# Patient Record
Sex: FEMALE | Race: BLACK OR AFRICAN AMERICAN | NOT HISPANIC OR LATINO | Employment: OTHER | URBAN - METROPOLITAN AREA
[De-identification: names, ages, dates, MRNs, and addresses within clinical notes are randomized per-mention and may not be internally consistent; named-entity substitution may affect disease eponyms.]

---

## 2014-10-30 LAB — PAP RECOMMENDATION EXT: NORMAL

## 2017-08-22 ENCOUNTER — HOSPITAL ENCOUNTER (EMERGENCY)
Facility: HOSPITAL | Age: 57
Discharge: HOME | End: 2017-08-22
Payer: MEDICARE

## 2017-08-22 VITALS
SYSTOLIC BLOOD PRESSURE: 196 MMHG | TEMPERATURE: 96 F | DIASTOLIC BLOOD PRESSURE: 108 MMHG | HEART RATE: 95 BPM | WEIGHT: 130 LBS | HEIGHT: 60 IN | BODY MASS INDEX: 25.52 KG/M2 | OXYGEN SATURATION: 98 % | RESPIRATION RATE: 18 BRPM

## 2017-08-22 DIAGNOSIS — Z86.59: ICD-10-CM

## 2017-08-22 DIAGNOSIS — M54.9 BACK PAIN, UNSPECIFIED BACK LOCATION, UNSPECIFIED BACK PAIN LATERALITY, UNSPECIFIED CHRONICITY: Primary | ICD-10-CM

## 2017-08-22 DIAGNOSIS — R03.0 ELEVATED BLOOD PRESSURE READING: ICD-10-CM

## 2017-08-22 PROCEDURE — 99283 EMERGENCY DEPT VISIT LOW MDM: CPT

## 2017-08-22 NOTE — ED PROVIDER NOTES
"SCRIBE #1 NOTE: I, Tuannoah Ramos, am scribing for, and in the presence of, ORIANA Cardona. I have scribed the entire note.      History      Chief Complaint   Patient presents with    Other     was told by  to bring patient for xray because patient was walking "funny"       Review of patient's allergies indicates:  No Known Allergies     HPI   HPI    8/22/2017, 5:14 PM   History obtained from the adult caretaker and patient      History of Present Illness: Juan Pablo Bolden is a 57 y.o. female patient who presents to the Emergency Department for an evaluation of back pain which onset gradually today. Pt was reportedly brought here by a  for a back xray due to pt "walking funny" and c/o back pain. Pt's caretakers state pt is not c/o back pain and she is walking normally to their knowledge. Caretakers deny any known trauma. Patient denies any fever, chills, extremity weakness/numbness, saddle anesthesia, bowel/bladder incontinence, and all other sxs at this time. No further complaints or concerns at this time.         Arrival mode: Personal vehicle    PCP: Charity Mcgovern MD       Past Medical History:  Past Medical History:   Diagnosis Date    Galactorrhea        Past Surgical History:  Past surgical history reviewed not relevant      Family History:  Family history reviewed not relevant      Social History:  Social History     Social History Main Topics    Smoking status: Never Smoker    Smokeless tobacco: Unknown    Alcohol use No    Drug use: No    Sexual activity: No       ROS   Review of Systems   Constitutional: Negative for chills and fever.   HENT: Negative for congestion, sore throat and trouble swallowing.    Respiratory: Negative for cough and shortness of breath.    Cardiovascular: Negative for chest pain.   Gastrointestinal: Negative for abdominal pain, nausea and vomiting.   Genitourinary: Negative for dysuria and hematuria.        (-) Bladder incontinence "   Musculoskeletal: Positive for back pain. Negative for gait problem, neck pain and neck stiffness.   Skin: Negative for rash and wound.   Neurological: Negative for weakness, light-headedness and headaches.        (-) Saddle anesthesia   Hematological: Does not bruise/bleed easily.     Physical Exam      Initial Vitals [08/22/17 1619]   BP Pulse Resp Temp SpO2   (!) 196/108 95 18 (!) 95.9 °F (35.5 °C) 98 %      MAP       137.33          Physical Exam  Nursing Notes and Vital Signs Reviewed.  Constitutional: Patient is in no acute distress. Well-developed and well-nourished.  Head: Atraumatic. Normocephalic.  Eyes: PERRL. EOM intact. Conjunctivae are not pale. No scleral icterus.  ENT: Mucous membranes are moist. Oropharynx is clear and symmetric.    Neck: Supple. Full ROM. No lymphadenopathy.  Cardiovascular: Regular rate. Regular rhythm.  Pulmonary/Chest: No respiratory distress.   Abdominal: Soft and non-distended.  There is no tenderness.   Musculoskeletal: Moves all extremities. No obvious deformities. No back pain or gait problem noted upon examination.  Skin: Warm and dry.  Neurological:  Alert, awake, and appropriate.  Normal speech.  No acute focal neurological deficits are appreciated.  Psychiatric: Normal affect. Good eye contact. Appropriate in content.    ED Course    Procedures  ED Vital Signs:  Vitals:    08/22/17 1619   BP: (!) 196/108   Pulse: 95   Resp: 18   Temp: (!) 95.9 °F (35.5 °C)   TempSrc: Tympanic   SpO2: 98%   Weight: 59 kg (130 lb)   Height: 5' (1.524 m)            The Emergency Provider reviewed the vital signs and test results, which are outlined above.    ED Discussion     5:23 PM: Reassessed pt at this time. Pt is awake, alert, and in NAD. Discussed with pt all pertinent ED information. Discussed pt dx and plan of tx. Gave pt all f/u and return to the ED instructions. All questions and concerns were addressed at this time. Pt expresses understanding of information and instructions,  and is comfortable with plan to discharge. Pt is stable for discharge.    I discussed with patient and/or family/caretaker that evaluation in the ED does not suggest any emergent or life threatening medical conditions requiring immediate intervention beyond what was provided in the ED, and I believe patient is safe for discharge.  Regardless, an unremarkable evaluation in the ED does not preclude the development or presence of a serious of life threatening condition. As such, patient was instructed to return immediately for any worsening or change in current symptoms.      ED Medication(s):  Medications - No data to display    Discharge Medication List as of 8/22/2017  5:23 PM          Follow-up Information     Charity Mcgovern MD In 3 days.    Specialty:  Nephrology  Contact information:  [address] [place] [address]  [address]  [facility] [id number] [phone]                     Medical Decision Making              Scribe Attestation:   Scribe #1: I performed the above scribed service and the documentation accurately describes the services I performed. I attest to the accuracy of the note.    Attending:   Physician Attestation Statement for Scribe #1: I, ORIANA Cardona, personally performed the services described in this documentation, as scribed by Tuan Ramos, in my presence, and it is both accurate and complete.          Clinical Impression       ICD-10-CM ICD-9-CM   1. Back pain, unspecified back location, unspecified back pain laterality, unspecified chronicity M54.9 724.5   2. History of mental retardation Z86.59 V11.8   3. Elevated blood pressure reading R03.0 796.2       Disposition:   Disposition: Discharged  Condition: Stable         Nereida Parson PA-C  08/23/17 0204

## 2017-11-27 ENCOUNTER — OFFICE VISIT (OUTPATIENT)
Dept: PODIATRY | Facility: CLINIC | Age: 57
End: 2017-11-27
Payer: MEDICARE

## 2017-11-27 VITALS — DIASTOLIC BLOOD PRESSURE: 74 MMHG | HEIGHT: 60 IN | SYSTOLIC BLOOD PRESSURE: 111 MMHG | HEART RATE: 64 BPM

## 2017-11-27 DIAGNOSIS — B35.1 DERMATOPHYTOSIS OF NAIL: ICD-10-CM

## 2017-11-27 DIAGNOSIS — B35.1 ONYCHOMYCOSIS OF TOENAIL: Primary | ICD-10-CM

## 2017-11-27 PROCEDURE — 99999 PR PBB SHADOW E&M-EST. PATIENT-LVL II: CPT | Mod: PBBFAC,,, | Performed by: PODIATRIST

## 2017-11-27 PROCEDURE — 99212 OFFICE O/P EST SF 10 MIN: CPT | Mod: PBBFAC | Performed by: PODIATRIST

## 2017-11-27 PROCEDURE — 99202 OFFICE O/P NEW SF 15 MIN: CPT | Mod: S$PBB,,, | Performed by: PODIATRIST

## 2017-11-27 RX ORDER — QUETIAPINE FUMARATE 100 MG/1
TABLET, FILM COATED ORAL
COMMUNITY
End: 2019-11-12 | Stop reason: ALTCHOICE

## 2017-11-27 RX ORDER — DIVALPROEX SODIUM 500 MG/1
500 TABLET, DELAYED RELEASE ORAL EVERY 8 HOURS
COMMUNITY
End: 2019-11-12 | Stop reason: ALTCHOICE

## 2017-11-27 RX ORDER — DIAZEPAM 5 MG/1
5 TABLET ORAL EVERY 6 HOURS PRN
COMMUNITY
End: 2020-02-06

## 2017-12-04 NOTE — PROGRESS NOTES
Subjective:     Patient ID: Juan Pablo Bolden is a 57 y.o. female.    Chief Complaint: Nail Care (patient is accompanied by her sister and caregiver, nail care/feet check )    Juan Pablo is a 57 y.o. female who presents to the clinic for evaluation and treatment of high risk feet. Juan Pablo has a past medical history of Galactorrhea. The patient's chief complaint is long, thick toenails. Patient is mentally delayed and is non-verbal. Patient is accompaied by her sister and aid.     PCP: Charity Mcgovern MD    Date Last Seen by PCP: 10/2017    Current shoe gear:  Affected Foot: Tennis shoes     Unaffected Foot: Tennis shoes    Last encounter in this department: Visit date not found    Hemoglobin A1C   Date Value Ref Range Status   10/14/2014 5.2 4.5 - 6.2 % Final           Patient Active Problem List   Diagnosis    Central hypothyroidism    Lipodystrophy    Osteopenia    Hyperprolactinemia       Medication List with Changes/Refills   Current Medications    CALCIUM CITRATE-VITAMIN D3 315-200 MG (CITRACAL+D) 315-200 MG-UNIT PER TABLET    Take 1 tablet by mouth 2 (two) times daily.    DIAZEPAM (VALIUM) 5 MG TABLET    Take 5 mg by mouth every 6 (six) hours as needed for Anxiety.    DIVALPROEX (DEPAKOTE) 500 MG TBEC    Take 500 mg by mouth every 8 (eight) hours.    HYDROCODONE-ACETAMINOPHEN ORAL    Take by mouth.    MELATONIN 5 MG TAB    Take by mouth.    MULTIVITAMIN,THERAPEUTIC (THEREMS ORAL)    Take by mouth.    MULTIVITS,CA,MINERALS/IRON/FA (THERAPEUTIC-M ORAL)    Take by mouth.    POLYETHYLENE GLYCOL (GLYCOLAX) 17 GRAM/DOSE POWDER    Take 17 g by mouth once daily.    QUETIAPINE (SEROQUEL) 100 MG TAB    Take by mouth.    SIMVASTATIN (ZOCOR) 20 MG TABLET    Take 20 mg by mouth every evening.    TRIAMTERENE-HYDROCHLOROTHIAZIDE 37.5-25 MG (DYAZIDE) 37.5-25 MG PER CAPSULE    Take 1 capsule by mouth every morning.   Discontinued Medications    ACETAMINOPHEN (TYLENOL ARTHRITIS PAIN) 650 MG TBSR    Take 650 mg by mouth as needed.     ACETAMINOPHEN-CODEINE 300-30MG (TYLENOL #3) 300-30 MG TAB        ACETYLCYSTEINE (NAC) 600 MG CAP    Take 600 mg by mouth 3 (three) times daily.    ESTROGEN, CONJUGATED,-MEDROXYPROGESTERONE 0.3-1.5 MG (PREMPRO) 0.3-1.5 MG PER TABLET    Take 1 tablet by mouth once daily.    IBUPROFEN (ADVIL,MOTRIN) 600 MG TABLET        OXCARBAZEPINE (TRILEPTAL) 300 MG/5 ML SUSP    Take by mouth 2 (two) times daily.    QUETIAPINE (SEROQUEL) 25 MG TAB        TUBERCULIN,PURIF.PROT.DERIV. (TUBERSOL IDRM)    Inject into the skin.       Review of patient's allergies indicates:  No Known Allergies    History reviewed. No pertinent surgical history.    History reviewed. No pertinent family history.    Social History     Social History    Marital status: Single     Spouse name: N/A    Number of children: N/A    Years of education: N/A     Occupational History    Not on file.     Social History Main Topics    Smoking status: Never Smoker    Smokeless tobacco: Never Used    Alcohol use No    Drug use: No    Sexual activity: No     Other Topics Concern    Not on file     Social History Narrative    No narrative on file       Vitals:    11/27/17 1354   BP: 111/74   Pulse: 64   Height: 5' (1.524 m)   PainSc: 0-No pain       Hemoglobin A1C   Date Value Ref Range Status   10/14/2014 5.2 4.5 - 6.2 % Final       Review of Systems   Unable to perform ROS: Mental acuity             Objective:      PHYSICAL EXAM: Apperance: Alert and orient in no distress,well developed, and with good attention to grooming and body habits  LOWER EXTREMITY EXAM:  VASCULAR: Dorsalis pedis pulses 2/4 bilateral and Posterior Tibial pulses 2/4 bilateral. Capillary fill time <4 seconds bilateral. No edema observed bilateral. Varicosities absent bilateral. Skin temperature of the lower extremities is warm to warm, proximal to distal. Hair growth WNL bilateral.  DERMATOLOGICAL: No skin rashes, subcutaneous nodules, lesions, or ulcers observed bilateral. Nails  1,2,3,4,5 left elongated, thickened, and discolored with subungual debris and nails 1,2,3,4,5 right elongated. Webspaces 1-4 clean, dry and without evidence of break in skin integrity bilateral.   NEUROLOGICAL: Light touch, sharp-dull, proprioception all present and equal bilaterally.     MUSCULOSKELETAL: Muscle strength is 5/5 for foot inverters, everters, plantarflexors, and dorsiflexors. Muscle tone is normal. Negative pain on palpation of nails bilateral.           Assessment:       Encounter Diagnoses   Name Primary?    Onychomycosis of toenail - Left Foot Yes    Dermatophytosis of nail - Right Foot          Plan:   Onychomycosis of toenail - Left Foot    Dermatophytosis of nail - Right Foot      I counseled the patient on her conditions, regarding findings of my examination, my impressions, and usual treatment plan.   Patient instructed to spray all shoes with Lysol disinfectant spray and let dry before wearing. Patient instructed to wash all socks in hot water and bleach.  Discuss treatment options for nail fungus.  I explained that fungus lives in a warm dark moist environment and therefore patient should make every attempt to keep feet clean and dry.  We discussed drying feet thoroughly after shower particularly between the toes and then applying powder between the toes and in the shoes.  For fungal toenails I prescribed topical medication to be used daily for up to a year.  We discussed oral Lamisil but I did not recommend it as a first line of treatment since it is an internal medicine that may potentially have side effects, including liver problems. Patient elects for topical treatment.   Patient to return as needed.                             Elsa Wilkes DPM  [facility]

## 2018-01-06 ENCOUNTER — OFFICE VISIT (OUTPATIENT)
Dept: URGENT CARE | Facility: CLINIC | Age: 58
End: 2018-01-06
Payer: MEDICARE

## 2018-01-06 VITALS
OXYGEN SATURATION: 94 % | SYSTOLIC BLOOD PRESSURE: 120 MMHG | BODY MASS INDEX: 22.81 KG/M2 | DIASTOLIC BLOOD PRESSURE: 82 MMHG | TEMPERATURE: 97 F | HEIGHT: 58 IN | WEIGHT: 108.69 LBS | HEART RATE: 96 BPM

## 2018-01-06 DIAGNOSIS — J06.9 VIRAL URI WITH COUGH: Primary | ICD-10-CM

## 2018-01-06 PROCEDURE — 99999 PR PBB SHADOW E&M-EST. PATIENT-LVL III: CPT | Mod: PBBFAC,,, | Performed by: NURSE PRACTITIONER

## 2018-01-06 PROCEDURE — 99213 OFFICE O/P EST LOW 20 MIN: CPT | Mod: PBBFAC,PO | Performed by: NURSE PRACTITIONER

## 2018-01-06 PROCEDURE — 99214 OFFICE O/P EST MOD 30 MIN: CPT | Mod: S$PBB,,, | Performed by: NURSE PRACTITIONER

## 2018-01-06 RX ORDER — BENZONATATE 100 MG/1
100 CAPSULE ORAL 3 TIMES DAILY PRN
Qty: 60 CAPSULE | Refills: 0 | Status: SHIPPED | OUTPATIENT
Start: 2018-01-06 | End: 2018-01-16

## 2018-01-06 NOTE — PATIENT INSTRUCTIONS
Viral Upper Respiratory Illness (Adult)  You have a viral upper respiratory illness (URI), which is another term for the common cold. This illness is contagious during the first few days. It is spread through the air by coughing and sneezing. It may also be spread by direct contact (touching the sick person and then touching your own eyes, nose, or mouth). Frequent handwashing will decrease risk of spread. Most viral illnesses go away within 7 to 10 days with rest and simple home remedies. Sometimes the illness may last for several weeks. Antibiotics will not kill a virus, and they are generally not prescribed for this condition.    Home care  · If symptoms are severe, rest at home for the first 2 to 3 days. When you resume activity, don't let yourself get too tired.  · Avoid being exposed to cigarette smoke (yours or others).  · You may use acetaminophen or ibuprofen to control pain and fever, unless another medicine was prescribed. (Note: If you have chronic liver or kidney disease, have ever had a stomach ulcer or gastrointestinal bleeding, or are taking blood-thinning medicines, talk with your healthcare provider before using these medicines.) Aspirin should never be given to anyone under 18 years of age who is ill with a viral infection or fever. It may cause severe liver or brain damage.  · Your appetite may be poor, so a light diet is fine. Avoid dehydration by drinking 6 to 8 glasses of fluids per day (water, soft drinks, juices, tea, or soup). Extra fluids will help loosen secretions in the nose and lungs.  · Over-the-counter cold medicines will not shorten the length of time youre sick, but they may be helpful for the following symptoms: cough, sore throat, and nasal and sinus congestion. (Note: Do not use decongestants if you have high blood pressure.)  Follow-up care  Follow up with your healthcare provider, or as advised.  When to seek medical advice  Call your healthcare provider right away if any  of these occur:  · Cough with lots of colored sputum (mucus)  · Severe headache; face, neck, or ear pain  · Difficulty swallowing due to throat pain  · Fever of 100.4°F (38°C)  Call 911, or get immediate medical care  Call emergency services right away if any of these occur:  · Chest pain, shortness of breath, wheezing, or difficulty breathing  · Coughing up blood  · Inability to swallow due to throat pain  Date Last Reviewed: 9/13/2015  © 6879-6651 [employer]. [address] [place] [address]. All rights reserved. This information is not intended as a substitute for professional medical care. Always follow your healthcare professional's instructions.

## 2018-01-07 NOTE — PROGRESS NOTES
Subjective:       Patient ID: Juan Pablo Bolden is a 57 y.o. female.    Chief Complaint: Cough (x1 day)    Cough   This is a new problem. The current episode started today. The problem has been waxing and waning. The problem occurs every few minutes. The cough is non-productive. Associated symptoms include rhinorrhea. Pertinent negatives include no chest pain, chills, ear congestion, ear pain, fever, hemoptysis, nasal congestion, postnasal drip, rash, sore throat, shortness of breath, sweats, weight loss or wheezing. Nothing aggravates the symptoms. She has tried nothing for the symptoms. There is no history of asthma, bronchiectasis, bronchitis, COPD, emphysema, environmental allergies or pneumonia.     Review of Systems   Constitutional: Negative for chills, fever and weight loss.   HENT: Positive for rhinorrhea. Negative for ear pain, postnasal drip and sore throat.    Respiratory: Positive for cough. Negative for hemoptysis, shortness of breath, wheezing and stridor.    Cardiovascular: Negative for chest pain, palpitations and leg swelling.   Skin: Negative for rash.   Allergic/Immunologic: Negative for environmental allergies.   Neurological: Negative for dizziness.   Psychiatric/Behavioral:        Mental retardation.       Objective:      Physical Exam   Constitutional: She appears well-developed and well-nourished.   HENT:   Head: Normocephalic.   Right Ear: Tympanic membrane normal.   Left Ear: Tympanic membrane normal.   Nose: Rhinorrhea present. No mucosal edema.   Mouth/Throat: Uvula is midline, oropharynx is clear and moist and mucous membranes are normal.   Cardiovascular: Normal rate and normal heart sounds.    Pulmonary/Chest: Effort normal and breath sounds normal.   Non productive cough   Neurological: She is alert.   Skin: Skin is warm.   Psychiatric: She is hyperactive and combative.   Nursing note and vitals reviewed.      Assessment:       1. Viral URI with cough        Plan:         Juan Pablo was seen  today for cough.    Diagnoses and all orders for this visit:    Viral URI with cough  -     benzonatate (TESSALON) 100 MG capsule; Take 1 capsule (100 mg total) by mouth 3 (three) times daily as needed.    · Your symptoms are likely due to a viral infection. These infections can last up to 14 days, but you should notice some improvement of your symptoms within the first 7-10 days. Viral infections will not improve with antibiotics. If your symptoms persist >10 days without improvement or if you have any new or worsening symptoms, this is an indication that you may have developed a bacterial infection and should return to your primary care provider or to Urgent Care.   · Getting plenty of rest is very important to fighting infections.  · Increase fluids.   · May apply warm compresses as needed for facial pain and congestion.   · Saline nasal spray to loosen nasal congestion.  · Flonase or Nasacort to reduce inflammation in the sinus cavities.  · You may take an over the counter antihistamine for allergy symptoms such as sneezing, itchy/watery eyes, scratchy throat, or congestion.  · Take Tylenol or Ibuprofen as needed for sore throat, body aches, or fever.   · Follow up with your primary care provider if symptoms persist >10 days or sooner for any new or worsening symptoms.   Go to the ER for any fever that does not improve with Tylenol/Ibuprofen, neck stiffness, rash, severe headache, vision changes, shortness of breath, chest pain, facial swelling, severe facial pain, or any other new and concerning symptoms.

## 2018-12-06 ENCOUNTER — TELEPHONE (OUTPATIENT)
Dept: HEMATOLOGY/ONCOLOGY | Facility: CLINIC | Age: 58
End: 2018-12-06

## 2018-12-06 NOTE — TELEPHONE ENCOUNTER
"Spoke with Luis at [facility] regarding obtaining lab test & providers notes.  He said to call [facility] [phone].  Called clinic and spoke with Viky.  She will fax records to us after she goes thru 'privacy' & referral protocol.    ====================================  ----- Message from Bernardo Logan RN sent at 12/5/2018 10:54 AM CST -----  Contact: Gi or Luis, [facility]    Spoke to Luis, patient needs to be seen for "low white blood cells"    ----- Message -----  From: Margi Khan  Sent: 12/5/2018  10:38 AM  To: , #    MsAnibal Gi is calling looking for a specific physician,  She did not have the correct spelling, but believes Staff may know who it is.  She is requesting a returned call.    She can be reached at [phone].    Thank you.      "

## 2019-05-21 ENCOUNTER — TELEPHONE (OUTPATIENT)
Dept: PODIATRY | Facility: CLINIC | Age: 59
End: 2019-05-21

## 2019-05-21 NOTE — TELEPHONE ENCOUNTER
Spoke with pt's [facility] contact-Luis.  Informed him that the earliest available appointment was 6/4/19.  Caller understood and call ended pleasantly.       ----- Message from Christine Ochoa sent at 5/21/2019  9:46 AM CDT -----  Contact: Gi/Provider  Type:  Sooner Apoointment Request    Caller is requesting a sooner appointment.  Caller declined first available appointment listed below.  Caller will not accept being placed on the waitlist and is requesting a message be sent to doctor.  Name of Caller:Gi  When is the first available appointment?6/4  Symptoms:toe nail coming thru shoe  Would the patient rather a call back or a response via [facility]? Call back  best Call Back Number:[phone]  Additional Information: na

## 2019-06-04 ENCOUNTER — OFFICE VISIT (OUTPATIENT)
Dept: PODIATRY | Facility: CLINIC | Age: 59
End: 2019-06-04
Payer: MEDICARE

## 2019-06-04 DIAGNOSIS — B35.1 ONYCHOMYCOSIS: Primary | ICD-10-CM

## 2019-06-04 DIAGNOSIS — M79.674 PAIN IN TOES OF BOTH FEET: ICD-10-CM

## 2019-06-04 DIAGNOSIS — M79.675 PAIN IN TOES OF BOTH FEET: ICD-10-CM

## 2019-06-04 DIAGNOSIS — F79 MENTAL HANDICAP: ICD-10-CM

## 2019-06-04 PROCEDURE — 99499 NO LOS: ICD-10-PCS | Mod: ,,, | Performed by: PODIATRIST

## 2019-06-04 PROCEDURE — 99499 UNLISTED E&M SERVICE: CPT | Mod: ,,, | Performed by: PODIATRIST

## 2019-06-04 PROCEDURE — 99999 PR PBB SHADOW E&M-EST. PATIENT-LVL I: CPT | Mod: PBBFAC,,, | Performed by: PODIATRIST

## 2019-06-04 PROCEDURE — 99211 OFF/OP EST MAY X REQ PHY/QHP: CPT | Mod: PBBFAC | Performed by: PODIATRIST

## 2019-06-04 PROCEDURE — 17999 UNLISTD PX SKN MUC MEMB SUBQ: CPT | Mod: CSM,S$GLB,, | Performed by: PODIATRIST

## 2019-06-04 PROCEDURE — 99999 PR PBB SHADOW E&M-EST. PATIENT-LVL I: ICD-10-PCS | Mod: PBBFAC,,, | Performed by: PODIATRIST

## 2019-06-04 PROCEDURE — 17999 PR NON-COVERED FOOT CARE: ICD-10-PCS | Mod: CSM,S$GLB,, | Performed by: PODIATRIST

## 2019-06-04 RX ORDER — TRIAMTERENE AND HYDROCHLOROTHIAZIDE 37.5; 25 MG/1; MG/1
1 CAPSULE ORAL
COMMUNITY
End: 2020-02-05 | Stop reason: SDUPTHER

## 2019-06-04 RX ORDER — GUAIFENESIN 600 MG/1
TABLET, EXTENDED RELEASE ORAL
Refills: 0 | COMMUNITY
Start: 2019-03-28 | End: 2021-03-26

## 2019-06-04 RX ORDER — SIMVASTATIN 40 MG/1
40 TABLET, FILM COATED ORAL
COMMUNITY
End: 2020-03-21 | Stop reason: SDUPTHER

## 2019-06-04 RX ORDER — MELATONIN 10 MG
2 CAPSULE ORAL
COMMUNITY
End: 2020-03-21 | Stop reason: SDUPTHER

## 2019-06-04 RX ORDER — BENZONATATE 100 MG/1
100 CAPSULE ORAL
COMMUNITY
End: 2020-02-05

## 2019-06-04 RX ORDER — DIAZEPAM 5 MG/1
5 TABLET ORAL
COMMUNITY
End: 2020-02-05 | Stop reason: SDUPTHER

## 2019-06-04 RX ORDER — QUETIAPINE FUMARATE 100 MG/1
100 TABLET, FILM COATED ORAL
COMMUNITY
End: 2019-11-12 | Stop reason: ALTCHOICE

## 2019-06-04 NOTE — PROGRESS NOTES
Subjective:       Patient ID: Juan Pablo Bolden is a 58 y.o. female.    Chief Complaint: Nail Care (possible fungal nail-left hallux. )      HPI: Juan Pablo Bolden presents to the office with the chief complaint of elongated, thickened and dystrophic nail plates to the B/L foot. This patient does have Peripheral Angiopathy. Patient does follow with Primary Care for management of comorbid states. This patient's PMD is Charity Mcgovern MD. This patient last saw his/her primary care provider, several weeks ago.    Review of patient's allergies indicates:  No Known Allergies    Past Medical History:   Diagnosis Date    Galactorrhea        History reviewed. No pertinent family history.    Social History     Socioeconomic History    Marital status: Single     Spouse name: Not on file    Number of children: Not on file    Years of education: Not on file    Highest education level: Not on file   Occupational History    Not on file   Social Needs    Financial resource strain: Not on file    Food insecurity:     Worry: Not on file     Inability: Not on file    Transportation needs:     Medical: Not on file     Non-medical: Not on file   Tobacco Use    Smoking status: Never Smoker    Smokeless tobacco: Never Used   Substance and Sexual Activity    Alcohol use: No    Drug use: No    Sexual activity: Never   Lifestyle    Physical activity:     Days per week: Not on file     Minutes per session: Not on file    Stress: Not on file   Relationships    Social connections:     Talks on phone: Not on file     Gets together: Not on file     Attends Taoism service: Not on file     Active member of club or organization: Not on file     Attends meetings of clubs or organizations: Not on file     Relationship status: Not on file   Other Topics Concern    Not on file   Social History Narrative    Not on file       History reviewed. No pertinent surgical history.    Review of Systems   Unable to perform ROS: Psychiatric  disorder          Objective:   There were no vitals taken for this visit.    LOWER EXTREMITY PHYSICAL EXAMINATION    NEUROLOGY: Protective sensation is intact via 5.07 [place] Wally monofilament. Proprioception is intact. Sensation to light touch is intact.     VASCULAR: Hair growth is sparse on the left and right dorsal foot and at the digits. The left dorsalis pedis pulse is 1/4 and on the right is 1/4, and the left posterior tibial pulse is 1/4 and is 1/4 on the right. Varicosities are absent. Spider veins are absent to the bilateral lower extremity. Warm to warm, proximal to distal. Capillary refill time is within normal limits and less  than 3 seconds.     DERMATOLOGY: There are nail changes which are consistent with onychomycosis on the left and right foot. On the left, nails 1, 2, 3 and 5 are thickened, are dystrophic, with yellow discoloration, and with malodorous subungual debris. On the right, nails 1 are thickened, are dystrophic, with yellow discoloration, and with malodorous subungual debris. These thickened and dystrophic nails are painful to touch and palpation. The remaining nail plates are elongated, and are not suggestive of onychomycosis.     ORTHOPEDIC: Manual Muscle Testing is 5/5 in all planes on the left and right, without pains, with and without resistance.  Patient is ambulatory with the assistance of a wheelchair.      Assessment:     1. Onychomycosis    2. Pain in toes of both feet    3. Mental handicap        Plan:     Onychomycosis    Pain in toes of both feet    Mental handicap        The onychomycotic nail plates, as outlined above, are sharply debrided with double action nail nipper, and/or with the assistance of a mechanical rotary lucas, with removal of all offending nail and nail border(s), for reduction of pains. Nails are reduced in terms of length, width and girth with removal of subungual debris to facilitate pain free weight bearing and ambulation. The elongated nails as  outlined in the objective portion of this note, were trimmed to appropriate length, with a double action nail nipper, for alleviation/reduction of pains as well. Follow up in approx. 3-4 months.    Patient does not meet the qualifications for, or have the class findings necessary for routine foot care. There is no lack of or diminished sensation and he/she has no significant findings of PVD to the B/L LE. At any follow-up appointment concerning routine foot care, patient will be PROC-B, and will be required to pay for services.  This fact is explained to the patient and/or any family who is present at today's appt.        No future appointments.

## 2019-08-26 ENCOUNTER — TELEPHONE (OUTPATIENT)
Dept: NEUROLOGY | Facility: CLINIC | Age: 59
End: 2019-08-26

## 2019-08-26 NOTE — TELEPHONE ENCOUNTER
I called Luis at [facility] in regards to any paperwork that the patient may need to fill out before the visit with Dr. Simon. I left a message for a return call.

## 2019-08-26 NOTE — TELEPHONE ENCOUNTER
"----- Message from Arina Hill sent at 8/26/2019  4:13 PM CDT -----  Contact: Luis Sorensen - [facility]  Luis ask if there's any forms that need to be completed to be fax to office so that they can be completed before appointment      Contact Info: [phone] office   [phone] fax      Additional Information: Dial "0" for the  and ask for Luis"

## 2019-08-29 ENCOUNTER — TELEPHONE (OUTPATIENT)
Dept: NEUROLOGY | Facility: CLINIC | Age: 59
End: 2019-08-29

## 2019-08-29 ENCOUNTER — OFFICE VISIT (OUTPATIENT)
Dept: NEUROLOGY | Facility: CLINIC | Age: 59
End: 2019-08-29
Payer: MEDICARE

## 2019-08-29 VITALS — BODY MASS INDEX: 28.09 KG/M2 | WEIGHT: 133.81 LBS | HEIGHT: 58 IN

## 2019-08-29 DIAGNOSIS — R45.1 AGITATION: ICD-10-CM

## 2019-08-29 DIAGNOSIS — R25.9 ABNORMAL MOVEMENT: Primary | ICD-10-CM

## 2019-08-29 PROCEDURE — 99999 PR PBB SHADOW E&M-EST. PATIENT-LVL V: ICD-10-PCS | Mod: PBBFAC,,, | Performed by: PSYCHIATRY & NEUROLOGY

## 2019-08-29 PROCEDURE — 99203 PR OFFICE/OUTPT VISIT, NEW, LEVL III, 30-44 MIN: ICD-10-PCS | Mod: S$PBB,,, | Performed by: PSYCHIATRY & NEUROLOGY

## 2019-08-29 PROCEDURE — 99999 PR PBB SHADOW E&M-EST. PATIENT-LVL V: CPT | Mod: PBBFAC,,, | Performed by: PSYCHIATRY & NEUROLOGY

## 2019-08-29 PROCEDURE — 99215 OFFICE O/P EST HI 40 MIN: CPT | Mod: PBBFAC,PO | Performed by: PSYCHIATRY & NEUROLOGY

## 2019-08-29 PROCEDURE — 99203 OFFICE O/P NEW LOW 30 MIN: CPT | Mod: S$PBB,,, | Performed by: PSYCHIATRY & NEUROLOGY

## 2019-08-29 NOTE — TELEPHONE ENCOUNTER
----- Message from Yue Obreogn sent at 8/29/2019  9:19 AM CDT -----  Contact: Nurse Rubi Simon Neurology ([facility])  Type:  Sooner Apoointment Request    Caller is requesting a sooner appointment.  Caller declined first available appointment listed below.  Caller will not accept being placed on the waitlist and is requesting a message be sent to doctor.  Name of Caller: Nurse Hoyos ([facility])  When is the first available appointment?n/a  Symptoms: consult for movement disorder  Would the patient rather a call back or a response via [facility]? Call back   Best Call Back Number: [phone] ask for neurology   Additional Information: n/a

## 2019-08-30 ENCOUNTER — TELEPHONE (OUTPATIENT)
Dept: NEUROLOGY | Facility: CLINIC | Age: 59
End: 2019-08-30

## 2019-08-30 NOTE — PROGRESS NOTES
Neurology Clinic Visit  Primary Care Provider: Charity Mcgovern MD   Referring Provider: Self, Aaareffemi   Date of Visit: 08/29/2019     chief complaint:   Chief Complaint   Patient presents with    Consult     movement disorder       History of Present Illness  Juan Pablo Bolden is a 59 y.o. female I have been asked to consult for evaluation of abnormal movements. She was supposed to be scheduled with a movement disorder specialist but she was schedule with with me in error. She was previously seen by Dr. Ferreira (movement disorder specialist in the past). He is accompanied by someone from her facility who assist with history. The patient has baseline neurocognitive impairment (developmental delay) and currently on seroquel and depakote for mood.  She was noted to have keith buccal movements on examination.         Patient Active Problem List    Diagnosis Date Noted    Mental handicap 06/04/2019    Hyperprolactinemia 06/24/2016    Osteopenia 10/09/2014    Central hypothyroidism 07/02/2014    Lipodystrophy 07/02/2014     Past Medical History:   Diagnosis Date    Galactorrhea      No past surgical history on file.  No family history on file.      Current Outpatient Medications   Medication Sig    benzonatate (TESSALON) 100 MG capsule Take 100 mg by mouth.    diazePAM (VALIUM) 5 MG tablet Take 5 mg by mouth every 6 (six) hours as needed for Anxiety.    diazePAM (VALIUM) 5 MG tablet Take 5 mg by mouth.    divalproex (DEPAKOTE) 500 MG TbEC Take 500 mg by mouth every 8 (eight) hours.    melatonin 5 mg Tab Take by mouth.    MUCINEX 600 mg 12 hr tablet TAKE 1 TABLET BY MOUTH EVERY TWELVE HOURS AS NEEDED    paliperidone palmitate (INVEGA SUSTENNA) 234 mg/1.5 mL Syrg injection Inject 234 mg into the muscle.    QUEtiapine (SEROQUEL) 100 MG Tab Take by mouth.    QUEtiapine (SEROQUEL) 100 MG Tab Take 100 mg by mouth.    simvastatin (ZOCOR) 40 MG tablet Take 40 mg by mouth.    THERA-M Tab Take 1 tablet by mouth  once daily.    triamterene-hydrochlorothiazide 37.5-25 mg (DYAZIDE) 37.5-25 mg per capsule Take 1 capsule by mouth every morning.    triamterene-hydrochlorothiazide 37.5-25 mg (DYAZIDE) 37.5-25 mg per capsule Take 1 capsule by mouth.    calcium citrate-vitamin D3 315-200 mg (CITRACAL+D) 315-200 mg-unit per tablet Take 1 tablet by mouth 2 (two) times daily.    HYDROCODONE-ACETAMINOPHEN ORAL Take by mouth.    melatonin 10 mg Cap Take 2 tablets by mouth.    MULTIVITAMIN,THERAPEUTIC (THEREMS ORAL) Take by mouth.    MULTIVITS,CA,MINERALS/IRON/FA (THERAPEUTIC-M ORAL) Take by mouth.    polyethylene glycol (GLYCOLAX) 17 gram/dose powder Take 17 g by mouth once daily.    simvastatin (ZOCOR) 20 MG tablet Take 20 mg by mouth every evening.     No current facility-administered medications for this visit.        Review of patient's allergies indicates:  No Known Allergies  Social History     Socioeconomic History    Marital status: Single     Spouse name: Not on file    Number of children: Not on file    Years of education: Not on file    Highest education level: Not on file   Occupational History    Not on file   Social Needs    Financial resource strain: Not on file    Food insecurity:     Worry: Not on file     Inability: Not on file    Transportation needs:     Medical: Not on file     Non-medical: Not on file   Tobacco Use    Smoking status: Never Smoker    Smokeless tobacco: Never Used   Substance and Sexual Activity    Alcohol use: No    Drug use: No    Sexual activity: Never   Lifestyle    Physical activity:     Days per week: Not on file     Minutes per session: Not on file    Stress: Not on file   Relationships    Social connections:     Talks on phone: Not on file     Gets together: Not on file     Attends Hinduism service: Not on file     Active member of club or organization: Not on file     Attends meetings of clubs or organizations: Not on file     Relationship status: Not on file   Other  "Topics Concern    Not on file   Social History Narrative    Not on file       Review of Systems      Constitutional: negative  Eyes: negative  Ears, nose, mouth, throat, and face: negative  Respiratory: negative  Cardiovascular: negative  Gastrointestinal: negative  Genitourinary:negative  Integument/breast: negative  Hematologic/lymphatic: negative  Musculoskeletal:negative  Neurological: negative  Behavioral/Psych: negative  Endocrine: negative  Allergic/Immunologic: negative    Objective:  Vital signs in last 24 hours:    Vitals:    08/29/19 0822   Weight: 60.7 kg (133 lb 13.1 oz)   Height: 4' 10" (1.473 m)       Body mass index is 27.97 kg/m².     General: no acute distress, well nourished, well-groomed  CVS: RRR, no murmur, gallops or rubs  Respiratory: Clear to ausculation  Extremities: no edema    Neurological Examination:    HIGHER INTEGRATIVE FUNCTIONS:  -N immediately responds to questions and commands  -Oriented to person  -Recent and remote memory intact  -speaks in full sentences; follows commands  -Normal fund of knowledge    CN:  -PERRLA, visual fields full, unable to visualize optic discs due to small pupils on fundus exam   -EOMI with normal saccades and smooth pursuit  -Facial sensation intact bilaterally  -Facial strength/movement intact bilaterally  -Hearing intact to voice  -Palate elevates symmetrically  -Normal shoulder shrug and head turn  -Tongue protrudes midline    MOTOR: (left/right graded 1-5)   -formal testing was difficult but she was at least 4+/5 in all extremities       SENSORY:  -Light touch intact bilaterally    REFLEXES:  -2+ upper and lower bilaterally  -Flexor plantar reflex bilaterally  -No clonus    COORDINATION:  -FNF intact bilaterally    GAIT:  -steady; walks without assistance      Assessment/Plan:      1. Abnormal movements (oral buccal dyskinesia? Likely from medications); appear chronic as they were mentioned in previous neurology note in 2013  2. " Agitation    Plan:  Referral to movement disorder specialist or neurologist that is closer to her.  Referral to psychiatry      I discussed assessment and plan with care center and answered the questions that they had.     Part of patient note might have been created using Dragon Dictation.  Any errors in syntax or even information may not have been identified and edited on initial review prior to signing this note.

## 2019-08-30 NOTE — TELEPHONE ENCOUNTER
I returned a call to Luis Sorensen and I sent the patient's AVS and appt letters. Call was satisfied

## 2019-11-12 ENCOUNTER — INITIAL CONSULT (OUTPATIENT)
Dept: PSYCHIATRY | Facility: CLINIC | Age: 59
End: 2019-11-12
Payer: MEDICARE

## 2019-11-12 VITALS — BODY MASS INDEX: 27.05 KG/M2 | WEIGHT: 129.44 LBS

## 2019-11-12 DIAGNOSIS — F79 INTELLECTUAL DISABILITY: Primary | ICD-10-CM

## 2019-11-12 DIAGNOSIS — G47.00 INSOMNIA, UNSPECIFIED TYPE: ICD-10-CM

## 2019-11-12 DIAGNOSIS — R45.1 RESTLESSNESS AND AGITATION: ICD-10-CM

## 2019-11-12 PROCEDURE — 90792 PR PSYCHIATRIC DIAGNOSTIC EVALUATION W/MEDICAL SERVICES: ICD-10-PCS | Mod: ,,, | Performed by: PSYCHIATRY & NEUROLOGY

## 2019-11-12 PROCEDURE — 90792 PSYCH DIAG EVAL W/MED SRVCS: CPT | Mod: ,,, | Performed by: PSYCHIATRY & NEUROLOGY

## 2019-11-12 PROCEDURE — 99999 PR PBB SHADOW E&M-EST. PATIENT-LVL II: CPT | Mod: PBBFAC,,, | Performed by: PSYCHIATRY & NEUROLOGY

## 2019-11-12 PROCEDURE — 99999 PR PBB SHADOW E&M-EST. PATIENT-LVL II: ICD-10-PCS | Mod: PBBFAC,,, | Performed by: PSYCHIATRY & NEUROLOGY

## 2019-11-12 PROCEDURE — 99212 OFFICE O/P EST SF 10 MIN: CPT | Mod: PBBFAC | Performed by: PSYCHIATRY & NEUROLOGY

## 2019-11-12 RX ORDER — QUETIAPINE FUMARATE 100 MG/1
TABLET, FILM COATED ORAL
Qty: 45 TABLET | Refills: 3 | Status: SHIPPED | OUTPATIENT
Start: 2019-11-12 | End: 2020-01-15 | Stop reason: ALTCHOICE

## 2019-11-12 RX ORDER — DIVALPROEX SODIUM 250 MG/1
250 TABLET, FILM COATED, EXTENDED RELEASE ORAL DAILY
Qty: 30 TABLET | Refills: 3 | Status: SHIPPED | OUTPATIENT
Start: 2019-11-12 | End: 2020-02-05 | Stop reason: ALTCHOICE
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
## 2019-11-19 RX ORDER — LAMOTRIGINE 100 MG/1
100 TABLET ORAL DAILY
Qty: 30 TABLET | Refills: 1 | Status: SHIPPED | OUTPATIENT
Start: 2020-01-01 | End: 2020-02-06

## 2019-11-19 RX ORDER — LAMOTRIGINE 25 MG/1
TABLET ORAL
Qty: 84 TABLET | Refills: 0 | Status: SHIPPED | OUTPATIENT
Start: 2019-11-19 | End: 2020-02-05 | Stop reason: ALTCHOICE

## 2020-01-15 ENCOUNTER — OFFICE VISIT (OUTPATIENT)
Dept: NEUROLOGY | Facility: CLINIC | Age: 60
End: 2020-01-15
Payer: MEDICARE

## 2020-01-15 VITALS
HEART RATE: 76 BPM | SYSTOLIC BLOOD PRESSURE: 130 MMHG | HEIGHT: 56 IN | WEIGHT: 119.06 LBS | DIASTOLIC BLOOD PRESSURE: 70 MMHG | BODY MASS INDEX: 26.78 KG/M2

## 2020-01-15 DIAGNOSIS — F79 MENTAL HANDICAP: Primary | ICD-10-CM

## 2020-01-15 PROCEDURE — 99999 PR PBB SHADOW E&M-EST. PATIENT-LVL III: ICD-10-PCS | Mod: PBBFAC,,, | Performed by: PSYCHIATRY & NEUROLOGY

## 2020-01-15 PROCEDURE — 99214 OFFICE O/P EST MOD 30 MIN: CPT | Mod: S$PBB,,, | Performed by: PSYCHIATRY & NEUROLOGY

## 2020-01-15 PROCEDURE — 99213 OFFICE O/P EST LOW 20 MIN: CPT | Mod: PBBFAC | Performed by: PSYCHIATRY & NEUROLOGY

## 2020-01-15 PROCEDURE — 99214 PR OFFICE/OUTPT VISIT, EST, LEVL IV, 30-39 MIN: ICD-10-PCS | Mod: S$PBB,,, | Performed by: PSYCHIATRY & NEUROLOGY

## 2020-01-15 PROCEDURE — 99999 PR PBB SHADOW E&M-EST. PATIENT-LVL III: CPT | Mod: PBBFAC,,, | Performed by: PSYCHIATRY & NEUROLOGY

## 2020-01-15 NOTE — LETTER
January 15, 2020      Roque Simon MD  [address] [facility] [address]           [address] [address]  Phone: [phone]  Fax: [phone]          Patient: Juan Pablo Bolden   MR Number: [id number]   YOB: 1960   Date of Visit: 1/15/2020       Dear Dr. Roque Simon:    Thank you for referring Juan Pablo Bolden to me for evaluation. Attached you will find relevant portions of my assessment and plan of care.    If you have questions, please do not hesitate to call me. I look forward to following Juan Pablo Bolden along with you.    Sincerely,    Yuniel Yuen MD    Enclosure  CC:  No Recipients    If you would like to receive this communication electronically, please contact [email or web address] or [phone] to request more information on [employer] access.    For providers and/or their staff who would like to refer a patient to [facility], please contact us through our one-stop-shop provider referral line, [facility], at [phone].    If you feel you have received this communication in error or would no longer like to receive these types of communications, please e-mail [email or web address]

## 2020-01-15 NOTE — PROGRESS NOTES
"Subjective:    Informant:  Patient's caregiver    Patient ID: Juan Pablo Bolden is a 59 y.o. female.    Chief Complaint:   Requesting a neurologist closer to the patient's home    History obtained from the patient's caregiver and review of her available medical records indicate that the patient has had a lifelong history of "mental handicap. "  Review of medical records indicate that the patient has been either with in a state hospital setting or in a group home or now in an individual apartment with a personal caregiver 24 hr a day.  The patient has been previously on multiple medications for behavior management.  According to the caregiver with her today, her best behavior was while she was on Seroquel, but that medication apparently was discontinued because of a low white count.  The patient more recently has been on Depakote 250 mg 2 or 3 times a day.  She has also been utilizing Valium 5 mg during periods of agitation and restlessness which does occur repetitively throughout the day and night.  According to the caregiver who is with her now, the patient has been much more agitated since being removed from Seroquel.   As an example, the caregiver indicates that during the evening hours, the patient is cursing loudly and is extremely restless and agitated.  She is constantly moving and requires almost hands-on care 24 hr a day.  When she is in the community or out of the home, she is given Valium 5 mg before the out any to try to moderate the behavior just slightly.  The caregiver with her today indicates that she does has some acquired her periods during the day when she is not as agitated although she is constantly talking.  As mentioned, she needs hands-on assistance for all instrumental activities of daily living.    The patient has been previously seen by movement disorders specialist but the examination was difficult because of the patient's behavior.  She has also been seen by General Neurology who again had " difficulty with examination because of her restlessness and agitation.  When questioned, the caregiver indicates that she does have occasional chewing movements and involuntary tongue protrusion but this is variable throughout the day and apparently does not interfere with activities of daily living.  She is extremely restless and is constantly moving about when she is in her apartment.  It is extremely difficult for the caregivers to keep her quiet and control her movements.          ROS:    The patient cannot answer questions appropriately in the review of systems.    Past Medical History:   Diagnosis Date    Galactorrhea      History reviewed. No pertinent surgical history.  History reviewed. No pertinent family history.  Social History     Socioeconomic History    Marital status: Single     Spouse name: Not on file    Number of children: Not on file    Years of education: Not on file    Highest education level: Not on file   Occupational History    Not on file   Social Needs    Financial resource strain: Not on file    Food insecurity:     Worry: Not on file     Inability: Not on file    Transportation needs:     Medical: Not on file     Non-medical: Not on file   Tobacco Use    Smoking status: Never Smoker    Smokeless tobacco: Never Used   Substance and Sexual Activity    Alcohol use: No    Drug use: No    Sexual activity: Never   Lifestyle    Physical activity:     Days per week: Not on file     Minutes per session: Not on file    Stress: Not on file   Relationships    Social connections:     Talks on phone: Not on file     Gets together: Not on file     Attends Christianity service: Not on file     Active member of club or organization: Not on file     Attends meetings of clubs or organizations: Not on file     Relationship status: Not on file   Other Topics Concern    Not on file   Social History Narrative    Not on file         Objective:   PE:   VITAL SIGNS:   Height 4 ft 8 in, weight 54  kg, BMI 26.69  Vitals:    01/15/20 1105   BP: 130/70   Pulse: 76     APPEARANCE:  MARKEDLY AGITATED SLIGHTLY THIN ADULT WOMAN WHO WAS CONSTANTLY TALKING ALTHOUGH HER SPEECH IS NOT CLEARLY UNDERSTANDABLE.  SHE WOULD NOT ATTEMPT TO FOLLOW ANY COMMANDS.  HEAD: NORMOCEPHALIC, ATRAUMATIC.  EYES:   BY OBSERVATION, HER EYES ARE CONJUGATE.  EARS:   THE PATIENT ACTIVELY RESISTED EXAMINATION    NOSE:  COULD NOT BE EXAMINED  MOUTH & THROAT: THE PATIENT IS EDENTULOUS.  SHE WOULD NOT OPEN HER MOUTH FOR PHARYNGEAL EXAMINATION.  NECK: SUPPLE. NO BRUITS.  CHEST: LUNGS CLEAR TO AUSCULTATION.  CARDIOVASCULAR: REGULAR RHYTHM WITHOUT SIGNIFICANT MURMURS.  ABDOMEN:  THE PATIENT WOULD NOT ALLOW EXAMINATION.  MUSCULOSKELETAL:  NO BONY DEFORMITY SEEN.     NEUROLOGIC:     NEUROLOGIC EXAMINATION WAS NOT POSSIBLE EXCEPT BY OBSERVATION.  HER FACIAL FEATURES ARE SYMMETRICAL.  HER SPEECH IS NONSENSICAL AND DIFFICULT TO UNDERSTAND.  SHE HAS ACTIVE MOVEMENT OF BOTH ARMS AND BOTH LEGS AND IN FACT WAS MARKEDLY RESTLESS THROUGHOUT THAT TIME OF HER VISIT.  SHE WAS TALKING CONTINUOUSLY.  SHE WOULD NOT FOLLOW ANY COMMANDS CONSISTENTLY.      Assessment:     Encounter Diagnosis   Name Primary?    Mental handicap Yes       Discussion:   It appears that an involuntary movement disorder is the least of this patient's issues.  Her behavior has deteriorated significantly and it is becoming increasingly difficult for her caregivers to manage her.  I have suggested that she be seen again by psychiatry are at least contact Psychiatry to determine if there are alternative medications that can be utilized to help control her continuous restless movements and acting out behavior.  It is noted that she is on Depakote which is being utilized as a mood stabilizer, but it should also be understood that Depakote can cause bone marrow suppression and if there is a low white count, this could be due to Depakote.  She has not had a recent CBC according to the medical  records I have available to me.  I do not think it reasonable to consider treating a movement disorder in this situation.      Plan:     1.  See comments above.  I would recommend consultation with Psychiatry regarding other medications.    This note is generated with speech recognition software and is subject to transcription error and sound alike phrases that may be missed by proofreading.

## 2020-01-21 ENCOUNTER — PATIENT MESSAGE (OUTPATIENT)
Dept: PSYCHIATRY | Facility: CLINIC | Age: 60
End: 2020-01-21

## 2020-01-21 RX ORDER — ARIPIPRAZOLE 10 MG/1
10 TABLET ORAL DAILY
Qty: 30 TABLET | Refills: 2 | Status: SHIPPED | OUTPATIENT
Start: 2020-01-21 | End: 2020-02-06

## 2020-01-24 ENCOUNTER — PATIENT MESSAGE (OUTPATIENT)
Dept: HEMATOLOGY/ONCOLOGY | Facility: CLINIC | Age: 60
End: 2020-01-24

## 2020-01-24 ENCOUNTER — TELEPHONE (OUTPATIENT)
Dept: HEMATOLOGY/ONCOLOGY | Facility: CLINIC | Age: 60
End: 2020-01-24

## 2020-01-24 NOTE — TELEPHONE ENCOUNTER
No answer, message sent via patient portal.     Telephone Encounter by Jada Paula RN at 02/09/17 01:32 PM     Author:  Jada Paula RN Service:  (none) Author Type:  Registered Nurse     Filed:  02/09/17 01:32 PM Encounter Date:  2/9/2017 Status:  Signed     :  Jada Paula RN (Registered Nurse)            Per Dr. Bass,  OK to wait since patient was asymptomatic at time of visit.  Reji notified.[AB1.1M]      Revision History        User Key Date/Time User Provider Type Action    > AB1.1 02/09/17 01:32 PM Jada Paula, RN Registered Nurse Sign    M - Manual

## 2020-01-24 NOTE — TELEPHONE ENCOUNTER
----- Message from Katherine Vargas sent at 1/24/2020 11:44 AM CST -----  Contact: My Chart Request  Patient requesting to speak with you regarding  Appointment Request From: Juan Pablo Bolden    With Provider: Hemotologist    Preferred Date Range: 2/3/2020 - 3/27/2020    Preferred Times: Any time    Reason for visit: Establish new patient for hemotologist in [facility] network    Comments:  Establish new hemotologist    Please call and advise/[phone]

## 2020-01-28 ENCOUNTER — PATIENT MESSAGE (OUTPATIENT)
Dept: PSYCHIATRY | Facility: CLINIC | Age: 60
End: 2020-01-28

## 2020-01-30 ENCOUNTER — PATIENT MESSAGE (OUTPATIENT)
Dept: PSYCHIATRY | Facility: CLINIC | Age: 60
End: 2020-01-30

## 2020-01-31 ENCOUNTER — OFFICE VISIT (OUTPATIENT)
Dept: NEUROLOGY | Facility: CLINIC | Age: 60
End: 2020-01-31
Payer: MEDICARE

## 2020-01-31 VITALS
SYSTOLIC BLOOD PRESSURE: 137 MMHG | DIASTOLIC BLOOD PRESSURE: 82 MMHG | BODY MASS INDEX: 26.69 KG/M2 | HEIGHT: 56 IN | HEART RATE: 61 BPM

## 2020-01-31 DIAGNOSIS — G24.01 TARDIVE DYSKINESIA: ICD-10-CM

## 2020-01-31 DIAGNOSIS — R45.1 RESTLESSNESS AND AGITATION: ICD-10-CM

## 2020-01-31 DIAGNOSIS — F84.9 PDD (PERVASIVE DEVELOPMENTAL DISORDER): ICD-10-CM

## 2020-01-31 PROBLEM — G93.49 CHRONIC STATIC ENCEPHALOPATHY: Status: ACTIVE | Noted: 2019-06-04

## 2020-01-31 PROCEDURE — 99999 PR PBB SHADOW E&M-EST. PATIENT-LVL IV: ICD-10-PCS | Mod: PBBFAC,,, | Performed by: PSYCHIATRY & NEUROLOGY

## 2020-01-31 PROCEDURE — 99214 OFFICE O/P EST MOD 30 MIN: CPT | Mod: S$PBB,,, | Performed by: PSYCHIATRY & NEUROLOGY

## 2020-01-31 PROCEDURE — 99999 PR PBB SHADOW E&M-EST. PATIENT-LVL IV: CPT | Mod: PBBFAC,,, | Performed by: PSYCHIATRY & NEUROLOGY

## 2020-01-31 PROCEDURE — 99214 OFFICE O/P EST MOD 30 MIN: CPT | Mod: PBBFAC | Performed by: PSYCHIATRY & NEUROLOGY

## 2020-01-31 PROCEDURE — 99214 PR OFFICE/OUTPT VISIT, EST, LEVL IV, 30-39 MIN: ICD-10-PCS | Mod: S$PBB,,, | Performed by: PSYCHIATRY & NEUROLOGY

## 2020-01-31 RX ORDER — MIRTAZAPINE 30 MG/1
30 TABLET, FILM COATED ORAL DAILY
COMMUNITY
Start: 2020-01-21 | End: 2020-02-06

## 2020-01-31 RX ORDER — ALPRAZOLAM 0.25 MG/1
TABLET ORAL 2 TIMES DAILY
COMMUNITY
Start: 2020-01-21 | End: 2020-02-06 | Stop reason: SDUPTHER

## 2020-01-31 RX ORDER — QUETIAPINE FUMARATE 100 MG/1
TABLET, FILM COATED ORAL
COMMUNITY
Start: 2020-01-01 | End: 2020-02-06

## 2020-01-31 NOTE — ASSESSMENT & PLAN NOTE
She is much calmer than what has been described in prior notes and also per sitter.  She is nearly constantly moving, much like a hyperactive kid, but no agitation today and will sit back down when asked.  The improvement came with resuming seroquel, so most likely behavioral or psychiatric rather than neurologic.    I am not sure of my role today (she was scheduled as a f/u, but new to me and thus not enough time to review chart until after she had left) other than to say, yes, she may still have EPS symptoms.    As recommended by my partner 7 years ago, clozapine would be safest, but if her behavior cannot be controlled with this, then there isn't much to be done to prevent EPS worsening.
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
## 2020-02-04 ENCOUNTER — PATIENT MESSAGE (OUTPATIENT)
Dept: NEUROLOGY | Facility: CLINIC | Age: 60
End: 2020-02-04

## 2020-02-05 ENCOUNTER — PATIENT MESSAGE (OUTPATIENT)
Dept: PSYCHIATRY | Facility: CLINIC | Age: 60
End: 2020-02-05

## 2020-02-05 ENCOUNTER — OFFICE VISIT (OUTPATIENT)
Dept: FAMILY MEDICINE | Facility: CLINIC | Age: 60
End: 2020-02-05
Payer: MEDICARE

## 2020-02-05 VITALS
HEART RATE: 75 BPM | BODY MASS INDEX: 23.51 KG/M2 | WEIGHT: 109 LBS | HEIGHT: 57 IN | DIASTOLIC BLOOD PRESSURE: 82 MMHG | TEMPERATURE: 98 F | SYSTOLIC BLOOD PRESSURE: 133 MMHG

## 2020-02-05 DIAGNOSIS — I10 ESSENTIAL HYPERTENSION: ICD-10-CM

## 2020-02-05 DIAGNOSIS — Z12.31 ENCOUNTER FOR SCREENING MAMMOGRAM FOR BREAST CANCER: Primary | ICD-10-CM

## 2020-02-05 DIAGNOSIS — G40.909 SEIZURE DISORDER: ICD-10-CM

## 2020-02-05 DIAGNOSIS — F84.9 PDD (PERVASIVE DEVELOPMENTAL DISORDER): ICD-10-CM

## 2020-02-05 PROCEDURE — 99214 OFFICE O/P EST MOD 30 MIN: CPT | Mod: PBBFAC,PO | Performed by: INTERNAL MEDICINE

## 2020-02-05 PROCEDURE — 99999 PR PBB SHADOW E&M-EST. PATIENT-LVL IV: CPT | Mod: PBBFAC,,, | Performed by: INTERNAL MEDICINE

## 2020-02-05 PROCEDURE — 99204 OFFICE O/P NEW MOD 45 MIN: CPT | Mod: S$PBB,,, | Performed by: INTERNAL MEDICINE

## 2020-02-05 PROCEDURE — 99204 PR OFFICE/OUTPT VISIT, NEW, LEVL IV, 45-59 MIN: ICD-10-PCS | Mod: S$PBB,,, | Performed by: INTERNAL MEDICINE

## 2020-02-05 PROCEDURE — 99999 PR PBB SHADOW E&M-EST. PATIENT-LVL IV: ICD-10-PCS | Mod: PBBFAC,,, | Performed by: INTERNAL MEDICINE

## 2020-02-05 RX ORDER — DIVALPROEX SODIUM 250 MG/1
250 TABLET, DELAYED RELEASE ORAL 3 TIMES DAILY
COMMUNITY
End: 2020-02-06 | Stop reason: SDUPTHER

## 2020-02-06 ENCOUNTER — PATIENT MESSAGE (OUTPATIENT)
Dept: FAMILY MEDICINE | Facility: CLINIC | Age: 60
End: 2020-02-06

## 2020-02-06 ENCOUNTER — OFFICE VISIT (OUTPATIENT)
Dept: PSYCHIATRY | Facility: CLINIC | Age: 60
End: 2020-02-06
Payer: MEDICARE

## 2020-02-06 DIAGNOSIS — R45.1 AGITATION: ICD-10-CM

## 2020-02-06 DIAGNOSIS — F84.9 PDD (PERVASIVE DEVELOPMENTAL DISORDER): Primary | ICD-10-CM

## 2020-02-06 PROCEDURE — 99211 OFF/OP EST MAY X REQ PHY/QHP: CPT | Mod: PBBFAC | Performed by: PSYCHIATRY & NEUROLOGY

## 2020-02-06 PROCEDURE — 99999 PR PBB SHADOW E&M-EST. PATIENT-LVL I: CPT | Mod: PBBFAC,,, | Performed by: PSYCHIATRY & NEUROLOGY

## 2020-02-06 PROCEDURE — 99999 PR PBB SHADOW E&M-EST. PATIENT-LVL I: ICD-10-PCS | Mod: PBBFAC,,, | Performed by: PSYCHIATRY & NEUROLOGY

## 2020-02-06 PROCEDURE — 99214 PR OFFICE/OUTPT VISIT, EST, LEVL IV, 30-39 MIN: ICD-10-PCS | Mod: S$PBB,,, | Performed by: PSYCHIATRY & NEUROLOGY

## 2020-02-06 PROCEDURE — 99214 OFFICE O/P EST MOD 30 MIN: CPT | Mod: S$PBB,,, | Performed by: PSYCHIATRY & NEUROLOGY

## 2020-02-06 RX ORDER — QUETIAPINE FUMARATE 200 MG/1
200 TABLET, FILM COATED ORAL NIGHTLY
Qty: 30 TABLET | Refills: 2 | Status: SHIPPED | OUTPATIENT
Start: 2020-02-06 | End: 2020-04-06 | Stop reason: SDUPTHER

## 2020-02-06 RX ORDER — DIVALPROEX SODIUM 250 MG/1
250 TABLET, DELAYED RELEASE ORAL 3 TIMES DAILY
Qty: 90 TABLET | Refills: 2 | Status: SHIPPED | OUTPATIENT
Start: 2020-02-06 | End: 2020-04-06 | Stop reason: SDUPTHER

## 2020-02-06 RX ORDER — ALPRAZOLAM 0.25 MG/1
0.25 TABLET ORAL 2 TIMES DAILY
Qty: 60 TABLET | Refills: 2 | Status: SHIPPED | OUTPATIENT
Start: 2020-02-06 | End: 2020-04-06 | Stop reason: SDUPTHER
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
## 2020-02-06 NOTE — ASSESSMENT & PLAN NOTE
-2-3 year old intellectual level  -Followed by both psychiatry and neurology  -Agitation and restlessness much improved since restarting seroquel  -Previously used valium PRN for doctor's visits  -Recently started on BID xanax by previous PCP but caregiver is unsure exactly why  -Discussed bringing this up at her upcoming psych appointment to see if this medication is needed

## 2020-02-06 NOTE — PROGRESS NOTES
Assessment/Plan:    Seizure disorder  -Caregiver reports hx of seizure disorder but no recent seizures  -On depakote but believe this may be more as mood stabilizer     PDD (pervasive developmental disorder)  -2-3 year old intellectual level  -Followed by both psychiatry and neurology  -Agitation and restlessness much improved since restarting seroquel  -Previously used valium PRN for doctor's visits  -Recently started on BID xanax by previous PCP but caregiver is unsure exactly why  -Discussed bringing this up at her upcoming psych appointment to see if this medication is needed    Essential hypertension  -At goal today  -Currently on Dyazide 37.5-25 mg daily    _____________________________________________________________________________________________________________________________________________________    Orders this visit:    Encounter for screening mammogram for breast cancer  -     Mammo Digital Screening Bilat; Future; Expected date: 02/05/2020    Seizure disorder    Essential hypertension    PDD (pervasive developmental disorder)      Follow up in about 6 months (around 8/5/2020).    Brooke Goldberg MD  _____________________________________________________________________________________________________________________________________________________    HPI:    Patient is in clinic today as a new patient, here to establish care. Patient is a 58 yo F with a PMH of pervasive developmental disorder, tardive dyskinesia and HTN.    Patient is here today with her caregivers.  They are currently working to transfer all of her care within the [facility] system.  She is already established with Neurology.  Plans to follow up with Psychiatry and Hematology soon.  Recently there was concern about patient's activity change.  She was previously on Seroquel but this was stopped due to concern for leukopenia.  After stopping Seroquel, patient can even more agitated and restless.  She was recently restarted on Seroquel  with improvement of behavior.  Patient plans to follow up with Psychiatry this week for further discussion of her psychiatric medications.  This includes discussion about the recent addition of Xanax her medication list.  Previously patient was provided p.r.n. Valium for doctor's visits or other outside activities.  For some unknown reason she was started on twice daily Xanax per her previous PCP.  Caregivers unsure why.  Since that time she has had periods of disorientation.  Caregiver and patient's sister having concerned that it could be due to Xanax, as the symptoms started shortly after she started this medication.  I agree that this should be further addressed with psychiatry and discontinue psychiatry agrees.    Patient also previously followed by Hematology for history of leukopenia.  This is suspected secondary to medications.  She plans to establish care with [facility] hematology soon.     Patient also with a history of hypertension.  This appears to be well controlled on her current medications.    Routine health maintenance discussed.  Mammogram ordered but I am not sure patient will be able to complete.  She has successfully completed in the past year.  In order to have Pap smear completed patient will require sedation.  This needs to be further discussed with her family.  I plan to request records from her previous PCP including last notes and labs.    Past Medical History:  Past Medical History:   Diagnosis Date    Galactorrhea      History reviewed. No pertinent surgical history.  Review of patient's allergies indicates:  No Known Allergies  Social History     Tobacco Use    Smoking status: Never Smoker    Smokeless tobacco: Never Used   Substance Use Topics    Alcohol use: No    Drug use: No     History reviewed. No pertinent family history.  Current Outpatient Medications on File Prior to Visit   Medication Sig Dispense Refill    ALPRAZolam (XANAX) 0.25 MG tablet 2 (two) times daily.        ARIPiprazole (ABILIFY) 10 MG Tab Take 1 tablet (10 mg total) by mouth once daily. 30 tablet 2    diazePAM (VALIUM) 5 MG tablet Take 5 mg by mouth every 6 (six) hours as needed for Anxiety.      divalproex (DEPAKOTE) 250 MG EC tablet Take 250 mg by mouth 3 (three) times daily.      lamoTRIgine (LAMICTAL) 100 MG tablet Take 1 tablet (100 mg total) by mouth once daily. 30 tablet 1    melatonin 10 mg Cap Take 2 tablets by mouth.      mirtazapine (REMERON) 30 MG tablet Take 30 mg by mouth once daily.      MUCINEX 600 mg 12 hr tablet TAKE 1 TABLET BY MOUTH EVERY TWELVE HOURS AS NEEDED  0    MULTIVITS,CA,MINERALS/IRON/FA (THERAPEUTIC-M ORAL) Take by mouth.      polyethylene glycol (GLYCOLAX) 17 gram/dose powder Take 17 g by mouth once daily.      QUEtiapine (SEROQUEL) 100 MG Tab       simvastatin (ZOCOR) 40 MG tablet Take 40 mg by mouth.      triamterene-hydrochlorothiazide 37.5-25 mg (DYAZIDE) 37.5-25 mg per capsule Take 1 capsule by mouth every morning.      [DISCONTINUED] divalproex ER (DEPAKOTE ER) 250 MG 24 hr tablet Take 1 tablet (250 mg total) by mouth once daily. 30 tablet 3    [DISCONTINUED] benzonatate (TESSALON) 100 MG capsule Take 100 mg by mouth.      [DISCONTINUED] calcium citrate-vitamin D3 315-200 mg (CITRACAL+D) 315-200 mg-unit per tablet Take 1 tablet by mouth 2 (two) times daily.      [DISCONTINUED] diazePAM (VALIUM) 5 MG tablet Take 5 mg by mouth.      [DISCONTINUED] HYDROCODONE-ACETAMINOPHEN ORAL Take by mouth.      [DISCONTINUED] lamoTRIgine (LAMICTAL) 25 MG tablet Take 1 daily x 14 days then 2 daily x 14 days then 3 daily x 14 days then start 100 mg bottle. Stop in case of rash. (Patient not taking: Reported on 2/5/2020) 84 tablet 0    [DISCONTINUED] melatonin 5 mg Tab Take by mouth.      [DISCONTINUED] MULTIVITAMIN,THERAPEUTIC (THEREMS ORAL) Take by mouth.      [DISCONTINUED] simvastatin (ZOCOR) 20 MG tablet Take 20 mg by mouth every evening.      [DISCONTINUED] THERA-M Tab Take  "1 tablet by mouth once daily.  11    [DISCONTINUED] triamterene-hydrochlorothiazide 37.5-25 mg (DYAZIDE) 37.5-25 mg per capsule Take 1 capsule by mouth.       No current facility-administered medications on file prior to visit.        Review of Systems   Constitutional: Positive for activity change. Negative for unexpected weight change.   HENT: Negative for hearing loss, rhinorrhea and trouble swallowing.    Eyes: Negative for discharge and visual disturbance.   Respiratory: Negative for chest tightness and wheezing.    Cardiovascular: Negative for chest pain and palpitations.   Gastrointestinal: Negative for blood in stool, constipation, diarrhea and vomiting.   Endocrine: Negative for polydipsia and polyuria.   Genitourinary: Negative for difficulty urinating, dysuria, hematuria and menstrual problem.   Musculoskeletal: Negative for arthralgias, joint swelling and neck pain.   Neurological: Negative for weakness and headaches.   Psychiatric/Behavioral: Negative for confusion and dysphoric mood.       Vitals:    02/05/20 1004   BP: 133/82   Pulse: 75   Temp: 97.6 °F (36.4 °C)   Weight: 49.4 kg (109 lb)   Height: 4' 9" (1.448 m)       Wt Readings from Last 3 Encounters:   02/05/20 49.4 kg (109 lb)   01/15/20 54 kg (119 lb 0.8 oz)   08/29/19 60.7 kg (133 lb 13.1 oz)       Physical Exam   Constitutional: She appears well-developed and well-nourished. No distress.   HENT:   Head: Normocephalic and atraumatic.   Eyes: Conjunctivae are normal.   Neck: Normal range of motion.   Cardiovascular: Normal rate, regular rhythm, normal heart sounds and intact distal pulses.   Pulmonary/Chest: Effort normal and breath sounds normal. No respiratory distress.   Abdominal: She exhibits no distension.   Musculoskeletal: Normal range of motion.   Neurological:   -Patient very active throughout visit, always moving in her chair and trying to get caregiver's attention  -Unable to assess orientation   Skin: Skin is warm and dry. "   Psychiatric: She has a normal mood and affect.

## 2020-02-06 NOTE — ASSESSMENT & PLAN NOTE
-Caregiver reports hx of seizure disorder but no recent seizures  -On depakote but believe this may be more as mood stabilizer

## 2020-02-11 ENCOUNTER — TELEPHONE (OUTPATIENT)
Dept: ADMINISTRATIVE | Facility: HOSPITAL | Age: 60
End: 2020-02-11

## 2020-02-11 PROBLEM — D69.6 THROMBOCYTOPENIA: Status: ACTIVE | Noted: 2020-02-11

## 2020-02-11 PROBLEM — D72.819 LEUKOPENIA: Status: ACTIVE | Noted: 2020-02-11

## 2020-02-11 NOTE — TELEPHONE ENCOUNTER
Pt's caregiver (Krystin) states she will be at her scheduled appt on Wednesday, 2/12/20 in [place]. lw

## 2020-02-12 ENCOUNTER — PATIENT MESSAGE (OUTPATIENT)
Dept: NEUROLOGY | Facility: CLINIC | Age: 60
End: 2020-02-12

## 2020-02-12 ENCOUNTER — HOSPITAL ENCOUNTER (OUTPATIENT)
Dept: RADIOLOGY | Facility: HOSPITAL | Age: 60
Discharge: HOME | End: 2020-02-12
Attending: INTERNAL MEDICINE
Payer: MEDICARE

## 2020-02-12 ENCOUNTER — INITIAL CONSULT (OUTPATIENT)
Dept: HEMATOLOGY/ONCOLOGY | Facility: CLINIC | Age: 60
End: 2020-02-12
Payer: MEDICARE

## 2020-02-12 VITALS
TEMPERATURE: 98 F | BODY MASS INDEX: 24.59 KG/M2 | WEIGHT: 114 LBS | HEART RATE: 83 BPM | OXYGEN SATURATION: 97 % | RESPIRATION RATE: 17 BRPM | HEIGHT: 57 IN

## 2020-02-12 DIAGNOSIS — D72.819 LEUKOPENIA, UNSPECIFIED TYPE: Primary | ICD-10-CM

## 2020-02-12 DIAGNOSIS — Z12.31 ENCOUNTER FOR SCREENING MAMMOGRAM FOR BREAST CANCER: ICD-10-CM

## 2020-02-12 DIAGNOSIS — G40.909 SEIZURE DISORDER: Primary | ICD-10-CM

## 2020-02-12 DIAGNOSIS — E22.1 HYPERPROLACTINEMIA: ICD-10-CM

## 2020-02-12 DIAGNOSIS — D69.6 THROMBOCYTOPENIA: ICD-10-CM

## 2020-02-12 DIAGNOSIS — D53.9 NUTRITIONAL ANEMIA, UNSPECIFIED: ICD-10-CM

## 2020-02-12 DIAGNOSIS — F84.9 PDD (PERVASIVE DEVELOPMENTAL DISORDER): ICD-10-CM

## 2020-02-12 PROCEDURE — 99204 OFFICE O/P NEW MOD 45 MIN: CPT | Mod: S$PBB,,, | Performed by: INTERNAL MEDICINE

## 2020-02-12 PROCEDURE — 99999 PR PBB SHADOW E&M-EST. PATIENT-LVL III: CPT | Mod: PBBFAC,,, | Performed by: INTERNAL MEDICINE

## 2020-02-12 PROCEDURE — 99204 PR OFFICE/OUTPT VISIT, NEW, LEVL IV, 45-59 MIN: ICD-10-PCS | Mod: S$PBB,,, | Performed by: INTERNAL MEDICINE

## 2020-02-12 PROCEDURE — 99213 OFFICE O/P EST LOW 20 MIN: CPT | Mod: PBBFAC,PO | Performed by: INTERNAL MEDICINE

## 2020-02-12 PROCEDURE — 99999 PR PBB SHADOW E&M-EST. PATIENT-LVL III: ICD-10-PCS | Mod: PBBFAC,,, | Performed by: INTERNAL MEDICINE

## 2020-02-12 NOTE — PROGRESS NOTES
Subjective:       Patient ID: Juan Pablo Bolden is a 59 y.o. female.    Chief Complaint: Leukopenia, unspecified type [D72.819]  HPI: We have an opportunity to see Ms. Juan Pablo Bolden in Hematology Oncology clinic at [facility] on 02/11/2020.  Ms. Juan Pablo Bolden is a 59 y.o. woman with pervasive development disorder, seizures disorder, presents for evaluation of leukopenia, thrombocytopenia. She is dependent on care 24 hours per day.     No history exists.     Past Medical History:   Diagnosis Date    Galactorrhea      No family history on file.  Social History     Socioeconomic History    Marital status: Single     Spouse name: Not on file    Number of children: Not on file    Years of education: Not on file    Highest education level: Not on file   Occupational History    Not on file   Social Needs    Financial resource strain: Not on file    Food insecurity:     Worry: Not on file     Inability: Not on file    Transportation needs:     Medical: Not on file     Non-medical: Not on file   Tobacco Use    Smoking status: Never Smoker    Smokeless tobacco: Never Used   Substance and Sexual Activity    Alcohol use: No    Drug use: No    Sexual activity: Never   Lifestyle    Physical activity:     Days per week: Not on file     Minutes per session: Not on file    Stress: Not on file   Relationships    Social connections:     Talks on phone: Not on file     Gets together: Not on file     Attends Voodoo service: Not on file     Active member of club or organization: Not on file     Attends meetings of clubs or organizations: Not on file     Relationship status: Not on file   Other Topics Concern    Not on file   Social History Narrative    Not on file     No past surgical history on file.  Current Outpatient Medications   Medication Sig Dispense Refill    ALPRAZolam (XANAX) 0.25 MG tablet Take 1 tablet (0.25 mg total) by mouth 2 (two) times daily. 60 tablet 2    divalproex (DEPAKOTE) 250 MG EC  tablet Take 1 tablet (250 mg total) by mouth 3 (three) times daily. 90 tablet 2    melatonin 10 mg Cap Take 2 tablets by mouth.      MUCINEX 600 mg 12 hr tablet TAKE 1 TABLET BY MOUTH EVERY TWELVE HOURS AS NEEDED  0    MULTIVITS,CA,MINERALS/IRON/FA (THERAPEUTIC-M ORAL) Take by mouth.      polyethylene glycol (GLYCOLAX) 17 gram/dose powder Take 17 g by mouth once daily.      QUEtiapine (SEROQUEL) 200 MG Tab Take 1 tablet (200 mg total) by mouth every evening. 30 tablet 2    simvastatin (ZOCOR) 40 MG tablet Take 40 mg by mouth.      triamterene-hydrochlorothiazide 37.5-25 mg (DYAZIDE) 37.5-25 mg per capsule Take 1 capsule by mouth every morning.       No current facility-administered medications for this visit.        Labs:  No results found for: WBC, HGB, HCT, MCV, PLT  BMP  Lab Results   Component Value Date     06/24/2016    K 4.2 06/24/2016     06/24/2016    CO2 28 06/24/2016    BUN 20 06/24/2016    CREATININE 0.8 06/24/2016    CALCIUM 10.5 06/24/2016    ANIONGAP 12 06/24/2016    ESTGFRAFRICA >60.0 06/24/2016    EGFRNONAA >60.0 06/24/2016     Lab Results   Component Value Date    ALT 16 06/24/2016    AST 19 06/24/2016    ALKPHOS 122 06/24/2016    BILITOT 0.2 06/24/2016       No results found for: IRON, TIBC, FERRITIN, SATURATEDIRO  No results found for: [id number]  No results found for: FOLATE  Lab Results   Component Value Date    TSH 0.462 06/24/2016       I have reviewed the radiology reports and examined the scan/xray images.    Review of Systems   Constitutional: Negative.    HENT: Negative.    Eyes: Negative.    Respiratory: Negative.    Cardiovascular: Negative.    Gastrointestinal: Negative.    Endocrine: Negative.    Genitourinary: Negative.    Musculoskeletal: Negative.    Skin: Negative.    Allergic/Immunologic: Negative.    Neurological: Negative.    Hematological: Negative.    Psychiatric/Behavioral: Negative.      ECOG SCORE    3 - Capable of only limited selfcare, confined to bed  or chair more than 50% of waking hours            Objective:     Vitals:    02/12/20 1415   Pulse: 83   Resp: 17   Temp: 97.5 °F (36.4 °C)   Body mass index is 24.67 kg/m².  Physical Exam   Constitutional: She is oriented to person, place, and time. She appears well-developed and well-nourished.   HENT:   Head: Normocephalic and atraumatic.   Eyes: Conjunctivae and EOM are normal.   Neck: Normal range of motion. Neck supple.   Cardiovascular: Normal rate and regular rhythm.   Pulmonary/Chest: Effort normal and breath sounds normal.   Abdominal: Soft. Bowel sounds are normal.   Musculoskeletal: Normal range of motion.   Neurological: She is alert and oriented to person, place, and time.   Skin: Skin is warm and dry.   Psychiatric: She has a normal mood and affect. Her behavior is normal. Judgment and thought content normal.   Nursing note and vitals reviewed.        Assessment:      1. Leukopenia, unspecified type    2. Thrombocytopenia    3. Nutritional anemia, unspecified            Plan:   Mild leukopenia and thrombocytopenia due to depakote.  Will workup for other causes.  Does not need to dose reduce depakote.  Leukopenia, unspecified type  -     CBC auto differential; Future; Expected date: 02/12/2020  -     Comprehensive metabolic panel; Future; Expected date: 02/12/2020  -     Folate; Future; Expected date: 02/12/2020  -     Ferritin; Future; Expected date: 02/12/2020  -     Vitamin B12; Future; Expected date: 02/12/2020  -     TSH; Future; Expected date: 02/12/2020  -     Iron and TIBC; Future; Expected date: 02/12/2020  -     Lactate dehydrogenase; Future; Expected date: 02/12/2020  -     Protein electrophoresis, serum; Future; Expected date: 02/12/2020  -     Immunofixation electrophoresis; Future; Expected date: 02/12/2020    Thrombocytopenia  -     CBC auto differential; Future; Expected date: 02/12/2020  -     Comprehensive metabolic panel; Future; Expected date: 02/12/2020  -     Folate; Future;  Expected date: 02/12/2020  -     Ferritin; Future; Expected date: 02/12/2020  -     Vitamin B12; Future; Expected date: 02/12/2020  -     TSH; Future; Expected date: 02/12/2020  -     Iron and TIBC; Future; Expected date: 02/12/2020  -     Lactate dehydrogenase; Future; Expected date: 02/12/2020  -     Protein electrophoresis, serum; Future; Expected date: 02/12/2020  -     Immunofixation electrophoresis; Future; Expected date: 02/12/2020    Nutritional anemia, unspecified   -     TSH; Future; Expected date: 02/12/2020

## 2020-02-13 ENCOUNTER — TELEPHONE (OUTPATIENT)
Dept: FAMILY MEDICINE | Facility: CLINIC | Age: 60
End: 2020-02-13

## 2020-02-13 NOTE — TELEPHONE ENCOUNTER
Returned call, no answer, rx for wheelchair was faxed yesterday. Also, original rx was left at  per request.

## 2020-02-13 NOTE — TELEPHONE ENCOUNTER
----- Message from Christine Ochoa sent at 2/13/2020 11:50 AM CST -----  Contact: Arnoldo/Xiomara  Please call Xiomara @ [phone] regarding pt order for wheelchair, [facility] never received

## 2020-02-14 ENCOUNTER — PATIENT MESSAGE (OUTPATIENT)
Dept: NEUROLOGY | Facility: CLINIC | Age: 60
End: 2020-02-14

## 2020-02-18 ENCOUNTER — PATIENT MESSAGE (OUTPATIENT)
Dept: PSYCHIATRY | Facility: CLINIC | Age: 60
End: 2020-02-18

## 2020-02-18 ENCOUNTER — TELEPHONE (OUTPATIENT)
Dept: FAMILY MEDICINE | Facility: CLINIC | Age: 60
End: 2020-02-18

## 2020-02-18 RX ORDER — DIAZEPAM 5 MG/1
5 TABLET ORAL EVERY 6 HOURS PRN
Qty: 30 TABLET | Refills: 0 | Status: SHIPPED | OUTPATIENT
Start: 2020-02-18 | End: 2020-04-03 | Stop reason: SDUPTHER

## 2020-02-18 NOTE — TELEPHONE ENCOUNTER
----- Message from Caty Mary sent at 2/18/2020  2:12 PM CST -----  Contact: Luis/ Arnoldo Smith called to see if he could get the wheelchair order fax over to him due to he has not has a chance to pick it up. Fax number is [phone] and phone number is [phone] ext [phone].        Thanks,  Caty Mary

## 2020-02-27 ENCOUNTER — PATIENT MESSAGE (OUTPATIENT)
Dept: PSYCHIATRY | Facility: CLINIC | Age: 60
End: 2020-02-27

## 2020-03-20 ENCOUNTER — PATIENT MESSAGE (OUTPATIENT)
Dept: PSYCHIATRY | Facility: CLINIC | Age: 60
End: 2020-03-20

## 2020-03-21 RX ORDER — MELATONIN 10 MG
2 CAPSULE ORAL NIGHTLY
COMMUNITY
Start: 2020-03-21 | End: 2020-04-22 | Stop reason: SDUPTHER

## 2020-03-21 RX ORDER — SIMVASTATIN 40 MG/1
40 TABLET, FILM COATED ORAL NIGHTLY
Qty: 30 TABLET | Refills: 1 | Status: SHIPPED | OUTPATIENT
Start: 2020-03-21 | End: 2020-10-30 | Stop reason: SDUPTHER

## 2020-03-21 RX ORDER — MIRTAZAPINE 30 MG/1
30 TABLET, FILM COATED ORAL NIGHTLY
Qty: 30 TABLET | Refills: 2 | Status: SHIPPED | OUTPATIENT
Start: 2020-03-21 | End: 2020-04-06 | Stop reason: SDUPTHER

## 2020-04-03 ENCOUNTER — PATIENT MESSAGE (OUTPATIENT)
Dept: PSYCHIATRY | Facility: CLINIC | Age: 60
End: 2020-04-03

## 2020-04-03 ENCOUNTER — TELEPHONE (OUTPATIENT)
Dept: PSYCHIATRY | Facility: CLINIC | Age: 60
End: 2020-04-03

## 2020-04-03 RX ORDER — DIAZEPAM 5 MG/1
5 TABLET ORAL EVERY 6 HOURS PRN
Qty: 30 TABLET | Refills: 0 | Status: SHIPPED | OUTPATIENT
Start: 2020-04-03 | End: 2020-04-06 | Stop reason: SDUPTHER

## 2020-04-06 ENCOUNTER — OFFICE VISIT (OUTPATIENT)
Dept: PSYCHIATRY | Facility: CLINIC | Age: 60
End: 2020-04-06
Payer: MEDICARE

## 2020-04-06 DIAGNOSIS — F79 INTELLECTUAL DISABILITY: ICD-10-CM

## 2020-04-06 DIAGNOSIS — F84.9 PDD (PERVASIVE DEVELOPMENTAL DISORDER): Primary | ICD-10-CM

## 2020-04-06 DIAGNOSIS — R45.1 RESTLESSNESS AND AGITATION: ICD-10-CM

## 2020-04-06 DIAGNOSIS — G47.00 INSOMNIA, UNSPECIFIED TYPE: ICD-10-CM

## 2020-04-06 PROCEDURE — 99214 OFFICE O/P EST MOD 30 MIN: CPT | Mod: 95,,, | Performed by: PSYCHIATRY & NEUROLOGY

## 2020-04-06 PROCEDURE — G0463 HOSPITAL OUTPT CLINIC VISIT: HCPCS

## 2020-04-06 PROCEDURE — 99214 PR OFFICE/OUTPT VISIT, EST, LEVL IV, 30-39 MIN: ICD-10-PCS | Mod: 95,,, | Performed by: PSYCHIATRY & NEUROLOGY

## 2020-04-06 PROCEDURE — 99211 OFF/OP EST MAY X REQ PHY/QHP: CPT

## 2020-04-06 RX ORDER — QUETIAPINE FUMARATE 200 MG/1
TABLET, FILM COATED ORAL
Qty: 60 TABLET | Refills: 1 | Status: SHIPPED | OUTPATIENT
Start: 2020-04-06 | End: 2020-06-12 | Stop reason: SDUPTHER

## 2020-04-06 RX ORDER — DIVALPROEX SODIUM 250 MG/1
250 TABLET, DELAYED RELEASE ORAL 3 TIMES DAILY
Qty: 90 TABLET | Refills: 0 | Status: SHIPPED | OUTPATIENT
Start: 2020-04-06 | End: 2020-06-02 | Stop reason: SDUPTHER

## 2020-04-06 RX ORDER — MIRTAZAPINE 30 MG/1
30 TABLET, FILM COATED ORAL NIGHTLY
Qty: 30 TABLET | Refills: 0 | Status: SHIPPED | OUTPATIENT
Start: 2020-04-06 | End: 2020-06-02 | Stop reason: SDUPTHER

## 2020-04-06 RX ORDER — DIAZEPAM 5 MG/1
5 TABLET ORAL EVERY 6 HOURS PRN
Qty: 30 TABLET | Refills: 0 | Status: SHIPPED | OUTPATIENT
Start: 2020-04-06 | End: 2020-06-02 | Stop reason: SDUPTHER

## 2020-04-06 RX ORDER — ALPRAZOLAM 0.25 MG/1
0.25 TABLET ORAL 2 TIMES DAILY
Qty: 60 TABLET | Refills: 0 | Status: SHIPPED | OUTPATIENT
Start: 2020-04-06 | End: 2020-06-02 | Stop reason: SDUPTHER
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
## 2020-04-22 ENCOUNTER — PATIENT MESSAGE (OUTPATIENT)
Dept: FAMILY MEDICINE | Facility: CLINIC | Age: 60
End: 2020-04-22

## 2020-04-22 RX ORDER — MELATONIN 10 MG
2 CAPSULE ORAL NIGHTLY
COMMUNITY
Start: 2020-04-22 | End: 2020-09-22

## 2020-04-22 NOTE — TELEPHONE ENCOUNTER
Medications have been renewed.    I have signed for the following orders AND/OR meds.  Please call the patient and ask the patient to schedule the testing AND/OR inform about any medications that were sent. Medications have been sent to pharmacy listed below      No orders of the defined types were placed in this encounter.      Medications Ordered This Encounter   Medications    melatonin 10 mg Cap     Sig: Take 2 tablets by mouth every evening.    multivit-iron-FA-calcium-mins (THERAPEUTIC-M) 9 mg iron-400 mcg Tab tablet     Sig: Take 1 tablet by mouth once daily.         [facility] - [address] [place] [address]  [facility] [place] [address], [place] [address]  Phone: [phone] Fax: [phone]

## 2020-06-02 RX ORDER — ALPRAZOLAM 0.25 MG/1
0.25 TABLET ORAL 2 TIMES DAILY
Qty: 60 TABLET | Refills: 0 | Status: SHIPPED | OUTPATIENT
Start: 2020-06-02 | End: 2020-06-12 | Stop reason: SDUPTHER

## 2020-06-02 RX ORDER — MELATONIN 10 MG
2 CAPSULE ORAL NIGHTLY
Status: CANCELLED | COMMUNITY
Start: 2020-06-02

## 2020-06-02 RX ORDER — DIAZEPAM 5 MG/1
5 TABLET ORAL EVERY 6 HOURS PRN
Qty: 30 TABLET | Refills: 0 | Status: SHIPPED | OUTPATIENT
Start: 2020-06-02 | End: 2020-07-24 | Stop reason: SDUPTHER

## 2020-06-02 RX ORDER — DIVALPROEX SODIUM 250 MG/1
250 TABLET, DELAYED RELEASE ORAL 3 TIMES DAILY
Qty: 90 TABLET | Refills: 0 | Status: SHIPPED | OUTPATIENT
Start: 2020-06-02 | End: 2020-06-12 | Stop reason: SDUPTHER

## 2020-06-02 RX ORDER — MIRTAZAPINE 30 MG/1
30 TABLET, FILM COATED ORAL NIGHTLY
Qty: 30 TABLET | Refills: 0 | Status: SHIPPED | OUTPATIENT
Start: 2020-06-02 | End: 2020-06-12 | Stop reason: SDUPTHER

## 2020-06-12 RX ORDER — ALPRAZOLAM 0.25 MG/1
0.25 TABLET ORAL 2 TIMES DAILY
Qty: 60 TABLET | Refills: 0 | Status: SHIPPED | OUTPATIENT
Start: 2020-06-12 | End: 2020-07-24 | Stop reason: SDUPTHER

## 2020-06-12 RX ORDER — MIRTAZAPINE 30 MG/1
30 TABLET, FILM COATED ORAL NIGHTLY
Qty: 30 TABLET | Refills: 0 | Status: SHIPPED | OUTPATIENT
Start: 2020-06-12 | End: 2020-07-15 | Stop reason: SDUPTHER

## 2020-06-12 RX ORDER — DIVALPROEX SODIUM 250 MG/1
250 TABLET, DELAYED RELEASE ORAL 3 TIMES DAILY
Qty: 90 TABLET | Refills: 0 | Status: SHIPPED | OUTPATIENT
Start: 2020-06-12 | End: 2020-07-15 | Stop reason: SDUPTHER

## 2020-06-12 RX ORDER — QUETIAPINE FUMARATE 200 MG/1
TABLET, FILM COATED ORAL
Qty: 60 TABLET | Refills: 1 | Status: SHIPPED | OUTPATIENT
Start: 2020-06-12 | End: 2020-08-24

## 2020-06-12 NOTE — TELEPHONE ENCOUNTER
Patient was last seen on 4/6/20  All medication was filled on 6/2/20 except Seroquel.   Seroquel was last sent to the pharmacy on 4/6/20

## 2020-07-21 ENCOUNTER — PATIENT MESSAGE (OUTPATIENT)
Dept: FAMILY MEDICINE | Facility: CLINIC | Age: 60
End: 2020-07-21

## 2020-07-21 DIAGNOSIS — I10 ESSENTIAL HYPERTENSION: Primary | ICD-10-CM

## 2020-07-21 RX ORDER — TRIAMTERENE AND HYDROCHLOROTHIAZIDE 37.5; 25 MG/1; MG/1
1 CAPSULE ORAL EVERY MORNING
Qty: 30 CAPSULE | Refills: 11 | Status: SHIPPED | OUTPATIENT
Start: 2020-07-21 | End: 2020-12-21 | Stop reason: SDUPTHER

## 2020-07-21 NOTE — TELEPHONE ENCOUNTER
I have signed for the following orders AND/OR meds.  Please call the patient and ask the patient to schedule the testing AND/OR inform about any medications that were sent. Medications have been sent to pharmacy listed below      No orders of the defined types were placed in this encounter.      Medications Ordered This Encounter   Medications    triamterene-hydrochlorothiazide 37.5-25 mg (DYAZIDE) 37.5-25 mg per capsule     Sig: Take 1 capsule by mouth every morning.     Dispense:  30 capsule     Refill:  11     .         [facility] - [address]  [address], [place] [address]  Phone: [phone] Fax: [phone]

## 2020-07-24 RX ORDER — ALPRAZOLAM 0.25 MG/1
0.25 TABLET ORAL 2 TIMES DAILY
Qty: 60 TABLET | Refills: 0 | Status: SHIPPED | OUTPATIENT
Start: 2020-07-24 | End: 2020-08-24 | Stop reason: SDUPTHER

## 2020-07-24 RX ORDER — DIAZEPAM 5 MG/1
5 TABLET ORAL EVERY 6 HOURS PRN
Qty: 30 TABLET | Refills: 0 | Status: SHIPPED | OUTPATIENT
Start: 2020-07-24 | End: 2020-08-24 | Stop reason: SDUPTHER

## 2020-08-24 ENCOUNTER — OFFICE VISIT (OUTPATIENT)
Dept: PSYCHIATRY | Facility: CLINIC | Age: 60
End: 2020-08-24
Payer: MEDICARE

## 2020-08-24 DIAGNOSIS — F84.9 PDD (PERVASIVE DEVELOPMENTAL DISORDER): Primary | ICD-10-CM

## 2020-08-24 DIAGNOSIS — R45.1 RESTLESSNESS AND AGITATION: ICD-10-CM

## 2020-08-24 PROCEDURE — 99213 OFFICE O/P EST LOW 20 MIN: CPT | Mod: 95,,, | Performed by: PSYCHIATRY & NEUROLOGY

## 2020-08-24 PROCEDURE — 99213 PR OFFICE/OUTPT VISIT, EST, LEVL III, 20-29 MIN: ICD-10-PCS | Mod: 95,,, | Performed by: PSYCHIATRY & NEUROLOGY

## 2020-08-24 RX ORDER — DIVALPROEX SODIUM 250 MG/1
250 TABLET, DELAYED RELEASE ORAL 3 TIMES DAILY
Qty: 90 TABLET | Refills: 2 | Status: SHIPPED | OUTPATIENT
Start: 2020-08-24 | End: 2020-10-30 | Stop reason: SDUPTHER

## 2020-08-24 RX ORDER — DIAZEPAM 5 MG/1
5 TABLET ORAL EVERY 6 HOURS PRN
Qty: 30 TABLET | Refills: 2 | Status: SHIPPED | OUTPATIENT
Start: 2020-08-24 | End: 2020-10-30 | Stop reason: SDUPTHER

## 2020-08-24 RX ORDER — QUETIAPINE FUMARATE 400 MG/1
TABLET, FILM COATED ORAL
Qty: 45 TABLET | Refills: 2 | Status: SHIPPED | OUTPATIENT
Start: 2020-08-24 | End: 2020-10-30 | Stop reason: SDUPTHER

## 2020-08-24 RX ORDER — MIRTAZAPINE 30 MG/1
30 TABLET, FILM COATED ORAL NIGHTLY
Qty: 30 TABLET | Refills: 2 | Status: SHIPPED | OUTPATIENT
Start: 2020-08-24 | End: 2020-10-30 | Stop reason: SDUPTHER

## 2020-08-24 RX ORDER — ALPRAZOLAM 0.25 MG/1
0.25 TABLET ORAL 2 TIMES DAILY
Qty: 60 TABLET | Refills: 2 | Status: SHIPPED | OUTPATIENT
Start: 2020-08-24 | End: 2020-10-08
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
## 2020-09-02 ENCOUNTER — OFFICE VISIT (OUTPATIENT)
Dept: PODIATRY | Facility: CLINIC | Age: 60
End: 2020-09-02
Payer: MEDICARE

## 2020-09-02 VITALS — HEIGHT: 57 IN | WEIGHT: 114 LBS | BODY MASS INDEX: 24.59 KG/M2

## 2020-09-02 DIAGNOSIS — F79 MENTAL HANDICAP: ICD-10-CM

## 2020-09-02 DIAGNOSIS — B35.1 ONYCHOMYCOSIS: Primary | ICD-10-CM

## 2020-09-02 PROCEDURE — 99499 NO LOS: ICD-10-PCS | Mod: ,,, | Performed by: PODIATRIST

## 2020-09-02 PROCEDURE — 99499 UNLISTED E&M SERVICE: CPT | Mod: ,,, | Performed by: PODIATRIST

## 2020-09-02 PROCEDURE — 17999 UNLISTD PX SKN MUC MEMB SUBQ: CPT | Mod: CSM,S$GLB,, | Performed by: PODIATRIST

## 2020-09-02 PROCEDURE — 17999 PR NON-COVERED FOOT CARE: ICD-10-PCS | Mod: CSM,S$GLB,, | Performed by: PODIATRIST

## 2020-09-02 PROCEDURE — 99999 PR PBB SHADOW E&M-EST. PATIENT-LVL III: CPT | Mod: PBBFAC,,, | Performed by: PODIATRIST

## 2020-09-02 PROCEDURE — 99213 OFFICE O/P EST LOW 20 MIN: CPT | Mod: PBBFAC | Performed by: PODIATRIST

## 2020-09-02 PROCEDURE — 99999 PR PBB SHADOW E&M-EST. PATIENT-LVL III: ICD-10-PCS | Mod: PBBFAC,,, | Performed by: PODIATRIST

## 2020-09-03 NOTE — PROGRESS NOTES
I advised the patient that for nail care coverage, patient must have high risk comorbidities such as Diabetes, Peripheral Vascular Disease, Neuropathy. The patient understands that they may receive a bill for routine non-covered foot care.   Patient does not have high risk feet. Pedal pulses are palpable.   RTC p.r.n. as PROC B

## 2020-09-09 ENCOUNTER — OFFICE VISIT (OUTPATIENT)
Dept: FAMILY MEDICINE | Facility: CLINIC | Age: 60
End: 2020-09-09
Payer: MEDICARE

## 2020-09-09 VITALS
HEART RATE: 66 BPM | HEIGHT: 57 IN | DIASTOLIC BLOOD PRESSURE: 92 MMHG | WEIGHT: 125.63 LBS | SYSTOLIC BLOOD PRESSURE: 128 MMHG | BODY MASS INDEX: 27.1 KG/M2

## 2020-09-09 DIAGNOSIS — R30.0 DYSURIA: Primary | ICD-10-CM

## 2020-09-09 DIAGNOSIS — N30.00 ACUTE CYSTITIS WITHOUT HEMATURIA: ICD-10-CM

## 2020-09-09 LAB
BACTERIA #/AREA URNS HPF: ABNORMAL /HPF
BILIRUB UR QL STRIP: NEGATIVE
CLARITY UR: CLEAR
COLOR UR: YELLOW
GLUCOSE UR QL STRIP: NEGATIVE
HGB UR QL STRIP: NEGATIVE
KETONES UR QL STRIP: NEGATIVE
LEUKOCYTE ESTERASE UR QL STRIP: ABNORMAL
MICROSCOPIC COMMENT: ABNORMAL
NITRITE UR QL STRIP: NEGATIVE
PH UR STRIP: 7 [PH] (ref 5–8)
PROT UR QL STRIP: NEGATIVE
RBC #/AREA URNS HPF: 0 /HPF (ref 0–4)
SP GR UR STRIP: 1.01 (ref 1–1.03)
SQUAMOUS #/AREA URNS HPF: 7 /HPF
URN SPEC COLLECT METH UR: ABNORMAL
WBC #/AREA URNS HPF: 15 /HPF (ref 0–5)

## 2020-09-09 PROCEDURE — 87086 URINE CULTURE/COLONY COUNT: CPT

## 2020-09-09 PROCEDURE — 99999 PR PBB SHADOW E&M-EST. PATIENT-LVL IV: ICD-10-PCS | Mod: PBBFAC,,, | Performed by: INTERNAL MEDICINE

## 2020-09-09 PROCEDURE — 81000 URINALYSIS NONAUTO W/SCOPE: CPT | Mod: PO

## 2020-09-09 PROCEDURE — 99999 PR PBB SHADOW E&M-EST. PATIENT-LVL IV: CPT | Mod: PBBFAC,,, | Performed by: INTERNAL MEDICINE

## 2020-09-09 PROCEDURE — 99213 OFFICE O/P EST LOW 20 MIN: CPT | Mod: S$PBB,,, | Performed by: INTERNAL MEDICINE

## 2020-09-09 PROCEDURE — 99214 OFFICE O/P EST MOD 30 MIN: CPT | Mod: PBBFAC,PO | Performed by: INTERNAL MEDICINE

## 2020-09-09 PROCEDURE — 99213 PR OFFICE/OUTPT VISIT, EST, LEVL III, 20-29 MIN: ICD-10-PCS | Mod: S$PBB,,, | Performed by: INTERNAL MEDICINE

## 2020-09-09 RX ORDER — NITROFURANTOIN 25; 75 MG/1; MG/1
100 CAPSULE ORAL 2 TIMES DAILY
Qty: 10 CAPSULE | Refills: 0 | Status: SHIPPED | OUTPATIENT
Start: 2020-09-09 | End: 2020-09-14

## 2020-09-09 NOTE — PATIENT INSTRUCTIONS
Nitrofurantoin tablets or capsules  What is this medicine?  NITROFURANTOIN (mook chirinos) is an antibiotic. It is used to treat urinary tract infections.  How should I use this medicine?  Take this medicine by mouth with a glass of water. Follow the directions on the prescription label. Take this medicine with food or milk. Take your doses at regular intervals. Do not take your medicine more often than directed. Do not stop taking except on your doctor's advice.  Talk to your pediatrician regarding the use of this medicine in children. While this drug may be prescribed for selected conditions, precautions do apply.  What side effects may I notice from receiving this medicine?  Side effects that you should report to your doctor or health care professional as soon as possible:  · allergic reactions like skin rash or hives, swelling of the face, lips, or tongue  · chest pain  · cough  · difficulty breathing  · dizziness, drowsiness  · fever or infection  · joint aches or pains  · pale or blue-tinted skin  · redness, blistering, peeling or loosening of the skin, including inside the mouth  · tingling, burning, pain, or numbness in hands or feet  · unusual bleeding or bruising  · unusually weak or tired  · yellowing of eyes or skin  Side effects that usually do not require medical attention (report to your doctor or health care professional if they continue or are bothersome):  · dark urine  · diarrhea  · headache  · loss of appetite  · nausea or vomiting  · temporary hair loss  What may interact with this medicine?  · antacids containing magnesium trisilicate  · probenecid  · quinolone antibiotics like ciprofloxacin, lomefloxacin, norfloxacin and ofloxacin  · sulfinpyrazone  What if I miss a dose?  If you miss a dose, take it as soon as you can. If it is almost time for your next dose, take only that dose. Do not take double or extra doses.  Where should I keep my medicine?  Keep out of the reach of  "children.  Store at room temperature between 15 and 30 degrees C (59 and 86 degrees F). Protect from light. Throw away any unused medicine after the expiration date.  What should I tell my health care provider before I take this medicine?  They need to know if you have any of these conditions:  · anemia  · diabetes  · glucose-6-phosphate dehydrogenase deficiency  · kidney disease  · liver disease  · lung disease  · other chronic illness  · an unusual or allergic reaction to nitrofurantoin, other antibiotics, other medicines, foods, dyes or preservatives  · pregnant or trying to get pregnant  · breast-feeding  What should I watch for while using this medicine?  Tell your doctor or health care professional if your symptoms do not improve or if you get new symptoms. Drink several glasses of water a day. If you are taking this medicine for a long time, visit your doctor for regular checks on your progress.  If you are diabetic, you may get a false positive result for sugar in your urine with certain brands of urine tests. Check with your doctor.  NOTE:This sheet is a summary. It may not cover all possible information. If you have questions about this medicine, talk to your doctor, pharmacist, or health care provider. Copyright© 2017 [employer]      Bladder Infection, Female (Adult)    Urine is normally doesn't have any bacteria in it. But bacteria can get into the urinary tract from the skin around the rectum. Or they can travel in the blood from elsewhere in the body. Once they are in your urinary tract, they can cause infection in the urethra (urethritis), the bladder (cystitis), or the kidneys (pyelonephritis).  The most common place for an infection is in the bladder. This is called a bladder infection. This is one of the most common infections in women. Most bladder infections are easily treated. They are not serious unless the infection spreads to the kidney.  The phrases "bladder infection," "UTI," and " ""cystitis" are often used to describe the same thing. But they are not always the same. Cystitis is an inflammation of the bladder. The most common cause of cystitis is an infection.  Symptoms  The infection causes inflammation in the urethra and bladder. This causes many of the symptoms. The most common symptoms of a bladder infection are:  · Pain or burning when urinating  · Having to urinate more often than usual  · Urgent need to urinate  · Only a small amount of urine comes out  · Blood in urine  · Abdominal discomfort. This is usually in the lower abdomen above the pubic bone.  · Cloudy urine  · Strong- or bad-smelling urine  · Unable to urinate (urinary retention)  · Unable to hold urine in (urinary incontinence)  · Fever  · Loss of appetite  · Confusion (in older adults)  Causes  Bladder infections are not contagious. You can't get one from someone else, from a toilet seat, or from sharing a bath.  The most common cause of bladder infections is bacteria from the bowels. The bacteria get onto the skin around the opening of the urethra. From there, they can get into the urine and travel up to the bladder, causing inflammation and infection. This usually happens because of:  · Wiping improperly after urinating. Always wipe from front to back.  · Bowel incontinence  · Pregnancy  · Procedures such as having a catheter inserted  · Older age  · Not emptying your bladder. This can allow bacteria a chance to grow in your urine.  · Dehydration  · Constipation  · Sex  · Use of a diaphragm for birth control   Treatment  Bladder infections are diagnosed by a urine test. They are treated with antibiotics and usually clear up quickly without complications. Treatment helps prevent a more serious kidney infection.  Medicines  Medicines can help in the treatment of a bladder infection:  · Take antibiotics until they are used up, even if you feel better. It is important to finish them to make sure the infection has " cleared.  · You can use acetaminophen or ibuprofen for pain, fever, or discomfort, unless another medicine was prescribed. If you have chronic liver or kidney disease, talk with your healthcare provider before using these medicines. Also talk with your provider if you've ever had a stomach ulcer or gastrointestinal bleeding, or are taking blood-thinner medicines.  · If you are given phenazopydridine to reduce burning with urination, it will cause your urine to become a bright orange color. This can stain clothing.  Care and prevention  These self-care steps can help prevent future infections:  · Drink plenty of fluids to prevent dehydration and flush out your bladder. Do this unless you must restrict fluids for other health reasons, or your doctor told you not to.  · Proper cleaning after going to the bathroom is important. Wipe from front to back after using the toilet to prevent the spread of bacteria.  · Urinate more often. Don't try to hold urine in for a long time.  · Wear loose-fitting clothes and cotton underwear. Avoid tight-fitting pants.  · Improve your diet and prevent constipation. Eat more fresh fruit and vegetables, and fiber, and less junk and fatty foods.  · Avoid sex until your symptoms are gone.  · Avoid caffeine, alcohol, and spicy foods. These can irritate your bladder.  · Urinate right after intercourse to flush out your bladder.  · If you use birth control pills and have frequent bladder infections, discuss it with your doctor.  Follow-up care  Call your healthcare provider if all symptoms are not gone after 3 days of treatment. This is especially important if you have repeat infections.  If a culture was done, you will be told if your treatment needs to be changed. If directed, you can call to find out the results.  If X-rays were done, you will be told if the results will affect your treatment.  Call 911  Call 911 if any of the following occur:  · Trouble breathing  · Hard to wake up  or confusion  · Fainting or loss of consciousness  · Rapid heart rate  When to seek medical advice  Call your healthcare provider right away if any of these occur:  · Fever of 100.4ºF (38.0ºC) or higher, or as directed by your healthcare provider  · Symptoms are not better by the third day of treatment  · Back or belly (abdominal) pain that gets worse  · Repeated vomiting, or unable to keep medicine down  · Weakness or dizziness  · Vaginal discharge  · Pain, redness, or swelling in the outer vaginal area (labia)  Date Last Reviewed: 10/1/2016  © 8183-0233 [employer]. [address] [place] [id number]. All rights reserved. This information is not intended as a substitute for professional medical care. Always follow your healthcare professional's instructions.

## 2020-09-09 NOTE — PROGRESS NOTES
Assessment/Plan:    Acute cystitis without hematuria  -symptoms and urine consistent with acute cystitis  -start on antibiotic  -follow up urine culture  -follow up if symptoms persist/worsen  _____________________________________________________________________________________________________________________________________________________    Orders this visit:    Dysuria  -     Urinalysis, Reflex to Urine Culture Urine, Clean Catch    Acute cystitis without hematuria  -     nitrofurantoin, macrocrystal-monohydrate, (MACROBID) 100 MG capsule; Take 1 capsule (100 mg total) by mouth 2 (two) times daily. for 5 days  Dispense: 10 capsule; Refill: 0    Other orders  -     Urinalysis Microscopic  -     Urine culture      Follow up if symptoms worsen or fail to improve.    Brooke Goldberg MD  _____________________________________________________________________________________________________________________________________________________    HPI:    Patient is in clinic today as an established patient concern for possible UTI. History provided by care provider.    Urinary Tract Infection: Patient complains of foul smelling urine, frequency and incontinence She has had symptoms for several days. Caregiver denies back pain and fever. Patient does not have a history of recurrent UTI.  Patient does not have a history of pyelonephritis.     No other complaints today.    Past Medical History:  Past Medical History:   Diagnosis Date    Galactorrhea      History reviewed. No pertinent surgical history.  Review of patient's allergies indicates:  No Known Allergies  Social History     Tobacco Use    Smoking status: Never Smoker    Smokeless tobacco: Never Used   Substance Use Topics    Alcohol use: No    Drug use: No     History reviewed. No pertinent family history.  Current Outpatient Medications on File Prior to Visit   Medication Sig Dispense Refill    ALPRAZolam (XANAX) 0.25 MG tablet Take 1 tablet (0.25 mg total) by  mouth 2 (two) times daily. 60 tablet 2    diazePAM (VALIUM) 5 MG tablet Take 1 tablet (5 mg total) by mouth every 6 (six) hours as needed for Anxiety. 30 tablet 2    divalproex (DEPAKOTE) 250 MG EC tablet Take 1 tablet (250 mg total) by mouth 3 (three) times daily. 90 tablet 2    mirtazapine (REMERON) 30 MG tablet Take 1 tablet (30 mg total) by mouth every evening. 30 tablet 2    MUCINEX 600 mg 12 hr tablet TAKE 1 TABLET BY MOUTH EVERY TWELVE HOURS AS NEEDED  0    multivit-iron-FA-calcium-mins (THERAPEUTIC-M) 9 mg iron-400 mcg Tab tablet Take 1 tablet by mouth once daily.      polyethylene glycol (GLYCOLAX) 17 gram/dose powder Take 17 g by mouth once daily.      QUEtiapine (SEROQUEL) 400 MG tablet Take 1/2 tablet each morning and 1 tablet at bedtime 45 tablet 2    simvastatin (ZOCOR) 40 MG tablet Take 1 tablet (40 mg total) by mouth every evening. 30 tablet 1    triamterene-hydrochlorothiazide 37.5-25 mg (DYAZIDE) 37.5-25 mg per capsule Take 1 capsule by mouth every morning. 30 capsule 11    melatonin 10 mg Cap Take 2 tablets by mouth every evening. (Patient not taking: Reported on 9/9/2020)       No current facility-administered medications on file prior to visit.        Review of Systems   Constitutional: Positive for activity change. Negative for unexpected weight change.   HENT: Negative for hearing loss, rhinorrhea and trouble swallowing.    Eyes: Negative for discharge and visual disturbance.   Respiratory: Negative for chest tightness and wheezing.    Cardiovascular: Negative for chest pain and palpitations.   Gastrointestinal: Negative for blood in stool, constipation, diarrhea and vomiting.   Endocrine: Positive for polyuria. Negative for polydipsia.   Genitourinary: Negative for difficulty urinating, dysuria, hematuria and menstrual problem.   Musculoskeletal: Negative for arthralgias, joint swelling and neck pain.   Neurological: Negative for weakness and headaches.   Psychiatric/Behavioral:  "Negative for confusion and dysphoric mood.       Vitals:    09/09/20 1631   BP: (!) 128/92   Pulse: 66   Weight: 57 kg (125 lb 9.6 oz)   Height: 4' 9" (1.448 m)       Wt Readings from Last 3 Encounters:   09/09/20 57 kg (125 lb 9.6 oz)   09/02/20 51.7 kg (114 lb)   02/12/20 51.7 kg (114 lb)       Physical Exam  Constitutional:       General: She is not in acute distress.     Appearance: Normal appearance. She is well-developed.   HENT:      Head: Normocephalic and atraumatic.   Eyes:      Conjunctiva/sclera: Conjunctivae normal.   Neck:      Musculoskeletal: Normal range of motion and neck supple.   Cardiovascular:      Rate and Rhythm: Normal rate.      Pulses: Normal pulses.   Pulmonary:      Effort: Pulmonary effort is normal.   Abdominal:      General: There is no distension.      Palpations: Abdomen is soft.      Tenderness: There is no abdominal tenderness. There is no right CVA tenderness or left CVA tenderness.   Musculoskeletal: Normal range of motion.   Skin:     General: Skin is warm and dry.      Findings: No rash.   Neurological:      General: No focal deficit present.      Mental Status: She is alert and oriented to person, place, and time.         Health Maintenance   Topic Date Due    Hepatitis C Screening  1960    Mammogram  03/08/2015    Lipid Panel  09/07/2022    TETANUS VACCINE  05/30/2024       "

## 2020-09-10 LAB
BACTERIA UR CULT: NORMAL
BACTERIA UR CULT: NORMAL

## 2020-09-11 ENCOUNTER — PATIENT MESSAGE (OUTPATIENT)
Dept: FAMILY MEDICINE | Facility: CLINIC | Age: 60
End: 2020-09-11

## 2020-09-14 NOTE — TELEPHONE ENCOUNTER
90 L is a separate appointment, as this is a full physical/annual exam.  Please have patient make an appointment.

## 2020-09-17 ENCOUNTER — OFFICE VISIT (OUTPATIENT)
Dept: FAMILY MEDICINE | Facility: CLINIC | Age: 60
End: 2020-09-17
Payer: MEDICARE

## 2020-09-17 VITALS
BODY MASS INDEX: 27.16 KG/M2 | SYSTOLIC BLOOD PRESSURE: 164 MMHG | DIASTOLIC BLOOD PRESSURE: 92 MMHG | WEIGHT: 125.88 LBS | TEMPERATURE: 97 F | HEART RATE: 82 BPM | HEIGHT: 57 IN

## 2020-09-17 DIAGNOSIS — F84.9 PDD (PERVASIVE DEVELOPMENTAL DISORDER): ICD-10-CM

## 2020-09-17 DIAGNOSIS — I10 ESSENTIAL HYPERTENSION: ICD-10-CM

## 2020-09-17 DIAGNOSIS — D72.819 LEUKOPENIA, UNSPECIFIED TYPE: ICD-10-CM

## 2020-09-17 PROCEDURE — 99999 PR PBB SHADOW E&M-EST. PATIENT-LVL III: ICD-10-PCS | Mod: PBBFAC,,, | Performed by: INTERNAL MEDICINE

## 2020-09-17 PROCEDURE — 99213 OFFICE O/P EST LOW 20 MIN: CPT | Mod: S$PBB,,, | Performed by: INTERNAL MEDICINE

## 2020-09-17 PROCEDURE — 99213 PR OFFICE/OUTPT VISIT, EST, LEVL III, 20-29 MIN: ICD-10-PCS | Mod: S$PBB,,, | Performed by: INTERNAL MEDICINE

## 2020-09-17 PROCEDURE — 99213 OFFICE O/P EST LOW 20 MIN: CPT | Mod: PBBFAC,PO | Performed by: INTERNAL MEDICINE

## 2020-09-17 PROCEDURE — 99999 PR PBB SHADOW E&M-EST. PATIENT-LVL III: CPT | Mod: PBBFAC,,, | Performed by: INTERNAL MEDICINE

## 2020-09-17 NOTE — Clinical Note
Please call caregiver and find out if there is anyway if Juan Pablo would be able to get her labs done that were ordered by Dr. Woodard earlier this year? It looks like she has been able to do this in the past. Would probable have to take valium before them.

## 2020-09-22 NOTE — ASSESSMENT & PLAN NOTE
-elevated today but usually at goal  -currently on Dyazide 37.5-25 mg daily  -I have asked staff to start a blood pressure log at home so that we can make sure she is being treated appropriately  -continue to monitor sodium intake in diet

## 2020-09-23 NOTE — PROGRESS NOTES
Assessment/Plan:    Essential hypertension  -elevated today but usually at goal  -currently on Dyazide 37.5-25 mg daily  -I have asked staff to start a blood pressure log at home so that we can make sure she is being treated appropriately  -continue to monitor sodium intake in diet    PDD (pervasive developmental disorder)  -2-3 year old intellectual level  -followed by both psychiatry and neurology  -currently depakote er 250 mg tid, quetiapine to 200 mg qAM and 400 mg qhs. Xanax 0.25 mg bid. Diazepam 5 mg prn    Leukopenia  -hx of both leukopenia and thrombocytopenia  -evaluated by hematology  -thought likely 2/2 depakote use  -labs for further work up never obtained    _____________________________________________________________________________________________________________________________________________________    Orders this visit:    Essential hypertension    PDD (pervasive developmental disorder)    Leukopenia, unspecified type      Follow up in about 6 months (around 3/17/2021), or if symptoms worsen or fail to improve.    Brooke Goldberg MD  _____________________________________________________________________________________________________________________________________________________    HPI:    Patient is in clinic today as an established patient here for annual visit. Juan Pablo is here today with one of her caregivers.    HTN: The patient has a diagnosis of hypertension. Blood pressure usually at goal but elevated today, however her constant movement does make it difficult to get an accurate readings. Caregiver reports compliance with the medicine listed below and has not any known side effects that they are aware of. They have not been checking blood pressure at home. Caregiver denies increase in sodium in diet. Denies any OTC medication use, including NSAIDs or decongestants.    Intellectual disability: Followed by neurology and psychiatry. Had psych follow up recently. No change in medications. Her  behavior has remained stable recently. Periodic agitation but calm today, but with continued movement and fidgeting which is usual behavior for her.    Patient has labs ordered earlier this year but were never done. Will need to see how we can facilitate getting these done. She does have PRN benzos available. There are no new complaints today. I did see her recently for a UTI but those symptoms resolved with antibiotic use. Health maintenance reviewed. Patient unable to complete mmg in past.      Past Medical History:  Past Medical History:   Diagnosis Date    Galactorrhea      History reviewed. No pertinent surgical history.  Review of patient's allergies indicates:  No Known Allergies  Social History     Tobacco Use    Smoking status: Never Smoker    Smokeless tobacco: Never Used   Substance Use Topics    Alcohol use: No    Drug use: No     History reviewed. No pertinent family history.  Current Outpatient Medications on File Prior to Visit   Medication Sig Dispense Refill    ALPRAZolam (XANAX) 0.25 MG tablet Take 1 tablet (0.25 mg total) by mouth 2 (two) times daily. 60 tablet 2    diazePAM (VALIUM) 5 MG tablet Take 1 tablet (5 mg total) by mouth every 6 (six) hours as needed for Anxiety. 30 tablet 2    divalproex (DEPAKOTE) 250 MG EC tablet Take 1 tablet (250 mg total) by mouth 3 (three) times daily. 90 tablet 2    mirtazapine (REMERON) 30 MG tablet Take 1 tablet (30 mg total) by mouth every evening. 30 tablet 2    MUCINEX 600 mg 12 hr tablet TAKE 1 TABLET BY MOUTH EVERY TWELVE HOURS AS NEEDED  0    multivit-iron-FA-calcium-mins (THERAPEUTIC-M) 9 mg iron-400 mcg Tab tablet Take 1 tablet by mouth once daily.      polyethylene glycol (GLYCOLAX) 17 gram/dose powder Take 17 g by mouth once daily.      QUEtiapine (SEROQUEL) 400 MG tablet Take 1/2 tablet each morning and 1 tablet at bedtime 45 tablet 2    simvastatin (ZOCOR) 40 MG tablet Take 1 tablet (40 mg total) by mouth every evening. 30 tablet 1     "triamterene-hydrochlorothiazide 37.5-25 mg (DYAZIDE) 37.5-25 mg per capsule Take 1 capsule by mouth every morning. 30 capsule 11     No current facility-administered medications on file prior to visit.        Review of Systems   Unable to perform ROS: Other       Vitals:    09/17/20 1119   BP: (!) 164/92   Pulse: 82   Temp: 96.9 °F (36.1 °C)   TempSrc: Oral   Weight: 57.1 kg (125 lb 14.4 oz)   Height: 4' 9" (1.448 m)       Wt Readings from Last 3 Encounters:   09/17/20 57.1 kg (125 lb 14.4 oz)   09/09/20 57 kg (125 lb 9.6 oz)   09/02/20 51.7 kg (114 lb)       Physical Exam  Constitutional:       General: She is not in acute distress.     Appearance: Normal appearance. She is well-developed.   HENT:      Head: Normocephalic and atraumatic.   Eyes:      Conjunctiva/sclera: Conjunctivae normal.   Neck:      Musculoskeletal: Normal range of motion and neck supple.   Cardiovascular:      Rate and Rhythm: Normal rate and regular rhythm.      Pulses: Normal pulses.      Heart sounds: Normal heart sounds. No murmur.   Pulmonary:      Effort: Pulmonary effort is normal. No respiratory distress.      Breath sounds: Normal breath sounds.   Abdominal:      General: Bowel sounds are normal. There is no distension.      Palpations: Abdomen is soft.      Tenderness: There is no abdominal tenderness.   Musculoskeletal: Normal range of motion.   Skin:     General: Skin is warm and dry.      Findings: No rash.   Neurological:      General: No focal deficit present.      Mental Status: She is alert. Mental status is at baseline.      Comments: Constant moving/fidgeting but at her baseline. No agitation today         Health Maintenance   Topic Date Due    Hepatitis C Screening  1960    Mammogram  03/08/2015    Lipid Panel  09/07/2022    TETANUS VACCINE  05/30/2024       "

## 2020-09-23 NOTE — ASSESSMENT & PLAN NOTE
-hx of both leukopenia and thrombocytopenia  -evaluated by hematology  -thought likely 2/2 depakote use  -labs for further work up never obtained

## 2020-09-23 NOTE — ASSESSMENT & PLAN NOTE
-2-3 year old intellectual level  -followed by both psychiatry and neurology  -currently depakote er 250 mg tid, quetiapine to 200 mg qAM and 400 mg qhs. Xanax 0.25 mg bid. Diazepam 5 mg prn

## 2020-09-24 ENCOUNTER — TELEPHONE (OUTPATIENT)
Dept: FAMILY MEDICINE | Facility: CLINIC | Age: 60
End: 2020-09-24

## 2020-09-24 NOTE — TELEPHONE ENCOUNTER
Please call patient's care givers and make sure they have started checking her blood pressure and if so, please get her last few readings.

## 2020-09-24 NOTE — TELEPHONE ENCOUNTER
Spoke with representative at [facility]. Stated that they haven't taken pt's blood pressure because they haven't received her cuff yet.

## 2020-09-24 NOTE — TELEPHONE ENCOUNTER
MD PIETER Rosales Staff             Please call caregiver and find out if there is anyway if Juan Pablo would be able to get her labs done that were ordered by Dr. Woodard earlier this year? It looks like she has been able to do this in the past. Would probable have to take valium before them.

## 2020-09-25 ENCOUNTER — PATIENT MESSAGE (OUTPATIENT)
Dept: FAMILY MEDICINE | Facility: CLINIC | Age: 60
End: 2020-09-25

## 2020-10-01 ENCOUNTER — LAB VISIT (OUTPATIENT)
Dept: LAB | Facility: HOSPITAL | Age: 60
End: 2020-10-01
Attending: INTERNAL MEDICINE
Payer: MEDICARE

## 2020-10-01 ENCOUNTER — TELEPHONE (OUTPATIENT)
Dept: HEMATOLOGY/ONCOLOGY | Facility: CLINIC | Age: 60
End: 2020-10-01

## 2020-10-01 ENCOUNTER — PATIENT MESSAGE (OUTPATIENT)
Dept: OTHER | Facility: OTHER | Age: 60
End: 2020-10-01

## 2020-10-01 DIAGNOSIS — D72.819 LEUKOPENIA, UNSPECIFIED TYPE: ICD-10-CM

## 2020-10-01 DIAGNOSIS — D53.9 NUTRITIONAL ANEMIA, UNSPECIFIED: ICD-10-CM

## 2020-10-01 DIAGNOSIS — D69.6 THROMBOCYTOPENIA: ICD-10-CM

## 2020-10-01 LAB
ALBUMIN SERPL BCP-MCNC: 3.4 G/DL (ref 3.5–5.2)
ALP SERPL-CCNC: 97 U/L (ref 55–135)
ALT SERPL W/O P-5'-P-CCNC: 13 U/L (ref 10–44)
ANION GAP SERPL CALC-SCNC: 11 MMOL/L (ref 8–16)
AST SERPL-CCNC: 17 U/L (ref 10–40)
BASOPHILS # BLD AUTO: ABNORMAL K/UL (ref 0–0.2)
BASOPHILS NFR BLD: 0 % (ref 0–1.9)
BILIRUB SERPL-MCNC: 0.2 MG/DL (ref 0.1–1)
BUN SERPL-MCNC: 20 MG/DL (ref 8–23)
CALCIUM SERPL-MCNC: 9.7 MG/DL (ref 8.7–10.5)
CHLORIDE SERPL-SCNC: 100 MMOL/L (ref 95–110)
CO2 SERPL-SCNC: 31 MMOL/L (ref 23–29)
CREAT SERPL-MCNC: 0.8 MG/DL (ref 0.5–1.4)
DIFFERENTIAL METHOD: ABNORMAL
EOSINOPHIL # BLD AUTO: ABNORMAL K/UL (ref 0–0.5)
EOSINOPHIL NFR BLD: 2 % (ref 0–8)
ERYTHROCYTE [DISTWIDTH] IN BLOOD BY AUTOMATED COUNT: 12.4 % (ref 11.5–14.5)
EST. GFR  (AFRICAN AMERICAN): >60 ML/MIN/1.73 M^2
EST. GFR  (NON AFRICAN AMERICAN): >60 ML/MIN/1.73 M^2
FERRITIN SERPL-MCNC: 83 NG/ML (ref 20–300)
FOLATE SERPL-MCNC: 8.4 NG/ML (ref 4–24)
GLUCOSE SERPL-MCNC: 80 MG/DL (ref 70–110)
HCT VFR BLD AUTO: 42.4 % (ref 37–48.5)
HGB BLD-MCNC: 13.7 G/DL (ref 12–16)
IMM GRANULOCYTES # BLD AUTO: ABNORMAL K/UL (ref 0–0.04)
IMM GRANULOCYTES NFR BLD AUTO: ABNORMAL % (ref 0–0.5)
IRON SERPL-MCNC: 80 UG/DL (ref 30–160)
LDH SERPL L TO P-CCNC: 230 U/L (ref 110–260)
LYMPHOCYTES # BLD AUTO: ABNORMAL K/UL (ref 1–4.8)
LYMPHOCYTES NFR BLD: 56 % (ref 18–48)
MCH RBC QN AUTO: 28 PG (ref 27–31)
MCHC RBC AUTO-ENTMCNC: 32.3 G/DL (ref 32–36)
MCV RBC AUTO: 87 FL (ref 82–98)
MONOCYTES # BLD AUTO: ABNORMAL K/UL (ref 0.3–1)
MONOCYTES NFR BLD: 9 % (ref 4–15)
NEUTROPHILS NFR BLD: 31 % (ref 38–73)
NEUTS BAND NFR BLD MANUAL: 1 %
NRBC BLD-RTO: 0 /100 WBC
PLATELET # BLD AUTO: 113 K/UL (ref 150–350)
PLATELET BLD QL SMEAR: ABNORMAL
PMV BLD AUTO: 10.8 FL (ref 9.2–12.9)
POTASSIUM SERPL-SCNC: 4.2 MMOL/L (ref 3.5–5.1)
PROMYELOCYTES NFR BLD MANUAL: 1 %
PROT SERPL-MCNC: 6.7 G/DL (ref 6–8.4)
RBC # BLD AUTO: 4.9 M/UL (ref 4–5.4)
SATURATED IRON: 18 % (ref 20–50)
SODIUM SERPL-SCNC: 142 MMOL/L (ref 136–145)
TOTAL IRON BINDING CAPACITY: 450 UG/DL (ref 250–450)
TRANSFERRIN SERPL-MCNC: 304 MG/DL (ref 200–375)
TSH SERPL DL<=0.005 MIU/L-ACNC: 1.23 UIU/ML (ref 0.4–4)
VIT B12 SERPL-MCNC: 586 PG/ML (ref 210–950)
WBC # BLD AUTO: 1.96 K/UL (ref 3.9–12.7)

## 2020-10-01 PROCEDURE — 80053 COMPREHEN METABOLIC PANEL: CPT

## 2020-10-01 PROCEDURE — 86334 PATHOLOGIST INTERPRETATION IFE: ICD-10-PCS | Mod: 26,,, | Performed by: PATHOLOGY

## 2020-10-01 PROCEDURE — 84443 ASSAY THYROID STIM HORMONE: CPT

## 2020-10-01 PROCEDURE — 85007 BL SMEAR W/DIFF WBC COUNT: CPT | Mod: PO

## 2020-10-01 PROCEDURE — 82728 ASSAY OF FERRITIN: CPT

## 2020-10-01 PROCEDURE — 85027 COMPLETE CBC AUTOMATED: CPT | Mod: PO

## 2020-10-01 PROCEDURE — 83615 LACTATE (LD) (LDH) ENZYME: CPT

## 2020-10-01 PROCEDURE — 84165 PROTEIN E-PHORESIS SERUM: CPT | Mod: 26,,, | Performed by: PATHOLOGY

## 2020-10-01 PROCEDURE — 86334 IMMUNOFIX E-PHORESIS SERUM: CPT | Mod: 26,,, | Performed by: PATHOLOGY

## 2020-10-01 PROCEDURE — 84165 PROTEIN E-PHORESIS SERUM: CPT

## 2020-10-01 PROCEDURE — 82607 VITAMIN B-12: CPT

## 2020-10-01 PROCEDURE — 86334 IMMUNOFIX E-PHORESIS SERUM: CPT

## 2020-10-01 PROCEDURE — 82746 ASSAY OF FOLIC ACID SERUM: CPT

## 2020-10-01 PROCEDURE — 36415 COLL VENOUS BLD VENIPUNCTURE: CPT | Mod: PO

## 2020-10-01 PROCEDURE — 84165 PATHOLOGIST INTERPRETATION SPE: ICD-10-PCS | Mod: 26,,, | Performed by: PATHOLOGY

## 2020-10-01 PROCEDURE — 83540 ASSAY OF IRON: CPT

## 2020-10-02 ENCOUNTER — PATIENT MESSAGE (OUTPATIENT)
Dept: FAMILY MEDICINE | Facility: CLINIC | Age: 60
End: 2020-10-02

## 2020-10-02 DIAGNOSIS — I10 ESSENTIAL HYPERTENSION: Primary | ICD-10-CM

## 2020-10-02 LAB
ALBUMIN SERPL ELPH-MCNC: 3.59 G/DL (ref 3.35–5.55)
ALPHA1 GLOB SERPL ELPH-MCNC: 0.52 G/DL (ref 0.17–0.41)
ALPHA2 GLOB SERPL ELPH-MCNC: 0.67 G/DL (ref 0.43–0.99)
B-GLOBULIN SERPL ELPH-MCNC: 0.74 G/DL (ref 0.5–1.1)
GAMMA GLOB SERPL ELPH-MCNC: 0.79 G/DL (ref 0.67–1.58)
INTERPRETATION SERPL IFE-IMP: NORMAL
PATHOLOGIST INTERPRETATION IFE: NORMAL
PATHOLOGIST INTERPRETATION SPE: NORMAL
PROT SERPL-MCNC: 6.3 G/DL (ref 6–8.4)

## 2020-10-02 RX ORDER — AMLODIPINE BESYLATE 5 MG/1
5 TABLET ORAL DAILY
Qty: 30 TABLET | Refills: 11 | Status: SHIPPED | OUTPATIENT
Start: 2020-10-02 | End: 2020-10-08 | Stop reason: SDUPTHER

## 2020-10-02 NOTE — TELEPHONE ENCOUNTER
Pressure is elevated.  I would like to add amlodipine once daily to her medications list.  Please have caregivers continue to send me readings.  Medication has been sent to her pharmacy.    I have signed for the following orders AND/OR meds.  Please call the patient and ask the patient to schedule the testing AND/OR inform about any medications that were sent. Medications have been sent to pharmacy listed below      No orders of the defined types were placed in this encounter.      Medications Ordered This Encounter   Medications    amLODIPine (NORVASC) 5 MG tablet     Sig: Take 1 tablet (5 mg total) by mouth once daily.     Dispense:  30 tablet     Refill:  11     .         [facility] - [address], [place] [address]  Phone: [phone] Fax: [phone]     Calm

## 2020-10-04 DIAGNOSIS — E61.1 IRON DEFICIENCY: Primary | ICD-10-CM

## 2020-10-05 ENCOUNTER — PATIENT MESSAGE (OUTPATIENT)
Dept: HEMATOLOGY/ONCOLOGY | Facility: CLINIC | Age: 60
End: 2020-10-05

## 2020-10-06 ENCOUNTER — PATIENT MESSAGE (OUTPATIENT)
Dept: FAMILY MEDICINE | Facility: CLINIC | Age: 60
End: 2020-10-06

## 2020-10-07 ENCOUNTER — PATIENT OUTREACH (OUTPATIENT)
Dept: ADMINISTRATIVE | Facility: OTHER | Age: 60
End: 2020-10-07

## 2020-10-07 NOTE — PROGRESS NOTES
Health Maintenance Due   Topic Date Due    Shingles Vaccine (1 of 2) 07/06/2009    Mammogram  03/08/2015    Influenza Vaccine (1) 08/01/2020     Updates were requested from care everywhere.  Chart was reviewed for overdue Proactive [facility] Encounters (MELISA) topics (CRS, Breast Cancer Screening, Eye exam)  Health Maintenance has been updated.  LINKS immunization registry triggered.  Immunizations were reconciled.  Mammogram tasked to patient.

## 2020-10-08 ENCOUNTER — TELEPHONE (OUTPATIENT)
Dept: FAMILY MEDICINE | Facility: CLINIC | Age: 60
End: 2020-10-08

## 2020-10-08 ENCOUNTER — PATIENT MESSAGE (OUTPATIENT)
Dept: FAMILY MEDICINE | Facility: CLINIC | Age: 60
End: 2020-10-08

## 2020-10-08 ENCOUNTER — OFFICE VISIT (OUTPATIENT)
Dept: GASTROENTEROLOGY | Facility: CLINIC | Age: 60
End: 2020-10-08
Payer: MEDICARE

## 2020-10-08 VITALS — HEIGHT: 57 IN | WEIGHT: 125 LBS | BODY MASS INDEX: 26.97 KG/M2

## 2020-10-08 DIAGNOSIS — Z01.812 PRE-PROCEDURE LAB EXAM: ICD-10-CM

## 2020-10-08 DIAGNOSIS — I10 ESSENTIAL HYPERTENSION: ICD-10-CM

## 2020-10-08 DIAGNOSIS — E61.1 IRON DEFICIENCY: ICD-10-CM

## 2020-10-08 PROCEDURE — 99999 PR PBB SHADOW E&M-EST. PATIENT-LVL III: ICD-10-PCS | Mod: PBBFAC,,, | Performed by: INTERNAL MEDICINE

## 2020-10-08 PROCEDURE — 99213 OFFICE O/P EST LOW 20 MIN: CPT | Mod: PBBFAC,PO | Performed by: INTERNAL MEDICINE

## 2020-10-08 PROCEDURE — 99204 OFFICE O/P NEW MOD 45 MIN: CPT | Mod: S$PBB,,, | Performed by: INTERNAL MEDICINE

## 2020-10-08 PROCEDURE — 99999 PR PBB SHADOW E&M-EST. PATIENT-LVL III: CPT | Mod: PBBFAC,,, | Performed by: INTERNAL MEDICINE

## 2020-10-08 PROCEDURE — 99204 PR OFFICE/OUTPT VISIT, NEW, LEVL IV, 45-59 MIN: ICD-10-PCS | Mod: S$PBB,,, | Performed by: INTERNAL MEDICINE

## 2020-10-08 RX ORDER — AMLODIPINE BESYLATE 10 MG/1
10 TABLET ORAL DAILY
Qty: 30 TABLET | Refills: 11 | Status: SHIPPED | OUTPATIENT
Start: 2020-10-08 | End: 2020-12-21 | Stop reason: SDUPTHER

## 2020-10-08 NOTE — LETTER
October 11, 2020      Brown Woodard MD  [address]  [place] [address]           [facility] [address] [place] [address]  Phone: [phone]          Patient: Juan Pablo Bolden   MR Number: [id number]   YOB: 1960   Date of Visit: 10/8/2020       Dear Dr. Brown Woodard:    Thank you for referring Juan Pablo Bolden to me for evaluation. Attached you will find relevant portions of my assessment and plan of care.    If you have questions, please do not hesitate to call me. I look forward to following Juan Pablo Bolden along with you.    Sincerely,    John Batista MD    Enclosure  CC:  No Recipients    If you would like to receive this communication electronically, please contact [email or web address] or [phone] to request more information on [facility] access.    For providers and/or their staff who would like to refer a patient to [facility], please contact us through our one-stop-shop provider referral line, Octavia Cosme, at [phone].    If you feel you have received this communication in error or would no longer like to receive these types of communications, please e-mail [email or web address]

## 2020-10-08 NOTE — TELEPHONE ENCOUNTER
----- Message from Zuhair Ann sent at 10/8/2020 12:37 PM CDT -----  Contact: Isabel  Type:  Sooner Apoointment Request    Caller is requesting a sooner appointment.  Caller declined first available appointment listed below.  Caller will not accept being placed on the waitlist and is requesting a message be sent to doctor.  Name of Caller:Isabel/pt supervisor  When is the first available appointment?2020  Symptoms:possible UTI  Would the patient rather a call back or a response via [facility]? Call back  Best Call Back Number:[phone]  Additional Information:

## 2020-10-08 NOTE — TELEPHONE ENCOUNTER
I have signed for the following orders AND/OR meds.  Please call the patient and ask the patient to schedule the testing AND/OR inform about any medications that were sent. Medications have been sent to pharmacy listed below      No orders of the defined types were placed in this encounter.      Medications Ordered This Encounter   Medications    amLODIPine (NORVASC) 10 MG tablet     Sig: Take 1 tablet (10 mg total) by mouth once daily.     Dispense:  30 tablet     Refill:  11     .         [facility] - [address], [place] [address]  Phone: [phone] Fax: [phone]

## 2020-10-08 NOTE — PROGRESS NOTES
"CC: iron deficiency    HPI 60 y.o. female with cognitive delay who presents for evaluation of incidental mild iron deficiency without associated overt blood loss. History difficult to obtain from patient due to cognitive delay. Therefore most of history obtained from medical records and caretaker at bedside. Patient had iron studies with low iron sat, increased TIBC 450, ferritin was normal. H/H normal at 13.7/42.4 but WBC decreased at 1.96. No history of endoscopy in past. No history of blood in stool.     Medical records reviewed. Additional history supplemented by nursing.     Past Medical History:   Diagnosis Date    Galactorrhea      PSH  None    Social History  Social History     Tobacco Use    Smoking status: Never Smoker    Smokeless tobacco: Never Used   Substance Use Topics    Alcohol use: No    Drug use: No     FH  No family history of colon cancer    Review of Systems  Unable to obtain due to cognitive delay    Physical Examination  Ht 4' 9" (1.448 m)   Wt 56.7 kg (125 lb)   BMI 27.05 kg/m²   General appearance: alert, sitting in wheelchair, no distress  HENT: Normocephalic, atraumatic, neck symmetrical, no nasal discharge   Eyes: conjunctivae/corneas clear, PERRL, EOM's intact  Lungs: clear to auscultation bilaterally, symmetric chest wall expansion bilaterally  Heart: regular rate and rhythm without rub; no displacement of the PMI   Abdomen: soft, non-tender; bowel sounds normoactive; no organomegaly  Extremities: extremities symmetric; no clubbing, cyanosis, or edema  Integument: Skin color, texture, turgor normal; no rashes; hair distrubution normal  Neurologic: Alert, unable to answer orientation questions, did not test gait  Psychiatric: flat affect; +evidence of impaired cognition     Labs:  Lab Results   Component Value Date    WBC 1.96 (LL) 10/01/2020    HGB 13.7 10/01/2020    HCT 42.4 10/01/2020    MCV 87 10/01/2020     (L) 10/01/2020       CMP  Sodium   Date Value Ref Range " Status   10/01/2020 142 136 - 145 mmol/L Final     Potassium   Date Value Ref Range Status   10/01/2020 4.2 3.5 - 5.1 mmol/L Final     Chloride   Date Value Ref Range Status   10/01/2020 100 95 - 110 mmol/L Final     CO2   Date Value Ref Range Status   10/01/2020 31 (H) 23 - 29 mmol/L Final     Glucose   Date Value Ref Range Status   10/01/2020 80 70 - 110 mg/dL Final     BUN, Bld   Date Value Ref Range Status   10/01/2020 20 8 - 23 mg/dL Final     Creatinine   Date Value Ref Range Status   10/01/2020 0.8 0.5 - 1.4 mg/dL Final     Calcium   Date Value Ref Range Status   10/01/2020 9.7 8.7 - 10.5 mg/dL Final     Total Protein   Date Value Ref Range Status   10/01/2020 6.7 6.0 - 8.4 g/dL Final     Albumin   Date Value Ref Range Status   10/01/2020 3.4 (L) 3.5 - 5.2 g/dL Final     Total Bilirubin   Date Value Ref Range Status   10/01/2020 0.2 0.1 - 1.0 mg/dL Final     Comment:     For infants and newborns, interpretation of results should be based  on gestational age, weight and in agreement with clinical  observations.  Premature Infant recommended reference ranges:  Up to 24 hours.............<8.0 mg/dL  Up to 48 hours............<12.0 mg/dL  3-5 days..................<15.0 mg/dL  6-29 days.................<15.0 mg/dL       Alkaline Phosphatase   Date Value Ref Range Status   10/01/2020 97 55 - 135 U/L Final     AST   Date Value Ref Range Status   10/01/2020 17 10 - 40 U/L Final     ALT   Date Value Ref Range Status   10/01/2020 13 10 - 44 U/L Final     Anion Gap   Date Value Ref Range Status   10/01/2020 11 8 - 16 mmol/L Final     eGFR if    Date Value Ref Range Status   10/01/2020 >60.0 >60 mL/min/1.73 m^2 Final     eGFR if non    Date Value Ref Range Status   10/01/2020 >60.0 >60 mL/min/1.73 m^2 Final     Comment:     Calculation used to obtain the estimated glomerular filtration  rate (eGFR) is the CKD-EPI equation.        Imaging:  BMD:  Normal     Independently  reviewed    Assessment:   1. Iron deficiency    Plan:   -Schedule EGD/Colonoscopy for evaluation of iron deficiency at [facility]  -Discussed with caretaker at bedside  -Will need to obtain consent from medical decision maker on day of procedure  -Magnesium citrate with dulcolax prep  -Hold iron supplementation for one week prior to procedure  -Further recommendations to follow endoscopy    Jhon Batista MD  [facility]  [address] [facility] [place] [address]  Office: [phone]  Fax: [phone]

## 2020-10-09 ENCOUNTER — PATIENT MESSAGE (OUTPATIENT)
Dept: FAMILY MEDICINE | Facility: CLINIC | Age: 60
End: 2020-10-09

## 2020-10-14 ENCOUNTER — OFFICE VISIT (OUTPATIENT)
Dept: FAMILY MEDICINE | Facility: CLINIC | Age: 60
End: 2020-10-14
Payer: MEDICARE

## 2020-10-14 VITALS
HEIGHT: 58 IN | WEIGHT: 125 LBS | BODY MASS INDEX: 26.24 KG/M2 | TEMPERATURE: 97 F | HEART RATE: 80 BPM | DIASTOLIC BLOOD PRESSURE: 90 MMHG | SYSTOLIC BLOOD PRESSURE: 148 MMHG

## 2020-10-14 DIAGNOSIS — Z23 INFLUENZA VACCINE NEEDED: Primary | ICD-10-CM

## 2020-10-14 DIAGNOSIS — I10 ESSENTIAL HYPERTENSION: ICD-10-CM

## 2020-10-14 DIAGNOSIS — R82.90 FOUL SMELLING URINE: ICD-10-CM

## 2020-10-14 PROCEDURE — 99999 PR PBB SHADOW E&M-EST. PATIENT-LVL IV: CPT | Mod: PBBFAC,,, | Performed by: INTERNAL MEDICINE

## 2020-10-14 PROCEDURE — 99999 PR PBB SHADOW E&M-EST. PATIENT-LVL IV: ICD-10-PCS | Mod: PBBFAC,,, | Performed by: INTERNAL MEDICINE

## 2020-10-14 PROCEDURE — 99214 OFFICE O/P EST MOD 30 MIN: CPT | Mod: PBBFAC,PO | Performed by: INTERNAL MEDICINE

## 2020-10-14 PROCEDURE — 99213 OFFICE O/P EST LOW 20 MIN: CPT | Mod: S$PBB,,, | Performed by: INTERNAL MEDICINE

## 2020-10-14 PROCEDURE — 99213 PR OFFICE/OUTPT VISIT, EST, LEVL III, 20-29 MIN: ICD-10-PCS | Mod: S$PBB,,, | Performed by: INTERNAL MEDICINE

## 2020-10-14 NOTE — PROGRESS NOTES
Assessment/Plan:    Essential hypertension  -at goal today  -currently on Dyazide 37.5-25 mg and amlodipine 10 mg QD  -have recently had issues with elevation but appears that current regimen is appopriate  -there was a concern for orthostatic hypotension by family due a hx but negative orthostatic vitals in clinic  -continue lifestyle modification with low sodium diet and exercise   -discussed hypertension disease course and importance of treating high blood pressure  -patient understood and advised of risk of untreated blood pressure.  ER precautions were given   for symptoms of hypertensive urgency and emergency.      Foul smelling urine  -concern by family member  -caregiver who spends most time with patient has not noticed any change in urine smell  -no other urinary symptoms with exception of chronic urinary frequency  -could not get urine specimen in clinic but will sent home cup so that she can bring sample for UA  _____________________________________________________________________________________________________________________________________________________    Orders this visit:    Influenza vaccine needed  -     Influenza - Quadrivalent (PF)    Essential hypertension  -     Orthostatic blood pressure    Foul smelling urine  -     Urinalysis, Reflex to Urine Culture Urine, Clean Catch      Follow up in about 6 months (around 4/14/2021).    Brooke Goldberg MD  _____________________________________________________________________________________________________________________________________________________    HPI:    Patient is in clinic today as an established patient here for HTN follow up and urinary concern.    HTN: The patient is currently being treated for essential hypertension. This condition is chronic.  Recently elevated but added amlodipine and have increased up to 10 mg.  Blood pressure now controlled and at goal.  The patient is tolerating their medication well with good compliance.  Denies  any adverse effects of medications.  Counseling was offered regarding low sodium diet.  The patient has a reduced salt intake. Routine exercise recommended. The patient denies headache, vision changes, chest pain, palpitations, shortness of breath, or lower extremity edema. There was a concern by her sister for swelling of her feet but her caregiver who is with her most of the time has never noticed this and she has no evidence of swelling today on exam.    There was also a concern expressed by her sister about foul-smelling urine.  She is unable to give a sample today in clinic but will send her home with the specimen cup so that she can bring back for UA.  Her caregiver has not noticed any strange smells of her urine.  She has started to have more frequent urination, however this has been over the past several months.  Denies noticing any hematuria.  Patient does not look like she is in discomfort when urinating, per caregiver.  She also denies any fevers recently.    No other complaints today.  Patient has plan for an EGD and colonoscopy seen with GI.  Also needs to follow-up with hematology for history of leukopenia/thrombocytopenia. Flu shot today.      Past Medical History:  Past Medical History:   Diagnosis Date    Galactorrhea      History reviewed. No pertinent surgical history.  Review of patient's allergies indicates:  No Known Allergies  Social History     Tobacco Use    Smoking status: Never Smoker    Smokeless tobacco: Never Used   Substance Use Topics    Alcohol use: No    Drug use: No     History reviewed. No pertinent family history.  Current Outpatient Medications on File Prior to Visit   Medication Sig Dispense Refill    amLODIPine (NORVASC) 10 MG tablet Take 1 tablet (10 mg total) by mouth once daily. 30 tablet 11    diazePAM (VALIUM) 5 MG tablet Take 1 tablet (5 mg total) by mouth every 6 (six) hours as needed for Anxiety. 30 tablet 2    divalproex (DEPAKOTE) 250 MG EC tablet Take 1  "tablet (250 mg total) by mouth 3 (three) times daily. 90 tablet 2    mirtazapine (REMERON) 30 MG tablet Take 1 tablet (30 mg total) by mouth every evening. 30 tablet 2    MUCINEX 600 mg 12 hr tablet TAKE 1 TABLET BY MOUTH EVERY TWELVE HOURS AS NEEDED  0    multivit-iron-FA-calcium-mins (THERAPEUTIC-M) 9 mg iron-400 mcg Tab tablet Take 1 tablet by mouth once daily.      polyethylene glycol (GLYCOLAX) 17 gram/dose powder Take 17 g by mouth once daily.      QUEtiapine (SEROQUEL) 400 MG tablet Take 1/2 tablet each morning and 1 tablet at bedtime 45 tablet 2    simvastatin (ZOCOR) 40 MG tablet Take 1 tablet (40 mg total) by mouth every evening. 30 tablet 1    triamterene-hydrochlorothiazide 37.5-25 mg (DYAZIDE) 37.5-25 mg per capsule Take 1 capsule by mouth every morning. 30 capsule 11     No current facility-administered medications on file prior to visit.        Review of Systems   Unable to perform ROS: Other   Respiratory: Negative for shortness of breath.    Cardiovascular: Negative for chest pain and palpitations.   Musculoskeletal: Negative for neck pain.   Neurological: Negative for headaches.       Vitals:    10/14/20 1320   BP: 123/73   Pulse: 80   Temp: 97.3 °F (36.3 °C)   Weight: 56.7 kg (125 lb)   Height: 4' 10" (1.473 m)       Wt Readings from Last 3 Encounters:   10/14/20 56.7 kg (125 lb)   10/08/20 56.7 kg (125 lb)   09/17/20 57.1 kg (125 lb 14.4 oz)       Physical Exam  Constitutional:       General: She is not in acute distress.     Appearance: Normal appearance. She is well-developed.   HENT:      Head: Normocephalic and atraumatic.   Eyes:      Conjunctiva/sclera: Conjunctivae normal.   Neck:      Musculoskeletal: Normal range of motion and neck supple.   Cardiovascular:      Rate and Rhythm: Normal rate and regular rhythm.      Pulses: Normal pulses.      Heart sounds: Normal heart sounds. No murmur.   Pulmonary:      Effort: Pulmonary effort is normal. No respiratory distress.      Breath " sounds: Normal breath sounds.   Abdominal:      General: Bowel sounds are normal. There is no distension.      Palpations: Abdomen is soft.      Tenderness: There is no abdominal tenderness.   Musculoskeletal: Normal range of motion.   Skin:     General: Skin is warm and dry.      Findings: No rash.   Neurological:      General: No focal deficit present.      Mental Status: She is alert. Mental status is at baseline.         Health Maintenance   Topic Date Due    Hepatitis C Screening  1960    Mammogram  03/08/2015    Lipid Panel  09/07/2022    TETANUS VACCINE  05/30/2024       Answers for HPI/ROS submitted by the patient on 10/14/2020   Hypertension  Chronicity: chronic  Onset: more than 1 month ago  Progression since onset: waxing and waning  Condition status: resistant  anxiety: Yes  blurred vision: No  malaise/fatigue: No  orthopnea: No  peripheral edema: No  PND: No  sweats: No  Agents associated with hypertension: no associated agents  Compliance problems: no compliance problems  Past treatments: nothing  Improvement on treatment: no improvement

## 2020-10-14 NOTE — ASSESSMENT & PLAN NOTE
-at goal today  -currently on Dyazide 37.5-25 mg and amlodipine 10 mg QD  -have recently had issues with elevation but appears that current regimen is appopriate  -continue lifestyle modification with low sodium diet and exercise   -discussed hypertension disease course and importance of treating high blood pressure  -patient understood and advised of risk of untreated blood pressure.  ER precautions were given   for symptoms of hypertensive urgency and emergency.

## 2020-10-16 ENCOUNTER — PATIENT MESSAGE (OUTPATIENT)
Dept: FAMILY MEDICINE | Facility: CLINIC | Age: 60
End: 2020-10-16

## 2020-10-19 ENCOUNTER — TELEPHONE (OUTPATIENT)
Dept: FAMILY MEDICINE | Facility: CLINIC | Age: 60
End: 2020-10-19

## 2020-10-19 ENCOUNTER — LAB VISIT (OUTPATIENT)
Dept: LAB | Facility: HOSPITAL | Age: 60
End: 2020-10-19
Attending: INTERNAL MEDICINE
Payer: MEDICARE

## 2020-10-19 DIAGNOSIS — R82.90 FOUL SMELLING URINE: ICD-10-CM

## 2020-10-19 DIAGNOSIS — R82.90 FOUL SMELLING URINE: Primary | ICD-10-CM

## 2020-10-19 DIAGNOSIS — N30.01 ACUTE CYSTITIS WITH HEMATURIA: Primary | ICD-10-CM

## 2020-10-19 LAB
BACTERIA #/AREA URNS HPF: NORMAL /HPF
BILIRUB UR QL STRIP: NEGATIVE
CLARITY UR: CLEAR
COLOR UR: YELLOW
GLUCOSE UR QL STRIP: NEGATIVE
HGB UR QL STRIP: ABNORMAL
KETONES UR QL STRIP: NEGATIVE
LEUKOCYTE ESTERASE UR QL STRIP: ABNORMAL
MICROSCOPIC COMMENT: NORMAL
NITRITE UR QL STRIP: NEGATIVE
PH UR STRIP: 7 [PH] (ref 5–8)
PROT UR QL STRIP: NEGATIVE
RBC #/AREA URNS HPF: 1 /HPF (ref 0–4)
SP GR UR STRIP: 1.02 (ref 1–1.03)
SQUAMOUS #/AREA URNS HPF: 5 /HPF
URN SPEC COLLECT METH UR: ABNORMAL
WBC #/AREA URNS HPF: 1 /HPF (ref 0–5)

## 2020-10-19 PROCEDURE — 81000 URINALYSIS NONAUTO W/SCOPE: CPT | Mod: PO

## 2020-10-19 RX ORDER — SULFAMETHOXAZOLE AND TRIMETHOPRIM 800; 160 MG/1; MG/1
1 TABLET ORAL 2 TIMES DAILY
Qty: 6 TABLET | Refills: 0 | Status: SHIPPED | OUTPATIENT
Start: 2020-10-19 | End: 2020-10-22

## 2020-10-19 NOTE — TELEPHONE ENCOUNTER
I have signed for the following orders AND/OR meds.  Please call the patient and ask the patient to schedule the testing AND/OR inform about any medications that were sent. Medications have been sent to pharmacy listed below      Orders Placed This Encounter   Procedures    Urinalysis, Reflex to Urine Culture Urine, Clean Catch     Standing Status:   Future     Standing Expiration Date:   12/18/2021     Order Specific Question:   Preferred Collection Type     Answer:   Urine, Clean Catch     Order Specific Question:   Specimen Source     Answer:   Urine              [facility] - [address], [place] [address]  Phone: [phone] Fax: [phone]

## 2020-10-19 NOTE — TELEPHONE ENCOUNTER
Patient's urine is slightly concerning for an infection.  I would like her to the started other antibiotic and then repeat her urine study in 2 weeks to make sure it is normal.      I have signed for the following orders AND/OR meds.  Please call the patient and ask the patient to schedule the testing AND/OR inform about any medications that were sent. Medications have been sent to pharmacy listed below      Orders Placed This Encounter   Procedures    Urinalysis, Reflex to Urine Culture Urine, Clean Catch     Standing Status:   Future     Standing Expiration Date:   12/18/2021     Order Specific Question:   Preferred Collection Type     Answer:   Urine, Clean Catch     Order Specific Question:   Specimen Source     Answer:   Urine       Medications Ordered This Encounter   Medications    sulfamethoxazole-trimethoprim 800-160mg (BACTRIM DS) 800-160 mg Tab     Sig: Take 1 tablet by mouth 2 (two) times daily. for 3 days     Dispense:  6 tablet     Refill:  0         [facility] [address], [address], [address] [place] [address]  Phone: [phone] Fax: [phone]

## 2020-10-19 NOTE — TELEPHONE ENCOUNTER
Spoke with [facility] regarding patient's results/medication sent to pharmacy. Urinalysis scheduled in 2 weeks.

## 2020-10-23 ENCOUNTER — PATIENT MESSAGE (OUTPATIENT)
Dept: PSYCHIATRY | Facility: CLINIC | Age: 60
End: 2020-10-23

## 2020-10-23 ENCOUNTER — PATIENT MESSAGE (OUTPATIENT)
Dept: FAMILY MEDICINE | Facility: CLINIC | Age: 60
End: 2020-10-23

## 2020-10-30 ENCOUNTER — PATIENT MESSAGE (OUTPATIENT)
Dept: FAMILY MEDICINE | Facility: CLINIC | Age: 60
End: 2020-10-30

## 2020-10-30 RX ORDER — DIAZEPAM 5 MG/1
5 TABLET ORAL EVERY 6 HOURS PRN
Qty: 30 TABLET | Refills: 0 | Status: SHIPPED | OUTPATIENT
Start: 2020-10-30 | End: 2020-11-30 | Stop reason: SDUPTHER

## 2020-10-30 RX ORDER — SIMVASTATIN 40 MG/1
40 TABLET, FILM COATED ORAL NIGHTLY
Qty: 30 TABLET | Refills: 1 | Status: SHIPPED | OUTPATIENT
Start: 2020-10-30 | End: 2020-12-14 | Stop reason: SDUPTHER

## 2020-10-30 RX ORDER — MIRTAZAPINE 30 MG/1
30 TABLET, FILM COATED ORAL NIGHTLY
Qty: 30 TABLET | Refills: 0 | Status: SHIPPED | OUTPATIENT
Start: 2020-10-30 | End: 2020-11-10 | Stop reason: SDUPTHER

## 2020-10-30 RX ORDER — QUETIAPINE FUMARATE 400 MG/1
TABLET, FILM COATED ORAL
Qty: 45 TABLET | Refills: 0 | Status: SHIPPED | OUTPATIENT
Start: 2020-10-30 | End: 2020-11-10 | Stop reason: SDUPTHER

## 2020-10-30 RX ORDER — DIVALPROEX SODIUM 250 MG/1
250 TABLET, DELAYED RELEASE ORAL 3 TIMES DAILY
Qty: 90 TABLET | Refills: 0 | Status: SHIPPED | OUTPATIENT
Start: 2020-10-30 | End: 2020-11-10 | Stop reason: SDUPTHER

## 2020-11-02 ENCOUNTER — CLINICAL SUPPORT (OUTPATIENT)
Dept: FAMILY MEDICINE | Facility: CLINIC | Age: 60
End: 2020-11-02
Payer: MEDICARE

## 2020-11-02 DIAGNOSIS — Z01.812 PRE-PROCEDURE LAB EXAM: ICD-10-CM

## 2020-11-02 PROCEDURE — U0003 INFECTIOUS AGENT DETECTION BY NUCLEIC ACID (DNA OR RNA); SEVERE ACUTE RESPIRATORY SYNDROME CORONAVIRUS 2 (SARS-COV-2) (CORONAVIRUS DISEASE [COVID-19]), AMPLIFIED PROBE TECHNIQUE, MAKING USE OF HIGH THROUGHPUT TECHNOLOGIES AS DESCRIBED BY CMS-2020-01-R: HCPCS

## 2020-11-03 LAB — SARS-COV-2 RNA RESP QL NAA+PROBE: NOT DETECTED

## 2020-11-03 NOTE — TELEPHONE ENCOUNTER
Blood pressure looks to be fairly well controlled. Plan to continue current medications for now. Her repeat urine study still shows bacteria and white blood cells. Please find out if patient is having any symptoms that could be a sign of an infection, such as frequent urination or foul-smelling urine. I plan to follow up on her culture before I prescribe another antibiotic.

## 2020-11-04 ENCOUNTER — LAB VISIT (OUTPATIENT)
Dept: LAB | Facility: HOSPITAL | Age: 60
End: 2020-11-04
Attending: INTERNAL MEDICINE
Payer: MEDICARE

## 2020-11-04 ENCOUNTER — OFFICE VISIT (OUTPATIENT)
Dept: HEMATOLOGY/ONCOLOGY | Facility: CLINIC | Age: 60
End: 2020-11-04
Payer: MEDICARE

## 2020-11-04 VITALS
SYSTOLIC BLOOD PRESSURE: 140 MMHG | DIASTOLIC BLOOD PRESSURE: 85 MMHG | HEIGHT: 57 IN | OXYGEN SATURATION: 96 % | BODY MASS INDEX: 27.05 KG/M2 | RESPIRATION RATE: 18 BRPM

## 2020-11-04 DIAGNOSIS — D69.6 THROMBOCYTOPENIA: ICD-10-CM

## 2020-11-04 DIAGNOSIS — D72.819 LEUKOPENIA, UNSPECIFIED TYPE: Primary | ICD-10-CM

## 2020-11-04 DIAGNOSIS — D72.819 LEUKOPENIA, UNSPECIFIED TYPE: ICD-10-CM

## 2020-11-04 LAB
ANISOCYTOSIS BLD QL SMEAR: SLIGHT
BASOPHILS # BLD AUTO: ABNORMAL K/UL (ref 0–0.2)
BASOPHILS NFR BLD: 0 % (ref 0–1.9)
DIFFERENTIAL METHOD: ABNORMAL
EOSINOPHIL # BLD AUTO: ABNORMAL K/UL (ref 0–0.5)
EOSINOPHIL NFR BLD: 3 % (ref 0–8)
ERYTHROCYTE [DISTWIDTH] IN BLOOD BY AUTOMATED COUNT: 12.5 % (ref 11.5–14.5)
GIANT PLATELETS BLD QL SMEAR: PRESENT
HCT VFR BLD AUTO: 42 % (ref 37–48.5)
HGB BLD-MCNC: 14 G/DL (ref 12–16)
IMM GRANULOCYTES # BLD AUTO: ABNORMAL K/UL (ref 0–0.04)
IMM GRANULOCYTES NFR BLD AUTO: ABNORMAL % (ref 0–0.5)
LYMPHOCYTES # BLD AUTO: ABNORMAL K/UL (ref 1–4.8)
LYMPHOCYTES NFR BLD: 38 % (ref 18–48)
MCH RBC QN AUTO: 29.4 PG (ref 27–31)
MCHC RBC AUTO-ENTMCNC: 33.3 G/DL (ref 32–36)
MCV RBC AUTO: 88 FL (ref 82–98)
MONOCYTES # BLD AUTO: ABNORMAL K/UL (ref 0.3–1)
MONOCYTES NFR BLD: 11 % (ref 4–15)
NEUTROPHILS NFR BLD: 46 % (ref 38–73)
NEUTS BAND NFR BLD MANUAL: 2 %
NRBC BLD-RTO: 0 /100 WBC
PLATELET # BLD AUTO: 120 K/UL (ref 150–350)
PLATELET BLD QL SMEAR: ABNORMAL
PMV BLD AUTO: 10.7 FL (ref 9.2–12.9)
RBC # BLD AUTO: 4.77 M/UL (ref 4–5.4)
VALPROATE SERPL-MCNC: 87.7 UG/ML (ref 50–100)
WBC # BLD AUTO: 2.16 K/UL (ref 3.9–12.7)

## 2020-11-04 PROCEDURE — 85027 COMPLETE CBC AUTOMATED: CPT | Mod: PO

## 2020-11-04 PROCEDURE — 99999 PR PBB SHADOW E&M-EST. PATIENT-LVL III: CPT | Mod: PBBFAC,,, | Performed by: INTERNAL MEDICINE

## 2020-11-04 PROCEDURE — 99213 OFFICE O/P EST LOW 20 MIN: CPT | Mod: S$PBB,,, | Performed by: INTERNAL MEDICINE

## 2020-11-04 PROCEDURE — 85007 BL SMEAR W/DIFF WBC COUNT: CPT | Mod: PO

## 2020-11-04 PROCEDURE — 99999 PR PBB SHADOW E&M-EST. PATIENT-LVL III: ICD-10-PCS | Mod: PBBFAC,,, | Performed by: INTERNAL MEDICINE

## 2020-11-04 PROCEDURE — 80164 ASSAY DIPROPYLACETIC ACD TOT: CPT

## 2020-11-04 PROCEDURE — 36415 COLL VENOUS BLD VENIPUNCTURE: CPT | Mod: PO

## 2020-11-04 PROCEDURE — 99213 PR OFFICE/OUTPT VISIT, EST, LEVL III, 20-29 MIN: ICD-10-PCS | Mod: S$PBB,,, | Performed by: INTERNAL MEDICINE

## 2020-11-04 PROCEDURE — 99213 OFFICE O/P EST LOW 20 MIN: CPT | Mod: PBBFAC,PO | Performed by: INTERNAL MEDICINE

## 2020-11-04 RX ORDER — DOCUSATE SODIUM 100 MG/1
100 CAPSULE, LIQUID FILLED ORAL 2 TIMES DAILY PRN
Qty: 60 CAPSULE | Refills: 0 | Status: SHIPPED | OUTPATIENT
Start: 2020-11-04 | End: 2021-03-26

## 2020-11-05 VITALS — HEART RATE: 78 BPM | SYSTOLIC BLOOD PRESSURE: 133 MMHG | DIASTOLIC BLOOD PRESSURE: 85 MMHG

## 2020-11-05 PROBLEM — D50.0 IRON DEFICIENCY ANEMIA DUE TO CHRONIC BLOOD LOSS: Status: ACTIVE | Noted: 2020-11-05

## 2020-11-06 ENCOUNTER — PATIENT MESSAGE (OUTPATIENT)
Dept: FAMILY MEDICINE | Facility: CLINIC | Age: 60
End: 2020-11-06

## 2020-11-09 NOTE — TELEPHONE ENCOUNTER
Please find out if caregivers are reporting any urinary symptoms. This was sent to them in a message but I cannot find a response. Symptoms such us foul smells or urinary frequency present?

## 2020-11-13 ENCOUNTER — PATIENT MESSAGE (OUTPATIENT)
Dept: FAMILY MEDICINE | Facility: CLINIC | Age: 60
End: 2020-11-13

## 2020-11-13 ENCOUNTER — PATIENT MESSAGE (OUTPATIENT)
Dept: GASTROENTEROLOGY | Facility: CLINIC | Age: 60
End: 2020-11-13

## 2020-11-13 DIAGNOSIS — A04.8 H. PYLORI INFECTION: Primary | ICD-10-CM

## 2020-11-13 RX ORDER — BISMUTH SUBCITRATE POTASSIUM, METRONIDAZOLE, TETRACYCLINE HYDROCHLORIDE 140; 125; 125 MG/1; MG/1; MG/1
3 CAPSULE ORAL
Qty: 120 CAPSULE | Refills: 0 | Status: SHIPPED | OUTPATIENT
Start: 2020-11-13 | End: 2020-11-23

## 2020-11-13 RX ORDER — PANTOPRAZOLE SODIUM 20 MG/1
20 TABLET, DELAYED RELEASE ORAL
Qty: 28 TABLET | Refills: 0 | Status: SHIPPED | OUTPATIENT
Start: 2020-11-13 | End: 2020-11-17 | Stop reason: SDUPTHER

## 2020-11-17 RX ORDER — TETRACYCLINE HYDROCHLORIDE 500 MG/1
500 CAPSULE ORAL EVERY 6 HOURS
Qty: 56 CAPSULE | Refills: 0 | Status: SHIPPED | OUTPATIENT
Start: 2020-11-17 | End: 2020-12-01

## 2020-11-17 RX ORDER — METRONIDAZOLE 500 MG/1
500 TABLET ORAL EVERY 8 HOURS
Qty: 42 TABLET | Refills: 0 | Status: SHIPPED | OUTPATIENT
Start: 2020-11-17 | End: 2020-12-01

## 2020-11-17 RX ORDER — PANTOPRAZOLE SODIUM 20 MG/1
20 TABLET, DELAYED RELEASE ORAL
Qty: 28 TABLET | Refills: 0 | Status: SHIPPED | OUTPATIENT
Start: 2020-11-17 | End: 2020-11-30 | Stop reason: SDUPTHER

## 2020-11-17 NOTE — PROGRESS NOTES
Will plan PPI (standard dose) BID for 14 days, Pepto bismol four times daily and Tetracycline (500 mg) QID and Metronidazole (500 mg) TID for treatment of h.pylori.

## 2020-11-18 ENCOUNTER — PATIENT OUTREACH (OUTPATIENT)
Dept: ADMINISTRATIVE | Facility: HOSPITAL | Age: 60
End: 2020-11-18

## 2020-11-18 NOTE — PROGRESS NOTES
HTN Report: Spoke with Pt's rep with [facility] about blood pressure, Pt BP is checked daily, asked me to call back shortly for BP reading.

## 2020-11-20 ENCOUNTER — PATIENT MESSAGE (OUTPATIENT)
Dept: FAMILY MEDICINE | Facility: CLINIC | Age: 60
End: 2020-11-20

## 2020-11-20 ENCOUNTER — TELEPHONE (OUTPATIENT)
Dept: ADMINISTRATIVE | Facility: HOSPITAL | Age: 60
End: 2020-11-20

## 2020-11-20 NOTE — TELEPHONE ENCOUNTER
Contacted patient to schedule overdue mammogram. Patient coordinator supervisor ( Laisha); patient cooridnator states that there will be a change in providers so she will reach out to another coordinator and have them contact us back.

## 2020-11-27 ENCOUNTER — PATIENT MESSAGE (OUTPATIENT)
Dept: FAMILY MEDICINE | Facility: CLINIC | Age: 60
End: 2020-11-27

## 2020-11-30 RX ORDER — QUETIAPINE FUMARATE 400 MG/1
TABLET, FILM COATED ORAL
Qty: 45 TABLET | Refills: 0 | Status: SHIPPED | OUTPATIENT
Start: 2020-11-30 | End: 2020-12-14 | Stop reason: SDUPTHER

## 2020-11-30 RX ORDER — DIVALPROEX SODIUM 250 MG/1
250 TABLET, DELAYED RELEASE ORAL 3 TIMES DAILY
Qty: 90 TABLET | Refills: 0 | Status: SHIPPED | OUTPATIENT
Start: 2020-11-30 | End: 2020-12-14 | Stop reason: SDUPTHER

## 2020-11-30 RX ORDER — SIMVASTATIN 40 MG/1
40 TABLET, FILM COATED ORAL NIGHTLY
Qty: 30 TABLET | Refills: 1 | OUTPATIENT
Start: 2020-11-30

## 2020-11-30 RX ORDER — MIRTAZAPINE 30 MG/1
30 TABLET, FILM COATED ORAL NIGHTLY
Qty: 30 TABLET | Refills: 0 | Status: SHIPPED | OUTPATIENT
Start: 2020-11-30 | End: 2020-12-14 | Stop reason: SDUPTHER

## 2020-11-30 RX ORDER — DIAZEPAM 5 MG/1
5 TABLET ORAL EVERY 6 HOURS PRN
Qty: 30 TABLET | Refills: 0 | Status: SHIPPED | OUTPATIENT
Start: 2020-11-30 | End: 2020-12-31 | Stop reason: SDUPTHER

## 2020-12-01 ENCOUNTER — PATIENT MESSAGE (OUTPATIENT)
Dept: FAMILY MEDICINE | Facility: CLINIC | Age: 60
End: 2020-12-01

## 2020-12-01 RX ORDER — PANTOPRAZOLE SODIUM 20 MG/1
20 TABLET, DELAYED RELEASE ORAL
Qty: 28 TABLET | Refills: 0 | Status: SHIPPED | OUTPATIENT
Start: 2020-12-01 | End: 2020-12-21 | Stop reason: SDUPTHER

## 2020-12-03 ENCOUNTER — PATIENT MESSAGE (OUTPATIENT)
Dept: PSYCHIATRY | Facility: CLINIC | Age: 60
End: 2020-12-03

## 2020-12-04 ENCOUNTER — PATIENT MESSAGE (OUTPATIENT)
Dept: FAMILY MEDICINE | Facility: CLINIC | Age: 60
End: 2020-12-04

## 2020-12-04 ENCOUNTER — PATIENT MESSAGE (OUTPATIENT)
Dept: HEMATOLOGY/ONCOLOGY | Facility: CLINIC | Age: 60
End: 2020-12-04

## 2020-12-04 DIAGNOSIS — D69.6 THROMBOCYTOPENIA: ICD-10-CM

## 2020-12-04 DIAGNOSIS — D72.819 LEUKOPENIA, UNSPECIFIED TYPE: ICD-10-CM

## 2020-12-11 ENCOUNTER — PATIENT MESSAGE (OUTPATIENT)
Dept: FAMILY MEDICINE | Facility: CLINIC | Age: 60
End: 2020-12-11

## 2020-12-11 ENCOUNTER — OFFICE VISIT (OUTPATIENT)
Dept: CARDIOLOGY | Facility: CLINIC | Age: 60
End: 2020-12-11
Payer: MEDICARE

## 2020-12-11 ENCOUNTER — PATIENT MESSAGE (OUTPATIENT)
Dept: OTHER | Facility: OTHER | Age: 60
End: 2020-12-11

## 2020-12-11 VITALS
SYSTOLIC BLOOD PRESSURE: 132 MMHG | WEIGHT: 123.81 LBS | HEIGHT: 57 IN | BODY MASS INDEX: 26.71 KG/M2 | HEART RATE: 78 BPM | DIASTOLIC BLOOD PRESSURE: 82 MMHG

## 2020-12-11 DIAGNOSIS — G24.01 TARDIVE DYSKINESIA: Primary | ICD-10-CM

## 2020-12-11 DIAGNOSIS — I10 ESSENTIAL HYPERTENSION: ICD-10-CM

## 2020-12-11 DIAGNOSIS — G40.909 SEIZURE DISORDER: ICD-10-CM

## 2020-12-11 PROCEDURE — 99205 PR OFFICE/OUTPT VISIT, NEW, LEVL V, 60-74 MIN: ICD-10-PCS | Mod: S$PBB,,, | Performed by: INTERNAL MEDICINE

## 2020-12-11 PROCEDURE — 99214 OFFICE O/P EST MOD 30 MIN: CPT | Mod: PBBFAC | Performed by: INTERNAL MEDICINE

## 2020-12-11 PROCEDURE — 99999 PR PBB SHADOW E&M-EST. PATIENT-LVL IV: CPT | Mod: PBBFAC,,, | Performed by: INTERNAL MEDICINE

## 2020-12-11 PROCEDURE — 99999 PR PBB SHADOW E&M-EST. PATIENT-LVL IV: ICD-10-PCS | Mod: PBBFAC,,, | Performed by: INTERNAL MEDICINE

## 2020-12-11 PROCEDURE — 99205 OFFICE O/P NEW HI 60 MIN: CPT | Mod: S$PBB,,, | Performed by: INTERNAL MEDICINE

## 2020-12-11 NOTE — PROGRESS NOTES
Subjective:   Patient ID:  Juan Pablo Bolden is a 60 y.o. female who presents for follow-up of Essential hypertension  BP stable at the home.Patient denies CP, angina or anginal equivalent.    Hypertension  This is a chronic problem. The current episode started more than 1 year ago. The problem has been gradually improving since onset. The problem is controlled. Pertinent negatives include no chest pain, palpitations or shortness of breath. Past treatments include calcium channel blockers and diuretics. The current treatment provides moderate improvement. Compliance problems include psychosocial issues.    Hyperlipidemia  This is a chronic problem. The current episode started more than 1 year ago. The problem is controlled. Pertinent negatives include no chest pain or shortness of breath. Current antihyperlipidemic treatment includes statins. The current treatment provides moderate improvement of lipids. There are no compliance problems.        Review of Systems   Constitution: Negative. Negative for weight gain.   HENT: Negative.    Eyes: Negative.    Cardiovascular: Negative.  Negative for chest pain, leg swelling and palpitations.   Respiratory: Negative.  Negative for shortness of breath.    Endocrine: Negative.    Hematologic/Lymphatic: Negative.    Skin: Negative.    Musculoskeletal: Negative for muscle weakness.   Gastrointestinal: Negative.    Genitourinary: Negative.    Neurological: Negative.  Negative for dizziness.   Psychiatric/Behavioral: Negative.    Allergic/Immunologic: Negative.      History reviewed. No pertinent family history.  Past Medical History:   Diagnosis Date    Bipolar 1 disorder     Chronic constipation     Galactorrhea     Growth retardation     Profound mental retardation    High cholesterol     Hyperlipidemia     Hypertension     Leukopenia     Neuroleptic-induced tardive dyskinesia     Schizoaffective disorder     Stereotypic movement disorder      Social History      Socioeconomic History    Marital status: Single     Spouse name: Not on file    Number of children: Not on file    Years of education: Not on file    Highest education level: Not on file   Occupational History    Not on file   Social Needs    Financial resource strain: Not hard at all    Food insecurity     Worry: Never true     Inability: Never true    Transportation needs     Medical: No     Non-medical: No   Tobacco Use    Smoking status: Never Smoker    Smokeless tobacco: Never Used   Substance and Sexual Activity    Alcohol use: No    Drug use: No    Sexual activity: Never   Lifestyle    Physical activity     Days per week: Not on file     Minutes per session: 20 min    Stress: Very much   Relationships    Social connections     Talks on phone: Once a week     Gets together: Once a week     Attends Restoration service: Not on file     Active member of club or organization: No     Attends meetings of clubs or organizations: Never     Relationship status: Never    Other Topics Concern    Not on file   Social History Narrative    Not on file     Current Outpatient Medications on File Prior to Visit   Medication Sig Dispense Refill    ALPRAZolam (XANAX) 0.25 MG tablet Take by mouth 2 (two) times daily as needed for Anxiety.      amLODIPine (NORVASC) 10 MG tablet Take 1 tablet (10 mg total) by mouth once daily. 30 tablet 11    diazePAM (VALIUM) 5 MG tablet Take 1 tablet (5 mg total) by mouth every 6 (six) hours as needed for Anxiety. 30 tablet 0    divalproex (DEPAKOTE) 250 MG EC tablet Take 1 tablet (250 mg total) by mouth 3 (three) times daily. 90 tablet 0    docusate sodium (COLACE) 100 MG capsule Take 1 capsule (100 mg total) by mouth 2 (two) times daily as needed for Constipation. 60 capsule 0    iron fum-B12-IF-C-folic acid (FOLTRIN) 110-0.5 mg capsule Take 1 capsule by mouth 2 (two) times daily. 60 capsule 0    mirtazapine (REMERON) 30 MG tablet Take 1 tablet (30 mg total) by  mouth every evening. 30 tablet 0    MUCINEX 600 mg 12 hr tablet TAKE 1 TABLET BY MOUTH EVERY TWELVE HOURS AS NEEDED  0    multivit-iron-FA-calcium-mins (THERAPEUTIC-M) 9 mg iron-400 mcg Tab tablet Take 1 tablet by mouth once daily.      pantoprazole (PROTONIX) 20 MG tablet Take 1 tablet (20 mg total) by mouth 2 (two) times daily before meals. for 14 days 28 tablet 0    polyethylene glycol (GLYCOLAX) 17 gram/dose powder Take 17 g by mouth once daily.      QUEtiapine (SEROQUEL) 400 MG tablet Take 1/2 tablet each morning and 1 tablet at bedtime 45 tablet 0    simvastatin (ZOCOR) 40 MG tablet Take 1 tablet (40 mg total) by mouth every evening. 30 tablet 1    triamterene-hydrochlorothiazide 37.5-25 mg (DYAZIDE) 37.5-25 mg per capsule Take 1 capsule by mouth every morning. 30 capsule 11     No current facility-administered medications on file prior to visit.      Review of patient's allergies indicates:  No Known Allergies    Objective:     Physical Exam   Constitutional: She is oriented to person, place, and time. She appears well-developed and well-nourished.   HENT:   Head: Normocephalic and atraumatic.   Eyes: Pupils are equal, round, and reactive to light. Conjunctivae and EOM are normal.   Neck: Normal range of motion. Neck supple.   Cardiovascular: Normal rate, regular rhythm, normal heart sounds and intact distal pulses.   Pulmonary/Chest: Effort normal and breath sounds normal.   Abdominal: Soft. Bowel sounds are normal.   Musculoskeletal: Normal range of motion.   Neurological: She is alert and oriented to person, place, and time.   Skin: Skin is warm and dry.   Psychiatric: She has a normal mood and affect.   Nursing note and vitals reviewed.      Assessment:     1. Tardive dyskinesia    2. Seizure disorder    3. Essential hypertension        Plan:     Tardive dyskinesia    Seizure disorder    Essential hypertension      Continue norvasc, diuretics-htn  Continue statin-hlp

## 2020-12-14 ENCOUNTER — PATIENT MESSAGE (OUTPATIENT)
Dept: PSYCHIATRY | Facility: CLINIC | Age: 60
End: 2020-12-14

## 2020-12-14 RX ORDER — MIRTAZAPINE 30 MG/1
30 TABLET, FILM COATED ORAL NIGHTLY
Qty: 30 TABLET | Refills: 0 | Status: SHIPPED | OUTPATIENT
Start: 2020-12-14 | End: 2020-12-21 | Stop reason: SDUPTHER

## 2020-12-14 RX ORDER — QUETIAPINE FUMARATE 400 MG/1
TABLET, FILM COATED ORAL
Qty: 45 TABLET | Refills: 0 | Status: SHIPPED | OUTPATIENT
Start: 2020-12-14 | End: 2021-01-08 | Stop reason: SDUPTHER

## 2020-12-14 RX ORDER — SIMVASTATIN 40 MG/1
40 TABLET, FILM COATED ORAL NIGHTLY
Qty: 30 TABLET | Refills: 1 | Status: SHIPPED | OUTPATIENT
Start: 2020-12-14 | End: 2020-12-21 | Stop reason: SDUPTHER

## 2020-12-14 RX ORDER — DIVALPROEX SODIUM 250 MG/1
250 TABLET, DELAYED RELEASE ORAL 3 TIMES DAILY
Qty: 90 TABLET | Refills: 0 | Status: SHIPPED | OUTPATIENT
Start: 2020-12-14 | End: 2021-01-08 | Stop reason: SDUPTHER

## 2020-12-20 ENCOUNTER — HOSPITAL ENCOUNTER (EMERGENCY)
Facility: HOSPITAL | Age: 60
Discharge: HOME | End: 2020-12-20
Attending: EMERGENCY MEDICINE
Payer: MEDICARE

## 2020-12-20 ENCOUNTER — OFFICE VISIT (OUTPATIENT)
Dept: URGENT CARE | Facility: CLINIC | Age: 60
End: 2020-12-20
Payer: MEDICARE

## 2020-12-20 VITALS
BODY MASS INDEX: 27.26 KG/M2 | HEIGHT: 57 IN | SYSTOLIC BLOOD PRESSURE: 124 MMHG | TEMPERATURE: 98 F | HEART RATE: 89 BPM | OXYGEN SATURATION: 96 % | RESPIRATION RATE: 16 BRPM | DIASTOLIC BLOOD PRESSURE: 75 MMHG

## 2020-12-20 VITALS
HEART RATE: 98 BPM | SYSTOLIC BLOOD PRESSURE: 148 MMHG | BODY MASS INDEX: 27.26 KG/M2 | OXYGEN SATURATION: 95 % | DIASTOLIC BLOOD PRESSURE: 86 MMHG | WEIGHT: 126 LBS | TEMPERATURE: 99 F | RESPIRATION RATE: 24 BRPM

## 2020-12-20 DIAGNOSIS — K59.04 CHRONIC IDIOPATHIC CONSTIPATION: Primary | ICD-10-CM

## 2020-12-20 DIAGNOSIS — R35.0 URINARY FREQUENCY: Primary | ICD-10-CM

## 2020-12-20 DIAGNOSIS — R14.0 ABDOMINAL DISTENSION: ICD-10-CM

## 2020-12-20 DIAGNOSIS — K59.00 CONSTIPATION, UNSPECIFIED CONSTIPATION TYPE: ICD-10-CM

## 2020-12-20 LAB
ALBUMIN SERPL BCP-MCNC: 3.7 G/DL (ref 3.5–5.2)
ALP SERPL-CCNC: 87 U/L (ref 55–135)
ALT SERPL W/O P-5'-P-CCNC: 13 U/L (ref 10–44)
ANION GAP SERPL CALC-SCNC: 11 MMOL/L (ref 8–16)
AST SERPL-CCNC: 23 U/L (ref 10–40)
BASOPHILS # BLD AUTO: 0.03 K/UL (ref 0–0.2)
BASOPHILS NFR BLD: 0.6 % (ref 0–1.9)
BILIRUB SERPL-MCNC: 0.4 MG/DL (ref 0.1–1)
BILIRUB UR QL STRIP: NEGATIVE
BUN SERPL-MCNC: 21 MG/DL (ref 8–23)
CALCIUM SERPL-MCNC: 9.9 MG/DL (ref 8.7–10.5)
CHLORIDE SERPL-SCNC: 97 MMOL/L (ref 95–110)
CLARITY UR: CLEAR
CO2 SERPL-SCNC: 29 MMOL/L (ref 23–29)
COLOR UR: YELLOW
CREAT SERPL-MCNC: 1.1 MG/DL (ref 0.5–1.4)
DIFFERENTIAL METHOD: ABNORMAL
EOSINOPHIL # BLD AUTO: 0 K/UL (ref 0–0.5)
EOSINOPHIL NFR BLD: 0.2 % (ref 0–8)
ERYTHROCYTE [DISTWIDTH] IN BLOOD BY AUTOMATED COUNT: 12.8 % (ref 11.5–14.5)
EST. GFR  (AFRICAN AMERICAN): >60 ML/MIN/1.73 M^2
EST. GFR  (NON AFRICAN AMERICAN): 54 ML/MIN/1.73 M^2
GLUCOSE SERPL-MCNC: 124 MG/DL (ref 70–110)
GLUCOSE UR QL STRIP: NEGATIVE
HCT VFR BLD AUTO: 43.4 % (ref 37–48.5)
HGB BLD-MCNC: 14.4 G/DL (ref 12–16)
HGB UR QL STRIP: NEGATIVE
IMM GRANULOCYTES # BLD AUTO: 0.1 K/UL (ref 0–0.04)
IMM GRANULOCYTES NFR BLD AUTO: 2.1 % (ref 0–0.5)
KETONES UR QL STRIP: NEGATIVE
LEUKOCYTE ESTERASE UR QL STRIP: NEGATIVE
LIPASE SERPL-CCNC: 20 U/L (ref 4–60)
LYMPHOCYTES # BLD AUTO: 0.6 K/UL (ref 1–4.8)
LYMPHOCYTES NFR BLD: 11.7 % (ref 18–48)
MCH RBC QN AUTO: 28.7 PG (ref 27–31)
MCHC RBC AUTO-ENTMCNC: 33.2 G/DL (ref 32–36)
MCV RBC AUTO: 87 FL (ref 82–98)
MONOCYTES # BLD AUTO: 0.8 K/UL (ref 0.3–1)
MONOCYTES NFR BLD: 17.3 % (ref 4–15)
NEUTROPHILS # BLD AUTO: 3.2 K/UL (ref 1.8–7.7)
NEUTROPHILS NFR BLD: 68.1 % (ref 38–73)
NITRITE UR QL STRIP: NEGATIVE
NRBC BLD-RTO: 0 /100 WBC
PH UR STRIP: 7 [PH] (ref 5–8)
PLATELET # BLD AUTO: 129 K/UL (ref 150–350)
PLATELET BLD QL SMEAR: ABNORMAL
PMV BLD AUTO: 11.3 FL (ref 9.2–12.9)
POTASSIUM SERPL-SCNC: 4.4 MMOL/L (ref 3.5–5.1)
PROT SERPL-MCNC: 7.4 G/DL (ref 6–8.4)
PROT UR QL STRIP: NEGATIVE
RBC # BLD AUTO: 5.01 M/UL (ref 4–5.4)
SARS-COV-2 RDRP RESP QL NAA+PROBE: NEGATIVE
SODIUM SERPL-SCNC: 137 MMOL/L (ref 136–145)
SP GR UR STRIP: 1.01 (ref 1–1.03)
URN SPEC COLLECT METH UR: NORMAL
UROBILINOGEN UR STRIP-ACNC: NEGATIVE EU/DL
WBC # BLD AUTO: 4.69 K/UL (ref 3.9–12.7)

## 2020-12-20 PROCEDURE — 36415 COLL VENOUS BLD VENIPUNCTURE: CPT

## 2020-12-20 PROCEDURE — 99999 PR PBB SHADOW E&M-EST. PATIENT-LVL IV: ICD-10-PCS | Mod: PBBFAC,,, | Performed by: PHYSICIAN ASSISTANT

## 2020-12-20 PROCEDURE — 80053 COMPREHEN METABOLIC PANEL: CPT

## 2020-12-20 PROCEDURE — 81003 URINALYSIS AUTO W/O SCOPE: CPT

## 2020-12-20 PROCEDURE — 99285 EMERGENCY DEPT VISIT HI MDM: CPT | Mod: 25,27

## 2020-12-20 PROCEDURE — 25000003 PHARM REV CODE 250: Performed by: FAMILY MEDICINE

## 2020-12-20 PROCEDURE — 63600175 PHARM REV CODE 636 W HCPCS: Performed by: FAMILY MEDICINE

## 2020-12-20 PROCEDURE — 99999 PR PBB SHADOW E&M-EST. PATIENT-LVL IV: CPT | Mod: PBBFAC,,, | Performed by: PHYSICIAN ASSISTANT

## 2020-12-20 PROCEDURE — 99214 OFFICE O/P EST MOD 30 MIN: CPT | Mod: PBBFAC,PO | Performed by: PHYSICIAN ASSISTANT

## 2020-12-20 PROCEDURE — 99214 OFFICE O/P EST MOD 30 MIN: CPT | Mod: S$PBB,,, | Performed by: PHYSICIAN ASSISTANT

## 2020-12-20 PROCEDURE — P9612 CATHETERIZE FOR URINE SPEC: HCPCS

## 2020-12-20 PROCEDURE — 96374 THER/PROPH/DIAG INJ IV PUSH: CPT

## 2020-12-20 PROCEDURE — 63600175 PHARM REV CODE 636 W HCPCS: Performed by: EMERGENCY MEDICINE

## 2020-12-20 PROCEDURE — U0002 COVID-19 LAB TEST NON-CDC: HCPCS

## 2020-12-20 PROCEDURE — 96372 THER/PROPH/DIAG INJ SC/IM: CPT | Mod: 59

## 2020-12-20 PROCEDURE — 85025 COMPLETE CBC W/AUTO DIFF WBC: CPT

## 2020-12-20 PROCEDURE — 99214 PR OFFICE/OUTPT VISIT, EST, LEVL IV, 30-39 MIN: ICD-10-PCS | Mod: S$PBB,,, | Performed by: PHYSICIAN ASSISTANT

## 2020-12-20 PROCEDURE — 83690 ASSAY OF LIPASE: CPT

## 2020-12-20 RX ORDER — GLYCERIN 1 G/1
1 SUPPOSITORY RECTAL ONCE
Status: COMPLETED | OUTPATIENT
Start: 2020-12-20 | End: 2020-12-20

## 2020-12-20 RX ORDER — LORAZEPAM 2 MG/ML
1 INJECTION INTRAMUSCULAR
Status: COMPLETED | OUTPATIENT
Start: 2020-12-20 | End: 2020-12-20

## 2020-12-20 RX ORDER — ZIPRASIDONE MESYLATE 20 MG/ML
20 INJECTION, POWDER, LYOPHILIZED, FOR SOLUTION INTRAMUSCULAR
Status: COMPLETED | OUTPATIENT
Start: 2020-12-20 | End: 2020-12-20

## 2020-12-20 RX ORDER — BISACODYL 5 MG
5 TABLET, DELAYED RELEASE (ENTERIC COATED) ORAL 2 TIMES DAILY
Qty: 60 TABLET | Refills: 0 | Status: SHIPPED | OUTPATIENT
Start: 2020-12-20 | End: 2020-12-31 | Stop reason: SDUPTHER

## 2020-12-20 RX ORDER — DIPHENHYDRAMINE HYDROCHLORIDE 50 MG/ML
25 INJECTION INTRAMUSCULAR; INTRAVENOUS
Status: COMPLETED | OUTPATIENT
Start: 2020-12-20 | End: 2020-12-20

## 2020-12-20 RX ADMIN — ZIPRASIDONE MESYLATE 20 MG: 20 INJECTION, POWDER, LYOPHILIZED, FOR SOLUTION INTRAMUSCULAR at 02:12

## 2020-12-20 RX ADMIN — DIPHENHYDRAMINE HYDROCHLORIDE 25 MG: 50 INJECTION, SOLUTION INTRAMUSCULAR; INTRAVENOUS at 02:12

## 2020-12-20 RX ADMIN — GLYCERIN 1 SUPPOSITORY: 2 SUPPOSITORY RECTAL at 07:12

## 2020-12-20 RX ADMIN — LORAZEPAM 1 MG: 2 INJECTION INTRAMUSCULAR; INTRAVENOUS at 07:12

## 2020-12-20 NOTE — ED NOTES
Pt arrived to ER room with caregiver and sister. Pt combative with attempts to help her from wheelchair to ED stretcher. Pt requires medication per family they have given 5mg Valium in the past with no behavior improvements. Dr. Pedro notified, medications to be ordered. Visitors remain in room with the patient in the wheelchair.

## 2020-12-20 NOTE — ED PROVIDER NOTES
SCRIBE #1 NOTE: I, Chana Anderson, am scribing for, and in the presence of, Yohana Pedro MD. I have scribed the HPI, ROS, and PEx.     SCRIBE #2 NOTE: I, Linda Davis, am scribing for, and in the presence of,  Julita Domingo MD. I have scribed the remaining portions of the note not scribed by Scribe #1.      History     Chief Complaint   Patient presents with    Dysuria     Caregiver reports pt continuously saying she has to use the bathroom but hasn't urinated since yesterday     Review of patient's allergies indicates:  No Known Allergies      History of Present Illness     HPI    12/20/2020, 2:01 PM  History obtained from the adult caretaker and sister      History of Present Illness: Juan Pablo Bolden is a 60 y.o. female patient with a PMHx of bipolar 1 disorder, chronic constipation, galactorrhea, growth retardation, HLD, HTN, leukopenia, neuroleptic-induced tardive dyskinesia, schizoaffective disorder, and stereotypic movement disorder who presents to the Emergency Department for evaluation of decreased urination which onset gradually a few days ago. Per caregiver, pt keeps saying she has to use the bathroom but then does not urinate. Symptoms are constant and moderate in severity. No mitigating or exacerbating factors reported. Associated sxs include urinary urgency, abdominal pain, and constipation. Caregiver/sister denies any fever, chills, n/v/d, hematuria, blood in stool, SOB, CP, and all other sxs at this time. Caregiver and sister brought pt to urgent care PTA and was told that the pt was very impacted and to come to the ED for catheter. Pt is also taking miralax with no relief. No further complaints or concerns at this time.       Arrival mode: Personal vehicle    PCP: Brooke Goldberg MD        Past Medical History:  Past Medical History:   Diagnosis Date    Bipolar 1 disorder     Chronic constipation     Galactorrhea     Growth retardation     Profound mental retardation    High cholesterol      Hyperlipidemia     Hypertension     Leukopenia     Neuroleptic-induced tardive dyskinesia     Schizoaffective disorder     Stereotypic movement disorder        Past Surgical History:  Past Surgical History:   Procedure Laterality Date    ESOPHAGOGASTRODUODENOSCOPY N/A 11/5/2020    Procedure: EGD (ESOPHAGOGASTRODUODENOSCOPY);  Surgeon: John Batista MD;  Location: [facility];  Service: Endoscopy;  Laterality: N/A;         Family History:  History reviewed. No pertinent family history.     Social History:  Social History     Tobacco Use    Smoking status: Never Smoker    Smokeless tobacco: Never Used   Substance and Sexual Activity    Alcohol use: No    Drug use: No    Sexual activity: Never      Review of Systems     Review of Systems   Constitutional: Negative for chills and fever.   HENT: Negative for sore throat.    Respiratory: Negative for shortness of breath.    Cardiovascular: Negative for chest pain.   Gastrointestinal: Positive for abdominal pain and constipation. Negative for blood in stool, diarrhea, nausea and vomiting.   Genitourinary: Positive for decreased urine volume and urgency. Negative for dysuria and hematuria.   Musculoskeletal: Negative for back pain.   Skin: Negative for rash.   Neurological: Negative for weakness.   Hematological: Does not bruise/bleed easily.   All other systems reviewed and are negative.     Physical Exam     Initial Vitals [12/20/20 1307]   BP Pulse Resp Temp SpO2   139/75 106 20 98.8 °F (37.1 °C) 95 %      MAP       --          Physical Exam   Difficulty examining pt due to pt being uncooperative, swinging arms, and pushing away.  Nursing Notes and Vital Signs Reviewed.  Constitutional: Patient is in no acute distress. Well-developed and well-nourished.  Head: Atraumatic. Normocephalic.  Eyes: PERRL. EOM intact. Conjunctivae are not pale. No scleral icterus.  ENT: Mucous membranes are moist. Oropharynx is clear and symmetric.    Neck: Supple. Full ROM.  "No lymphadenopathy.  Cardiovascular: Regular rate. Regular rhythm. No murmurs, rubs, or gallops. Distal pulses are 2+ and symmetric.  Pulmonary/Chest: No respiratory distress. Clear to auscultation bilaterally. No wheezing or rales.  Abdominal: Soft and somewhat distended.  There is no tenderness.  No rebound, guarding, or rigidity. Decreased bowel sounds. No edema.   Genitourinary: No CVA tenderness  Musculoskeletal: Moves all extremities. No obvious deformities. No edema.  Skin: Warm and dry.  Neurological:  Alert and awake.  Normal speech. No acute focal neurological deficits are appreciated.  Psychiatric: Normal affect.   ED Course   Procedures  ED Vital Signs:  Vitals:    12/20/20 1307 12/20/20 1530 12/20/20 1601 12/20/20 1733   BP: 139/75  (!) 141/85 139/76   Pulse: 106 97 94 96   Resp: 20  18 20   Temp: 98.8 °F (37.1 °C)      TempSrc: Oral      SpO2: 95%  96% 95%   Height: 4' 9" (1.448 m)       12/20/20 1832   BP: 124/75   Pulse: 89   Resp: 16   Temp: 97.8 °F (36.6 °C)   TempSrc: Oral   SpO2: 96%   Height:        Abnormal Lab Results:  Labs Reviewed   CBC W/ AUTO DIFFERENTIAL - Abnormal; Notable for the following components:       Result Value    Platelets 129 (*)     Immature Granulocytes 2.1 (*)     Immature Grans (Abs) 0.10 (*)     Lymph # 0.6 (*)     Lymph % 11.7 (*)     Mono % 17.3 (*)     Platelet Estimate Decreased (*)     All other components within normal limits   COMPREHENSIVE METABOLIC PANEL - Abnormal; Notable for the following components:    Glucose 124 (*)     eGFR if non  54 (*)     All other components within normal limits   URINALYSIS, REFLEX TO URINE CULTURE    Narrative:     Specimen Source->Urine   LIPASE   SARS-COV-2 RNA AMPLIFICATION, QUAL        All Lab Results:  Results for orders placed or performed during the hospital encounter of 12/20/20   Urinalysis, Reflex to Urine Culture Urine, Catheterized    Specimen: Urine   Result Value Ref Range    Specimen UA Urine, " Catheterized     Color, UA Yellow Yellow, Straw, Demi    Appearance, UA Clear Clear    pH, UA 7.0 5.0 - 8.0    Specific Gravity, UA 1.015 1.005 - 1.030    Protein, UA Negative Negative    Glucose, UA Negative Negative    Ketones, UA Negative Negative    Bilirubin (UA) Negative Negative    Occult Blood UA Negative Negative    Nitrite, UA Negative Negative    Urobilinogen, UA Negative <2.0 EU/dL    Leukocytes, UA Negative Negative   CBC auto differential   Result Value Ref Range    WBC 4.69 3.90 - 12.70 K/uL    RBC 5.01 4.00 - 5.40 M/uL    Hemoglobin 14.4 12.0 - 16.0 g/dL    Hematocrit 43.4 37.0 - 48.5 %    MCV 87 82 - 98 fL    MCH 28.7 27.0 - 31.0 pg    MCHC 33.2 32.0 - 36.0 g/dL    RDW 12.8 11.5 - 14.5 %    Platelets 129 (L) 150 - 350 K/uL    MPV 11.3 9.2 - 12.9 fL    Immature Granulocytes 2.1 (H) 0.0 - 0.5 %    Gran # (ANC) 3.2 1.8 - 7.7 K/uL    Immature Grans (Abs) 0.10 (H) 0.00 - 0.04 K/uL    Lymph # 0.6 (L) 1.0 - 4.8 K/uL    Mono # 0.8 0.3 - 1.0 K/uL    Eos # 0.0 0.0 - 0.5 K/uL    Baso # 0.03 0.00 - 0.20 K/uL    nRBC 0 0 /100 WBC    Gran % 68.1 38.0 - 73.0 %    Lymph % 11.7 (L) 18.0 - 48.0 %    Mono % 17.3 (H) 4.0 - 15.0 %    Eosinophil % 0.2 0.0 - 8.0 %    Basophil % 0.6 0.0 - 1.9 %    Platelet Estimate Decreased (A)     Differential Method Automated    Comprehensive metabolic panel   Result Value Ref Range    Sodium 137 136 - 145 mmol/L    Potassium 4.4 3.5 - 5.1 mmol/L    Chloride 97 95 - 110 mmol/L    CO2 29 23 - 29 mmol/L    Glucose 124 (H) 70 - 110 mg/dL    BUN 21 8 - 23 mg/dL    Creatinine 1.1 0.5 - 1.4 mg/dL    Calcium 9.9 8.7 - 10.5 mg/dL    Total Protein 7.4 6.0 - 8.4 g/dL    Albumin 3.7 3.5 - 5.2 g/dL    Total Bilirubin 0.4 0.1 - 1.0 mg/dL    Alkaline Phosphatase 87 55 - 135 U/L    AST 23 10 - 40 U/L    ALT 13 10 - 44 U/L    Anion Gap 11 8 - 16 mmol/L    eGFR if African American >60 >60 mL/min/1.73 m^2    eGFR if non African American 54 (A) >60 mL/min/1.73 m^2   Lipase   Result Value Ref Range     Lipase 20 4 - 60 U/L   COVID-19 Rapid Screening   Result Value Ref Range    SARS-CoV-2 RNA, Amplification, Qual Negative Negative         Imaging Results:  Imaging Results          CT Abdomen Pelvis  Without Contrast (Final result)  Result time 12/20/20 17:35:21    Final result by Raleigh Brady MD (12/20/20 17:35:21)                 Impression:      Motion limited    No evidence of bowel obstruction.    Moderate constipation. Moderate fecal impaction level the rectum.    Moderate gastric rugal fold thickening and mild thickening of the distal esophagus could reflect esophagitis and gastritis. Air in fluid is seen throughout small bowel loops without transition in could reflect enteritis.    All CT scans at this facility use dose modulation, iterative reconstruction, and/or weight based dosing when appropriate to reduce radiation dose to as low as reasonable achievable.      Electronically signed by: Raleigh Brady MD  Date:    12/20/2020  Time:    17:35             Narrative:    EXAMINATION:  CT ABDOMEN PELVIS WITHOUT CONTRAST    CLINICAL HISTORY:  Abdominal distension;    TECHNIQUE:  Low dose axial images, sagittal and coronal reformations were obtained from the lung bases to the pubic symphysis.  Oral contrast was not administered.    COMPARISON:  12/20/2020    FINDINGS:  Motion limited    Heart: Normal size. No effusion.  Dense coronary artery disease    Lung Bases: Demonstrate dependent changes and mild atelectasis    Liver: Normal size and attenuation. No focal lesions.    Gallbladder: No calcified gallstones.    Bile Ducts: No dilatation.    Pancreas: No obvious mass. No peripancreatic fat stranding.    Spleen: Normal.    Adrenals: Normal.    Kidneys/Ureters: No mass, hydroureteronephrosis, or nephroureterolithiasis.  Hypodensity within the interpolar region right kidney measuring 2.2 cm thought to reflect a cyst.  Punctate nonobstructing stone upper pole left kidney    Bladder: No wall  thickening.    Reproductive organs: Normal.    GI Tract/Mesentery: Moderate gastric rugal fold thickening and mild thickening of the distal esophagus could reflect esophagitis and gastritis.  Small bowel loops measure up to 2.9 cm.  No evidence of bowel obstruction.  Fluid is seen throughout small bowel loops without transition in could reflect enteritis.  Moderate constipation.  Moderate fecal impaction level the rectum.  Appendix is not seen.  No inflammation    Peritoneal Space: No ascites or free air.    Retroperitoneum: No significant adenopathy.    Abdominal wall: Normal.    Vasculature: No aneurysm. Aorta demonstrates atherosclerotic disease.    Bones: No acute fracture.  Remote posterior bilateral 12th rib fractures.  Advanced degenerative changes throughout the mid lower lumbar spine.  No suspicious lytic or sclerotic lesions.                               X-Ray Abdomen Flat And Erect (Final result)  Result time 12/20/20 16:24:10    Final result by Raleigh Brady MD (12/20/20 16:24:10)                 Impression:      See findings above.      Electronically signed by: Raleigh Brady MD  Date:    12/20/2020  Time:    16:24             Narrative:    EXAMINATION:  XR ABDOMEN FLAT AND ERECT    CLINICAL HISTORY:  Abdominal distension (gaseous)    COMPARISON:  None    FINDINGS:  Study is severely motion limited.    Prominent small bowel loops measuring up to 3.4 cm are seen throughout the abdomen without definite transition.  Nonobstructive bowel gas pattern is noted. No radiopaque kidney calculus is identified.    Air and feces is seen throughout the colon consistent with mild to moderate constipation.  No evidence of organomegaly.    The bones demonstrate moderate degenerative changes within the lower lumbar region.  The bones are otherwise intact.    No gross consolidation within the lungs.  Mild atelectasis left lung base with trace left pleural effusion suspected.                                       The  Emergency Provider reviewed the vital signs and test results, which are outlined above.     ED Discussion     3:54 PM: Dr. Pedro transfers care of patient to Dr. Domingo pending lab results.    6:12 PM: Reassessed pt at this time.  Pt's condition has improved at this time. Discussed with pt's caretaker all pertinent ED information and results. Discussed pt dx and gave pt's caretaker pt's rx of Dulcolax 5 mg BID. I recommended to the pt's caretaker that the pt f/u with her PCP in a few days. Gave pt's caretaker all f/u and return to the ED instructions. All questions and concerns were addressed at this time. Pt's caretaker expresses understanding of information and instructions, and is comfortable with plan to discharge. Pt is stable for discharge.    I discussed with patient's caretaker that evaluation in the ED does not suggest any emergent or life threatening medical conditions requiring immediate intervention beyond what was provided in the ED, and I believe patient is safe for discharge.  Regardless, an unremarkable evaluation in the ED does not preclude the development or presence of a serious of life threatening condition. As such, patient's caretaker was instructed to return immediately for any worsening or change in pt's current symptoms.       Medical Decision Making:   Clinical Tests:   Lab Tests: Ordered and Reviewed  Radiological Study: Ordered and Reviewed           ED Medication(s):  Medications   ziprasidone injection 20 mg (20 mg Intramuscular Given 12/20/20 1426)   diphenhydrAMINE injection 25 mg (25 mg Intramuscular Given 12/20/20 1426)   glycerin adult suppository 1 suppository (1 suppository Rectal Given 12/20/20 1919)   lorazepam injection 1 mg (1 mg Intravenous Given 12/20/20 1906)       Discharge Medication List as of 12/20/2020  6:13 PM      START taking these medications    Details   bisacodyL (DULCOLAX) 5 mg EC tablet Take 1 tablet (5 mg total) by mouth 2 (two) times daily., Starting Sun  12/20/2020, Print             Follow-up Information     Schedule an appointment as soon as possible for a visit  with Brooke Goldberg MD.    Specialty: Internal Medicine  Why: As needed  Contact information:  [id number] [id number] [phone]                       Scribe Attestation:   Scribe #1: I performed the above scribed service and the documentation accurately describes the services I performed. I attest to the accuracy of the note.     Attending:   Physician Attestation Statement for Scribe #1: I, Yohana Pedro MD, personally performed the services described in this documentation, as scribed by Chana Anderson, in my presence, and it is both accurate and complete.       Scribe Attestation:   Scribe #2: I performed the above scribed service and the documentation accurately describes the services I performed. I attest to the accuracy of the note.    Attending Attestation:           Physician Attestation for Scribe:    Physician Attestation Statement for Scribe #2: I, Julita Domingo MD, reviewed documentation, as scribed by Linda Davis in my presence, and it is both accurate and complete. I also acknowledge and confirm the content of the note done by Scribe #1.           Clinical Impression       ICD-10-CM ICD-9-CM   1. Chronic idiopathic constipation  K59.04 564.00   2. Abdominal distension  R14.0 787.3       Disposition:   Disposition: Discharged  Condition: Stable         Julita Domingo MD  12/21/20 0540

## 2020-12-20 NOTE — PROGRESS NOTES
Juan Pablo Bolden is a 60 year old female who presents today with her sister and .  HPI provided by sister and .  States patient has been constipated for a few days now but unsure of last BM since caregivers have changed recently.  Caregiver states that when she has changed her brief over the past few days she has noticed hard stool at the rectum but unable to disimpact.  Sister states she is concerned because she is more distended than normal.  Also with symptoms of urinary frequency since Friday and today with urinary retention.  Caregiver states that when she changes her she complains of discomfort when wiping her at the urethra.  No back pain, fever, night sweats, chills, or vomiting.      All areas of patients chart reviewed including past medical history, past surgical history, medications, allergies, family history, and social history.    Review of Systems   Constitutional: Negative for chills and fever.   HENT: Negative for sore throat.    Respiratory: Negative for shortness of breath.    Cardiovascular: Negative for chest pain.   Gastrointestinal: Positive for constipation. Negative for abdominal pain, nausea and vomiting.   Genitourinary: Positive for frequency. Negative for dysuria.        +urinary retention today   Musculoskeletal: Negative for back pain.   Neurological: Negative for headaches.   All other systems reviewed and are negative.    Physical Exam:  BP (!) 148/86 (BP Location: Right arm, Patient Position: Sitting)   Pulse 98   Temp 98.5 °F (36.9 °C) (Temporal)   Resp (!) 24   Wt 57.2 kg (125 lb 15.9 oz)   SpO2 95%   BMI 27.26 kg/m²   Physical Exam   Constitutional: She is oriented to person, place, and time and well-developed, well-nourished, and in no distress.   Wheelchair bound.   HENT:   Head: Normocephalic.   Right Ear: External ear normal.   Left Ear: External ear normal.   Mouth/Throat: No oropharyngeal exudate.   Neck: Normal range of motion.   Cardiovascular:  Normal rate and normal heart sounds.   Pulmonary/Chest: Effort normal and breath sounds normal. No respiratory distress.   Abdominal: Bowel sounds are normal. She exhibits distension. There is no abdominal tenderness. There is no CVA tenderness.   Neurological: She is alert and oriented to person, place, and time.   Patient at baseline per provider and sister.   Skin: Skin is warm.   Vitals reviewed.    Assessment:  1. Urinary frequency  - POCT URINE DIPSTICK WITHOUT MICROSCOPE    2. Constipation, unspecified constipation type    3. Abdominal distension    Plan:  Patient unable to provider urine sample.  Recommended further eval in ER.  Patients caregiver and family verbalize understanding and will go via private vehicle.  Report called to ER to nurse.

## 2020-12-20 NOTE — ED NOTES
In and out catheter performed using sterile technique. Urine collected on first attempt. Pt tolerated well. Pt drained 400cc of urine from bladder.

## 2020-12-21 DIAGNOSIS — I10 ESSENTIAL HYPERTENSION: ICD-10-CM

## 2020-12-21 DIAGNOSIS — D72.819 LEUKOPENIA, UNSPECIFIED TYPE: ICD-10-CM

## 2020-12-21 DIAGNOSIS — D69.6 THROMBOCYTOPENIA: ICD-10-CM

## 2020-12-21 RX ORDER — QUETIAPINE FUMARATE 400 MG/1
TABLET, FILM COATED ORAL
Qty: 45 TABLET | Refills: 0 | OUTPATIENT
Start: 2020-12-21

## 2020-12-21 RX ORDER — ALPRAZOLAM 0.25 MG/1
TABLET ORAL 2 TIMES DAILY PRN
OUTPATIENT
Start: 2020-12-21

## 2020-12-21 RX ORDER — PANTOPRAZOLE SODIUM 20 MG/1
20 TABLET, DELAYED RELEASE ORAL DAILY
Qty: 30 TABLET | Refills: 5 | Status: SHIPPED | OUTPATIENT
Start: 2020-12-21 | End: 2021-04-09

## 2020-12-21 RX ORDER — DIVALPROEX SODIUM 250 MG/1
250 TABLET, DELAYED RELEASE ORAL 3 TIMES DAILY
Qty: 90 TABLET | Refills: 5 | OUTPATIENT
Start: 2020-12-21

## 2020-12-21 RX ORDER — SIMVASTATIN 40 MG/1
40 TABLET, FILM COATED ORAL NIGHTLY
Qty: 30 TABLET | Refills: 11 | Status: SHIPPED | OUTPATIENT
Start: 2020-12-21 | End: 2021-04-23 | Stop reason: SDUPTHER

## 2020-12-21 RX ORDER — POLYETHYLENE GLYCOL 3350 17 G/17G
17 POWDER, FOR SOLUTION ORAL DAILY
Qty: 750 G | Refills: 5 | Status: SHIPPED | OUTPATIENT
Start: 2020-12-21 | End: 2021-03-26

## 2020-12-21 RX ORDER — MIRTAZAPINE 30 MG/1
30 TABLET, FILM COATED ORAL NIGHTLY
Qty: 30 TABLET | Refills: 0 | Status: SHIPPED | OUTPATIENT
Start: 2020-12-21 | End: 2021-01-08 | Stop reason: SDUPTHER

## 2020-12-21 RX ORDER — AMLODIPINE BESYLATE 10 MG/1
10 TABLET ORAL DAILY
Qty: 30 TABLET | Refills: 11 | Status: SHIPPED | OUTPATIENT
Start: 2020-12-21 | End: 2021-10-13

## 2020-12-21 RX ORDER — DIAZEPAM 5 MG/1
5 TABLET ORAL EVERY 6 HOURS PRN
Qty: 30 TABLET | Refills: 0 | OUTPATIENT
Start: 2020-12-21 | End: 2021-01-20

## 2020-12-21 RX ORDER — TRIAMTERENE AND HYDROCHLOROTHIAZIDE 37.5; 25 MG/1; MG/1
1 CAPSULE ORAL EVERY MORNING
Qty: 30 CAPSULE | Refills: 11 | Status: SHIPPED | OUTPATIENT
Start: 2020-12-21 | End: 2021-07-08 | Stop reason: SDUPTHER

## 2020-12-21 NOTE — ED NOTES
IV insertion successful with ED tech X2, patient's caregiver, and patient's sister. Rails up X2, visitors to remain with patient as approved by charge nurse upon arrival.

## 2020-12-21 NOTE — TELEPHONE ENCOUNTER
----- Message from Irena Gerber sent at 12/21/2020  4:27 PM CST -----  Type:  RX Refill Request    Who Called: Sister of pt-Alanis Noyola  Refill or New Rx:refill  RX Name and Strength:all medication  How is the patient currently taking it? (ex. 1XDay):  Is this a 30 day or 90 day RX:  Preferred Pharmacy with phone number:      [facility]/pharmacy #[id number] - [place], [place] - [address] [address]  [address] [address] [address]  Phone: [phone] Fax: [phone]      Local or Mail Order:local  Ordering Provider:Yusuf  Would the patient rather a call back or a response via [facility]? call  Best Call Back Number:[phone]    Additional Information: Pt is completely out.//need to speak with nurse

## 2020-12-23 ENCOUNTER — PATIENT MESSAGE (OUTPATIENT)
Dept: PSYCHIATRY | Facility: CLINIC | Age: 60
End: 2020-12-23

## 2020-12-23 RX ORDER — ALPRAZOLAM 0.25 MG/1
0.25 TABLET ORAL 2 TIMES DAILY PRN
Qty: 30 TABLET | Refills: 0 | Status: SHIPPED | OUTPATIENT
Start: 2020-12-23 | End: 2020-12-31 | Stop reason: SDUPTHER

## 2020-12-31 ENCOUNTER — OFFICE VISIT (OUTPATIENT)
Dept: FAMILY MEDICINE | Facility: CLINIC | Age: 60
End: 2020-12-31
Payer: MEDICARE

## 2020-12-31 VITALS
SYSTOLIC BLOOD PRESSURE: 110 MMHG | HEIGHT: 57 IN | BODY MASS INDEX: 26.54 KG/M2 | HEART RATE: 100 BPM | OXYGEN SATURATION: 98 % | TEMPERATURE: 98 F | DIASTOLIC BLOOD PRESSURE: 60 MMHG | WEIGHT: 123 LBS

## 2020-12-31 DIAGNOSIS — K59.09 CHRONIC CONSTIPATION: ICD-10-CM

## 2020-12-31 DIAGNOSIS — Z09 HOSPITAL DISCHARGE FOLLOW-UP: ICD-10-CM

## 2020-12-31 DIAGNOSIS — Z23 IMMUNIZATION DUE: ICD-10-CM

## 2020-12-31 DIAGNOSIS — F84.9 PDD (PERVASIVE DEVELOPMENTAL DISORDER): ICD-10-CM

## 2020-12-31 PROCEDURE — 99214 PR OFFICE/OUTPT VISIT, EST, LEVL IV, 30-39 MIN: ICD-10-PCS | Mod: S$PBB,,, | Performed by: NURSE PRACTITIONER

## 2020-12-31 PROCEDURE — 99214 OFFICE O/P EST MOD 30 MIN: CPT | Mod: S$PBB,,, | Performed by: NURSE PRACTITIONER

## 2020-12-31 PROCEDURE — 99999 PR PBB SHADOW E&M-EST. PATIENT-LVL IV: ICD-10-PCS | Mod: PBBFAC,,, | Performed by: NURSE PRACTITIONER

## 2020-12-31 PROCEDURE — 99214 OFFICE O/P EST MOD 30 MIN: CPT | Mod: PBBFAC,PO | Performed by: NURSE PRACTITIONER

## 2020-12-31 PROCEDURE — 99999 PR PBB SHADOW E&M-EST. PATIENT-LVL IV: CPT | Mod: PBBFAC,,, | Performed by: NURSE PRACTITIONER

## 2020-12-31 RX ORDER — DIAZEPAM 5 MG/1
5 TABLET ORAL EVERY 6 HOURS PRN
Qty: 30 TABLET | Refills: 0 | Status: SHIPPED | OUTPATIENT
Start: 2020-12-31 | End: 2021-01-08 | Stop reason: SDUPTHER

## 2020-12-31 RX ORDER — BISACODYL 5 MG
TABLET, DELAYED RELEASE (ENTERIC COATED) ORAL
Qty: 60 TABLET | Refills: 5 | Status: SHIPPED | OUTPATIENT
Start: 2020-12-31 | End: 2022-04-02

## 2020-12-31 RX ORDER — ALPRAZOLAM 0.25 MG/1
0.25 TABLET ORAL 2 TIMES DAILY
Qty: 60 TABLET | Refills: 0 | Status: SHIPPED | OUTPATIENT
Start: 2020-12-31 | End: 2021-01-08 | Stop reason: SDUPTHER

## 2021-01-07 ENCOUNTER — IMMUNIZATION (OUTPATIENT)
Dept: FAMILY MEDICINE | Facility: CLINIC | Age: 61
End: 2021-01-07
Payer: MEDICARE

## 2021-01-07 PROCEDURE — 90686 IIV4 VACC NO PRSV 0.5 ML IM: CPT | Mod: PBBFAC,PO

## 2021-01-08 ENCOUNTER — OFFICE VISIT (OUTPATIENT)
Dept: PSYCHIATRY | Facility: CLINIC | Age: 61
End: 2021-01-08
Payer: MEDICARE

## 2021-01-08 DIAGNOSIS — F84.9 PDD (PERVASIVE DEVELOPMENTAL DISORDER): Primary | ICD-10-CM

## 2021-01-08 DIAGNOSIS — R45.1 RESTLESSNESS AND AGITATION: ICD-10-CM

## 2021-01-08 DIAGNOSIS — F79 INTELLECTUAL DISABILITY: ICD-10-CM

## 2021-01-08 PROCEDURE — 99214 OFFICE O/P EST MOD 30 MIN: CPT | Mod: S$PBB,,, | Performed by: PSYCHIATRY & NEUROLOGY

## 2021-01-08 PROCEDURE — 99999 PR PBB SHADOW E&M-EST. PATIENT-LVL II: CPT | Mod: PBBFAC,,, | Performed by: PSYCHIATRY & NEUROLOGY

## 2021-01-08 PROCEDURE — 99212 OFFICE O/P EST SF 10 MIN: CPT | Mod: PBBFAC | Performed by: PSYCHIATRY & NEUROLOGY

## 2021-01-08 PROCEDURE — 99999 PR PBB SHADOW E&M-EST. PATIENT-LVL II: ICD-10-PCS | Mod: PBBFAC,,, | Performed by: PSYCHIATRY & NEUROLOGY

## 2021-01-08 PROCEDURE — 99214 PR OFFICE/OUTPT VISIT, EST, LEVL IV, 30-39 MIN: ICD-10-PCS | Mod: S$PBB,,, | Performed by: PSYCHIATRY & NEUROLOGY

## 2021-01-08 RX ORDER — DIVALPROEX SODIUM 250 MG/1
250 TABLET, DELAYED RELEASE ORAL 3 TIMES DAILY
Qty: 90 TABLET | Refills: 2 | Status: SHIPPED | OUTPATIENT
Start: 2021-01-08 | End: 2021-04-09

## 2021-01-08 RX ORDER — MIRTAZAPINE 30 MG/1
30 TABLET, FILM COATED ORAL NIGHTLY
Qty: 30 TABLET | Refills: 2 | Status: SHIPPED | OUTPATIENT
Start: 2021-01-08 | End: 2021-04-08

## 2021-01-08 RX ORDER — DIAZEPAM 5 MG/1
5 TABLET ORAL EVERY 6 HOURS PRN
Qty: 30 TABLET | Refills: 2 | Status: SHIPPED | OUTPATIENT
Start: 2021-01-08 | End: 2021-03-10 | Stop reason: SDUPTHER

## 2021-01-08 RX ORDER — ALPRAZOLAM 0.25 MG/1
0.25 TABLET ORAL 2 TIMES DAILY
Qty: 60 TABLET | Refills: 2 | Status: SHIPPED | OUTPATIENT
Start: 2021-01-08 | End: 2021-04-27 | Stop reason: SDUPTHER

## 2021-01-08 RX ORDER — QUETIAPINE FUMARATE 400 MG/1
TABLET, FILM COATED ORAL
Qty: 45 TABLET | Refills: 2 | Status: SHIPPED | OUTPATIENT
Start: 2021-01-08 | End: 2021-03-29

## 2021-01-15 ENCOUNTER — PATIENT MESSAGE (OUTPATIENT)
Dept: GASTROENTEROLOGY | Facility: CLINIC | Age: 61
End: 2021-01-15

## 2021-01-15 DIAGNOSIS — D72.819 LEUKOPENIA, UNSPECIFIED TYPE: ICD-10-CM

## 2021-01-15 DIAGNOSIS — D69.6 THROMBOCYTOPENIA: ICD-10-CM

## 2021-01-31 ENCOUNTER — OFFICE VISIT (OUTPATIENT)
Dept: URGENT CARE | Facility: CLINIC | Age: 61
End: 2021-01-31
Payer: MEDICARE

## 2021-01-31 VITALS — BODY MASS INDEX: 23.37 KG/M2 | OXYGEN SATURATION: 97 % | HEART RATE: 88 BPM | WEIGHT: 108 LBS | TEMPERATURE: 98 F

## 2021-01-31 DIAGNOSIS — R05.9 COUGH: Primary | ICD-10-CM

## 2021-01-31 DIAGNOSIS — U07.1 LAB TEST POSITIVE FOR DETECTION OF COVID-19 VIRUS: ICD-10-CM

## 2021-01-31 DIAGNOSIS — R52 GENERALIZED BODY ACHES: ICD-10-CM

## 2021-01-31 LAB
CTP QC/QA: YES
SARS-COV-2 RDRP RESP QL NAA+PROBE: POSITIVE

## 2021-01-31 PROCEDURE — 99214 OFFICE O/P EST MOD 30 MIN: CPT | Mod: PBBFAC,PO | Performed by: NURSE PRACTITIONER

## 2021-01-31 PROCEDURE — 99999 PR PBB SHADOW E&M-EST. PATIENT-LVL IV: CPT | Mod: PBBFAC,CR,, | Performed by: NURSE PRACTITIONER

## 2021-01-31 PROCEDURE — 99215 PR OFFICE/OUTPT VISIT, EST, LEVL V, 40-54 MIN: ICD-10-PCS | Mod: S$PBB,CR,, | Performed by: NURSE PRACTITIONER

## 2021-01-31 PROCEDURE — 99215 OFFICE O/P EST HI 40 MIN: CPT | Mod: S$PBB,CR,, | Performed by: NURSE PRACTITIONER

## 2021-01-31 PROCEDURE — U0002 COVID-19 LAB TEST NON-CDC: HCPCS | Mod: PBBFAC,PO | Performed by: NURSE PRACTITIONER

## 2021-01-31 PROCEDURE — 99999 PR PBB SHADOW E&M-EST. PATIENT-LVL IV: ICD-10-PCS | Mod: PBBFAC,CR,, | Performed by: NURSE PRACTITIONER

## 2021-02-01 ENCOUNTER — TELEPHONE (OUTPATIENT)
Dept: FAMILY MEDICINE | Facility: CLINIC | Age: 61
End: 2021-02-01

## 2021-02-01 ENCOUNTER — NURSE TRIAGE (OUTPATIENT)
Dept: ADMINISTRATIVE | Facility: CLINIC | Age: 61
End: 2021-02-01

## 2021-02-01 DIAGNOSIS — I10 ESSENTIAL HYPERTENSION: ICD-10-CM

## 2021-02-01 DIAGNOSIS — F84.9 PDD (PERVASIVE DEVELOPMENTAL DISORDER): ICD-10-CM

## 2021-02-01 DIAGNOSIS — G40.909 SEIZURE DISORDER: Primary | ICD-10-CM

## 2021-02-02 ENCOUNTER — TELEPHONE (OUTPATIENT)
Dept: FAMILY MEDICINE | Facility: CLINIC | Age: 61
End: 2021-02-02

## 2021-02-03 ENCOUNTER — PATIENT MESSAGE (OUTPATIENT)
Dept: PSYCHIATRY | Facility: CLINIC | Age: 61
End: 2021-02-03

## 2021-02-03 RX ORDER — DIVALPROEX SODIUM 500 MG/1
500 TABLET, DELAYED RELEASE ORAL 2 TIMES DAILY
Qty: 60 TABLET | Refills: 2 | Status: SHIPPED | OUTPATIENT
Start: 2021-02-03 | End: 2021-04-27 | Stop reason: SDUPTHER

## 2021-02-04 ENCOUNTER — TELEPHONE (OUTPATIENT)
Dept: FAMILY MEDICINE | Facility: CLINIC | Age: 61
End: 2021-02-04

## 2021-02-10 ENCOUNTER — PATIENT MESSAGE (OUTPATIENT)
Dept: CARDIOLOGY | Facility: CLINIC | Age: 61
End: 2021-02-10

## 2021-02-10 ENCOUNTER — PATIENT MESSAGE (OUTPATIENT)
Dept: GASTROENTEROLOGY | Facility: CLINIC | Age: 61
End: 2021-02-10

## 2021-02-19 ENCOUNTER — PATIENT MESSAGE (OUTPATIENT)
Dept: PSYCHIATRY | Facility: CLINIC | Age: 61
End: 2021-02-19

## 2021-02-25 ENCOUNTER — PATIENT MESSAGE (OUTPATIENT)
Dept: GASTROENTEROLOGY | Facility: CLINIC | Age: 61
End: 2021-02-25

## 2021-02-25 ENCOUNTER — EXTERNAL HOME HEALTH (OUTPATIENT)
Dept: HOME HEALTH SERVICES | Facility: HOSPITAL | Age: 61
End: 2021-02-25

## 2021-03-01 ENCOUNTER — PATIENT MESSAGE (OUTPATIENT)
Dept: GASTROENTEROLOGY | Facility: CLINIC | Age: 61
End: 2021-03-01

## 2021-03-03 ENCOUNTER — OFFICE VISIT (OUTPATIENT)
Dept: FAMILY MEDICINE | Facility: CLINIC | Age: 61
End: 2021-03-03
Payer: MEDICARE

## 2021-03-03 VITALS
HEIGHT: 59 IN | TEMPERATURE: 98 F | HEART RATE: 60 BPM | WEIGHT: 123 LBS | BODY MASS INDEX: 24.8 KG/M2 | SYSTOLIC BLOOD PRESSURE: 116 MMHG | OXYGEN SATURATION: 98 % | DIASTOLIC BLOOD PRESSURE: 60 MMHG

## 2021-03-03 DIAGNOSIS — R46.89 BEHAVIORAL CHANGE: ICD-10-CM

## 2021-03-03 DIAGNOSIS — R30.0 DYSURIA: ICD-10-CM

## 2021-03-03 PROCEDURE — 99999 PR PBB SHADOW E&M-EST. PATIENT-LVL IV: ICD-10-PCS | Mod: PBBFAC,,, | Performed by: NURSE PRACTITIONER

## 2021-03-03 PROCEDURE — 99213 OFFICE O/P EST LOW 20 MIN: CPT | Mod: S$PBB,,, | Performed by: NURSE PRACTITIONER

## 2021-03-03 PROCEDURE — 99999 PR PBB SHADOW E&M-EST. PATIENT-LVL IV: CPT | Mod: PBBFAC,,, | Performed by: NURSE PRACTITIONER

## 2021-03-03 PROCEDURE — 99213 PR OFFICE/OUTPT VISIT, EST, LEVL III, 20-29 MIN: ICD-10-PCS | Mod: S$PBB,,, | Performed by: NURSE PRACTITIONER

## 2021-03-03 PROCEDURE — 99214 OFFICE O/P EST MOD 30 MIN: CPT | Mod: PBBFAC,PO | Performed by: NURSE PRACTITIONER

## 2021-03-03 RX ORDER — CEFUROXIME AXETIL 500 MG/1
500 TABLET ORAL
COMMUNITY
End: 2021-03-26

## 2021-03-05 ENCOUNTER — TELEPHONE (OUTPATIENT)
Dept: FAMILY MEDICINE | Facility: CLINIC | Age: 61
End: 2021-03-05

## 2021-03-05 ENCOUNTER — TELEPHONE (OUTPATIENT)
Dept: GASTROENTEROLOGY | Facility: CLINIC | Age: 61
End: 2021-03-05

## 2021-03-05 ENCOUNTER — LAB VISIT (OUTPATIENT)
Dept: LAB | Facility: HOSPITAL | Age: 61
End: 2021-03-05
Attending: INTERNAL MEDICINE
Payer: MEDICARE

## 2021-03-05 DIAGNOSIS — A04.8 H. PYLORI INFECTION: Primary | ICD-10-CM

## 2021-03-05 DIAGNOSIS — A04.8 H. PYLORI INFECTION: ICD-10-CM

## 2021-03-05 PROCEDURE — 87338 HPYLORI STOOL AG IA: CPT | Performed by: INTERNAL MEDICINE

## 2021-03-08 ENCOUNTER — PATIENT MESSAGE (OUTPATIENT)
Dept: NEUROLOGY | Facility: CLINIC | Age: 61
End: 2021-03-08

## 2021-03-08 ENCOUNTER — TELEPHONE (OUTPATIENT)
Dept: NEUROLOGY | Facility: CLINIC | Age: 61
End: 2021-03-08

## 2021-03-08 ENCOUNTER — PATIENT MESSAGE (OUTPATIENT)
Dept: PSYCHIATRY | Facility: CLINIC | Age: 61
End: 2021-03-08

## 2021-03-09 ENCOUNTER — PATIENT MESSAGE (OUTPATIENT)
Dept: PSYCHIATRY | Facility: CLINIC | Age: 61
End: 2021-03-09

## 2021-03-10 ENCOUNTER — PATIENT MESSAGE (OUTPATIENT)
Dept: FAMILY MEDICINE | Facility: CLINIC | Age: 61
End: 2021-03-10

## 2021-03-10 ENCOUNTER — PATIENT MESSAGE (OUTPATIENT)
Dept: PSYCHIATRY | Facility: CLINIC | Age: 61
End: 2021-03-10

## 2021-03-10 RX ORDER — DIAZEPAM 5 MG/1
5 TABLET ORAL EVERY 6 HOURS PRN
Qty: 30 TABLET | Refills: 0 | Status: SHIPPED | OUTPATIENT
Start: 2021-03-10 | End: 2021-04-09

## 2021-03-11 ENCOUNTER — TELEPHONE (OUTPATIENT)
Dept: GASTROENTEROLOGY | Facility: CLINIC | Age: 61
End: 2021-03-11

## 2021-03-11 ENCOUNTER — PATIENT MESSAGE (OUTPATIENT)
Dept: FAMILY MEDICINE | Facility: CLINIC | Age: 61
End: 2021-03-11

## 2021-03-11 LAB — H PYLORI AG STL QL IA: NOT DETECTED

## 2021-03-17 ENCOUNTER — DOCUMENT SCAN (OUTPATIENT)
Dept: HOME HEALTH SERVICES | Facility: HOSPITAL | Age: 61
End: 2021-03-17
Payer: MEDICARE

## 2021-03-17 DIAGNOSIS — D72.819 LEUKOPENIA, UNSPECIFIED TYPE: ICD-10-CM

## 2021-03-17 DIAGNOSIS — D69.6 THROMBOCYTOPENIA: Primary | ICD-10-CM

## 2021-03-19 ENCOUNTER — HOSPITAL ENCOUNTER (EMERGENCY)
Facility: HOSPITAL | Age: 61
Discharge: HOME | End: 2021-04-04
Attending: FAMILY MEDICINE
Payer: MEDICARE

## 2021-03-19 DIAGNOSIS — F23 ACUTE PSYCHOSIS: Primary | ICD-10-CM

## 2021-03-19 DIAGNOSIS — F99 PSYCHIATRIC DISORDER: ICD-10-CM

## 2021-03-19 DIAGNOSIS — Z00.8 MEDICAL CLEARANCE FOR PSYCHIATRIC ADMISSION: ICD-10-CM

## 2021-03-19 DIAGNOSIS — R46.89 AGGRESSIVE BEHAVIOR: ICD-10-CM

## 2021-03-19 DIAGNOSIS — F25.9 SCHIZOAFFECTIVE DISORDER, UNSPECIFIED TYPE: ICD-10-CM

## 2021-03-19 LAB
ALBUMIN SERPL BCP-MCNC: 3.1 G/DL (ref 3.5–5.2)
ALP SERPL-CCNC: 69 U/L (ref 55–135)
ALT SERPL W/O P-5'-P-CCNC: 8 U/L (ref 10–44)
ANION GAP SERPL CALC-SCNC: 7 MMOL/L (ref 8–16)
APAP SERPL-MCNC: <3 UG/ML (ref 10–20)
AST SERPL-CCNC: 15 U/L (ref 10–40)
BASOPHILS NFR BLD: 0 % (ref 0–1.9)
BILIRUB SERPL-MCNC: 0.2 MG/DL (ref 0.1–1)
BUN SERPL-MCNC: 11 MG/DL (ref 8–23)
CALCIUM SERPL-MCNC: 9 MG/DL (ref 8.7–10.5)
CHLORIDE SERPL-SCNC: 105 MMOL/L (ref 95–110)
CO2 SERPL-SCNC: 30 MMOL/L (ref 23–29)
CREAT SERPL-MCNC: 0.8 MG/DL (ref 0.5–1.4)
DIFFERENTIAL METHOD: ABNORMAL
EOSINOPHIL NFR BLD: 5 % (ref 0–8)
ERYTHROCYTE [DISTWIDTH] IN BLOOD BY AUTOMATED COUNT: 13.5 % (ref 11.5–14.5)
EST. GFR  (AFRICAN AMERICAN): >60 ML/MIN/1.73 M^2
EST. GFR  (NON AFRICAN AMERICAN): >60 ML/MIN/1.73 M^2
ETHANOL SERPL-MCNC: <10 MG/DL
GLUCOSE SERPL-MCNC: 123 MG/DL (ref 70–110)
HCT VFR BLD AUTO: 34.6 % (ref 37–48.5)
HGB BLD-MCNC: 11.4 G/DL (ref 12–16)
IMM GRANULOCYTES # BLD AUTO: ABNORMAL K/UL (ref 0–0.04)
IMM GRANULOCYTES NFR BLD AUTO: ABNORMAL % (ref 0–0.5)
LYMPHOCYTES NFR BLD: 56 % (ref 18–48)
MCH RBC QN AUTO: 29.8 PG (ref 27–31)
MCHC RBC AUTO-ENTMCNC: 32.9 G/DL (ref 32–36)
MCV RBC AUTO: 90 FL (ref 82–98)
MONOCYTES NFR BLD: 11 % (ref 4–15)
NEUTROPHILS NFR BLD: 26 % (ref 38–73)
NEUTS BAND NFR BLD MANUAL: 2 %
NRBC BLD-RTO: 0 /100 WBC
PLATELET # BLD AUTO: 132 K/UL (ref 150–350)
PMV BLD AUTO: 10.2 FL (ref 9.2–12.9)
POTASSIUM SERPL-SCNC: 3.5 MMOL/L (ref 3.5–5.1)
PROT SERPL-MCNC: 5.7 G/DL (ref 6–8.4)
RBC # BLD AUTO: 3.83 M/UL (ref 4–5.4)
SODIUM SERPL-SCNC: 142 MMOL/L (ref 136–145)
T4 FREE SERPL-MCNC: 0.82 NG/DL (ref 0.71–1.51)
TSH SERPL DL<=0.005 MIU/L-ACNC: 0.27 UIU/ML (ref 0.4–4)
WBC # BLD AUTO: 1.63 K/UL (ref 3.9–12.7)

## 2021-03-19 PROCEDURE — 96372 THER/PROPH/DIAG INJ SC/IM: CPT | Mod: 59

## 2021-03-19 PROCEDURE — 82077 ASSAY SPEC XCP UR&BREATH IA: CPT | Performed by: FAMILY MEDICINE

## 2021-03-19 PROCEDURE — 80143 DRUG ASSAY ACETAMINOPHEN: CPT | Performed by: FAMILY MEDICINE

## 2021-03-19 PROCEDURE — 85007 BL SMEAR W/DIFF WBC COUNT: CPT | Performed by: FAMILY MEDICINE

## 2021-03-19 PROCEDURE — 85027 COMPLETE CBC AUTOMATED: CPT | Performed by: FAMILY MEDICINE

## 2021-03-19 PROCEDURE — 63600175 PHARM REV CODE 636 W HCPCS: Performed by: FAMILY MEDICINE

## 2021-03-19 PROCEDURE — 99291 CRITICAL CARE FIRST HOUR: CPT | Mod: 25

## 2021-03-19 PROCEDURE — 84439 ASSAY OF FREE THYROXINE: CPT | Performed by: FAMILY MEDICINE

## 2021-03-19 PROCEDURE — 84443 ASSAY THYROID STIM HORMONE: CPT | Performed by: FAMILY MEDICINE

## 2021-03-19 PROCEDURE — 80053 COMPREHEN METABOLIC PANEL: CPT | Performed by: FAMILY MEDICINE

## 2021-03-19 PROCEDURE — 82962 GLUCOSE BLOOD TEST: CPT

## 2021-03-19 RX ORDER — FOLIC ACID 1 MG/1
0.5 TABLET ORAL 2 TIMES DAILY
COMMUNITY
End: 2021-04-23

## 2021-03-19 RX ORDER — ZIPRASIDONE MESYLATE 20 MG/ML
20 INJECTION, POWDER, LYOPHILIZED, FOR SOLUTION INTRAMUSCULAR
Status: COMPLETED | OUTPATIENT
Start: 2021-03-19 | End: 2021-03-19

## 2021-03-19 RX ORDER — LORAZEPAM 2 MG/ML
1 INJECTION INTRAMUSCULAR
Status: COMPLETED | OUTPATIENT
Start: 2021-03-19 | End: 2021-03-19

## 2021-03-19 RX ORDER — MAGNESIUM GLUCONATE 27 MG(500)
10 TABLET ORAL NIGHTLY
COMMUNITY
End: 2021-04-23

## 2021-03-19 RX ORDER — DIPHENHYDRAMINE HYDROCHLORIDE 50 MG/ML
50 INJECTION INTRAMUSCULAR; INTRAVENOUS
Status: COMPLETED | OUTPATIENT
Start: 2021-03-19 | End: 2021-03-19

## 2021-03-19 RX ADMIN — DIPHENHYDRAMINE HYDROCHLORIDE 50 MG: 50 INJECTION, SOLUTION INTRAMUSCULAR; INTRAVENOUS at 07:03

## 2021-03-19 RX ADMIN — ZIPRASIDONE MESYLATE 20 MG: 20 INJECTION, POWDER, LYOPHILIZED, FOR SOLUTION INTRAMUSCULAR at 06:03

## 2021-03-19 RX ADMIN — LORAZEPAM 1 MG: 2 INJECTION INTRAMUSCULAR; INTRAVENOUS at 07:03

## 2021-03-20 LAB
AMPHET+METHAMPHET UR QL: NEGATIVE
BARBITURATES UR QL SCN>200 NG/ML: NEGATIVE
BENZODIAZ UR QL SCN>200 NG/ML: NORMAL
BILIRUB UR QL STRIP: NEGATIVE
BZE UR QL SCN: NEGATIVE
CANNABINOIDS UR QL SCN: NEGATIVE
CLARITY UR: CLEAR
COLOR UR: YELLOW
CREAT UR-MCNC: 39.6 MG/DL (ref 15–325)
CTP QC/QA: YES
GLUCOSE UR QL STRIP: NEGATIVE
HGB UR QL STRIP: ABNORMAL
KETONES UR QL STRIP: NEGATIVE
LEUKOCYTE ESTERASE UR QL STRIP: NEGATIVE
METHADONE UR QL SCN>300 NG/ML: NEGATIVE
NITRITE UR QL STRIP: NEGATIVE
OPIATES UR QL SCN: NEGATIVE
PCP UR QL SCN>25 NG/ML: NEGATIVE
PH UR STRIP: 7 [PH] (ref 5–8)
PROT UR QL STRIP: NEGATIVE
SARS-COV-2 RDRP RESP QL NAA+PROBE: NEGATIVE
SP GR UR STRIP: 1.01 (ref 1–1.03)
TOXICOLOGY INFORMATION: NORMAL
URN SPEC COLLECT METH UR: ABNORMAL
UROBILINOGEN UR STRIP-ACNC: NEGATIVE EU/DL

## 2021-03-20 PROCEDURE — 99214 OFFICE O/P EST MOD 30 MIN: CPT | Mod: 95,,, | Performed by: PSYCHIATRY & NEUROLOGY

## 2021-03-20 PROCEDURE — 80307 DRUG TEST PRSMV CHEM ANLYZR: CPT | Performed by: FAMILY MEDICINE

## 2021-03-20 PROCEDURE — 63600175 PHARM REV CODE 636 W HCPCS: Performed by: EMERGENCY MEDICINE

## 2021-03-20 PROCEDURE — 99214 PR OFFICE/OUTPT VISIT, EST, LEVL IV, 30-39 MIN: ICD-10-PCS | Mod: 95,,, | Performed by: PSYCHIATRY & NEUROLOGY

## 2021-03-20 PROCEDURE — 25000003 PHARM REV CODE 250: Performed by: EMERGENCY MEDICINE

## 2021-03-20 PROCEDURE — 63600175 PHARM REV CODE 636 W HCPCS: Performed by: FAMILY MEDICINE

## 2021-03-20 PROCEDURE — 81003 URINALYSIS AUTO W/O SCOPE: CPT | Mod: 59 | Performed by: FAMILY MEDICINE

## 2021-03-20 PROCEDURE — U0002 COVID-19 LAB TEST NON-CDC: HCPCS | Performed by: EMERGENCY MEDICINE

## 2021-03-20 RX ORDER — ZIPRASIDONE MESYLATE 20 MG/ML
20 INJECTION, POWDER, LYOPHILIZED, FOR SOLUTION INTRAMUSCULAR
Status: COMPLETED | OUTPATIENT
Start: 2021-03-20 | End: 2021-03-20

## 2021-03-20 RX ORDER — DIPHENHYDRAMINE HYDROCHLORIDE 50 MG/ML
25 INJECTION INTRAMUSCULAR; INTRAVENOUS
Status: COMPLETED | OUTPATIENT
Start: 2021-03-20 | End: 2021-03-20

## 2021-03-20 RX ORDER — ATORVASTATIN CALCIUM 20 MG/1
20 TABLET, FILM COATED ORAL NIGHTLY
COMMUNITY
Start: 2021-03-18 | End: 2021-03-26

## 2021-03-20 RX ORDER — HALOPERIDOL 5 MG/ML
10 INJECTION INTRAMUSCULAR
Status: COMPLETED | OUTPATIENT
Start: 2021-03-20 | End: 2021-03-20

## 2021-03-20 RX ORDER — ZIPRASIDONE MESYLATE 20 MG/ML
20 INJECTION, POWDER, LYOPHILIZED, FOR SOLUTION INTRAMUSCULAR
Status: DISCONTINUED | OUTPATIENT
Start: 2021-03-20 | End: 2021-03-20

## 2021-03-20 RX ORDER — LORAZEPAM 2 MG/ML
2 INJECTION INTRAMUSCULAR
Status: COMPLETED | OUTPATIENT
Start: 2021-03-20 | End: 2021-03-20

## 2021-03-20 RX ORDER — HALOPERIDOL 5 MG/ML
10 INJECTION INTRAMUSCULAR
Status: DISCONTINUED | OUTPATIENT
Start: 2021-03-20 | End: 2021-03-20

## 2021-03-20 RX ORDER — LORAZEPAM 1 MG/1
2 TABLET ORAL
Status: COMPLETED | OUTPATIENT
Start: 2021-03-20 | End: 2021-03-20

## 2021-03-20 RX ADMIN — DIPHENHYDRAMINE HYDROCHLORIDE 25 MG: 50 INJECTION, SOLUTION INTRAMUSCULAR; INTRAVENOUS at 08:03

## 2021-03-20 RX ADMIN — LORAZEPAM 2 MG: 1 TABLET ORAL at 10:03

## 2021-03-20 RX ADMIN — DIPHENHYDRAMINE HYDROCHLORIDE 25 MG: 50 INJECTION, SOLUTION INTRAMUSCULAR; INTRAVENOUS at 02:03

## 2021-03-20 RX ADMIN — ZIPRASIDONE MESYLATE 20 MG: 20 INJECTION, POWDER, LYOPHILIZED, FOR SOLUTION INTRAMUSCULAR at 01:03

## 2021-03-20 RX ADMIN — ZIPRASIDONE MESYLATE 20 MG: 20 INJECTION, POWDER, LYOPHILIZED, FOR SOLUTION INTRAMUSCULAR at 02:03

## 2021-03-20 RX ADMIN — ZIPRASIDONE MESYLATE 20 MG: 20 INJECTION, POWDER, LYOPHILIZED, FOR SOLUTION INTRAMUSCULAR at 09:03

## 2021-03-20 RX ADMIN — LORAZEPAM 2 MG: 2 INJECTION INTRAMUSCULAR; INTRAVENOUS at 08:03

## 2021-03-20 RX ADMIN — ZIPRASIDONE MESYLATE 20 MG: 20 INJECTION, POWDER, LYOPHILIZED, FOR SOLUTION INTRAMUSCULAR at 06:03

## 2021-03-20 RX ADMIN — HALOPERIDOL LACTATE 10 MG: 5 INJECTION, SOLUTION INTRAMUSCULAR at 08:03

## 2021-03-21 LAB — POCT GLUCOSE: 137 MG/DL (ref 70–110)

## 2021-03-21 PROCEDURE — 63600175 PHARM REV CODE 636 W HCPCS: Performed by: EMERGENCY MEDICINE

## 2021-03-21 PROCEDURE — 25000003 PHARM REV CODE 250: Performed by: EMERGENCY MEDICINE

## 2021-03-21 RX ORDER — DIAZEPAM 5 MG/1
5 TABLET ORAL EVERY 6 HOURS PRN
Status: DISCONTINUED | OUTPATIENT
Start: 2021-03-21 | End: 2021-04-04 | Stop reason: HOSPADM

## 2021-03-21 RX ORDER — DIVALPROEX SODIUM 500 MG/1
500 TABLET, DELAYED RELEASE ORAL 2 TIMES DAILY
Status: DISCONTINUED | OUTPATIENT
Start: 2021-03-21 | End: 2021-03-21

## 2021-03-21 RX ORDER — DIVALPROEX SODIUM 250 MG/1
250 TABLET, DELAYED RELEASE ORAL 3 TIMES DAILY
Status: DISCONTINUED | OUTPATIENT
Start: 2021-03-21 | End: 2021-03-21

## 2021-03-21 RX ORDER — TALC
9 POWDER (GRAM) TOPICAL NIGHTLY
Status: DISCONTINUED | OUTPATIENT
Start: 2021-03-21 | End: 2021-03-23

## 2021-03-21 RX ORDER — QUETIAPINE FUMARATE 100 MG/1
400 TABLET, FILM COATED ORAL NIGHTLY
Status: DISCONTINUED | OUTPATIENT
Start: 2021-03-21 | End: 2021-04-02

## 2021-03-21 RX ORDER — MIRTAZAPINE 15 MG/1
30 TABLET, FILM COATED ORAL NIGHTLY
Status: DISCONTINUED | OUTPATIENT
Start: 2021-03-21 | End: 2021-03-23

## 2021-03-21 RX ORDER — ZIPRASIDONE MESYLATE 20 MG/ML
20 INJECTION, POWDER, LYOPHILIZED, FOR SOLUTION INTRAMUSCULAR
Status: COMPLETED | OUTPATIENT
Start: 2021-03-21 | End: 2021-03-21

## 2021-03-21 RX ORDER — QUETIAPINE FUMARATE 100 MG/1
200 TABLET, FILM COATED ORAL DAILY
Status: DISCONTINUED | OUTPATIENT
Start: 2021-03-21 | End: 2021-04-04 | Stop reason: HOSPADM

## 2021-03-21 RX ORDER — DIAZEPAM 5 MG/1
5 TABLET ORAL
Status: COMPLETED | OUTPATIENT
Start: 2021-03-21 | End: 2021-03-21

## 2021-03-21 RX ORDER — DIVALPROEX SODIUM 500 MG/1
500 TABLET, DELAYED RELEASE ORAL 2 TIMES DAILY
Status: DISCONTINUED | OUTPATIENT
Start: 2021-03-21 | End: 2021-03-29

## 2021-03-21 RX ADMIN — QUETIAPINE 400 MG: 100 TABLET ORAL at 08:03

## 2021-03-21 RX ADMIN — ZIPRASIDONE MESYLATE 20 MG: 20 INJECTION, POWDER, LYOPHILIZED, FOR SOLUTION INTRAMUSCULAR at 06:03

## 2021-03-21 RX ADMIN — DIAZEPAM 5 MG: 5 TABLET ORAL at 11:03

## 2021-03-21 RX ADMIN — DIVALPROEX SODIUM 500 MG: 500 TABLET, DELAYED RELEASE ORAL at 02:03

## 2021-03-21 RX ADMIN — Medication 9 MG: at 08:03

## 2021-03-21 RX ADMIN — MIRTAZAPINE 30 MG: 15 TABLET, FILM COATED ORAL at 08:03

## 2021-03-21 RX ADMIN — DIVALPROEX SODIUM 500 MG: 500 TABLET, DELAYED RELEASE ORAL at 08:03

## 2021-03-21 RX ADMIN — QUETIAPINE 200 MG: 100 TABLET ORAL at 02:03

## 2021-03-21 RX ADMIN — DIAZEPAM 5 MG: 5 TABLET ORAL at 05:03

## 2021-03-22 PROCEDURE — 25000003 PHARM REV CODE 250: Performed by: EMERGENCY MEDICINE

## 2021-03-22 PROCEDURE — 63600175 PHARM REV CODE 636 W HCPCS: Performed by: EMERGENCY MEDICINE

## 2021-03-22 PROCEDURE — G0408 PR TELHEALTH INPT CONSULT 35MIN: ICD-10-PCS | Mod: 95,,, | Performed by: PSYCHIATRY & NEUROLOGY

## 2021-03-22 PROCEDURE — G0408 INPT/TELE FOLLOW UP 35: HCPCS | Mod: 95,,, | Performed by: PSYCHIATRY & NEUROLOGY

## 2021-03-22 RX ORDER — ZIPRASIDONE MESYLATE 20 MG/ML
20 INJECTION, POWDER, LYOPHILIZED, FOR SOLUTION INTRAMUSCULAR
Status: COMPLETED | OUTPATIENT
Start: 2021-03-22 | End: 2021-03-22

## 2021-03-22 RX ADMIN — Medication 9 MG: at 09:03

## 2021-03-22 RX ADMIN — QUETIAPINE 200 MG: 100 TABLET ORAL at 09:03

## 2021-03-22 RX ADMIN — DIVALPROEX SODIUM 500 MG: 500 TABLET, DELAYED RELEASE ORAL at 09:03

## 2021-03-22 RX ADMIN — QUETIAPINE 400 MG: 100 TABLET ORAL at 09:03

## 2021-03-22 RX ADMIN — ZIPRASIDONE MESYLATE 20 MG: 20 INJECTION, POWDER, LYOPHILIZED, FOR SOLUTION INTRAMUSCULAR at 10:03

## 2021-03-22 RX ADMIN — MIRTAZAPINE 30 MG: 15 TABLET, FILM COATED ORAL at 09:03

## 2021-03-23 PROCEDURE — 25000003 PHARM REV CODE 250: Performed by: EMERGENCY MEDICINE

## 2021-03-23 PROCEDURE — 93005 ELECTROCARDIOGRAM TRACING: CPT

## 2021-03-23 PROCEDURE — 63600175 PHARM REV CODE 636 W HCPCS: Performed by: EMERGENCY MEDICINE

## 2021-03-23 PROCEDURE — 93010 EKG 12-LEAD: ICD-10-PCS | Mod: ,,, | Performed by: INTERNAL MEDICINE

## 2021-03-23 PROCEDURE — 93010 ELECTROCARDIOGRAM REPORT: CPT | Mod: ,,, | Performed by: INTERNAL MEDICINE

## 2021-03-23 RX ORDER — TALC
9 POWDER (GRAM) TOPICAL NIGHTLY
Status: DISCONTINUED | OUTPATIENT
Start: 2021-03-23 | End: 2021-04-04 | Stop reason: HOSPADM

## 2021-03-23 RX ORDER — ZIPRASIDONE MESYLATE 20 MG/ML
20 INJECTION, POWDER, LYOPHILIZED, FOR SOLUTION INTRAMUSCULAR
Status: COMPLETED | OUTPATIENT
Start: 2021-03-23 | End: 2021-03-23

## 2021-03-23 RX ORDER — DIAZEPAM 5 MG/1
5 TABLET ORAL
Status: COMPLETED | OUTPATIENT
Start: 2021-03-23 | End: 2021-03-23

## 2021-03-23 RX ORDER — MIRTAZAPINE 15 MG/1
30 TABLET, FILM COATED ORAL NIGHTLY
Status: DISCONTINUED | OUTPATIENT
Start: 2021-03-23 | End: 2021-04-04 | Stop reason: HOSPADM

## 2021-03-23 RX ADMIN — DIVALPROEX SODIUM 500 MG: 500 TABLET, DELAYED RELEASE ORAL at 10:03

## 2021-03-23 RX ADMIN — DIAZEPAM 5 MG: 5 TABLET ORAL at 06:03

## 2021-03-23 RX ADMIN — ZIPRASIDONE MESYLATE 20 MG: 20 INJECTION, POWDER, LYOPHILIZED, FOR SOLUTION INTRAMUSCULAR at 11:03

## 2021-03-23 RX ADMIN — MIRTAZAPINE 30 MG: 15 TABLET, FILM COATED ORAL at 10:03

## 2021-03-23 RX ADMIN — DIAZEPAM 5 MG: 5 TABLET ORAL at 02:03

## 2021-03-23 RX ADMIN — QUETIAPINE 400 MG: 100 TABLET ORAL at 10:03

## 2021-03-23 RX ADMIN — Medication 9 MG: at 10:03

## 2021-03-23 RX ADMIN — DIVALPROEX SODIUM 500 MG: 500 TABLET, DELAYED RELEASE ORAL at 09:03

## 2021-03-23 RX ADMIN — QUETIAPINE 200 MG: 100 TABLET ORAL at 09:03

## 2021-03-24 PROCEDURE — 25000003 PHARM REV CODE 250: Performed by: EMERGENCY MEDICINE

## 2021-03-24 PROCEDURE — 63600175 PHARM REV CODE 636 W HCPCS: Performed by: EMERGENCY MEDICINE

## 2021-03-24 RX ORDER — LORAZEPAM 2 MG/ML
1.5 INJECTION INTRAMUSCULAR
Status: COMPLETED | OUTPATIENT
Start: 2021-03-24 | End: 2021-03-24

## 2021-03-24 RX ORDER — DIAZEPAM 5 MG/1
5 TABLET ORAL
Status: COMPLETED | OUTPATIENT
Start: 2021-03-24 | End: 2021-03-24

## 2021-03-24 RX ADMIN — DIVALPROEX SODIUM 500 MG: 500 TABLET, DELAYED RELEASE ORAL at 07:03

## 2021-03-24 RX ADMIN — QUETIAPINE 400 MG: 100 TABLET ORAL at 07:03

## 2021-03-24 RX ADMIN — DIAZEPAM 5 MG: 5 TABLET ORAL at 07:03

## 2021-03-24 RX ADMIN — DIVALPROEX SODIUM 500 MG: 500 TABLET, DELAYED RELEASE ORAL at 10:03

## 2021-03-24 RX ADMIN — LORAZEPAM 1.5 MG: 2 INJECTION INTRAMUSCULAR; INTRAVENOUS at 08:03

## 2021-03-24 RX ADMIN — MIRTAZAPINE 30 MG: 15 TABLET, FILM COATED ORAL at 07:03

## 2021-03-24 RX ADMIN — Medication 9 MG: at 07:03

## 2021-03-24 RX ADMIN — DIAZEPAM 5 MG: 5 TABLET ORAL at 11:03

## 2021-03-24 RX ADMIN — QUETIAPINE 200 MG: 100 TABLET ORAL at 10:03

## 2021-03-25 PROCEDURE — 63600175 PHARM REV CODE 636 W HCPCS: Performed by: EMERGENCY MEDICINE

## 2021-03-25 PROCEDURE — 25000003 PHARM REV CODE 250: Performed by: EMERGENCY MEDICINE

## 2021-03-25 RX ORDER — QUETIAPINE FUMARATE 100 MG/1
200 TABLET, FILM COATED ORAL ONCE
Status: COMPLETED | OUTPATIENT
Start: 2021-03-25 | End: 2021-03-25

## 2021-03-25 RX ORDER — HALOPERIDOL 5 MG/ML
10 INJECTION INTRAMUSCULAR
Status: COMPLETED | OUTPATIENT
Start: 2021-03-25 | End: 2021-03-25

## 2021-03-25 RX ORDER — DIVALPROEX SODIUM 500 MG/1
500 TABLET, DELAYED RELEASE ORAL
Status: COMPLETED | OUTPATIENT
Start: 2021-03-25 | End: 2021-03-25

## 2021-03-25 RX ORDER — DIPHENHYDRAMINE HYDROCHLORIDE 50 MG/ML
25 INJECTION INTRAMUSCULAR; INTRAVENOUS
Status: COMPLETED | OUTPATIENT
Start: 2021-03-25 | End: 2021-03-25

## 2021-03-25 RX ADMIN — QUETIAPINE 200 MG: 100 TABLET ORAL at 10:03

## 2021-03-25 RX ADMIN — QUETIAPINE 400 MG: 100 TABLET ORAL at 10:03

## 2021-03-25 RX ADMIN — Medication 9 MG: at 08:03

## 2021-03-25 RX ADMIN — MIRTAZAPINE 30 MG: 15 TABLET, FILM COATED ORAL at 08:03

## 2021-03-25 RX ADMIN — DIPHENHYDRAMINE HYDROCHLORIDE 25 MG: 50 INJECTION, SOLUTION INTRAMUSCULAR; INTRAVENOUS at 01:03

## 2021-03-25 RX ADMIN — DIVALPROEX SODIUM 500 MG: 500 TABLET, DELAYED RELEASE ORAL at 10:03

## 2021-03-25 RX ADMIN — DIVALPROEX SODIUM 500 MG: 500 TABLET, DELAYED RELEASE ORAL at 08:03

## 2021-03-25 RX ADMIN — DIAZEPAM 5 MG: 5 TABLET ORAL at 04:03

## 2021-03-25 RX ADMIN — HALOPERIDOL LACTATE 10 MG: 5 INJECTION, SOLUTION INTRAMUSCULAR at 01:03

## 2021-03-26 PROCEDURE — 25000003 PHARM REV CODE 250: Performed by: EMERGENCY MEDICINE

## 2021-03-26 PROCEDURE — 63600175 PHARM REV CODE 636 W HCPCS: Performed by: EMERGENCY MEDICINE

## 2021-03-26 PROCEDURE — G0425 INPT/ED TELECONSULT30: HCPCS | Mod: 95,,, | Performed by: NURSE PRACTITIONER

## 2021-03-26 PROCEDURE — G0425 PR INPT TELEHEALTH CONSULT 30M: ICD-10-PCS | Mod: 95,,, | Performed by: NURSE PRACTITIONER

## 2021-03-26 RX ORDER — DIPHENHYDRAMINE HYDROCHLORIDE 50 MG/ML
25 INJECTION INTRAMUSCULAR; INTRAVENOUS
Status: COMPLETED | OUTPATIENT
Start: 2021-03-26 | End: 2021-03-26

## 2021-03-26 RX ORDER — HALOPERIDOL 5 MG/ML
5 INJECTION INTRAMUSCULAR
Status: COMPLETED | OUTPATIENT
Start: 2021-03-26 | End: 2021-03-26

## 2021-03-26 RX ADMIN — MIRTAZAPINE 30 MG: 15 TABLET, FILM COATED ORAL at 10:03

## 2021-03-26 RX ADMIN — DIVALPROEX SODIUM 500 MG: 500 TABLET, DELAYED RELEASE ORAL at 10:03

## 2021-03-26 RX ADMIN — DIAZEPAM 5 MG: 5 TABLET ORAL at 11:03

## 2021-03-26 RX ADMIN — HALOPERIDOL LACTATE 5 MG: 5 INJECTION, SOLUTION INTRAMUSCULAR at 03:03

## 2021-03-26 RX ADMIN — QUETIAPINE 200 MG: 100 TABLET ORAL at 10:03

## 2021-03-26 RX ADMIN — DIPHENHYDRAMINE HYDROCHLORIDE 25 MG: 50 INJECTION, SOLUTION INTRAMUSCULAR; INTRAVENOUS at 03:03

## 2021-03-26 RX ADMIN — Medication 9 MG: at 10:03

## 2021-03-26 RX ADMIN — QUETIAPINE 400 MG: 100 TABLET ORAL at 10:03

## 2021-03-27 PROCEDURE — 63600175 PHARM REV CODE 636 W HCPCS: Performed by: EMERGENCY MEDICINE

## 2021-03-27 PROCEDURE — 25000003 PHARM REV CODE 250: Performed by: EMERGENCY MEDICINE

## 2021-03-27 RX ORDER — ZIPRASIDONE MESYLATE 20 MG/ML
20 INJECTION, POWDER, LYOPHILIZED, FOR SOLUTION INTRAMUSCULAR
Status: COMPLETED | OUTPATIENT
Start: 2021-03-27 | End: 2021-03-27

## 2021-03-27 RX ADMIN — DIVALPROEX SODIUM 500 MG: 500 TABLET, DELAYED RELEASE ORAL at 11:03

## 2021-03-27 RX ADMIN — ZIPRASIDONE MESYLATE 20 MG: 20 INJECTION, POWDER, LYOPHILIZED, FOR SOLUTION INTRAMUSCULAR at 06:03

## 2021-03-27 RX ADMIN — DIAZEPAM 5 MG: 5 TABLET ORAL at 05:03

## 2021-03-27 RX ADMIN — MIRTAZAPINE 30 MG: 15 TABLET, FILM COATED ORAL at 08:03

## 2021-03-27 RX ADMIN — QUETIAPINE 200 MG: 100 TABLET ORAL at 11:03

## 2021-03-27 RX ADMIN — DIVALPROEX SODIUM 500 MG: 500 TABLET, DELAYED RELEASE ORAL at 08:03

## 2021-03-28 PROCEDURE — 25000003 PHARM REV CODE 250: Performed by: EMERGENCY MEDICINE

## 2021-03-28 PROCEDURE — 99214 OFFICE O/P EST MOD 30 MIN: CPT | Mod: 95,,, | Performed by: PSYCHIATRY & NEUROLOGY

## 2021-03-28 PROCEDURE — 99214 PR OFFICE/OUTPT VISIT, EST, LEVL IV, 30-39 MIN: ICD-10-PCS | Mod: 95,,, | Performed by: PSYCHIATRY & NEUROLOGY

## 2021-03-28 RX ADMIN — DIVALPROEX SODIUM 500 MG: 500 TABLET, DELAYED RELEASE ORAL at 11:03

## 2021-03-28 RX ADMIN — MIRTAZAPINE 30 MG: 15 TABLET, FILM COATED ORAL at 09:03

## 2021-03-28 RX ADMIN — QUETIAPINE 400 MG: 100 TABLET ORAL at 09:03

## 2021-03-28 RX ADMIN — DIVALPROEX SODIUM 500 MG: 500 TABLET, DELAYED RELEASE ORAL at 09:03

## 2021-03-28 RX ADMIN — QUETIAPINE 200 MG: 100 TABLET ORAL at 11:03

## 2021-03-28 RX ADMIN — Medication 9 MG: at 09:03

## 2021-03-29 LAB
VALPROATE SERPL-MCNC: 113.3 UG/ML (ref 50–100)
VALPROATE SERPL-MCNC: 94.5 UG/ML (ref 50–100)

## 2021-03-29 PROCEDURE — 80164 ASSAY DIPROPYLACETIC ACD TOT: CPT | Performed by: EMERGENCY MEDICINE

## 2021-03-29 PROCEDURE — 25000003 PHARM REV CODE 250: Performed by: EMERGENCY MEDICINE

## 2021-03-29 PROCEDURE — S0166 INJ OLANZAPINE 2.5MG: HCPCS | Performed by: EMERGENCY MEDICINE

## 2021-03-29 PROCEDURE — 80164 ASSAY DIPROPYLACETIC ACD TOT: CPT | Mod: 91 | Performed by: EMERGENCY MEDICINE

## 2021-03-29 PROCEDURE — 63600175 PHARM REV CODE 636 W HCPCS: Performed by: EMERGENCY MEDICINE

## 2021-03-29 RX ORDER — ZIPRASIDONE MESYLATE 20 MG/ML
20 INJECTION, POWDER, LYOPHILIZED, FOR SOLUTION INTRAMUSCULAR
Status: COMPLETED | OUTPATIENT
Start: 2021-03-29 | End: 2021-03-29

## 2021-03-29 RX ORDER — OLANZAPINE 10 MG/2ML
10 INJECTION, POWDER, FOR SOLUTION INTRAMUSCULAR ONCE AS NEEDED
Status: COMPLETED | OUTPATIENT
Start: 2021-03-29 | End: 2021-03-29

## 2021-03-29 RX ORDER — DIVALPROEX SODIUM 500 MG/1
500 TABLET, DELAYED RELEASE ORAL 3 TIMES DAILY
Status: DISCONTINUED | OUTPATIENT
Start: 2021-03-29 | End: 2021-03-29

## 2021-03-29 RX ADMIN — ZIPRASIDONE MESYLATE 20 MG: 20 INJECTION, POWDER, LYOPHILIZED, FOR SOLUTION INTRAMUSCULAR at 05:03

## 2021-03-29 RX ADMIN — QUETIAPINE 200 MG: 100 TABLET ORAL at 09:03

## 2021-03-29 RX ADMIN — MIRTAZAPINE 30 MG: 15 TABLET, FILM COATED ORAL at 11:03

## 2021-03-29 RX ADMIN — DIVALPROEX SODIUM 500 MG: 500 TABLET, DELAYED RELEASE ORAL at 09:03

## 2021-03-29 RX ADMIN — OLANZAPINE 10 MG: 10 INJECTION, POWDER, FOR SOLUTION INTRAMUSCULAR at 02:03

## 2021-03-29 RX ADMIN — DIVALPROEX SODIUM 500 MG: 500 TABLET, DELAYED RELEASE ORAL at 03:03

## 2021-03-29 RX ADMIN — DIAZEPAM 5 MG: 5 TABLET ORAL at 10:03

## 2021-03-30 ENCOUNTER — PATIENT MESSAGE (OUTPATIENT)
Dept: FAMILY MEDICINE | Facility: CLINIC | Age: 61
End: 2021-03-30

## 2021-03-30 LAB — VALPROATE SERPL-MCNC: 64.9 UG/ML (ref 50–100)

## 2021-03-30 PROCEDURE — 63600175 PHARM REV CODE 636 W HCPCS: Performed by: EMERGENCY MEDICINE

## 2021-03-30 PROCEDURE — 25000003 PHARM REV CODE 250: Performed by: EMERGENCY MEDICINE

## 2021-03-30 PROCEDURE — 80164 ASSAY DIPROPYLACETIC ACD TOT: CPT | Performed by: EMERGENCY MEDICINE

## 2021-03-30 RX ORDER — DIVALPROEX SODIUM 500 MG/1
500 TABLET, DELAYED RELEASE ORAL EVERY 12 HOURS
Status: DISCONTINUED | OUTPATIENT
Start: 2021-03-30 | End: 2021-04-04 | Stop reason: HOSPADM

## 2021-03-30 RX ORDER — ZIPRASIDONE MESYLATE 20 MG/ML
20 INJECTION, POWDER, LYOPHILIZED, FOR SOLUTION INTRAMUSCULAR EVERY 12 HOURS PRN
Status: DISCONTINUED | OUTPATIENT
Start: 2021-03-30 | End: 2021-03-30

## 2021-03-30 RX ORDER — DOCUSATE SODIUM 100 MG
CAPSULE ORAL
Qty: 60 CAPSULE | Refills: 0 | Status: SHIPPED | OUTPATIENT
Start: 2021-03-30 | End: 2021-07-28

## 2021-03-30 RX ORDER — HALOPERIDOL 5 MG/ML
5 INJECTION INTRAMUSCULAR
Status: COMPLETED | OUTPATIENT
Start: 2021-03-30 | End: 2021-03-30

## 2021-03-30 RX ORDER — IBUPROFEN 200 MG
TABLET ORAL
Qty: 120 TABLET | Refills: 0 | OUTPATIENT
Start: 2021-03-30

## 2021-03-30 RX ORDER — DIPHENHYDRAMINE HYDROCHLORIDE 50 MG/ML
25 INJECTION INTRAMUSCULAR; INTRAVENOUS
Status: COMPLETED | OUTPATIENT
Start: 2021-03-30 | End: 2021-03-30

## 2021-03-30 RX ORDER — LORAZEPAM 2 MG/ML
2 INJECTION INTRAMUSCULAR
Status: COMPLETED | OUTPATIENT
Start: 2021-03-30 | End: 2021-03-30

## 2021-03-30 RX ORDER — MUPIROCIN 20 MG/G
OINTMENT TOPICAL
Qty: 22 G | OUTPATIENT
Start: 2021-03-30

## 2021-03-30 RX ADMIN — MIRTAZAPINE 30 MG: 15 TABLET, FILM COATED ORAL at 09:03

## 2021-03-30 RX ADMIN — LORAZEPAM 2 MG: 2 INJECTION INTRAMUSCULAR; INTRAVENOUS at 12:03

## 2021-03-30 RX ADMIN — DIPHENHYDRAMINE HYDROCHLORIDE 25 MG: 50 INJECTION, SOLUTION INTRAMUSCULAR; INTRAVENOUS at 12:03

## 2021-03-30 RX ADMIN — DIVALPROEX SODIUM 500 MG: 500 TABLET, DELAYED RELEASE ORAL at 09:03

## 2021-03-30 RX ADMIN — HALOPERIDOL LACTATE 5 MG: 5 INJECTION, SOLUTION INTRAMUSCULAR at 12:03

## 2021-03-30 RX ADMIN — QUETIAPINE 400 MG: 100 TABLET ORAL at 09:03

## 2021-03-30 RX ADMIN — QUETIAPINE 200 MG: 100 TABLET ORAL at 11:03

## 2021-03-30 RX ADMIN — Medication 9 MG: at 09:03

## 2021-03-30 RX ADMIN — DIAZEPAM 5 MG: 5 TABLET ORAL at 11:03

## 2021-03-31 ENCOUNTER — TELEPHONE (OUTPATIENT)
Dept: FAMILY MEDICINE | Facility: CLINIC | Age: 61
End: 2021-03-31

## 2021-03-31 DIAGNOSIS — F84.9 PDD (PERVASIVE DEVELOPMENTAL DISORDER): Primary | ICD-10-CM

## 2021-03-31 DIAGNOSIS — F25.9 SCHIZOAFFECTIVE DISORDER, UNSPECIFIED TYPE: ICD-10-CM

## 2021-03-31 DIAGNOSIS — R45.1 RESTLESSNESS AND AGITATION: ICD-10-CM

## 2021-03-31 PROCEDURE — 25000003 PHARM REV CODE 250: Performed by: EMERGENCY MEDICINE

## 2021-03-31 PROCEDURE — 25000003 PHARM REV CODE 250: Performed by: FAMILY MEDICINE

## 2021-03-31 RX ORDER — FERROUS FUM/VIT C/B12-IF/FOLIC 110-0.5MG
CAPSULE ORAL
Qty: 60 CAPSULE | Refills: 11 | Status: SHIPPED | OUTPATIENT
Start: 2021-03-31 | End: 2021-04-09

## 2021-03-31 RX ORDER — QUETIAPINE FUMARATE 100 MG/1
200 TABLET, FILM COATED ORAL ONCE
Status: COMPLETED | OUTPATIENT
Start: 2021-03-31 | End: 2021-03-31

## 2021-03-31 RX ORDER — DIVALPROEX SODIUM 500 MG/1
500 TABLET, DELAYED RELEASE ORAL
Status: COMPLETED | OUTPATIENT
Start: 2021-03-31 | End: 2021-03-31

## 2021-03-31 RX ORDER — ALPRAZOLAM 0.5 MG/1
1 TABLET ORAL
Status: COMPLETED | OUTPATIENT
Start: 2021-03-31 | End: 2021-03-31

## 2021-03-31 RX ADMIN — QUETIAPINE 200 MG: 100 TABLET ORAL at 12:03

## 2021-03-31 RX ADMIN — DIAZEPAM 5 MG: 5 TABLET ORAL at 12:03

## 2021-03-31 RX ADMIN — MIRTAZAPINE 30 MG: 15 TABLET, FILM COATED ORAL at 09:03

## 2021-03-31 RX ADMIN — ALPRAZOLAM 1 MG: 0.5 TABLET ORAL at 06:03

## 2021-03-31 RX ADMIN — DIVALPROEX SODIUM 500 MG: 500 TABLET, DELAYED RELEASE ORAL at 09:03

## 2021-03-31 RX ADMIN — Medication 9 MG: at 09:03

## 2021-03-31 RX ADMIN — QUETIAPINE 400 MG: 100 TABLET ORAL at 09:03

## 2021-03-31 RX ADMIN — DIVALPROEX SODIUM 500 MG: 500 TABLET, DELAYED RELEASE ORAL at 12:03

## 2021-04-01 PROCEDURE — 25000003 PHARM REV CODE 250: Performed by: EMERGENCY MEDICINE

## 2021-04-01 RX ADMIN — DIVALPROEX SODIUM 500 MG: 500 TABLET, DELAYED RELEASE ORAL at 08:04

## 2021-04-01 RX ADMIN — QUETIAPINE 400 MG: 100 TABLET ORAL at 10:04

## 2021-04-01 RX ADMIN — DIVALPROEX SODIUM 500 MG: 500 TABLET, DELAYED RELEASE ORAL at 10:04

## 2021-04-01 RX ADMIN — MIRTAZAPINE 30 MG: 15 TABLET, FILM COATED ORAL at 10:04

## 2021-04-01 RX ADMIN — QUETIAPINE 200 MG: 100 TABLET ORAL at 08:04

## 2021-04-01 RX ADMIN — Medication 9 MG: at 10:04

## 2021-04-02 PROCEDURE — G0425 PR INPT TELEHEALTH CONSULT 30M: ICD-10-PCS | Mod: 95,,, | Performed by: PSYCHIATRY & NEUROLOGY

## 2021-04-02 PROCEDURE — 63600175 PHARM REV CODE 636 W HCPCS: Performed by: EMERGENCY MEDICINE

## 2021-04-02 PROCEDURE — 25000003 PHARM REV CODE 250: Performed by: EMERGENCY MEDICINE

## 2021-04-02 PROCEDURE — G0425 INPT/ED TELECONSULT30: HCPCS | Mod: 95,,, | Performed by: PSYCHIATRY & NEUROLOGY

## 2021-04-02 PROCEDURE — 93010 EKG 12-LEAD: ICD-10-PCS | Mod: ,,, | Performed by: INTERNAL MEDICINE

## 2021-04-02 PROCEDURE — 93005 ELECTROCARDIOGRAM TRACING: CPT

## 2021-04-02 PROCEDURE — 93010 ELECTROCARDIOGRAM REPORT: CPT | Mod: ,,, | Performed by: INTERNAL MEDICINE

## 2021-04-02 RX ORDER — QUETIAPINE FUMARATE 100 MG/1
200 TABLET, FILM COATED ORAL NIGHTLY
Status: DISCONTINUED | OUTPATIENT
Start: 2021-04-02 | End: 2021-04-04 | Stop reason: HOSPADM

## 2021-04-02 RX ORDER — HALOPERIDOL 5 MG/ML
5 INJECTION INTRAMUSCULAR
Status: COMPLETED | OUTPATIENT
Start: 2021-04-02 | End: 2021-04-02

## 2021-04-02 RX ORDER — ZIPRASIDONE MESYLATE 20 MG/ML
20 INJECTION, POWDER, LYOPHILIZED, FOR SOLUTION INTRAMUSCULAR
Status: COMPLETED | OUTPATIENT
Start: 2021-04-02 | End: 2021-04-02

## 2021-04-02 RX ORDER — CLONIDINE HYDROCHLORIDE 0.1 MG/1
0.1 TABLET ORAL 2 TIMES DAILY
Status: DISCONTINUED | OUTPATIENT
Start: 2021-04-02 | End: 2021-04-04 | Stop reason: HOSPADM

## 2021-04-02 RX ORDER — POLYETHYLENE GLYCOL 3350 17 G/17G
17 POWDER, FOR SOLUTION ORAL DAILY
Status: DISCONTINUED | OUTPATIENT
Start: 2021-04-03 | End: 2021-04-04 | Stop reason: HOSPADM

## 2021-04-02 RX ORDER — LORAZEPAM 2 MG/ML
2 INJECTION INTRAMUSCULAR
Status: COMPLETED | OUTPATIENT
Start: 2021-04-02 | End: 2021-04-02

## 2021-04-02 RX ADMIN — QUETIAPINE 200 MG: 100 TABLET ORAL at 09:04

## 2021-04-02 RX ADMIN — ZIPRASIDONE MESYLATE 20 MG: 20 INJECTION, POWDER, LYOPHILIZED, FOR SOLUTION INTRAMUSCULAR at 06:04

## 2021-04-02 RX ADMIN — MIRTAZAPINE 30 MG: 15 TABLET, FILM COATED ORAL at 09:04

## 2021-04-02 RX ADMIN — LORAZEPAM 2 MG: 2 INJECTION INTRAMUSCULAR; INTRAVENOUS at 06:04

## 2021-04-02 RX ADMIN — DIVALPROEX SODIUM 500 MG: 500 TABLET, DELAYED RELEASE ORAL at 09:04

## 2021-04-02 RX ADMIN — HALOPERIDOL LACTATE 5 MG: 5 INJECTION, SOLUTION INTRAMUSCULAR at 02:04

## 2021-04-02 RX ADMIN — DIAZEPAM 5 MG: 5 TABLET ORAL at 09:04

## 2021-04-02 RX ADMIN — Medication 9 MG: at 09:04

## 2021-04-03 PROCEDURE — 25000003 PHARM REV CODE 250: Performed by: EMERGENCY MEDICINE

## 2021-04-03 RX ORDER — GLYCERIN 1 G/1
1 SUPPOSITORY RECTAL
Qty: 25 SUPPOSITORY | Refills: 0 | Status: SHIPPED | OUTPATIENT
Start: 2021-04-03 | End: 2023-05-07

## 2021-04-03 RX ORDER — CLONIDINE HYDROCHLORIDE 0.1 MG/1
0.1 TABLET ORAL 2 TIMES DAILY
Qty: 30 TABLET | Refills: 0 | Status: SHIPPED | OUTPATIENT
Start: 2021-04-03 | End: 2021-04-09

## 2021-04-03 RX ADMIN — CLONIDINE HYDROCHLORIDE 0.1 MG: 0.1 TABLET ORAL at 08:04

## 2021-04-03 RX ADMIN — DIVALPROEX SODIUM 500 MG: 500 TABLET, DELAYED RELEASE ORAL at 08:04

## 2021-04-03 RX ADMIN — QUETIAPINE 200 MG: 100 TABLET ORAL at 08:04

## 2021-04-03 RX ADMIN — DIAZEPAM 5 MG: 5 TABLET ORAL at 08:04

## 2021-04-03 RX ADMIN — POLYETHYLENE GLYCOL 3350 17 G: 17 POWDER, FOR SOLUTION ORAL at 08:04

## 2021-04-03 RX ADMIN — Medication 9 MG: at 08:04

## 2021-04-03 RX ADMIN — MIRTAZAPINE 30 MG: 15 TABLET, FILM COATED ORAL at 08:04

## 2021-04-04 VITALS
DIASTOLIC BLOOD PRESSURE: 89 MMHG | SYSTOLIC BLOOD PRESSURE: 130 MMHG | WEIGHT: 123 LBS | HEART RATE: 77 BPM | HEIGHT: 59 IN | BODY MASS INDEX: 24.8 KG/M2 | TEMPERATURE: 97 F | OXYGEN SATURATION: 97 % | RESPIRATION RATE: 16 BRPM

## 2021-04-04 PROCEDURE — 25000003 PHARM REV CODE 250: Performed by: EMERGENCY MEDICINE

## 2021-04-04 PROCEDURE — 25000003 PHARM REV CODE 250: Performed by: STUDENT IN AN ORGANIZED HEALTH CARE EDUCATION/TRAINING PROGRAM

## 2021-04-04 RX ORDER — POLYETHYLENE GLYCOL 3350 17 G/17G
17 POWDER, FOR SOLUTION ORAL
Status: COMPLETED | OUTPATIENT
Start: 2021-04-04 | End: 2021-04-04

## 2021-04-04 RX ADMIN — CLONIDINE HYDROCHLORIDE 0.1 MG: 0.1 TABLET ORAL at 10:04

## 2021-04-04 RX ADMIN — DIVALPROEX SODIUM 500 MG: 500 TABLET, DELAYED RELEASE ORAL at 10:04

## 2021-04-04 RX ADMIN — QUETIAPINE 200 MG: 100 TABLET ORAL at 10:04

## 2021-04-04 RX ADMIN — POLYETHYLENE GLYCOL 3350 17 G: 17 POWDER, FOR SOLUTION ORAL at 05:04

## 2021-04-05 ENCOUNTER — TELEPHONE (OUTPATIENT)
Dept: FAMILY MEDICINE | Facility: CLINIC | Age: 61
End: 2021-04-05

## 2021-04-06 ENCOUNTER — PATIENT MESSAGE (OUTPATIENT)
Dept: CARDIOLOGY | Facility: CLINIC | Age: 61
End: 2021-04-06

## 2021-04-06 ENCOUNTER — TELEPHONE (OUTPATIENT)
Dept: NEUROLOGY | Facility: CLINIC | Age: 61
End: 2021-04-06

## 2021-04-06 NOTE — TELEPHONE ENCOUNTER
----- Message from Meri Carbajal sent at 4/6/2021  4:38 PM CDT -----  Juan Pablo Decord sister Alanis calling regarding Patient Advice want to know pt can be seen sooner.  psychologist want her to be evaluate for kluver bucy syndrome. Call back [phone]

## 2021-04-07 ENCOUNTER — TELEPHONE (OUTPATIENT)
Dept: CARDIOLOGY | Facility: CLINIC | Age: 61
End: 2021-04-07

## 2021-04-07 ENCOUNTER — NURSE TRIAGE (OUTPATIENT)
Dept: ADMINISTRATIVE | Facility: CLINIC | Age: 61
End: 2021-04-07

## 2021-04-07 ENCOUNTER — TELEPHONE (OUTPATIENT)
Dept: NEUROLOGY | Facility: CLINIC | Age: 61
End: 2021-04-07

## 2021-04-08 ENCOUNTER — HOSPITAL ENCOUNTER (OUTPATIENT)
Dept: RADIOLOGY | Facility: HOSPITAL | Age: 61
Discharge: HOME | End: 2021-04-08
Attending: INTERNAL MEDICINE
Payer: MEDICARE

## 2021-04-08 ENCOUNTER — TELEPHONE (OUTPATIENT)
Dept: FAMILY MEDICINE | Facility: CLINIC | Age: 61
End: 2021-04-08

## 2021-04-08 DIAGNOSIS — K59.00 CONSTIPATION, UNSPECIFIED CONSTIPATION TYPE: Primary | ICD-10-CM

## 2021-04-08 DIAGNOSIS — K59.00 CONSTIPATION, UNSPECIFIED CONSTIPATION TYPE: ICD-10-CM

## 2021-04-08 PROCEDURE — 74018 RADEX ABDOMEN 1 VIEW: CPT | Mod: 26,,, | Performed by: RADIOLOGY

## 2021-04-08 PROCEDURE — 74018 XR KUB: ICD-10-PCS | Mod: 26,,, | Performed by: RADIOLOGY

## 2021-04-08 PROCEDURE — 74018 RADEX ABDOMEN 1 VIEW: CPT | Mod: TC

## 2021-04-09 ENCOUNTER — TELEPHONE (OUTPATIENT)
Dept: FAMILY MEDICINE | Facility: CLINIC | Age: 61
End: 2021-04-09

## 2021-04-09 ENCOUNTER — LAB VISIT (OUTPATIENT)
Dept: LAB | Facility: HOSPITAL | Age: 61
End: 2021-04-09
Attending: INTERNAL MEDICINE
Payer: MEDICARE

## 2021-04-09 ENCOUNTER — OFFICE VISIT (OUTPATIENT)
Dept: FAMILY MEDICINE | Facility: CLINIC | Age: 61
End: 2021-04-09
Payer: MEDICARE

## 2021-04-09 VITALS
BODY MASS INDEX: 30.64 KG/M2 | SYSTOLIC BLOOD PRESSURE: 127 MMHG | HEIGHT: 59 IN | WEIGHT: 152 LBS | TEMPERATURE: 98 F | DIASTOLIC BLOOD PRESSURE: 83 MMHG | HEART RATE: 84 BPM

## 2021-04-09 DIAGNOSIS — E03.8 CENTRAL HYPOTHYROIDISM: ICD-10-CM

## 2021-04-09 DIAGNOSIS — R79.89 ABNORMAL TSH: ICD-10-CM

## 2021-04-09 DIAGNOSIS — G40.909 SEIZURE DISORDER: ICD-10-CM

## 2021-04-09 DIAGNOSIS — F25.9 SCHIZOAFFECTIVE DISORDER, UNSPECIFIED TYPE: ICD-10-CM

## 2021-04-09 DIAGNOSIS — K59.00 CONSTIPATION, UNSPECIFIED CONSTIPATION TYPE: Primary | ICD-10-CM

## 2021-04-09 DIAGNOSIS — K59.00 CONSTIPATION, UNSPECIFIED CONSTIPATION TYPE: ICD-10-CM

## 2021-04-09 DIAGNOSIS — Z79.899 ENCOUNTER FOR LONG-TERM (CURRENT) USE OF HIGH-RISK MEDICATION: ICD-10-CM

## 2021-04-09 DIAGNOSIS — D72.819 LEUKOPENIA, UNSPECIFIED TYPE: ICD-10-CM

## 2021-04-09 DIAGNOSIS — R79.89 LOW TSH LEVEL: ICD-10-CM

## 2021-04-09 DIAGNOSIS — R53.81 DEBILITY: Primary | ICD-10-CM

## 2021-04-09 DIAGNOSIS — I10 ESSENTIAL HYPERTENSION: ICD-10-CM

## 2021-04-09 DIAGNOSIS — F84.9 PDD (PERVASIVE DEVELOPMENTAL DISORDER): ICD-10-CM

## 2021-04-09 DIAGNOSIS — R31.9 HEMATURIA, UNSPECIFIED TYPE: ICD-10-CM

## 2021-04-09 PROCEDURE — 85027 COMPLETE CBC AUTOMATED: CPT | Performed by: INTERNAL MEDICINE

## 2021-04-09 PROCEDURE — 99214 OFFICE O/P EST MOD 30 MIN: CPT | Mod: S$PBB,,, | Performed by: INTERNAL MEDICINE

## 2021-04-09 PROCEDURE — 84443 ASSAY THYROID STIM HORMONE: CPT | Performed by: INTERNAL MEDICINE

## 2021-04-09 PROCEDURE — 36415 COLL VENOUS BLD VENIPUNCTURE: CPT | Mod: PO | Performed by: INTERNAL MEDICINE

## 2021-04-09 PROCEDURE — 99214 OFFICE O/P EST MOD 30 MIN: CPT | Mod: PBBFAC,PO | Performed by: INTERNAL MEDICINE

## 2021-04-09 PROCEDURE — 80164 ASSAY DIPROPYLACETIC ACD TOT: CPT | Performed by: INTERNAL MEDICINE

## 2021-04-09 PROCEDURE — 80053 COMPREHEN METABOLIC PANEL: CPT | Performed by: INTERNAL MEDICINE

## 2021-04-09 PROCEDURE — 99999 PR PBB SHADOW E&M-EST. PATIENT-LVL IV: ICD-10-PCS | Mod: PBBFAC,,, | Performed by: INTERNAL MEDICINE

## 2021-04-09 PROCEDURE — 99214 PR OFFICE/OUTPT VISIT, EST, LEVL IV, 30-39 MIN: ICD-10-PCS | Mod: S$PBB,,, | Performed by: INTERNAL MEDICINE

## 2021-04-09 PROCEDURE — 99999 PR PBB SHADOW E&M-EST. PATIENT-LVL IV: CPT | Mod: PBBFAC,,, | Performed by: INTERNAL MEDICINE

## 2021-04-09 PROCEDURE — 85007 BL SMEAR W/DIFF WBC COUNT: CPT | Performed by: INTERNAL MEDICINE

## 2021-04-09 RX ORDER — MUPIROCIN 20 MG/G
OINTMENT TOPICAL
COMMUNITY
Start: 2021-03-30 | End: 2022-01-12 | Stop reason: SDUPTHER

## 2021-04-09 RX ORDER — IBUPROFEN 200 MG
200 TABLET ORAL EVERY 6 HOURS PRN
COMMUNITY
Start: 2021-03-30 | End: 2023-05-07 | Stop reason: ALTCHOICE

## 2021-04-09 RX ORDER — DEXTROMETHORPHAN POLISTIREX 30 MG/5 ML
1 SUSPENSION, EXTENDED RELEASE 12 HR ORAL ONCE
Qty: 133 ML | Refills: 0 | Status: SHIPPED | OUTPATIENT
Start: 2021-04-09 | End: 2021-04-09

## 2021-04-09 RX ORDER — LACTULOSE 10 G/15ML
10 SOLUTION ORAL EVERY 6 HOURS PRN
Qty: 120 ML | Refills: 1 | Status: SHIPPED | OUTPATIENT
Start: 2021-04-09 | End: 2021-04-19

## 2021-04-10 LAB
ALBUMIN SERPL BCP-MCNC: 3.4 G/DL (ref 3.5–5.2)
ALP SERPL-CCNC: 78 U/L (ref 55–135)
ALT SERPL W/O P-5'-P-CCNC: 9 U/L (ref 10–44)
ANION GAP SERPL CALC-SCNC: 8 MMOL/L (ref 8–16)
AST SERPL-CCNC: 15 U/L (ref 10–40)
BASOPHILS NFR BLD: 0 % (ref 0–1.9)
BILIRUB SERPL-MCNC: 0.3 MG/DL (ref 0.1–1)
BUN SERPL-MCNC: 18 MG/DL (ref 8–23)
CALCIUM SERPL-MCNC: 9.6 MG/DL (ref 8.7–10.5)
CHLORIDE SERPL-SCNC: 98 MMOL/L (ref 95–110)
CO2 SERPL-SCNC: 30 MMOL/L (ref 23–29)
CREAT SERPL-MCNC: 0.7 MG/DL (ref 0.5–1.4)
DIFFERENTIAL METHOD: ABNORMAL
EOSINOPHIL NFR BLD: 5 % (ref 0–8)
ERYTHROCYTE [DISTWIDTH] IN BLOOD BY AUTOMATED COUNT: 13.5 % (ref 11.5–14.5)
EST. GFR  (AFRICAN AMERICAN): >60 ML/MIN/1.73 M^2
EST. GFR  (NON AFRICAN AMERICAN): >60 ML/MIN/1.73 M^2
GLUCOSE SERPL-MCNC: 79 MG/DL (ref 70–110)
HCT VFR BLD AUTO: 41.1 % (ref 37–48.5)
HGB BLD-MCNC: 13 G/DL (ref 12–16)
IMM GRANULOCYTES # BLD AUTO: ABNORMAL K/UL (ref 0–0.04)
IMM GRANULOCYTES NFR BLD AUTO: ABNORMAL % (ref 0–0.5)
LYMPHOCYTES NFR BLD: 38 % (ref 18–48)
MCH RBC QN AUTO: 29.8 PG (ref 27–31)
MCHC RBC AUTO-ENTMCNC: 31.6 G/DL (ref 32–36)
MCV RBC AUTO: 94 FL (ref 82–98)
METAMYELOCYTES NFR BLD MANUAL: 1 %
MONOCYTES NFR BLD: 20 % (ref 4–15)
NEUTROPHILS NFR BLD: 35 % (ref 38–73)
NEUTS BAND NFR BLD MANUAL: 1 %
NRBC BLD-RTO: 0 /100 WBC
PLATELET # BLD AUTO: 138 K/UL (ref 150–450)
PLATELET BLD QL SMEAR: ABNORMAL
PMV BLD AUTO: 11.6 FL (ref 9.2–12.9)
POTASSIUM SERPL-SCNC: 4.5 MMOL/L (ref 3.5–5.1)
PROT SERPL-MCNC: 6.5 G/DL (ref 6–8.4)
RBC # BLD AUTO: 4.36 M/UL (ref 4–5.4)
SODIUM SERPL-SCNC: 136 MMOL/L (ref 136–145)
TSH SERPL DL<=0.005 MIU/L-ACNC: 1.18 UIU/ML (ref 0.4–4)
VALPROATE SERPL-MCNC: 90.8 UG/ML (ref 50–100)
WBC # BLD AUTO: 1.61 K/UL (ref 3.9–12.7)

## 2021-04-11 PROCEDURE — G0179 MD RECERTIFICATION HHA PT: HCPCS | Mod: ,,, | Performed by: INTERNAL MEDICINE

## 2021-04-11 PROCEDURE — G0179 PR HOME HEALTH MD RECERTIFICATION: ICD-10-PCS | Mod: ,,, | Performed by: INTERNAL MEDICINE

## 2021-04-12 ENCOUNTER — LAB VISIT (OUTPATIENT)
Dept: LAB | Facility: HOSPITAL | Age: 61
End: 2021-04-12
Attending: INTERNAL MEDICINE
Payer: MEDICARE

## 2021-04-12 ENCOUNTER — PATIENT OUTREACH (OUTPATIENT)
Dept: ADMINISTRATIVE | Facility: OTHER | Age: 61
End: 2021-04-12

## 2021-04-12 ENCOUNTER — TELEPHONE (OUTPATIENT)
Dept: FAMILY MEDICINE | Facility: CLINIC | Age: 61
End: 2021-04-12

## 2021-04-12 DIAGNOSIS — R31.9 HEMATURIA, UNSPECIFIED TYPE: ICD-10-CM

## 2021-04-12 DIAGNOSIS — R60.0 LOWER EXTREMITY EDEMA: Primary | ICD-10-CM

## 2021-04-12 LAB
BILIRUB UR QL STRIP: NEGATIVE
CLARITY UR: CLEAR
COLOR UR: YELLOW
GLUCOSE UR QL STRIP: NEGATIVE
HGB UR QL STRIP: NEGATIVE
KETONES UR QL STRIP: NEGATIVE
LEUKOCYTE ESTERASE UR QL STRIP: NEGATIVE
NITRITE UR QL STRIP: NEGATIVE
PH UR STRIP: 7 [PH] (ref 5–8)
PROT UR QL STRIP: NEGATIVE
SP GR UR STRIP: 1.01 (ref 1–1.03)
URN SPEC COLLECT METH UR: NORMAL

## 2021-04-12 PROCEDURE — 81003 URINALYSIS AUTO W/O SCOPE: CPT | Performed by: INTERNAL MEDICINE

## 2021-04-13 ENCOUNTER — TELEPHONE (OUTPATIENT)
Dept: GASTROENTEROLOGY | Facility: CLINIC | Age: 61
End: 2021-04-13

## 2021-04-13 ENCOUNTER — OFFICE VISIT (OUTPATIENT)
Dept: GASTROENTEROLOGY | Facility: CLINIC | Age: 61
End: 2021-04-13
Payer: MEDICARE

## 2021-04-13 VITALS
BODY MASS INDEX: 31.65 KG/M2 | WEIGHT: 157 LBS | SYSTOLIC BLOOD PRESSURE: 130 MMHG | HEIGHT: 59 IN | HEART RATE: 60 BPM | DIASTOLIC BLOOD PRESSURE: 60 MMHG

## 2021-04-13 DIAGNOSIS — Z12.11 COLON CANCER SCREENING: Primary | ICD-10-CM

## 2021-04-13 DIAGNOSIS — A04.8 H. PYLORI INFECTION: ICD-10-CM

## 2021-04-13 DIAGNOSIS — K59.09 OTHER CONSTIPATION: ICD-10-CM

## 2021-04-13 DIAGNOSIS — D50.0 IRON DEFICIENCY ANEMIA DUE TO CHRONIC BLOOD LOSS: ICD-10-CM

## 2021-04-13 PROCEDURE — 99214 OFFICE O/P EST MOD 30 MIN: CPT | Mod: S$PBB,,, | Performed by: INTERNAL MEDICINE

## 2021-04-13 PROCEDURE — 99214 OFFICE O/P EST MOD 30 MIN: CPT | Mod: PBBFAC | Performed by: INTERNAL MEDICINE

## 2021-04-13 PROCEDURE — 99999 PR PBB SHADOW E&M-EST. PATIENT-LVL IV: CPT | Mod: PBBFAC,,, | Performed by: INTERNAL MEDICINE

## 2021-04-13 PROCEDURE — 99999 PR PBB SHADOW E&M-EST. PATIENT-LVL IV: ICD-10-PCS | Mod: PBBFAC,,, | Performed by: INTERNAL MEDICINE

## 2021-04-13 PROCEDURE — 99214 PR OFFICE/OUTPT VISIT, EST, LEVL IV, 30-39 MIN: ICD-10-PCS | Mod: S$PBB,,, | Performed by: INTERNAL MEDICINE

## 2021-04-13 RX ORDER — DOCUSATE SODIUM 100 MG/1
100 CAPSULE, LIQUID FILLED ORAL 2 TIMES DAILY
Qty: 180 CAPSULE | Refills: 0 | Status: SHIPPED | OUTPATIENT
Start: 2021-04-13 | End: 2021-07-12 | Stop reason: SDUPTHER

## 2021-04-13 RX ORDER — SODIUM, POTASSIUM,MAG SULFATES 17.5-3.13G
1 SOLUTION, RECONSTITUTED, ORAL ORAL DAILY
Qty: 1 KIT | Refills: 0 | Status: SHIPPED | OUTPATIENT
Start: 2021-04-13 | End: 2021-04-15

## 2021-04-13 RX ORDER — SODIUM PICOSULFATE, MAGNESIUM OXIDE, AND ANHYDROUS CITRIC ACID 10; 3.5; 12 MG/160ML; G/160ML; G/160ML
1 LIQUID ORAL ONCE
Qty: 2 BOTTLE | Refills: 0 | Status: SHIPPED | OUTPATIENT
Start: 2021-04-13 | End: 2021-04-13

## 2021-04-13 RX ORDER — POLYETHYLENE GLYCOL 3350 17 G/17G
17 POWDER, FOR SOLUTION ORAL 2 TIMES DAILY
Qty: 180 EACH | Refills: 0 | Status: SHIPPED | OUTPATIENT
Start: 2021-04-13 | End: 2021-07-12

## 2021-04-13 RX ORDER — POLYETHYLENE GLYCOL 3350, SODIUM SULFATE ANHYDROUS, SODIUM BICARBONATE, SODIUM CHLORIDE, POTASSIUM CHLORIDE 236; 22.74; 6.74; 5.86; 2.97 G/4L; G/4L; G/4L; G/4L; G/4L
4 POWDER, FOR SOLUTION ORAL ONCE
Qty: 4000 ML | Refills: 0 | Status: SHIPPED | OUTPATIENT
Start: 2021-04-13 | End: 2021-04-13

## 2021-04-13 RX ORDER — SIMETHICONE 125 MG
125 CAPSULE ORAL 4 TIMES DAILY PRN
Qty: 30 CAPSULE | Refills: 0 | Status: SHIPPED | OUTPATIENT
Start: 2021-04-13 | End: 2021-06-14 | Stop reason: SDUPTHER

## 2021-04-13 RX ORDER — FUROSEMIDE 20 MG/1
20 TABLET ORAL DAILY PRN
Qty: 30 TABLET | Refills: 1 | Status: SHIPPED | OUTPATIENT
Start: 2021-04-13 | End: 2021-07-12 | Stop reason: SDUPTHER

## 2021-04-14 ENCOUNTER — TELEPHONE (OUTPATIENT)
Dept: GASTROENTEROLOGY | Facility: CLINIC | Age: 61
End: 2021-04-14

## 2021-04-15 ENCOUNTER — OFFICE VISIT (OUTPATIENT)
Dept: CARDIOLOGY | Facility: CLINIC | Age: 61
End: 2021-04-15
Payer: MEDICARE

## 2021-04-15 VITALS
SYSTOLIC BLOOD PRESSURE: 132 MMHG | HEART RATE: 60 BPM | HEIGHT: 59 IN | WEIGHT: 156.94 LBS | DIASTOLIC BLOOD PRESSURE: 70 MMHG | BODY MASS INDEX: 31.64 KG/M2

## 2021-04-15 DIAGNOSIS — I10 ESSENTIAL HYPERTENSION: ICD-10-CM

## 2021-04-15 DIAGNOSIS — R60.0 LOCALIZED EDEMA: Primary | ICD-10-CM

## 2021-04-15 DIAGNOSIS — G24.01 TARDIVE DYSKINESIA: ICD-10-CM

## 2021-04-15 DIAGNOSIS — G40.909 SEIZURE DISORDER: ICD-10-CM

## 2021-04-15 PROCEDURE — 99999 PR PBB SHADOW E&M-EST. PATIENT-LVL IV: CPT | Mod: PBBFAC,,, | Performed by: INTERNAL MEDICINE

## 2021-04-15 PROCEDURE — 99214 OFFICE O/P EST MOD 30 MIN: CPT | Mod: S$PBB,,, | Performed by: INTERNAL MEDICINE

## 2021-04-15 PROCEDURE — 99214 OFFICE O/P EST MOD 30 MIN: CPT | Mod: PBBFAC | Performed by: INTERNAL MEDICINE

## 2021-04-15 PROCEDURE — 99999 PR PBB SHADOW E&M-EST. PATIENT-LVL IV: ICD-10-PCS | Mod: PBBFAC,,, | Performed by: INTERNAL MEDICINE

## 2021-04-15 PROCEDURE — 99214 PR OFFICE/OUTPT VISIT, EST, LEVL IV, 30-39 MIN: ICD-10-PCS | Mod: S$PBB,,, | Performed by: INTERNAL MEDICINE

## 2021-04-19 ENCOUNTER — TELEPHONE (OUTPATIENT)
Dept: HEMATOLOGY/ONCOLOGY | Facility: CLINIC | Age: 61
End: 2021-04-19

## 2021-04-20 ENCOUNTER — TELEPHONE (OUTPATIENT)
Dept: FAMILY MEDICINE | Facility: CLINIC | Age: 61
End: 2021-04-20

## 2021-04-20 ENCOUNTER — DOCUMENT SCAN (OUTPATIENT)
Dept: HOME HEALTH SERVICES | Facility: HOSPITAL | Age: 61
End: 2021-04-20
Payer: MEDICARE

## 2021-04-20 DIAGNOSIS — R60.0 LOWER EXTREMITY EDEMA: Primary | ICD-10-CM

## 2021-04-22 ENCOUNTER — DOCUMENT SCAN (OUTPATIENT)
Dept: HOME HEALTH SERVICES | Facility: HOSPITAL | Age: 61
End: 2021-04-22
Payer: MEDICARE

## 2021-04-26 ENCOUNTER — TELEPHONE (OUTPATIENT)
Dept: FAMILY MEDICINE | Facility: CLINIC | Age: 61
End: 2021-04-26

## 2021-04-27 ENCOUNTER — OFFICE VISIT (OUTPATIENT)
Dept: PSYCHIATRY | Facility: CLINIC | Age: 61
End: 2021-04-27
Payer: MEDICARE

## 2021-04-27 ENCOUNTER — PATIENT MESSAGE (OUTPATIENT)
Dept: GASTROENTEROLOGY | Facility: CLINIC | Age: 61
End: 2021-04-27

## 2021-04-27 DIAGNOSIS — R45.1 RESTLESSNESS AND AGITATION: ICD-10-CM

## 2021-04-27 DIAGNOSIS — F84.9 PDD (PERVASIVE DEVELOPMENTAL DISORDER): Primary | ICD-10-CM

## 2021-04-27 DIAGNOSIS — F79 INTELLECTUAL DISABILITY: ICD-10-CM

## 2021-04-27 DIAGNOSIS — G47.00 INSOMNIA, UNSPECIFIED TYPE: ICD-10-CM

## 2021-04-27 PROCEDURE — 99213 OFFICE O/P EST LOW 20 MIN: CPT | Mod: S$PBB,,, | Performed by: PSYCHIATRY & NEUROLOGY

## 2021-04-27 PROCEDURE — 99213 PR OFFICE/OUTPT VISIT, EST, LEVL III, 20-29 MIN: ICD-10-PCS | Mod: S$PBB,,, | Performed by: PSYCHIATRY & NEUROLOGY

## 2021-04-27 PROCEDURE — 99212 OFFICE O/P EST SF 10 MIN: CPT | Mod: PBBFAC | Performed by: PSYCHIATRY & NEUROLOGY

## 2021-04-27 PROCEDURE — 99999 PR PBB SHADOW E&M-EST. PATIENT-LVL II: CPT | Mod: PBBFAC,,, | Performed by: PSYCHIATRY & NEUROLOGY

## 2021-04-27 PROCEDURE — 99999 PR PBB SHADOW E&M-EST. PATIENT-LVL II: ICD-10-PCS | Mod: PBBFAC,,, | Performed by: PSYCHIATRY & NEUROLOGY

## 2021-04-27 RX ORDER — ALPRAZOLAM 0.25 MG/1
0.25 TABLET ORAL 2 TIMES DAILY
Qty: 60 TABLET | Refills: 2 | Status: SHIPPED | OUTPATIENT
Start: 2021-04-27 | End: 2021-07-19

## 2021-04-27 RX ORDER — DIVALPROEX SODIUM 500 MG/1
500 TABLET, DELAYED RELEASE ORAL 2 TIMES DAILY
Qty: 60 TABLET | Refills: 2 | Status: SHIPPED | OUTPATIENT
Start: 2021-04-27 | End: 2021-07-27 | Stop reason: SDUPTHER

## 2021-04-27 RX ORDER — DIAZEPAM 5 MG/1
5 TABLET ORAL EVERY 6 HOURS PRN
Qty: 30 TABLET | Refills: 1 | Status: SHIPPED | OUTPATIENT
Start: 2021-04-27 | End: 2021-07-27

## 2021-04-27 RX ORDER — QUETIAPINE FUMARATE 400 MG/1
TABLET, FILM COATED ORAL
Qty: 45 TABLET | Refills: 2 | Status: SHIPPED | OUTPATIENT
Start: 2021-04-27 | End: 2021-07-27 | Stop reason: SDUPTHER

## 2021-04-27 RX ORDER — MIRTAZAPINE 30 MG/1
30 TABLET, FILM COATED ORAL NIGHTLY
Qty: 30 TABLET | Refills: 2 | Status: SHIPPED | OUTPATIENT
Start: 2021-04-27 | End: 2021-07-27

## 2021-04-28 ENCOUNTER — TELEPHONE (OUTPATIENT)
Dept: HEMATOLOGY/ONCOLOGY | Facility: CLINIC | Age: 61
End: 2021-04-28

## 2021-04-28 ENCOUNTER — IMMUNIZATION (OUTPATIENT)
Dept: INTERNAL MEDICINE | Facility: CLINIC | Age: 61
End: 2021-04-28
Payer: MEDICARE

## 2021-04-28 ENCOUNTER — HOSPITAL ENCOUNTER (OUTPATIENT)
Dept: CARDIOLOGY | Facility: HOSPITAL | Age: 61
Discharge: HOME | End: 2021-04-28
Attending: INTERNAL MEDICINE
Payer: MEDICARE

## 2021-04-28 VITALS
BODY MASS INDEX: 31.45 KG/M2 | SYSTOLIC BLOOD PRESSURE: 132 MMHG | DIASTOLIC BLOOD PRESSURE: 70 MMHG | HEART RATE: 88 BPM | HEIGHT: 59 IN | WEIGHT: 156 LBS

## 2021-04-28 DIAGNOSIS — Z23 NEED FOR VACCINATION: Primary | ICD-10-CM

## 2021-04-28 DIAGNOSIS — I10 ESSENTIAL HYPERTENSION: ICD-10-CM

## 2021-04-28 DIAGNOSIS — G40.909 SEIZURE DISORDER: ICD-10-CM

## 2021-04-28 DIAGNOSIS — R60.0 LOCALIZED EDEMA: ICD-10-CM

## 2021-04-28 DIAGNOSIS — G24.01 TARDIVE DYSKINESIA: ICD-10-CM

## 2021-04-28 PROCEDURE — 91300 COVID-19, MRNA, LNP-S, PF, 30 MCG/0.3 ML DOSE VACCINE: CPT | Mod: PBBFAC

## 2021-04-28 PROCEDURE — 93306 TTE W/DOPPLER COMPLETE: CPT

## 2021-04-28 PROCEDURE — 93306 TTE W/DOPPLER COMPLETE: CPT | Mod: 26,,, | Performed by: INTERNAL MEDICINE

## 2021-04-28 PROCEDURE — 93306 ECHO (CUPID ONLY): ICD-10-PCS | Mod: 26,,, | Performed by: INTERNAL MEDICINE

## 2021-04-29 LAB
AV INDEX (PROSTH): 0.78
AV MEAN GRADIENT: 5 MMHG
AV PEAK GRADIENT: 10 MMHG
AV VALVE AREA: 2.19 CM2
AV VELOCITY RATIO: 0.76
BSA FOR ECHO PROCEDURE: 1.72 M2
CV ECHO LV RWT: 0.59 CM
DOP CALC AO PEAK VEL: 1.55 M/S
DOP CALC AO VTI: 29.33 CM
DOP CALC LVOT AREA: 2.8 CM2
DOP CALC LVOT DIAMETER: 1.89 CM
DOP CALC LVOT PEAK VEL: 1.18 M/S
DOP CALC LVOT STROKE VOLUME: 64.19 CM3
DOP CALCLVOT PEAK VEL VTI: 22.89 CM
E WAVE DECELERATION TIME: 182.09 MSEC
E/A RATIO: 0.86
ECHO LV POSTERIOR WALL: 0.97 CM (ref 0.6–1.1)
EJECTION FRACTION: 60 %
FRACTIONAL SHORTENING: 38 % (ref 28–44)
INTERVENTRICULAR SEPTUM: 1.02 CM (ref 0.6–1.1)
IVRT: 85.63 MSEC
LA MAJOR: 3.81 CM
LA MINOR: 3.48 CM
LA WIDTH: 2.14 CM
LEFT ATRIUM SIZE: 2.89 CM
LEFT ATRIUM VOLUME INDEX: 11.5 ML/M2
LEFT ATRIUM VOLUME: 19.12 CM3
LEFT INTERNAL DIMENSION IN SYSTOLE: 2.04 CM (ref 2.1–4)
LEFT VENTRICLE DIASTOLIC VOLUME INDEX: 26.16 ML/M2
LEFT VENTRICLE DIASTOLIC VOLUME: 43.42 ML
LEFT VENTRICLE MASS INDEX: 56 G/M2
LEFT VENTRICLE SYSTOLIC VOLUME INDEX: 8.1 ML/M2
LEFT VENTRICLE SYSTOLIC VOLUME: 13.46 ML
LEFT VENTRICULAR INTERNAL DIMENSION IN DIASTOLE: 3.28 CM (ref 3.5–6)
LEFT VENTRICULAR MASS: 93.01 G
MV PEAK A VEL: 1.01 M/S
MV PEAK E VEL: 0.87 M/S
MV STENOSIS PRESSURE HALF TIME: 52.81 MS
MV VALVE AREA P 1/2 METHOD: 4.17 CM2
PISA TR MAX VEL: 1.89 M/S
PV PEAK VELOCITY: 0.98 CM/S
RA MAJOR: 3.05 CM
RA PRESSURE: 3 MMHG
RA WIDTH: 2.19 CM
RIGHT VENTRICULAR END-DIASTOLIC DIMENSION: 2.25 CM
TR MAX PG: 14 MMHG
TV REST PULMONARY ARTERY PRESSURE: 17 MMHG

## 2021-05-03 DIAGNOSIS — D69.6 THROMBOCYTOPENIA: Primary | ICD-10-CM

## 2021-05-03 DIAGNOSIS — D72.819 LEUKOPENIA, UNSPECIFIED TYPE: ICD-10-CM

## 2021-05-04 ENCOUNTER — PATIENT MESSAGE (OUTPATIENT)
Dept: CARDIOLOGY | Facility: CLINIC | Age: 61
End: 2021-05-04

## 2021-05-06 ENCOUNTER — OFFICE VISIT (OUTPATIENT)
Dept: HEMATOLOGY/ONCOLOGY | Facility: CLINIC | Age: 61
End: 2021-05-06
Payer: MEDICARE

## 2021-05-06 ENCOUNTER — TELEPHONE (OUTPATIENT)
Dept: FAMILY MEDICINE | Facility: CLINIC | Age: 61
End: 2021-05-06

## 2021-05-06 ENCOUNTER — LAB VISIT (OUTPATIENT)
Dept: LAB | Facility: HOSPITAL | Age: 61
End: 2021-05-06
Attending: INTERNAL MEDICINE
Payer: MEDICARE

## 2021-05-06 VITALS
DIASTOLIC BLOOD PRESSURE: 99 MMHG | OXYGEN SATURATION: 99 % | WEIGHT: 115.75 LBS | TEMPERATURE: 97 F | HEIGHT: 59 IN | HEART RATE: 102 BPM | SYSTOLIC BLOOD PRESSURE: 147 MMHG | BODY MASS INDEX: 23.33 KG/M2

## 2021-05-06 DIAGNOSIS — D72.819 LEUKOPENIA, UNSPECIFIED TYPE: ICD-10-CM

## 2021-05-06 DIAGNOSIS — D69.6 THROMBOCYTOPENIA: ICD-10-CM

## 2021-05-06 DIAGNOSIS — G40.909 SEIZURE DISORDER: ICD-10-CM

## 2021-05-06 DIAGNOSIS — G24.01 TARDIVE DYSKINESIA: Primary | ICD-10-CM

## 2021-05-06 DIAGNOSIS — D72.819 LEUKOPENIA, UNSPECIFIED TYPE: Primary | ICD-10-CM

## 2021-05-06 LAB
ANISOCYTOSIS BLD QL SMEAR: SLIGHT
BASOPHILS # BLD AUTO: ABNORMAL K/UL (ref 0–0.2)
BASOPHILS NFR BLD: 0 % (ref 0–1.9)
DIFFERENTIAL METHOD: ABNORMAL
EOSINOPHIL # BLD AUTO: ABNORMAL K/UL (ref 0–0.5)
EOSINOPHIL NFR BLD: 1 % (ref 0–8)
ERYTHROCYTE [DISTWIDTH] IN BLOOD BY AUTOMATED COUNT: 12 % (ref 11.5–14.5)
HCT VFR BLD AUTO: 43.7 % (ref 37–48.5)
HGB BLD-MCNC: 15 G/DL (ref 12–16)
HYPOCHROMIA BLD QL SMEAR: ABNORMAL
IMM GRANULOCYTES # BLD AUTO: ABNORMAL K/UL (ref 0–0.04)
IMM GRANULOCYTES NFR BLD AUTO: ABNORMAL % (ref 0–0.5)
LYMPHOCYTES # BLD AUTO: ABNORMAL K/UL (ref 1–4.8)
LYMPHOCYTES NFR BLD: 55 % (ref 18–48)
MCH RBC QN AUTO: 30.2 PG (ref 27–31)
MCHC RBC AUTO-ENTMCNC: 34.3 G/DL (ref 32–36)
MCV RBC AUTO: 88 FL (ref 82–98)
MONOCYTES # BLD AUTO: ABNORMAL K/UL (ref 0.3–1)
MONOCYTES NFR BLD: 6 % (ref 4–15)
NEUTROPHILS NFR BLD: 35 % (ref 38–73)
NEUTS BAND NFR BLD MANUAL: 3 %
NRBC BLD-RTO: 0 /100 WBC
OVALOCYTES BLD QL SMEAR: ABNORMAL
PLATELET # BLD AUTO: 162 K/UL (ref 150–450)
PLATELET BLD QL SMEAR: ABNORMAL
PMV BLD AUTO: 10.8 FL (ref 9.2–12.9)
POIKILOCYTOSIS BLD QL SMEAR: SLIGHT
RBC # BLD AUTO: 4.97 M/UL (ref 4–5.4)
WBC # BLD AUTO: 1.97 K/UL (ref 3.9–12.7)

## 2021-05-06 PROCEDURE — 88189 FLOWCYTOMETRY/READ 16 & >: CPT | Mod: ,,, | Performed by: PATHOLOGY

## 2021-05-06 PROCEDURE — 88189 PR  FLOWCYTOMETRY/READ, 16 & > MARKERS: ICD-10-PCS | Mod: ,,, | Performed by: PATHOLOGY

## 2021-05-06 PROCEDURE — 36415 COLL VENOUS BLD VENIPUNCTURE: CPT | Performed by: NURSE PRACTITIONER

## 2021-05-06 PROCEDURE — 99215 OFFICE O/P EST HI 40 MIN: CPT | Mod: PBBFAC,25 | Performed by: NURSE PRACTITIONER

## 2021-05-06 PROCEDURE — 99999 PR PBB SHADOW E&M-EST. PATIENT-LVL V: ICD-10-PCS | Mod: PBBFAC,,, | Performed by: NURSE PRACTITIONER

## 2021-05-06 PROCEDURE — 85007 BL SMEAR W/DIFF WBC COUNT: CPT | Performed by: NURSE PRACTITIONER

## 2021-05-06 PROCEDURE — 99214 OFFICE O/P EST MOD 30 MIN: CPT | Mod: S$PBB,,, | Performed by: NURSE PRACTITIONER

## 2021-05-06 PROCEDURE — 99999 PR PBB SHADOW E&M-EST. PATIENT-LVL V: CPT | Mod: PBBFAC,,, | Performed by: NURSE PRACTITIONER

## 2021-05-06 PROCEDURE — 85027 COMPLETE CBC AUTOMATED: CPT | Performed by: NURSE PRACTITIONER

## 2021-05-06 PROCEDURE — 88184 FLOWCYTOMETRY/ TC 1 MARKER: CPT | Performed by: PATHOLOGY

## 2021-05-06 PROCEDURE — 99214 PR OFFICE/OUTPT VISIT, EST, LEVL IV, 30-39 MIN: ICD-10-PCS | Mod: S$PBB,,, | Performed by: NURSE PRACTITIONER

## 2021-05-06 PROCEDURE — 88185 FLOWCYTOMETRY/TC ADD-ON: CPT | Mod: 59 | Performed by: PATHOLOGY

## 2021-05-07 ENCOUNTER — DOCUMENT SCAN (OUTPATIENT)
Dept: HOME HEALTH SERVICES | Facility: HOSPITAL | Age: 61
End: 2021-05-07
Payer: MEDICARE

## 2021-05-07 LAB
FLOW CYTOMETRY ANTIBODIES ANALYZED - BLOOD: NORMAL
FLOW CYTOMETRY COMMENT - BLOOD: NORMAL
FLOW CYTOMETRY INTERPRETATION - BLOOD: NORMAL

## 2021-05-10 ENCOUNTER — TELEPHONE (OUTPATIENT)
Dept: FAMILY MEDICINE | Facility: CLINIC | Age: 61
End: 2021-05-10

## 2021-05-11 ENCOUNTER — DOCUMENT SCAN (OUTPATIENT)
Dept: HOME HEALTH SERVICES | Facility: HOSPITAL | Age: 61
End: 2021-05-11
Payer: MEDICARE

## 2021-05-11 ENCOUNTER — TELEPHONE (OUTPATIENT)
Dept: GASTROENTEROLOGY | Facility: CLINIC | Age: 61
End: 2021-05-11

## 2021-05-13 ENCOUNTER — EXTERNAL HOME HEALTH (OUTPATIENT)
Dept: HOME HEALTH SERVICES | Facility: HOSPITAL | Age: 61
End: 2021-05-13
Payer: MEDICARE

## 2021-05-14 ENCOUNTER — LAB VISIT (OUTPATIENT)
Dept: OTOLARYNGOLOGY | Facility: CLINIC | Age: 61
End: 2021-05-14
Payer: MEDICARE

## 2021-05-14 DIAGNOSIS — Z12.11 COLON CANCER SCREENING: ICD-10-CM

## 2021-05-14 PROCEDURE — U0003 INFECTIOUS AGENT DETECTION BY NUCLEIC ACID (DNA OR RNA); SEVERE ACUTE RESPIRATORY SYNDROME CORONAVIRUS 2 (SARS-COV-2) (CORONAVIRUS DISEASE [COVID-19]), AMPLIFIED PROBE TECHNIQUE, MAKING USE OF HIGH THROUGHPUT TECHNOLOGIES AS DESCRIBED BY CMS-2020-01-R: HCPCS | Performed by: INTERNAL MEDICINE

## 2021-05-14 PROCEDURE — U0005 INFEC AGEN DETEC AMPLI PROBE: HCPCS | Performed by: INTERNAL MEDICINE

## 2021-05-15 LAB — SARS-COV-2 RNA RESP QL NAA+PROBE: NOT DETECTED

## 2021-05-17 ENCOUNTER — ANESTHESIA (OUTPATIENT)
Dept: ENDOSCOPY | Facility: HOSPITAL | Age: 61
End: 2021-05-17
Payer: MEDICARE

## 2021-05-17 ENCOUNTER — ANESTHESIA EVENT (OUTPATIENT)
Dept: ENDOSCOPY | Facility: HOSPITAL | Age: 61
End: 2021-05-17
Payer: MEDICARE

## 2021-05-17 ENCOUNTER — HOSPITAL ENCOUNTER (OUTPATIENT)
Facility: HOSPITAL | Age: 61
Discharge: HOME | End: 2021-05-17
Attending: INTERNAL MEDICINE | Admitting: INTERNAL MEDICINE
Payer: MEDICARE

## 2021-05-17 DIAGNOSIS — Z12.11 COLON CANCER SCREENING: Primary | ICD-10-CM

## 2021-05-17 PROCEDURE — G0121 COLON CA SCRN NOT HI RSK IND: HCPCS | Mod: ,,, | Performed by: INTERNAL MEDICINE

## 2021-05-17 PROCEDURE — G0121 COLON CA SCRN NOT HI RSK IND: HCPCS | Performed by: INTERNAL MEDICINE

## 2021-05-17 PROCEDURE — 63600175 PHARM REV CODE 636 W HCPCS: Performed by: NURSE ANESTHETIST, CERTIFIED REGISTERED

## 2021-05-17 PROCEDURE — 37000009 HC ANESTHESIA EA ADD 15 MINS: Performed by: INTERNAL MEDICINE

## 2021-05-17 PROCEDURE — 25000003 PHARM REV CODE 250: Performed by: NURSE ANESTHETIST, CERTIFIED REGISTERED

## 2021-05-17 PROCEDURE — 25000003 PHARM REV CODE 250: Performed by: ANESTHESIOLOGY

## 2021-05-17 PROCEDURE — 37000008 HC ANESTHESIA 1ST 15 MINUTES: Performed by: INTERNAL MEDICINE

## 2021-05-17 PROCEDURE — G0121 COLON CA SCRN NOT HI RSK IND: ICD-10-PCS | Mod: ,,, | Performed by: INTERNAL MEDICINE

## 2021-05-17 RX ORDER — KETAMINE HYDROCHLORIDE 100 MG/ML
200 INJECTION, SOLUTION INTRAMUSCULAR; INTRAVENOUS ONCE
Status: COMPLETED | OUTPATIENT
Start: 2021-05-17 | End: 2021-05-17

## 2021-05-17 RX ORDER — PROPOFOL 10 MG/ML
VIAL (ML) INTRAVENOUS
Status: DISCONTINUED | OUTPATIENT
Start: 2021-05-17 | End: 2021-05-17

## 2021-05-17 RX ORDER — MIDAZOLAM HYDROCHLORIDE 1 MG/ML
INJECTION, SOLUTION INTRAMUSCULAR; INTRAVENOUS
Status: DISCONTINUED | OUTPATIENT
Start: 2021-05-17 | End: 2021-05-17

## 2021-05-17 RX ORDER — SODIUM CHLORIDE, SODIUM LACTATE, POTASSIUM CHLORIDE, CALCIUM CHLORIDE 600; 310; 30; 20 MG/100ML; MG/100ML; MG/100ML; MG/100ML
INJECTION, SOLUTION INTRAVENOUS CONTINUOUS PRN
Status: DISCONTINUED | OUTPATIENT
Start: 2021-05-17 | End: 2021-05-17

## 2021-05-17 RX ORDER — LIDOCAINE HYDROCHLORIDE 10 MG/ML
INJECTION, SOLUTION EPIDURAL; INFILTRATION; INTRACAUDAL; PERINEURAL
Status: DISCONTINUED | OUTPATIENT
Start: 2021-05-17 | End: 2021-05-17

## 2021-05-17 RX ADMIN — MIDAZOLAM HYDROCHLORIDE 2 MG: 1 INJECTION, SOLUTION INTRAMUSCULAR; INTRAVENOUS at 09:05

## 2021-05-17 RX ADMIN — PROPOFOL 30 MG: 10 INJECTION, EMULSION INTRAVENOUS at 09:05

## 2021-05-17 RX ADMIN — SODIUM CHLORIDE, SODIUM LACTATE, POTASSIUM CHLORIDE, AND CALCIUM CHLORIDE: 600; 310; 30; 20 INJECTION, SOLUTION INTRAVENOUS at 09:05

## 2021-05-17 RX ADMIN — LIDOCAINE HYDROCHLORIDE 50 MG: 10 INJECTION, SOLUTION EPIDURAL; INFILTRATION; INTRACAUDAL; PERINEURAL at 09:05

## 2021-05-17 RX ADMIN — KETAMINE HYDROCHLORIDE 200 MG: 100 INJECTION INTRAMUSCULAR; INTRAVENOUS at 09:05

## 2021-05-17 RX ADMIN — PROPOFOL 50 MG: 10 INJECTION, EMULSION INTRAVENOUS at 09:05

## 2021-05-20 ENCOUNTER — OFFICE VISIT (OUTPATIENT)
Dept: CARDIOLOGY | Facility: CLINIC | Age: 61
End: 2021-05-20
Payer: MEDICARE

## 2021-05-20 ENCOUNTER — PATIENT OUTREACH (OUTPATIENT)
Dept: INTERNAL MEDICINE | Facility: CLINIC | Age: 61
End: 2021-05-20

## 2021-05-20 VITALS
WEIGHT: 115.5 LBS | OXYGEN SATURATION: 97 % | SYSTOLIC BLOOD PRESSURE: 120 MMHG | BODY MASS INDEX: 23.33 KG/M2 | HEART RATE: 67 BPM | DIASTOLIC BLOOD PRESSURE: 70 MMHG

## 2021-05-20 VITALS
WEIGHT: 114.88 LBS | HEIGHT: 59 IN | OXYGEN SATURATION: 97 % | TEMPERATURE: 97 F | SYSTOLIC BLOOD PRESSURE: 145 MMHG | DIASTOLIC BLOOD PRESSURE: 76 MMHG | HEART RATE: 72 BPM | BODY MASS INDEX: 23.16 KG/M2 | RESPIRATION RATE: 13 BRPM

## 2021-05-20 DIAGNOSIS — G24.01 TARDIVE DYSKINESIA: Primary | ICD-10-CM

## 2021-05-20 DIAGNOSIS — G40.909 SEIZURE DISORDER: ICD-10-CM

## 2021-05-20 DIAGNOSIS — R60.0 LOCALIZED EDEMA: ICD-10-CM

## 2021-05-20 DIAGNOSIS — I10 ESSENTIAL HYPERTENSION: ICD-10-CM

## 2021-05-20 DIAGNOSIS — Z12.31 BREAST CANCER SCREENING BY MAMMOGRAM: Primary | ICD-10-CM

## 2021-05-20 PROCEDURE — 99999 PR PBB SHADOW E&M-EST. PATIENT-LVL IV: ICD-10-PCS | Mod: PBBFAC,,, | Performed by: INTERNAL MEDICINE

## 2021-05-20 PROCEDURE — 99999 PR PBB SHADOW E&M-EST. PATIENT-LVL IV: CPT | Mod: PBBFAC,,, | Performed by: INTERNAL MEDICINE

## 2021-05-20 PROCEDURE — 99215 PR OFFICE/OUTPT VISIT, EST, LEVL V, 40-54 MIN: ICD-10-PCS | Mod: S$PBB,,, | Performed by: INTERNAL MEDICINE

## 2021-05-20 PROCEDURE — 99215 OFFICE O/P EST HI 40 MIN: CPT | Mod: S$PBB,,, | Performed by: INTERNAL MEDICINE

## 2021-05-20 PROCEDURE — 99214 OFFICE O/P EST MOD 30 MIN: CPT | Mod: PBBFAC | Performed by: INTERNAL MEDICINE

## 2021-05-21 ENCOUNTER — PATIENT OUTREACH (OUTPATIENT)
Dept: ADMINISTRATIVE | Facility: HOSPITAL | Age: 61
End: 2021-05-21

## 2021-05-21 ENCOUNTER — IMMUNIZATION (OUTPATIENT)
Dept: INTERNAL MEDICINE | Facility: CLINIC | Age: 61
End: 2021-05-21
Payer: MEDICARE

## 2021-05-21 DIAGNOSIS — Z23 NEED FOR VACCINATION: Primary | ICD-10-CM

## 2021-05-21 PROCEDURE — 91300 COVID-19, MRNA, LNP-S, PF, 30 MCG/0.3 ML DOSE VACCINE: CPT | Mod: ,,, | Performed by: FAMILY MEDICINE

## 2021-05-21 PROCEDURE — 0002A COVID-19, MRNA, LNP-S, PF, 30 MCG/0.3 ML DOSE VACCINE: ICD-10-PCS | Mod: CV19,,, | Performed by: FAMILY MEDICINE

## 2021-05-21 PROCEDURE — 0002A COVID-19, MRNA, LNP-S, PF, 30 MCG/0.3 ML DOSE VACCINE: CPT | Mod: CV19,,, | Performed by: FAMILY MEDICINE

## 2021-05-21 PROCEDURE — 91300 COVID-19, MRNA, LNP-S, PF, 30 MCG/0.3 ML DOSE VACCINE: ICD-10-PCS | Mod: ,,, | Performed by: FAMILY MEDICINE

## 2021-06-03 ENCOUNTER — TELEPHONE (OUTPATIENT)
Dept: RADIOLOGY | Facility: HOSPITAL | Age: 61
End: 2021-06-03

## 2021-06-03 ENCOUNTER — TELEPHONE (OUTPATIENT)
Dept: ADMINISTRATIVE | Facility: HOSPITAL | Age: 61
End: 2021-06-03

## 2021-06-04 ENCOUNTER — HOSPITAL ENCOUNTER (OUTPATIENT)
Dept: RADIOLOGY | Facility: HOSPITAL | Age: 61
Discharge: HOME | End: 2021-06-04
Attending: INTERNAL MEDICINE
Payer: MEDICARE

## 2021-06-04 DIAGNOSIS — R60.0 LOWER EXTREMITY EDEMA: ICD-10-CM

## 2021-06-04 DIAGNOSIS — Z12.31 BREAST CANCER SCREENING BY MAMMOGRAM: ICD-10-CM

## 2021-06-04 PROCEDURE — 93970 EXTREMITY STUDY: CPT | Mod: TC,PO

## 2021-06-04 PROCEDURE — 93970 EXTREMITY STUDY: CPT | Mod: 26,,, | Performed by: RADIOLOGY

## 2021-06-04 PROCEDURE — 93970 US LOWER EXTREMITY VEINS BILATERAL: ICD-10-PCS | Mod: 26,,, | Performed by: RADIOLOGY

## 2021-06-07 ENCOUNTER — OFFICE VISIT (OUTPATIENT)
Dept: HEMATOLOGY/ONCOLOGY | Facility: CLINIC | Age: 61
End: 2021-06-07
Payer: MEDICARE

## 2021-06-07 ENCOUNTER — TELEPHONE (OUTPATIENT)
Dept: FAMILY MEDICINE | Facility: CLINIC | Age: 61
End: 2021-06-07

## 2021-06-07 VITALS
OXYGEN SATURATION: 96 % | HEART RATE: 100 BPM | TEMPERATURE: 97 F | DIASTOLIC BLOOD PRESSURE: 86 MMHG | WEIGHT: 117.06 LBS | SYSTOLIC BLOOD PRESSURE: 125 MMHG | BODY MASS INDEX: 23.64 KG/M2

## 2021-06-07 DIAGNOSIS — D69.6 THROMBOCYTOPENIA: Primary | ICD-10-CM

## 2021-06-07 DIAGNOSIS — D72.819 LEUKOPENIA, UNSPECIFIED TYPE: ICD-10-CM

## 2021-06-07 PROCEDURE — 99214 OFFICE O/P EST MOD 30 MIN: CPT | Mod: PBBFAC | Performed by: INTERNAL MEDICINE

## 2021-06-07 PROCEDURE — 99214 PR OFFICE/OUTPT VISIT, EST, LEVL IV, 30-39 MIN: ICD-10-PCS | Mod: S$PBB,,, | Performed by: INTERNAL MEDICINE

## 2021-06-07 PROCEDURE — 99999 PR PBB SHADOW E&M-EST. PATIENT-LVL IV: CPT | Mod: PBBFAC,,, | Performed by: INTERNAL MEDICINE

## 2021-06-07 PROCEDURE — 99999 PR PBB SHADOW E&M-EST. PATIENT-LVL IV: ICD-10-PCS | Mod: PBBFAC,,, | Performed by: INTERNAL MEDICINE

## 2021-06-07 PROCEDURE — 99214 OFFICE O/P EST MOD 30 MIN: CPT | Mod: S$PBB,,, | Performed by: INTERNAL MEDICINE

## 2021-06-10 ENCOUNTER — TELEPHONE (OUTPATIENT)
Dept: RADIOLOGY | Facility: HOSPITAL | Age: 61
End: 2021-06-10
Payer: MEDICARE

## 2021-06-10 ENCOUNTER — TELEPHONE (OUTPATIENT)
Dept: FAMILY MEDICINE | Facility: CLINIC | Age: 61
End: 2021-06-10

## 2021-06-10 PROCEDURE — G0179 MD RECERTIFICATION HHA PT: HCPCS | Mod: ,,, | Performed by: INTERNAL MEDICINE

## 2021-06-10 PROCEDURE — G0179 PR HOME HEALTH MD RECERTIFICATION: ICD-10-PCS | Mod: ,,, | Performed by: INTERNAL MEDICINE

## 2021-06-14 ENCOUNTER — PATIENT OUTREACH (OUTPATIENT)
Dept: ADMINISTRATIVE | Facility: OTHER | Age: 61
End: 2021-06-14

## 2021-06-14 RX ORDER — SIMETHICONE 125 MG
125 CAPSULE ORAL 4 TIMES DAILY PRN
Qty: 30 CAPSULE | Refills: 0 | Status: SHIPPED | OUTPATIENT
Start: 2021-06-14 | End: 2021-09-12

## 2021-06-15 ENCOUNTER — OFFICE VISIT (OUTPATIENT)
Dept: GASTROENTEROLOGY | Facility: CLINIC | Age: 61
End: 2021-06-15
Payer: MEDICARE

## 2021-06-15 VITALS
BODY MASS INDEX: 23.6 KG/M2 | HEART RATE: 64 BPM | DIASTOLIC BLOOD PRESSURE: 70 MMHG | SYSTOLIC BLOOD PRESSURE: 120 MMHG | HEIGHT: 59 IN | WEIGHT: 117.06 LBS

## 2021-06-15 DIAGNOSIS — K59.00 CONSTIPATION, UNSPECIFIED CONSTIPATION TYPE: Primary | ICD-10-CM

## 2021-06-15 PROCEDURE — 99214 PR OFFICE/OUTPT VISIT, EST, LEVL IV, 30-39 MIN: ICD-10-PCS | Mod: S$PBB,,, | Performed by: INTERNAL MEDICINE

## 2021-06-15 PROCEDURE — 99214 OFFICE O/P EST MOD 30 MIN: CPT | Mod: S$PBB,,, | Performed by: INTERNAL MEDICINE

## 2021-06-15 PROCEDURE — 99214 OFFICE O/P EST MOD 30 MIN: CPT | Mod: PBBFAC | Performed by: INTERNAL MEDICINE

## 2021-06-15 PROCEDURE — 99999 PR PBB SHADOW E&M-EST. PATIENT-LVL IV: ICD-10-PCS | Mod: PBBFAC,,, | Performed by: INTERNAL MEDICINE

## 2021-06-15 PROCEDURE — 99999 PR PBB SHADOW E&M-EST. PATIENT-LVL IV: CPT | Mod: PBBFAC,,, | Performed by: INTERNAL MEDICINE

## 2021-06-16 ENCOUNTER — TELEPHONE (OUTPATIENT)
Dept: ADMINISTRATIVE | Facility: HOSPITAL | Age: 61
End: 2021-06-16

## 2021-06-23 ENCOUNTER — TELEPHONE (OUTPATIENT)
Dept: FAMILY MEDICINE | Facility: CLINIC | Age: 61
End: 2021-06-23

## 2021-07-01 ENCOUNTER — EXTERNAL HOME HEALTH (OUTPATIENT)
Dept: HOME HEALTH SERVICES | Facility: HOSPITAL | Age: 61
End: 2021-07-01
Payer: MEDICARE

## 2021-07-07 ENCOUNTER — TELEPHONE (OUTPATIENT)
Dept: ADMINISTRATIVE | Facility: HOSPITAL | Age: 61
End: 2021-07-07

## 2021-07-08 RX ORDER — TRIAMTERENE/HYDROCHLOROTHIAZID 37.5-25 MG
TABLET ORAL
Qty: 30 TABLET | Refills: 11 | Status: SHIPPED | OUTPATIENT
Start: 2021-07-08 | End: 2022-06-20

## 2021-07-12 ENCOUNTER — TELEPHONE (OUTPATIENT)
Dept: FAMILY MEDICINE | Facility: CLINIC | Age: 61
End: 2021-07-12

## 2021-07-12 ENCOUNTER — PATIENT MESSAGE (OUTPATIENT)
Dept: FAMILY MEDICINE | Facility: CLINIC | Age: 61
End: 2021-07-12

## 2021-07-12 ENCOUNTER — PATIENT MESSAGE (OUTPATIENT)
Dept: GASTROENTEROLOGY | Facility: CLINIC | Age: 61
End: 2021-07-12

## 2021-07-12 DIAGNOSIS — K59.00 CONSTIPATION, UNSPECIFIED CONSTIPATION TYPE: Primary | ICD-10-CM

## 2021-07-12 DIAGNOSIS — R60.0 LOWER EXTREMITY EDEMA: ICD-10-CM

## 2021-07-12 RX ORDER — DOCUSATE SODIUM 100 MG/1
100 CAPSULE, LIQUID FILLED ORAL 2 TIMES DAILY
Qty: 180 CAPSULE | Refills: 0 | Status: SHIPPED | OUTPATIENT
Start: 2021-07-12 | End: 2021-07-12 | Stop reason: SDUPTHER

## 2021-07-12 RX ORDER — FUROSEMIDE 20 MG/1
20 TABLET ORAL DAILY PRN
Qty: 30 TABLET | Refills: 5 | Status: SHIPPED | OUTPATIENT
Start: 2021-07-12 | End: 2021-07-12

## 2021-07-13 ENCOUNTER — TELEPHONE (OUTPATIENT)
Dept: FAMILY MEDICINE | Facility: CLINIC | Age: 61
End: 2021-07-13

## 2021-07-13 RX ORDER — FUROSEMIDE 20 MG/1
20 TABLET ORAL DAILY PRN
Qty: 30 TABLET | Refills: 5 | Status: SHIPPED | OUTPATIENT
Start: 2021-07-13 | End: 2022-07-06 | Stop reason: SDUPTHER

## 2021-07-13 RX ORDER — DOCUSATE SODIUM 100 MG/1
100 CAPSULE, LIQUID FILLED ORAL 2 TIMES DAILY
Qty: 180 CAPSULE | Refills: 0 | Status: SHIPPED | OUTPATIENT
Start: 2021-07-13 | End: 2021-10-11

## 2021-07-16 ENCOUNTER — PATIENT MESSAGE (OUTPATIENT)
Dept: GASTROENTEROLOGY | Facility: CLINIC | Age: 61
End: 2021-07-16

## 2021-07-20 ENCOUNTER — HOSPITAL ENCOUNTER (OUTPATIENT)
Dept: RADIOLOGY | Facility: HOSPITAL | Age: 61
Discharge: HOME | End: 2021-07-20
Attending: NURSE PRACTITIONER
Payer: MEDICARE

## 2021-07-20 DIAGNOSIS — R56.9 SEIZURE: ICD-10-CM

## 2021-07-20 DIAGNOSIS — Z87.898 HISTORY OF SEIZURES: ICD-10-CM

## 2021-07-20 DIAGNOSIS — R41.9 ALTERATION OF AWARENESS: Primary | ICD-10-CM

## 2021-07-20 DIAGNOSIS — R42 DIZZINESS AND GIDDINESS: Primary | ICD-10-CM

## 2021-07-20 DIAGNOSIS — G24.01 TARDIVE DYSKINESIA: ICD-10-CM

## 2021-07-20 DIAGNOSIS — F80.9 DEVELOPMENTAL SPEECH OR LANGUAGE DISORDER: ICD-10-CM

## 2021-07-20 DIAGNOSIS — Z86.73 HISTORY OF CVA (CEREBROVASCULAR ACCIDENT): ICD-10-CM

## 2021-07-20 DIAGNOSIS — R41.9 ALTERATION OF AWARENESS: ICD-10-CM

## 2021-07-20 DIAGNOSIS — F84.9 PERVASIVE DEVELOPMENTAL DISORDER, UNSPECIFIED: ICD-10-CM

## 2021-07-20 DIAGNOSIS — R56.9 SEIZURES: ICD-10-CM

## 2021-07-20 DIAGNOSIS — F81.9 DEVELOPMENTAL DISORDER OF SCHOLASTIC SKILL: ICD-10-CM

## 2021-07-20 DIAGNOSIS — Z74.09 IMPAIRED FUNCTIONAL MOBILITY, BALANCE, GAIT, AND ENDURANCE: ICD-10-CM

## 2021-07-20 DIAGNOSIS — I10 HYPERTENSION, ESSENTIAL: ICD-10-CM

## 2021-07-20 DIAGNOSIS — R42 DIZZINESS AND GIDDINESS: ICD-10-CM

## 2021-07-20 PROCEDURE — 93880 EXTRACRANIAL BILAT STUDY: CPT | Mod: TC

## 2021-07-20 PROCEDURE — 93880 US CAROTID BILATERAL: ICD-10-PCS | Mod: 26,,, | Performed by: RADIOLOGY

## 2021-07-20 PROCEDURE — 93880 EXTRACRANIAL BILAT STUDY: CPT | Mod: 26,,, | Performed by: RADIOLOGY

## 2021-07-20 RX ORDER — POLYETHYLENE GLYCOL 3350 17 G/17G
POWDER, FOR SOLUTION ORAL DAILY
COMMUNITY
Start: 2021-06-15 | End: 2021-07-20 | Stop reason: SDUPTHER

## 2021-07-20 RX ORDER — POLYETHYLENE GLYCOL 3350 17 G/17G
17 POWDER, FOR SOLUTION ORAL 2 TIMES DAILY
Qty: 3060 G | Refills: 0 | Status: SHIPPED | OUTPATIENT
Start: 2021-07-20 | End: 2021-10-18

## 2021-07-22 ENCOUNTER — NURSE TRIAGE (OUTPATIENT)
Dept: ADMINISTRATIVE | Facility: CLINIC | Age: 61
End: 2021-07-22

## 2021-07-23 ENCOUNTER — OFFICE VISIT (OUTPATIENT)
Dept: URGENT CARE | Facility: CLINIC | Age: 61
End: 2021-07-23
Payer: MEDICARE

## 2021-07-23 ENCOUNTER — HOSPITAL ENCOUNTER (OUTPATIENT)
Dept: RADIOLOGY | Facility: HOSPITAL | Age: 61
Discharge: HOME | End: 2021-07-23
Attending: NURSE PRACTITIONER
Payer: MEDICARE

## 2021-07-23 ENCOUNTER — TELEPHONE (OUTPATIENT)
Dept: FAMILY MEDICINE | Facility: CLINIC | Age: 61
End: 2021-07-23

## 2021-07-23 VITALS
TEMPERATURE: 97 F | BODY MASS INDEX: 22.58 KG/M2 | SYSTOLIC BLOOD PRESSURE: 151 MMHG | DIASTOLIC BLOOD PRESSURE: 86 MMHG | WEIGHT: 111.75 LBS | HEART RATE: 87 BPM | OXYGEN SATURATION: 98 %

## 2021-07-23 DIAGNOSIS — M79.672 ACUTE FOOT PAIN, LEFT: ICD-10-CM

## 2021-07-23 DIAGNOSIS — S05.12XA ECCHYMOSIS OF LEFT EYE, INITIAL ENCOUNTER: ICD-10-CM

## 2021-07-23 DIAGNOSIS — S05.12XA ECCHYMOSIS OF LEFT EYE, INITIAL ENCOUNTER: Primary | ICD-10-CM

## 2021-07-23 PROCEDURE — 70150 XR FACIAL BONES 3 OR MORE VIEW: ICD-10-PCS | Mod: 26,,, | Performed by: RADIOLOGY

## 2021-07-23 PROCEDURE — 73630 X-RAY EXAM OF FOOT: CPT | Mod: TC,PO,LT

## 2021-07-23 PROCEDURE — 99214 PR OFFICE/OUTPT VISIT, EST, LEVL IV, 30-39 MIN: ICD-10-PCS | Mod: S$PBB,,, | Performed by: NURSE PRACTITIONER

## 2021-07-23 PROCEDURE — 99999 PR PBB SHADOW E&M-EST. PATIENT-LVL V: CPT | Mod: PBBFAC,,, | Performed by: NURSE PRACTITIONER

## 2021-07-23 PROCEDURE — 99214 OFFICE O/P EST MOD 30 MIN: CPT | Mod: S$PBB,,, | Performed by: NURSE PRACTITIONER

## 2021-07-23 PROCEDURE — 73630 X-RAY EXAM OF FOOT: CPT | Mod: 26,LT,, | Performed by: RADIOLOGY

## 2021-07-23 PROCEDURE — 70150 X-RAY EXAM OF FACIAL BONES: CPT | Mod: TC,PO

## 2021-07-23 PROCEDURE — 73630 XR FOOT COMPLETE 3 VIEW LEFT: ICD-10-PCS | Mod: 26,LT,, | Performed by: RADIOLOGY

## 2021-07-23 PROCEDURE — 99215 OFFICE O/P EST HI 40 MIN: CPT | Mod: PBBFAC,PO | Performed by: NURSE PRACTITIONER

## 2021-07-23 PROCEDURE — 99999 PR PBB SHADOW E&M-EST. PATIENT-LVL V: ICD-10-PCS | Mod: PBBFAC,,, | Performed by: NURSE PRACTITIONER

## 2021-07-23 PROCEDURE — 70150 X-RAY EXAM OF FACIAL BONES: CPT | Mod: 26,,, | Performed by: RADIOLOGY

## 2021-07-27 ENCOUNTER — OFFICE VISIT (OUTPATIENT)
Dept: PSYCHIATRY | Facility: CLINIC | Age: 61
End: 2021-07-27
Payer: MEDICARE

## 2021-07-27 ENCOUNTER — HOSPITAL ENCOUNTER (OUTPATIENT)
Dept: RADIOLOGY | Facility: HOSPITAL | Age: 61
Discharge: HOME | End: 2021-07-27
Attending: INTERNAL MEDICINE
Payer: MEDICARE

## 2021-07-27 DIAGNOSIS — Z12.39 ENCOUNTER FOR SCREENING FOR MALIGNANT NEOPLASM OF BREAST, UNSPECIFIED SCREENING MODALITY: ICD-10-CM

## 2021-07-27 DIAGNOSIS — R45.1 RESTLESSNESS AND AGITATION: ICD-10-CM

## 2021-07-27 DIAGNOSIS — F84.9 PDD (PERVASIVE DEVELOPMENTAL DISORDER): Primary | ICD-10-CM

## 2021-07-27 DIAGNOSIS — G47.00 INSOMNIA, UNSPECIFIED TYPE: ICD-10-CM

## 2021-07-27 PROCEDURE — 99214 OFFICE O/P EST MOD 30 MIN: CPT | Mod: S$PBB,,, | Performed by: PSYCHIATRY & NEUROLOGY

## 2021-07-27 PROCEDURE — 76642 ULTRASOUND BREAST LIMITED: CPT | Mod: TC,50

## 2021-07-27 PROCEDURE — 99214 PR OFFICE/OUTPT VISIT, EST, LEVL IV, 30-39 MIN: ICD-10-PCS | Mod: S$PBB,,, | Performed by: PSYCHIATRY & NEUROLOGY

## 2021-07-27 PROCEDURE — 76642 ULTRASOUND BREAST LIMITED: CPT | Mod: 26,50,, | Performed by: RADIOLOGY

## 2021-07-27 PROCEDURE — 99212 OFFICE O/P EST SF 10 MIN: CPT | Mod: PBBFAC | Performed by: PSYCHIATRY & NEUROLOGY

## 2021-07-27 PROCEDURE — 99999 PR PBB SHADOW E&M-EST. PATIENT-LVL II: CPT | Mod: PBBFAC,,, | Performed by: PSYCHIATRY & NEUROLOGY

## 2021-07-27 PROCEDURE — 76642 US BREAST BILATERAL LIMITED: ICD-10-PCS | Mod: 26,50,, | Performed by: RADIOLOGY

## 2021-07-27 PROCEDURE — 99999 PR PBB SHADOW E&M-EST. PATIENT-LVL II: ICD-10-PCS | Mod: PBBFAC,,, | Performed by: PSYCHIATRY & NEUROLOGY

## 2021-07-27 RX ORDER — QUETIAPINE FUMARATE 400 MG/1
TABLET, FILM COATED ORAL
Qty: 45 TABLET | Refills: 2 | Status: SHIPPED | OUTPATIENT
Start: 2021-07-27 | End: 2021-11-30 | Stop reason: SDUPTHER

## 2021-07-27 RX ORDER — LORAZEPAM 1 MG/1
1 TABLET ORAL DAILY PRN
Qty: 30 TABLET | Refills: 2 | Status: SHIPPED | OUTPATIENT
Start: 2021-07-27 | End: 2021-11-30 | Stop reason: SDUPTHER

## 2021-07-27 RX ORDER — ALPRAZOLAM 0.25 MG/1
0.25 TABLET ORAL 2 TIMES DAILY
Qty: 60 TABLET | Refills: 2 | Status: SHIPPED | OUTPATIENT
Start: 2021-07-27 | End: 2021-11-30 | Stop reason: SDUPTHER

## 2021-07-27 RX ORDER — DIVALPROEX SODIUM 500 MG/1
500 TABLET, DELAYED RELEASE ORAL 2 TIMES DAILY
Qty: 60 TABLET | Refills: 2 | Status: SHIPPED | OUTPATIENT
Start: 2021-07-27 | End: 2021-11-30 | Stop reason: SDUPTHER

## 2021-07-27 RX ORDER — MIRTAZAPINE 45 MG/1
45 TABLET, FILM COATED ORAL NIGHTLY
Qty: 30 TABLET | Refills: 2 | Status: SHIPPED | OUTPATIENT
Start: 2021-07-27 | End: 2021-10-18

## 2021-07-28 ENCOUNTER — OFFICE VISIT (OUTPATIENT)
Dept: FAMILY MEDICINE | Facility: CLINIC | Age: 61
End: 2021-07-28
Payer: MEDICARE

## 2021-07-28 VITALS
WEIGHT: 110 LBS | TEMPERATURE: 98 F | BODY MASS INDEX: 22.18 KG/M2 | SYSTOLIC BLOOD PRESSURE: 122 MMHG | HEART RATE: 78 BPM | HEIGHT: 59 IN | DIASTOLIC BLOOD PRESSURE: 89 MMHG

## 2021-07-28 DIAGNOSIS — F84.9 PDD (PERVASIVE DEVELOPMENTAL DISORDER): ICD-10-CM

## 2021-07-28 DIAGNOSIS — D50.0 IRON DEFICIENCY ANEMIA DUE TO CHRONIC BLOOD LOSS: ICD-10-CM

## 2021-07-28 DIAGNOSIS — G40.909 SEIZURE DISORDER: Primary | ICD-10-CM

## 2021-07-28 DIAGNOSIS — I10 ESSENTIAL HYPERTENSION: ICD-10-CM

## 2021-07-28 DIAGNOSIS — D72.819 LEUKOPENIA, UNSPECIFIED TYPE: ICD-10-CM

## 2021-07-28 PROCEDURE — 99214 OFFICE O/P EST MOD 30 MIN: CPT | Mod: S$PBB,,, | Performed by: INTERNAL MEDICINE

## 2021-07-28 PROCEDURE — 99214 PR OFFICE/OUTPT VISIT, EST, LEVL IV, 30-39 MIN: ICD-10-PCS | Mod: S$PBB,,, | Performed by: INTERNAL MEDICINE

## 2021-07-28 PROCEDURE — 99999 PR PBB SHADOW E&M-EST. PATIENT-LVL IV: CPT | Mod: PBBFAC,,, | Performed by: INTERNAL MEDICINE

## 2021-07-28 PROCEDURE — 99214 OFFICE O/P EST MOD 30 MIN: CPT | Mod: PBBFAC,PO | Performed by: INTERNAL MEDICINE

## 2021-07-28 PROCEDURE — 99999 PR PBB SHADOW E&M-EST. PATIENT-LVL IV: ICD-10-PCS | Mod: PBBFAC,,, | Performed by: INTERNAL MEDICINE

## 2021-07-28 RX ORDER — DICLOFENAC SODIUM 10 MG/G
2 GEL TOPICAL 4 TIMES DAILY PRN
Qty: 150 G | Refills: 1 | Status: SHIPPED | OUTPATIENT
Start: 2021-07-28 | End: 2021-10-25 | Stop reason: SDUPTHER

## 2021-08-02 ENCOUNTER — PATIENT OUTREACH (OUTPATIENT)
Dept: ADMINISTRATIVE | Facility: HOSPITAL | Age: 61
End: 2021-08-02

## 2021-08-12 ENCOUNTER — TELEPHONE (OUTPATIENT)
Dept: PSYCHIATRY | Facility: CLINIC | Age: 61
End: 2021-08-12

## 2021-08-13 ENCOUNTER — TELEPHONE (OUTPATIENT)
Dept: PSYCHIATRY | Facility: CLINIC | Age: 61
End: 2021-08-13

## 2021-08-13 RX ORDER — GABAPENTIN 800 MG/1
800 TABLET ORAL 3 TIMES DAILY
Qty: 90 TABLET | Refills: 2 | Status: SHIPPED | OUTPATIENT
Start: 2021-08-13 | End: 2021-10-25

## 2021-08-24 ENCOUNTER — PATIENT MESSAGE (OUTPATIENT)
Dept: GASTROENTEROLOGY | Facility: CLINIC | Age: 61
End: 2021-08-24

## 2021-09-02 ENCOUNTER — PATIENT MESSAGE (OUTPATIENT)
Dept: HEMATOLOGY/ONCOLOGY | Facility: CLINIC | Age: 61
End: 2021-09-02

## 2021-09-11 ENCOUNTER — LAB VISIT (OUTPATIENT)
Dept: LAB | Facility: HOSPITAL | Age: 61
End: 2021-09-11
Attending: INTERNAL MEDICINE
Payer: MEDICARE

## 2021-09-11 ENCOUNTER — NURSE TRIAGE (OUTPATIENT)
Dept: ADMINISTRATIVE | Facility: CLINIC | Age: 61
End: 2021-09-11

## 2021-09-11 ENCOUNTER — DOCUMENTATION ONLY (OUTPATIENT)
Dept: HEMATOLOGY/ONCOLOGY | Facility: CLINIC | Age: 61
End: 2021-09-11

## 2021-09-11 DIAGNOSIS — G40.909 SEIZURE DISORDER: ICD-10-CM

## 2021-09-11 DIAGNOSIS — D69.6 THROMBOCYTOPENIA: ICD-10-CM

## 2021-09-11 DIAGNOSIS — D72.819 LEUKOPENIA, UNSPECIFIED TYPE: ICD-10-CM

## 2021-09-11 LAB
ALBUMIN SERPL BCP-MCNC: 3.6 G/DL (ref 3.5–5.2)
ANION GAP SERPL CALC-SCNC: 12 MMOL/L (ref 8–16)
BUN SERPL-MCNC: 21 MG/DL (ref 8–23)
CALCIUM SERPL-MCNC: 10.3 MG/DL (ref 8.7–10.5)
CHLORIDE SERPL-SCNC: 100 MMOL/L (ref 95–110)
CO2 SERPL-SCNC: 27 MMOL/L (ref 23–29)
CREAT SERPL-MCNC: 0.8 MG/DL (ref 0.5–1.4)
EST. GFR  (AFRICAN AMERICAN): >60 ML/MIN/1.73 M^2
EST. GFR  (NON AFRICAN AMERICAN): >60 ML/MIN/1.73 M^2
GLUCOSE SERPL-MCNC: 102 MG/DL (ref 70–110)
PHOSPHATE SERPL-MCNC: 3.4 MG/DL (ref 2.7–4.5)
POTASSIUM SERPL-SCNC: 3.9 MMOL/L (ref 3.5–5.1)
SODIUM SERPL-SCNC: 139 MMOL/L (ref 136–145)
VALPROATE SERPL-MCNC: 69.4 UG/ML (ref 50–100)

## 2021-09-11 PROCEDURE — 87389 HIV-1 AG W/HIV-1&-2 AB AG IA: CPT | Performed by: INTERNAL MEDICINE

## 2021-09-11 PROCEDURE — 85027 COMPLETE CBC AUTOMATED: CPT | Performed by: INTERNAL MEDICINE

## 2021-09-11 PROCEDURE — 85060 BLOOD SMEAR INTERPRETATION: CPT | Mod: ,,, | Performed by: PATHOLOGY

## 2021-09-11 PROCEDURE — 85060 PATHOLOGIST REVIEW: ICD-10-PCS | Mod: ,,, | Performed by: PATHOLOGY

## 2021-09-11 PROCEDURE — 80164 ASSAY DIPROPYLACETIC ACD TOT: CPT | Performed by: INTERNAL MEDICINE

## 2021-09-11 PROCEDURE — 85007 BL SMEAR W/DIFF WBC COUNT: CPT | Performed by: INTERNAL MEDICINE

## 2021-09-11 PROCEDURE — 86803 HEPATITIS C AB TEST: CPT | Performed by: INTERNAL MEDICINE

## 2021-09-11 PROCEDURE — 36415 COLL VENOUS BLD VENIPUNCTURE: CPT | Performed by: INTERNAL MEDICINE

## 2021-09-11 PROCEDURE — 80069 RENAL FUNCTION PANEL: CPT | Performed by: INTERNAL MEDICINE

## 2021-09-13 ENCOUNTER — PATIENT MESSAGE (OUTPATIENT)
Dept: PSYCHIATRY | Facility: CLINIC | Age: 61
End: 2021-09-13

## 2021-09-13 ENCOUNTER — OFFICE VISIT (OUTPATIENT)
Dept: HEMATOLOGY/ONCOLOGY | Facility: CLINIC | Age: 61
End: 2021-09-13
Payer: MEDICARE

## 2021-09-13 VITALS
TEMPERATURE: 98 F | DIASTOLIC BLOOD PRESSURE: 78 MMHG | OXYGEN SATURATION: 98 % | HEIGHT: 59 IN | WEIGHT: 122.56 LBS | SYSTOLIC BLOOD PRESSURE: 125 MMHG | BODY MASS INDEX: 24.71 KG/M2 | HEART RATE: 91 BPM

## 2021-09-13 DIAGNOSIS — D69.6 THROMBOCYTOPENIA: Primary | ICD-10-CM

## 2021-09-13 DIAGNOSIS — D72.819 LEUKOPENIA, UNSPECIFIED TYPE: ICD-10-CM

## 2021-09-13 LAB
ANISOCYTOSIS BLD QL SMEAR: SLIGHT
BASOPHILS NFR BLD: 0 % (ref 0–1.9)
DACRYOCYTES BLD QL SMEAR: ABNORMAL
DIFFERENTIAL METHOD: ABNORMAL
EOSINOPHIL NFR BLD: 1 % (ref 0–8)
ERYTHROCYTE [DISTWIDTH] IN BLOOD BY AUTOMATED COUNT: 12.4 % (ref 11.5–14.5)
HCT VFR BLD AUTO: 40.4 % (ref 37–48.5)
HCV AB SERPL QL IA: NEGATIVE
HGB BLD-MCNC: 13.5 G/DL (ref 12–16)
HIV 1+2 AB+HIV1 P24 AG SERPL QL IA: NEGATIVE
IMM GRANULOCYTES # BLD AUTO: ABNORMAL K/UL (ref 0–0.04)
IMM GRANULOCYTES NFR BLD AUTO: ABNORMAL % (ref 0–0.5)
LYMPHOCYTES NFR BLD: 47 % (ref 18–48)
MCH RBC QN AUTO: 30.1 PG (ref 27–31)
MCHC RBC AUTO-ENTMCNC: 33.4 G/DL (ref 32–36)
MCV RBC AUTO: 90 FL (ref 82–98)
MONOCYTES NFR BLD: 17 % (ref 4–15)
MYELOCYTES NFR BLD MANUAL: 2 %
NEUTROPHILS NFR BLD: 33 % (ref 38–73)
NRBC BLD-RTO: 0 /100 WBC
PATH REV BLD -IMP: NORMAL
PATH REV BLD -IMP: NORMAL
PLATELET # BLD AUTO: 120 K/UL (ref 150–450)
PLATELET BLD QL SMEAR: ABNORMAL
PMV BLD AUTO: 10.1 FL (ref 9.2–12.9)
POIKILOCYTOSIS BLD QL SMEAR: SLIGHT
RBC # BLD AUTO: 4.48 M/UL (ref 4–5.4)
SMUDGE CELLS BLD QL SMEAR: PRESENT
WBC # BLD AUTO: 1.86 K/UL (ref 3.9–12.7)

## 2021-09-13 PROCEDURE — 99214 PR OFFICE/OUTPT VISIT, EST, LEVL IV, 30-39 MIN: ICD-10-PCS | Mod: S$PBB,,, | Performed by: INTERNAL MEDICINE

## 2021-09-13 PROCEDURE — 99999 PR PBB SHADOW E&M-EST. PATIENT-LVL V: CPT | Mod: PBBFAC,,, | Performed by: INTERNAL MEDICINE

## 2021-09-13 PROCEDURE — 99215 OFFICE O/P EST HI 40 MIN: CPT | Mod: PBBFAC | Performed by: INTERNAL MEDICINE

## 2021-09-13 PROCEDURE — 99214 OFFICE O/P EST MOD 30 MIN: CPT | Mod: S$PBB,,, | Performed by: INTERNAL MEDICINE

## 2021-09-13 PROCEDURE — 99999 PR PBB SHADOW E&M-EST. PATIENT-LVL V: ICD-10-PCS | Mod: PBBFAC,,, | Performed by: INTERNAL MEDICINE

## 2021-09-17 ENCOUNTER — TELEPHONE (OUTPATIENT)
Dept: FAMILY MEDICINE | Facility: CLINIC | Age: 61
End: 2021-09-17

## 2021-09-20 DIAGNOSIS — D69.6 THROMBOCYTOPENIA: Primary | ICD-10-CM

## 2021-09-22 ENCOUNTER — PATIENT OUTREACH (OUTPATIENT)
Dept: ADMINISTRATIVE | Facility: OTHER | Age: 61
End: 2021-09-22

## 2021-09-22 DIAGNOSIS — Z12.31 ENCOUNTER FOR SCREENING MAMMOGRAM FOR BREAST CANCER: Primary | ICD-10-CM

## 2021-09-24 ENCOUNTER — OFFICE VISIT (OUTPATIENT)
Dept: GASTROENTEROLOGY | Facility: CLINIC | Age: 61
End: 2021-09-24
Payer: MEDICARE

## 2021-09-24 ENCOUNTER — OFFICE VISIT (OUTPATIENT)
Dept: HEMATOLOGY/ONCOLOGY | Facility: CLINIC | Age: 61
End: 2021-09-24
Payer: MEDICARE

## 2021-09-24 DIAGNOSIS — K59.00 CONSTIPATION, UNSPECIFIED CONSTIPATION TYPE: Primary | ICD-10-CM

## 2021-09-24 DIAGNOSIS — D72.819 LEUKOPENIA, UNSPECIFIED TYPE: ICD-10-CM

## 2021-09-24 DIAGNOSIS — D69.6 THROMBOCYTOPENIA: ICD-10-CM

## 2021-09-24 PROCEDURE — 99214 PR OFFICE/OUTPT VISIT, EST, LEVL IV, 30-39 MIN: ICD-10-PCS | Mod: 95,,, | Performed by: NURSE PRACTITIONER

## 2021-09-24 PROCEDURE — 99214 OFFICE O/P EST MOD 30 MIN: CPT | Mod: 95,,, | Performed by: INTERNAL MEDICINE

## 2021-09-24 PROCEDURE — 99214 OFFICE O/P EST MOD 30 MIN: CPT | Mod: 95,,, | Performed by: NURSE PRACTITIONER

## 2021-09-24 PROCEDURE — 99214 PR OFFICE/OUTPT VISIT, EST, LEVL IV, 30-39 MIN: ICD-10-PCS | Mod: 95,,, | Performed by: INTERNAL MEDICINE

## 2021-09-30 ENCOUNTER — TELEPHONE (OUTPATIENT)
Dept: FAMILY MEDICINE | Facility: CLINIC | Age: 61
End: 2021-09-30

## 2021-10-02 ENCOUNTER — LAB VISIT (OUTPATIENT)
Dept: LAB | Facility: HOSPITAL | Age: 61
End: 2021-10-02
Attending: PSYCHIATRY & NEUROLOGY
Payer: MEDICARE

## 2021-10-02 DIAGNOSIS — D69.6 THROMBOCYTOPENIA: ICD-10-CM

## 2021-10-04 ENCOUNTER — IMMUNIZATION (OUTPATIENT)
Dept: PRIMARY CARE CLINIC | Facility: CLINIC | Age: 61
End: 2021-10-04
Payer: MEDICARE

## 2021-10-04 DIAGNOSIS — Z23 NEED FOR VACCINATION: Primary | ICD-10-CM

## 2021-10-04 PROCEDURE — 0003A COVID-19, MRNA, LNP-S, PF, 30 MCG/0.3 ML DOSE VACCINE: CPT | Mod: CV19,PBBFAC | Performed by: NURSE PRACTITIONER

## 2021-10-04 PROCEDURE — 91300 COVID-19, MRNA, LNP-S, PF, 30 MCG/0.3 ML DOSE VACCINE: CPT | Mod: PBBFAC | Performed by: NURSE PRACTITIONER

## 2021-10-06 ENCOUNTER — TELEPHONE (OUTPATIENT)
Dept: ADMINISTRATIVE | Facility: HOSPITAL | Age: 61
End: 2021-10-06

## 2021-10-19 ENCOUNTER — OFFICE VISIT (OUTPATIENT)
Dept: PODIATRY | Facility: CLINIC | Age: 61
End: 2021-10-19
Payer: MEDICARE

## 2021-10-19 ENCOUNTER — HOSPITAL ENCOUNTER (OUTPATIENT)
Dept: RADIOLOGY | Facility: HOSPITAL | Age: 61
Discharge: HOME | End: 2021-10-19
Attending: PODIATRIST
Payer: MEDICARE

## 2021-10-19 ENCOUNTER — IMMUNIZATION (OUTPATIENT)
Dept: INTERNAL MEDICINE | Facility: CLINIC | Age: 61
End: 2021-10-19
Payer: MEDICARE

## 2021-10-19 VITALS — BODY MASS INDEX: 24.6 KG/M2 | HEIGHT: 59 IN | WEIGHT: 122 LBS

## 2021-10-19 DIAGNOSIS — M79.671 RIGHT FOOT PAIN: Primary | ICD-10-CM

## 2021-10-19 DIAGNOSIS — M79.671 RIGHT FOOT PAIN: ICD-10-CM

## 2021-10-19 DIAGNOSIS — F79 MENTAL HANDICAP: ICD-10-CM

## 2021-10-19 DIAGNOSIS — B35.1 ONYCHOMYCOSIS: ICD-10-CM

## 2021-10-19 PROCEDURE — 99999 PR PBB SHADOW E&M-EST. PATIENT-LVL III: ICD-10-PCS | Mod: PBBFAC,,, | Performed by: PODIATRIST

## 2021-10-19 PROCEDURE — 99213 OFFICE O/P EST LOW 20 MIN: CPT | Mod: PBBFAC,25,PO | Performed by: PODIATRIST

## 2021-10-19 PROCEDURE — 73630 X-RAY EXAM OF FOOT: CPT | Mod: 26,RT,, | Performed by: RADIOLOGY

## 2021-10-19 PROCEDURE — 73630 XR FOOT COMPLETE 3 VIEW RIGHT: ICD-10-PCS | Mod: 26,RT,, | Performed by: RADIOLOGY

## 2021-10-19 PROCEDURE — 99999 PR PBB SHADOW E&M-EST. PATIENT-LVL III: CPT | Mod: PBBFAC,,, | Performed by: PODIATRIST

## 2021-10-19 PROCEDURE — 99212 PR OFFICE/OUTPT VISIT, EST, LEVL II, 10-19 MIN: ICD-10-PCS | Mod: S$PBB,,, | Performed by: PODIATRIST

## 2021-10-19 PROCEDURE — 90686 IIV4 VACC NO PRSV 0.5 ML IM: CPT | Mod: PBBFAC

## 2021-10-19 PROCEDURE — 73630 X-RAY EXAM OF FOOT: CPT | Mod: TC,PO,RT

## 2021-10-19 PROCEDURE — 99212 OFFICE O/P EST SF 10 MIN: CPT | Mod: S$PBB,,, | Performed by: PODIATRIST

## 2021-10-20 ENCOUNTER — TELEPHONE (OUTPATIENT)
Dept: FAMILY MEDICINE | Facility: CLINIC | Age: 61
End: 2021-10-20

## 2021-10-20 RX ORDER — MIRTAZAPINE 45 MG/1
45 TABLET, FILM COATED ORAL NIGHTLY
Qty: 30 TABLET | Refills: 0 | Status: SHIPPED | OUTPATIENT
Start: 2021-10-20 | End: 2021-11-30 | Stop reason: SDUPTHER

## 2021-10-25 ENCOUNTER — OFFICE VISIT (OUTPATIENT)
Dept: FAMILY MEDICINE | Facility: CLINIC | Age: 61
End: 2021-10-25
Payer: MEDICARE

## 2021-10-25 DIAGNOSIS — I10 ESSENTIAL HYPERTENSION: ICD-10-CM

## 2021-10-25 DIAGNOSIS — F84.9 PDD (PERVASIVE DEVELOPMENTAL DISORDER): Primary | ICD-10-CM

## 2021-10-25 DIAGNOSIS — D69.6 THROMBOCYTOPENIA: ICD-10-CM

## 2021-10-25 DIAGNOSIS — D72.819 LEUKOPENIA, UNSPECIFIED TYPE: ICD-10-CM

## 2021-10-25 PROCEDURE — 99213 OFFICE O/P EST LOW 20 MIN: CPT | Mod: 95,,, | Performed by: INTERNAL MEDICINE

## 2021-10-25 PROCEDURE — 99213 PR OFFICE/OUTPT VISIT, EST, LEVL III, 20-29 MIN: ICD-10-PCS | Mod: 95,,, | Performed by: INTERNAL MEDICINE

## 2021-10-25 RX ORDER — DICLOFENAC SODIUM 10 MG/G
2 GEL TOPICAL 4 TIMES DAILY PRN
Qty: 150 G | Refills: 1 | Status: SHIPPED | OUTPATIENT
Start: 2021-10-25 | End: 2021-12-17 | Stop reason: SDUPTHER

## 2021-11-15 ENCOUNTER — OFFICE VISIT (OUTPATIENT)
Dept: INTERNAL MEDICINE | Facility: CLINIC | Age: 61
End: 2021-11-15
Payer: MEDICARE

## 2021-11-15 ENCOUNTER — HOSPITAL ENCOUNTER (OUTPATIENT)
Dept: RADIOLOGY | Facility: HOSPITAL | Age: 61
Discharge: HOME | End: 2021-11-15
Payer: MEDICARE

## 2021-11-15 VITALS — TEMPERATURE: 99 F | WEIGHT: 121.56 LBS | BODY MASS INDEX: 24.56 KG/M2

## 2021-11-15 DIAGNOSIS — R30.0 DYSURIA: Primary | ICD-10-CM

## 2021-11-15 DIAGNOSIS — K59.09 OTHER CONSTIPATION: ICD-10-CM

## 2021-11-15 DIAGNOSIS — F84.9 PDD (PERVASIVE DEVELOPMENTAL DISORDER): ICD-10-CM

## 2021-11-15 PROCEDURE — 99999 PR PBB SHADOW E&M-EST. PATIENT-LVL III: ICD-10-PCS | Mod: PBBFAC,,,

## 2021-11-15 PROCEDURE — 99213 PR OFFICE/OUTPT VISIT, EST, LEVL III, 20-29 MIN: ICD-10-PCS | Mod: S$PBB,,,

## 2021-11-15 PROCEDURE — 74018 RADEX ABDOMEN 1 VIEW: CPT | Mod: TC

## 2021-11-15 PROCEDURE — 74018 XR ABDOMEN AP 1 VIEW: ICD-10-PCS | Mod: 26,,, | Performed by: RADIOLOGY

## 2021-11-15 PROCEDURE — 99213 OFFICE O/P EST LOW 20 MIN: CPT | Mod: S$PBB,,,

## 2021-11-15 PROCEDURE — 99999 PR PBB SHADOW E&M-EST. PATIENT-LVL III: CPT | Mod: PBBFAC,,,

## 2021-11-15 PROCEDURE — 99213 OFFICE O/P EST LOW 20 MIN: CPT | Mod: PBBFAC

## 2021-11-15 PROCEDURE — 74018 RADEX ABDOMEN 1 VIEW: CPT | Mod: 26,,, | Performed by: RADIOLOGY

## 2021-11-15 RX ORDER — DOCUSATE SODIUM 100 MG/1
100 CAPSULE, LIQUID FILLED ORAL 2 TIMES DAILY
COMMUNITY
Start: 2021-11-12 | End: 2021-12-21 | Stop reason: SDUPTHER

## 2021-11-15 RX ORDER — POLYETHYLENE GLYCOL 3350 17 G/17G
POWDER, FOR SOLUTION ORAL
COMMUNITY
Start: 2021-11-12 | End: 2023-03-15 | Stop reason: SDUPTHER

## 2021-11-22 ENCOUNTER — TELEPHONE (OUTPATIENT)
Dept: INTERNAL MEDICINE | Facility: CLINIC | Age: 61
End: 2021-11-22
Payer: MEDICARE

## 2021-11-22 ENCOUNTER — LAB VISIT (OUTPATIENT)
Dept: LAB | Facility: HOSPITAL | Age: 61
End: 2021-11-22
Attending: INTERNAL MEDICINE
Payer: MEDICARE

## 2021-11-22 DIAGNOSIS — K59.09 OTHER CONSTIPATION: ICD-10-CM

## 2021-11-22 LAB
BILIRUB UR QL STRIP: NEGATIVE
CLARITY UR: CLEAR
COLOR UR: YELLOW
GLUCOSE UR QL STRIP: NEGATIVE
HGB UR QL STRIP: NEGATIVE
KETONES UR QL STRIP: NEGATIVE
LEUKOCYTE ESTERASE UR QL STRIP: NEGATIVE
NITRITE UR QL STRIP: NEGATIVE
PH UR STRIP: 6 [PH] (ref 5–8)
PROT UR QL STRIP: NEGATIVE
SP GR UR STRIP: >1.03 (ref 1–1.03)
URN SPEC COLLECT METH UR: ABNORMAL
UROBILINOGEN UR STRIP-ACNC: 1 EU/DL

## 2021-11-22 PROCEDURE — 81003 URINALYSIS AUTO W/O SCOPE: CPT

## 2021-11-30 ENCOUNTER — OFFICE VISIT (OUTPATIENT)
Dept: PSYCHIATRY | Facility: CLINIC | Age: 61
End: 2021-11-30
Payer: MEDICARE

## 2021-11-30 DIAGNOSIS — F79 INTELLECTUAL DISABILITY: Primary | ICD-10-CM

## 2021-11-30 DIAGNOSIS — R45.1 RESTLESSNESS AND AGITATION: ICD-10-CM

## 2021-11-30 PROCEDURE — 99213 OFFICE O/P EST LOW 20 MIN: CPT | Mod: 95,,, | Performed by: PSYCHIATRY & NEUROLOGY

## 2021-11-30 PROCEDURE — 99213 PR OFFICE/OUTPT VISIT, EST, LEVL III, 20-29 MIN: ICD-10-PCS | Mod: 95,,, | Performed by: PSYCHIATRY & NEUROLOGY

## 2021-11-30 RX ORDER — LORAZEPAM 1 MG/1
1 TABLET ORAL DAILY PRN
Qty: 30 TABLET | Refills: 2 | Status: SHIPPED | OUTPATIENT
Start: 2021-11-30 | End: 2022-02-16

## 2021-11-30 RX ORDER — ALPRAZOLAM 0.25 MG/1
0.25 TABLET ORAL 2 TIMES DAILY
Qty: 60 TABLET | Refills: 2 | Status: SHIPPED | OUTPATIENT
Start: 2021-11-30 | End: 2022-02-22 | Stop reason: SDUPTHER

## 2021-11-30 RX ORDER — QUETIAPINE FUMARATE 400 MG/1
TABLET, FILM COATED ORAL
Qty: 45 TABLET | Refills: 2 | Status: SHIPPED | OUTPATIENT
Start: 2021-11-30 | End: 2022-02-22 | Stop reason: SDUPTHER

## 2021-11-30 RX ORDER — DIVALPROEX SODIUM 500 MG/1
500 TABLET, DELAYED RELEASE ORAL 2 TIMES DAILY
Qty: 60 TABLET | Refills: 2 | Status: SHIPPED | OUTPATIENT
Start: 2021-11-30 | End: 2022-02-09

## 2021-11-30 RX ORDER — MIRTAZAPINE 45 MG/1
45 TABLET, FILM COATED ORAL NIGHTLY
Qty: 30 TABLET | Refills: 2 | Status: SHIPPED | OUTPATIENT
Start: 2021-11-30 | End: 2022-02-22 | Stop reason: SDUPTHER

## 2021-12-09 ENCOUNTER — OFFICE VISIT (OUTPATIENT)
Dept: CARDIOLOGY | Facility: CLINIC | Age: 61
End: 2021-12-09
Payer: MEDICARE

## 2021-12-09 ENCOUNTER — HOSPITAL ENCOUNTER (OUTPATIENT)
Dept: CARDIOLOGY | Facility: HOSPITAL | Age: 61
Discharge: HOME | End: 2021-12-09
Attending: INTERNAL MEDICINE
Payer: MEDICARE

## 2021-12-09 VITALS — DIASTOLIC BLOOD PRESSURE: 121 MMHG | SYSTOLIC BLOOD PRESSURE: 161 MMHG | HEART RATE: 95 BPM | OXYGEN SATURATION: 98 %

## 2021-12-09 DIAGNOSIS — I10 ESSENTIAL HYPERTENSION: ICD-10-CM

## 2021-12-09 DIAGNOSIS — R60.0 LOCALIZED EDEMA: ICD-10-CM

## 2021-12-09 DIAGNOSIS — I10 ESSENTIAL HYPERTENSION: Primary | ICD-10-CM

## 2021-12-09 DIAGNOSIS — G24.01 TARDIVE DYSKINESIA: ICD-10-CM

## 2021-12-09 DIAGNOSIS — G40.909 SEIZURE DISORDER: Primary | ICD-10-CM

## 2021-12-09 PROCEDURE — 99214 OFFICE O/P EST MOD 30 MIN: CPT | Mod: S$PBB,,, | Performed by: INTERNAL MEDICINE

## 2021-12-09 PROCEDURE — 99212 OFFICE O/P EST SF 10 MIN: CPT | Mod: PBBFAC | Performed by: INTERNAL MEDICINE

## 2021-12-09 PROCEDURE — 99999 PR PBB SHADOW E&M-EST. PATIENT-LVL II: ICD-10-PCS | Mod: PBBFAC,,, | Performed by: INTERNAL MEDICINE

## 2021-12-09 PROCEDURE — 93005 ELECTROCARDIOGRAM TRACING: CPT

## 2021-12-09 PROCEDURE — 93010 EKG 12-LEAD: ICD-10-PCS | Mod: ,,, | Performed by: INTERNAL MEDICINE

## 2021-12-09 PROCEDURE — 99999 PR PBB SHADOW E&M-EST. PATIENT-LVL II: CPT | Mod: PBBFAC,,, | Performed by: INTERNAL MEDICINE

## 2021-12-09 PROCEDURE — 99214 PR OFFICE/OUTPT VISIT, EST, LEVL IV, 30-39 MIN: ICD-10-PCS | Mod: S$PBB,,, | Performed by: INTERNAL MEDICINE

## 2021-12-09 PROCEDURE — 93010 ELECTROCARDIOGRAM REPORT: CPT | Mod: ,,, | Performed by: INTERNAL MEDICINE

## 2021-12-17 RX ORDER — DICLOFENAC SODIUM 10 MG/G
2 GEL TOPICAL 4 TIMES DAILY PRN
Qty: 150 G | Refills: 1 | Status: SHIPPED | OUTPATIENT
Start: 2021-12-17 | End: 2022-01-29

## 2021-12-21 RX ORDER — DOCUSATE SODIUM 100 MG/1
100 CAPSULE, LIQUID FILLED ORAL 2 TIMES DAILY
Qty: 180 CAPSULE | Refills: 0 | Status: SHIPPED | OUTPATIENT
Start: 2021-12-21 | End: 2022-03-21

## 2021-12-23 ENCOUNTER — OFFICE VISIT (OUTPATIENT)
Dept: URGENT CARE | Facility: CLINIC | Age: 61
End: 2021-12-23
Payer: MEDICARE

## 2021-12-23 VITALS
HEIGHT: 59 IN | RESPIRATION RATE: 14 BRPM | TEMPERATURE: 98 F | BODY MASS INDEX: 24.39 KG/M2 | HEART RATE: 95 BPM | DIASTOLIC BLOOD PRESSURE: 74 MMHG | SYSTOLIC BLOOD PRESSURE: 131 MMHG | WEIGHT: 121 LBS | OXYGEN SATURATION: 99 %

## 2021-12-23 DIAGNOSIS — J20.9 ACUTE BRONCHITIS, UNSPECIFIED ORGANISM: Primary | ICD-10-CM

## 2021-12-23 LAB
CTP QC/QA: YES
SARS-COV-2 RDRP RESP QL NAA+PROBE: NEGATIVE

## 2021-12-23 PROCEDURE — U0002 COVID-19 LAB TEST NON-CDC: HCPCS | Mod: QW,CR,S$GLB, | Performed by: PHYSICIAN ASSISTANT

## 2021-12-23 PROCEDURE — U0002: ICD-10-PCS | Mod: QW,CR,S$GLB, | Performed by: PHYSICIAN ASSISTANT

## 2021-12-23 PROCEDURE — 99213 OFFICE O/P EST LOW 20 MIN: CPT | Mod: S$GLB,,, | Performed by: PHYSICIAN ASSISTANT

## 2021-12-23 PROCEDURE — 99213 PR OFFICE/OUTPT VISIT, EST, LEVL III, 20-29 MIN: ICD-10-PCS | Mod: S$GLB,,, | Performed by: PHYSICIAN ASSISTANT

## 2021-12-23 RX ORDER — FOLIC ACID 1 MG/1
TABLET ORAL
COMMUNITY
End: 2022-01-12 | Stop reason: SDUPTHER

## 2021-12-23 RX ORDER — LACTULOSE 10 G/15ML
SOLUTION ORAL
COMMUNITY
End: 2022-04-02

## 2021-12-23 RX ORDER — FUROSEMIDE 20 MG/1
TABLET ORAL
COMMUNITY
End: 2022-01-12 | Stop reason: SDUPTHER

## 2021-12-23 RX ORDER — DIAZEPAM 5 MG/1
TABLET ORAL
COMMUNITY
End: 2022-02-22

## 2021-12-23 RX ORDER — MUPIROCIN 20 MG/G
OINTMENT TOPICAL
COMMUNITY
End: 2023-05-07 | Stop reason: ALTCHOICE

## 2021-12-23 RX ORDER — DOCUSATE SODIUM 100 MG/1
CAPSULE, LIQUID FILLED ORAL
COMMUNITY
End: 2022-01-12 | Stop reason: SDUPTHER

## 2021-12-23 RX ORDER — POLYETHYLENE GLYCOL 1450
POWDER (GRAM) MISCELLANEOUS
COMMUNITY
End: 2022-01-12 | Stop reason: SDUPTHER

## 2021-12-23 RX ORDER — TRIAMTERENE AND HYDROCHLOROTHIAZIDE 37.5; 25 MG/1; MG/1
CAPSULE ORAL
COMMUNITY
End: 2022-01-12 | Stop reason: SDUPTHER

## 2021-12-23 RX ORDER — MELATONIN 10 MG
CAPSULE ORAL
COMMUNITY
End: 2022-04-26 | Stop reason: SDUPTHER

## 2021-12-23 RX ORDER — IBUPROFEN 200 MG
TABLET ORAL
COMMUNITY
End: 2022-01-12 | Stop reason: SDUPTHER

## 2021-12-23 RX ORDER — AMLODIPINE BESYLATE 10 MG/1
TABLET ORAL
COMMUNITY
End: 2022-01-12 | Stop reason: SDUPTHER

## 2021-12-23 RX ORDER — QUETIAPINE FUMARATE 400 MG/1
TABLET, FILM COATED ORAL
COMMUNITY
End: 2022-01-12 | Stop reason: SDUPTHER

## 2021-12-23 RX ORDER — BENZONATATE 100 MG/1
100 CAPSULE ORAL 3 TIMES DAILY PRN
Qty: 15 CAPSULE | Refills: 0 | Status: SHIPPED | OUTPATIENT
Start: 2021-12-23 | End: 2021-12-28

## 2021-12-23 RX ORDER — ALPRAZOLAM 0.25 MG/1
TABLET ORAL
COMMUNITY
End: 2022-01-12 | Stop reason: SDUPTHER

## 2021-12-23 RX ORDER — DIVALPROEX SODIUM 500 MG/1
TABLET, DELAYED RELEASE ORAL
COMMUNITY
End: 2022-01-12 | Stop reason: SDUPTHER

## 2021-12-23 RX ORDER — SIMETHICONE 125 MG
CAPSULE ORAL
COMMUNITY

## 2021-12-23 RX ORDER — MIRTAZAPINE 30 MG/1
TABLET, FILM COATED ORAL
COMMUNITY
End: 2022-02-22 | Stop reason: SDUPTHER

## 2021-12-30 ENCOUNTER — TELEPHONE (OUTPATIENT)
Dept: FAMILY MEDICINE | Facility: CLINIC | Age: 61
End: 2021-12-30
Payer: MEDICARE

## 2022-01-03 ENCOUNTER — HOSPITAL ENCOUNTER (OUTPATIENT)
Dept: RADIOLOGY | Facility: HOSPITAL | Age: 62
Discharge: HOME | End: 2022-01-03
Attending: NURSE PRACTITIONER
Payer: MEDICARE

## 2022-01-03 ENCOUNTER — OFFICE VISIT (OUTPATIENT)
Dept: FAMILY MEDICINE | Facility: CLINIC | Age: 62
End: 2022-01-03
Payer: MEDICARE

## 2022-01-03 VITALS
DIASTOLIC BLOOD PRESSURE: 85 MMHG | TEMPERATURE: 98 F | HEART RATE: 89 BPM | BODY MASS INDEX: 22.78 KG/M2 | SYSTOLIC BLOOD PRESSURE: 130 MMHG | WEIGHT: 113 LBS | HEIGHT: 59 IN

## 2022-01-03 DIAGNOSIS — K59.00 CONSTIPATION, UNSPECIFIED CONSTIPATION TYPE: ICD-10-CM

## 2022-01-03 DIAGNOSIS — R05.9 COUGH: ICD-10-CM

## 2022-01-03 DIAGNOSIS — Z86.16 HISTORY OF COVID-19: ICD-10-CM

## 2022-01-03 DIAGNOSIS — J06.9 UPPER RESPIRATORY TRACT INFECTION, UNSPECIFIED TYPE: Primary | ICD-10-CM

## 2022-01-03 DIAGNOSIS — Z86.39 HISTORY OF DEHYDRATION: ICD-10-CM

## 2022-01-03 PROCEDURE — 99999 PR PBB SHADOW E&M-EST. PATIENT-LVL V: CPT | Mod: PBBFAC,,, | Performed by: NURSE PRACTITIONER

## 2022-01-03 PROCEDURE — 99214 OFFICE O/P EST MOD 30 MIN: CPT | Mod: S$PBB,,, | Performed by: NURSE PRACTITIONER

## 2022-01-03 PROCEDURE — 74018 XR ABDOMEN AP 1 VIEW: ICD-10-PCS | Mod: 26,,, | Performed by: RADIOLOGY

## 2022-01-03 PROCEDURE — 74018 RADEX ABDOMEN 1 VIEW: CPT | Mod: 26,,, | Performed by: RADIOLOGY

## 2022-01-03 PROCEDURE — 71046 X-RAY EXAM CHEST 2 VIEWS: CPT | Mod: 26,,, | Performed by: RADIOLOGY

## 2022-01-03 PROCEDURE — 74018 RADEX ABDOMEN 1 VIEW: CPT | Mod: TC,PO

## 2022-01-03 PROCEDURE — 71046 X-RAY EXAM CHEST 2 VIEWS: CPT | Mod: TC,PO

## 2022-01-03 PROCEDURE — 99999 PR PBB SHADOW E&M-EST. PATIENT-LVL V: ICD-10-PCS | Mod: PBBFAC,,, | Performed by: NURSE PRACTITIONER

## 2022-01-03 PROCEDURE — 99214 PR OFFICE/OUTPT VISIT, EST, LEVL IV, 30-39 MIN: ICD-10-PCS | Mod: S$PBB,,, | Performed by: NURSE PRACTITIONER

## 2022-01-03 PROCEDURE — 99215 OFFICE O/P EST HI 40 MIN: CPT | Mod: PBBFAC,25,PO | Performed by: NURSE PRACTITIONER

## 2022-01-03 PROCEDURE — 71046 XR CHEST PA AND LATERAL: ICD-10-PCS | Mod: 26,,, | Performed by: RADIOLOGY

## 2022-01-04 NOTE — PROGRESS NOTES
Subjective:       Patient ID: Juan Pablo Bolden is a 61 y.o. female.    Chief Complaint: Cough (X1 week)    Cough  This is a new problem. Episode onset: Caregiver states cough began 12/19/21; states tested for COVID-19 at urgent care and was negative; states cough progressed but has now resolved. The problem has been resolved (Caegiver states at facility with inconsistent medication administration; states uncertain if prescribed medications are given as prescribed. Pt developmentally delayed). The problem occurs every few minutes. The cough is productive of sputum. Pertinent negatives include no chest pain, chills, ear congestion, ear pain, fever, headaches, heartburn, hemoptysis, myalgias, nasal congestion, postnasal drip, rash, rhinorrhea, sore throat, shortness of breath, sweats, weight loss or wheezing. Nothing aggravates the symptoms. She has tried prescription cough suppressant (mucinex) for the symptoms. The treatment provided significant relief. There is no history of asthma, bronchiectasis, bronchitis, COPD, emphysema, environmental allergies or pneumonia.   Constipation  This is a chronic problem. The current episode started more than 1 month ago. The problem has been waxing and waning since onset. Stool frequency: Unknown; caregiver states has no record of bowel movement; pt unable to verbalize due to develomental delay; states prescribed medication for constipation but uncertain if being given. The patient is not on a high fiber diet. She does not exercise regularly. There has not been adequate water intake (History of dehydration per caregiver). Associated symptoms include bloating. Pertinent negatives include no abdominal pain, anorexia, back pain, diarrhea, difficulty urinating, fecal incontinence, fever, flatus, hematochezia, hemorrhoids, melena, nausea, rectal pain, vomiting or weight loss. She has tried nothing for the symptoms. The treatment provided no relief. Her past medical history is significant  for psychiatric history. There is no history of abdominal surgery, endocrine disease, inflammatory bowel disease, irritable bowel syndrome, metabolic disease, neurologic disease, neuromuscular disease or radiation treatment.     Review of Systems   Constitutional: Negative.  Negative for chills, fever and weight loss.   HENT: Negative.  Negative for ear pain, postnasal drip, rhinorrhea and sore throat.    Eyes: Negative.    Respiratory: Positive for cough. Negative for hemoptysis, shortness of breath and wheezing.    Cardiovascular: Negative.  Negative for chest pain.   Gastrointestinal: Positive for bloating and constipation. Negative for abdominal pain, anorexia, diarrhea, flatus, heartburn, hematochezia, hemorrhoids, melena, nausea, rectal pain and vomiting.   Endocrine: Negative.    Genitourinary: Negative.  Negative for difficulty urinating.   Musculoskeletal: Negative.  Negative for back pain and myalgias.   Integumentary:  Negative for rash. Negative.   Allergic/Immunologic: Negative.  Negative for environmental allergies.   Neurological: Negative.  Negative for headaches.   Psychiatric/Behavioral: Negative.          Objective:      Physical Exam  Vitals and nursing note reviewed.   Constitutional:       Appearance: Normal appearance.   HENT:      Head: Normocephalic.      Right Ear: Tympanic membrane, ear canal and external ear normal.      Left Ear: Tympanic membrane, ear canal and external ear normal.      Nose: Nose normal.      Mouth/Throat:      Mouth: Mucous membranes are moist.      Pharynx: Oropharynx is clear.   Eyes:      Conjunctiva/sclera: Conjunctivae normal.      Pupils: Pupils are equal, round, and reactive to light.   Cardiovascular:      Rate and Rhythm: Normal rate and regular rhythm.      Pulses: Normal pulses.      Heart sounds: Normal heart sounds.   Pulmonary:      Effort: Pulmonary effort is normal.      Breath sounds: Normal breath sounds.   Abdominal:      General: Bowel sounds are  normal.      Palpations: Abdomen is soft.   Musculoskeletal:         General: Normal range of motion.      Cervical back: Normal range of motion and neck supple.   Skin:     General: Skin is warm and dry.      Capillary Refill: Capillary refill takes 2 to 3 seconds.   Neurological:      Mental Status: She is alert.   Psychiatric:         Mood and Affect: Mood normal.         Assessment:       Problem List Items Addressed This Visit    None     Visit Diagnoses     Upper respiratory tract infection, unspecified type    -  Primary    Cough        Relevant Orders    X-Ray Chest PA And Lateral (Completed)    History of dehydration        Constipation, unspecified constipation type        Relevant Orders    X-Ray Abdomen AP 1 View (Completed)    History of COVID-19              Plan:           Juan Pablo was seen today for cough.    Diagnoses and all orders for this visit:    Upper respiratory tract infection, unspecified type  Cough(resolved)  History of COVID-19  -     X-Ray Chest PA And Lateral; Future    History of dehydration  -    Basic Metabolic Panel; Future    Constipation, unspecified constipation type  -     X-Ray Abdomen Flat And Erect; Future    Continue current medications  Report to ER immediately if symptoms worsen

## 2022-01-05 ENCOUNTER — TELEPHONE (OUTPATIENT)
Dept: PSYCHIATRY | Facility: CLINIC | Age: 62
End: 2022-01-05
Payer: MEDICARE

## 2022-01-07 ENCOUNTER — TELEPHONE (OUTPATIENT)
Dept: FAMILY MEDICINE | Facility: CLINIC | Age: 62
End: 2022-01-07
Payer: MEDICARE

## 2022-01-07 ENCOUNTER — TELEPHONE (OUTPATIENT)
Dept: NEUROLOGY | Facility: CLINIC | Age: 62
End: 2022-01-07
Payer: MEDICARE

## 2022-01-07 DIAGNOSIS — Z86.39 HISTORY OF DEHYDRATION: Primary | ICD-10-CM

## 2022-01-07 NOTE — TELEPHONE ENCOUNTER
----- Message from Shamika Champion sent at 1/7/2022  1:58 PM CST -----  Contact: Alanis/ Sister  Alanis is needing a call back to get the patient schedule for a transfer of care appointment. She has been seen for Movement disorders at [facility]. Please call her back at [phone]

## 2022-01-07 NOTE — TELEPHONE ENCOUNTER
----- Message from Shamika Champion sent at 1/7/2022  1:50 PM CST -----  Contact: Alanis/ Sister  Alanis is needing new orders placed for the lab work and please call her to schedule at the [place] location. Please call her back at [phone]

## 2022-01-11 NOTE — PROGRESS NOTES
Subjective:      Patient ID: Juan Pablo Bolden is a 61 y.o. female.    Chief Complaint:  hand shaking in the left hand       History of Present Illness  HPI    Patient has seen multiple neurologist, including movement specialist, in the past including Dr. Ferreira, Dr. Gonzalez at [facility], Dr. Simon, Dr. Yuen, Dr. Bangura, and Dr. Arsen Zayas at [facility]. Obtaining an accurate history and assessment was difficult related to patient talking loudly and being restless throughout appointment.      Patient present's to appointment with sister. Patient's sister states that they would like to establish care with a neurologist in [place]. Patient's sister provided the history. Patient's sister states that she recently began taking patient to appointments over the last year; therefore, she has limited history. Patient has diagnoses of tardive dyskinesia, PDD, schizoaffective disorder, hyperprolactinemia, central hypothyroidism, and lipodystrophy.  The patient is currently living at a respite center. Previously, patient was living independently with a privatized provider (who brought her to appointments). Patient is awaiting to be placed with a new privatized provider. She states at baseline, the patient is orientated to self and needs assistance with all ADLs and 24 hour care. She becomes agitated when her medications are not given to her consistently or when objects are taken from her. The patient's sister visits her 2 to 3 times a week. Patient's sister states patient is at her baseline. She has noticed the patient's focus has decreased but denies memory changes. Patient's sister is unsure if the patient is receiving her medications appropriately at the respite center.    Patient's sister brought patient to appointment because she has noticed the patient holding her left arm close to her body and moving the fingers on her left hand constantly for the last 3 weeks (December 2021). Patient's sister demonstrated the  movement, which appeared to be piano-like. Per chart review, patient has long history of psychotropic exposure. Patient sees Dr. Schmidt with psychiatry for agitation and restlessness. Currently taking Seroquel 200/400 mg, Remeron 30 mg QHS, Depakote 500 mg BID, Valium 5 mg PRN, and Xanax 0.25 mg BID for restlessness and agitation. Patient's sister is concerned the patient had a stroke because she had very high blood pressure at her last cardiology appointment (/121 12-9-2021)  and not sure about compliance with medications. Denies lateralized weakness, trouble swallowing (always needs foods chopped), or facial droop. She states the patient has had similar movement in her fingers in the past as well as lip smacking that stopped. She noticed her sister often rocks/sways back and forth. She often grabs her clothes and licks her clothes. She denies the patient tapping her foot, shoulder shrugging, and rotating hips. She denies history of seizures, although it is on the patient's chart. She states that she has never witnessed seizure-like activity or was told about seizure-like activity. She states she has had a negative EEG in the past. Patient's sister was unaware of patient's previous stroke and denied patient having any stroke-like symptoms in the past. The patient had a fall last year. She is able to ambulate but likes to hold on to people while she walks.  9- CTH showed small, old lacunar infarct in the anterior limb of the left internal capsule. Otherwise unremarkable. 2-7-2020 CTH showed no acute intracranial abnormality. Chronic ischemic changes including bilateral chronic lacunar infarcts.      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 Patient's sister states that patient does not have Kluver Bucy Syndrome, and it was ruled out in the past. She is unsure when the last brain imaging was completed. She states they have attempted a MRI and MRA in the past but  sedation is needed. Patient was not able to stay still with valium.         Review of Systems  Review of Systems   Unable to perform ROS: Psychiatric disorder     Objective:   VITAL SIGNS WERE REVIEWED        GENERAL APPEARANCE:      The patient speaking loudly stating and restless. Did not follow instructions.     BMI 23.38     No signs of respiratory distress.     Normal breathing pattern.     Did not make eye contact      GENERAL MEDICAL EXAM:     HEENT:  Head is atraumatic normocephalic.      Neck and Axillae: No JVD. No visible lesions. No thyromegaly. No lymphadenopathy.     Cardiopulmonary: No cyanosis. No tachypnea. Normal respiratory effort.     Gastrointestinal/Urogenital:  No jaundice. No stomas or lesions. No visible hernias. No catheters.     Skin, Hair and Nails:  No neurofibromatosis. No visible lesions.No stigmata of autoimmune disease. No clubbing.     Skin is warm and moist. No palpable masses.     Limbs: No varicose veins. No visible swelling. No palpable edema.     Muskoskeletal: No visible deformities.No visible lesions.     No spine tenderness. No signs of longstanding neuropathy. No dislocations or fractures.          Neurologic Exam     Mental Status   Oriented to person.   Disoriented to place. Disoriented to country, city, area, street and number.   Disoriented to time. Disoriented to year, month, date and season.   Follows 1 step commands.   Attention: decreased. Concentration: decreased.   Speech: slurred   Level of consciousness: alert  Knowledge: poor.   Abnormal comprehension.     Cranial Nerves   Cranial nerves II through XII intact.     CN III, IV, VI   Right pupil: Size: 3 mm. Shape: regular. Accommodation: intact.   Left pupil: Size: 3 mm. Shape: regular. Accommodation: intact.   CN III: no CN III palsy  CN VI: no CN VI palsy  Ophthalmoparesis: none  Upgaze: normal  Downgaze: normal  Conjugate gaze: absent    CN VII   Facial expression full, symmetric.     CN VIII   CN VIII normal.    Hearing: intact    CN XII   CN XII normal.    Exam limited due to patient's limited participation      Motor Exam   Muscle bulk: decreased  Overall muscle tone: decreased Patient appeared to move all extremities equally      Patient able to reach bilateral arms out to grab objects     Sensory Exam   Right arm light touch: normal  Left arm light touch: normal  Right leg light touch: normal  Left leg light touch: normal    Gait, Coordination, and Reflexes     Gait  Gait: normal    Reflexes   Right brachioradialis: 2+  Left brachioradialis: 2+  Right biceps: 2+  Left biceps: 2+  Right triceps: 2+  Left triceps: 2+ Patient screamed and kicked leg up when attempting to check reflexes     Patient was holding items in her hands throughout appointment, unable to assess finger movements.         Lab Results   Component Value Date    WBC 2.34 (L) 10/02/2021    HGB 12.7 10/02/2021    HCT 38.7 10/02/2021    MCV 91 10/02/2021     (L) 10/02/2021       Sodium   Date Value Ref Range Status   09/11/2021 139 136 - 145 mmol/L Final     Potassium   Date Value Ref Range Status   09/11/2021 3.9 3.5 - 5.1 mmol/L Final     Chloride   Date Value Ref Range Status   09/11/2021 100 95 - 110 mmol/L Final     CO2   Date Value Ref Range Status   09/11/2021 27 23 - 29 mmol/L Final     Glucose   Date Value Ref Range Status   09/11/2021 102 70 - 110 mg/dL Final     BUN   Date Value Ref Range Status   09/11/2021 21 8 - 23 mg/dL Final     Creatinine   Date Value Ref Range Status   09/11/2021 0.8 0.5 - 1.4 mg/dL Final     Calcium   Date Value Ref Range Status   09/11/2021 10.3 8.7 - 10.5 mg/dL Final     Total Protein   Date Value Ref Range Status   04/09/2021 6.5 6.0 - 8.4 g/dL Final     Albumin   Date Value Ref Range Status   09/11/2021 3.6 3.5 - 5.2 g/dL Final     Total Bilirubin   Date Value Ref Range Status   04/09/2021 0.3 0.1 - 1.0 mg/dL Final     Comment:     For infants and newborns, interpretation of results should be based  on  gestational age, weight and in agreement with clinical  observations.    Premature Infant recommended reference ranges:  Up to 24 hours.............<8.0 mg/dL  Up to 48 hours............<12.0 mg/dL  3-5 days..................<15.0 mg/dL  6-29 days.................<15.0 mg/dL       Alkaline Phosphatase   Date Value Ref Range Status   04/09/2021 78 55 - 135 U/L Final     AST   Date Value Ref Range Status   04/09/2021 15 10 - 40 U/L Final     ALT   Date Value Ref Range Status   04/09/2021 9 (L) 10 - 44 U/L Final     Anion Gap   Date Value Ref Range Status   09/11/2021 12 8 - 16 mmol/L Final     eGFR if    Date Value Ref Range Status   09/11/2021 >60.0 >60 mL/min/1.73 m^2 Final     eGFR if non    Date Value Ref Range Status   09/11/2021 >60.0 >60 mL/min/1.73 m^2 Final     Comment:     Calculation used to obtain the estimated glomerular filtration  rate (eGFR) is the CKD-EPI equation.          Lab Results   Component Value Date    ZVOBYSRF01 586 10/01/2020       Lab Results   Component Value Date    TSH 1.177 04/09/2021    FREET4 0.82 03/19/2021 9-    CTH showed small, old lacunar infarct in the anterior limb of the left internal capsule. Otherwise unremarkable.    2-7-2020    CTH showed no acute intracranial abnormality. Chronic ischemic changes including bilateral chronic lacunar infarcts    4-9-2021    Valproic Acid level 90.8 NL    4-    TTE WNL per Dr Ambriz from [facility] note (do not personally see results)    6-4-2021    EEG normal in the awake state    Reviewed the neuroimaging independently      Assessment:     1. Tardive dyskinesia    2. Other symptoms and signs involving the nervous system    3. Restlessness and agitation    4. PDD (pervasive developmental disorder)    5. Mental handicap    6. Abnormal movements           Plan:   TARDIVE DYSKINESIA/ABNROMAL MOVEMENTS/QUESTIONAL SEIZURE DISORDER?    Evaluate MRI brain W WO contrast    Evaluate MRA  brain    Evaluate EEG followed by AEEG    Continue to monitor        PDD/RESTLESS and AGITATION    Continue to follow psych    Continue medications as prescribed by psych          MEDICAL/SURGICAL COMORBIDITIES      All relevant medical comorbidities noted and managed by primary care physician and medical care team.          MISCELLANEOUS MEDICAL PROBLEMS         HEALTHY LIFESTYLE AND PREVENTATIVE CARE     Encouraged the patient to adhere to the age-appropriate health maintenance guidelines including screening tests and vaccinations.      Discussed the overall importance of healthy lifestyle, optimal weight, exercise, healthy diet, good sleep hygiene and avoiding drugs including smoking, alcohol and recreational drugs. The patient verbalized full understanding.         Advised the patient to follow COVID-19 prevention measures.         I spent a total of 90 minutes on the day of the visit.  This includes face to face time (30 minutes) and non-face to face time (60 mintues) preparing to see the patient (eg, review of tests, CTH), obtaining and/or reviewing separately obtained history (psych and neurology notes), documenting clinical information in the electronic or other health record, independently interpreting results and communicating results to the patient/family/caregiver, or care coordinator.           RTC    Follow up in about 6 months (around 7/12/2022).     Dolly Casillas, MSN, NP  Collaborating Provider: Korin Fowler MD, FAAN Neurologist/Epileptologist

## 2022-01-12 ENCOUNTER — OFFICE VISIT (OUTPATIENT)
Dept: NEUROLOGY | Facility: CLINIC | Age: 62
End: 2022-01-12
Payer: MEDICARE

## 2022-01-12 ENCOUNTER — PATIENT OUTREACH (OUTPATIENT)
Dept: ADMINISTRATIVE | Facility: OTHER | Age: 62
End: 2022-01-12
Payer: MEDICARE

## 2022-01-12 VITALS
HEART RATE: 65 BPM | RESPIRATION RATE: 16 BRPM | WEIGHT: 115.75 LBS | DIASTOLIC BLOOD PRESSURE: 74 MMHG | OXYGEN SATURATION: 78 % | SYSTOLIC BLOOD PRESSURE: 134 MMHG | HEIGHT: 59 IN | BODY MASS INDEX: 23.33 KG/M2

## 2022-01-12 DIAGNOSIS — R45.1 RESTLESSNESS AND AGITATION: ICD-10-CM

## 2022-01-12 DIAGNOSIS — F79 MENTAL HANDICAP: ICD-10-CM

## 2022-01-12 DIAGNOSIS — G24.01 TARDIVE DYSKINESIA: Primary | ICD-10-CM

## 2022-01-12 DIAGNOSIS — F84.9 PDD (PERVASIVE DEVELOPMENTAL DISORDER): ICD-10-CM

## 2022-01-12 DIAGNOSIS — R25.9 ABNORMAL MOVEMENTS: ICD-10-CM

## 2022-01-12 DIAGNOSIS — R29.818 OTHER SYMPTOMS AND SIGNS INVOLVING THE NERVOUS SYSTEM: ICD-10-CM

## 2022-01-12 PROCEDURE — 99999 PR PBB SHADOW E&M-EST. PATIENT-LVL V: CPT | Mod: PBBFAC,,, | Performed by: NURSE PRACTITIONER

## 2022-01-12 PROCEDURE — 99215 PR OFFICE/OUTPT VISIT, EST, LEVL V, 40-54 MIN: ICD-10-PCS | Mod: S$PBB,,, | Performed by: NURSE PRACTITIONER

## 2022-01-12 PROCEDURE — 99215 OFFICE O/P EST HI 40 MIN: CPT | Mod: PBBFAC | Performed by: NURSE PRACTITIONER

## 2022-01-12 PROCEDURE — 99417 PR PROLONGED SVC, OUTPT, W/WO DIRECT PT CONTACT,  EA ADDTL 15 MIN: ICD-10-PCS | Mod: S$PBB,,, | Performed by: NURSE PRACTITIONER

## 2022-01-12 PROCEDURE — 99215 OFFICE O/P EST HI 40 MIN: CPT | Mod: S$PBB,,, | Performed by: NURSE PRACTITIONER

## 2022-01-12 PROCEDURE — 99999 PR PBB SHADOW E&M-EST. PATIENT-LVL V: ICD-10-PCS | Mod: PBBFAC,,, | Performed by: NURSE PRACTITIONER

## 2022-01-12 PROCEDURE — 99417 PROLNG OP E/M EACH 15 MIN: CPT | Mod: S$PBB,,, | Performed by: NURSE PRACTITIONER

## 2022-01-12 NOTE — PATIENT INSTRUCTIONS
Patient Education       Tardive Dyskinesia   The Basics   Written by the doctors and editors at [facility]   What is tardive dyskinesia? -- Tardive dyskinesia is a disorder that causes your body to make movements that are not under your control. It can make you move your mouth, tongue, face, trunk, arms, or legs, even when you are not trying to move. This disorder is a side effect of taking certain medicines. It most often happens in people who have been taking medicines called antipsychotics, which are used to treat mental illness, such as schizophrenia. Sometimes the disorder becomes permanent.  What are the symptoms of tardive dyskinesia? -- People with tardive dyskinesia make movements that they can't control.  · They sometimes:  ? Make odd faces  ? Stick out their tongue or move their tongue around  ? Pout, pucker, or smack their lips  · They might twist or spread their fingers over and over again, or move them as though they are playing a piano.  · Their legs might make quick, jerking movements, or slow, twisting movements.  · Their neck might twist at strange angles, or their hips might thrust.  The symptoms can be mild, and might or might not be bothersome.  What causes tardive dyskinesia? -- Tardive dyskinesia is a side effect of being on certain medicines for a while. Sometimes it starts when the dose of the medicine is lowered, or when the medicine is stopped. The main medicines that cause it include:  · Medicines called antipsychotics, such as:  ? Haloperidol (brand name: Haldol)  ? Fluphenazine  ? Risperidone (brand name: Risperdal)  ? Olanzapine (brand name: Zyprexa)  · Metoclopramide (brand name: Reglan) - This is a medicine used to control nausea and vomiting and to treat acid reflux. No one should take this medicine for longer than 3 months at a time. There might be other medicines that can be used in its place.  Will I need tests? -- Probably not. If you have been on an antipsychotic medicine for at  "least a month and you show signs of tardive dyskinesia, the doctor or nurse will probably be able to tell what's wrong. Still, it's possible the doctor or nurse will order blood tests or brain scans to make sure your symptoms are not caused by another problem.  How is tardive dyskinesia treated? -- Some people with tardive dyskinesia only have mild symptoms and don't need treatment. For people who do, the treatment usually involves switching antipsychotic medicines or changing the dose. In some cases, the condition will go away or improve a little if the medicine that caused it is stopped. Unfortunately, it is not always possible to stop the medicine.  There are also medicines that can be given along with antipsychotic medicines that seem to help with tardive dyskinesia. If your doctor is not able to control your tardive dyskinesia by switching your antipsychotic medicine or changing your dose, they might suggest adding other medicines. For example:  · Benzodiazepines - These medicines can ease mild tardive dyskinesia symptoms in some people.  · Botulinum toxin - People who have abnormal movement only in certain body parts (such as the eyelids) can get injections of a medicine called botulinum toxin. This medicine, also called "BoTox," can help muscles relax and stop moving abnormally.  · Deutetrabenazine, tetrabenazine, or valbenazine - If you have severe symptoms that can't be treated with botulinum toxin, your doctor might suggest trying one of these medicines.  People with severe tardive dyskinesia that does not improve with other treatments can sometimes get a treatment called "deep brain stimulation" (also called "DBS"). People who get DBS must first have surgery to place wires into a part of the brain that helps control muscle movement. The wires are attached to a device that gets implanted under the skin, usually near the collarbone. It sends electrical signals to the brain to reduce abnormal movement.  Can " tardive dyskinesia be prevented? -- Yes and no. Tardive dyskinesia is an unfortunate side effect of medicines that people need to take to be well. Most people who take these medications do not get tardive dyskinesia, but some do. It's not always possible to avoid these medicines, so it's not always possible to avoid tardive dyskinesia. Still, there are certain things you can do to reduce the chances of getting the disorder or having a severe form of it. If you are on an antipsychotic medicine, watch out for abnormal movements and let your doctor know right away if you notice any. The earlier the symptoms of tardive dyskinesia are found, the more likely it is that something can be done to prevent the condition from becoming permanent.  All topics are updated as new evidence becomes available and our peer review process is complete.  This topic retrieved from [employer] on: Sep 21, 2021.  Topic 30205 Version 9.0  Release: 29.4.2 - C29.263  © 2021 UpToDate, Inc. and/or its affiliates. All rights reserved.  Consumer Information Use and Disclaimer   This information is not specific medical advice and does not replace information you receive from your health care provider. This is only a brief summary of general information. It does NOT include all information about conditions, illnesses, injuries, tests, procedures, treatments, therapies, discharge instructions or life-style choices that may apply to you. You must talk with your health care provider for complete information about your health and treatment options. This information should not be used to decide whether or not to accept your health care provider's advice, instructions or recommendations. Only your health care provider has the knowledge and training to provide advice that is right for you. The use of this information is governed by the [employer] End User License Agreement, available at https://www.[employer].Kiw[employer]ic/en/solutions/JumpSeat/about/servando.The use of  "[facility] content is governed by the [employer] Terms of Use. ©2021 UpToDate, Inc. All rights reserved.  Copyright   © 2021 UpToDate, Inc. and/or its affiliates. All rights reserved.  Patient Education       EEG   The Basics   Written by the doctors and editors at [facility]   What is an EEG? -- EEG is short for "electroencephalogram." An EEG is a test that measures electrical activity in the brain and records brain wave patterns. Some brain conditions can affect a person's brain wave patterns.   Why might my doctor order an EEG? -- Your doctor might order an EEG to diagnose a brain condition or get more information about a known brain condition. You might have an EEG if you:  · Have seizures - Seizures are waves of abnormal electrical activity in the brain. Seizures can make you have convulsions (sudden shaking episodes), pass out, or move or behave strangely. An EEG can give your doctor information about your seizures.  · Have a brain tumor, brain injury, sleep problems, or memory problems  · Are going to have brain surgery  A doctor might also order an EEG to check the brain activity of a person in a coma.  How do I prepare for an EEG? -- To prepare for an EEG, you should:  · Wash your hair the night before your EEG with shampoo only. Don't put any conditioner, cream, or gel in your hair.  · Not eat or drink anything with caffeine in it for 8 to 12 hours beforehand.  · Follow your doctor's instructions about taking your medicines. You might need to take your usual medicines and doses. Or you might need to change your medicines.  · Follow your doctor's instructions about sleep. You might need to stay awake the night before your EEG if your doctor orders something called a "sleep-deprived EEG." If you do this, you will need to stay awake without drinking coffee or energy drinks.  What happens during an EEG? -- An EEG can take place in a lab, office, or hospital.  At the start of the EEG, a technician will stick " "electrodes onto your scalp using paste (figure 1). Some people wear a special cap with electrodes on it instead of having electrodes put on their scalp. Wires run from the electrodes to a computer that will record your brain activity.  After the electrodes are in place, you will lie on a bed or lean back in a chair. Your doctor might give you medicine to make you sleepy. You will need to stay still and close your eyes. The technician will ask you to do different things at different times during the EEG. He or she might have you look at a flashing light, or breathe in and out deeply and quickly.  Having an EEG usually takes about an hour. Some EEGs last longer. People can have an EEG for a few hours or overnight. In some cases, a video camera will record them during their EEG. This is called a "video EEG."   Some people have an "ambulatory EEG." After the electrodes are in place, they go home wearing a portable EEG recorder. The EEG records their brain waves for a few days, or sometimes longer, while they do their usual activities.  What happens after an EEG? -- After an EEG, the technician will remove the electrodes or the cap. Most people can go about their usual activities. But if your doctor gave you medicine to make you sleepy, you will feel sleepy for a while. In that case, you should have another person drive you home.  What are the downsides of an EEG? -- There aren't usually any downsides to having an EEG. The procedure is painless. An EEG only gets information about the electricity that your brain makes. It does not do anything to your brain, and it cannot read your thoughts or feelings.  What should I know about the results? -- You should know that an EEG doesn't always give a doctor helpful information. That's because an EEG records brain activity only for a short time.  Some people can have a normal EEG result even if they have seizures or another brain condition. Your doctor will use your EEG results " along with your exam and other results to diagnose or manage your condition. If your doctor thinks that another EEG would be helpful, he or she might have you do an EEG that lasts longer.  All topics are updated as new evidence becomes available and our peer review process is complete.  This topic retrieved from [employer] on: Sep 21, 2021.  Topic 38765 Version 6.0  Release: 29.4.2 - C29.263  © 2021 UpToDate, Inc. and/or its affiliates. All rights reserved.  figure 1: Person having an EEG     · This drawing shows a person having an EEG (also called an electroencephalogram). An EEG is a test that measures electrical activity in the brain and records brain wave patterns.  · The electrodes are stuck onto the scalp using paste. In some cases, the person wears a special cap with electrodes on it instead.  Graphic 94057 Version 6.0    Consumer Information Use and Disclaimer   This information is not specific medical advice and does not replace information you receive from your health care provider. This is only a brief summary of general information. It does NOT include all information about conditions, illnesses, injuries, tests, procedures, treatments, therapies, discharge instructions or life-style choices that may apply to you. You must talk with your health care provider for complete information about your health and treatment options. This information should not be used to decide whether or not to accept your health care provider's advice, instructions or recommendations. Only your health care provider has the knowledge and training to provide advice that is right for you. The use of this information is governed by the [employer] End User License Agreement, available at https://www.InflaRx.[employer]MCN/en/solutions/[employer]/about/servando.The use of [employer] content is governed by the [employer] Terms of Use. ©2021 UpToDate, Inc. All rights reserved.  Copyright   © 2021 UpToDate, Inc. and/or its affiliates. All rights  reserved.  Patient Education       MRI Scan   Why is this procedure done?   MRI scan is an imaging test. It uses strong magnetic and radio waves to take pictures of the inside of your body. The doctor looks at the pictures on a computer. The test shows many details about your organs, tissues, and bones. Your doctor may order an MRI to:  · See if you have any problems inside of your body from an injury or illness. Problems of the head, chest, spine, bones, joints, stomach, and pelvis can be found.  · See results after treatments  · Check results from other tests that are not normal  · Know how much of the body is affected by an illness  · Look at tissues or body parts for a defect or deformity       What happens before the procedure?   · Your doctor will ask you about your health history. Talk to your doctor about:  ? All the drugs you are taking. Be sure to include all prescription and over-the-counter (OTC) drugs, and herbal supplements. Tell the doctor about any drug allergy. Bring a list of drugs you take with you.  ? You may be given a dye called contrast for this procedure. Tell your doctor if you are allergic to dye.  ? Any chronic health problems you may have. Be sure to tell your doctor if you have sickle cell anemia, kidney problems, diabetes, anxiety, or fear of closed spaces.  ? Any metal devices or implants in your body. These may include heart valves, pacemakers, or implantable defibrillators. You may have ear implants, a joint replacement, or an IUD. If you have had surgery, you may have metal plates, pins, screws, or staples and clips from other surgical repairs.  ? If you have ever worked around metal. Welders and metal workers may have small pieces of metal under their skin.  ? If you are or may be pregnant or trying to become pregnant.  ? If you are not able to stay in a closed space for 30 to 45 minutes. You may be given drugs to help you relax during the test.  ? Your weight. MRI machines may  have a weight limit.  · Your doctor will do an exam and may order an x-ray to check for metal objects in your body.  · You may be asked to take off all metal. This would include jewelry, watch, hairpins, or hearing aids.  · If you have been given a drug to help you relax, you will need to have someone to drive you home after the test.  What happens during the procedure?   · The staff may place an IV in your blood vessel if you need contrast dye. The dye is used to make the pictures clearer. You may feel warm or flushed for a minute or two when the dye is given.  · You will lie on a narrow exam table. When the test starts, the table is moved into a large magnetic tunnel at the center of MRI scanner.  · You are alone in the MRI room during the test. The staff will be close by where they can see you while you are having the MRI. They also use special tools to watch your heart rate and breathing.  · The MRI machine often makes loud and knocking noises while it is working. Earplugs or headphones help block the noise of the MRI machine during the test. They also allow the staff to talk with you.  · It is important that you hold very still and do not move during the test.  · The magnetic field is created and radio waves are sent from the scanner. The waves react in your body and respond with signals. These signals are received by the computer. Then, it makes them into pictures of your organs, bones, and tissues.  · When the test is over, the exam table moves out of the magnetic tunnel and the IV is taken out.  · The test takes about 1 hour.  What happens after the procedure?   · If you came from home, you should be able to go home after your test.  · If you are in the hospital, you will go back to your room.  · Your doctor will look at the pictures with the staff and the MRI expert.  · After the review is done, your doctor will talk about your test results with you. Your doctor may ask you to plan a visit to talk about the  test results.  What care is needed at home?   · Ask your doctor what you need to do when you go home. Make sure you ask questions if you do not understand what the doctor says. This way you will know what you need to do.  · If you have been given drugs to relax you, do not drive or run machinery for at least 24 hours.  · Drink lots of fluids to help flush the dye out of your body.  · Your doctor will let you know when you can go back to your normal activities and diet after the test.  What follow-up care is needed?   · Your doctor may ask you to make visits to the office to check on your progress. Be sure to keep these visits.  What problems could happen?   · Sometimes patients may have an allergic reaction to the contrast dye. Allergic reactions may range from mild hives to (rarely) anaphylactic shock.  · You may feel very nervous during the test if you have a problem with small spaces or anxiety. Ask your doctor for drugs to help with these feelings.  Where can I learn more?   American College of Radiology  http://www.radiologyinfo.org/en/info.cfm?pg=bodymr   [facility] Choices  http://www.nhs.uk/Conditions/MRI-scan/Pages/Introduction.aspx   Last Reviewed Date   2021-02-12  Consumer Information Use and Disclaimer   This information is not specific medical advice and does not replace information you receive from your health care provider. This is only a brief summary of general information. It does NOT include all information about conditions, illnesses, injuries, tests, procedures, treatments, therapies, discharge instructions or life-style choices that may apply to you. You must talk with your health care provider for complete information about your health and treatment options. This information should not be used to decide whether or not to accept your health care providers advice, instructions or recommendations. Only your health care provider has the knowledge and training to provide advice that is right for  "you.  Copyright   Copyright © 2021 UpToDate, Inc. and its affiliates and/or licensors. All rights reserved.  Patient Education       Magnetic Resonance Angiography   Why is this procedure done?   Magnetic resonance angiogram is also called MRA. This imaging test uses strong magnetic and radio waves to take pictures of the blood vessels. The doctor looks at the pictures on a computer. Your doctor may order this test to learn more about possible problems with your blood vessels. The doctor may want to look at the blood vessels in your brain, legs, or other parts of your body. You may have:  · Bulges, weak spots, or tears in your blood vessels  · Blocked vessels that bring blood and oxygen to a part of your body  What happens before the procedure?   · Your doctor will take your history. Talk to your doctor about:  ? All the drugs you are taking. Be sure to include all prescription and over-the-counter (OTC) drugs, and herbal supplements. Tell the doctor about any drug allergy. Bring a list of drugs you take with you.  ? Any chronic health problems you may have. Be sure to tell your doctor if you have sickle cell anemia, kidney problems, diabetes, anxiety, or claustrophobia.  ? Any metal devices and implants in your body. These may include pacemakers, implantable defibrillators, ear implants, recently placed artificial joints, IUDs, artificial valves, metal fragments like metal plates, pins, screws, or staples and clips from other surgical repairs.  ? If you are pregnant, trying to become pregnant, or may be pregnant.  ? If you are breastfeeding.  · You may be given a dye called "contrast" for this test. Tell your doctor if you are allergic to dye or shellfish.  · You may be given a drug to help you relax during the test. If so, you will not be allowed to drive right away after the test. Ask a family member or a friend to drive you home.  Your doctor may:  · Take an x-ray to check if there are any metal objects in your " body  · Ask you to remove your watch, jewelry, glasses, dentures, partials, hearing aids, and all other metal objects  · Tell you to stop eating or drinking before your procedure  What happens during the procedure?   · You will lie on a narrow exam table. When the test starts, the table moves into a large magnetic tunnel at the center of the MRI scanner.  · You are alone in the MRI room during the test. The staff will be close by where they can see and hear you while you are having the MRI. They also use special tools to watch your heart rate and breathing.  · The staff will place an IV in your blood vessel so they can give you the contrast dye. The dye is used to make the pictures clearer. You may feel warm for a minute or two when the dye is given.  · The MRI machine often makes loud and knocking noises while it is working. Earplugs or headphones help block the noise of the MRI machine during the test. They also allow the staff to talk with you.  · It is important that you do not move during the test.  · The magnetic field is created and radio waves are sent from the scanner. The waves react in your body and respond with signals. These signals are received by the computer. Then, it makes them into pictures of the blood vessels.  · When the test is over, the exam table moves out of the magnetic tunnel and the IV is taken out.  · The test takes 1 to 2 hours.  What happens after the procedure?   · If you came from home, you should be able to go home after your test.  · If you are in the hospital, you will go back to your room.  · Your doctor will look at the pictures with the staff and the MRI expert.  · After the review is done, your doctor will talk about your test results with you. Your doctor may ask you to plan a visit to talk about the test results.  What care is needed at home?   · Ask your doctor what you need to do when you go home. Make sure you ask questions if you do not understand what the doctor says.  This way you will know what you need to do.  · Drink lots of fluids to help flush the dye out of your body.  · Your doctor will let you know when you can go back to your normal activities and diet after the test.  What follow-up care is needed?   · Your doctor may ask you to make visits to the office to check on your progress. Be sure to keep these visits.  What problems could happen?   · Some patients may have an allergic reaction to the contrast dye. These reactions may range from mild hives to (rarely) anaphylactic shock.  · You may feel very nervous during the test if you have a problem with small spaces or anxiety. Ask your doctor for drugs to help with these feelings.  Where can I learn more?   Rad[employer] Society of [place]  http://www.radiologyinfo.org/en/info.cfm?pg=angiomr   Last Reviewed Date   2020-11-16  Consumer Information Use and Disclaimer   This information is not specific medical advice and does not replace information you receive from your health care provider. This is only a brief summary of general information. It does NOT include all information about conditions, illnesses, injuries, tests, procedures, treatments, therapies, discharge instructions or life-style choices that may apply to you. You must talk with your health care provider for complete information about your health and treatment options. This information should not be used to decide whether or not to accept your health care providers advice, instructions or recommendations. Only your health care provider has the knowledge and training to provide advice that is right for you.  Copyright   Copyright © 2021 UpToDate, Inc. and its affiliates and/or licensors. All rights reserved.

## 2022-01-12 NOTE — PROGRESS NOTES
Health Maintenance Due   Topic Date Due    Shingles Vaccine (1 of 2) Never done    Mammogram  03/08/2014     Updates were requested from care everywhere.  Chart was reviewed for overdue Proactive [facility] Encounters (MELISA) topics (CRS, Breast Cancer Screening, Eye exam)  Health Maintenance has been updated.  [place] immunization registry triggered.  Immunizations were reconciled.

## 2022-01-13 DIAGNOSIS — E61.1 IRON DEFICIENCY: ICD-10-CM

## 2022-01-13 DIAGNOSIS — D69.6 THROMBOCYTOPENIA: ICD-10-CM

## 2022-01-13 DIAGNOSIS — D72.819 LEUKOPENIA, UNSPECIFIED TYPE: Primary | ICD-10-CM

## 2022-01-14 DIAGNOSIS — Z79.899 ON VALPROATE THERAPY: Primary | ICD-10-CM

## 2022-01-20 ENCOUNTER — TELEPHONE (OUTPATIENT)
Dept: NEUROLOGY | Facility: CLINIC | Age: 62
End: 2022-01-20
Payer: MEDICARE

## 2022-01-20 NOTE — TELEPHONE ENCOUNTER
----- Message from Dolly Casillas NP sent at 1/20/2022  3:20 PM CST -----  Please let Ms. Bolden's sister know I received her EEG from [facility] today, and it was normal. Please tell her she does not need to repeat EEG and cancel the order. Thank you!!

## 2022-01-21 ENCOUNTER — PATIENT MESSAGE (OUTPATIENT)
Dept: NEUROLOGY | Facility: CLINIC | Age: 62
End: 2022-01-21
Payer: MEDICARE

## 2022-01-21 ENCOUNTER — TELEPHONE (OUTPATIENT)
Dept: NEUROLOGY | Facility: CLINIC | Age: 62
End: 2022-01-21
Payer: MEDICARE

## 2022-01-22 ENCOUNTER — LAB VISIT (OUTPATIENT)
Dept: LAB | Facility: HOSPITAL | Age: 62
End: 2022-01-22
Attending: NURSE PRACTITIONER
Payer: MEDICARE

## 2022-01-22 DIAGNOSIS — Z86.39 HISTORY OF DEHYDRATION: ICD-10-CM

## 2022-01-22 DIAGNOSIS — Z79.899 ON VALPROATE THERAPY: ICD-10-CM

## 2022-01-22 DIAGNOSIS — R29.818 OTHER SYMPTOMS AND SIGNS INVOLVING THE NERVOUS SYSTEM: ICD-10-CM

## 2022-01-22 LAB
ANION GAP SERPL CALC-SCNC: 10 MMOL/L (ref 8–16)
BUN SERPL-MCNC: 20 MG/DL (ref 8–23)
CALCIUM SERPL-MCNC: 10 MG/DL (ref 8.7–10.5)
CHLORIDE SERPL-SCNC: 101 MMOL/L (ref 95–110)
CO2 SERPL-SCNC: 30 MMOL/L (ref 23–29)
CREAT SERPL-MCNC: 0.8 MG/DL (ref 0.5–1.4)
CREAT SERPL-MCNC: 0.8 MG/DL (ref 0.5–1.4)
EST. GFR  (AFRICAN AMERICAN): >60 ML/MIN/1.73 M^2
EST. GFR  (AFRICAN AMERICAN): >60 ML/MIN/1.73 M^2
EST. GFR  (NON AFRICAN AMERICAN): >60 ML/MIN/1.73 M^2
EST. GFR  (NON AFRICAN AMERICAN): >60 ML/MIN/1.73 M^2
GLUCOSE SERPL-MCNC: 100 MG/DL (ref 70–110)
POTASSIUM SERPL-SCNC: 4.4 MMOL/L (ref 3.5–5.1)
SODIUM SERPL-SCNC: 141 MMOL/L (ref 136–145)
VALPROATE SERPL-MCNC: 107.3 UG/ML (ref 50–100)

## 2022-01-22 PROCEDURE — 80048 BASIC METABOLIC PNL TOTAL CA: CPT | Performed by: NURSE PRACTITIONER

## 2022-01-22 PROCEDURE — 80164 ASSAY DIPROPYLACETIC ACD TOT: CPT | Performed by: PSYCHIATRY & NEUROLOGY

## 2022-01-22 PROCEDURE — 36415 COLL VENOUS BLD VENIPUNCTURE: CPT | Performed by: NURSE PRACTITIONER

## 2022-01-29 RX ORDER — DICLOFENAC SODIUM 10 MG/G
GEL TOPICAL
Qty: 200 G | Refills: 1 | Status: SHIPPED | OUTPATIENT
Start: 2022-01-29 | End: 2022-03-22

## 2022-02-12 ENCOUNTER — OFFICE VISIT (OUTPATIENT)
Dept: URGENT CARE | Facility: CLINIC | Age: 62
End: 2022-02-12
Payer: MEDICARE

## 2022-02-12 VITALS
HEIGHT: 59 IN | WEIGHT: 114 LBS | TEMPERATURE: 99 F | BODY MASS INDEX: 22.98 KG/M2 | OXYGEN SATURATION: 98 % | SYSTOLIC BLOOD PRESSURE: 141 MMHG | HEART RATE: 93 BPM | RESPIRATION RATE: 20 BRPM | DIASTOLIC BLOOD PRESSURE: 77 MMHG

## 2022-02-12 DIAGNOSIS — J06.9 URI WITH COUGH AND CONGESTION: ICD-10-CM

## 2022-02-12 DIAGNOSIS — I10 ESSENTIAL HYPERTENSION: ICD-10-CM

## 2022-02-12 DIAGNOSIS — Z20.822 SUSPECTED COVID-19 VIRUS INFECTION: ICD-10-CM

## 2022-02-12 LAB
CTP QC/QA: YES
SARS-COV-2 RDRP RESP QL NAA+PROBE: NEGATIVE

## 2022-02-12 PROCEDURE — U0002: ICD-10-PCS | Mod: QW,CR,S$GLB, | Performed by: NURSE PRACTITIONER

## 2022-02-12 PROCEDURE — U0002 COVID-19 LAB TEST NON-CDC: HCPCS | Mod: QW,CR,S$GLB, | Performed by: NURSE PRACTITIONER

## 2022-02-12 PROCEDURE — 99214 OFFICE O/P EST MOD 30 MIN: CPT | Mod: S$GLB,,, | Performed by: NURSE PRACTITIONER

## 2022-02-12 PROCEDURE — 99214 PR OFFICE/OUTPT VISIT, EST, LEVL IV, 30-39 MIN: ICD-10-PCS | Mod: S$GLB,,, | Performed by: NURSE PRACTITIONER

## 2022-02-12 RX ORDER — BENZONATATE 100 MG/1
200 CAPSULE ORAL 3 TIMES DAILY PRN
Qty: 60 CAPSULE | Refills: 1 | Status: SHIPPED | OUTPATIENT
Start: 2022-02-12 | End: 2022-03-02

## 2022-02-12 NOTE — PATIENT INSTRUCTIONS
PLAN: Lab work POCT rapid Covid 19 screen/negative  Advise increase p.o. fluids--water/juice & rest  Meds:  Tessalon Perles  Advise use of Robitussin DM at nighttime  Simply saline nasal wash to irrigate sinuses and for congestion/runny nose.  Cool mist humidifier/vaporizer.  Practice good handwashing.  Advise use of green tea with honey  Tylenol for fever, headache and body aches.  Warm salt water gargles for throat comfort.  Chloraseptic spray or lozenges for throat comfort.  Advise follow up with PCP in 2-3 days for recheck  Advise go to ER if symptoms worsen or fail to improve with treatment.  Instructions, follow up, and supportive care as per AVS.  AVS provided and reviewed with patient including supportive care, follow up, and red flag symptoms.   Sister verbalizes understanding and agrees with treatment plan. Discharged from Urgent Care in stable condition.  You have tested negative for COVID-19 today. If you did not have any close exposure as defined below, then effective today, you can return to your normal daily activities including social distancing, wearing masks, and frequent handwashing.

## 2022-02-12 NOTE — PROGRESS NOTES
"Subjective:       Patient ID: Juan Pablo Bolden is a 61 y.o. female.    Vitals:  height is 4' 11" (1.499 m) and weight is 51.7 kg (114 lb). Her temperature is 99.3 °F (37.4 °C). Her blood pressure is 141/77 (abnormal) and her pulse is 93. Her respiration is 20 and oxygen saturation is 98%.     Chief Complaint: Cough    Patient presents to clinic with sister complaining of runny nose and persistant cough.   Patient is developmentally disabled and is in an assisted living home Concerns for covid.     Cough  This is a new problem. The problem has been unchanged. The problem occurs constantly. The cough is non-productive. Associated symptoms include a fever, nasal congestion, postnasal drip, rhinorrhea, a sore throat, sweats, weight loss and wheezing. Pertinent negatives include no chest pain, chills, ear congestion, ear pain, headaches, heartburn, hemoptysis, myalgias, rash or shortness of breath. Nothing aggravates the symptoms. Risk factors for lung disease include animal exposure. The treatment provided no relief. There is no history of asthma, bronchiectasis, bronchitis, COPD, emphysema, environmental allergies or pneumonia.       Constitution: Positive for fever. Negative for chills.   HENT: Positive for postnasal drip and sore throat. Negative for ear pain.    Cardiovascular: Negative for chest pain.   Respiratory: Positive for cough and wheezing. Negative for bloody sputum and shortness of breath.    Gastrointestinal: Negative for heartburn.   Musculoskeletal: Negative for muscle ache.   Skin: Negative for rash.   Allergic/Immunologic: Negative for environmental allergies.   Neurological: Negative for headaches.         Patient asking for [employer]  Past Medical History:   Diagnosis Date    Bipolar 1 disorder     Chronic constipation     Galactorrhea     Growth retardation     Profound mental retardation    High cholesterol     Hyperlipidemia     Hypertension     Leukopenia     Neuroleptic-induced " tardive dyskinesia     Schizoaffective disorder     Stereotypic movement disorder          .  Past Surgical History:   Procedure Laterality Date    COLONOSCOPY N/A 5/17/2021    Procedure: COLONOSCOPY;  Surgeon: Jeffrey Ayala MD;  Location: [facility];  Service: Gastroenterology;  Laterality: N/A;    ESOPHAGOGASTRODUODENOSCOPY N/A 11/5/2020    Procedure: EGD (ESOPHAGOGASTRODUODENOSCOPY);  Surgeon: John Batista MD;  Location: [facility];  Service: Endoscopy;  Laterality: N/A;         Social History     Socioeconomic History    Marital status: Single   Tobacco Use    Smoking status: Never Smoker    Smokeless tobacco: Never Used   Substance and Sexual Activity    Alcohol use: No    Drug use: No    Sexual activity: Never     Social Determinants of Health     Financial Resource Strain: Unknown    Difficulty of Paying Living Expenses: Patient refused   Food Insecurity: Unknown    Worried About Running Out of Food in the Last Year: Patient refused    Ran Out of Food in the Last Year: Patient refused   Transportation Needs: No Transportation Needs    Lack of Transportation (Medical): No    Lack of Transportation (Non-Medical): No   Physical Activity: Inactive    Days of Exercise per Week: 0 days    Minutes of Exercise per Session: 0 min   Stress: Unknown    Feeling of Stress : Patient refused   Social Connections: Unknown    Frequency of Communication with Friends and Family: Patient refused    Frequency of Social Gatherings with Friends and Family: Patient refused    Active Member of Clubs or Organizations: No    Attends Club or Organization Meetings: Never    Marital Status: Never    Housing Stability: Low Risk     Unable to Pay for Housing in the Last Year: No    Number of Places Lived in the Last Year: 1    Unstable Housing in the Last Year: No       History reviewed. No pertinent family history.      ROS:  GENERAL:  fever, chills with body aches  SKIN: No rashes, itching or  changes in color or texture of skin.   HEENT:  Reports  runny nose, nasal congestion  NODES: No masses or lesions. Denies swollen glands.   CHEST: reports cough   CARDIOVASCULAR: Denies chest pain, shortness of breath.  ABDOMEN: Appetite fine. No weight loss. Denies diarrhea, abdominal pain  MUSCULOSKELETAL: No joint stiffness or swelling. Denies back pain.  NEUROLOGIC: No history of seizures, paralysis, alteration of gait or coordination.  PSYCHIATRIC: Denies mood swings, depression or suicidal thoughts.    PE:   APPEARANCE: Well nourished, well developed, in mild distress.  Temp 99.3°, pulse ox 98%  V/S: Reviewed.  SKIN: Normal skin turgor, no lesions.  HEENT: Turbinates injected, TM's poor light reflex bilateral, no facial tenderness.  CHEST: Lungs clear to auscultation. No wheezing  CARDIOVASCULAR: Regular rate and rhythm.  NEUROLOGIC: No sensory deficits. Gait & Posture: Normal, No cerebellar signs.  MENTAL STATUS: Patient alert, oriented x 3 & conversant.    PLAN: Lab work POCT rapid Covid 19 screen/negative  Advise increase p.o. fluids--water/juice & rest  Meds:  Tessalon Perles  Advise use of Robitussin DM at nighttime  Simply saline nasal wash to irrigate sinuses and for congestion/runny nose.  Cool mist humidifier/vaporizer.  Practice good handwashing.  Advise use of green tea with honey  Tylenol for fever, headache and body aches.  Warm salt water gargles for throat comfort.  Chloraseptic spray or lozenges for throat comfort.  Advise follow up with PCP in 2-3 days for recheck  Advise go to ER if symptoms worsen or fail to improve with treatment.  Instructions, follow up, and supportive care as per AVS.  AVS provided and reviewed with patient including supportive care, follow up, and red flag symptoms.   Sister verbalizes understanding and agrees with treatment plan. Discharged from Urgent Care in stable condition.  You have tested negative for COVID-19 today. If you did not have any close exposure as  defined below, then effective today, you can return to your normal daily activities including social distancing, wearing masks, and frequent handwashing.      DIAGNOSIS:  Suspect COVID-19  Essential hypertension  URI with cough and congestion

## 2022-02-15 ENCOUNTER — OFFICE VISIT (OUTPATIENT)
Dept: INTERNAL MEDICINE | Facility: CLINIC | Age: 62
End: 2022-02-15
Payer: MEDICARE

## 2022-02-15 ENCOUNTER — HOSPITAL ENCOUNTER (OUTPATIENT)
Dept: RADIOLOGY | Facility: HOSPITAL | Age: 62
Discharge: HOME | End: 2022-02-15
Payer: MEDICARE

## 2022-02-15 VITALS
WEIGHT: 112.75 LBS | SYSTOLIC BLOOD PRESSURE: 120 MMHG | HEART RATE: 96 BPM | DIASTOLIC BLOOD PRESSURE: 70 MMHG | TEMPERATURE: 98 F | BODY MASS INDEX: 22.73 KG/M2 | HEIGHT: 59 IN | OXYGEN SATURATION: 96 %

## 2022-02-15 DIAGNOSIS — G24.01 TARDIVE DYSKINESIA: ICD-10-CM

## 2022-02-15 DIAGNOSIS — J20.8 ACUTE VIRAL BRONCHITIS: ICD-10-CM

## 2022-02-15 DIAGNOSIS — R05.9 COUGH: Primary | ICD-10-CM

## 2022-02-15 DIAGNOSIS — F25.9 SCHIZOAFFECTIVE DISORDER, UNSPECIFIED TYPE: ICD-10-CM

## 2022-02-15 DIAGNOSIS — F84.9 PDD (PERVASIVE DEVELOPMENTAL DISORDER): ICD-10-CM

## 2022-02-15 DIAGNOSIS — I10 ESSENTIAL HYPERTENSION: ICD-10-CM

## 2022-02-15 DIAGNOSIS — R05.9 COUGH: ICD-10-CM

## 2022-02-15 PROCEDURE — 99999 PR PBB SHADOW E&M-EST. PATIENT-LVL V: CPT | Mod: PBBFAC,,,

## 2022-02-15 PROCEDURE — 71046 X-RAY EXAM CHEST 2 VIEWS: CPT | Mod: TC

## 2022-02-15 PROCEDURE — 99999 PR PBB SHADOW E&M-EST. PATIENT-LVL V: ICD-10-PCS | Mod: PBBFAC,,,

## 2022-02-15 PROCEDURE — 71046 XR CHEST PA AND LATERAL: ICD-10-PCS | Mod: 26,,, | Performed by: RADIOLOGY

## 2022-02-15 PROCEDURE — 99214 OFFICE O/P EST MOD 30 MIN: CPT | Mod: S$PBB,,,

## 2022-02-15 PROCEDURE — 71046 X-RAY EXAM CHEST 2 VIEWS: CPT | Mod: 26,,, | Performed by: RADIOLOGY

## 2022-02-15 PROCEDURE — 99214 PR OFFICE/OUTPT VISIT, EST, LEVL IV, 30-39 MIN: ICD-10-PCS | Mod: S$PBB,,,

## 2022-02-15 PROCEDURE — 99215 OFFICE O/P EST HI 40 MIN: CPT | Mod: PBBFAC

## 2022-02-15 RX ORDER — AZITHROMYCIN 250 MG/1
TABLET, FILM COATED ORAL
Qty: 6 TABLET | Refills: 0 | Status: SHIPPED | OUTPATIENT
Start: 2022-02-15 | End: 2022-02-20

## 2022-02-15 RX ORDER — AZITHROMYCIN 250 MG/1
TABLET, FILM COATED ORAL
Qty: 6 TABLET | Refills: 0 | Status: SHIPPED | OUTPATIENT
Start: 2022-02-15 | End: 2022-02-15 | Stop reason: CLARIF

## 2022-02-15 NOTE — PROGRESS NOTES
Juan Pablo Bolden  02/15/2022  [id number]    Brooke Goldberg MD  Patient Care Team:  Brooke Goldberg MD as PCP - General (Internal Medicine)  Has the patient seen any provider outside of the [facility] network since the last visit? (no). If yes, HIPPA forms completed and records requested.        Visit Type:an urgent visit for a new problem    Chief Complaint:  Chief Complaint   Patient presents with    Chest Congestion       History of Present Illness:    61 year old female presents today with her sister  She lives in a group home  She has been coughing and not feeling well for almost two weeks  The home called her sister on 2/12 and informed her that her temp was 99.3 and that her cough was getting worse.   Went to  on 2/12  Was tested for COVID and it was negative  Given tessalon pearls    The sister is having problems with the facility   She is not sure if she is getting her medicine daily like they should     She is waiting for her to find sitters so she can go back into her apartment  The sister has spoken with the staff and administers at the facility several times regarding medication administration.       History:  Past Medical History:   Diagnosis Date    Bipolar 1 disorder     Chronic constipation     Galactorrhea     Growth retardation     Profound mental retardation    High cholesterol     Hyperlipidemia     Hypertension     Leukopenia     Neuroleptic-induced tardive dyskinesia     Schizoaffective disorder     Stereotypic movement disorder      Past Surgical History:   Procedure Laterality Date    COLONOSCOPY N/A 5/17/2021    Procedure: COLONOSCOPY;  Surgeon: Jeffrey Ayala MD;  Location: [facility];  Service: Gastroenterology;  Laterality: N/A;    ESOPHAGOGASTRODUODENOSCOPY N/A 11/5/2020    Procedure: EGD (ESOPHAGOGASTRODUODENOSCOPY);  Surgeon: John Batista MD;  Location: [facility];  Service: Endoscopy;  Laterality: N/A;     No family history on file.  Social History     Socioeconomic  History    Marital status: Single   Tobacco Use    Smoking status: Never Smoker    Smokeless tobacco: Never Used   Substance and Sexual Activity    Alcohol use: No    Drug use: No    Sexual activity: Never     Social Determinants of Health     Financial Resource Strain: Unknown    Difficulty of Paying Living Expenses: Patient refused   Food Insecurity: Unknown    Worried About Running Out of Food in the Last Year: Patient refused    Ran Out of Food in the Last Year: Patient refused   Transportation Needs: No Transportation Needs    Lack of Transportation (Medical): No    Lack of Transportation (Non-Medical): No   Physical Activity: Inactive    Days of Exercise per Week: 0 days    Minutes of Exercise per Session: 0 min   Stress: Unknown    Feeling of Stress : Patient refused   Social Connections: Unknown    Frequency of Communication with Friends and Family: Patient refused    Frequency of Social Gatherings with Friends and Family: Patient refused    Active Member of Clubs or Organizations: No    Attends Club or Organization Meetings: Never    Marital Status: Never    Housing Stability: Low Risk     Unable to Pay for Housing in the Last Year: No    Number of Places Lived in the Last Year: 1    Unstable Housing in the Last Year: No     Patient Active Problem List   Diagnosis    Central hypothyroidism    Lipodystrophy    Osteopenia    Hyperprolactinemia    PDD (pervasive developmental disorder)    Tardive dyskinesia    Restlessness and agitation    Seizure disorder    Essential hypertension    Leukopenia    Thrombocytopenia    Iron deficiency anemia due to chronic blood loss    Schizoaffective disorder    Other constipation    H. pylori infection    Colon cancer screening     Review of patient's allergies indicates:  No Known Allergies    The following were reviewed at this visit: active problem list, medication list, allergies, family history, social history, and health  maintenance.    Medications:  Current Outpatient Medications on File Prior to Visit   Medication Sig Dispense Refill    ALPRAZolam (XANAX) 0.25 MG tablet Take 1 tablet (0.25 mg total) by mouth 2 (two) times daily. 60 tablet 2    amLODIPine (NORVASC) 10 MG tablet TAKE 1 TABLET BY MOUTH ONCE DAILY 30 tablet 5    benzonatate (TESSALON PERLES) 100 MG capsule Take 2 capsules (200 mg total) by mouth 3 (three) times daily as needed for Cough. 60 capsule 1    bisacodyL (DULCOLAX) 5 mg EC tablet Take 1 tablet twice daily for constipation 60 tablet 5    diazePAM (VALIUM) 5 MG tablet 1 tablet      diclofenac sodium (VOLTAREN) 1 % Gel APPLY TWO GRAMS TO THE AFFECTED AREA FOUR TIMES DAILY AS NEEDED FOR PAIN 200 g 1    divalproex (DEPAKOTE) 500 MG TbEC Take 1 tablet (500 mg total) by mouth 2 (two) times daily. 60 tablet 2    docusate sodium (COLACE) 100 MG capsule Take 1 capsule (100 mg total) by mouth 2 (two) times daily. 180 capsule 0    folic acid (FOLVITE) 1 MG tablet TAKE ONE-HALF TABLET BY MOUTH TWICE DAILY 30 tablet 11    furosemide (LASIX) 20 MG tablet Take 1 tablet (20 mg total) by mouth daily as needed. 30 tablet 5    glycerin adult suppository Place 1 suppository rectally as needed for Constipation. 25 suppository 0    ibuprofen (ADVIL,MOTRIN) 200 MG tablet Take 200 mg by mouth every 6 (six) hours as needed.      lactulose (CHRONULAC) 20 gram/30 mL Soln 15 ml      LORazepam (ATIVAN) 1 MG tablet Take 1 tablet (1 mg total) by mouth daily as needed for Anxiety. 30 tablet 2    melatonin 10 mg Cap as directed      mirtazapine (REMERON) 30 MG tablet 1 tablet at bedtime      mirtazapine (REMERON) 45 MG tablet Take 1 tablet (45 mg total) by mouth every evening. 30 tablet 2    mupirocin (BACTROBAN) 2 % ointment 1 application      polyethylene glycol (GLYCOLAX) 17 gram/dose powder SMARTSI Capful(s) By Mouth Daily      QUEtiapine (SEROQUEL) 400 MG tablet TAKE 1/2 TABLET BY MOUTH EVERY MORNING and ONE TABLET  BY MOUTH EVERY NIGHT AT BEDTIME 45 tablet 2    simethicone (MYLICON) 125 mg Cap capsule 1 capsule after meals and at bedtime as needed      simvastatin (ZOCOR) 40 MG tablet Take 1 tablet (40 mg total) by mouth every evening. 30 tablet 11    triamterene-hydrochlorothiazide 37.5-25 mg (MAXZIDE-25) 37.5-25 mg per tablet TAKE 1 TABLET BY MOUTH EVERY MORNING 30 tablet 11     No current facility-administered medications on file prior to visit.       Medications have been reviewed and reconciled with patient at this visit.  Barriers to medications reviewed with patient.    Adverse reactions to current medications reviewed with patient..    Over the counter medications reviewed and reconciled with patient.    Exam:  Wt Readings from Last 3 Encounters:   02/12/22 51.7 kg (114 lb)   01/12/22 52.5 kg (115 lb 11.9 oz)   01/03/22 51.3 kg (113 lb)     Temp Readings from Last 3 Encounters:   02/12/22 99.3 °F (37.4 °C)   01/03/22 98.1 °F (36.7 °C) (Oral)   12/23/21 98.1 °F (36.7 °C)     BP Readings from Last 3 Encounters:   02/12/22 (!) 141/77   01/12/22 134/74   01/03/22 130/85     Pulse Readings from Last 3 Encounters:   02/12/22 93   01/12/22 65   01/03/22 89     There is no height or weight on file to calculate BMI.      Review of Systems   Unable to perform ROS: Mental acuity   Respiratory: Positive for cough.      Physical Exam  Cardiovascular:      Rate and Rhythm: Normal rate and regular rhythm.      Pulses: Normal pulses.      Heart sounds: Normal heart sounds.   Pulmonary:      Effort: Pulmonary effort is normal. No respiratory distress.      Breath sounds: Normal breath sounds. No stridor. No wheezing or rhonchi.   Abdominal:      General: Bowel sounds are normal.   Neurological:      Mental Status: Mental status is at baseline.         Laboratory Reviewed ({Yes)  Lab Results   Component Value Date    WBC 2.34 (L) 10/02/2021    HGB 12.7 10/02/2021    HCT 38.7 10/02/2021     (L) 10/02/2021    CHOL 234 (H)  10/14/2014    TRIG 97 10/14/2014     (H) 10/14/2014    ALT 9 (L) 04/09/2021    AST 15 04/09/2021     01/22/2022    K 4.4 01/22/2022     01/22/2022    CREATININE 0.8 01/22/2022    CREATININE 0.8 01/22/2022    BUN 20 01/22/2022    CO2 30 (H) 01/22/2022    TSH 1.177 04/09/2021    HGBA1C 5.2 10/14/2014       Juan Pablo was seen today for chest congestion.    Diagnoses and all orders for this visit:    Cough  -     X-Ray Chest PA And Lateral; Future  -     Discontinue: azithromycin (Z-JACKLYN) 250 MG tablet; Take 2 tablets by mouth on day 1; Take 1 tablet by mouth on days 2-5  -     azithromycin (Z-JACKLYN) 250 MG tablet; Take 2 tablets by mouth on day 1; Take 1 tablet by mouth on days 2-5    Essential hypertension   On Norvasc    Schizoaffective disorder, unspecified type   Being followed by psych    PDD (pervasive developmental disorder)  Being followed by psych     Tardive dyskinesia   Being followed by psych      Will cover with antibotic since she has been sick almost two weeks  Informed that bronchial cough can last weeks/months  Will continue to take Tessalon pearls as needed         Care Plan/Goals: Reviewed   Goals    None         Follow up: No follow-ups on file.    After visit summary was printed and given to patient upon discharge today.  Patient goals and care plan are included in After Visit Summary.

## 2022-02-21 ENCOUNTER — PATIENT MESSAGE (OUTPATIENT)
Dept: GASTROENTEROLOGY | Facility: CLINIC | Age: 62
End: 2022-02-21
Payer: MEDICARE

## 2022-02-22 ENCOUNTER — OFFICE VISIT (OUTPATIENT)
Dept: PSYCHIATRY | Facility: CLINIC | Age: 62
End: 2022-02-22
Payer: MEDICARE

## 2022-02-22 DIAGNOSIS — G47.00 INSOMNIA, UNSPECIFIED TYPE: ICD-10-CM

## 2022-02-22 DIAGNOSIS — F79 INTELLECTUAL DISABILITY: Primary | ICD-10-CM

## 2022-02-22 DIAGNOSIS — R45.1 RESTLESSNESS AND AGITATION: ICD-10-CM

## 2022-02-22 PROCEDURE — 99213 OFFICE O/P EST LOW 20 MIN: CPT | Mod: 95,,, | Performed by: PSYCHIATRY & NEUROLOGY

## 2022-02-22 PROCEDURE — 99213 PR OFFICE/OUTPT VISIT, EST, LEVL III, 20-29 MIN: ICD-10-PCS | Mod: 95,,, | Performed by: PSYCHIATRY & NEUROLOGY

## 2022-02-22 RX ORDER — ALPRAZOLAM 0.25 MG/1
0.25 TABLET ORAL 2 TIMES DAILY
Qty: 60 TABLET | Refills: 2 | Status: SHIPPED | OUTPATIENT
Start: 2022-02-22 | End: 2022-05-31 | Stop reason: SDUPTHER

## 2022-02-22 RX ORDER — MIRTAZAPINE 30 MG/1
TABLET, FILM COATED ORAL
Qty: 30 TABLET | Refills: 2 | Status: SHIPPED | OUTPATIENT
Start: 2022-02-22 | End: 2022-04-04 | Stop reason: SDUPTHER

## 2022-02-22 RX ORDER — QUETIAPINE FUMARATE 400 MG/1
TABLET, FILM COATED ORAL
Qty: 45 TABLET | Refills: 2 | Status: SHIPPED | OUTPATIENT
Start: 2022-02-22 | End: 2022-07-13 | Stop reason: SDUPTHER

## 2022-02-22 RX ORDER — MIRTAZAPINE 45 MG/1
45 TABLET, FILM COATED ORAL NIGHTLY
Qty: 30 TABLET | Refills: 2 | Status: SHIPPED | OUTPATIENT
Start: 2022-02-22 | End: 2022-05-23
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
## 2022-02-23 ENCOUNTER — PATIENT MESSAGE (OUTPATIENT)
Dept: GASTROENTEROLOGY | Facility: CLINIC | Age: 62
End: 2022-02-23
Payer: MEDICARE

## 2022-02-23 RX ORDER — DOCUSATE SODIUM 100 MG/1
100 CAPSULE, LIQUID FILLED ORAL DAILY
Qty: 90 CAPSULE | Refills: 3 | Status: SHIPPED | OUTPATIENT
Start: 2022-02-23 | End: 2022-05-24

## 2022-02-26 ENCOUNTER — PATIENT MESSAGE (OUTPATIENT)
Dept: PSYCHIATRY | Facility: CLINIC | Age: 62
End: 2022-02-26
Payer: MEDICARE

## 2022-03-08 ENCOUNTER — HOSPITAL ENCOUNTER (OUTPATIENT)
Dept: PULMONOLOGY | Facility: HOSPITAL | Age: 62
Discharge: HOME | End: 2022-03-08
Attending: NURSE PRACTITIONER
Payer: MEDICARE

## 2022-03-08 DIAGNOSIS — G24.01 TARDIVE DYSKINESIA: ICD-10-CM

## 2022-03-08 DIAGNOSIS — R29.818 OTHER SYMPTOMS AND SIGNS INVOLVING THE NERVOUS SYSTEM: ICD-10-CM

## 2022-03-08 DIAGNOSIS — R25.9 ABNORMAL MOVEMENTS: ICD-10-CM

## 2022-03-08 PROCEDURE — 95816 EEG AWAKE AND DROWSY: CPT

## 2022-03-08 PROCEDURE — 95816 PR EEG,W/AWAKE & DROWSY RECORD: ICD-10-PCS | Mod: 26,,, | Performed by: PSYCHIATRY & NEUROLOGY

## 2022-03-08 PROCEDURE — 95816 EEG AWAKE AND DROWSY: CPT | Mod: 26,,, | Performed by: PSYCHIATRY & NEUROLOGY

## 2022-03-09 NOTE — PROCEDURES
DATE EEG PERFORMED:  2022.      DATE EEG INTERPRETED: 2022.                   DURATION OF EE MINUTES.         LEVEL OF CONSCIOUSENESS    Awake.      EEG BACKGROUND    The posterior dominant basic rhythm reaches 7-8 Hz, symmetric, reactive, poorly-modulated and not well-sustained.     Excessive EMG artifacts.      EEG CLASSIFICATION    Background Slowing, Generalized.         IMPRESSION      The EEG is suggestive of diffuse encephalopathy.       There are no epileptiform discharges or lateralizing signs. No typical events were recorded. There is no electrographic evidence of seizure.There is no electrographic evidence of status epilepticus.         PLEASE NOTE THAT A NON-EPILEPTIFORM EEG DOES NOT RULE OUT EPILEPSY.        JASON MARCOS MD, FAAN    Diplomate, American Board of Psychiatry and Neurology    Diplomate, American Board of Clinical Neurophysiology

## 2022-03-09 NOTE — PROGRESS NOTES
EEG Reviewed    3-9-2022    The EEG is suggestive of diffuse encephalopathy. No seizure events or electrographic evidence of seizures noted

## 2022-03-11 ENCOUNTER — PATIENT MESSAGE (OUTPATIENT)
Dept: FAMILY MEDICINE | Facility: CLINIC | Age: 62
End: 2022-03-11
Payer: MEDICARE

## 2022-03-11 ENCOUNTER — PATIENT MESSAGE (OUTPATIENT)
Dept: PSYCHIATRY | Facility: CLINIC | Age: 62
End: 2022-03-11
Payer: MEDICARE

## 2022-03-18 ENCOUNTER — PES CALL (OUTPATIENT)
Dept: ADMINISTRATIVE | Facility: CLINIC | Age: 62
End: 2022-03-18
Payer: MEDICARE

## 2022-03-24 ENCOUNTER — PATIENT MESSAGE (OUTPATIENT)
Dept: PSYCHIATRY | Facility: CLINIC | Age: 62
End: 2022-03-24
Payer: MEDICARE

## 2022-03-28 RX ORDER — FOLIC ACID 1 MG/1
TABLET ORAL
Qty: 30 TABLET | Refills: 11 | Status: SHIPPED | OUTPATIENT
Start: 2022-03-28 | End: 2022-09-22

## 2022-03-29 DIAGNOSIS — I10 ESSENTIAL HYPERTENSION: ICD-10-CM

## 2022-03-30 RX ORDER — AMLODIPINE BESYLATE 10 MG/1
TABLET ORAL
Qty: 90 TABLET | Refills: 1 | Status: SHIPPED | OUTPATIENT
Start: 2022-03-30 | End: 2022-09-20

## 2022-03-30 NOTE — TELEPHONE ENCOUNTER
Refill Authorization Note   Juan Pablo Bolden  is requesting a refill authorization.  Brief Assessment and Rationale for Refill:  Approve     Medication Therapy Plan:       Medication Reconciliation Completed: No   Comments:   --->Care Gap information included below if applicable.       Requested Prescriptions   Pending Prescriptions Disp Refills    amLODIPine (NORVASC) 10 MG tablet [Pharmacy Med Name: amlodipine 10 mg tablet] 90 tablet 1     Sig: TAKE 1 TABLET BY MOUTH EVERY DAY       Calcium-Channel Blockers Protocol Passed - 3/30/2022  9:38 AM        Passed - Patient is not currently pregnant        Passed - No positive pregnancy test in past 12 months         Passed - Visit with authorizing provider in past 12 months or upcoming 90 days         Passed - Blood Pressure below 139/89 on file in past 12 months      BP Readings from Last 3 Encounters:   02/15/22 120/70   02/12/22 (!) 141/77   01/12/22 134/74            Cardiovascular:  Calcium Channel Blockers Passed - 3/30/2022  9:38 AM        Passed - Patient is at least 18 years old        Passed - Last BP in normal range within 360 days     BP Readings from Last 3 Encounters:   02/15/22 120/70   02/12/22 (!) 141/77   01/12/22 134/74               Passed - Valid encounter within last 15 months     Recent Visits  Date Type Provider Dept   10/25/21 Office Visit Brooke Goldberg MD [facility]   07/28/21 Office Visit Brooke Goldberg MD [facility]   04/09/21 Office Visit Brooke Goldberg MD [facility]   10/14/20 Office Visit Brooke Goldberg MD [facility]   09/17/20 Office Visit Brooke Goldberg MD [facility]   09/09/20 Office Visit Brooke Goldberg MD [facility]   Showing recent visits within past 720 days and meeting all other requirements  Future Appointments  No visits were found meeting these conditions.  Showing future appointments within next 150 days and meeting all other requirements      Future Appointments               In 2 days MD Georgia Rosales - Family Medicine, [place]    In 1 week [facility]ATORY, [facility] - Lab, [place]    In 3 weeks ROSE Okeefe - Hematol [facility], [place]    In 3 weeks MD Georgia Rosales - [facility], [place]    In 2 months MD Bartolo Taylor - Heart Failure & Transplant (Medical Of[facility] Bl), [place]    In 3 months MD Bartolo Gomez - Neurology, [place] [facility]                Passed - Matches previous order       Previous Authorizing Provider: Brooke Goldberg MD (amLODIPine (NORVASC) 10 MG tablet)  Previous Pharmacy: [facility] - [place] - [address]            Passed - No ED/Hospital visits since last PCP visit     Last PCP Visit: 10/25/2021 Last Admission: 5/17/2021 Last ED Visit: 3/19/2021              Appointments  past 12m or future 3m with PCP    Date Provider   Last Visit   10/25/2021 Brooke Goldberg MD   Next Visit   4/1/2022 Brooke Goldberg MD   ED visits in past 90 days: 0     Note composed:9:39 AM 03/30/2022

## 2022-03-30 NOTE — TELEPHONE ENCOUNTER
Unable to retrieve patient chart and identify care gaps.  Powered by [employer] by [employer]. Reference number: [id number].   3/29/2022 10:18:47 PM CDT

## 2022-03-31 ENCOUNTER — TELEPHONE (OUTPATIENT)
Dept: FAMILY MEDICINE | Facility: CLINIC | Age: 62
End: 2022-03-31
Payer: MEDICARE

## 2022-03-31 NOTE — TELEPHONE ENCOUNTER
----- Message from Priscilla Dugan sent at 3/31/2022  3:54 PM CDT -----  Contact: Self   Pt is requesting a call regarding her condition. Please advise

## 2022-04-02 ENCOUNTER — HOSPITAL ENCOUNTER (OUTPATIENT)
Dept: RADIOLOGY | Facility: CLINIC | Age: 62
Discharge: HOME | End: 2022-04-02
Attending: NURSE PRACTITIONER
Payer: MEDICARE

## 2022-04-02 ENCOUNTER — OFFICE VISIT (OUTPATIENT)
Dept: URGENT CARE | Facility: CLINIC | Age: 62
End: 2022-04-02
Payer: MEDICARE

## 2022-04-02 VITALS
BODY MASS INDEX: 22.58 KG/M2 | HEART RATE: 111 BPM | HEIGHT: 59 IN | RESPIRATION RATE: 18 BRPM | SYSTOLIC BLOOD PRESSURE: 145 MMHG | TEMPERATURE: 98 F | DIASTOLIC BLOOD PRESSURE: 83 MMHG | WEIGHT: 112 LBS | OXYGEN SATURATION: 99 %

## 2022-04-02 DIAGNOSIS — K59.09 OTHER CONSTIPATION: ICD-10-CM

## 2022-04-02 DIAGNOSIS — R63.0 NO APPETITE: ICD-10-CM

## 2022-04-02 DIAGNOSIS — R10.84 GENERALIZED ABDOMINAL PAIN: ICD-10-CM

## 2022-04-02 DIAGNOSIS — I10 ESSENTIAL HYPERTENSION: ICD-10-CM

## 2022-04-02 DIAGNOSIS — R10.84 GENERALIZED ABDOMINAL PAIN: Primary | ICD-10-CM

## 2022-04-02 DIAGNOSIS — F79 INTELLECTUAL DISABILITY: ICD-10-CM

## 2022-04-02 PROCEDURE — 99214 PR OFFICE/OUTPT VISIT, EST, LEVL IV, 30-39 MIN: ICD-10-PCS | Mod: S$GLB,,, | Performed by: NURSE PRACTITIONER

## 2022-04-02 PROCEDURE — 81003 POCT URINALYSIS, DIPSTICK, AUTOMATED, W/O SCOPE: ICD-10-PCS | Mod: QW,S$GLB,, | Performed by: NURSE PRACTITIONER

## 2022-04-02 PROCEDURE — 99214 OFFICE O/P EST MOD 30 MIN: CPT | Mod: S$GLB,,, | Performed by: NURSE PRACTITIONER

## 2022-04-02 PROCEDURE — 74019 XR ABDOMEN FLAT AND ERECT: ICD-10-PCS | Mod: S$GLB,,, | Performed by: RADIOLOGY

## 2022-04-02 PROCEDURE — 74019 RADEX ABDOMEN 2 VIEWS: CPT | Mod: S$GLB,,, | Performed by: RADIOLOGY

## 2022-04-02 PROCEDURE — 81003 URINALYSIS AUTO W/O SCOPE: CPT | Mod: QW,S$GLB,, | Performed by: NURSE PRACTITIONER

## 2022-04-02 RX ORDER — LACTULOSE 10 G/15ML
10 SOLUTION ORAL 2 TIMES DAILY
Qty: 210 ML | Refills: 0 | Status: SHIPPED | OUTPATIENT
Start: 2022-04-02 | End: 2022-04-06

## 2022-04-02 NOTE — PATIENT INSTRUCTIONS
PLAN:  Flat and erect of abdomen  Advise go to ER for further evaluation  Advise increase p.o. fluids--water/juice & rest  Advise use OTC Mag citrate  Meds:  Lactulose /refills  Tylenol or Ibuprofen for fever, headache and body aches.  Advise follow up with PCP in 2- 3 days for recheck  Advise go to ER if nausea, vomiting, fever, increased pain, or fail to improve with treatment.  AVS provided and reviewed with patient including supportive care, follow up, and red flag symptoms.   Patient verbalizes understanding and agrees with treatment plan. Discharged from Urgent Care in stable condition.

## 2022-04-02 NOTE — PROGRESS NOTES
Subjective:       Patient ID: Juan Pablo Bolden is a 61 y.o. female.    Vitals:  vitals were not taken for this visit.     Chief Complaint: Abdominal Pain    Patient presents with constipation and possible UTI.  Patients caregiver told her that yesterday she was told that she was not eating or drinking.  She's been sleeping a lot and her abd is hard.  She has not been urinating or defecating.     Abdominal Pain  This is a new problem. The current episode started yesterday. The onset quality is undetermined. Associated symptoms include constipation. Pertinent negatives include no anorexia, arthralgias, belching, diarrhea, dysuria, fever, flatus, frequency, headaches, hematochezia, hematuria, melena, myalgias, nausea, vomiting or weight loss.       Constitution: Negative for fever.   Gastrointestinal: Positive for abdominal pain and constipation. Negative for nausea, vomiting, diarrhea and bright red blood in stool.   Genitourinary: Negative for dysuria, frequency and hematuria.   Musculoskeletal: Negative for joint pain and muscle ache.   Neurological: Negative for headaches.         Past Medical History:   Diagnosis Date    Bipolar 1 disorder     Chronic constipation     Galactorrhea     Growth retardation     Profound mental retardation    High cholesterol     Hyperlipidemia     Hypertension     Leukopenia     Neuroleptic-induced tardive dyskinesia     Schizoaffective disorder     Stereotypic movement disorder           Social History     Socioeconomic History    Marital status: Single   Tobacco Use    Smoking status: Never Smoker    Smokeless tobacco: Never Used   Substance and Sexual Activity    Alcohol use: No    Drug use: No    Sexual activity: Never     Social Determinants of Health     Financial Resource Strain: Unknown    Difficulty of Paying Living Expenses: Patient refused   Food Insecurity: Unknown    Worried About Running Out of Food in the Last Year: Patient refused    Ran Out of Food in  the Last Year: Patient refused   Transportation Needs: No Transportation Needs    Lack of Transportation (Medical): No    Lack of Transportation (Non-Medical): No   Physical Activity: Inactive    Days of Exercise per Week: 0 days    Minutes of Exercise per Session: 0 min   Stress: Unknown    Feeling of Stress : Patient refused   Social Connections: Unknown    Frequency of Communication with Friends and Family: Patient refused    Frequency of Social Gatherings with Friends and Family: Patient refused    Active Member of Clubs or Organizations: No    Attends Club or Organization Meetings: Never    Marital Status: Never    Housing Stability: Low Risk     Unable to Pay for Housing in the Last Year: No    Number of Places Lived in the Last Year: 1    Unstable Housing in the Last Year: No        History reviewed. No pertinent family history.    ROS:  GENERAL: No fever, chills, fatigability or weight loss.  SKIN: No rashes, itching or changes in color or texture of skin.  HEENT: No headaches or recent head trauma.  Denies ear pain, discharge or vertigo. No loss of smell, no epistaxis or postnasal drip. No hoarseness or change in voice.   NODES: No masses or lesions. Denies swollen glands.  CHEST: Denies cyanosis, wheezing, cough and sputum production.  CARDIOVASCULAR: Denies chest pain, PND, orthopnea or reduced exercise tolerance.  ABDOMEN:  No Appetite, reports constipation, abdominal pain  :  Not urinating  MUSCULOSKELETAL: No joint stiffness or swelling. Denies back pain.  NEUROLOGIC: No history of seizures, paralysis, alteration of gait or coordination.  PSYCHIATRIC: Denies mood swings, depression or suicidal thoughts.    PE:   APPEARANCE: Well nourished, mumbles, in moderate distress.  Temp 98.4°, pulse ox 99%  V/S: Reviewed.  SKIN: Normal skin turgor, no lesions.  HEENT:  turbinates pink, mucus membranes okay pink pharynx  CHEST:  No respiratory symptoms.  CARDIOVASCULAR: Regular rate and  rhythm   ABDOMEN:  Abdomen distended, tenderness on touch, no bowel sounds  MUSCULOSKELETAL: Motor: 5/5 strength major flexors/extensors.  NEUROLOGIC: No sensory deficits. Gait & Posture: Normal gait and fine motion.   MENTAL STATUS: Patient awake, mumbles.    PLAN: POCT UA/ unable to urinate, Flat and erect of abdomen  Advise go to ER for further evaluation  Advise increase p.o. fluids--water/juice & rest  Advise use OTC Mag citrate  Meds:  Lactulose /refills  Tylenol or Ibuprofen for fever, headache and body aches.  Advise follow up with PCP in 2- 3 days for recheck  Advise go to ER if nausea, vomiting, fever, increased pain, or fail to improve with treatment.  AVS provided and reviewed with patient including supportive care, follow up, and red flag symptoms.   Patient verbalizes understanding and agrees with treatment plan. Discharged from Urgent Care in stable condition.    Patient unable to urinate, no urine specimen  Advise x-rays  [facility] radiologist will officially read x-rays, staff will telephone results    DIAGNOSIS:  No appetite  Abdominal pain  Constipation  Essential hypertension  Intellectual disability

## 2022-04-03 LAB
BILIRUB UR QL STRIP: NEGATIVE
GLUCOSE UR QL STRIP: NEGATIVE
KETONES UR QL STRIP: NEGATIVE
LEUKOCYTE ESTERASE UR QL STRIP: NEGATIVE
PH, POC UA: 6.5
POC BLOOD, URINE: NEGATIVE
POC NITRATES, URINE: NEGATIVE
PROT UR QL STRIP: NEGATIVE
SP GR UR STRIP: 1.02 (ref 1–1.03)
UROBILINOGEN UR STRIP-ACNC: 1 (ref 0.1–1.1)

## 2022-04-04 ENCOUNTER — OFFICE VISIT (OUTPATIENT)
Dept: FAMILY MEDICINE | Facility: CLINIC | Age: 62
End: 2022-04-04
Payer: MEDICARE

## 2022-04-04 VITALS
DIASTOLIC BLOOD PRESSURE: 85 MMHG | HEIGHT: 59 IN | SYSTOLIC BLOOD PRESSURE: 138 MMHG | TEMPERATURE: 98 F | WEIGHT: 111 LBS | HEART RATE: 72 BPM | BODY MASS INDEX: 22.38 KG/M2

## 2022-04-04 DIAGNOSIS — R19.00 INTRA-ABDOMINAL AND PELVIC SWELLING, MASS AND LUMP, UNSPECIFIED SITE: ICD-10-CM

## 2022-04-04 DIAGNOSIS — D72.819 LEUKOPENIA, UNSPECIFIED TYPE: ICD-10-CM

## 2022-04-04 DIAGNOSIS — R10.9 ABDOMINAL PAIN, UNSPECIFIED ABDOMINAL LOCATION: ICD-10-CM

## 2022-04-04 DIAGNOSIS — G40.909 SEIZURE DISORDER: ICD-10-CM

## 2022-04-04 DIAGNOSIS — R10.84 GENERALIZED ABDOMINAL PAIN: ICD-10-CM

## 2022-04-04 DIAGNOSIS — F25.9 SCHIZOAFFECTIVE DISORDER, UNSPECIFIED TYPE: ICD-10-CM

## 2022-04-04 DIAGNOSIS — I10 ESSENTIAL HYPERTENSION: Primary | ICD-10-CM

## 2022-04-04 DIAGNOSIS — E03.8 CENTRAL HYPOTHYROIDISM: ICD-10-CM

## 2022-04-04 DIAGNOSIS — F84.9 PDD (PERVASIVE DEVELOPMENTAL DISORDER): ICD-10-CM

## 2022-04-04 PROCEDURE — 99214 PR OFFICE/OUTPT VISIT, EST, LEVL IV, 30-39 MIN: ICD-10-PCS | Mod: S$PBB,,, | Performed by: INTERNAL MEDICINE

## 2022-04-04 PROCEDURE — 99999 PR PBB SHADOW E&M-EST. PATIENT-LVL V: CPT | Mod: PBBFAC,,, | Performed by: INTERNAL MEDICINE

## 2022-04-04 PROCEDURE — 99214 OFFICE O/P EST MOD 30 MIN: CPT | Mod: S$PBB,,, | Performed by: INTERNAL MEDICINE

## 2022-04-04 PROCEDURE — 99999 PR PBB SHADOW E&M-EST. PATIENT-LVL V: ICD-10-PCS | Mod: PBBFAC,,, | Performed by: INTERNAL MEDICINE

## 2022-04-04 PROCEDURE — 99215 OFFICE O/P EST HI 40 MIN: CPT | Mod: PBBFAC,PO | Performed by: INTERNAL MEDICINE

## 2022-04-05 ENCOUNTER — TELEPHONE (OUTPATIENT)
Dept: URGENT CARE | Facility: CLINIC | Age: 62
End: 2022-04-05
Payer: MEDICARE

## 2022-04-05 ENCOUNTER — TELEPHONE (OUTPATIENT)
Dept: FAMILY MEDICINE | Facility: CLINIC | Age: 62
End: 2022-04-05
Payer: MEDICARE

## 2022-04-05 DIAGNOSIS — R10.9 ABDOMINAL PAIN, UNSPECIFIED ABDOMINAL LOCATION: ICD-10-CM

## 2022-04-05 NOTE — TELEPHONE ENCOUNTER
Courtesy call to pt sister to see if she was feeling better. Pt sister Alanis states that she is feeling better.

## 2022-04-05 NOTE — TELEPHONE ENCOUNTER
----- Message from Maria Guadalupe Humphrey sent at 4/5/2022  8:35 AM CDT -----  Contact: Alanis Noyola-sister  Calling in regards to questions about her CT scan procedure. Please call [phone] (home)

## 2022-04-05 NOTE — TELEPHONE ENCOUNTER
Pt's sister called to ask if CT had to be with contrast, due to pt not cooperating with IV. If so, she would like to have a stronger sedative sent in.

## 2022-04-05 NOTE — TELEPHONE ENCOUNTER
Ok to do without contrast      I have signed for the following orders AND/OR meds.  Please call the patient and ask the patient to schedule the testing AND/OR inform about any medications that were sent. Medications have been sent to pharmacy listed below      Orders Placed This Encounter   Procedures    CT Abdomen Pelvis  Without Contrast     Standing Status:   Future     Standing Expiration Date:   4/5/2023     Order Specific Question:   Oral/Rectal Contrast instructions:     Answer:   NO Oral Contrast     Order Specific Question:   Special CT ABD Protocol Request?     Answer:   Routine     Order Specific Question:   May the Radiologist modify the order per protocol to meet the clinical needs of the patient?     Answer:   Yes              [facility] - [id number] [facility] [place] [address]  Phone: [phone] Fax: [phone]

## 2022-04-07 ENCOUNTER — PATIENT MESSAGE (OUTPATIENT)
Dept: FAMILY MEDICINE | Facility: CLINIC | Age: 62
End: 2022-04-07
Payer: MEDICARE

## 2022-04-08 ENCOUNTER — HOSPITAL ENCOUNTER (OUTPATIENT)
Dept: RADIOLOGY | Facility: HOSPITAL | Age: 62
Discharge: HOME | End: 2022-04-08
Attending: INTERNAL MEDICINE
Payer: MEDICARE

## 2022-04-08 DIAGNOSIS — R10.9 ABDOMINAL PAIN, UNSPECIFIED ABDOMINAL LOCATION: ICD-10-CM

## 2022-04-08 PROCEDURE — 74176 CT ABD & PELVIS W/O CONTRAST: CPT | Mod: 26,,, | Performed by: RADIOLOGY

## 2022-04-08 PROCEDURE — 74176 CT ABDOMEN PELVIS WITHOUT CONTRAST: ICD-10-PCS | Mod: 26,,, | Performed by: RADIOLOGY

## 2022-04-08 PROCEDURE — 74176 CT ABD & PELVIS W/O CONTRAST: CPT | Mod: TC
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
## 2022-04-14 ENCOUNTER — TELEPHONE (OUTPATIENT)
Dept: NEUROLOGY | Facility: CLINIC | Age: 62
End: 2022-04-14
Payer: MEDICARE

## 2022-04-14 NOTE — TELEPHONE ENCOUNTER
----- Message from Annette Membreno sent at 4/14/2022 10:48 AM CDT -----  Contact: Ms. Peguero Direct # [phone]  Patients nurse Ms. Peguero at [facility] is calling in regards to her saying that she would like for you to fax over the history of the patients physicals in order for the patient to sedated on April 2nd 2022.     I told Ms. Peguero that she will have to go through medical records, she said that she would like for you to fax the information over instead.     [facility] fax# [phone]

## 2022-04-18 ENCOUNTER — PES CALL (OUTPATIENT)
Dept: ADMINISTRATIVE | Facility: CLINIC | Age: 62
End: 2022-04-18
Payer: MEDICARE

## 2022-04-19 ENCOUNTER — TELEPHONE (OUTPATIENT)
Dept: NEUROLOGY | Facility: CLINIC | Age: 62
End: 2022-04-19
Payer: MEDICARE

## 2022-04-19 NOTE — TELEPHONE ENCOUNTER
Called patient's sister, Alanis, in regard to MRI and MRA brain. Notified Alanis that [facility] requires the patient to have an updated H&P within 1 month of the MRI with sedation. Alanis informed me that they would no longer be able to make the MRI/MRA on 4-. She states it was rescheduled for 5-. Patient's daughter stated they would be able to come for an appointment on 5-. I notified staff to schedule appointment on 5- at 10:30AM with me.

## 2022-04-19 NOTE — TELEPHONE ENCOUNTER
Attempted to contact with the patients sister about trying to get the patient in to be seen before her MRI. VM left.-BR   Forwarded to MA to watch for fax.

## 2022-04-20 ENCOUNTER — OFFICE VISIT (OUTPATIENT)
Dept: HEMATOLOGY/ONCOLOGY | Facility: CLINIC | Age: 62
End: 2022-04-20
Payer: MEDICARE

## 2022-04-20 ENCOUNTER — HOSPITAL ENCOUNTER (OUTPATIENT)
Dept: RADIOLOGY | Facility: HOSPITAL | Age: 62
Discharge: HOME | End: 2022-04-20
Attending: NURSE PRACTITIONER
Payer: MEDICARE

## 2022-04-20 ENCOUNTER — HOSPITAL ENCOUNTER (OUTPATIENT)
Dept: RADIOLOGY | Facility: HOSPITAL | Age: 62
Discharge: HOME | End: 2022-04-20
Attending: INTERNAL MEDICINE
Payer: MEDICARE

## 2022-04-20 VITALS
TEMPERATURE: 97 F | OXYGEN SATURATION: 96 % | WEIGHT: 113.31 LBS | BODY MASS INDEX: 22.84 KG/M2 | HEART RATE: 90 BPM | HEIGHT: 59 IN

## 2022-04-20 DIAGNOSIS — D69.6 THROMBOCYTOPENIA: ICD-10-CM

## 2022-04-20 DIAGNOSIS — E61.1 IRON DEFICIENCY: ICD-10-CM

## 2022-04-20 DIAGNOSIS — R60.0 LOCALIZED EDEMA: ICD-10-CM

## 2022-04-20 DIAGNOSIS — R19.00 INTRA-ABDOMINAL AND PELVIC SWELLING, MASS AND LUMP, UNSPECIFIED SITE: ICD-10-CM

## 2022-04-20 DIAGNOSIS — D72.819 LEUKOPENIA, UNSPECIFIED TYPE: ICD-10-CM

## 2022-04-20 DIAGNOSIS — M79.89 SWELLING OF RIGHT UPPER EXTREMITY: ICD-10-CM

## 2022-04-20 DIAGNOSIS — M79.89 SWELLING OF RIGHT UPPER EXTREMITY: Primary | ICD-10-CM

## 2022-04-20 DIAGNOSIS — D50.0 IRON DEFICIENCY ANEMIA DUE TO CHRONIC BLOOD LOSS: ICD-10-CM

## 2022-04-20 PROCEDURE — A9698 NON-RAD CONTRAST MATERIALNOC: HCPCS | Performed by: INTERNAL MEDICINE

## 2022-04-20 PROCEDURE — 25500020 PHARM REV CODE 255: Performed by: INTERNAL MEDICINE

## 2022-04-20 PROCEDURE — 99213 PR OFFICE/OUTPT VISIT, EST, LEVL III, 20-29 MIN: ICD-10-PCS | Mod: S$PBB,,, | Performed by: NURSE PRACTITIONER

## 2022-04-20 PROCEDURE — 99999 PR PBB SHADOW E&M-EST. PATIENT-LVL V: CPT | Mod: PBBFAC,,, | Performed by: NURSE PRACTITIONER

## 2022-04-20 PROCEDURE — 99215 OFFICE O/P EST HI 40 MIN: CPT | Mod: PBBFAC,25 | Performed by: NURSE PRACTITIONER

## 2022-04-20 PROCEDURE — 74177 CT ABD & PELVIS W/CONTRAST: CPT | Mod: TC

## 2022-04-20 PROCEDURE — 99999 PR PBB SHADOW E&M-EST. PATIENT-LVL V: ICD-10-PCS | Mod: PBBFAC,,, | Performed by: NURSE PRACTITIONER

## 2022-04-20 PROCEDURE — 93971 EXTREMITY STUDY: CPT | Mod: TC,RT

## 2022-04-20 PROCEDURE — 99213 OFFICE O/P EST LOW 20 MIN: CPT | Mod: S$PBB,,, | Performed by: NURSE PRACTITIONER

## 2022-04-20 RX ADMIN — IOHEXOL 100 ML: 350 INJECTION, SOLUTION INTRAVENOUS at 04:04

## 2022-04-20 RX ADMIN — IOHEXOL 500 ML: 9 SOLUTION ORAL at 04:04

## 2022-04-20 NOTE — ASSESSMENT & PLAN NOTE
Saturated iron level slightly low. No anemia. encourage iron rich foods.     F/u 3 months with cbc, iron studies. Sooner follow up PRN

## 2022-04-20 NOTE — PROGRESS NOTES
Kecia reports her oxygen saturations have gotten worse than when we spoke earlier today, going to the mid 80s with any activity. She will come to [facility] ER for eval. Message sent to Dr. Norris and Blanca with IP team regarding this update in her status.     Dr. Norris rec a chest CT when patient gets to the ER.    Subjective:       Patient ID: Juan Pablo Bolden is a 61 y.o. female.    Chief Complaint: review labs. Low WBC      HPI: 61 y.o female with pervasive development disorder, seizures disorder, originally referred for evaluation of leukopenia, thrombocytopenia. She is dependent on care 24 hours per day. Workup revealed normal B12/folate. Slightly low saturated iron level. No evidence of paraproteinemia. Cytopenias attributed to Depakote use    Patient presents today for follow up of her leukopenia, thrombocytopenia. Having CT abdomen with contrast today to f/u abnormal CT abdomen without contrast  Social History     Socioeconomic History    Marital status: Single   Tobacco Use    Smoking status: Never Smoker    Smokeless tobacco: Never Used   Substance and Sexual Activity    Alcohol use: No    Drug use: No    Sexual activity: Never     Social Determinants of Health     Financial Resource Strain: Unknown    Difficulty of Paying Living Expenses: Patient refused   Food Insecurity: Unknown    Worried About Running Out of Food in the Last Year: Patient refused    Ran Out of Food in the Last Year: Patient refused   Transportation Needs: No Transportation Needs    Lack of Transportation (Medical): No    Lack of Transportation (Non-Medical): No   Physical Activity: Inactive    Days of Exercise per Week: 0 days    Minutes of Exercise per Session: 0 min   Stress: Unknown    Feeling of Stress : Patient refused   Social Connections: Unknown    Frequency of Communication with Friends and Family: Patient refused    Frequency of Social Gatherings with Friends and Family: Patient refused    Active Member of Clubs or Organizations: No    Attends Club or Organization Meetings: Never    Marital Status: Never    Housing Stability: Low Risk     Unable to Pay for Housing in the Last Year: No    Number of Places Lived in the Last Year: 1    Unstable Housing in the Last Year: No       Past Medical History:   Diagnosis  Date    Bipolar 1 disorder     Chronic constipation     Galactorrhea     Growth retardation     Profound mental retardation    High cholesterol     Hyperlipidemia     Hypertension     Leukopenia     Neuroleptic-induced tardive dyskinesia     Schizoaffective disorder     Stereotypic movement disorder        History reviewed. No pertinent family history.    Past Surgical History:   Procedure Laterality Date    COLONOSCOPY N/A 5/17/2021    Procedure: COLONOSCOPY;  Surgeon: Jeffrey Ayala MD;  Location: [facility] ENDO;  Service: Gastroenterology;  Laterality: N/A;    ESOPHAGOGASTRODUODENOSCOPY N/A 11/5/2020    Procedure: EGD (ESOPHAGOGASTRODUODENOSCOPY);  Surgeon: John Batista MD;  Location: [facility] ENDO;  Service: Endoscopy;  Laterality: N/A;       Review of Systems   Unable to perform ROS: Other         Medication List with Changes/Refills   Current Medications    ALPRAZOLAM (XANAX) 0.25 MG TABLET    Take 1 tablet (0.25 mg total) by mouth 2 (two) times daily.    AMLODIPINE (NORVASC) 10 MG TABLET    TAKE 1 TABLET BY MOUTH EVERY DAY    DICLOFENAC SODIUM (VOLTAREN) 1 % GEL    APPLY TWO GRAMS TO THE AFFECTED AREA FOUR TIMES DAILY AS NEEDED FOR PAIN    DIVALPROEX (DEPAKOTE) 500 MG TBEC    Take 1 tablet (500 mg total) by mouth 2 (two) times daily.    DOCUSATE SODIUM (COLACE) 100 MG CAPSULE    Take 1 capsule (100 mg total) by mouth once daily.    FOLIC ACID (FOLVITE) 1 MG TABLET    TAKE ONE-HALF TABLET BY MOUTH TWICE DAILY    FUROSEMIDE (LASIX) 20 MG TABLET    Take 1 tablet (20 mg total) by mouth daily as needed.    GLYCERIN ADULT SUPPOSITORY    Place 1 suppository rectally as needed for Constipation.    IBUPROFEN (ADVIL,MOTRIN) 200 MG TABLET    Take 200 mg by mouth every 6 (six) hours as needed.    LACTULOSE (CHRONULAC) 10 GRAM/15 ML SOLUTION    TAKE 15 ML BY MOUTH TWICE DAILY FOR 7 DAYS    LORAZEPAM (ATIVAN) 1 MG TABLET    Take 1 tablet (1 mg total) by mouth daily as needed for Anxiety.    MELATONIN 10 MG  CAP    as directed    MIRTAZAPINE (REMERON) 45 MG TABLET    Take 1 tablet (45 mg total) by mouth every evening.    MUPIROCIN (BACTROBAN) 2 % OINTMENT    1 application    POLYETHYLENE GLYCOL (GLYCOLAX) 17 GRAM/DOSE POWDER    SMARTSI Capful(s) By Mouth Daily    QUETIAPINE (SEROQUEL) 400 MG TABLET    TAKE 1/2 TABLET BY MOUTH EVERY MORNING and ONE TABLET BY MOUTH EVERY NIGHT AT BEDTIME    SIMETHICONE (MYLICON) 125 MG CAP CAPSULE    1 capsule after meals and at bedtime as needed    SIMVASTATIN (ZOCOR) 40 MG TABLET    Take 1 tablet (40 mg total) by mouth every evening.    TRIAMTERENE-HYDROCHLOROTHIAZIDE 37.5-25 MG (MAXZIDE-25) 37.5-25 MG PER TABLET    TAKE 1 TABLET BY MOUTH EVERY MORNING     Objective:     Vitals:    22 1313   Pulse: 90   Temp: 97.2 °F (36.2 °C)     Lab Results   Component Value Date    WBC 4.63 2022    HGB 13.0 2022    HCT 40.4 2022    MCV 91 2022     2022       BMP  Lab Results   Component Value Date     2022    K 4.8 2022    CL 99 2022    CO2 29 2022    BUN 16 2022    CREATININE 0.7 2022    CALCIUM 10.3 2022    ANIONGAP 10 2022    ESTGFRAFRICA >60 2022    EGFRNONAA >60 2022     Lab Results   Component Value Date    ALT 22 2022    AST 19 2022    ALKPHOS 116 2022    BILITOT 0.3 2022         Physical Exam  Pulmonary:      Effort: Pulmonary effort is normal. No respiratory distress.   Neurological:      Mental Status: She is alert.          Assessment:     Problem List Items Addressed This Visit        Hematology    Thrombocytopenia     Platelet count wnl on most recent CBC           Relevant Orders    CBC Auto Differential    Iron and TIBC    Ferritin       Oncology    Leukopenia     WBC wnl on most recent CBC    Plan to f/u with cbc, iron studies in 3 months.            Relevant Orders    CBC Auto Differential    Iron and TIBC    Ferritin    Iron deficiency anemia due to  chronic blood loss     Saturated iron level slightly low. No anemia. encourage iron rich foods.     F/u 3 months with cbc, iron studies. Sooner follow up PRN             Other Visit Diagnoses     Swelling of right upper extremity    -  Primary    Relevant Orders    US RUE VENOUS (Completed)    Localized edema         Relevant Orders    US RUE VENOUS (Completed)    Iron deficiency        Relevant Orders    CBC Auto Differential    Iron and TIBC    Ferritin            Plan:     Swelling of right upper extremity  -     US RUE VENOUS; Future; Expected date: 04/20/2022    Localized edema   -     US RUE VENOUS; Future; Expected date: 04/20/2022    Iron deficiency  -     CBC Auto Differential; Future; Expected date: 04/20/2022  -     Iron and TIBC; Future; Expected date: 04/20/2022  -     Ferritin; Future; Expected date: 04/20/2022    Leukopenia, unspecified type  -     CBC Auto Differential; Future; Expected date: 04/20/2022  -     Iron and TIBC; Future; Expected date: 04/20/2022  -     Ferritin; Future; Expected date: 04/20/2022    Thrombocytopenia  -     CBC Auto Differential; Future; Expected date: 04/20/2022  -     Iron and TIBC; Future; Expected date: 04/20/2022  -     Ferritin; Future; Expected date: 04/20/2022    Iron deficiency anemia due to chronic blood loss              JOSÉ MIGUEL Sousa

## 2022-04-21 ENCOUNTER — TELEPHONE (OUTPATIENT)
Dept: FAMILY MEDICINE | Facility: CLINIC | Age: 62
End: 2022-04-21
Payer: MEDICARE

## 2022-04-21 NOTE — TELEPHONE ENCOUNTER
----- Message from Shahla Pavon sent at 4/21/2022  9:21 AM CDT -----  Regarding: calling for [facility]  Contact: Alanis Noyola  Please call lAanis Noyola again regarding pt. [phone]

## 2022-04-21 NOTE — TELEPHONE ENCOUNTER
Mrs. Thapa agrees to econsult with GI. She wanted to inform you that Juan Pablo hasn't been sleeping, which she thinks is related to the pain. She would like to possibly get something sent in for her for pain.

## 2022-04-21 NOTE — TELEPHONE ENCOUNTER
Please call patient's sister Alanis. Repeat CT still concerning for pancreatic mass/lesions. Radiology recommending follow up with an endoscopic ultrasound. Please see if she is ok with an e-consult to gastro to see if they agree with this recommendation.

## 2022-04-21 NOTE — TELEPHONE ENCOUNTER
----- Message from Oma Mcgovern sent at 4/21/2022  8:49 AM CDT -----  Contact: Sister  Type:  Patient Returning Call    Who Called: Sister  Who Left Message for Patient: Brooke  Does the patient know what this is regarding? no  Would the patient rather a call back or a response via [employer]? Call back  Best Call Back Number: [phone]  Additional Information: n/a

## 2022-04-22 ENCOUNTER — E-CONSULT (OUTPATIENT)
Dept: GASTROENTEROLOGY | Facility: HOSPITAL | Age: 62
End: 2022-04-22
Payer: MEDICARE

## 2022-04-22 DIAGNOSIS — K86.9 PANCREATIC LESION: Primary | ICD-10-CM

## 2022-04-23 NOTE — PROGRESS NOTES
[facility] GASTROENTEROLOGY  Response for E-Consult     Patient Name: Juan Pablo Bolden  MRN: [id number]  Primary Care Provider: Brooke Goldberg MD   Requesting Provider: Brooke Goldberg MD      Findings: CT showing pancreatic cysts    I did not speak to the requesting provider verbally about this.     Total time of Consultation: 15 minute    Percentage of time spent on verbal/written discussion: 100%     Chart reviewed. Pt had undergone lab testing on 4/8/2022 for abdominal pain that showed evidence of possible pancreatitis with an elevated lipase >1000, amylase of 450 . Liver enzymes were not elevated. Pt has leukopenia and thrombocytopenia followed by heme.    CT abdomen without contrast on 4/8/2022 showed fullness at the pancreatic head/neck, normal biliary tree, soft tissue implants concerning for peritoneal carcinomatosis.     Repeat CT with contrast on 4/20/22 showed multiple hypodense lesions in the head and body ranging from 17 mm to 2.1 cm in the body. PD dilation is present. 6 mm lesion in the tail.     Given these findings endoscopic ultrasound is recommended to evaluate for pancreatic cysts and rule out malignancy given the prior CT findings of possible peritoneal carcinomatosis. Will arrange this procedure for the patient.     Thank you for your consult.     Cherise Marshall MD  [facility] GASTROENTEROLOGY

## 2022-04-25 ENCOUNTER — PATIENT MESSAGE (OUTPATIENT)
Dept: PSYCHIATRY | Facility: CLINIC | Age: 62
End: 2022-04-25
Payer: MEDICARE

## 2022-04-25 ENCOUNTER — PATIENT MESSAGE (OUTPATIENT)
Dept: FAMILY MEDICINE | Facility: CLINIC | Age: 62
End: 2022-04-25
Payer: MEDICARE

## 2022-04-25 NOTE — TELEPHONE ENCOUNTER
Care Due:                  Date            Visit Type   Department     Provider  --------------------------------------------------------------------------------                                EP -                              PRIMARY      [facility]  Last Visit: 04-      CARE ([facility])   MEDICINE       Brooke Goldberg  Next Visit: None Scheduled  None         None Found                                                            Last  Test          Frequency    Reason                     Performed    Due Date  --------------------------------------------------------------------------------    Lipid Panel.  12 months..  simvastatin..............  Not Found    Overdue    Powered by [employer]. Reference number: [id number].   4/25/2022 9:09:30 AM CDT

## 2022-04-26 ENCOUNTER — PATIENT MESSAGE (OUTPATIENT)
Dept: ADMINISTRATIVE | Facility: HOSPITAL | Age: 62
End: 2022-04-26
Payer: MEDICARE

## 2022-04-26 ENCOUNTER — PATIENT MESSAGE (OUTPATIENT)
Dept: FAMILY MEDICINE | Facility: CLINIC | Age: 62
End: 2022-04-26
Payer: MEDICARE

## 2022-04-26 RX ORDER — MAGNESIUM GLUCONATE 27 MG(500)
TABLET ORAL
Qty: 30 TABLET | Refills: 11 | Status: SHIPPED | OUTPATIENT
Start: 2022-04-26 | End: 2022-12-19 | Stop reason: SDUPTHER

## 2022-04-26 RX ORDER — SIMVASTATIN 40 MG/1
TABLET, FILM COATED ORAL
Qty: 30 TABLET | Refills: 11 | Status: SHIPPED | OUTPATIENT
Start: 2022-04-26 | End: 2023-03-31 | Stop reason: SDUPTHER

## 2022-04-26 RX ORDER — MELATONIN 10 MG
CAPSULE ORAL
Qty: 30 CAPSULE | Refills: 11 | Status: SHIPPED | OUTPATIENT
Start: 2022-04-26 | End: 2022-04-26 | Stop reason: SDUPTHER

## 2022-04-26 NOTE — TELEPHONE ENCOUNTER
Informed pt's sister that e consult was sent to GI. Also cancelled automatic refill for Voltaren gel at [facility]'s.

## 2022-04-26 NOTE — TELEPHONE ENCOUNTER
----- Message from Bernadette Acevedo sent at 4/26/2022  3:30 PM CDT -----  Contact: PT sister       Who Called: Alanis      Does the patient know what this is regarding?: missed   call   Would the patient rather a call back or a response via [facility]?   Callback   Best Call Back Number: .[phone] (home)         Additional Information:

## 2022-04-26 NOTE — TELEPHONE ENCOUNTER
----- Message from Bernadette Acevedo sent at 4/26/2022  3:30 PM CDT -----  Contact: PT sister       Who Called: Alanis      Does the patient know what this is regarding?: missed   call   Would the patient rather a call back or a response via [facility]?   Callback   Best Call Back Number: [phone] (home)         Additional Information:

## 2022-04-28 ENCOUNTER — TELEPHONE (OUTPATIENT)
Dept: GASTROENTEROLOGY | Facility: CLINIC | Age: 62
End: 2022-04-28
Payer: MEDICARE

## 2022-04-28 ENCOUNTER — PATIENT MESSAGE (OUTPATIENT)
Dept: GASTROENTEROLOGY | Facility: CLINIC | Age: 62
End: 2022-04-28
Payer: MEDICARE

## 2022-04-28 NOTE — TELEPHONE ENCOUNTER
Phoned patient and spoke with her sister Alanis.  Scheduled patient for procedure on 5/16/22.  Went over instructions and will send via [employer].  Alanis verbalized understanding.

## 2022-04-28 NOTE — TELEPHONE ENCOUNTER
----- Message from Cherise Marshall MD sent at 4/22/2022  9:27 PM CDT -----  Pt has new pancreatic lesions needs EUS for further evaluation.    -S

## 2022-05-02 ENCOUNTER — PATIENT MESSAGE (OUTPATIENT)
Dept: ADMINISTRATIVE | Facility: HOSPITAL | Age: 62
End: 2022-05-02
Payer: MEDICARE

## 2022-05-15 ENCOUNTER — PATIENT MESSAGE (OUTPATIENT)
Dept: GASTROENTEROLOGY | Facility: CLINIC | Age: 62
End: 2022-05-15
Payer: MEDICARE

## 2022-05-15 ENCOUNTER — PATIENT MESSAGE (OUTPATIENT)
Dept: ENDOSCOPY | Facility: HOSPITAL | Age: 62
End: 2022-05-15
Payer: MEDICARE

## 2022-05-15 ENCOUNTER — PATIENT MESSAGE (OUTPATIENT)
Dept: PSYCHIATRY | Facility: CLINIC | Age: 62
End: 2022-05-15
Payer: MEDICARE

## 2022-05-16 ENCOUNTER — HOSPITAL ENCOUNTER (OUTPATIENT)
Facility: HOSPITAL | Age: 62
Discharge: HOME | End: 2022-05-16
Attending: INTERNAL MEDICINE | Admitting: INTERNAL MEDICINE
Payer: MEDICARE

## 2022-05-16 ENCOUNTER — ANESTHESIA EVENT (OUTPATIENT)
Dept: ENDOSCOPY | Facility: HOSPITAL | Age: 62
End: 2022-05-16
Payer: MEDICARE

## 2022-05-16 ENCOUNTER — ANESTHESIA (OUTPATIENT)
Dept: ENDOSCOPY | Facility: HOSPITAL | Age: 62
End: 2022-05-16
Payer: MEDICARE

## 2022-05-16 PROCEDURE — 43238 EGD US FINE NEEDLE BX/ASPIR: CPT | Mod: ,,, | Performed by: INTERNAL MEDICINE

## 2022-05-16 PROCEDURE — 27202059 HC NEEDLE, FNA (ANY): Performed by: INTERNAL MEDICINE

## 2022-05-16 PROCEDURE — 63600175 PHARM REV CODE 636 W HCPCS: Performed by: NURSE ANESTHETIST, CERTIFIED REGISTERED

## 2022-05-16 PROCEDURE — 88173 CYTOPATH EVAL FNA REPORT: CPT | Performed by: PATHOLOGY

## 2022-05-16 PROCEDURE — 25000003 PHARM REV CODE 250: Performed by: NURSE ANESTHETIST, CERTIFIED REGISTERED

## 2022-05-16 PROCEDURE — 82150 ASSAY OF AMYLASE: CPT | Performed by: INTERNAL MEDICINE

## 2022-05-16 PROCEDURE — 82378 CARCINOEMBRYONIC ANTIGEN: CPT | Performed by: INTERNAL MEDICINE

## 2022-05-16 PROCEDURE — 37000008 HC ANESTHESIA 1ST 15 MINUTES: Performed by: INTERNAL MEDICINE

## 2022-05-16 PROCEDURE — 43238 EGD US FINE NEEDLE BX/ASPIR: CPT | Performed by: INTERNAL MEDICINE

## 2022-05-16 PROCEDURE — 37000009 HC ANESTHESIA EA ADD 15 MINS: Performed by: INTERNAL MEDICINE

## 2022-05-16 PROCEDURE — 88173 PR  INTERPRETATION OF FNA SMEAR: ICD-10-PCS | Mod: 26,,, | Performed by: PATHOLOGY

## 2022-05-16 PROCEDURE — 88173 CYTOPATH EVAL FNA REPORT: CPT | Mod: 26,,, | Performed by: PATHOLOGY

## 2022-05-16 PROCEDURE — 43238 PR UPGI ENDOSCOPY W/US FN BX: ICD-10-PCS | Mod: ,,, | Performed by: INTERNAL MEDICINE

## 2022-05-16 RX ORDER — PROPOFOL 10 MG/ML
VIAL (ML) INTRAVENOUS
Status: DISCONTINUED | OUTPATIENT
Start: 2022-05-16 | End: 2022-05-16

## 2022-05-16 RX ORDER — MIDAZOLAM HYDROCHLORIDE 1 MG/ML
INJECTION, SOLUTION INTRAMUSCULAR; INTRAVENOUS
Status: DISCONTINUED | OUTPATIENT
Start: 2022-05-16 | End: 2022-05-16

## 2022-05-16 RX ORDER — SODIUM CHLORIDE, SODIUM LACTATE, POTASSIUM CHLORIDE, CALCIUM CHLORIDE 600; 310; 30; 20 MG/100ML; MG/100ML; MG/100ML; MG/100ML
INJECTION, SOLUTION INTRAVENOUS CONTINUOUS PRN
Status: DISCONTINUED | OUTPATIENT
Start: 2022-05-16 | End: 2022-05-16

## 2022-05-16 RX ORDER — SODIUM CHLORIDE, SODIUM LACTATE, POTASSIUM CHLORIDE, CALCIUM CHLORIDE 600; 310; 30; 20 MG/100ML; MG/100ML; MG/100ML; MG/100ML
INJECTION, SOLUTION INTRAVENOUS CONTINUOUS
Status: DISCONTINUED | OUTPATIENT
Start: 2022-05-16 | End: 2022-05-16 | Stop reason: HOSPADM

## 2022-05-16 RX ORDER — LIDOCAINE HYDROCHLORIDE 10 MG/ML
INJECTION, SOLUTION EPIDURAL; INFILTRATION; INTRACAUDAL; PERINEURAL
Status: DISCONTINUED | OUTPATIENT
Start: 2022-05-16 | End: 2022-05-16

## 2022-05-16 RX ADMIN — LIDOCAINE HYDROCHLORIDE 100 MG: 10 INJECTION, SOLUTION EPIDURAL; INFILTRATION; INTRACAUDAL; PERINEURAL at 12:05

## 2022-05-16 RX ADMIN — PROPOFOL 40 MG: 10 INJECTION, EMULSION INTRAVENOUS at 12:05

## 2022-05-16 RX ADMIN — PROPOFOL 50 MG: 10 INJECTION, EMULSION INTRAVENOUS at 12:05

## 2022-05-16 RX ADMIN — PROPOFOL 30 MG: 10 INJECTION, EMULSION INTRAVENOUS at 12:05

## 2022-05-16 RX ADMIN — GLYCOPYRROLATE 0.2 MG: 0.2 INJECTION, SOLUTION INTRAMUSCULAR; INTRAVITREAL at 12:05

## 2022-05-16 RX ADMIN — SODIUM CHLORIDE, SODIUM LACTATE, POTASSIUM CHLORIDE, AND CALCIUM CHLORIDE: .6; .31; .03; .02 INJECTION, SOLUTION INTRAVENOUS at 12:05

## 2022-05-16 RX ADMIN — MIDAZOLAM HYDROCHLORIDE 2 MG: 1 INJECTION, SOLUTION INTRAMUSCULAR; INTRAVENOUS at 12:05

## 2022-05-16 NOTE — TRANSFER OF CARE
"Anesthesia Transfer of Care Note    Patient: Juan Pablo Bolden    Procedure(s) Performed: Procedure(s) (LRB):  ULTRASOUND, UPPER GI TRACT, ENDOSCOPIC (N/A)    Patient location: GI    Anesthesia Type: MAC    Transport from OR: Transported from OR on room air with adequate spontaneous ventilation    Post pain: adequate analgesia    Post assessment: no apparent anesthetic complications and tolerated procedure well    Post vital signs: stable    Level of consciousness: sedated and responds to stimulation    Nausea/Vomiting: no nausea/vomiting    Complications: none    Transfer of care protocol was followed      Last vitals:   Visit Vitals  BP (!) 89/63 (BP Location: Left arm, Patient Position: Lying)   Pulse 73   Temp 36.5 °C (97.7 °F) (Temporal)   Resp 17   Ht 4' 11" (1.499 m)   Wt 51.3 kg (113 lb)   SpO2 99%   BMI 22.82 kg/m²     "

## 2022-05-16 NOTE — ANESTHESIA POSTPROCEDURE EVALUATION
Anesthesia Post Evaluation    Patient: Juan Pablo Bolden    Procedure(s) Performed: Procedure(s) (LRB):  ULTRASOUND, UPPER GI TRACT, ENDOSCOPIC (N/A)    Final Anesthesia Type: MAC      Patient location during evaluation: GI PACU  Patient participation: Yes- Able to Participate  Level of consciousness: awake and alert  Post-procedure vital signs: reviewed and stable  Pain management: adequate  Airway patency: patent    PONV status at discharge: No PONV  Anesthetic complications: no      Cardiovascular status: blood pressure returned to baseline and hemodynamically stable  Respiratory status: unassisted, spontaneous ventilation and room air  Hydration status: euvolemic  Follow-up not needed.          Vitals Value Taken Time   /71 05/16/22 1308   Temp 36.5 °C (97.7 °F) 05/16/22 1249   Pulse 58 05/16/22 1308   Resp 16 05/16/22 1308   SpO2 99 % 05/16/22 1308         No case tracking events are documented in the log.      Pain/Shala Score: Shala Score: 10 (5/16/2022  1:08 PM)

## 2022-05-16 NOTE — ANESTHESIA PREPROCEDURE EVALUATION
05/16/2022  Juan Pablo Bolden is a 61 y.o., female.      Pre-op Assessment    I have reviewed the Patient Summary Reports.     I have reviewed the Nursing Notes. I have reviewed the NPO Status.   I have reviewed the Medications.     Review of Systems  Anesthesia Hx:  No problems with previous Anesthesia  Denies Family Hx of Anesthesia complications.   Denies Personal Hx of Anesthesia complications.   Social:  Non-Smoker    Hematology/Oncology:  Hematology Normal        EENT/Dental:EENT/Dental Normal   Cardiovascular:   Exercise tolerance: good Hypertension ECG has been reviewed. Echo 4/21    · Concentric remodeling and normal systolic function.  · With normal right ventricular systolic function.  · The estimated ejection fraction is 60%.  · Normal left ventricular diastolic function.  · Normal central venous pressure (3 mmHg).  · The estimated PA systolic pressure is 17 mmHg.     Pulmonary:  Pulmonary Normal    Renal/:  Renal/ Normal     Hepatic/GI:  Hepatic/GI Normal    Musculoskeletal:  Musculoskeletal Normal    Neurological:   Seizures MR   Endocrine:   Hypothyroidism    Dermatological:  Skin Normal    Psych:   Psychiatric History Schizophrenia         Physical Exam  General: Well nourished, Alert and Confusion    Airway:  Mallampati: II   Mouth Opening: Normal  TM Distance: Normal  Tongue: Normal  Neck ROM: Normal ROM    Dental:  Edentulous    Heart:  Rate: Normal  Rhythm: Regular Rhythm        Anesthesia Plan  Type of Anesthesia, risks & benefits discussed:    Anesthesia Type: MAC, Gen ETT  Intra-op Monitoring Plan: Standard ASA Monitors  Post Op Pain Control Plan: multimodal analgesia  Induction:  IV  Informed Consent: Informed consent signed with the Patient and all parties understand the risks and agree with anesthesia plan.  All questions answered.   ASA Score: 3  Day of Surgery Review of History &  Physical: H&P Update referred to the surgeon/provider.    Ready For Surgery From Anesthesia Perspective.     .

## 2022-05-16 NOTE — [employer] PATIENT INSTRUCTIONS
Discharge Summary/Instructions after an Endoscopic Procedure  Patient Name: Juan Pablo Bolden  Patient MRN: [id number]  Patient YOB: 1960  Monday, May 16, 2022 Cherise Marshall MD  Dear patient,  As a result of recent federal legislation (The Federal Cures Act), you may   receive lab or pathology results from your procedure in your [facility]   account before your physician is able to contact you. Your physician or   their representative will relay the results to you with their   recommendations at their soonest availability.  Thank you,  RESTRICTIONS:  During your procedure today, you received medications for sedation.  These   medications may affect your judgment, balance and coordination.  Therefore,   for 24 hours, you have the following restrictions:   - DO NOT drive a car, operate machinery, make legal/financial decisions,   sign important papers or drink alcohol.    ACTIVITY:  Today: no heavy lifting, straining or running due to procedural   sedation/anesthesia.  The following day: return to full activity including work.  DIET:  Eat and drink normally unless instructed otherwise.     TREATMENT FOR COMMON SIDE EFFECTS:  - Mild abdominal pain, nausea, belching, bloating or excessive gas:  rest,   eat lightly and use a heating pad.  - Sore Throat: treat with throat lozenges and/or gargle with warm salt   water.  - Because air was used during the procedure, expelling large amounts of air   from your rectum or belching is normal.  - If a bowel prep was taken, you may not have a bowel movement for 1-3 days.    This is normal.  SYMPTOMS TO WATCH FOR AND REPORT TO YOUR PHYSICIAN:  1. Abdominal pain or bloating, other than gas cramps.  2. Chest pain.  3. Back pain.  4. Signs of infection such as: chills or fever occurring within 24 hours   after the procedure.  5. Rectal bleeding, which would show as bright red, maroon, or black stools.   (A tablespoon of blood from the rectum is not serious, especially  if   hemorrhoids are present.)  6. Vomiting.  7. Weakness or dizziness.  GO DIRECTLY TO THE NEAREST EMERGENCY ROOM IF YOU HAVE ANY OF THE FOLLOWING:      Difficulty breathing              Chills and/or fever over 101 F   Persistent vomiting and/or vomiting blood   Severe abdominal pain   Severe chest pain   Black, tarry stools   Bleeding- more than one tablespoon   Any other symptom or condition that you feel may need urgent attention  Your doctor recommends these additional instructions:  If any biopsies were taken, your doctors clinic will contact you in 1 to 2   weeks with any results.  - Discharge patient to home.   - Resume previous diet.   - Await pathology results.   - Perform MRCP in 6 months.  For questions, problems or results please call your physician Cherise Marshall MD at Work:  [phone]  If you have any questions about the above instructions, call the GI   department at [phone] or call the endoscopy unit at [phone]   from 7am until 3 pm.  [facility] [place], EMERGENCY ROOM PHONE NUMBER:   [phone]  IF A COMPLICATION OR EMERGENCY SITUATION ARISES AND YOU ARE UNABLE TO REACH   YOUR PHYSICIAN - GO DIRECTLY TO THE EMERGENCY ROOM.  I have read or have had read to me these discharge instructions for my   procedure and have received a written copy.  I understand these   instructions and will follow-up with my physician if I have any questions.     __________________________________       _____________________________________  Nurse Signature                                          Patient/Designated   Responsible Party Signature  MD Cherise Ritchie MD  5/16/2022 1:21:44 PM  This report has been verified and signed electronically.  Dear patient,  As a result of recent federal legislation (The Federal Cures Act), you may   receive lab or pathology results from your procedure in your [facility]   account before your  physician is able to contact you. Your physician or   their representative will relay the results to you with their   recommendations at their soonest availability.  Thank you,  PROVATION

## 2022-05-16 NOTE — H&P
PRE PROCEDURE H&P    Patient Name: Juan Pablo Bolden  MRN: [id number]  : 1960  Date of Procedure:  2022  Referring Physician: Cherise Marshall *  Primary Physician: Brooke Goldberg MD  Procedure Physician: Cherise Marshall MD       Planned Procedure: EUS  Diagnosis: pancreatic cysts  Chief Complaint: Same as above    HPI: Patient is an 61 y.o. female is here for the above.     Endoscopic ultrasound with possible fine needle aspiration/biopsy was recommended. The procedure was described along with the risks and benefits. Risks include perforation (0.02%), pancreatitis (0-2%), bleeding (4%), sedation related adverse events. This discussion was had in the presence of her sister Alanis Noyola. Other risks include, risks of adverse reaction to sedation requiring the use of reversal agents, bleeding requiring blood transfusion, perforation requiring surgical intervention, technical failure, aspiration leading to respiratory distress and respiratory failure resulting in endotracheal intubation and mechanical ventilation including death. Anesthesia is utilized for this procedure, it is up to the anesthesiologist to determine airway safety including elective endotracheal intubation. Questions were answered,  Her sister Alanis Noyola agreed to proceed. There were no language barriers.      Past Medical History:   Past Medical History:   Diagnosis Date    Bipolar 1 disorder     Chronic constipation     Galactorrhea     Growth retardation     Profound mental retardation    High cholesterol     Hyperlipidemia     Hypertension     Leukopenia     Neuroleptic-induced tardive dyskinesia     Schizoaffective disorder     Stereotypic movement disorder         Past Surgical History:  Past Surgical History:   Procedure Laterality Date    COLONOSCOPY N/A 2021    Procedure: COLONOSCOPY;  Surgeon: Jeffrey Ayala MD;  Location: [facility];  Service: Gastroenterology;  Laterality: N/A;     ESOPHAGOGASTRODUODENOSCOPY N/A 2020    Procedure: EGD (ESOPHAGOGASTRODUODENOSCOPY);  Surgeon: John Batista MD;  Location: [facility];  Service: Endoscopy;  Laterality: N/A;        Home Medications:  Prior to Admission medications    Medication Sig Start Date End Date Taking? Authorizing Provider   ALPRAZolam (XANAX) 0.25 MG tablet Take 1 tablet (0.25 mg total) by mouth 2 (two) times daily. 22  Yes Ahmet Schmidt MD   amLODIPine (NORVASC) 10 MG tablet TAKE 1 TABLET BY MOUTH EVERY DAY 3/30/22  Yes Brooke Goldberg MD   divalproex (DEPAKOTE) 500 MG TbEC Take 1 tablet (500 mg total) by mouth 2 (two) times daily. 22 Yes Ahmet Schmidt MD   docusate sodium (COLACE) 100 MG capsule Take 1 capsule (100 mg total) by mouth once daily. 22 Yes Jeffrey Ayala MD   folic acid (FOLVITE) 1 MG tablet TAKE ONE-HALF TABLET BY MOUTH TWICE DAILY 3/28/22  Yes Brooke Goldberg MD   furosemide (LASIX) 20 MG tablet Take 1 tablet (20 mg total) by mouth daily as needed. 21  Yes Brooke Goldberg MD   LORazepam (ATIVAN) 1 MG tablet Take 1 tablet (1 mg total) by mouth daily as needed for Anxiety. 22 Yes Ahmet Schmidt MD   melatonin 10 mg TbSR TAKE 1 TABLET BY MOUTH EVERY NIGHT AT BEDTIME 22  Yes Brooke Goldberg MD   mirtazapine (REMERON) 45 MG tablet Take 1 tablet (45 mg total) by mouth every evening. 22  Yes Ahmet Schmidt MD   polyethylene glycol (GLYCOLAX) 17 gram/dose powder SMARTSI Capful(s) By Mouth Daily 21  Yes Historical Provider   QUEtiapine (SEROQUEL) 400 MG tablet TAKE 1/2 TABLET BY MOUTH EVERY MORNING and ONE TABLET BY MOUTH EVERY NIGHT AT BEDTIME 22  Yes Ahmet Schmidt MD   simethicone (MYLICON) 125 mg Cap capsule 1 capsule after meals and at bedtime as needed   Yes Historical Provider   simvastatin (ZOCOR) 40 MG tablet TAKE 1 TABLET BY MOUTH EVERY EVENING 22  Yes Brooke Goldberg MD    triamterene-hydrochlorothiazide 37.5-25 mg (MAXZIDE-25) 37.5-25 mg per tablet TAKE 1 TABLET BY MOUTH EVERY MORNING 7/8/21  Yes Brooke Goldberg MD   diclofenac sodium (VOLTAREN) 1 % Gel APPLY TWO GRAMS TO THE AFFECTED AREA FOUR TIMES DAILY AS NEEDED FOR PAIN 5/9/22   Brooke Goldberg MD   glycerin adult suppository Place 1 suppository rectally as needed for Constipation. 4/3/21   Dawson Vazquez MD   ibuprofen (ADVIL,MOTRIN) 200 MG tablet Take 200 mg by mouth every 6 (six) hours as needed. 3/30/21   Historical Provider   lactulose (CHRONULAC) 10 gram/15 mL solution TAKE 15 ML BY MOUTH TWICE DAILY FOR 7 DAYS 4/6/22   Marya Storey NP   mupirocin (BACTROBAN) 2 % ointment 1 application    Historical Provider        Allergies:  Review of patient's allergies indicates:  No Known Allergies     Social History:   Social History     Socioeconomic History    Marital status: Single   Tobacco Use    Smoking status: Never Smoker    Smokeless tobacco: Never Used   Substance and Sexual Activity    Alcohol use: No    Drug use: No    Sexual activity: Never     Social Determinants of Health     Financial Resource Strain: Unknown    Difficulty of Paying Living Expenses: Patient refused   Food Insecurity: Unknown    Worried About Running Out of Food in the Last Year: Patient refused    Ran Out of Food in the Last Year: Patient refused   Transportation Needs: No Transportation Needs    Lack of Transportation (Medical): No    Lack of Transportation (Non-Medical): No   Physical Activity: Inactive    Days of Exercise per Week: 0 days    Minutes of Exercise per Session: 0 min   Stress: Unknown    Feeling of Stress : Patient refused   Social Connections: Unknown    Frequency of Communication with Friends and Family: Patient refused    Frequency of Social Gatherings with Friends and Family: Patient refused    Active Member of Clubs or Organizations: No    Attends Club or Organization Meetings: Never    Marital  "Status: Never    Housing Stability: Low Risk     Unable to Pay for Housing in the Last Year: No    Number of Places Lived in the Last Year: 1    Unstable Housing in the Last Year: No       Family History:  History reviewed. No pertinent family history.    ROS: No acute cardiac events, no acute respiratory complaints.     Physical Exam (all patients):    Temp 97.5 °F (36.4 °C)   Resp 20   Ht 4' 11" (1.499 m)   Wt 51.3 kg (113 lb)   BMI 22.82 kg/m²   Lungs: Clear to auscultation bilaterally, respirations unlabored  Heart: Regular rate and rhythm, S1 and S2 normal, no obvious murmurs  Abdomen:         Soft, non-tender, bowel sounds normal, no masses, no organomegaly    Lab Results   Component Value Date    WBC 4.63 04/08/2022    MCV 91 04/08/2022    RDW 12.9 04/08/2022     04/08/2022    GLU 95 04/08/2022    HGBA1C 5.2 10/14/2014    BUN 16 04/08/2022     04/08/2022    K 4.8 04/08/2022    CL 99 04/08/2022        SEDATION PLAN: per anesthesia      History reviewed, vital signs satisfactory, cardiopulmonary status satisfactory, sedation options, risks and plans have been discussed with the patient  All their questions were answered and the patient agrees to the sedation procedures as planned and the patient is deemed an appropriate candidate for the sedation as planned.    Procedure explained to patient, informed consent obtained and placed in chart.    Cherise Marshall  5/16/2022  12:24 PM     "

## 2022-05-17 VITALS
RESPIRATION RATE: 16 BRPM | DIASTOLIC BLOOD PRESSURE: 71 MMHG | TEMPERATURE: 98 F | BODY MASS INDEX: 22.78 KG/M2 | OXYGEN SATURATION: 99 % | HEART RATE: 58 BPM | SYSTOLIC BLOOD PRESSURE: 115 MMHG | WEIGHT: 113 LBS | HEIGHT: 59 IN

## 2022-05-18 ENCOUNTER — PATIENT OUTREACH (OUTPATIENT)
Dept: ADMINISTRATIVE | Facility: OTHER | Age: 62
End: 2022-05-18
Payer: MEDICARE

## 2022-05-18 NOTE — PROGRESS NOTES
Health Maintenance Due   Topic Date Due    Shingles Vaccine (1 of 2) Never done    Mammogram  03/08/2014    COVID-19 Vaccine (4 - Booster for Pfizer series) 01/04/2022     Updates were requested from care everywhere.  Chart was reviewed for overdue Proactive [facility] Encounters (MELISA) topics (CRS, Breast Cancer Screening, Eye exam)  Health Maintenance has been updated.  LINKS immunization registry triggered.  Immunizations were reconciled.

## 2022-05-19 ENCOUNTER — OFFICE VISIT (OUTPATIENT)
Dept: NEUROLOGY | Facility: CLINIC | Age: 62
End: 2022-05-19
Payer: MEDICARE

## 2022-05-19 VITALS
HEIGHT: 59 IN | WEIGHT: 110 LBS | RESPIRATION RATE: 16 BRPM | DIASTOLIC BLOOD PRESSURE: 70 MMHG | HEART RATE: 79 BPM | SYSTOLIC BLOOD PRESSURE: 138 MMHG | OXYGEN SATURATION: 95 % | BODY MASS INDEX: 22.18 KG/M2

## 2022-05-19 DIAGNOSIS — F84.9 PDD (PERVASIVE DEVELOPMENTAL DISORDER): ICD-10-CM

## 2022-05-19 DIAGNOSIS — G24.01 TARDIVE DYSKINESIA: Primary | ICD-10-CM

## 2022-05-19 DIAGNOSIS — R29.818 OTHER SYMPTOMS AND SIGNS INVOLVING THE NERVOUS SYSTEM: ICD-10-CM

## 2022-05-19 DIAGNOSIS — R45.1 RESTLESSNESS AND AGITATION: ICD-10-CM

## 2022-05-19 DIAGNOSIS — F79 MENTAL HANDICAP: ICD-10-CM

## 2022-05-19 DIAGNOSIS — F25.9 SCHIZOAFFECTIVE DISORDER, UNSPECIFIED TYPE: ICD-10-CM

## 2022-05-19 DIAGNOSIS — R25.9 ABNORMAL MOVEMENTS: ICD-10-CM

## 2022-05-19 PROCEDURE — 99999 PR PBB SHADOW E&M-EST. PATIENT-LVL V: ICD-10-PCS | Mod: PBBFAC,,, | Performed by: NURSE PRACTITIONER

## 2022-05-19 PROCEDURE — 99215 OFFICE O/P EST HI 40 MIN: CPT | Mod: S$PBB,,, | Performed by: NURSE PRACTITIONER

## 2022-05-19 PROCEDURE — 99999 PR PBB SHADOW E&M-EST. PATIENT-LVL V: CPT | Mod: PBBFAC,,, | Performed by: NURSE PRACTITIONER

## 2022-05-19 PROCEDURE — 99215 PR OFFICE/OUTPT VISIT, EST, LEVL V, 40-54 MIN: ICD-10-PCS | Mod: S$PBB,,, | Performed by: NURSE PRACTITIONER

## 2022-05-19 PROCEDURE — 99215 OFFICE O/P EST HI 40 MIN: CPT | Mod: PBBFAC | Performed by: NURSE PRACTITIONER

## 2022-05-19 NOTE — PROGRESS NOTES
Subjective:      Patient ID: Juan Pablo Bolden is a 61 y.o. female.    Chief Complaint:  Tardive dyskinesia       History of Present Illness  HPI    Patient has seen multiple neurologist, including movement specialist, in the past including Dr. Ferreira, Dr. Gonzalez at [facility], Dr. Simon, Dr. Yuen, Dr. Bangura, and Dr. Arsen Zayas at [facility]. Obtaining an accurate history and assessment was difficult related to patient talking loudly and being restless throughout appointment.      Patient present's to appointment with sister. Patient's sister states that they would like to establish care with a neurologist in [place]. Patient's sister provided the history. Patient's sister states that she recently began taking patient to appointments over the last year; therefore, she has limited history. Patient has diagnoses of tardive dyskinesia, PDD, schizoaffective disorder, hyperprolactinemia, central hypothyroidism, and lipodystrophy.  The patient is currently living at a respite center. Previously, patient was living independently with a privatized provider (who brought her to appointments). Patient is awaiting to be placed with a new privatized provider. She states at baseline, the patient is orientated to self and needs assistance with all ADLs and 24 hour care. She becomes agitated when her medications are not given to her consistently or when objects are taken from her. The patient's sister visits her 2 to 3 times a week. Patient's sister states patient is at her baseline. She has noticed the patient's focus has decreased but denies memory changes. Patient's sister is unsure if the patient is receiving her medications appropriately at the respite center.    Patient's sister brought patient to appointment because she has noticed the patient holding her left arm close to her body and moving the fingers on her left hand constantly for the last 3 weeks (December 2021). Patient's sister demonstrated the movement,  which appeared to be piano-like. Per chart review, patient has long history of psychotropic exposure. Patient sees Dr. Schmidt with psychiatry for agitation and restlessness. Currently taking Seroquel 200/400 mg, Remeron 30 mg QHS, Depakote 500 mg BID, Valium 5 mg PRN, and Xanax 0.25 mg BID for restlessness and agitation. Patient's sister is concerned the patient had a stroke because she had very high blood pressure at her last cardiology appointment (/121 12-9-2021)  and not sure about compliance with medications. Denies lateralized weakness, trouble swallowing (always needs foods chopped), or facial droop. She states the patient has had similar movement in her fingers in the past as well as lip smacking that stopped. She noticed her sister often rocks/sways back and forth. She often grabs her clothes and licks her clothes. She denies the patient tapping her foot, shoulder shrugging, and rotating hips. She denies history of seizures, although it is on the patient's chart. She states that she has never witnessed seizure-like activity or was told about seizure-like activity. She states she has had a negative EEG in the past. Patient's sister was unaware of patient's previous stroke and denied patient having any stroke-like symptoms in the past. The patient had a fall last year. She is able to ambulate but likes to hold on to people while she walks.  9- CTH showed small, old lacunar infarct in the anterior limb of the left internal capsule. Otherwise unremarkable. 2-7-2020 CTH showed no acute intracranial abnormality. Chronic ischemic changes including bilateral chronic lacunar infarcts.      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 Patient's sister states that patient does not have Kluver Bucy Syndrome, and it was ruled out in the past. She is unsure when the last brain imaging was completed. She states they have attempted a MRI and MRA in the past but sedation  is needed. Patient was not able to stay still with valium.     Interval History 5-: Patient presents to appointment with sister. Patient's sister states that patient has recently been diagnosed pancreatic cysts, otherwise doing well. Continues to have piano-like finger movements, lip smacking, and chewing on clothes periodically. Patient continues to see Dr. Schmidt with psychiatry for agitation and restlessness. Currently taking Seroquel 200/400 mg, Remeron 45 mg QHS, Depakote 500 mg BID, Ativan 1 mg PRN, and Xanax 0.25 mg BID for restlessness and agitation.  3-8-2022 The EEG is suggestive of diffuse encephalopathy.       Review of Systems  Review of Systems   Unable to perform ROS: Psychiatric disorder     Objective:   VITAL SIGNS WERE REVIEWED        GENERAL APPEARANCE:      The patient speaking loudly and restless. Did not follow instructions.     BMI 22.22     No signs of respiratory distress.     Normal breathing pattern.     Did not make eye contact      GENERAL MEDICAL EXAM:     HEENT:  Head is atraumatic normocephalic.      Neck and Axillae: No JVD. No visible lesions. No thyromegaly. No lymphadenopathy.     Cardiopulmonary: No cyanosis. No tachypnea. Normal respiratory effort.     Gastrointestinal/Urogenital:  No jaundice. No stomas or lesions. No visible hernias. No catheters.     Skin, Hair and Nails:  No neurofibromatosis. No visible lesions.No stigmata of autoimmune disease. No clubbing.     Skin is warm and moist. No palpable masses.     Limbs: No varicose veins. No visible swelling. No palpable edema.     Muskoskeletal: No visible deformities.No visible lesions.     No spine tenderness. No signs of longstanding neuropathy. No dislocations or fractures.          Neurologic Exam     Mental Status   Oriented to person.   Disoriented to place. Disoriented to country, city, area, street and number.   Disoriented to time. Disoriented to year, month, date and season.   Follows 1 step commands.    Attention: decreased. Concentration: decreased.   Speech: slurred   Level of consciousness: alert  Knowledge: poor.   Abnormal comprehension.     Cranial Nerves   Cranial nerves II through XII intact.     CN III, IV, VI   Right pupil: Size: 3 mm. Shape: regular. Accommodation: intact.   Left pupil: Size: 3 mm. Shape: regular. Accommodation: intact.   CN III: no CN III palsy  CN VI: no CN VI palsy  Ophthalmoparesis: none  Upgaze: normal  Downgaze: normal  Conjugate gaze: present  Vestibulo-ocular reflex: present    CN VII   Facial expression full, symmetric.     CN VIII   CN VIII normal.   Hearing: intact    CN XII   CN XII normal.     Exam limited due to patient's limited participation      Motor Exam   Muscle bulk: decreased  Overall muscle tone: decreased  Patient appeared to move all extremities equally     Patient able to reach bilateral arms out to grab objects     Sensory Exam   Right arm light touch: normal  Left arm light touch: normal  Right leg light touch: normal  Left leg light touch: normal    Gait, Coordination, and Reflexes     Gait  Gait: normal    Reflexes   Right brachioradialis: 2+  Left brachioradialis: 2+  Right biceps: 2+  Left biceps: 2+  Right triceps: 2+  Left triceps: 2+  Patient screamed and kicked leg up when attempting to check reflexes     Patient was holding items in her hands throughout appointment, unable to assess finger movements.         Lab Results   Component Value Date    WBC 4.63 04/08/2022    HGB 13.0 04/08/2022    HCT 40.4 04/08/2022    MCV 91 04/08/2022     04/08/2022   H   Sodium   Date Value Ref Range Status   04/08/2022 138 136 - 145 mmol/L Final     Potassium   Date Value Ref Range Status   04/08/2022 4.8 3.5 - 5.1 mmol/L Final     Chloride   Date Value Ref Range Status   04/08/2022 99 95 - 110 mmol/L Final     CO2   Date Value Ref Range Status   04/08/2022 29 23 - 29 mmol/L Final     Glucose   Date Value Ref Range Status   04/08/2022 95 70 - 110 mg/dL Final      BUN   Date Value Ref Range Status   04/08/2022 16 8 - 23 mg/dL Final     Creatinine   Date Value Ref Range Status   04/08/2022 0.7 0.5 - 1.4 mg/dL Final     Calcium   Date Value Ref Range Status   04/08/2022 10.3 8.7 - 10.5 mg/dL Final     Total Protein   Date Value Ref Range Status   04/08/2022 6.7 6.0 - 8.4 g/dL Final     Albumin   Date Value Ref Range Status   04/08/2022 2.7 (L) 3.5 - 5.2 g/dL Final     Total Bilirubin   Date Value Ref Range Status   04/08/2022 0.3 0.1 - 1.0 mg/dL Final     Comment:     For infants and newborns, interpretation of results should be based  on gestational age, weight and in agreement with clinical  observations.    Premature Infant recommended reference ranges:  Up to 24 hours.............<8.0 mg/dL  Up to 48 hours............<12.0 mg/dL  3-5 days..................<15.0 mg/dL  6-29 days.................<15.0 mg/dL       Alkaline Phosphatase   Date Value Ref Range Status   04/08/2022 116 55 - 135 U/L Final     AST   Date Value Ref Range Status   04/08/2022 19 10 - 40 U/L Final     ALT   Date Value Ref Range Status   04/08/2022 22 10 - 44 U/L Final     Anion Gap   Date Value Ref Range Status   04/08/2022 10 8 - 16 mmol/L Final     eGFR if    Date Value Ref Range Status   04/08/2022 >60 >60 mL/min/1.73 m^2 Final     eGFR if non    Date Value Ref Range Status   04/08/2022 >60 >60 mL/min/1.73 m^2 Final     Comment:     Calculation used to obtain the estimated glomerular filtration  rate (eGFR) is the CKD-EPI equation.        Lab Results   Component Value Date    PNXFTRJA19 586 10/01/2020     Lab Results   Component Value Date    TSH 1.177 04/09/2021 9-     CTH showed small, old lacunar infarct in the anterior limb of the left internal capsule. Otherwise unremarkable.     2-7-2020     CTH showed no acute intracranial abnormality. Chronic ischemic changes including bilateral chronic lacunar infarcts     4-9-2021     Valproic Acid level  90.8 NL     4-     TTE WNL per Dr Ambriz from [facility] note (do not personally see results)     6-4-2021     EEG normal in the awake state    3-8-2022    The EEG is suggestive of diffuse encephalopathy.    6-    MRI brain and MRA head show chronic ischemic changes including several chronic lacunar infarcts both supra and infratentorially in the basal ganglia, thalamus, and right cerebellar hemisphere. There are also scattered chronic microhemmorrhages both supra and infratentorially which are most likely secondary to hypertension. Atherosclerotic disease of the posterior circulation with nondominant right vertebral artyer occluding at the craniocervical junction and multifocal stenoses of the basilar artery. There are fetal origins of the posterior cerebral arteries.      Reviewed the neuroimaging independently      Assessment:      1. Tardive dyskinesia          Plan:   TARDIVE DYSKINESIA/ABNROMAL MOVEMENTS/QUESTIONAL SEIZURE DISORDER?     Evaluate MRI brain W WO contrast     Evaluate MRA brain     Unable to obtain AEEG r/t restlessness     Continue to monitor           PDD/RESTLESS and AGITATION     Continue to follow psych     Continue medications as prescribed by psych              MEDICAL/SURGICAL COMORBIDITIES      All relevant medical comorbidities noted and managed by primary care physician and medical care team.          MISCELLANEOUS MEDICAL PROBLEMS         HEALTHY LIFESTYLE AND PREVENTATIVE CARE     Encouraged the patient to adhere to the age-appropriate health maintenance guidelines including screening tests and vaccinations.      Discussed the overall importance of healthy lifestyle, optimal weight, exercise, healthy diet, good sleep hygiene and avoiding drugs including smoking, alcohol and recreational drugs. The patient verbalized full understanding.         Advised the patient to follow COVID-19 prevention measures.         I spent a total of 40 minutes on the day of the visit.  This  includes face to face time and non-face to face time preparing to see the patient (eg, review of tests), obtaining and/or reviewing separately obtained history, documenting clinical information in the electronic or other health record, independently interpreting results and communicating results to the patient/family/caregiver, or care coordinator.         Keep appointment with Dr. Malcolm Casillas, MSN, NP  Collaborating Provider: Korin Fowler MD, FAAN Neurologist/Epileptologist

## 2022-05-20 LAB
AMYLASE, PANCREATIC FLUID: 2024 U/L
BDY SITE: NORMAL
BDY SITE: NORMAL
CEA FLD-MCNC: 8859 NG/ML

## 2022-05-23 LAB
COMMENT: NORMAL
FINAL PATHOLOGIC DIAGNOSIS: NORMAL
Lab: NORMAL

## 2022-05-24 ENCOUNTER — TELEPHONE (OUTPATIENT)
Dept: FAMILY MEDICINE | Facility: CLINIC | Age: 62
End: 2022-05-24
Payer: MEDICARE

## 2022-05-24 NOTE — TELEPHONE ENCOUNTER
No additional labs needed. It looks like she also has labs scheduled in July but this is for hematology.    Ok to cancer voltaren gel refills.

## 2022-05-24 NOTE — TELEPHONE ENCOUNTER
----- Message from Ekta Horton sent at 5/24/2022 12:00 PM CDT -----  Pt's sister (Alanis) is requesting a call back in regards to pt. Caller can be reached at  [phone] (nscc)

## 2022-05-24 NOTE — TELEPHONE ENCOUNTER
pts sister is asking for the ok to call the pharmacy to cancel any refills on the voltaren. She has 8 tubes at home.    She would also like to know if you need any labs done on Juan Pablo, she has a lab appt on 6/11 for another provider and wanted to try and get them all done at once

## 2022-05-27 ENCOUNTER — PATIENT MESSAGE (OUTPATIENT)
Dept: FAMILY MEDICINE | Facility: CLINIC | Age: 62
End: 2022-05-27
Payer: MEDICARE

## 2022-05-27 ENCOUNTER — TELEPHONE (OUTPATIENT)
Dept: GASTROENTEROLOGY | Facility: CLINIC | Age: 62
End: 2022-05-27
Payer: MEDICARE

## 2022-05-27 ENCOUNTER — PATIENT MESSAGE (OUTPATIENT)
Dept: GASTROENTEROLOGY | Facility: HOSPITAL | Age: 62
End: 2022-05-27
Payer: MEDICARE

## 2022-05-27 DIAGNOSIS — K86.9 PANCREATIC LESION: Primary | ICD-10-CM

## 2022-05-27 NOTE — TELEPHONE ENCOUNTER
Returned call and spoke with patient's sister Alanis.  Alanis requested results from patient's procedure on 5/16/22.        Alanis complained that she had left messages last week and did not receive a call back. Advised her that the results can take anywhere from 7-10 business days.  I apologized to her and let her know that the results are in and I would get Dr. Marshall to review them and would give her a call right back.  She verbalized understanding.    Patient's advocacy was also on the line.

## 2022-05-27 NOTE — TELEPHONE ENCOUNTER
Pt called and informd about lab testing for cyst showing negative cytology but elevated CEA >8000. Will refer to surg onc and repeat scan in 3-6 months    Cherise Marshall MD  Gastroenterology  Director of Advanced Endoscopy at [facility]

## 2022-05-27 NOTE — TELEPHONE ENCOUNTER
----- Message from Billie Hill sent at 5/27/2022  3:52 PM CDT -----  Contact: Pt Sister  .Type:  Needs Medical Advice    Who Called: Pt Sister   Would the patient rather a call back or a response via [facility]? Call   Best Call Back Number: .[phone] (home)    Additional Information: Pt sister states that she needs to speak with someone.. Thanks AW        Labor Epidural    Patient location during procedure: OB  Indication:at surgeon's request  Performed By  Anesthesiologist: AMISHA MANJARREZ  Preanesthetic Checklist  Completed: patient identified, site marked, surgical consent, pre-op evaluation, timeout performed, IV checked, risks and benefits discussed and monitors and equipment checked  Epidural Block Prep:  Pt Position:sitting  Sterile Tech:cap, gloves, mask and sterile barrier  Monitoring:blood pressure monitoring and EKG  Epidural Block Procedure:  Approach:midline  Guidance:landmark technique  Location:L2-L3  Needle Type:Tuohy  Needle Gauge:17  Cath Depth at skin:12 cm  Paresthesia: none  Aspiration:negative  Test Dose:negative  Post Assessment:  Dressing:occlusive dressing applied and secured with tape  Pt Tolerance:patient tolerated the procedure well with no apparent complications  Complications:no

## 2022-05-30 ENCOUNTER — TELEPHONE (OUTPATIENT)
Dept: HEMATOLOGY/ONCOLOGY | Facility: CLINIC | Age: 62
End: 2022-05-30
Payer: MEDICARE

## 2022-05-30 NOTE — TELEPHONE ENCOUNTER
----- Message from Dawson Gutierrez MD sent at 5/30/2022  8:57 AM CDT -----  I can see her end of the month Deepali. We would def recommend continuing to follow this. I'm going to assume she is not a candidate for MRI given her handicap, so probably alternating CT and EUS to establish some stability.  ----- Message -----  From: Cherise Marshall MD  Sent: 5/27/2022   4:54 PM CDT  To: Dawson Gutierrez MD    Hi Dawson,   Can she see you or Khalif. Pt is has mental retardation and is accompanied by her sister Alanis Noyola. Cyst at the pancreatic head of 21 mm x 17 CEA is >8000 on EUS.     -S

## 2022-05-30 NOTE — TELEPHONE ENCOUNTER
Called patient's sister and discussed surgical oncology consult. Scheduled for 6/24 with Dr. Gutierrez per his direction. Reviewed location of the clinic, sister verbalized understanding to all information

## 2022-06-01 RX ORDER — ALPRAZOLAM 0.25 MG/1
0.25 TABLET ORAL 2 TIMES DAILY
Qty: 60 TABLET | Refills: 2 | Status: SHIPPED | OUTPATIENT
Start: 2022-06-01 | End: 2022-08-23

## 2022-06-08 ENCOUNTER — PES CALL (OUTPATIENT)
Dept: ADMINISTRATIVE | Facility: CLINIC | Age: 62
End: 2022-06-08
Payer: MEDICARE

## 2022-06-08 PROBLEM — I25.10 CORONARY ARTERY CALCIFICATION: Status: ACTIVE | Noted: 2022-06-08

## 2022-06-08 PROBLEM — R93.89 ABNORMAL CT SCAN: Status: ACTIVE | Noted: 2022-06-08

## 2022-06-08 PROBLEM — I25.84 CORONARY ARTERY CALCIFICATION: Status: ACTIVE | Noted: 2022-06-08

## 2022-06-08 PROBLEM — K76.9 LIVER LESION: Status: ACTIVE | Noted: 2022-06-08

## 2022-06-08 PROBLEM — I70.0 CALCIFICATION OF AORTA: Status: ACTIVE | Noted: 2022-06-08

## 2022-06-08 NOTE — PROGRESS NOTES
Subjective:   Patient ID:  Juan Pablo Bolden is a 61 y.o. female who presents for follow-up of No chief complaint on file.    Hypertension  This is a chronic problem. The current episode started more than 1 year ago. The problem has been gradually improving since onset. The problem is controlled. Pertinent negatives include no chest pain, palpitations or shortness of breath. Past treatments include calcium channel blockers and diuretics. The current treatment provides moderate improvement. Compliance problems include psychosocial issues.    Hyperlipidemia  This is a chronic problem. The current episode started more than 1 year ago. The problem is controlled. Pertinent negatives include no chest pain or shortness of breath. Current antihyperlipidemic treatment includes statins. The current treatment provides moderate improvement of lipids. There are no compliance problems     Clinically the patient is stable.  Recent cardiac echo shows normal LV function.  There has been no heart rate blood pressure changes.  The sister was present today and patient is stable.  Otherwise doing well this time.  Recent colonoscopy stable and no polyps.     This finds the patient agitated and probably not getting medications properly and will happy go back to the facility to improve overall compliance is of medications. The sister was present today and she is in agreement.     Today's visit finds the patient feeling well no longer agitated heart rate blood pressure stable doing well on chronic medications.      Review of Systems   Constitutional: Negative for chills, diaphoresis, night sweats, weight gain and weight loss.   HENT: Negative for congestion, hoarse voice, sore throat and stridor.    Eyes: Negative for double vision and pain.   Cardiovascular: Negative for chest pain, claudication, cyanosis, dyspnea on exertion, irregular heartbeat, leg swelling, near-syncope, orthopnea, palpitations, paroxysmal nocturnal dyspnea and syncope.    Respiratory: Negative for cough, hemoptysis, shortness of breath, sleep disturbances due to breathing, snoring, sputum production and wheezing.    Endocrine: Negative for cold intolerance, heat intolerance and polydipsia.   Hematologic/Lymphatic: Negative for bleeding problem. Does not bruise/bleed easily.   Skin: Negative for color change, dry skin and rash.   Musculoskeletal: Negative for joint swelling and muscle cramps.   Gastrointestinal: Negative for bloating, abdominal pain, constipation, diarrhea, dysphagia, melena, nausea and vomiting.   Genitourinary: Negative for flank pain and urgency.   Neurological: Negative for dizziness, focal weakness, headaches, light-headedness, loss of balance, seizures and weakness.   Psychiatric/Behavioral: Negative for altered mental status and memory loss. The patient is not nervous/anxious.      No family history on file.  Past Medical History:   Diagnosis Date    Bipolar 1 disorder     Chronic constipation     Galactorrhea     Growth retardation     Profound mental retardation    High cholesterol     Hyperlipidemia     Hypertension     Leukopenia     Neuroleptic-induced tardive dyskinesia     Schizoaffective disorder     Stereotypic movement disorder      Social History     Socioeconomic History    Marital status: Single   Tobacco Use    Smoking status: Never Smoker    Smokeless tobacco: Never Used   Substance and Sexual Activity    Alcohol use: No    Drug use: No    Sexual activity: Never     Social Determinants of Health     Financial Resource Strain: Unknown    Difficulty of Paying Living Expenses: Patient refused   Food Insecurity: Unknown    Worried About Running Out of Food in the Last Year: Patient refused    Ran Out of Food in the Last Year: Patient refused   Transportation Needs: No Transportation Needs    Lack of Transportation (Medical): No    Lack of Transportation (Non-Medical): No   Physical Activity: Inactive    Days of Exercise per  Week: 0 days    Minutes of Exercise per Session: 0 min   Stress: Unknown    Feeling of Stress : Patient refused   Social Connections: Unknown    Frequency of Communication with Friends and Family: Patient refused    Frequency of Social Gatherings with Friends and Family: Patient refused    Active Member of Clubs or Organizations: No    Attends Club or Organization Meetings: Never    Marital Status: Never    Housing Stability: Low Risk     Unable to Pay for Housing in the Last Year: No    Number of Places Lived in the Last Year: 1    Unstable Housing in the Last Year: No     Current Outpatient Medications on File Prior to Visit   Medication Sig Dispense Refill    ALPRAZolam (XANAX) 0.25 MG tablet Take 1 tablet (0.25 mg total) by mouth 2 (two) times daily. 60 tablet 2    amLODIPine (NORVASC) 10 MG tablet TAKE 1 TABLET BY MOUTH EVERY DAY 90 tablet 1    diclofenac sodium (VOLTAREN) 1 % Gel APPLY TWO GRAMS TO THE AFFECTED AREA FOUR TIMES DAILY AS NEEDED FOR PAIN 200 g 1    divalproex (DEPAKOTE) 500 MG TbEC Take 1 tablet (500 mg total) by mouth 2 (two) times daily. 60 tablet 1    folic acid (FOLVITE) 1 MG tablet TAKE ONE-HALF TABLET BY MOUTH TWICE DAILY 30 tablet 11    furosemide (LASIX) 20 MG tablet Take 1 tablet (20 mg total) by mouth daily as needed. 30 tablet 5    glycerin adult suppository Place 1 suppository rectally as needed for Constipation. 25 suppository 0    ibuprofen (ADVIL,MOTRIN) 200 MG tablet Take 200 mg by mouth every 6 (six) hours as needed.      lactulose (CHRONULAC) 10 gram/15 mL solution TAKE 15 ML BY MOUTH TWICE DAILY FOR 7 DAYS 210 mL 0    LORazepam (ATIVAN) 1 MG tablet Take 1 tablet (1 mg total) by mouth daily as needed for Anxiety. 30 tablet 2    melatonin 10 mg TbSR TAKE 1 TABLET BY MOUTH EVERY NIGHT AT BEDTIME 30 tablet 11    mirtazapine (REMERON) 45 MG tablet TAKE 1 TABLET BY MOUTH EVERY EVENING 30 tablet 1    mupirocin (BACTROBAN) 2 % ointment 1 application       polyethylene glycol (GLYCOLAX) 17 gram/dose powder SMARTSI Capful(s) By Mouth Daily      QUEtiapine (SEROQUEL) 400 MG tablet TAKE 1/2 TABLET BY MOUTH EVERY MORNING and ONE TABLET BY MOUTH EVERY NIGHT AT BEDTIME 45 tablet 2    simethicone (MYLICON) 125 mg Cap capsule 1 capsule after meals and at bedtime as needed      simvastatin (ZOCOR) 40 MG tablet TAKE 1 TABLET BY MOUTH EVERY EVENING 30 tablet 11    triamterene-hydrochlorothiazide 37.5-25 mg (MAXZIDE-25) 37.5-25 mg per tablet TAKE 1 TABLET BY MOUTH EVERY MORNING 30 tablet 11     No current facility-administered medications on file prior to visit.     Review of patient's allergies indicates:  No Known Allergies    Objective:     Physical Exam  Eyes:      Pupils: Pupils are equal, round, and reactive to light.   Neck:      Trachea: No tracheal deviation.   Cardiovascular:      Rate and Rhythm: Normal rate and regular rhythm.      Pulses: Intact distal pulses.           Carotid pulses are 2+ on the right side and 2+ on the left side.       Radial pulses are 2+ on the right side and 2+ on the left side.        Femoral pulses are 2+ on the right side and 2+ on the left side.       Popliteal pulses are 2+ on the right side and 2+ on the left side.        Dorsalis pedis pulses are 2+ on the right side and 2+ on the left side.        Posterior tibial pulses are 2+ on the right side and 2+ on the left side.      Heart sounds: Normal heart sounds. No murmur heard.    No friction rub. No gallop.   Pulmonary:      Effort: Pulmonary effort is normal. No respiratory distress.      Breath sounds: Normal breath sounds. No stridor. No wheezing or rales.   Chest:      Chest wall: No tenderness.   Abdominal:      General: There is no distension.      Tenderness: There is no abdominal tenderness. There is no rebound.   Musculoskeletal:         General: No tenderness.      Cervical back: Normal range of motion.   Skin:     General: Skin is warm and dry.   Neurological:       Mental Status: She is alert and oriented to person, place, and time.         Assessment:     1. Essential hypertension        Plan:     Essential hypertension    Impression 1 hypertension stable improved no other changes continue all medicines at this time.  Patient continues on max side as well as Lasix intermittently and amlodipine.  Follow-up evaluation again in 6 months.

## 2022-06-09 ENCOUNTER — OFFICE VISIT (OUTPATIENT)
Dept: TRANSPLANT | Facility: CLINIC | Age: 62
End: 2022-06-09
Payer: MEDICARE

## 2022-06-09 VITALS
HEIGHT: 59 IN | HEART RATE: 88 BPM | RESPIRATION RATE: 16 BRPM | OXYGEN SATURATION: 97 % | DIASTOLIC BLOOD PRESSURE: 82 MMHG | SYSTOLIC BLOOD PRESSURE: 116 MMHG | WEIGHT: 112 LBS | BODY MASS INDEX: 22.58 KG/M2

## 2022-06-09 DIAGNOSIS — I10 ESSENTIAL HYPERTENSION: Primary | ICD-10-CM

## 2022-06-09 PROCEDURE — 99214 PR OFFICE/OUTPT VISIT, EST, LEVL IV, 30-39 MIN: ICD-10-PCS | Mod: S$PBB,,, | Performed by: INTERNAL MEDICINE

## 2022-06-09 PROCEDURE — 99214 OFFICE O/P EST MOD 30 MIN: CPT | Mod: PBBFAC | Performed by: INTERNAL MEDICINE

## 2022-06-09 PROCEDURE — 99999 PR PBB SHADOW E&M-EST. PATIENT-LVL IV: ICD-10-PCS | Mod: PBBFAC,,, | Performed by: INTERNAL MEDICINE

## 2022-06-09 PROCEDURE — 99999 PR PBB SHADOW E&M-EST. PATIENT-LVL IV: CPT | Mod: PBBFAC,,, | Performed by: INTERNAL MEDICINE

## 2022-06-09 PROCEDURE — 99214 OFFICE O/P EST MOD 30 MIN: CPT | Mod: S$PBB,,, | Performed by: INTERNAL MEDICINE

## 2022-06-10 ENCOUNTER — PATIENT OUTREACH (OUTPATIENT)
Dept: ADMINISTRATIVE | Facility: HOSPITAL | Age: 62
End: 2022-06-10
Payer: MEDICARE

## 2022-06-10 NOTE — PROGRESS NOTES
Cervical Gap BR REPORT: Called and spoke with pt sister in regards to overdue pap smear and mammogram. Alanis says that pt has to be sedated to have pap smear and mammogram done. Message will be sent to PCP to see how to proceed. Uploaded and linked pap smear to HM found in Care Everywhere.

## 2022-06-11 ENCOUNTER — LAB VISIT (OUTPATIENT)
Dept: LAB | Facility: HOSPITAL | Age: 62
End: 2022-06-11
Attending: NURSE PRACTITIONER
Payer: MEDICARE

## 2022-06-11 DIAGNOSIS — G24.01 TARDIVE DYSKINESIA: ICD-10-CM

## 2022-06-11 LAB
CREAT SERPL-MCNC: 0.8 MG/DL (ref 0.5–1.4)
EST. GFR  (AFRICAN AMERICAN): >60 ML/MIN/1.73 M^2
EST. GFR  (NON AFRICAN AMERICAN): >60 ML/MIN/1.73 M^2

## 2022-06-11 PROCEDURE — 36415 COLL VENOUS BLD VENIPUNCTURE: CPT | Performed by: NURSE PRACTITIONER

## 2022-06-11 PROCEDURE — 82565 ASSAY OF CREATININE: CPT | Performed by: NURSE PRACTITIONER

## 2022-06-13 ENCOUNTER — TELEPHONE (OUTPATIENT)
Dept: FAMILY MEDICINE | Facility: CLINIC | Age: 62
End: 2022-06-13
Payer: MEDICARE

## 2022-06-13 ENCOUNTER — TELEPHONE (OUTPATIENT)
Dept: NEUROLOGY | Facility: CLINIC | Age: 62
End: 2022-06-13
Payer: MEDICARE

## 2022-06-13 ENCOUNTER — TELEPHONE (OUTPATIENT)
Dept: OBSTETRICS AND GYNECOLOGY | Facility: CLINIC | Age: 62
End: 2022-06-13
Payer: MEDICARE

## 2022-06-13 VITALS — SYSTOLIC BLOOD PRESSURE: 122 MMHG | DIASTOLIC BLOOD PRESSURE: 78 MMHG

## 2022-06-13 NOTE — TELEPHONE ENCOUNTER
----- Message from Angela Sigala MA sent at 6/13/2022 10:35 AM CDT -----  Regarding: Pap Smear  Pt need assistance with scheduling pap smear. Pt is special needs and will need sedation in order to do pap smear..[phone] (home)

## 2022-06-13 NOTE — TELEPHONE ENCOUNTER
Mrs. Thapa, Ms. Bolden's sister, had questions regarding wether or not Ms. Bolden could take a Xanax prior to receiving anesthesia for her MRI. I instructed Mrs. Thapa to ask the anesthesiologist at the [facility]. She verbalized understanding.

## 2022-06-13 NOTE — TELEPHONE ENCOUNTER
----- Message from Angela Sigala MA sent at 6/13/2022 10:35 AM CDT -----  Regarding: Pap Smear  Pt need assistance with scheduling pap smear. Pt is special needs and will need sedation in order to do pap smear..[phone] (home)

## 2022-06-13 NOTE — TELEPHONE ENCOUNTER
Called sister and advised Dr. Goldberg does not think the patient needs to be seen unless having problems.  Patient stated they will call with any concerns.

## 2022-06-13 NOTE — TELEPHONE ENCOUNTER
----- Message from Dolly Casillas NP sent at 6/13/2022  9:02 AM CDT -----  Labs Reviewed    6-    Creatinine and GFR NL    Kidney levels look good!

## 2022-06-13 NOTE — TELEPHONE ENCOUNTER
Spoke with patient sister regarding lab results.She had questions regarding an upcoming procedure for her sister. Informed her that ai will be reaching out to Ms. Alberto soon as she was out of clinic.-BR

## 2022-06-16 ENCOUNTER — PATIENT MESSAGE (OUTPATIENT)
Dept: NEUROLOGY | Facility: CLINIC | Age: 62
End: 2022-06-16
Payer: MEDICARE

## 2022-06-16 NOTE — TELEPHONE ENCOUNTER
Contacted patient's sister regarding an upcoming procedure. The sister wanted instructions as to whether the patient can take her medicines before the procedure. I informed the patients sister that the instructions would come for the anesthesia doctor. Patient verbalized understanding.-BR

## 2022-06-21 ENCOUNTER — TELEPHONE (OUTPATIENT)
Dept: NEUROLOGY | Facility: CLINIC | Age: 62
End: 2022-06-21
Payer: MEDICARE

## 2022-06-21 DIAGNOSIS — I65.01 OCCLUSION OF RIGHT VERTEBRAL ARTERY: ICD-10-CM

## 2022-06-21 DIAGNOSIS — I63.81 MULTIPLE LACUNAR INFARCTS: Primary | ICD-10-CM

## 2022-06-21 NOTE — TELEPHONE ENCOUNTER
Gave Ms. Thapa, Ms. Bolden's sister, MRI brain and MRA head results. Instructed to follow up with Dr. Moser on July 5th. Also asked Ms. Thapa to  disk with images at [facility] and bring to appointment. She verbalized understanding.

## 2022-06-23 ENCOUNTER — PATIENT OUTREACH (OUTPATIENT)
Dept: ADMINISTRATIVE | Facility: HOSPITAL | Age: 62
End: 2022-06-23
Payer: MEDICARE

## 2022-06-23 ENCOUNTER — PATIENT MESSAGE (OUTPATIENT)
Dept: FAMILY MEDICINE | Facility: CLINIC | Age: 62
End: 2022-06-23
Payer: MEDICARE

## 2022-06-23 DIAGNOSIS — G40.909 SEIZURE DISORDER: Primary | ICD-10-CM

## 2022-06-23 DIAGNOSIS — G24.01 TARDIVE DYSKINESIA: ICD-10-CM

## 2022-06-23 DIAGNOSIS — F84.9 PDD (PERVASIVE DEVELOPMENTAL DISORDER): ICD-10-CM

## 2022-06-23 DIAGNOSIS — F25.9 SCHIZOAFFECTIVE DISORDER, UNSPECIFIED TYPE: ICD-10-CM

## 2022-06-23 DIAGNOSIS — R45.1 RESTLESSNESS AND AGITATION: ICD-10-CM

## 2022-06-23 NOTE — PROGRESS NOTES
Working Cervical Cancer Screening Report:       Per Dr. Goldberg note 06/13. No pap needed unless pt having symptoms    Health Maintenance Updated  Immunizations Updated  Care Everywhere Updated  [facility] Searched-None found  [facility] Searched-None found  [facility] Searched-None found

## 2022-06-24 ENCOUNTER — OFFICE VISIT (OUTPATIENT)
Dept: INTERNAL MEDICINE | Facility: CLINIC | Age: 62
End: 2022-06-24
Payer: MEDICARE

## 2022-06-24 ENCOUNTER — OFFICE VISIT (OUTPATIENT)
Dept: SURGICAL ONCOLOGY | Facility: CLINIC | Age: 62
End: 2022-06-24
Payer: MEDICARE

## 2022-06-24 VITALS
WEIGHT: 111.56 LBS | HEART RATE: 71 BPM | DIASTOLIC BLOOD PRESSURE: 83 MMHG | SYSTOLIC BLOOD PRESSURE: 128 MMHG | BODY MASS INDEX: 22.49 KG/M2 | OXYGEN SATURATION: 95 % | HEIGHT: 59 IN

## 2022-06-24 VITALS
BODY MASS INDEX: 22.53 KG/M2 | DIASTOLIC BLOOD PRESSURE: 83 MMHG | TEMPERATURE: 97 F | WEIGHT: 111.56 LBS | SYSTOLIC BLOOD PRESSURE: 128 MMHG | HEART RATE: 71 BPM

## 2022-06-24 DIAGNOSIS — E03.8 CENTRAL HYPOTHYROIDISM: ICD-10-CM

## 2022-06-24 DIAGNOSIS — D69.6 THROMBOCYTOPENIA: ICD-10-CM

## 2022-06-24 DIAGNOSIS — K86.9 PANCREATIC LESION: ICD-10-CM

## 2022-06-24 DIAGNOSIS — G24.01 TARDIVE DYSKINESIA: ICD-10-CM

## 2022-06-24 DIAGNOSIS — F25.9 SCHIZOAFFECTIVE DISORDER, UNSPECIFIED TYPE: ICD-10-CM

## 2022-06-24 DIAGNOSIS — F84.9 PDD (PERVASIVE DEVELOPMENTAL DISORDER): ICD-10-CM

## 2022-06-24 DIAGNOSIS — Z00.00 ENCOUNTER FOR PREVENTIVE HEALTH EXAMINATION: Primary | ICD-10-CM

## 2022-06-24 DIAGNOSIS — D72.819 LEUKOPENIA, UNSPECIFIED TYPE: ICD-10-CM

## 2022-06-24 DIAGNOSIS — R32 URINARY INCONTINENCE, UNSPECIFIED TYPE: ICD-10-CM

## 2022-06-24 DIAGNOSIS — R15.9 INCONTINENCE OF FECES, UNSPECIFIED FECAL INCONTINENCE TYPE: ICD-10-CM

## 2022-06-24 DIAGNOSIS — E22.1 HYPERPROLACTINEMIA: ICD-10-CM

## 2022-06-24 DIAGNOSIS — G40.909 SEIZURE DISORDER: ICD-10-CM

## 2022-06-24 DIAGNOSIS — K76.9 LIVER LESION: ICD-10-CM

## 2022-06-24 DIAGNOSIS — D64.9 ANEMIA, UNSPECIFIED TYPE: ICD-10-CM

## 2022-06-24 DIAGNOSIS — I25.10 CORONARY ARTERY CALCIFICATION: ICD-10-CM

## 2022-06-24 DIAGNOSIS — R26.9 ABNORMALITY OF GAIT AND MOBILITY: ICD-10-CM

## 2022-06-24 DIAGNOSIS — M85.80 OSTEOPENIA, UNSPECIFIED LOCATION: ICD-10-CM

## 2022-06-24 DIAGNOSIS — D49.0 IPMN (INTRADUCTAL PAPILLARY MUCINOUS NEOPLASM): Primary | ICD-10-CM

## 2022-06-24 DIAGNOSIS — I25.84 CORONARY ARTERY CALCIFICATION: ICD-10-CM

## 2022-06-24 DIAGNOSIS — I10 ESSENTIAL HYPERTENSION: ICD-10-CM

## 2022-06-24 DIAGNOSIS — E88.1 LIPODYSTROPHY: ICD-10-CM

## 2022-06-24 DIAGNOSIS — I70.0 CALCIFICATION OF AORTA: ICD-10-CM

## 2022-06-24 DIAGNOSIS — R93.89 ABNORMAL CT SCAN: ICD-10-CM

## 2022-06-24 PROCEDURE — 99999 PR PBB SHADOW E&M-EST. PATIENT-LVL IV: ICD-10-PCS | Mod: PBBFAC,,, | Performed by: SURGERY

## 2022-06-24 PROCEDURE — 99203 PR OFFICE/OUTPT VISIT, NEW, LEVL III, 30-44 MIN: ICD-10-PCS | Mod: S$PBB,,, | Performed by: SURGERY

## 2022-06-24 PROCEDURE — G0439 PR MEDICARE ANNUAL WELLNESS SUBSEQUENT VISIT: ICD-10-PCS | Mod: ,,, | Performed by: NURSE PRACTITIONER

## 2022-06-24 PROCEDURE — 99999 PR PBB SHADOW E&M-EST. PATIENT-LVL V: CPT | Mod: PBBFAC,,, | Performed by: NURSE PRACTITIONER

## 2022-06-24 PROCEDURE — 99999 PR PBB SHADOW E&M-EST. PATIENT-LVL V: ICD-10-PCS | Mod: PBBFAC,,, | Performed by: NURSE PRACTITIONER

## 2022-06-24 PROCEDURE — 99203 OFFICE O/P NEW LOW 30 MIN: CPT | Mod: S$PBB,,, | Performed by: SURGERY

## 2022-06-24 PROCEDURE — 99215 OFFICE O/P EST HI 40 MIN: CPT | Mod: PBBFAC,25 | Performed by: NURSE PRACTITIONER

## 2022-06-24 PROCEDURE — G0439 PPPS, SUBSEQ VISIT: HCPCS | Mod: ,,, | Performed by: NURSE PRACTITIONER

## 2022-06-24 PROCEDURE — 99214 OFFICE O/P EST MOD 30 MIN: CPT | Mod: PBBFAC,27 | Performed by: SURGERY

## 2022-06-24 PROCEDURE — 99999 PR PBB SHADOW E&M-EST. PATIENT-LVL IV: CPT | Mod: PBBFAC,,, | Performed by: SURGERY

## 2022-06-24 NOTE — PATIENT INSTRUCTIONS
Counseling and Referral of Other Preventative  (Italic type indicates deductible and co-insurance are waived)    Patient Name: Juan Pablo Bolden  Today's Date: 6/24/2022    Health Maintenance       Date Due Completion Date    Shingles Vaccine (1 of 2) Never done ---    Mammogram 03/08/2014 3/8/2013    COVID-19 Vaccine (4 - Booster for Pfizer series) 01/04/2022 10/4/2021    High Dose Statin 06/24/2023 6/24/2022    TETANUS VACCINE 05/30/2024 5/30/2014    Lipid Panel 12/23/2024 12/23/2019    Colorectal Cancer Screening 05/17/2031 5/17/2021        No orders of the defined types were placed in this encounter.    The following information is provided to all patients.  This information is to help you find resources for any of the problems found today that may be affecting your health:                Living healthy guide: www.[facility].[place].gov      Understanding Diabetes: www.diabetes.org      Eating healthy: www.cdc.gov/healthyweight      [facility] home safety checklist: www.cdc.gov/steadi/patient.html      Agency on Aging: www.goea.[place].[facility]      Alcoholics anonymous (AA): www.aa.org      Physical Activity: www.kristal.nih.gov/ih6mnwb      Tobacco use: www.quitwithusla.org

## 2022-06-24 NOTE — PROGRESS NOTES
"  Juan Pablo Bolden presented for a  Medicare AWV and comprehensive Health Risk Assessment today. The following components were reviewed and updated:    · Medical history  · Family History  · Social history  · Allergies and Current Medications  · Health Risk Assessment  · Health Maintenance  · Care Team         ** See Completed Assessments for Annual Wellness Visit within the encounter summary.**         The following assessments were completed:  · Living Situation  · CAGE  · Depression Screening-unable to complete due to PDD  · Timed Get Up and Go  · Whisper Test-unable to complete due to PDD. Sister denies noticing hearing difficulties.   · Cognitive Function Screening-unable to complete due to PDD  · Nutrition Screening  · ADL Screening  · PAQ Screening        Vitals:    06/24/22 1259   BP: 128/83   BP Location: Left arm   Patient Position: Sitting   Pulse: 71   SpO2: 95%   Weight: 50.6 kg (111 lb 8.8 oz)   Height: 4' 11" (1.499 m)     Body mass index is 22.53 kg/m².  Physical Exam  Vitals and nursing note reviewed.   Constitutional:       Appearance: She is well-developed.      Comments: Patient accompanied by sister, who was present throughout the visit and provided information on patient's behalf.      HENT:      Head: Normocephalic.   Cardiovascular:      Rate and Rhythm: Normal rate and regular rhythm.      Heart sounds: Normal heart sounds.   Pulmonary:      Effort: Pulmonary effort is normal. No respiratory distress.   Abdominal:      Palpations: Abdomen is soft. There is no mass.      Tenderness: There is no abdominal tenderness.   Musculoskeletal:         General: Normal range of motion.   Skin:     General: Skin is warm and dry.   Neurological:      Mental Status: She is alert.      Motor: No abnormal muscle tone.   Psychiatric:         Behavior: Behavior normal.      Comments: Pt non communicative                Diagnoses and health risks identified today and associated recommendations/orders:    1. " Encounter for preventive health examination  Discussed receiving Shingrix at pharmacy.    Discussed locations to receive COVID booster    Sister denies noticing any problems due to stress. She knows to follow up with PCP if noticing.     Encouraged healthy diet and exercise as tolerated     Has 24/7 care    2. Liver lesion  Ct 4/22-12 mm hypervascular lesion within segment 8 of the right hepatic lobe is not fully characterized.  Differential includes flash filling hemangioma or metastatic disease.   Advised to follow up with PCP/providers for further evaluation and recommendations. Sister expressed understanding.      3. Abnormal CT scan  Ct 4/22  Continue current treatment plan as previously prescribed with your  Gastroenterologist and surgeon.     4. Calcification of aorta  Ct 4/22  Discussed diagnosis and risk reduction.   Advised to follow up with PCP/cardiologist for further recommendations. Sister expressed understanding.       5. Coronary artery calcification  See #4    6. Essential hypertension  Stable and controlled. Continue current treatment plan as previously prescribed with your PCP and cardiologist.     7. Central hypothyroidism  Continue current treatment plan as previously prescribed with your  pcp     8. Thrombocytopenia  Stable and controlled. Continue current treatment plan as previously prescribed with your hematologist.     9. Seizure disorder  Sister denies recent activity   Stable. Continue current treatment plan as previously prescribed with your  Neurologist.     10. Schizoaffective disorder, unspecified type  Continue current treatment plan as previously prescribed with your  pcp     11. PDD (pervasive developmental disorder)  Continue current treatment plan as previously prescribed with your  Providers.     12. Tardive dyskinesia  Continue current treatment plan as previously prescribed with your  Neurologist.     13. Osteopenia, unspecified location  Please follow up with PCP    14.  Lipodystrophy  Saw endocrinologist in past. Will discuss with PCP need to establish with new endo.     15. Hyperprolactinemia  See #14    16. Leukopenia, unspecified type  Stable and controlled. Continue current treatment plan as previously prescribed with your hematologist.     17. Anemia, unspecified type  Continue current treatment plan as previously prescribed with your  Hematologist.     18. Incontinence of feces, unspecified fecal incontinence type  Chronic  Advised to follow up with PCP for further evaluation and recommendations.sister expressed understanding.      19. Urinary incontinence, unspecified type  See #18    20. Abnormality of gait and mobility  Normal timed get up and go test. Reports 2+ falls in the last 12 months.    Fall precautions reviewed with sister. Advised to follow up with PCP for further recommendations. Sister expressed understanding.         Provided Juan Pablo with a 5-10 year written screening schedule and personal prevention plan. Recommendations were developed using the USPSTF age appropriate recommendations. Education, counseling, and referrals were provided as needed. After Visit Summary printed and given to patient which includes a list of additional screenings\tests needed.    Follow up in about 1 year (around 6/24/2023) for awv.    Maria Teresa Meadows NP  I offered to discuss advanced care planning, including how to pick a person who would make decisions for you if you were unable to make them for yourself, called a health care power of , and what kind of decisions you might make such as use of life sustaining treatments such as ventilators and tube feeding when faced with a life limiting illness recorded on a living will that they will need to know. (How you want to be cared for as you near the end of your natural life)     X  Patient is unable to engage in a discussion regarding advanced directives due to severe physical and/or cognitive impairment.

## 2022-06-24 NOTE — Clinical Note
Your patient was seen for AWV. Abnormalities bolded. Please review.  Thank you,  JOSÉ MIGUEL Lemons

## 2022-06-28 NOTE — TELEPHONE ENCOUNTER
"I have signed for the following orders AND/OR meds.  Please call the patient and ask the patient to schedule the testing AND/OR inform about any medications that were sent. Medications have been sent to pharmacy listed below      Orders Placed This Encounter   Procedures    WHEELCHAIR FOR HOME USE     Order Specific Question:   Hours in W/C per day:     Answer:   6     Order Specific Question:   Type of Wheelchair:     Answer:   Standard     Order Specific Question:   Size(Width):     Answer:   16"(small adult)     Order Specific Question:   Leg Support:     Answer:   STD footrests     Order Specific Question:   Lap Belt:     Answer:   Velcro     Order Specific Question:   Accessories:     Answer:   Front brakes     Order Specific Question:   Cushion:     Answer:   Basic     Order Specific Question:   Height:     Answer:   4'11"     Order Specific Question:   Weight:     Answer:   111 lb     Order Specific Question:   Length of need (1-99 months):     Answer:   99     Order Specific Question:   Please check all that apply:     Answer:   Caregiver is capable and willing to operate wheelchair safely.     Order Specific Question:   Please check all that apply:     Answer:   Patient mobility limitations cannot be sufficiently resolved by the use of other ambulatory therapies.              [facility] [place] - [address] [address] [facility] [address]  Phone: [phone] Fax: [phone]    [id number] - [place] - [address] AT [facility]  [address]  [place] [address]  Phone: [phone] Fax: [phone]    "

## 2022-06-29 ENCOUNTER — TELEPHONE (OUTPATIENT)
Dept: FAMILY MEDICINE | Facility: CLINIC | Age: 62
End: 2022-06-29
Payer: MEDICARE

## 2022-06-29 NOTE — TELEPHONE ENCOUNTER
----- Message from Savannah Kenny sent at 6/29/2022  3:28 PM CDT -----  Contact: Alanis/Sister  Patient's sister is calling to speak with a nurse regarding order. Patient's sister request to ask a question regarding wheelchair order. Please give Ms. Thapa a call back at [phone] when possible.  Thank you,  GH

## 2022-06-30 ENCOUNTER — PATIENT MESSAGE (OUTPATIENT)
Dept: PSYCHIATRY | Facility: CLINIC | Age: 62
End: 2022-06-30
Payer: MEDICARE

## 2022-06-30 ENCOUNTER — TELEPHONE (OUTPATIENT)
Dept: NEUROLOGY | Facility: CLINIC | Age: 62
End: 2022-06-30
Payer: MEDICARE

## 2022-06-30 NOTE — TELEPHONE ENCOUNTER
----- Message from Shamika Champion sent at 6/30/2022  9:59 AM CDT -----  Contact: Alanis/ Sister  Type:  Patient Returning Call    Who Called: Alanis  Who Left Message for Patient:  Does the patient know what this is regarding?: Rescheduling appt  Would the patient rather a call back or a response via [facility]? Call back  Best Call Back Number: [phone]  Additional Information:

## 2022-06-30 NOTE — TELEPHONE ENCOUNTER
Spoke with sister Alanis-    Arlen booked 08/02/2022 @ 1:30 pm with Dr. Dr. Moser at the [place].

## 2022-07-05 NOTE — PROGRESS NOTES
Juan Pablo is a mentally disabled 63yo F, lives in group home and brought in today by her sister. I have reviewed her imaging, EUS and labarotory findings.  In summary she has 2cm cyst of the pancreas. This is relatively stable in size since 2020 and is asymptomatic, at least to the best of our ability to characterize given her handicap. An EUS did not reveal high risk features and her cyst fluid CEA is 8000, confirming a side branch IPMN. Her main PD is normal and cytology was negative.    I discussed with her sister these findings and the significance of side branch IPMN. Given her handicap, we would have to have strong suspicion of progression to cancer to justify major operative intervention. Will continue to follow her alternating EUS/MRIs and be available to evaluate if there are major changes.    I spent 30 minutes with Ms. Bolden and her sister, with >50% of time spent face to face and additional time preparing to see the patient (eg, review of tests), obtaining and/or reviewing separately obtained history, documenting clinical information in the electronic or other health record, independently interpreting results (not separately reported) and communicating results to the patient/family/caregiver, or Care coordination (not separately reported).         Dawson Gutierrez MD  Upper GI / Hepatobiliary Surgical Oncology  [facility] [place], [place]  Office: [phone]  Fax: [phone]

## 2022-07-11 ENCOUNTER — PATIENT MESSAGE (OUTPATIENT)
Dept: NEUROLOGY | Facility: CLINIC | Age: 62
End: 2022-07-11
Payer: MEDICARE

## 2022-07-13 ENCOUNTER — PATIENT MESSAGE (OUTPATIENT)
Dept: FAMILY MEDICINE | Facility: CLINIC | Age: 62
End: 2022-07-13
Payer: MEDICARE

## 2022-07-13 ENCOUNTER — PATIENT MESSAGE (OUTPATIENT)
Dept: ADMINISTRATIVE | Facility: OTHER | Age: 62
End: 2022-07-13
Payer: MEDICARE

## 2022-07-13 ENCOUNTER — TELEPHONE (OUTPATIENT)
Dept: NEUROLOGY | Facility: CLINIC | Age: 62
End: 2022-07-13
Payer: MEDICARE

## 2022-07-13 DIAGNOSIS — I10 PRIMARY HYPERTENSION: Primary | ICD-10-CM

## 2022-07-13 RX ORDER — QUETIAPINE FUMARATE 400 MG/1
TABLET, FILM COATED ORAL
Qty: 45 TABLET | Refills: 1 | Status: SHIPPED | OUTPATIENT
Start: 2022-07-13 | End: 2022-08-11

## 2022-07-13 RX ORDER — DIVALPROEX SODIUM 500 MG/1
500 TABLET, DELAYED RELEASE ORAL 2 TIMES DAILY
Qty: 60 TABLET | Refills: 1 | Status: SHIPPED | OUTPATIENT
Start: 2022-07-13 | End: 2022-08-18 | Stop reason: SDUPTHER

## 2022-07-13 NOTE — TELEPHONE ENCOUNTER
I have signed for the following orders AND/OR meds.  Please call the patient and ask the patient to schedule the testing AND/OR inform about any medications that were sent. Medications have been sent to pharmacy listed below      Orders Placed This Encounter   Procedures    Hypertension Digital Medicine (HDMP) Enrollment Order     I. PURPOSE  To provide [facility] patients with innovative, specialized blood pressure monitoring and optimal dosing of antihypertensive therapy to improve health outcomes and decrease microvascular and macrovascular complications    II. GOALS  To maintain a systematic, coordinated and cost-effective process to monitor blood pressure in patients with hypertension  To attain and maintain optimal antihypertensive therapy while ensuring patient safety using the most recent evidence-based guidelines for the management of hypertension as reported by AHA/ACC in 2017.   Provide consultative services to providers, patients and caregivers regarding optimal antihypertensive therapy  To improve patient/caregiver understanding and compliance related to antihypertensive therapies by providing continuous patient and caregiver education about their prescribed medications and associated disease state  To provide education, guidance and reinforcement regarding lifestyle modifications including weight loss, adopting and maintaining the Dietary Approaches to Stop Hypertension or DASH diet, sodium restriction, physical activity, and moderation of alcohol consumption to patients and caregivers  Allow providers increased availability of clinic time for direct patient care   To provide patient care and education within an interdisciplinary framework and enhance partnering with other members of health care team  Improve continuity of care for high blood pressure patients and improve patient engagement  Collect and utilize pharmacy related outcomes to improve quality of patient care    III.  COLLABORATIVE PRACTICE AGREEMENT    A.  Under this collaborative practice agreement, an [facility] pharmacist according to and in compliance with [place] Board of Pharmacy Title 46, Part LIII, section 523 and [place] Board of Medical Examiners definition of the Collaborative Drug Therapy Management (CDTM) may initiate, implement, alter and monitor a therapeutic drug plan intended to manage antihypertensive therapy.  Services offered by the hypertension pharmacy specialist may include education on disease state and lifestyle modification, in addition to the drug therapy services listed above. Written and audio/visual educational materials and patient specific information may be provided to improve quality of care.    B. Primary Collaborating Physician:    Primary Physician: Aman Adamson M.D., [facility], [facility]  , Cardiology  License number: [id number]  CDTM number:  [id number]  Telephone number: [phone]   E-mail address: [email or web address]  Emergency Contact Information: [phone]    Back-Up Physician:  Elia Hampton M.D.  Cardiology  License number:  [id number]  CDTM number:  [id number]  Telephone number:  [phone]  E-mail address: [email or web address]  Emergency Contact Information: [phone]    C.  Pharmacists:   Each of the following pharmacists will serve as the primary pharmacist on CDTM and any pharmacist may serve as the secondary pharmacist for another pharmacists agreement.  Each of the pharmacists listed below has a Pharm.D degree, with completion of an accredited pharmacy practice residency and as well as experience with managing patients along with a physician.  The outpatient monitoring service will be provided under the Cardiology Department at [facility] location in [place] at [address] [place] [address]. The pharmacist will contact patients via telephone from a remote location.    Pharmacist: Marisela Reid, Efren  This  pharmacist has completed a pharmacy practice residency and has practiced clinical pharmacy for 7 years. She has experience in the areas of cardiology, acute care, and managed care. She started a pharmacist run hypertension clinic at [facility] during her residency and was published in The American Journal of Health-System Pharmacists.     [place] Pharmacist License Number: [id number]  CDTM number:  [id number]  Contact Number:  [phone]  Contact E-mail: [email or web address]  Emergency Contact Number:  [phone]    Pharmacist:  Alexa Rothman PharmD  This pharmacist has completed a pharmacy practice residency and has practiced clinical pharmacy for 17 years in the areas of cardiology, geriatric medicine, anticoagulation management, retail and ambulatory care.  She served as a pharmacy practice clinical preceptor and lead pharmacist in anticoagulation clinic for 10 years.  [place] Pharmacist License # [id number]  CDTM number:  [id number]  Contact Number:  [phone]  Contact E-mail:  [email or web address]   Emergency Contact Number:  [phone]    Pharmacist: Becky Myles PharmD, BCACP, CDE  This pharmacist has completed a pharmacy practice residency with emphasis in ambulatory care and has practiced clinical pharmacy for 8 years in the areas of dyslipidemia, hypertension, diabetes, and anticoagulation. She is also a Certified Diabetes Educator.      [place] Pharmacist License # [id number]  CDTM number: [id number]  Contact number: [phone]  Contact E-mail:  [email or web address]  Emergency Contact Number: [phone]    Pharmacist: Estefany Middleton PharmD  This pharmacist started practicing at [facility] in 2011 following her one year residency at [facility]. She serves as an Adjunct Clinical  in the College of Pharmacy at [employer]. She served as the lead pharmacist for the Coumadin Clinic team for 4 years.   [place]  Pharmacist License: [id number]  CDTM number: [id number]  Contact number: [phone]  Contact E-mail: [email or web address]   Emergency Contact Number: [phone]    Pharmacist:  Jess Caraballo PharmD  This pharmacist has completed a pharmacy practice residency (PGY-1) and has practiced clinical pharmacy for 16  years in the areas of heart and abdominal transplant, retail and ambulatory care.  She was directly involved in the care of congestive heart failure, left ventricular assist device and heart transplant patients from [id number].  She is currently practicing as a digital medicine clinical specialist in heart failure.    [place] Pharmacist License # [id number]  CDTM number:  [id number]  Contact Number:  [phone]  Contact E-mail:  [email or web address]   Emergency Contact Number:  [phone]    Pharmacist: Estefany Marr PharmD  This pharmacist obtained her Pharm.D. from [employer], and has completed an Ambulatory Care Pharmacy Practice residency at [employer] [facility]. She has practiced clinical pharmacy for 9  years and has experience in the areas of Hypertension and Diabetes.      [place] Pharmacist License: [id number]  CDTM Number: [id number]  Telephone number: [phone]  E-mail: [email or web address]  Emergency Contact Number: [phone]      Pharmacist: Reyna Dueñas PharmD  This pharmacist has completed a pharmacy practice residency with an emphasis in ambulatory care and has practiced clinical pharmacy for one year. She has experience in the areas of diabetes, hypertension and dyslipidemia.   [place] Pharmacist License: [id number]  CDTM Number: [id number]  Contact Number: [phone]  Contact Email: [email or web address]   Emergency Contact: [phone]    Pharmacist: Brenda Mcgovern PharmD, BCPS  This pharmacist has completed a pharmacy practice residency with an emphasis in ambulatory care and is a Board Certified Pharmacotherapy  Specialist (BCPS). She has practiced clinical pharmacy for  years in areas of ambulatory care, internal medicine, cardiology and specialty pharmacy.    [place] Pharmacist License Number: [id number]  TM number:  [id number]  Contact Number:  [phone]  Contact E-mail:  [email or web address]   Emergency Contact Number:  [phone]    D.  Eligible Patients  Patients whose antihypertensive therapy is managed under this agreement must have a diagnosis of hypertension as documented in the patient record, and have established care with a provider within [facility]. All aspects of the patients hypertension medication management will be followed in collaboration with the physician treating the patients hypertension.  The patient will be seen by his or her physician per their discretion or by the recommendation by the hypertension pharmacist.  The patients case may be reviewed with clinic medical personnel as needed, but will be reported at least every 30 days to the collaborating physician regarding the patients drug therapy management. All decisions made by the pharmacist will be recorded in [facility] and are readily available for physician review. All issues outside of the scope of antihypertensive therapy shall be reported to the collaborating physician.     E.  Medications in CDTM  This collaborative practice proposal involves the management of patients who are receiving antihypertensive therapy, specifically, thiazide-type diuretics, angiotensin-converting enzyme inhibtors (ACE-I), angiotensin receptor blockers (ARB), calcium channel blockers (CCB), beta-blockers, loop diuretics, potassium-sparing diuretics, potassium supplementation, aldosterone antagonists, direct renin inhibitors, alpha-1 receptor blockers, central alpha-2 agonists, direct arterial vasodilators and peripheral adrenergic antoagonists, or those patients in which hypertension is controlled by lifestyle modifcation  alone.      F.  Clinical Procedure  All patients will be notified that a hypertension CDTM exists.  A signed physician order will be required for each patient to be enrolled into the hypertension CDTM.  Informed consent and an electronic signature will be obtained from each patient and documented in the patients record.  This patient signature will be valid for 1 year, requiring a new physician order and patient consent yearly.  The patient must also be enrolled in the [employer] communication program ([facility]) to be elegible for the monitoring program.  The following management plan will be utilized for CDTM:    Patients must submit blood pressures at a minimum of once weekly from the patient- purchased wireless blood pressure cuff. Patients who fail to comply with this requirement are subject to removal from [facility].   Evaluate the change in blood pressure, if any, from previous measurements performed on the same cuff and arm.  Determine and document contributing factors for blood pressure change.    Review initial drug therapy made by clinic physician upon program enrollment with patient to ensure compliance.    Identify target blood pressure based on age and comorbidities. Therapeutic changes will be made in collaboration with PharmD and collaborating physician based on the AHA/ACC 2017 guidelines for the treatment of hypertension.  Guide drug optimization based on patient response at 2-4 week intervals until control is achieved.  The PharmD will continue to monitor blood pressure and make adjustments to hypertension medications in order to achieve optimal blood pressure.   Order follow up labs when changing or adding ACE inhibitors and diuretics.    Refer to hypertension specialist if  blood pressure goals cannot be achieved using the noted algorithm.  Notify physician with specific problems or request clinic visit if deemed necessary.  Document antihypertensive therapy changes, interventions, and outcomes in  EMR.   Provide patient/caregiver continuous education or reinforcement regarding hypertension management,  medications and lifestyle modifications.    Laboratory Tests  Pharmacists will be authorized to order and evaluate laboratory tests directly related to the disease specific drug therapy being managed. Pharmacists will also be authorized to order additional specific labs based on patient clinical presentation or description. Pharmacists may also review other lab data available in the patient record which may be necessary for the evaluation and assessment of the impact on antihypertensive therapy (i.e. drug interaction, disease interaction, etc.).     b.  Documentation   Documentation of patient encounters and lab results will be permanently placed in the [facility].  Laboratory results will automatically populate prompting review and assessment of each patient.  Laboratory results obtained from outside laboratories will be entered into EMR manually.  This documentation will include lab values, medication changes, identification and assessment of adverse events related to therapy, antihypertensive medication dose adjustments, therapeutic management plan and follow up as well as any other information given to the patient during the telemedicine visit.    c.     1.   will be carried out through quarterly reports tracking following statistics:   a. Percent of patients requiring a change to antihypertensive medications   b. Number of emergency visits due to hypertensive urgency or emergency, hypotension or medication side effects for participating patients  Percent of patients who are controlled (BP <130/80 mmHg)  and uncontrolled (BP>130 mmHg)     2.  Patient specific quality indicators will be identified through quarterly review of the following data:   a.  Average change in blood pressure after a dose adjument by the hypertension pharmacist    3.  A random sample of patient  records shall be reviewed by the primary physician quarterly to ensure adherence by the hypertension clinical specialists to the collaborative practice agreement.     4.   measures will be reported to Pharmacy & Therapeutics Committee yearly.     References:    ANNE Mark, et al. 2017. 2017. Guidelines for the Prevention, Detection, Evaluation and Management of High Blood Pressure in Adults.      Order Specific Question:   BP Control Goal     Answer:   Current (2017) AHA Guidelines              [facility] - [place] - [address]  [address] [facility] [address]  Phone: [phone] Fax: [phone]    [id number] - [place] - [address] AT [facility]  [address]  [place] [address]  Phone: [phone] Fax: [phone]

## 2022-07-13 NOTE — TELEPHONE ENCOUNTER
I spoke with patient in regards to appointment and they have been fixed. She did verbalize understanding.

## 2022-07-13 NOTE — TELEPHONE ENCOUNTER
----- Message from Anjelica Acosta sent at 7/13/2022 10:17 AM CDT -----  Contact: Alanis Thapa is calling regards to an appt miscommunication. Please give her a call back at [phone].

## 2022-07-14 RX ORDER — MIRTAZAPINE 45 MG/1
45 TABLET, FILM COATED ORAL NIGHTLY
Qty: 30 TABLET | Refills: 1 | Status: SHIPPED | OUTPATIENT
Start: 2022-07-14 | End: 2022-08-18 | Stop reason: SDUPTHER

## 2022-07-16 ENCOUNTER — LAB VISIT (OUTPATIENT)
Dept: LAB | Facility: HOSPITAL | Age: 62
End: 2022-07-16
Attending: NURSE PRACTITIONER
Payer: MEDICARE

## 2022-07-16 DIAGNOSIS — E61.1 IRON DEFICIENCY: ICD-10-CM

## 2022-07-16 DIAGNOSIS — D72.819 LEUKOPENIA, UNSPECIFIED TYPE: ICD-10-CM

## 2022-07-16 DIAGNOSIS — D69.6 THROMBOCYTOPENIA: ICD-10-CM

## 2022-07-16 LAB
BASOPHILS # BLD AUTO: 0.02 K/UL (ref 0–0.2)
BASOPHILS NFR BLD: 0.9 % (ref 0–1.9)
DIFFERENTIAL METHOD: ABNORMAL
EOSINOPHIL # BLD AUTO: 0 K/UL (ref 0–0.5)
EOSINOPHIL NFR BLD: 1.7 % (ref 0–8)
ERYTHROCYTE [DISTWIDTH] IN BLOOD BY AUTOMATED COUNT: 12.7 % (ref 11.5–14.5)
FERRITIN SERPL-MCNC: 148 NG/ML (ref 20–300)
HCT VFR BLD AUTO: 40.5 % (ref 37–48.5)
HGB BLD-MCNC: 13.8 G/DL (ref 12–16)
IMM GRANULOCYTES # BLD AUTO: 0.02 K/UL (ref 0–0.04)
IMM GRANULOCYTES NFR BLD AUTO: 0.9 % (ref 0–0.5)
IRON SERPL-MCNC: 92 UG/DL (ref 30–160)
LYMPHOCYTES # BLD AUTO: 1.2 K/UL (ref 1–4.8)
LYMPHOCYTES NFR BLD: 50.9 % (ref 18–48)
MCH RBC QN AUTO: 30.1 PG (ref 27–31)
MCHC RBC AUTO-ENTMCNC: 34.1 G/DL (ref 32–36)
MCV RBC AUTO: 88 FL (ref 82–98)
MONOCYTES # BLD AUTO: 0.4 K/UL (ref 0.3–1)
MONOCYTES NFR BLD: 15.5 % (ref 4–15)
NEUTROPHILS # BLD AUTO: 0.7 K/UL (ref 1.8–7.7)
NEUTROPHILS NFR BLD: 30.1 % (ref 38–73)
NRBC BLD-RTO: 0 /100 WBC
PLATELET # BLD AUTO: 122 K/UL (ref 150–450)
PMV BLD AUTO: 10.7 FL (ref 9.2–12.9)
RBC # BLD AUTO: 4.58 M/UL (ref 4–5.4)
SATURATED IRON: 21 % (ref 20–50)
TOTAL IRON BINDING CAPACITY: 444 UG/DL (ref 250–450)
TRANSFERRIN SERPL-MCNC: 300 MG/DL (ref 200–375)
WBC # BLD AUTO: 2.32 K/UL (ref 3.9–12.7)

## 2022-07-16 PROCEDURE — 82728 ASSAY OF FERRITIN: CPT | Performed by: NURSE PRACTITIONER

## 2022-07-16 PROCEDURE — 85025 COMPLETE CBC W/AUTO DIFF WBC: CPT | Performed by: NURSE PRACTITIONER

## 2022-07-16 PROCEDURE — 84466 ASSAY OF TRANSFERRIN: CPT | Performed by: NURSE PRACTITIONER

## 2022-07-16 PROCEDURE — 36415 COLL VENOUS BLD VENIPUNCTURE: CPT | Performed by: NURSE PRACTITIONER

## 2022-07-29 ENCOUNTER — TELEPHONE (OUTPATIENT)
Dept: NEUROLOGY | Facility: CLINIC | Age: 62
End: 2022-07-29
Payer: MEDICARE

## 2022-07-29 NOTE — TELEPHONE ENCOUNTER
----- Message from Dolly Casillas NP sent at 7/29/2022 12:08 PM CDT -----  I saw that Juan Pablo's sister was questioning the appointment with me next week in a message. She does not need to see me unless there is something new she wanted to discuss. The purpose of that appointment was to establish care with Dr. Fowler. I believe she was switched to my schedule because he is out of town. Looks like she has an appointment with Dr. Fowler in a couple weeks.

## 2022-08-02 ENCOUNTER — OFFICE VISIT (OUTPATIENT)
Dept: HEMATOLOGY/ONCOLOGY | Facility: CLINIC | Age: 62
End: 2022-08-02
Payer: MEDICARE

## 2022-08-02 ENCOUNTER — OFFICE VISIT (OUTPATIENT)
Dept: NEUROLOGY | Facility: CLINIC | Age: 62
End: 2022-08-02
Payer: MEDICARE

## 2022-08-02 VITALS
OXYGEN SATURATION: 98 % | WEIGHT: 113 LBS | HEIGHT: 59 IN | BODY MASS INDEX: 22.78 KG/M2 | TEMPERATURE: 99 F | HEART RATE: 86 BPM

## 2022-08-02 VITALS — BODY MASS INDEX: 22.98 KG/M2 | WEIGHT: 114 LBS | RESPIRATION RATE: 16 BRPM | HEART RATE: 80 BPM | HEIGHT: 59 IN

## 2022-08-02 DIAGNOSIS — F84.9 PDD (PERVASIVE DEVELOPMENTAL DISORDER): ICD-10-CM

## 2022-08-02 DIAGNOSIS — R45.1 RESTLESSNESS AND AGITATION: ICD-10-CM

## 2022-08-02 DIAGNOSIS — I63.81 MULTIPLE LACUNAR INFARCTS: ICD-10-CM

## 2022-08-02 DIAGNOSIS — I65.1 BASILAR ARTERY STENOSIS: ICD-10-CM

## 2022-08-02 DIAGNOSIS — R25.9 ABNORMAL MOVEMENTS: ICD-10-CM

## 2022-08-02 DIAGNOSIS — G24.01 TARDIVE DYSKINESIA: Primary | ICD-10-CM

## 2022-08-02 DIAGNOSIS — I10 ESSENTIAL HYPERTENSION: ICD-10-CM

## 2022-08-02 DIAGNOSIS — F25.9 SCHIZOAFFECTIVE DISORDER, UNSPECIFIED TYPE: ICD-10-CM

## 2022-08-02 DIAGNOSIS — D72.819 LEUKOPENIA, UNSPECIFIED TYPE: Primary | ICD-10-CM

## 2022-08-02 DIAGNOSIS — I65.01 OCCLUSION OF RIGHT VERTEBRAL ARTERY: ICD-10-CM

## 2022-08-02 DIAGNOSIS — F79 MENTAL HANDICAP: ICD-10-CM

## 2022-08-02 DIAGNOSIS — D69.6 THROMBOCYTOPENIA: ICD-10-CM

## 2022-08-02 PROCEDURE — 99215 OFFICE O/P EST HI 40 MIN: CPT | Mod: PBBFAC | Performed by: NURSE PRACTITIONER

## 2022-08-02 PROCEDURE — 99214 PR OFFICE/OUTPT VISIT, EST, LEVL IV, 30-39 MIN: ICD-10-PCS | Mod: S$PBB,,, | Performed by: NURSE PRACTITIONER

## 2022-08-02 PROCEDURE — 99214 OFFICE O/P EST MOD 30 MIN: CPT | Mod: S$PBB,,, | Performed by: INTERNAL MEDICINE

## 2022-08-02 PROCEDURE — 99214 PR OFFICE/OUTPT VISIT, EST, LEVL IV, 30-39 MIN: ICD-10-PCS | Mod: S$PBB,,, | Performed by: INTERNAL MEDICINE

## 2022-08-02 PROCEDURE — 99999 PR PBB SHADOW E&M-EST. PATIENT-LVL III: ICD-10-PCS | Mod: PBBFAC,,, | Performed by: INTERNAL MEDICINE

## 2022-08-02 PROCEDURE — 99999 PR PBB SHADOW E&M-EST. PATIENT-LVL V: ICD-10-PCS | Mod: PBBFAC,,, | Performed by: NURSE PRACTITIONER

## 2022-08-02 PROCEDURE — 99999 PR PBB SHADOW E&M-EST. PATIENT-LVL V: CPT | Mod: PBBFAC,,, | Performed by: NURSE PRACTITIONER

## 2022-08-02 PROCEDURE — 99999 PR PBB SHADOW E&M-EST. PATIENT-LVL III: CPT | Mod: PBBFAC,,, | Performed by: INTERNAL MEDICINE

## 2022-08-02 PROCEDURE — 99214 OFFICE O/P EST MOD 30 MIN: CPT | Mod: S$PBB,,, | Performed by: NURSE PRACTITIONER

## 2022-08-02 PROCEDURE — 99213 OFFICE O/P EST LOW 20 MIN: CPT | Mod: PBBFAC,27 | Performed by: INTERNAL MEDICINE

## 2022-08-02 NOTE — PROGRESS NOTES
Subjective:      DATE OF VISIT: 22     ?  Patient ID:?Juan Pablo Bolden is a 62 y.o. female.?? MR#: [id number]   ?   REFERRING PROVIDER: No referring provider defined for this encounter.     ? Primary Care Providers:  Brooke Goldberg MD, MD (General)     CHIEF COMPLAINT: ?  Leukopenia??   ?   HPI    Ms. ThomasdFollows up for monitoring of her leukopenia and thrombocytopenia which has been waxing and waning.  No notable new medications, fever, chills, night sweats, unintentional weight loss, lymphadenopathy, infections or oral ulceration.  Since last visit in our clinic she did have  Workup for pancreatic lesion including EUS evaluation of pancreatic cysts and surgical Oncology evaluation with Dr. Dawson Gutierrez; plan for surveillance.  She has been in stable health otherwise.    Review of Systems    ?   A comprehensive 14-point review of systems was reviewed with patient and was negative other than as specified above.   ?      Objective:      Physical Exam      ?   Vitals:    22 1117   Pulse: 86   Temp: 98.6 °F (37 °C)      ?   ECO   General appearance:   Agitation, limited interaction, developmental delay  Head, eyes, ears, nose, and throat: moist mucous membranes.   Respiratory:  Normal work of breathing  Abdomen: nontender, nondistended.   Extremities: Warm, without edema.   Skin: No rashes, ecchymoses or petechial lesion.   Psychiatric:  Normal mood and affect.    ?   Laboratory:  ?   No visits with results within 1 Day(s) from this visit.   Latest known visit with results is:   Lab Visit on 2022   Component Date Value Ref Range Status    WBC 2022 2.32 (A) 3.90 - 12.70 K/uL Final    RBC 2022 4.58  4.00 - 5.40 M/uL Final    Hemoglobin 2022 13.8  12.0 - 16.0 g/dL Final    Hematocrit 2022 40.5  37.0 - 48.5 % Final    MCV 2022 88  82 - 98 fL Final    MCH 2022 30.1  27.0 - 31.0 pg Final    MCHC 2022 34.1  32.0 - 36.0 g/dL Final    RDW 2022 12.7  11.5  - 14.5 % Final    Platelets 07/16/2022 122 (A) 150 - 450 K/uL Final    MPV 07/16/2022 10.7  9.2 - 12.9 fL Final    Immature Granulocytes 07/16/2022 0.9 (A) 0.0 - 0.5 % Final    Gran # (ANC) 07/16/2022 0.7 (A) 1.8 - 7.7 K/uL Final    Immature Grans (Abs) 07/16/2022 0.02  0.00 - 0.04 K/uL Final    Lymph # 07/16/2022 1.2  1.0 - 4.8 K/uL Final    Mono # 07/16/2022 0.4  0.3 - 1.0 K/uL Final    Eos # 07/16/2022 0.0  0.0 - 0.5 K/uL Final    Baso # 07/16/2022 0.02  0.00 - 0.20 K/uL Final    nRBC 07/16/2022 0  0 /100 WBC Final    Gran % 07/16/2022 30.1 (A) 38.0 - 73.0 % Final    Lymph % 07/16/2022 50.9 (A) 18.0 - 48.0 % Final    Mono % 07/16/2022 15.5 (A) 4.0 - 15.0 % Final    Eosinophil % 07/16/2022 1.7  0.0 - 8.0 % Final    Basophil % 07/16/2022 0.9  0.0 - 1.9 % Final    Differential Method 07/16/2022 Automated   Final    Iron 07/16/2022 92  30 - 160 ug/dL Final    Transferrin 07/16/2022 300  200 - 375 mg/dL Final    TIBC 07/16/2022 444  250 - 450 ug/dL Final    Saturated Iron 07/16/2022 21  20 - 50 % Final    Ferritin 07/16/2022 148  20.0 - 300.0 ng/mL Final          ?   Assessment/Plan:   Leukopenia, unspecified type    Thrombocytopenia       1. Leukopenia, unspecified type    2. Thrombocytopenia          Plan:     #  leukopenia:  Labs available since October 2020 with relatively stable leukopenia, mild lymphocyte predominance.  Flow cytometry previously performed within normal limits.  I did discuss differential diagnosis including medication effect, constitutional / benign but cannot exclude bone marrow disorder without biopsy although no other clinical concerns/constitutional symptom and may not be tolerated by patient given developmental delay.   HIV and hepatitis-C negative.  Peripheral blood smear review without notable abnormality aside from cytopenia.   Recommend continued surveillance and consideration of further testing/bone marrow biopsy upon significant lab change or clinical concern as  patient tolerance allows.     #   Thrombocytopenia:  Sister notes in her 20 use having more severe thrombocytopenia episode hospitalized without recurrent severe thrombocytopenia.  Low normal platelet count relatively stable and most recent labs with improvement to 170s K.  No clinical concern for bleeding.  Continue surveillance.  Differential diagnosis includes immune mediated, medication effect, less likely bone marrow neoplasia.  Recommend continue monitoring.  I reviewed signs and symptoms of low platelet count and recommended prompt follow-up if evidence of bleeding or petechial lesion.    P# ancreatic lesion/ hepatic lesion: Recommend follow-up with Surgical Oncology/ GI.  Per notes/ caregiver plan for surveillance at this time possible IPMN.    Follow-Up:   Patient Instructions   RV in 3 mo with cbc, cmp prior at [place]  Follow-up with surg onc/re panc/liver recommended

## 2022-08-02 NOTE — PROGRESS NOTES
Subjective:      Patient ID: Juan Pablo Bolden is a 62 y.o. female.    Chief Complaint:  Neurologic Problem (//)      History of Present Illness  HPI    Patient has seen multiple neurologist, including movement specialist, in the past including Dr. Ferreira, Dr. Gonzalez at [facility], Dr. Simon, Dr. Yuen, Dr. Bangura, and Dr. Arsen Zayas at [facility]. Obtaining an accurate history and assessment was difficult related to patient talking loudly and being restless throughout appointment.      Patient present's to appointment with sister. Patient's sister states that they would like to establish care with a neurologist in [place]. Patient's sister provided the history. Patient's sister states that she recently began taking patient to appointments over the last year; therefore, she has limited history. Patient has diagnoses of tardive dyskinesia, PDD, schizoaffective disorder, hyperprolactinemia, central hypothyroidism, and lipodystrophy.  The patient is currently living at a respite center. Previously, patient was living independently with a privatized provider (who brought her to appointments). Patient is awaiting to be placed with a new privatized provider. She states at baseline, the patient is orientated to self and needs assistance with all ADLs and 24 hour care. She becomes agitated when her medications are not given to her consistently or when objects are taken from her. The patient's sister visits her 2 to 3 times a week. Patient's sister states patient is at her baseline. She has noticed the patient's focus has decreased but denies memory changes. Patient's sister is unsure if the patient is receiving her medications appropriately at the respite center.    Patient's sister brought patient to appointment because she has noticed the patient holding her left arm close to her body and moving the fingers on her left hand constantly for the last 3 weeks (December 2021). Patient's sister demonstrated the movement,  which appeared to be piano-like. Per chart review, patient has long history of psychotropic exposure. Patient sees Dr. Schmidt with psychiatry for agitation and restlessness. Currently taking Seroquel 200/400 mg, Remeron 30 mg QHS, Depakote 500 mg BID, Valium 5 mg PRN, and Xanax 0.25 mg BID for restlessness and agitation. Patient's sister is concerned the patient had a stroke because she had very high blood pressure at her last cardiology appointment (/121 12-9-2021)  and not sure about compliance with medications. Denies lateralized weakness, trouble swallowing (always needs foods chopped), or facial droop. She states the patient has had similar movement in her fingers in the past as well as lip smacking that stopped. She noticed her sister often rocks/sways back and forth. She often grabs her clothes and licks her clothes. She denies the patient tapping her foot, shoulder shrugging, and rotating hips. She denies history of seizures, although it is on the patient's chart. She states that she has never witnessed seizure-like activity or was told about seizure-like activity. She states she has had a negative EEG in the past. Patient's sister was unaware of patient's previous stroke and denied patient having any stroke-like symptoms in the past. The patient had a fall last year. She is able to ambulate but likes to hold on to people while she walks.  9- CTH showed small, old lacunar infarct in the anterior limb of the left internal capsule. Otherwise unremarkable. 2-7-2020 CTH showed no acute intracranial abnormality. Chronic ischemic changes including bilateral chronic lacunar infarcts.  Patient continues to have piano-like finger movements, lip smacking, and chewing on clothes periodically. Patient continues to see Dr. Schmidt with psychiatry for agitation and restlessness. Currently taking Seroquel 200/400 mg, Remeron 45 mg QHS, Depakote 500 mg BID, Ativan 1 mg PRN, and Xanax 0.25 mg BID for  restlessness and agitation.  3-8-2022 The EEG is suggestive of diffuse encephalopathy.        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 Patient's sister states that patient does not have Kluver Bucy Syndrome, and it was ruled out in the past. She is unsure when the last brain imaging was completed. She states they have attempted a MRI and MRA in the past but sedation is needed. Patient was not able to stay still with valium.         Interval History 8-2-2022: Patient presents to appointment with sister. Patient's sister states patient is overall doing well. She has had a couple falls recently. Piano-like finger movements, lip smacking, and chewing on clothes has improved. Continues to see psych for agitation and restlessness. Denies seizure-like activity. Patient's sister states as long as her medications are given to her, the patient does well. Sister has concerns that caregivers are not giving the patient her medications consistently. 6- MRI brain and MRA head show chronic ischemic changes including several chronic lacunar infarcts both supra and infratentorially in the basal ganglia, thalamus, and right cerebellar hemisphere. There are also scattered chronic microhemmorrhages both supra and infratentorially which are most likely secondary to hypertension. Atherosclerotic disease of the posterior circulation with nondominant right vertebral artery occluding at the craniocervical junction and multifocal stenoses of the basilar artery. There are fetal origins of the posterior cerebral arteries. Patient has an appointment with Dr. Moser in September 2022. Also has an appointment with cardiology in December 2022.      Review of Systems  Review of Systems   Unable to perform ROS: Psychiatric disorder     Objective:   VITAL SIGNS WERE REVIEWED        GENERAL APPEARANCE:      The patient speaking loudly and restless. Did not follow instructions.     BMI 22.82     No signs of  respiratory distress.     Normal breathing pattern.     Did not make eye contact      GENERAL MEDICAL EXAM:     HEENT:  Head is atraumatic normocephalic.      Neck and Axillae: No JVD. No visible lesions. No thyromegaly. No lymphadenopathy.     Cardiopulmonary: No cyanosis. No tachypnea. Normal respiratory effort.     Gastrointestinal/Urogenital:  No jaundice. No stomas or lesions. No visible hernias. No catheters.     Skin, Hair and Nails:  No neurofibromatosis. No visible lesions.No stigmata of autoimmune disease. No clubbing.     Skin is warm and moist. No palpable masses.     Limbs: No varicose veins. No visible swelling. No palpable edema.     Muskoskeletal: No visible deformities.No visible lesions.     No spine tenderness. No signs of longstanding neuropathy. No dislocations or fractures.          Neurologic Exam     Mental Status   Oriented to person.   Disoriented to place. Disoriented to country, city, area, street and number.   Disoriented to time. Disoriented to year, month, date and season.   Follows 1 step commands.   Attention: decreased. Concentration: decreased.   Speech: slurred   Level of consciousness: alert  Knowledge: poor.   Abnormal comprehension.     Cranial Nerves     CN III, IV, VI   Right pupil: Size: 3 mm. Shape: regular. Accommodation: intact.   Left pupil: Size: 3 mm. Shape: regular. Accommodation: intact.   CN III: no CN III palsy  CN VI: no CN VI palsy  Ophthalmoparesis: none  Upgaze: normal  Downgaze: normal  Conjugate gaze: present  Vestibulo-ocular reflex: present    CN VII   Facial expression full, symmetric.     CN VIII   CN VIII normal.   Hearing: intact    CN XII   CN XII normal.     Exam limited due to patient's limited participation      Motor Exam   Muscle bulk: decreased  Overall muscle tone: decreased  Patient appeared to move all extremities equally     Patient able to reach bilateral arms out to grab objects     Sensory Exam   Right arm light touch: normal  Left arm  light touch: normal  Right leg light touch: normal  Left leg light touch: normal    Gait, Coordination, and Reflexes     Tremor   Resting tremor: absent  Intention tremor: absent  Action tremor: absent  Patient screamed and kicked leg up when attempting to check reflexes     Patient was holding items in her hands throughout appointment, unable to assess finger movements.         Lab Results   Component Value Date    WBC 2.32 (L) 07/16/2022    HGB 13.8 07/16/2022    HCT 40.5 07/16/2022    MCV 88 07/16/2022     (L) 07/16/2022   H   Sodium   Date Value Ref Range Status   04/08/2022 138 136 - 145 mmol/L Final     Potassium   Date Value Ref Range Status   04/08/2022 4.8 3.5 - 5.1 mmol/L Final     Chloride   Date Value Ref Range Status   04/08/2022 99 95 - 110 mmol/L Final     CO2   Date Value Ref Range Status   04/08/2022 29 23 - 29 mmol/L Final     Glucose   Date Value Ref Range Status   04/08/2022 95 70 - 110 mg/dL Final     BUN   Date Value Ref Range Status   04/08/2022 16 8 - 23 mg/dL Final     Creatinine   Date Value Ref Range Status   06/11/2022 0.8 0.5 - 1.4 mg/dL Final     Calcium   Date Value Ref Range Status   04/08/2022 10.3 8.7 - 10.5 mg/dL Final     Total Protein   Date Value Ref Range Status   04/08/2022 6.7 6.0 - 8.4 g/dL Final     Albumin   Date Value Ref Range Status   04/08/2022 2.7 (L) 3.5 - 5.2 g/dL Final     Total Bilirubin   Date Value Ref Range Status   04/08/2022 0.3 0.1 - 1.0 mg/dL Final     Comment:     For infants and newborns, interpretation of results should be based  on gestational age, weight and in agreement with clinical  observations.    Premature Infant recommended reference ranges:  Up to 24 hours.............<8.0 mg/dL  Up to 48 hours............<12.0 mg/dL  3-5 days..................<15.0 mg/dL  6-29 days.................<15.0 mg/dL       Alkaline Phosphatase   Date Value Ref Range Status   04/08/2022 116 55 - 135 U/L Final     AST   Date Value Ref Range Status   04/08/2022 19  10 - 40 U/L Final     ALT   Date Value Ref Range Status   04/08/2022 22 10 - 44 U/L Final     Anion Gap   Date Value Ref Range Status   04/08/2022 10 8 - 16 mmol/L Final     eGFR if    Date Value Ref Range Status   06/11/2022 >60.0 >60 mL/min/1.73 m^2 Final     eGFR if non    Date Value Ref Range Status   06/11/2022 >60.0 >60 mL/min/1.73 m^2 Final     Comment:     Calculation used to obtain the estimated glomerular filtration  rate (eGFR) is the CKD-EPI equation.        Lab Results   Component Value Date    UNYULOYD16 586 10/01/2020     Lab Results   Component Value Date    TSH 1.177 04/09/2021 9-     CTH showed small, old lacunar infarct in the anterior limb of the left internal capsule. Otherwise unremarkable.     2-7-2020     CTH showed no acute intracranial abnormality. Chronic ischemic changes including bilateral chronic lacunar infarcts     4-9-2021     Valproic Acid level 90.8 NL     4-     TTE unremarkable     6-4-2021     EEG normal in the awake state    3-8-2022    The EEG is suggestive of diffuse encephalopathy.    6-    MRI brain and MRA head show chronic ischemic changes including several chronic lacunar infarcts both supra and infratentorially in the basal ganglia, thalamus, and right cerebellar hemisphere. There are also scattered chronic microhemmorrhages both supra and infratentorially which are most likely secondary to hypertension. Atherosclerotic disease of the posterior circulation with nondominant right vertebral artyer occluding at the craniocervical junction and multifocal stenoses of the basilar artery. There are fetal origins of the posterior cerebral arteries.      Reviewed the neuroimaging independently      Assessment:      1. Tardive dyskinesia    2. History of multiple lacunar infarcts    3. Occlusion of right vertebral artery    4. Basilar artery stenosis    5. Essential hypertension    6. Restlessness and agitation    7.  PDD (pervasive developmental disorder)    8. Schizoaffective disorder, unspecified type    9. Abnormal movements    10. Mental handicap          Plan:   TARDIVE DYSKINESIA/ABNROMAL MOVEMENTS/QUESTIONAL SEIZURE DISORDER?     Unable to obtain AEEG r/t restlessness     Continue to monitor           PDD/RESTLESS and AGITATION     Continue to follow psych     Continue medications as prescribed by psych         HISTORY OF LACUNAR STROKES/CHRONIC MICRO HEMORRHAGES    Keep appointment with Dr. Moser    Medical supportive care    Vascular Risk Factors (VRFs) stratification (BP, BS, BC control )    Healthy diet and exercise as tolerated.     Call 911 if any SUDDEN:       Weakness  Numbness  Slurring of speech  Speech difficulty   Vertigo  Loss of balance  Loss of vision  Loss of hearing  Double vision  Trouble swallowing  Trouble breathing  Facial drooping            MEDICAL/SURGICAL COMORBIDITIES      All relevant medical comorbidities noted and managed by primary care physician and medical care team.          MISCELLANEOUS MEDICAL PROBLEMS         HEALTHY LIFESTYLE AND PREVENTATIVE CARE     Encouraged the patient to adhere to the age-appropriate health maintenance guidelines including screening tests and vaccinations.      Discussed the overall importance of healthy lifestyle, optimal weight, exercise, healthy diet, good sleep hygiene and avoiding drugs including smoking, alcohol and recreational drugs. The patient verbalized full understanding.         Advised the patient to follow COVID-19 prevention measures.         I spent a total of 35 minutes on the day of the visit.  This includes face to face time and non-face to face time preparing to see the patient (eg, review of tests), obtaining and/or reviewing separately obtained history, documenting clinical information in the electronic or other health record, independently interpreting results and communicating results to the patient/family/caregiver, or care  coordinator.         Keep appointment with Dr. Fowler & Dr. Ehsan Casillas, MSN, NP  Collaborating Provider: Korin Fowler MD, FAAN Neurologist/Epileptologist

## 2022-08-04 ENCOUNTER — PATIENT MESSAGE (OUTPATIENT)
Dept: ADMINISTRATIVE | Facility: HOSPITAL | Age: 62
End: 2022-08-04
Payer: MEDICARE

## 2022-08-09 ENCOUNTER — PATIENT MESSAGE (OUTPATIENT)
Dept: ADMINISTRATIVE | Facility: HOSPITAL | Age: 62
End: 2022-08-09
Payer: MEDICARE

## 2022-08-18 ENCOUNTER — OFFICE VISIT (OUTPATIENT)
Dept: PSYCHIATRY | Facility: CLINIC | Age: 62
End: 2022-08-18
Payer: MEDICARE

## 2022-08-18 DIAGNOSIS — F79 INTELLECTUAL DISABILITY: Primary | ICD-10-CM

## 2022-08-18 DIAGNOSIS — R45.1 RESTLESSNESS AND AGITATION: ICD-10-CM

## 2022-08-18 DIAGNOSIS — G47.00 INSOMNIA, UNSPECIFIED TYPE: ICD-10-CM

## 2022-08-18 DIAGNOSIS — Z79.899 ON VALPROATE THERAPY: ICD-10-CM

## 2022-08-18 PROCEDURE — 99214 OFFICE O/P EST MOD 30 MIN: CPT | Mod: 95,,, | Performed by: PSYCHIATRY & NEUROLOGY

## 2022-08-18 PROCEDURE — 99214 PR OFFICE/OUTPT VISIT, EST, LEVL IV, 30-39 MIN: ICD-10-PCS | Mod: 95,,, | Performed by: PSYCHIATRY & NEUROLOGY

## 2022-08-18 RX ORDER — MIRTAZAPINE 45 MG/1
45 TABLET, FILM COATED ORAL NIGHTLY
Qty: 30 TABLET | Refills: 3 | Status: SHIPPED | OUTPATIENT
Start: 2022-08-18 | End: 2022-12-15 | Stop reason: SDUPTHER

## 2022-08-18 RX ORDER — QUETIAPINE FUMARATE 400 MG/1
TABLET, FILM COATED ORAL
Qty: 45 TABLET | Refills: 3 | Status: SHIPPED | OUTPATIENT
Start: 2022-08-18 | End: 2022-11-14 | Stop reason: SDUPTHER

## 2022-08-18 RX ORDER — DIVALPROEX SODIUM 500 MG/1
500 TABLET, DELAYED RELEASE ORAL 2 TIMES DAILY
Qty: 60 TABLET | Refills: 3 | Status: SHIPPED | OUTPATIENT
Start: 2022-08-18 | End: 2022-12-15 | Stop reason: SDUPTHER
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
## 2022-08-22 ENCOUNTER — TELEPHONE (OUTPATIENT)
Dept: FAMILY MEDICINE | Facility: CLINIC | Age: 62
End: 2022-08-22
Payer: MEDICARE

## 2022-08-22 NOTE — TELEPHONE ENCOUNTER
Call returned to patient's sister, Alanis. She reports that the patient has developed a cough and runny nose. Appointment scheduled with ORIANA Masters on 8/24/22 at 3:00 pm.

## 2022-08-22 NOTE — TELEPHONE ENCOUNTER
----- Message from Elie Melo sent at 8/22/2022  1:29 PM CDT -----  Contact: Sister Alanis [phone]  Alanis called in regards to speaking with the nurse she has a concern she states she has been waiting for a return call please advise

## 2022-08-23 ENCOUNTER — PATIENT MESSAGE (OUTPATIENT)
Dept: PRIMARY CARE CLINIC | Facility: CLINIC | Age: 62
End: 2022-08-23
Payer: MEDICARE

## 2022-08-23 ENCOUNTER — OFFICE VISIT (OUTPATIENT)
Dept: INTERNAL MEDICINE | Facility: CLINIC | Age: 62
End: 2022-08-23
Payer: MEDICARE

## 2022-08-23 ENCOUNTER — TELEPHONE (OUTPATIENT)
Dept: NEUROLOGY | Facility: CLINIC | Age: 62
End: 2022-08-23
Payer: MEDICARE

## 2022-08-23 VITALS
HEIGHT: 59 IN | RESPIRATION RATE: 18 BRPM | WEIGHT: 111 LBS | DIASTOLIC BLOOD PRESSURE: 70 MMHG | SYSTOLIC BLOOD PRESSURE: 120 MMHG | HEART RATE: 84 BPM | TEMPERATURE: 98 F | BODY MASS INDEX: 22.38 KG/M2 | OXYGEN SATURATION: 95 %

## 2022-08-23 DIAGNOSIS — J10.1 INFLUENZA B: ICD-10-CM

## 2022-08-23 DIAGNOSIS — I10 ESSENTIAL HYPERTENSION: ICD-10-CM

## 2022-08-23 DIAGNOSIS — F79 INTELLECTUAL DISABILITY: ICD-10-CM

## 2022-08-23 DIAGNOSIS — U07.1 COVID-19: Primary | ICD-10-CM

## 2022-08-23 DIAGNOSIS — F84.9 PDD (PERVASIVE DEVELOPMENTAL DISORDER): ICD-10-CM

## 2022-08-23 DIAGNOSIS — G24.01 TARDIVE DYSKINESIA: ICD-10-CM

## 2022-08-23 LAB
CTP QC/QA: YES
CTP QC/QA: YES
FLUAV AG NPH QL: NEGATIVE
FLUBV AG NPH QL: POSITIVE
SARS-COV-2 RDRP RESP QL NAA+PROBE: POSITIVE

## 2022-08-23 PROCEDURE — 99999 PR PBB SHADOW E&M-EST. PATIENT-LVL IV: ICD-10-PCS | Mod: PBBFAC,CR,,

## 2022-08-23 PROCEDURE — 99214 PR OFFICE/OUTPT VISIT, EST, LEVL IV, 30-39 MIN: ICD-10-PCS | Mod: S$PBB,CR,,

## 2022-08-23 PROCEDURE — U0002 COVID-19 LAB TEST NON-CDC: HCPCS | Mod: PBBFAC

## 2022-08-23 PROCEDURE — 87804 INFLUENZA ASSAY W/OPTIC: CPT | Mod: PBBFAC

## 2022-08-23 PROCEDURE — 99214 OFFICE O/P EST MOD 30 MIN: CPT | Mod: PBBFAC

## 2022-08-23 PROCEDURE — 99214 OFFICE O/P EST MOD 30 MIN: CPT | Mod: S$PBB,CR,,

## 2022-08-23 PROCEDURE — 99999 PR PBB SHADOW E&M-EST. PATIENT-LVL IV: CPT | Mod: PBBFAC,CR,,

## 2022-08-23 RX ORDER — DOCUSATE SODIUM 100 MG/1
100 CAPSULE, LIQUID FILLED ORAL DAILY
COMMUNITY
Start: 2022-08-11 | End: 2022-08-23 | Stop reason: SDUPTHER

## 2022-08-23 NOTE — PROGRESS NOTES
Juan Pablo Thomasxiomara  08/23/2022  [id number]    Brooke Goldberg MD  Patient Care Team:  Brooke Goldberg MD as PCP - General (Internal Medicine)  Dolly Casillas NP as Nurse Practitioner (Neurology)  Elia Wilkins MD as Consulting Physician (Cardiology)  Richy Gutierrez MD as Consulting Physician (Surgical Oncology)  Cherise Marshall MD as Consulting Physician (Gastroenterology)  Dipti Zayas NP as Nurse Practitioner (Hematology and Oncology)  Yulissa Alexander as Digital Medicine Health   Minh Rock PharmD as Hypertension Digital Medicine Clinician (Pharmacist)  Brooke Goldberg MD as Hypertension Digital Medicine Responsible Provider (Internal Medicine)  [employer] as Hypertension Digital Medicine Contract          Visit Type:an urgent visit for a new problem    Chief Complaint:  Chief Complaint   Patient presents with    Otalgia       History of Present Illness:    62 year old female presents today with her sister for congestion  She has her own apartment and 24 hour sitters  Sitter at the house yesterday was sick   Her sister has cameras in the apartment     Received a message from one of the sitters yesterday that she congested, coughing, and has a runny nose  Not sure when the symptoms started  Checked her temp today and did not have a fever  She has not coughed or had a runny nose since she is been with her sister    Patient's brother in law recently had COVID  He is diabetic and has other health comorbidities          History:  Past Medical History:   Diagnosis Date    Bipolar 1 disorder     Chronic constipation     Galactorrhea     Growth retardation     Profound mental retardation    High cholesterol     Hyperlipidemia     Hypertension     Leukopenia     Neuroleptic-induced tardive dyskinesia     Schizoaffective disorder     Stereotypic movement disorder      Past Surgical History:   Procedure Laterality Date    COLONOSCOPY N/A 5/17/2021    Procedure: COLONOSCOPY;  Surgeon: Jeffrey  JUDE Ayala MD;  Location: [facility] ENDO;  Service: Gastroenterology;  Laterality: N/A;    ENDOSCOPIC ULTRASOUND OF UPPER GASTROINTESTINAL TRACT N/A 5/16/2022    Procedure: ULTRASOUND, UPPER GI TRACT, ENDOSCOPIC;  Surgeon: Cherise Marshall MD;  Location: [facility] ENDO;  Service: Endoscopy;  Laterality: N/A;  Upper and linear, 22 shark core, slides and formalin.    ESOPHAGOGASTRODUODENOSCOPY N/A 11/5/2020    Procedure: EGD (ESOPHAGOGASTRODUODENOSCOPY);  Surgeon: John Batista MD;  Location: [facility];  Service: Endoscopy;  Laterality: N/A;     Family History   Problem Relation Age of Onset    Lung cancer Mother     Hypertension Father     Prostate cancer Father     Stroke Maternal Uncle     Heart disease Maternal Grandmother      Social History     Socioeconomic History    Marital status: Single   Tobacco Use    Smoking status: Never Smoker    Smokeless tobacco: Never Used   Substance and Sexual Activity    Alcohol use: No    Drug use: No    Sexual activity: Never     Social Determinants of Health     Financial Resource Strain: Low Risk     Difficulty of Paying Living Expenses: Not hard at all   Food Insecurity: No Food Insecurity    Worried About Running Out of Food in the Last Year: Never true    Ran Out of Food in the Last Year: Never true   Transportation Needs: No Transportation Needs    Lack of Transportation (Medical): No    Lack of Transportation (Non-Medical): No   Physical Activity: Inactive    Days of Exercise per Week: 0 days    Minutes of Exercise per Session: 0 min   Stress: No Stress Concern Present    Feeling of Stress : Only a little   Social Connections: Socially Isolated    Frequency of Communication with Friends and Family: Twice a week    Frequency of Social Gatherings with Friends and Family: Twice a week    Attends Pentecostalism Services: Never    Active Member of Clubs or Organizations: No    Attends Club or Organization Meetings: Never    Marital Status: Never     Housing Stability: Low Risk     Unable to Pay for Housing in the Last Year: No    Number of Places Lived in the Last Year: 1    Unstable Housing in the Last Year: No     Patient Active Problem List   Diagnosis    Central hypothyroidism    Lipodystrophy    Osteopenia    Hyperprolactinemia    PDD (pervasive developmental disorder)    Tardive dyskinesia    Restlessness and agitation    Seizure disorder    Essential hypertension    Leukopenia    Thrombocytopenia    Iron deficiency anemia due to chronic blood loss    Schizoaffective disorder    Other constipation    H. pylori infection    Other symptoms and signs involving the nervous system    Coronary artery calcification    Abnormal CT scan    Calcification of aorta    Liver lesion     Review of patient's allergies indicates:  No Known Allergies    The following were reviewed at this visit: active problem list, medication list, allergies, family history, social history, and health maintenance.    Medications:  Current Outpatient Medications on File Prior to Visit   Medication Sig Dispense Refill    amLODIPine (NORVASC) 10 MG tablet TAKE 1 TABLET BY MOUTH EVERY DAY 90 tablet 1    diclofenac sodium (VOLTAREN) 1 % Gel APPLY TWO GRAMS TO THE AFFECTED AREA FOUR TIMES DAILY AS NEEDED FOR PAIN 200 g 1    divalproex (DEPAKOTE) 500 MG TbEC Take 1 tablet (500 mg total) by mouth 2 (two) times daily. 60 tablet 3    folic acid (FOLVITE) 1 MG tablet TAKE ONE-HALF TABLET BY MOUTH TWICE DAILY 30 tablet 11    furosemide (LASIX) 20 MG tablet TAKE 1 TABLET BY MOUTH DAILY AS NEEDED SWELLING 30 tablet 4    glycerin adult suppository Place 1 suppository rectally as needed for Constipation. 25 suppository 0    ibuprofen (ADVIL,MOTRIN) 200 MG tablet Take 200 mg by mouth every 6 (six) hours as needed.      lactulose (CHRONULAC) 10 gram/15 mL solution TAKE 15 ML BY MOUTH TWICE DAILY FOR 7 DAYS 210 mL 0    melatonin 10 mg TbSR TAKE 1 TABLET BY MOUTH  EVERY NIGHT AT BEDTIME 30 tablet 11    mirtazapine (REMERON) 45 MG tablet Take 1 tablet (45 mg total) by mouth every evening. 30 tablet 3    mupirocin (BACTROBAN) 2 % ointment Takes prn      polyethylene glycol (GLYCOLAX) 17 gram/dose powder SMARTSI Capful(s) By Mouth Daily      QUEtiapine (SEROQUEL) 400 MG tablet TAKE 1/2 TABLET BY MOUTH EVERY MORNING and ONE TABLET BY MOUTH EVERY NIGHT AT BEDTIME 45 tablet 3    simethicone (MYLICON) 125 mg Cap capsule 1 capsule after meals and at bedtime as needed      simvastatin (ZOCOR) 40 MG tablet TAKE 1 TABLET BY MOUTH EVERY EVENING 30 tablet 11    triamterene-hydrochlorothiazide 37.5-25 mg (MAXZIDE-25) 37.5-25 mg per tablet TAKE 1 TABLET BY MOUTH EVERY MORNING 90 tablet 3    LORazepam (ATIVAN) 1 MG tablet Take 1 tablet (1 mg total) by mouth daily as needed for Anxiety. 30 tablet 2    [DISCONTINUED] ALPRAZolam (XANAX) 0.25 MG tablet Take 1 tablet (0.25 mg total) by mouth 2 (two) times daily. 60 tablet 2    [DISCONTINUED] docusate sodium (COLACE) 100 MG capsule Take 100 mg by mouth once daily.       No current facility-administered medications on file prior to visit.       Medications have been reviewed and reconciled with patient at this visit.  Barriers to medications reviewed with patient.    Adverse reactions to current medications reviewed with patient..    Over the counter medications reviewed and reconciled with patient.    Exam:  Wt Readings from Last 3 Encounters:   22 50.4 kg (111 lb)   22 51.3 kg (113 lb)   22 51.7 kg (113 lb 15.7 oz)     Temp Readings from Last 3 Encounters:   22 98.4 °F (36.9 °C)   22 98.6 °F (37 °C)   22 97.1 °F (36.2 °C) (Tympanic)     BP Readings from Last 3 Encounters:   22 120/70   22 128/83   22 128/83     Pulse Readings from Last 3 Encounters:   22 84   22 86   22 80     Body mass index is 22.42 kg/m².      Review of Systems   Unable to perform ROS: Mental  acuity   HENT: Positive for congestion.      Physical Exam  Cardiovascular:      Rate and Rhythm: Normal rate and regular rhythm.      Heart sounds: Normal heart sounds.   Pulmonary:      Effort: Pulmonary effort is normal.      Breath sounds: Normal breath sounds.   Skin:     General: Skin is warm and dry.         Laboratory Reviewed ({Yes)  Lab Results   Component Value Date    WBC 2.32 (L) 07/16/2022    HGB 13.8 07/16/2022    HCT 40.5 07/16/2022     (L) 07/16/2022    CHOL 234 (H) 10/14/2014    TRIG 97 10/14/2014     (H) 10/14/2014    ALT 22 04/08/2022    AST 19 04/08/2022     04/08/2022    K 4.8 04/08/2022    CL 99 04/08/2022    CREATININE 0.8 06/11/2022    BUN 16 04/08/2022    CO2 29 04/08/2022    TSH 1.177 04/09/2021    HGBA1C 5.2 10/14/2014       Juan Pablo was seen today for otalgia.    Diagnoses and all orders for this visit:    COVID-19  -     POCT COVID-19 Rapid Screening  -     POCT INFLUENZA A/B    Influenza B  -     POCT COVID-19 Rapid Screening  -     POCT INFLUENZA A/B    Intellectual disability  Continue current treatment plan as previously prescribed with your Neurologist    Essential hypertension  At goal during visit      Tardive dyskinesia  Continue current treatment plan as previously prescribed with your Neurologist    PDD (pervasive developmental disorder)  Continue current treatment plan as previously prescribed with your Neurologist    Patient's sister will call to inform if she would like some medications sent to the pharmacy  Will call the sitting service to see if someone will be able to assist patient with medication administration     No SOB or resp distress  Noted during visit     Discussed risk and possible SE of paxlovid  Does not think patient will tolerate the infusion     Care Plan/Goals: Reviewed    Goals       Blood Pressure < 130/80 (pt-stated)       Exercise at least 150 minutes per week.       Take at least one BP reading per week at various times of the day            Follow up: No follow-ups on file.    After visit summary was printed and given to patient upon discharge today.  Patient goals and care plan are included in After Visit Summary.

## 2022-08-23 NOTE — TELEPHONE ENCOUNTER
----- Message from Anjelica Acosta sent at 8/23/2022  4:21 PM CDT -----  Contact: Alanis Thapa is returning a missed call form nurse.Please give her a call back at [phone].

## 2022-08-23 NOTE — TELEPHONE ENCOUNTER
----- Message from Gideon Malik sent at 8/23/2022  3:04 PM CDT -----  Regarding: Patient tested Positive  Pt would like to reschedule appointment, she tested positive for COVID. Please call her sister to reschedule her appointment. Thank you.

## 2022-08-24 ENCOUNTER — PATIENT MESSAGE (OUTPATIENT)
Dept: FAMILY MEDICINE | Facility: CLINIC | Age: 62
End: 2022-08-24
Payer: MEDICARE

## 2022-08-24 ENCOUNTER — TELEPHONE (OUTPATIENT)
Dept: FAMILY MEDICINE | Facility: CLINIC | Age: 62
End: 2022-08-24
Payer: MEDICARE

## 2022-08-24 DIAGNOSIS — U07.1 COVID-19: Primary | ICD-10-CM

## 2022-08-24 NOTE — TELEPHONE ENCOUNTER
----- Message from Mary Cornell sent at 8/24/2022  8:14 AM CDT -----  Contact: Alanis/ Sister  Patients sister is calling to speak to the nurse regarding question and concerns. Reports needing to speak to the nurse about sisters conditions. Please give patients sister a call back at. [phone]

## 2022-08-24 NOTE — TELEPHONE ENCOUNTER
Covid risk score 3  Qualifies for Paxlovid. Hold simvastatin while taking.  No COVID nursing available. Symptoms monitoring ordered.    I have signed for the following orders AND/OR meds.  Please call the patient and ask the patient to schedule the testing AND/OR inform about any medications that were sent. Medications have been sent to pharmacy listed below      Orders Placed This Encounter   Procedures    COVID-19 Home Symptom Monitoring  - Duration (days): 14     Order Specific Question:   Duration (days)     Answer:   14       Medications Ordered This Encounter   Medications    nirmatrelvir-ritonavir 300 mg (150 mg x 2)-100 mg copackaged tablets (EUA)     Sig: Take 3 tablets by mouth 2 (two) times daily for 5 days. Each dose contains 2 nirmatrelvir (pink tablets) and 1 ritonavir (white tablet). Take all 3 tablets together     Dispense:  30 tablet     Refill:  0     Order Specific Question:   Per EUA criteria, patient has a positive COVID test AND symptom onset </= 5 days     Answer:   Yes         [facility] [place] - [address] [facility]  [place] [address]  Phone: [phone] Fax: [phone]    [id number] - [place] - [address] AT [facility]  [address]  [place] [address]  Phone: [phone] Fax: [phone]

## 2022-08-25 ENCOUNTER — NURSE TRIAGE (OUTPATIENT)
Dept: ADMINISTRATIVE | Facility: CLINIC | Age: 62
End: 2022-08-25
Payer: MEDICARE

## 2022-08-25 NOTE — TELEPHONE ENCOUNTER
[facility] pt.   Covid + since 8/23/22.    Sister states pt is disabled, has home care service caring for pt in pt home. Sister is unhappy with care, states caregivers are not caring for her like they should. She is requesting information on any home service offered through [facility] and/or insurance to assist with pt care. Informed pt message would be routed to PCP clinic requesting callback from clinic. Also discussed reaching out to insurance company to find out if any services offered through insurance.     Reason for Disposition   Nursing judgment    Protocols used: INFORMATION ONLY CALL - NO TRIAGE-A-[place]

## 2022-08-29 ENCOUNTER — TELEPHONE (OUTPATIENT)
Dept: FAMILY MEDICINE | Facility: CLINIC | Age: 62
End: 2022-08-29
Payer: MEDICARE

## 2022-08-29 NOTE — TELEPHONE ENCOUNTER
----- Message from Maya Horton sent at 8/29/2022 12:06 PM CDT -----  Contact: Alanis/sister  Alanis would like a call back at [phone], Regards to if patient can still be seen or do she needs to reschedule her 8/30/22 visit due to recent diagnose of Covid and flu.    Thanks  Td

## 2022-08-29 NOTE — TELEPHONE ENCOUNTER
Spoke with patient's sister, she would like to discuss further at patient's scheduled appointment for tomorrow.

## 2022-08-29 NOTE — TELEPHONE ENCOUNTER
Call returned. Advised that as long as patient has completed 5 day quarantine and has had improvement of symptoms, there is no need to reschedule appointment at this time.

## 2022-08-30 ENCOUNTER — OFFICE VISIT (OUTPATIENT)
Dept: PRIMARY CARE CLINIC | Facility: CLINIC | Age: 62
End: 2022-08-30
Payer: MEDICARE

## 2022-08-30 VITALS
OXYGEN SATURATION: 99 % | HEIGHT: 59 IN | HEART RATE: 87 BPM | DIASTOLIC BLOOD PRESSURE: 71 MMHG | SYSTOLIC BLOOD PRESSURE: 109 MMHG | WEIGHT: 111 LBS | TEMPERATURE: 98 F | BODY MASS INDEX: 22.38 KG/M2

## 2022-08-30 DIAGNOSIS — M79.604 PAIN OF RIGHT LOWER EXTREMITY: ICD-10-CM

## 2022-08-30 DIAGNOSIS — H61.23 CERUMEN DEBRIS ON TYMPANIC MEMBRANE, BILATERAL: Primary | ICD-10-CM

## 2022-08-30 PROCEDURE — 99214 OFFICE O/P EST MOD 30 MIN: CPT | Mod: PBBFAC,PN | Performed by: INTERNAL MEDICINE

## 2022-08-30 PROCEDURE — 99213 OFFICE O/P EST LOW 20 MIN: CPT | Mod: S$PBB,,, | Performed by: INTERNAL MEDICINE

## 2022-08-30 PROCEDURE — 99999 PR PBB SHADOW E&M-EST. PATIENT-LVL IV: CPT | Mod: PBBFAC,,, | Performed by: INTERNAL MEDICINE

## 2022-08-30 PROCEDURE — 99213 PR OFFICE/OUTPT VISIT, EST, LEVL III, 20-29 MIN: ICD-10-PCS | Mod: S$PBB,,, | Performed by: INTERNAL MEDICINE

## 2022-08-30 PROCEDURE — 99999 PR PBB SHADOW E&M-EST. PATIENT-LVL IV: ICD-10-PCS | Mod: PBBFAC,,, | Performed by: INTERNAL MEDICINE

## 2022-08-30 NOTE — PROGRESS NOTES
Assessment/Plan:    Problem List Items Addressed This Visit    None  Visit Diagnoses       Cerumen debris on tympanic membrane, bilateral    -  Primary  -sister plans to try home debrox  -if unable to remove at home, will refer to ENT      Pain of right lower extremity      -patient did not exhibit any signs or symptoms of experiencing pain of extremity  -normal exam  -continue to monitor at home            Follow up if symptoms worsen or fail to improve.    Brooke Goldberg MD  _____________________________________________________________________________________________________________________________________________________    CC: leg pain    HPI:    Patient is in clinic today as an established patient here for leg pain.     Patient here today with her sister.  Sister states that patient was seen grabbing her leg and there was a concern for her having pain in her right leg.  She has been walking normally on  both legs, able to bear weight on both.  Sister denies any abnormal gait.  When asked, patient does not complain of any pain.      Sister was also concerned about patient having something going on with her years.  She was seen talking in both ears.  She did recently have COVID.  Still having a mild cough but symptoms improving.  Denies any fevers.  There has been no drainage from ear that has been noted.      No other complaints today.  Past Medical History:  Past Medical History:   Diagnosis Date    Bipolar 1 disorder     Chronic constipation     Galactorrhea     Growth retardation     Profound mental retardation    High cholesterol     Hyperlipidemia     Hypertension     Leukopenia     Neuroleptic-induced tardive dyskinesia     Schizoaffective disorder     Stereotypic movement disorder      Past Surgical History:   Procedure Laterality Date    COLONOSCOPY N/A 5/17/2021    Procedure: COLONOSCOPY;  Surgeon: Jeffrey Ayala MD;  Location: [facility];  Service: Gastroenterology;  Laterality: N/A;     ENDOSCOPIC ULTRASOUND OF UPPER GASTROINTESTINAL TRACT N/A 5/16/2022    Procedure: ULTRASOUND, UPPER GI TRACT, ENDOSCOPIC;  Surgeon: Cherise Marshall MD;  Location: [facility] ENDO;  Service: Endoscopy;  Laterality: N/A;  Upper and linear, 22 shark core, slides and formalin.    ESOPHAGOGASTRODUODENOSCOPY N/A 11/5/2020    Procedure: EGD (ESOPHAGOGASTRODUODENOSCOPY);  Surgeon: John Batista MD;  Location: [facility] ENDO;  Service: Endoscopy;  Laterality: N/A;     Review of patient's allergies indicates:  No Known Allergies  Social History     Tobacco Use    Smoking status: Never    Smokeless tobacco: Never   Substance Use Topics    Alcohol use: No    Drug use: No     Family History   Problem Relation Age of Onset    Lung cancer Mother     Hypertension Father     Prostate cancer Father     Stroke Maternal Uncle     Heart disease Maternal Grandmother      Current Outpatient Medications on File Prior to Visit   Medication Sig Dispense Refill    ALPRAZolam (XANAX) 0.25 MG tablet TAKE 1 TABLET BY MOUTH TWICE DAILY 60 tablet 3    amLODIPine (NORVASC) 10 MG tablet TAKE 1 TABLET BY MOUTH EVERY DAY 90 tablet 1    diclofenac sodium (VOLTAREN) 1 % Gel APPLY TWO GRAMS TO THE AFFECTED AREA FOUR TIMES DAILY AS NEEDED FOR PAIN 200 g 1    divalproex (DEPAKOTE) 500 MG TbEC Take 1 tablet (500 mg total) by mouth 2 (two) times daily. 60 tablet 3    folic acid (FOLVITE) 1 MG tablet TAKE ONE-HALF TABLET BY MOUTH TWICE DAILY 30 tablet 11    furosemide (LASIX) 20 MG tablet TAKE 1 TABLET BY MOUTH DAILY AS NEEDED SWELLING 30 tablet 4    glycerin adult suppository Place 1 suppository rectally as needed for Constipation. 25 suppository 0    ibuprofen (ADVIL,MOTRIN) 200 MG tablet Take 200 mg by mouth every 6 (six) hours as needed.      lactulose (CHRONULAC) 10 gram/15 mL solution TAKE 15 ML BY MOUTH TWICE DAILY FOR 7 DAYS 210 mL 0    LORazepam (ATIVAN) 1 MG tablet Take 1 tablet (1 mg total) by mouth daily as needed for  "Anxiety. 30 tablet 2    melatonin 10 mg TbSR TAKE 1 TABLET BY MOUTH EVERY NIGHT AT BEDTIME 30 tablet 11    mirtazapine (REMERON) 45 MG tablet Take 1 tablet (45 mg total) by mouth every evening. 30 tablet 3    mupirocin (BACTROBAN) 2 % ointment Takes prn      polyethylene glycol (GLYCOLAX) 17 gram/dose powder SMARTSI Capful(s) By Mouth Daily      QUEtiapine (SEROQUEL) 400 MG tablet TAKE 1/2 TABLET BY MOUTH EVERY MORNING and ONE TABLET BY MOUTH EVERY NIGHT AT BEDTIME 45 tablet 3    simethicone (MYLICON) 125 mg Cap capsule 1 capsule after meals and at bedtime as needed      simvastatin (ZOCOR) 40 MG tablet TAKE 1 TABLET BY MOUTH EVERY EVENING 30 tablet 11    triamterene-hydrochlorothiazide 37.5-25 mg (MAXZIDE-25) 37.5-25 mg per tablet TAKE 1 TABLET BY MOUTH EVERY MORNING 90 tablet 3    [DISCONTINUED] nirmatrelvir-ritonavir 300 mg (150 mg x 2)-100 mg copackaged tablets (EUA) Take 3 tablets by mouth 2 (two) times daily for 5 days. Each dose contains 2 nirmatrelvir (pink tablets) and 1 ritonavir (white tablet). Take all 3 tablets together (Patient not taking: Reported on 2022) 30 tablet 0     No current facility-administered medications on file prior to visit.       Review of Systems   Unable to perform ROS: Other     Vitals:    22 1044   BP: 109/71   Pulse: 87   Temp: 98.1 °F (36.7 °C)   SpO2: 99%   Weight: 50.3 kg (111 lb)   Height: 4' 11" (1.499 m)       Wt Readings from Last 3 Encounters:   22 50.3 kg (111 lb)   22 50.4 kg (111 lb)   22 51.3 kg (113 lb)       Physical Exam  Constitutional:       General: She is not in acute distress.     Appearance: Normal appearance. She is well-developed.   HENT:      Head: Normocephalic and atraumatic.      Right Ear: There is impacted cerumen.      Left Ear: There is impacted cerumen.   Eyes:      Conjunctiva/sclera: Conjunctivae normal.   Cardiovascular:      Rate and Rhythm: Normal rate and regular rhythm.      Pulses: Normal pulses.      " Heart sounds: Normal heart sounds. No murmur heard.  Pulmonary:      Effort: Pulmonary effort is normal. No respiratory distress.      Breath sounds: Normal breath sounds.   Abdominal:      General: Bowel sounds are normal. There is no distension.      Palpations: Abdomen is soft.      Tenderness: There is no abdominal tenderness.   Musculoskeletal:         General: No swelling, tenderness, deformity or signs of injury. Normal range of motion.      Cervical back: Normal range of motion and neck supple.      Right lower leg: No edema.      Left lower leg: No edema.   Skin:     General: Skin is warm and dry.      Findings: No rash.   Neurological:      General: No focal deficit present.      Mental Status: She is alert. Mental status is at baseline.     Health Maintenance   Topic Date Due    Aspirin/Antiplatelet Therapy  Never done    Mammogram  03/08/2014    High Dose Statin  08/30/2023    TETANUS VACCINE  05/30/2024    Lipid Panel  12/23/2024    Hepatitis C Screening  Completed    DEXA Scan  Discontinued

## 2022-09-06 ENCOUNTER — OFFICE VISIT (OUTPATIENT)
Dept: NEUROLOGY | Facility: CLINIC | Age: 62
End: 2022-09-06
Payer: MEDICARE

## 2022-09-06 VITALS
HEART RATE: 74 BPM | BODY MASS INDEX: 22.38 KG/M2 | HEIGHT: 59 IN | SYSTOLIC BLOOD PRESSURE: 134 MMHG | WEIGHT: 111 LBS | RESPIRATION RATE: 14 BRPM | DIASTOLIC BLOOD PRESSURE: 85 MMHG

## 2022-09-06 DIAGNOSIS — R90.89 ABNORMAL BRAIN MRI: Primary | ICD-10-CM

## 2022-09-06 PROCEDURE — 99999 PR PBB SHADOW E&M-EST. PATIENT-LVL III: ICD-10-PCS | Mod: PBBFAC,,, | Performed by: PSYCHIATRY & NEUROLOGY

## 2022-09-06 PROCEDURE — 99999 PR PBB SHADOW E&M-EST. PATIENT-LVL III: CPT | Mod: PBBFAC,,, | Performed by: PSYCHIATRY & NEUROLOGY

## 2022-09-06 PROCEDURE — 99213 OFFICE O/P EST LOW 20 MIN: CPT | Mod: PBBFAC | Performed by: PSYCHIATRY & NEUROLOGY

## 2022-09-06 PROCEDURE — 99204 OFFICE O/P NEW MOD 45 MIN: CPT | Mod: S$PBB,,, | Performed by: PSYCHIATRY & NEUROLOGY

## 2022-09-06 PROCEDURE — 99204 PR OFFICE/OUTPT VISIT, NEW, LEVL IV, 45-59 MIN: ICD-10-PCS | Mod: S$PBB,,, | Performed by: PSYCHIATRY & NEUROLOGY

## 2022-09-06 NOTE — PROGRESS NOTES
[facility]  Neurology Department  Vascular Neurology  Clinic Note      Reason for Consult (Chief Complaint):  Abnormal brain imaging    SUBJECTIVE:     History of Present Illness:  Patient is a 62 y.o. female who presents to clinic today as a referral for evaluation and recommendations based on possible chronic lacunar strokes on MRI of the brain.  Patient is accompanied by her sister.  They do not endorse any clear episodes of unilateral weakness or facial droop.  There was an episode of mild left hand weakness that did not last long.  Patient lives in a facility, there is questionable compliance with medications.    Past Medical History:   Diagnosis Date    Bipolar 1 disorder     Chronic constipation     Galactorrhea     Growth retardation     Profound mental retardation    High cholesterol     Hyperlipidemia     Hypertension     Leukopenia     Neuroleptic-induced tardive dyskinesia     Schizoaffective disorder     Stereotypic movement disorder      Past Surgical History:   Procedure Laterality Date    COLONOSCOPY N/A 5/17/2021    Procedure: COLONOSCOPY;  Surgeon: Jeffrey Ayala MD;  Location: [facility];  Service: Gastroenterology;  Laterality: N/A;    ENDOSCOPIC ULTRASOUND OF UPPER GASTROINTESTINAL TRACT N/A 5/16/2022    Procedure: ULTRASOUND, UPPER GI TRACT, ENDOSCOPIC;  Surgeon: Cherise Marshall MD;  Location: [facility];  Service: Endoscopy;  Laterality: N/A;  Upper and linear, 22 shark core, slides and formalin.    ESOPHAGOGASTRODUODENOSCOPY N/A 11/5/2020    Procedure: EGD (ESOPHAGOGASTRODUODENOSCOPY);  Surgeon: John Batista MD;  Location: [facility];  Service: Endoscopy;  Laterality: N/A;     Family History   Problem Relation Age of Onset    Lung cancer Mother     Hypertension Father     Prostate cancer Father     Stroke Maternal Uncle     Heart disease Maternal Grandmother      Social History     Tobacco Use    Smoking status: Never    Smokeless tobacco: Never  "  Substance Use Topics    Alcohol use: No    Drug use: No     Review of patient's allergies indicates:  No Known Allergies    Medications:   I have reviewed the current medication administration record.  For a complete list of medications please see the medication list.    Review of Systems:  Constitutional: no fever, no chills, no fatigue  Eyes: no eyesight worsening, no double vision, no eye pain  ENT/Mouth: no nasal congestion or nose bleeds, no sore throat or hoarseness.  Cardiovascular: no chest pain w/exertion, no palpitations, no leg pain while walking, no irregular heartbeat  Respiratory: positive for cough  Gastrointestinal: no nausea or vomiting, no abdominal pain or change in bowel habits, no black/bloody stools  Genitourinary: no frequent voiding or blood in urine  Musculoskeletal: no joint pain, no muscle pain   Skin/Breast: negative for rash or skin lesions  Hematologic: no easy bruising  Neurological: no headaches, dizziness, or confusion    OBJECTIVE:     Vital Signs (Most Recent):  /85   Pulse 74   Resp 14   Ht 4' 11" (1.499 m)   Wt 50.3 kg (111 lb)   BMI 22.42 kg/m²     Physical Exam:  General: well developed, well nourished  Head/Neck: atraumatic  Eyes: no vessel changes  Respiratory: normal respiratory effort  Vascular: no carotid bruits  Cardiac: regular rate and rhythm  Abdomen: soft, non-tender  Skin: positive for loss of energy/fatigue    Neurological Exam:   LOC: alert  Language: difficulty w communication at baseline  Speech: Dysarthria  Orientation: Not oriented to place, Not oriented to time  Memory: Abnormalities: No remote memory intact, No age correct, and No month correct  Visual Fields (CN II): Full  EOM (CN III, IV, VI): Full/intact  Oculocephalics: normal  Pupils (CN III, IV, VI): PERRL  Facial Sensation (CN V): Symmetric, Corneal reflex intact  Facial Movement (CN VII): symmetric facial expression  Hearing (CN VIII): intact bilaterally  Gag Reflex (CN IX, X): " normal/symmetric  Shoulder/Neck (CN XI): SCM-Left: Normal ; SCM-Right: Normal ; Shoulder Shrug: Normal/Symetric  Tongue (CN XII): to midline  Reflexes: flexor plantar responses bilaterally and 2+ throughout  Motor*: Arm Left:  Normal (5/5), Leg Left:   Normal (5/5), Arm Right:   Normal (5/5), Leg Right:   Normal (5/5)  Cerebellar*: Normal limb, Normal gait  , Normal stance  Sensation: intact to light touch, temperature and vibration  Tone: Arm-Left: normal; Leg-Left: normal; Arm-Right: normal; Leg-Right: normal    Laboratory:  BMP:   Lab Results   Component Value Date     04/08/2022    K 4.8 04/08/2022    CL 99 04/08/2022    CO2 29 04/08/2022    BUN 16 04/08/2022    CREATININE 0.8 06/11/2022    CALCIUM 10.3 04/08/2022     CBC:   Lab Results   Component Value Date    WBC 2.32 (L) 07/16/2022    RBC 4.58 07/16/2022    HGB 13.8 07/16/2022    HCT 40.5 07/16/2022     (L) 07/16/2022    MCV 88 07/16/2022    MCH 30.1 07/16/2022    MCHC 34.1 07/16/2022     Lipid Panel:   Lab Results   Component Value Date    CHOL 234 (H) 10/14/2014    LDLCALC 106.6 10/14/2014     (H) 10/14/2014    TRIG 97 10/14/2014     Coagulation: No results found for: PT, INR, APTT  Hgb A1C:   Lab Results   Component Value Date    HGBA1C 5.2 10/14/2014     TSH:   Lab Results   Component Value Date    TSH 1.177 04/09/2021       Diagnostic Results:  Brain Imaging: MRI Head. Date: 2022  Chronic changes  ASSESSMENT/PLAN:     Diagnosis:  Asymptomatic white matter disease    Recommendations:  Standard risk factor modification with goal of normal tension, normal blood sugar, normal lipids.      Recommend healthy diet with avoidance as possible of red meat and fried foods, and to include fish, chicken, turkey as well as fruits and vegetables.    Allow for daily exercise.    Patient/Family Stroke Education    I have discussed the patient's stroke risk factors and the importance to modify them in order to reduce the risk of future events.  Also,  the importance of a healthy diet and daily exercise in order to reduce the risk of future strokes.    I went over the usual stroke warning signs and symptoms, and the need to activate EMS (call 911) as soon as the symptoms present.  - Sudden onset numbness or weakness of the face, arm, or leg; especially on one side of the body  - Sudden confusion, trouble speaking, or understanding  - Sudden trouble seeing in one or both eyes  - Sudden trouble walking, dizziness, loss of coordination  - Sudden severe headache with no known cause      Leroy Moser MD  Vascular and Interventional Neurology  , Department of Neurology  Section Head, Vascular Neurology  Departments of Neurology, Neurosurgery and Radiology  [facility] [place], [place]

## 2022-09-15 ENCOUNTER — PATIENT MESSAGE (OUTPATIENT)
Dept: PRIMARY CARE CLINIC | Facility: CLINIC | Age: 62
End: 2022-09-15
Payer: MEDICARE

## 2022-09-19 ENCOUNTER — PATIENT MESSAGE (OUTPATIENT)
Dept: PRIMARY CARE CLINIC | Facility: CLINIC | Age: 62
End: 2022-09-19
Payer: MEDICARE

## 2022-09-19 ENCOUNTER — PATIENT MESSAGE (OUTPATIENT)
Dept: GASTROENTEROLOGY | Facility: CLINIC | Age: 62
End: 2022-09-19
Payer: MEDICARE

## 2022-10-03 ENCOUNTER — PATIENT MESSAGE (OUTPATIENT)
Dept: GASTROENTEROLOGY | Facility: CLINIC | Age: 62
End: 2022-10-03
Payer: MEDICARE

## 2022-10-03 DIAGNOSIS — Z71.89 COMPLEX CARE COORDINATION: ICD-10-CM

## 2022-10-04 ENCOUNTER — PATIENT MESSAGE (OUTPATIENT)
Dept: ADMINISTRATIVE | Facility: HOSPITAL | Age: 62
End: 2022-10-04
Payer: MEDICARE

## 2022-10-04 ENCOUNTER — OFFICE VISIT (OUTPATIENT)
Dept: PRIMARY CARE CLINIC | Facility: CLINIC | Age: 62
End: 2022-10-04
Payer: MEDICARE

## 2022-10-04 VITALS
HEIGHT: 59 IN | WEIGHT: 111 LBS | DIASTOLIC BLOOD PRESSURE: 81 MMHG | TEMPERATURE: 98 F | OXYGEN SATURATION: 97 % | HEART RATE: 89 BPM | BODY MASS INDEX: 22.38 KG/M2 | SYSTOLIC BLOOD PRESSURE: 111 MMHG

## 2022-10-04 DIAGNOSIS — Z12.31 ENCOUNTER FOR SCREENING MAMMOGRAM FOR BREAST CANCER: Primary | ICD-10-CM

## 2022-10-04 DIAGNOSIS — F84.9 PDD (PERVASIVE DEVELOPMENTAL DISORDER): ICD-10-CM

## 2022-10-04 DIAGNOSIS — D72.819 LEUKOPENIA, UNSPECIFIED TYPE: ICD-10-CM

## 2022-10-04 DIAGNOSIS — Z23 INFLUENZA VACCINE NEEDED: Primary | ICD-10-CM

## 2022-10-04 DIAGNOSIS — G40.909 SEIZURE DISORDER: ICD-10-CM

## 2022-10-04 DIAGNOSIS — I10 ESSENTIAL HYPERTENSION: ICD-10-CM

## 2022-10-04 LAB — BCS RECOMMENDATION EXT: NORMAL

## 2022-10-04 PROCEDURE — 99999 PR PBB SHADOW E&M-EST. PATIENT-LVL IV: ICD-10-PCS | Mod: PBBFAC,,, | Performed by: INTERNAL MEDICINE

## 2022-10-04 PROCEDURE — 99214 PR OFFICE/OUTPT VISIT, EST, LEVL IV, 30-39 MIN: ICD-10-PCS | Mod: S$PBB,,, | Performed by: INTERNAL MEDICINE

## 2022-10-04 PROCEDURE — 99214 OFFICE O/P EST MOD 30 MIN: CPT | Mod: S$PBB,,, | Performed by: INTERNAL MEDICINE

## 2022-10-04 PROCEDURE — G0008 ADMIN INFLUENZA VIRUS VAC: HCPCS | Mod: PBBFAC,PN

## 2022-10-04 PROCEDURE — 99999 PR PBB SHADOW E&M-EST. PATIENT-LVL IV: CPT | Mod: PBBFAC,,, | Performed by: INTERNAL MEDICINE

## 2022-10-04 PROCEDURE — 99214 OFFICE O/P EST MOD 30 MIN: CPT | Mod: PBBFAC,PN,25 | Performed by: INTERNAL MEDICINE

## 2022-10-04 RX ORDER — DOCUSATE SODIUM 100 MG/1
100 CAPSULE, LIQUID FILLED ORAL DAILY
Qty: 90 CAPSULE | Refills: 3 | Status: SHIPPED | OUTPATIENT
Start: 2022-10-04 | End: 2022-12-19 | Stop reason: SDUPTHER

## 2022-10-04 RX ORDER — DOCUSATE SODIUM 100 MG/1
100 CAPSULE, LIQUID FILLED ORAL DAILY
COMMUNITY
Start: 2022-09-26 | End: 2022-10-04 | Stop reason: SDUPTHER

## 2022-10-04 RX ORDER — CETIRIZINE HYDROCHLORIDE 10 MG/1
10 TABLET ORAL DAILY PRN
COMMUNITY

## 2022-10-04 RX ORDER — DIPHENHYDRAMINE HCL 25 MG
25 CAPSULE ORAL NIGHTLY PRN
COMMUNITY

## 2022-10-04 NOTE — TELEPHONE ENCOUNTER
I have signed for the following orders AND/OR meds.  Please call the patient and ask the patient to schedule the testing AND/OR inform about any medications that were sent. Medications have been sent to pharmacy listed below      Orders Placed This Encounter   Procedures    US Breast Bilateral Limited     Standing Status:   Future     Standing Expiration Date:   10/4/2024     Order Specific Question:   May the Radiologist modify the order per protocol to meet the clinical needs of the patient?     Answer:   Yes     Order Specific Question:   Release to patient     Answer:   Immediate              [facility] - [place] - [id number] [facility]  [place] [address]  Phone: [phone] Fax: [phone]    [id number] [place] - [address] AT [facility]  [address]  [place] [address]  Phone: [phone] Fax: [phone]

## 2022-10-19 ENCOUNTER — HOSPITAL ENCOUNTER (OUTPATIENT)
Dept: RADIOLOGY | Facility: HOSPITAL | Age: 62
Discharge: HOME | End: 2022-10-19
Attending: INTERNAL MEDICINE
Payer: MEDICARE

## 2022-10-19 ENCOUNTER — OFFICE VISIT (OUTPATIENT)
Dept: PODIATRY | Facility: CLINIC | Age: 62
End: 2022-10-19
Payer: MEDICARE

## 2022-10-19 VITALS — WEIGHT: 110.88 LBS | BODY MASS INDEX: 22.35 KG/M2 | HEIGHT: 59 IN

## 2022-10-19 DIAGNOSIS — Z12.31 ENCOUNTER FOR SCREENING MAMMOGRAM FOR BREAST CANCER: ICD-10-CM

## 2022-10-19 DIAGNOSIS — B35.1 ONYCHOMYCOSIS: Primary | ICD-10-CM

## 2022-10-19 DIAGNOSIS — F79 MENTAL HANDICAP: ICD-10-CM

## 2022-10-19 PROCEDURE — 99999 PR PBB SHADOW E&M-EST. PATIENT-LVL III: CPT | Mod: PBBFAC,,, | Performed by: PODIATRIST

## 2022-10-19 PROCEDURE — 99213 OFFICE O/P EST LOW 20 MIN: CPT | Mod: S$PBB,,, | Performed by: PODIATRIST

## 2022-10-19 PROCEDURE — 99213 OFFICE O/P EST LOW 20 MIN: CPT | Mod: PBBFAC | Performed by: PODIATRIST

## 2022-10-19 PROCEDURE — 99999 PR PBB SHADOW E&M-EST. PATIENT-LVL III: ICD-10-PCS | Mod: PBBFAC,,, | Performed by: PODIATRIST

## 2022-10-19 PROCEDURE — 76641 ULTRASOUND BREAST COMPLETE: CPT | Mod: TC,50

## 2022-10-19 PROCEDURE — 99213 PR OFFICE/OUTPT VISIT, EST, LEVL III, 20-29 MIN: ICD-10-PCS | Mod: S$PBB,,, | Performed by: PODIATRIST

## 2022-10-19 PROCEDURE — 76641 US BREAST BILATERAL COMPLETE: ICD-10-PCS | Mod: 26,50,, | Performed by: RADIOLOGY

## 2022-10-19 PROCEDURE — 76641 ULTRASOUND BREAST COMPLETE: CPT | Mod: 26,50,, | Performed by: RADIOLOGY

## 2022-10-19 NOTE — PROGRESS NOTES
Normal breast US, repeat in 1 year, results released through [employer]. Please verify that patient has viewed results. If not, please call patient with interpretation below:    I have reviewed the results of your breast US and it appears that everything was read as normal.  Based on this, the radiologist has recommended that you recheck in 1 year.     Also please see below health maintenance items that are due:    Aspirin/Antiplatelet Therapy Never done  Shingles Vaccine(1 of 2) Never done  Mammogram due on 03/08/2014  COVID-19 Vaccine(4 - Booster for Pfizer series) due on 11/29/2021

## 2022-10-25 DIAGNOSIS — D72.819 LEUKOPENIA, UNSPECIFIED TYPE: Primary | ICD-10-CM

## 2022-10-27 ENCOUNTER — TELEPHONE (OUTPATIENT)
Dept: PSYCHIATRY | Facility: CLINIC | Age: 62
End: 2022-10-27
Payer: MEDICARE

## 2022-10-27 NOTE — TELEPHONE ENCOUNTER
----- Message from Timothy Newton sent at 10/27/2022  3:59 PM CDT -----  Contact: pt sister  Type:  Sooner Apoointment Request    Caller is requesting a sooner appointment.  Caller declined first available appointment listed below.  Caller will not accept being placed on the waitlist and is requesting a message be sent to doctor.  Name of Caller: Yohan marquez    When is the first available appointment? 12/15/2022    Symptoms: to be seen / med check       Would the patient rather a call back or a response via [facility]? Call back   Best Call Back Number:[phone]    Additional Information:

## 2022-10-30 NOTE — PROGRESS NOTES
Subjective:     Patient ID: Juan Pablo Bolden is a 62 y.o. female.    Chief Complaint: Nail Care (Pt c/o left foot toe nail pain, non-diabetic pt wears tennis shoes, PCP Dr. Goldberg last seen 10-4-22)    Juan Pablo is a 62 y.o. female who presents to the podiatry clinic  with complaint of left nail toe pain. Patient is accompanied by her sister today, who states the patient aide has stated patient won't put full weight on left foot sometimes. Patients sister denied any injury to the foot.     Patient Active Problem List   Diagnosis    Central hypothyroidism    Lipodystrophy    Osteopenia    Hyperprolactinemia    PDD (pervasive developmental disorder)    Tardive dyskinesia    Restlessness and agitation    Seizure disorder    Essential hypertension    Leukopenia    Thrombocytopenia    Iron deficiency anemia due to chronic blood loss    Schizoaffective disorder    Other constipation    H. pylori infection    Other symptoms and signs involving the nervous system    Coronary artery calcification    Abnormal CT scan    Calcification of aorta    Liver lesion    Abnormal brain MRI       Medication List with Changes/Refills   Current Medications    ALPRAZOLAM (XANAX) 0.25 MG TABLET    TAKE 1 TABLET BY MOUTH TWICE DAILY    AMLODIPINE (NORVASC) 10 MG TABLET    TAKE 1 TABLET BY MOUTH ONCE DAILY    CETIRIZINE (ZYRTEC) 10 MG TABLET    Take 10 mg by mouth daily as needed for Allergies.    DICLOFENAC SODIUM (VOLTAREN) 1 % GEL    APPLY TWO GRAMS TO THE AFFECTED AREA FOUR TIMES DAILY AS NEEDED FOR PAIN    DIPHENHYDRAMINE (BENADRYL) 25 MG CAPSULE    Take 25 mg by mouth nightly as needed for Allergies.    DIVALPROEX (DEPAKOTE) 500 MG TBEC    Take 1 tablet (500 mg total) by mouth 2 (two) times daily.    DOCUSATE SODIUM (COLACE) 100 MG CAPSULE    Take 1 capsule (100 mg total) by mouth once daily.    FOLIC ACID (FOLVITE) 1 MG TABLET    TAKE ONE-HALF TABLET BY MOUTH TWICE DAILY    FUROSEMIDE (LASIX) 20 MG TABLET    TAKE 1 TABLET BY MOUTH DAILY AS  NEEDED SWELLING    GLYCERIN ADULT SUPPOSITORY    Place 1 suppository rectally as needed for Constipation.    IBUPROFEN (ADVIL,MOTRIN) 200 MG TABLET    Take 200 mg by mouth every 6 (six) hours as needed.    LACTULOSE (CHRONULAC) 10 GRAM/15 ML SOLUTION    TAKE 15 ML BY MOUTH TWICE DAILY FOR 7 DAYS    MELATONIN 10 MG TBSR    TAKE 1 TABLET BY MOUTH EVERY NIGHT AT BEDTIME    MIRTAZAPINE (REMERON) 45 MG TABLET    Take 1 tablet (45 mg total) by mouth every evening.    MUPIROCIN (BACTROBAN) 2 % OINTMENT    Takes prn    POLYETHYLENE GLYCOL (GLYCOLAX) 17 GRAM/DOSE POWDER    SMARTSI Capful(s) By Mouth Daily    QUETIAPINE (SEROQUEL) 400 MG TABLET    TAKE 1/2 TABLET BY MOUTH EVERY MORNING and ONE TABLET BY MOUTH EVERY NIGHT AT BEDTIME    SIMETHICONE (MYLICON) 125 MG CAP CAPSULE    1 capsule after meals and at bedtime as needed    SIMVASTATIN (ZOCOR) 40 MG TABLET    TAKE 1 TABLET BY MOUTH EVERY EVENING    TRIAMTERENE-HYDROCHLOROTHIAZIDE 37.5-25 MG (MAXZIDE-25) 37.5-25 MG PER TABLET    TAKE 1 TABLET BY MOUTH EVERY MORNING       Review of patient's allergies indicates:  No Known Allergies    Past Surgical History:   Procedure Laterality Date    COLONOSCOPY N/A 2021    Procedure: COLONOSCOPY;  Surgeon: Jeffrey Ayala MD;  Location: [facility];  Service: Gastroenterology;  Laterality: N/A;    ENDOSCOPIC ULTRASOUND OF UPPER GASTROINTESTINAL TRACT N/A 2022    Procedure: ULTRASOUND, UPPER GI TRACT, ENDOSCOPIC;  Surgeon: Cherise Marshall MD;  Location: [facility];  Service: Endoscopy;  Laterality: N/A;  Upper and linear, 22 shark core, slides and formalin.    ESOPHAGOGASTRODUODENOSCOPY N/A 2020    Procedure: EGD (ESOPHAGOGASTRODUODENOSCOPY);  Surgeon: John Batista MD;  Location: [facility];  Service: Endoscopy;  Laterality: N/A;       Family History   Problem Relation Age of Onset    Lung cancer Mother     Hypertension Father     Prostate cancer Father     Stroke Maternal Uncle     Heart disease  "Maternal Grandmother        Social History     Socioeconomic History    Marital status: Single   Tobacco Use    Smoking status: Never    Smokeless tobacco: Never   Substance and Sexual Activity    Alcohol use: No    Drug use: No    Sexual activity: Never     Social Determinants of Health     Financial Resource Strain: Low Risk     Difficulty of Paying Living Expenses: Not hard at all   Food Insecurity: No Food Insecurity    Worried About Running Out of Food in the Last Year: Never true    Ran Out of Food in the Last Year: Never true   Transportation Needs: No Transportation Needs    Lack of Transportation (Medical): No    Lack of Transportation (Non-Medical): No   Physical Activity: Inactive    Days of Exercise per Week: 0 days    Minutes of Exercise per Session: 0 min   Stress: No Stress Concern Present    Feeling of Stress : Only a little   Social Connections: Socially Isolated    Frequency of Communication with Friends and Family: Twice a week    Frequency of Social Gatherings with Friends and Family: Twice a week    Attends [employer] Services: Never    Active Member of Clubs or Organizations: No    Attends Club or Organization Meetings: Never    Marital Status: Never    Housing Stability: Low Risk     Unable to Pay for Housing in the Last Year: No    Number of Places Lived in the Last Year: 1    Unstable Housing in the Last Year: No       Vitals:    10/19/22 1123   Weight: 50.3 kg (110 lb 14.3 oz)   Height: 4' 11" (1.499 m)       Hemoglobin A1C   Date Value Ref Range Status   10/14/2014 5.2 4.5 - 6.2 % Final       Review of Systems   Unable to perform ROS: Mental acuity       Objective:      PHYSICAL EXAM: Apperance: Alert and orient in no distress,well developed, and with good attention to grooming and body habits  Patient presents ambulating in tennis shoes.   LOWER EXTREMITY EXAM:  VASCULAR: Dorsalis pedis pulses 2/4 bilateral and Posterior Tibial pulses 2/4 bilateral. Capillary fill time <4 seconds " bilateral. No edema observed bilateral. Varicosities absent bilateral. Skin temperature of the lower extremities is warm to warm, proximal to distal. Hair growth WNL bilateral.  DERMATOLOGICAL: No skin rashes, subcutaneous nodules, lesions, or ulcers observed bilateral. Nails 1,2,3,4,5 bilateral thickened, and discolored with subungual debris. Webspaces 1,2,3,4 bilateral clean, dry and without evidence of break in skin integrity.   NEUROLOGICAL: Light touch, sharp-dull, proprioception all present and equal bilaterally.   MUSCULOSKELETAL: Muscle strength is 5/5 for foot inverters, everters, plantarflexors, and dorsiflexors. Muscle tone is normal. No pain on palpation of bilateral feet.         Assessment:       Encounter Diagnoses   Name Primary?    Onychomycosis Yes    Mental handicap            Plan:   Onychomycosis    Mental handicap      I counseled the patient on her conditions, regarding findings of my examination, my impressions, and usual treatment plan.   Patients sister instructed on keeping nails filed down.  The patient and I reviewed the types of shoes she should be wearing, my recommendation includes generally the best time of the day for a shoe fitting is the afternoon, shoes with a wide toe box, very good cushion, and tennis shoes with removable inner soles.The patient and I reviewed my recommendations for over-the-counter orthotic inserts.   Patient to return as needed.       Elsa Wilkes DPM  [facility]

## 2022-10-31 NOTE — PROGRESS NOTES
This note is specifically for wellness visit performed today.     WELLNESS EXAM      Patient ID: Juan Pablo Bolden is a 62 y.o. female.  has a past medical history of Bipolar 1 disorder, Chronic constipation, Galactorrhea, Growth retardation, High cholesterol, Hyperlipidemia, Hypertension, Leukopenia, Neuroleptic-induced tardive dyskinesia, Schizoaffective disorder, and Stereotypic movement disorder.     Chief Complaint:  Encounter for wellness exam/90L documentation    Well Adult Physical: Patient here for a comprehensive physical exam. Patient reports no problems or complaints today in clinic. In need of her 90L documentation completed.     Health Maintenance Topics with due status: Not Due       Topic Last Completion Date    TETANUS VACCINE 05/30/2014    Lipid Panel 12/23/2019    Colorectal Cancer Screening 05/17/2021    High Dose Statin 10/30/2022        ==============================================    Health Maintenance Due   Topic Date Due    Aspirin/Antiplatelet Therapy  Never done    Shingles Vaccine (1 of 2) Never done    Mammogram  03/08/2014    COVID-19 Vaccine (4 - Booster for Pfizer series) 11/29/2021       Past Medical History:  Past Medical History:   Diagnosis Date    Bipolar 1 disorder     Chronic constipation     Galactorrhea     Growth retardation     Profound mental retardation    High cholesterol     Hyperlipidemia     Hypertension     Leukopenia     Neuroleptic-induced tardive dyskinesia     Schizoaffective disorder     Stereotypic movement disorder      Past Surgical History:   Procedure Laterality Date    COLONOSCOPY N/A 5/17/2021    Procedure: COLONOSCOPY;  Surgeon: Jeffrey Ayala MD;  Location: [facility];  Service: Gastroenterology;  Laterality: N/A;    ENDOSCOPIC ULTRASOUND OF UPPER GASTROINTESTINAL TRACT N/A 5/16/2022    Procedure: ULTRASOUND, UPPER GI TRACT, ENDOSCOPIC;  Surgeon: Cherise Marshall MD;  Location: [facility];  Service: Endoscopy;  Laterality: N/A;  Upper and  linear, 22 shark core, slides and formalin.    ESOPHAGOGASTRODUODENOSCOPY N/A 2020    Procedure: EGD (ESOPHAGOGASTRODUODENOSCOPY);  Surgeon: John Batista MD;  Location: [facility];  Service: Endoscopy;  Laterality: N/A;     Review of patient's allergies indicates:  No Known Allergies  Current Outpatient Medications on File Prior to Visit   Medication Sig Dispense Refill    ALPRAZolam (XANAX) 0.25 MG tablet TAKE 1 TABLET BY MOUTH TWICE DAILY 60 tablet 3    amLODIPine (NORVASC) 10 MG tablet TAKE 1 TABLET BY MOUTH ONCE DAILY 90 tablet 3    cetirizine (ZYRTEC) 10 MG tablet Take 10 mg by mouth daily as needed for Allergies.      diclofenac sodium (VOLTAREN) 1 % Gel APPLY TWO GRAMS TO THE AFFECTED AREA FOUR TIMES DAILY AS NEEDED FOR PAIN 200 g 1    diphenhydrAMINE (BENADRYL) 25 mg capsule Take 25 mg by mouth nightly as needed for Allergies.      divalproex (DEPAKOTE) 500 MG TbEC Take 1 tablet (500 mg total) by mouth 2 (two) times daily. 60 tablet 3    folic acid (FOLVITE) 1 MG tablet TAKE ONE-HALF TABLET BY MOUTH TWICE DAILY 30 tablet 5    furosemide (LASIX) 20 MG tablet TAKE 1 TABLET BY MOUTH DAILY AS NEEDED SWELLING 30 tablet 4    glycerin adult suppository Place 1 suppository rectally as needed for Constipation. 25 suppository 0    ibuprofen (ADVIL,MOTRIN) 200 MG tablet Take 200 mg by mouth every 6 (six) hours as needed.      lactulose (CHRONULAC) 10 gram/15 mL solution TAKE 15 ML BY MOUTH TWICE DAILY FOR 7 DAYS 210 mL 0    melatonin 10 mg TbSR TAKE 1 TABLET BY MOUTH EVERY NIGHT AT BEDTIME 30 tablet 11    mirtazapine (REMERON) 45 MG tablet Take 1 tablet (45 mg total) by mouth every evening. 30 tablet 3    mupirocin (BACTROBAN) 2 % ointment Takes prn      polyethylene glycol (GLYCOLAX) 17 gram/dose powder SMARTSI Capful(s) By Mouth Daily      QUEtiapine (SEROQUEL) 400 MG tablet TAKE 1/2 TABLET BY MOUTH EVERY MORNING and ONE TABLET BY MOUTH EVERY NIGHT AT BEDTIME 45 tablet 3    simethicone (MYLICON) 125 mg  Cap capsule 1 capsule after meals and at bedtime as needed      simvastatin (ZOCOR) 40 MG tablet TAKE 1 TABLET BY MOUTH EVERY EVENING 30 tablet 11    triamterene-hydrochlorothiazide 37.5-25 mg (MAXZIDE-25) 37.5-25 mg per tablet TAKE 1 TABLET BY MOUTH EVERY MORNING 90 tablet 3     No current facility-administered medications on file prior to visit.     Social History     Socioeconomic History    Marital status: Single   Tobacco Use    Smoking status: Never    Smokeless tobacco: Never   Substance and Sexual Activity    Alcohol use: No    Drug use: No    Sexual activity: Never     Social Determinants of Health     Financial Resource Strain: Low Risk     Difficulty of Paying Living Expenses: Not hard at all   Food Insecurity: No Food Insecurity    Worried About Running Out of Food in the Last Year: Never true    Ran Out of Food in the Last Year: Never true   Transportation Needs: No Transportation Needs    Lack of Transportation (Medical): No    Lack of Transportation (Non-Medical): No   Physical Activity: Inactive    Days of Exercise per Week: 0 days    Minutes of Exercise per Session: 0 min   Stress: No Stress Concern Present    Feeling of Stress : Only a little   Social Connections: Socially Isolated    Frequency of Communication with Friends and Family: Twice a week    Frequency of Social Gatherings with Friends and Family: Twice a week    Attends Confucianist Services: Never    Active Member of Clubs or Organizations: No    Attends Club or Organization Meetings: Never    Marital Status: Never    Housing Stability: Low Risk     Unable to Pay for Housing in the Last Year: No    Number of Places Lived in the Last Year: 1    Unstable Housing in the Last Year: No     Family History   Problem Relation Age of Onset    Lung cancer Mother     Hypertension Father     Prostate cancer Father     Stroke Maternal Uncle     Heart disease Maternal Grandmother        Review of Systems   Unable to obtain due to patient's  cognitive state        Objective:      Vitals:    10/04/22 1023   BP: 111/81   Pulse: 89   Temp: 98.4 °F (36.9 °C)     Body mass index is 22.42 kg/m².     Physical Exam   Constitutional: stable; NAD  Head: Normocephalic and atraumatic.   Eyes: Pupils are equal, round, and reactive to light. EOM are normal.   Neck: Normal range of motion. Neck supple.   Cardiovascular: Normal rate, regular rhythm, normal heart sounds and intact distal pulses.   No murmur heard.  Pulmonary/Chest: Effort normal and breath sounds normal. No respiratory distress. No wheezes.   Musculoskeletal: Normal range of motion. Exhibits no edema.   Neurological: At baseline  Skin: Skin is warm and dry. Capillary refill takes less than 2 seconds.         All labs, imaging and procedures performed since last visit have been personally reviewed.    Assessment / Plan:      Patient here for annual wellness exam. Health maintenance was reviewed and ordered.    Complete history and physical was completed today.  Complete and thorough medication reconciliation was performed.  Discussed risks and benefits of medications.  Advised patient on orders and health maintenance.  We discussed old records and old labs if available.  Will request any records not available through epic.  Continue current medications listed on your summary sheet.    All questions were answered. Patient had no further concerns. Advised of diagnoses and plan. Follow up as planned or return sooner if symptoms persist or worsen.     Problem List Items Addressed This Visit          Neuro    PDD (pervasive developmental disorder)    Overview     -2-3 year old intellectual level  -followed by both psychiatry and neurology  -currently depakote er 500 mg Bid, quetiapine 400 mg BID, remeron 45 mg qhs, Xanax 0.25 mg bid, Diazepam 5 mg prn  -sister/caregiver reports stable behavior         Seizure disorder    Overview     -follow by neurology  -no recent seizure activity  -on depakote             Cardiac/Vascular    Essential hypertension    Overview     -at goal today  -currently on Dyazide 37.5-25 mg and amlodipine 10 mg QD  -continue lifestyle modification with low sodium diet and exercise   -discussed hypertension disease course and importance of treating high blood pressure  -patient caregiver understood and advised of risk of untreated blood pressure.  ER precautions were given   for symptoms of hypertensive urgency and emergency.            Oncology    Leukopenia    Overview     -hx of both leukopenia and thrombocytopenia  -evaluated by hematology  -thought likely multifactorial  -plan to continue surveillance and if worsening, will pursue bone marrow bx          Other Visit Diagnoses       Influenza vaccine needed    -  Primary            Follow up in about 6 months (around 4/4/2023).    Brooke Goldberg MD

## 2022-11-05 ENCOUNTER — PATIENT MESSAGE (OUTPATIENT)
Dept: PSYCHIATRY | Facility: CLINIC | Age: 62
End: 2022-11-05
Payer: MEDICARE

## 2022-11-05 ENCOUNTER — PATIENT MESSAGE (OUTPATIENT)
Dept: GASTROENTEROLOGY | Facility: CLINIC | Age: 62
End: 2022-11-05
Payer: MEDICARE

## 2022-11-07 DIAGNOSIS — K86.9 PANCREATIC LESION: Primary | ICD-10-CM

## 2022-11-11 ENCOUNTER — PATIENT MESSAGE (OUTPATIENT)
Dept: PSYCHIATRY | Facility: CLINIC | Age: 62
End: 2022-11-11
Payer: MEDICARE

## 2022-11-14 DIAGNOSIS — R29.818 OTHER SYMPTOMS AND SIGNS INVOLVING THE NERVOUS SYSTEM: Primary | ICD-10-CM

## 2022-11-14 DIAGNOSIS — G24.01 TARDIVE DYSKINESIA: ICD-10-CM

## 2022-11-14 DIAGNOSIS — R25.9 ABNORMAL MOVEMENTS: ICD-10-CM

## 2022-11-14 RX ORDER — QUETIAPINE FUMARATE 400 MG/1
TABLET, FILM COATED ORAL
Qty: 60 TABLET | Refills: 3 | Status: SHIPPED | OUTPATIENT
Start: 2022-11-14 | End: 2022-12-15 | Stop reason: SDUPTHER

## 2022-11-19 ENCOUNTER — LAB VISIT (OUTPATIENT)
Dept: LAB | Facility: HOSPITAL | Age: 62
End: 2022-11-19
Attending: PSYCHIATRY & NEUROLOGY
Payer: MEDICARE

## 2022-11-19 DIAGNOSIS — Z79.899 ON VALPROATE THERAPY: ICD-10-CM

## 2022-11-19 LAB
ALT SERPL W/O P-5'-P-CCNC: 12 U/L (ref 10–44)
AST SERPL-CCNC: 20 U/L (ref 10–40)
VALPROATE SERPL-MCNC: 59.7 UG/ML (ref 50–100)

## 2022-11-19 PROCEDURE — 36415 COLL VENOUS BLD VENIPUNCTURE: CPT | Performed by: PSYCHIATRY & NEUROLOGY

## 2022-11-19 PROCEDURE — 84450 TRANSFERASE (AST) (SGOT): CPT | Performed by: PSYCHIATRY & NEUROLOGY

## 2022-11-19 PROCEDURE — 84460 ALANINE AMINO (ALT) (SGPT): CPT | Performed by: PSYCHIATRY & NEUROLOGY

## 2022-11-19 PROCEDURE — 80164 ASSAY DIPROPYLACETIC ACD TOT: CPT | Performed by: PSYCHIATRY & NEUROLOGY

## 2022-11-21 ENCOUNTER — PATIENT MESSAGE (OUTPATIENT)
Dept: PSYCHIATRY | Facility: CLINIC | Age: 62
End: 2022-11-21
Payer: MEDICARE

## 2022-11-23 ENCOUNTER — PATIENT MESSAGE (OUTPATIENT)
Dept: GASTROENTEROLOGY | Facility: CLINIC | Age: 62
End: 2022-11-23
Payer: MEDICARE

## 2022-11-23 ENCOUNTER — TELEPHONE (OUTPATIENT)
Dept: GASTROENTEROLOGY | Facility: CLINIC | Age: 62
End: 2022-11-23
Payer: MEDICARE

## 2022-11-23 NOTE — TELEPHONE ENCOUNTER
Phoned patient's sister and advised the MRI/MRCP has been rescheduled and provided the new time and place.  Also sent makexyz message regarding instructions and prep.  She verbalized understanding.

## 2022-11-23 NOTE — TELEPHONE ENCOUNTER
----- Message from Sienna Carrera RN sent at 11/23/2022 12:54 PM CST -----  Regarding: RE: Clarification needed - MRI's on Friday 11/25/22  Dr. Marshall,     The above pt's family states the pt has never come to [facility] for procedures.    Pt's Sister, Alanis, would like to r/s the MRI/MRCP (Friday, 11/25/22) to a location in [place].    Pt's Sister is requesting a CB from your staff to reschedule procedure in the [place] area.  Alanis reports difficulty connecting with your office staff in the past and prays she receives a return phone call soon. She can be reached at: [phone]     Regards,     ROSA Crouch, RN  [facility]   [facility]  [phone]     ----- Message -----  From: Cherise Marshall MD  Sent: 11/23/2022  11:11 AM CST  To: Leroy Moser MD, Sienna Carrera RN, #  Subject: RE: Clarification needed - MRI's on Friday 1#    I only ordered an MRCP. Please contact the physician whom ordered the MRI and MRA of the brain. The MRCP is needed.  ----- Message -----  From: Sienna Carrera RN  Sent: 11/22/2022   4:34 PM CST  To: Leroy Moser MD, Korin Fowler MD, #  Subject: Clarification needed - MRI's on Friday 11/25#    Good afternoon,     The above patient is scheduled for 3-MRI's on Friday 11/25/22:    MRI- brain W/WO Contrast  MRI/MRA brain  MRI MRCP     Pt's sister - Alanis - states MRI and MRA of brain was completed at [facility] in [place], [place] on 6/17/22.   Do the two Brain scans scheduled on Friday 11/25/22 - need to be repeated?     Sister is aware the MRCP scheduled on Friday 11/25/22 is necessary.     Thank you in advance for your anticipated cooperation.     Regards,  -KB    Sienna Carrera, LORELEIN, RN  [facility]   [facility]  [phone]

## 2022-12-13 ENCOUNTER — TELEPHONE (OUTPATIENT)
Dept: CARDIOLOGY | Facility: CLINIC | Age: 62
End: 2022-12-13
Payer: MEDICARE

## 2022-12-13 DIAGNOSIS — I10 ESSENTIAL HYPERTENSION: Primary | ICD-10-CM

## 2022-12-13 NOTE — TELEPHONE ENCOUNTER
Contacted pt to reschedule appt. Appt is set for 01/05/2023  Pt stated understanding with no questions or concerns      ----- Message from Martina Antonio sent at 12/13/2022 10:51 AM CST -----  Contact: Alanis/sister  Alanis the sister is calling in regards to getting the EKG appt reschedule.Please call back at [phone]        Thanks  RAMONA

## 2022-12-15 ENCOUNTER — PATIENT OUTREACH (OUTPATIENT)
Dept: ADMINISTRATIVE | Facility: HOSPITAL | Age: 62
End: 2022-12-15
Payer: MEDICARE

## 2022-12-15 ENCOUNTER — OFFICE VISIT (OUTPATIENT)
Dept: PSYCHIATRY | Facility: CLINIC | Age: 62
End: 2022-12-15
Payer: MEDICARE

## 2022-12-15 DIAGNOSIS — R45.1 RESTLESSNESS AND AGITATION: ICD-10-CM

## 2022-12-15 DIAGNOSIS — Z79.899 ON VALPROIC ACID THERAPY: Primary | ICD-10-CM

## 2022-12-15 DIAGNOSIS — F79 INTELLECTUAL DISABILITY: ICD-10-CM

## 2022-12-15 DIAGNOSIS — G47.00 INSOMNIA, UNSPECIFIED TYPE: ICD-10-CM

## 2022-12-15 PROCEDURE — 99214 OFFICE O/P EST MOD 30 MIN: CPT | Mod: 95,,, | Performed by: PSYCHIATRY & NEUROLOGY

## 2022-12-15 PROCEDURE — 99214 PR OFFICE/OUTPT VISIT, EST, LEVL IV, 30-39 MIN: ICD-10-PCS | Mod: 95,,, | Performed by: PSYCHIATRY & NEUROLOGY

## 2022-12-15 RX ORDER — MIRTAZAPINE 45 MG/1
45 TABLET, FILM COATED ORAL NIGHTLY
Qty: 30 TABLET | Refills: 3 | Status: SHIPPED | OUTPATIENT
Start: 2022-12-15 | End: 2023-03-31 | Stop reason: SDUPTHER

## 2022-12-15 RX ORDER — ALPRAZOLAM 0.25 MG/1
0.25 TABLET ORAL 2 TIMES DAILY
Qty: 60 TABLET | Refills: 3 | Status: SHIPPED | OUTPATIENT
Start: 2022-12-15 | End: 2023-04-11

## 2022-12-15 RX ORDER — DIVALPROEX SODIUM 500 MG/1
500 TABLET, DELAYED RELEASE ORAL 2 TIMES DAILY
Qty: 60 TABLET | Refills: 3 | Status: SHIPPED | OUTPATIENT
Start: 2022-12-15 | End: 2023-03-31 | Stop reason: SDUPTHER

## 2022-12-15 RX ORDER — QUETIAPINE FUMARATE 400 MG/1
TABLET, FILM COATED ORAL
Qty: 60 TABLET | Refills: 3 | Status: SHIPPED | OUTPATIENT
Start: 2022-12-15 | End: 2023-02-22
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
## 2022-12-15 NOTE — PROGRESS NOTES
Mammogram Report: Per chart review pt had [facility] US of breast on 10/19/22 and mammogram.  Mammogram results are on US report, waiting clarification from management on whether actual mammo report is needed to be able to update HM.

## 2022-12-16 RX ORDER — ALPRAZOLAM 1 MG/1
1 TABLET ORAL ONCE
Qty: 1 TABLET | Refills: 0 | Status: SHIPPED | OUTPATIENT
Start: 2022-12-16 | End: 2022-12-16

## 2022-12-17 ENCOUNTER — LAB VISIT (OUTPATIENT)
Dept: LAB | Facility: HOSPITAL | Age: 62
End: 2022-12-17
Attending: INTERNAL MEDICINE
Payer: MEDICARE

## 2022-12-17 DIAGNOSIS — D72.819 LEUKOPENIA, UNSPECIFIED TYPE: ICD-10-CM

## 2022-12-17 LAB
ALBUMIN SERPL BCP-MCNC: 3.2 G/DL (ref 3.5–5.2)
ALP SERPL-CCNC: 65 U/L (ref 55–135)
ALT SERPL W/O P-5'-P-CCNC: 17 U/L (ref 10–44)
ANION GAP SERPL CALC-SCNC: 11 MMOL/L (ref 8–16)
AST SERPL-CCNC: 21 U/L (ref 10–40)
BASOPHILS # BLD AUTO: 0.03 K/UL (ref 0–0.2)
BASOPHILS NFR BLD: 1.4 % (ref 0–1.9)
BILIRUB SERPL-MCNC: 0.3 MG/DL (ref 0.1–1)
BUN SERPL-MCNC: 20 MG/DL (ref 8–23)
CALCIUM SERPL-MCNC: 9.5 MG/DL (ref 8.7–10.5)
CHLORIDE SERPL-SCNC: 103 MMOL/L (ref 95–110)
CO2 SERPL-SCNC: 28 MMOL/L (ref 23–29)
CREAT SERPL-MCNC: 0.8 MG/DL (ref 0.5–1.4)
DIFFERENTIAL METHOD: ABNORMAL
EOSINOPHIL # BLD AUTO: 0 K/UL (ref 0–0.5)
EOSINOPHIL NFR BLD: 1.9 % (ref 0–8)
ERYTHROCYTE [DISTWIDTH] IN BLOOD BY AUTOMATED COUNT: 13.2 % (ref 11.5–14.5)
EST. GFR  (NO RACE VARIABLE): >60 ML/MIN/1.73 M^2
GLUCOSE SERPL-MCNC: 122 MG/DL (ref 70–110)
HCT VFR BLD AUTO: 43 % (ref 37–48.5)
HGB BLD-MCNC: 14.7 G/DL (ref 12–16)
IMM GRANULOCYTES # BLD AUTO: 0.04 K/UL (ref 0–0.04)
IMM GRANULOCYTES NFR BLD AUTO: 1.9 % (ref 0–0.5)
LYMPHOCYTES # BLD AUTO: 1 K/UL (ref 1–4.8)
LYMPHOCYTES NFR BLD: 45.8 % (ref 18–48)
MCH RBC QN AUTO: 30.6 PG (ref 27–31)
MCHC RBC AUTO-ENTMCNC: 34.2 G/DL (ref 32–36)
MCV RBC AUTO: 89 FL (ref 82–98)
MONOCYTES # BLD AUTO: 0.3 K/UL (ref 0.3–1)
MONOCYTES NFR BLD: 15.3 % (ref 4–15)
NEUTROPHILS # BLD AUTO: 0.7 K/UL (ref 1.8–7.7)
NEUTROPHILS NFR BLD: 33.7 % (ref 38–73)
NRBC BLD-RTO: 0 /100 WBC
PLATELET # BLD AUTO: 126 K/UL (ref 150–450)
PMV BLD AUTO: 10.2 FL (ref 9.2–12.9)
POTASSIUM SERPL-SCNC: 3.9 MMOL/L (ref 3.5–5.1)
PROT SERPL-MCNC: 6.1 G/DL (ref 6–8.4)
RBC # BLD AUTO: 4.81 M/UL (ref 4–5.4)
SODIUM SERPL-SCNC: 142 MMOL/L (ref 136–145)
WBC # BLD AUTO: 2.16 K/UL (ref 3.9–12.7)

## 2022-12-17 PROCEDURE — 36415 COLL VENOUS BLD VENIPUNCTURE: CPT | Performed by: INTERNAL MEDICINE

## 2022-12-17 PROCEDURE — 80053 COMPREHEN METABOLIC PANEL: CPT | Performed by: INTERNAL MEDICINE

## 2022-12-17 PROCEDURE — 85025 COMPLETE CBC W/AUTO DIFF WBC: CPT | Performed by: INTERNAL MEDICINE

## 2022-12-19 ENCOUNTER — PATIENT MESSAGE (OUTPATIENT)
Dept: GASTROENTEROLOGY | Facility: CLINIC | Age: 62
End: 2022-12-19
Payer: MEDICARE

## 2022-12-19 ENCOUNTER — OFFICE VISIT (OUTPATIENT)
Dept: HEMATOLOGY/ONCOLOGY | Facility: CLINIC | Age: 62
End: 2022-12-19
Payer: MEDICARE

## 2022-12-19 ENCOUNTER — TELEPHONE (OUTPATIENT)
Dept: HEMATOLOGY/ONCOLOGY | Facility: CLINIC | Age: 62
End: 2022-12-19
Payer: MEDICARE

## 2022-12-19 DIAGNOSIS — D72.819 LEUKOPENIA, UNSPECIFIED TYPE: ICD-10-CM

## 2022-12-19 DIAGNOSIS — D69.6 THROMBOCYTOPENIA: Primary | ICD-10-CM

## 2022-12-19 PROCEDURE — 99214 OFFICE O/P EST MOD 30 MIN: CPT | Mod: 95,,, | Performed by: INTERNAL MEDICINE

## 2022-12-19 PROCEDURE — 99214 PR OFFICE/OUTPT VISIT, EST, LEVL IV, 30-39 MIN: ICD-10-PCS | Mod: 95,,, | Performed by: INTERNAL MEDICINE

## 2022-12-19 RX ORDER — DOCUSATE SODIUM 100 MG/1
100 CAPSULE, LIQUID FILLED ORAL DAILY
Qty: 90 CAPSULE | Refills: 3 | Status: SHIPPED | OUTPATIENT
Start: 2022-12-19 | End: 2023-03-31 | Stop reason: SDUPTHER

## 2022-12-19 RX ORDER — MAGNESIUM GLUCONATE 27 MG(500)
1 TABLET ORAL NIGHTLY
Qty: 30 TABLET | Refills: 11 | Status: SHIPPED | OUTPATIENT
Start: 2022-12-19 | End: 2024-02-27

## 2022-12-19 RX ORDER — DICLOFENAC SODIUM 10 MG/G
GEL TOPICAL
Qty: 200 G | Refills: 1 | Status: SHIPPED | OUTPATIENT
Start: 2022-12-19 | End: 2023-10-31 | Stop reason: SDUPTHER

## 2022-12-19 NOTE — TELEPHONE ENCOUNTER
----- Message from Fior Alvarado sent at 12/19/2022 11:40 AM CST -----  Contact: Alanis/ Sister  Alanis is calling to speak to the nurse regarding  trying to see if the appointment that's scheduled for today can be changed to a virtual appointment. Please give her a call back at [phone]    Thanks  LJ

## 2022-12-19 NOTE — TELEPHONE ENCOUNTER
----- Message from Corinne Garcia sent at 12/19/2022  1:13 PM CST -----  Pt's sister Alanis would like the nurse to call her back regarding medication. Call back number is [phone]. Thx. EL

## 2022-12-19 NOTE — TELEPHONE ENCOUNTER
Call returned to patient's sister Alanis. Appointment changed to virtual per request. All understanding verbalized.

## 2022-12-19 NOTE — PROGRESS NOTES
Subjective:      DATE OF VISIT: 22     ?The patient location is: home  The chief complaint leading to consultation is:  Follow-up cytopenias chronic    Visit type: audiovisual    Face to Face time with patient:  10 minutes  Twenty-five minutes of total time spent on the encounter, which includes face to face time and non-face to face time preparing to see the patient (eg, review of tests), Obtaining and/or reviewing separately obtained history, Documenting clinical information in the electronic or other health record, Independently interpreting results (not separately reported) and communicating results to the patient/family/caregiver, or Care coordination (not separately reported).         Each patient to whom he or she provides medical services by telemedicine is:  (1) informed of the relationship between the physician and patient and the respective role of any other health care provider with respect to management of the patient; and (2) notified that he or she may decline to receive medical services by telemedicine and may withdraw from such care at any time.    Notes:     Patient ID:?Juan Pablo Bolden is a 62 y.o. female.?? MR#: [id number]   ?   REFERRING PROVIDER: No referring provider defined for this encounter.     ? Primary Care Providers:  Brooke Goldberg MD, MD (General)     CHIEF COMPLAINT: ?  Chronic leukopenia, thrombocytopenia?   ?   HPI    Ms. Bolden follows in virtual visit for chronic leukopenia and thrombocytopenia stable on repeat labs.  Since our last visit she did have COVID and pneumonia recovered well outpatient.  No unintentional weight loss.  Review of systems negative aside from foot pain.      Review of Systems    ?   A comprehensive 14-point review of systems was reviewed with patient and was negative other than as specified above.   ?      Objective:      Physical Exam      ?   There were no vitals filed for this visit.     ?   ECO   General appearance:   Agitation, limited  interaction, developmental delay  Head, eyes, ears, nose, and throat: moist mucous membranes.   Respiratory:  Normal work of breathing  Abdomen: nontender, nondistended.   Extremities: Warm, without edema.   Skin: No rashes, ecchymoses or petechial lesion.   Psychiatric:  Normal mood and affect.    ?   Laboratory:  ?   No visits with results within 1 Day(s) from this visit.   Latest known visit with results is:   Lab Visit on 12/17/2022   Component Date Value Ref Range Status    WBC 12/17/2022 2.16 (L)  3.90 - 12.70 K/uL Final    RBC 12/17/2022 4.81  4.00 - 5.40 M/uL Final    Hemoglobin 12/17/2022 14.7  12.0 - 16.0 g/dL Final    Hematocrit 12/17/2022 43.0  37.0 - 48.5 % Final    MCV 12/17/2022 89  82 - 98 fL Final    MCH 12/17/2022 30.6  27.0 - 31.0 pg Final    MCHC 12/17/2022 34.2  32.0 - 36.0 g/dL Final    RDW 12/17/2022 13.2  11.5 - 14.5 % Final    Platelets 12/17/2022 126 (L)  150 - 450 K/uL Final    MPV 12/17/2022 10.2  9.2 - 12.9 fL Final    Immature Granulocytes 12/17/2022 1.9 (H)  0.0 - 0.5 % Final    Gran # (ANC) 12/17/2022 0.7 (L)  1.8 - 7.7 K/uL Final    Immature Grans (Abs) 12/17/2022 0.04  0.00 - 0.04 K/uL Final    Lymph # 12/17/2022 1.0  1.0 - 4.8 K/uL Final    Mono # 12/17/2022 0.3  0.3 - 1.0 K/uL Final    Eos # 12/17/2022 0.0  0.0 - 0.5 K/uL Final    Baso # 12/17/2022 0.03  0.00 - 0.20 K/uL Final    nRBC 12/17/2022 0  0 /100 WBC Final    Gran % 12/17/2022 33.7 (L)  38.0 - 73.0 % Final    Lymph % 12/17/2022 45.8  18.0 - 48.0 % Final    Mono % 12/17/2022 15.3 (H)  4.0 - 15.0 % Final    Eosinophil % 12/17/2022 1.9  0.0 - 8.0 % Final    Basophil % 12/17/2022 1.4  0.0 - 1.9 % Final    Differential Method 12/17/2022 Automated   Final    Sodium 12/17/2022 142  136 - 145 mmol/L Final    Potassium 12/17/2022 3.9  3.5 - 5.1 mmol/L Final    Chloride 12/17/2022 103  95 - 110 mmol/L Final    CO2 12/17/2022 28  23 - 29 mmol/L Final    Glucose 12/17/2022 122 (H)  70 - 110 mg/dL Final    BUN 12/17/2022 20  8 - 23  mg/dL Final    Creatinine 12/17/2022 0.8  0.5 - 1.4 mg/dL Final    Calcium 12/17/2022 9.5  8.7 - 10.5 mg/dL Final    Total Protein 12/17/2022 6.1  6.0 - 8.4 g/dL Final    Albumin 12/17/2022 3.2 (L)  3.5 - 5.2 g/dL Final    Total Bilirubin 12/17/2022 0.3  0.1 - 1.0 mg/dL Final    Alkaline Phosphatase 12/17/2022 65  55 - 135 U/L Final    AST 12/17/2022 21  10 - 40 U/L Final    ALT 12/17/2022 17  10 - 44 U/L Final    Anion Gap 12/17/2022 11  8 - 16 mmol/L Final    eGFR 12/17/2022 >60  >60 mL/min/1.73 m^2 Final          ?   Assessment/Plan:   Thrombocytopenia    Leukopenia, unspecified type         1. Thrombocytopenia    2. Leukopenia, unspecified type            Plan:     #  leukopenia:  Labs available since October 2020 with relatively stable leukopenia, mild lymphocyte predominance.  Flow cytometry previously performed within normal limits.  I did discuss differential diagnosis including medication effect, constitutional / benign but cannot exclude bone marrow disorder without biopsy although no other clinical concerns/constitutional symptom and may not be tolerated by patient given developmental delay.   HIV and hepatitis-C negative.  Peripheral blood smear review without notable abnormality aside from cytopenia.   Recommend continued surveillance and consideration of further testing/bone marrow biopsy upon significant lab change or clinical concern as patient tolerance allows.     #   Thrombocytopenia:  Sister notes in her 20 use having more severe thrombocytopenia episode hospitalized without recurrent severe thrombocytopenia.  Low normal platelet count relatively stable and most recent labs with improvement to 170s K.  No clinical concern for bleeding.  Continue surveillance.  Differential diagnosis includes immune mediated, medication effect, less likely bone marrow neoplasia.  Recommend continue monitoring.  I reviewed signs and symptoms of low platelet count and recommended prompt follow-up if evidence of  bleeding or petechial lesion.    # Pancreatic lesion/ hepatic lesion: Recommend follow-up with Surgical Oncology/ GI.  Per notes/ caregiver plan for surveillance at this time possible IPMN.    Repeat labs December 2022 with stable cytopenias leukopenia and thrombocytopenia.  She did have COVID and pneumonia recovered well as outpatient.  Would monitor for any lab decline or clinical decline/recurrent infections.  Follow-up recommended with Surgical Oncology/GI in imaging per above.      Follow-Up:   Route Chart for Scheduling    Med Onc Chart Routing      Follow up with physician 6 months.   Follow up with [facility]    Infusion scheduling note    Injection scheduling note    Labs CBC and CMP   Lab interval: every 6 months  on saturday at [facility]   Imaging    Pharmacy appointment    Other referrals

## 2022-12-23 ENCOUNTER — PATIENT MESSAGE (OUTPATIENT)
Dept: HEMATOLOGY/ONCOLOGY | Facility: CLINIC | Age: 62
End: 2022-12-23
Payer: MEDICARE

## 2022-12-29 ENCOUNTER — TELEPHONE (OUTPATIENT)
Dept: FAMILY MEDICINE | Facility: CLINIC | Age: 62
End: 2022-12-29

## 2022-12-29 ENCOUNTER — TELEPHONE (OUTPATIENT)
Dept: FAMILY MEDICINE | Facility: CLINIC | Age: 62
End: 2022-12-29
Payer: MEDICARE

## 2022-12-29 NOTE — TELEPHONE ENCOUNTER
----- Message from Giovana Wu sent at 12/29/2022  3:11 PM CST -----  Contact: Alanis/ Sister  Patients sister is calling to speak with the nurse regarding questions/concerns regarding her leg and falls/ potential falls. Please give patients sister a call back at [phone]    Thanks

## 2022-12-29 NOTE — TELEPHONE ENCOUNTER
Pt sister asked for a handicap tag r/t pt not being able to handle far walking distances. Please advise.

## 2022-12-29 NOTE — TELEPHONE ENCOUNTER
----- Message from Mary Cornell sent at 12/29/2022  3:48 PM CST -----  Contact: Alanis/sister  .Type:  Patient Returning Call    Who Called: Alanis  Who Left Message for Patient: Nurse  Does the patient know what this is regarding?:No  Would the patient rather a call back or a response via [facility]? Call back  Best Call Back Number:.[phone]     Additional Information: Patient requesting call back

## 2022-12-30 ENCOUNTER — PATIENT MESSAGE (OUTPATIENT)
Dept: PRIMARY CARE CLINIC | Facility: CLINIC | Age: 62
End: 2022-12-30
Payer: MEDICARE

## 2022-12-30 ENCOUNTER — TELEPHONE (OUTPATIENT)
Dept: FAMILY MEDICINE | Facility: CLINIC | Age: 62
End: 2022-12-30
Payer: MEDICARE

## 2022-12-30 NOTE — TELEPHONE ENCOUNTER
----- Message from Ekta Horton sent at 12/30/2022  2:19 PM CST -----  Pt's sister is requesting a call back in regards to pt foot. Caller states that she has let a previous message and a message on the portal and is concern about pt foot being infected. Caller can be reached at  [phone] ([email or web address])

## 2022-12-30 NOTE — TELEPHONE ENCOUNTER
Spoke with patient's sister, Juan Pablo. Scheduled patient to come in tomorrow for urgent care clinic for further evaluation.

## 2022-12-31 ENCOUNTER — PATIENT MESSAGE (OUTPATIENT)
Dept: FAMILY MEDICINE | Facility: CLINIC | Age: 62
End: 2022-12-31
Payer: MEDICARE

## 2023-01-03 RX ORDER — DIAZEPAM 5 MG/1
5 TABLET ORAL ONCE
Qty: 1 TABLET | Refills: 0 | Status: SHIPPED | OUTPATIENT
Start: 2023-01-03 | End: 2023-01-17 | Stop reason: SDUPTHER

## 2023-01-05 ENCOUNTER — OFFICE VISIT (OUTPATIENT)
Dept: CARDIOLOGY | Facility: CLINIC | Age: 63
End: 2023-01-05
Payer: MEDICARE

## 2023-01-05 ENCOUNTER — HOSPITAL ENCOUNTER (OUTPATIENT)
Dept: CARDIOLOGY | Facility: HOSPITAL | Age: 63
Discharge: HOME | End: 2023-01-05
Attending: INTERNAL MEDICINE
Payer: MEDICARE

## 2023-01-05 VITALS
BODY MASS INDEX: 21.29 KG/M2 | WEIGHT: 105.63 LBS | HEIGHT: 59 IN | HEART RATE: 90 BPM | DIASTOLIC BLOOD PRESSURE: 60 MMHG | OXYGEN SATURATION: 97 % | SYSTOLIC BLOOD PRESSURE: 110 MMHG

## 2023-01-05 DIAGNOSIS — I25.10 CORONARY ARTERY CALCIFICATION: ICD-10-CM

## 2023-01-05 DIAGNOSIS — I25.84 CORONARY ARTERY CALCIFICATION: ICD-10-CM

## 2023-01-05 DIAGNOSIS — I10 ESSENTIAL HYPERTENSION: Primary | ICD-10-CM

## 2023-01-05 DIAGNOSIS — I70.0 CALCIFICATION OF AORTA: ICD-10-CM

## 2023-01-05 DIAGNOSIS — I10 ESSENTIAL HYPERTENSION: ICD-10-CM

## 2023-01-05 PROCEDURE — 99999 PR PBB SHADOW E&M-EST. PATIENT-LVL IV: CPT | Mod: PBBFAC,,, | Performed by: INTERNAL MEDICINE

## 2023-01-05 PROCEDURE — 93010 EKG 12-LEAD: ICD-10-PCS | Mod: ,,, | Performed by: INTERNAL MEDICINE

## 2023-01-05 PROCEDURE — 93010 ELECTROCARDIOGRAM REPORT: CPT | Mod: ,,, | Performed by: INTERNAL MEDICINE

## 2023-01-05 PROCEDURE — 99214 OFFICE O/P EST MOD 30 MIN: CPT | Mod: S$PBB,,, | Performed by: INTERNAL MEDICINE

## 2023-01-05 PROCEDURE — 99999 PR PBB SHADOW E&M-EST. PATIENT-LVL IV: ICD-10-PCS | Mod: PBBFAC,,, | Performed by: INTERNAL MEDICINE

## 2023-01-05 PROCEDURE — 99214 OFFICE O/P EST MOD 30 MIN: CPT | Mod: PBBFAC | Performed by: INTERNAL MEDICINE

## 2023-01-05 PROCEDURE — 93005 ELECTROCARDIOGRAM TRACING: CPT

## 2023-01-05 PROCEDURE — 99214 PR OFFICE/OUTPT VISIT, EST, LEVL IV, 30-39 MIN: ICD-10-PCS | Mod: S$PBB,,, | Performed by: INTERNAL MEDICINE

## 2023-01-05 NOTE — PROGRESS NOTES
Subjective:   Patient ID:  Juan Pablo Bolden is a 62 y.o. female who presents for follow-up of No chief complaint on file.  Hypertension  This is a chronic problem. The current episode started more than 1 year ago. The problem has been gradually improving since onset. The problem is controlled. Pertinent negatives include no chest pain, palpitations or shortness of breath. Past treatments include calcium channel blockers and diuretics. The current treatment provides moderate improvement. Compliance problems include psychosocial issues.    Hyperlipidemia  This is a chronic problem. The current episode started more than 1 year ago. The problem is controlled. Pertinent negatives include no chest pain or shortness of breath. Current antihyperlipidemic treatment includes statins. The current treatment provides moderate improvement of lipids. There are no compliance problems     Clinically the patient is stable.  Recent cardiac echo shows normal LV function.  There has been no heart rate blood pressure changes.  The sister was present today and patient is stable.  Otherwise doing well this time.  Recent colonoscopy stable and no polyps.     This finds the patient agitated and probably not getting medications properly and will happy go back to the facility to improve overall compliance is of medications. The sister was present today and she is in agreement.       01/05/2023: Overall patient is doing fairly well better compliance with medications stable heart rate blood pressure overall feeling improved no significant edema today otherwise stable current medications.  Hypertension hyperlipidemia are stable.      Review of Systems   Constitutional: Negative for chills, diaphoresis, night sweats, weight gain and weight loss.   HENT:  Negative for congestion, hoarse voice, sore throat and stridor.    Eyes:  Negative for double vision and pain.   Cardiovascular:  Negative for chest pain, claudication, cyanosis, dyspnea on exertion,  irregular heartbeat, leg swelling, near-syncope, orthopnea, palpitations, paroxysmal nocturnal dyspnea and syncope.   Respiratory:  Negative for cough, hemoptysis, shortness of breath, sleep disturbances due to breathing, snoring, sputum production and wheezing.    Endocrine: Negative for cold intolerance, heat intolerance and polydipsia.   Hematologic/Lymphatic: Negative for bleeding problem. Does not bruise/bleed easily.   Skin:  Negative for color change, dry skin and rash.   Musculoskeletal:  Negative for joint swelling and muscle cramps.   Gastrointestinal:  Negative for bloating, abdominal pain, constipation, diarrhea, dysphagia, melena, nausea and vomiting.   Genitourinary:  Negative for flank pain and urgency.   Neurological:  Negative for dizziness, focal weakness, headaches, light-headedness, loss of balance, seizures and weakness.   Psychiatric/Behavioral:  Negative for altered mental status and memory loss. The patient is not nervous/anxious.    Family History   Problem Relation Age of Onset    Lung cancer Mother     Hypertension Father     Prostate cancer Father     Stroke Maternal Uncle     Heart disease Maternal Grandmother      Past Medical History:   Diagnosis Date    Bipolar 1 disorder     Chronic constipation     Galactorrhea     Growth retardation     Profound mental retardation    High cholesterol     Hyperlipidemia     Hypertension     Leukopenia     Neuroleptic-induced tardive dyskinesia     Schizoaffective disorder     Stereotypic movement disorder      Social History     Socioeconomic History    Marital status: Single   Tobacco Use    Smoking status: Never    Smokeless tobacco: Never   Substance and Sexual Activity    Alcohol use: No    Drug use: No    Sexual activity: Never     Social Determinants of Health     Financial Resource Strain: Low Risk     Difficulty of Paying Living Expenses: Not hard at all   Food Insecurity: No Food Insecurity    Worried About Running Out of Food in the Last  Year: Never true    Ran Out of Food in the Last Year: Never true   Transportation Needs: No Transportation Needs    Lack of Transportation (Medical): No    Lack of Transportation (Non-Medical): No   Physical Activity: Inactive    Days of Exercise per Week: 0 days    Minutes of Exercise per Session: 0 min   Stress: Unknown    Feeling of Stress : Patient refused   Social Connections: Unknown    Frequency of Communication with Friends and Family: Three times a week    Frequency of Social Gatherings with Friends and Family: More than three times a week    Attends Restorationism Services: Never    Active Member of Clubs or Organizations: No    Attends Club or Organization Meetings: Patient refused    Marital Status: Patient refused   Housing Stability: Low Risk     Unable to Pay for Housing in the Last Year: No    Number of Places Lived in the Last Year: 1    Unstable Housing in the Last Year: No     Current Outpatient Medications on File Prior to Visit   Medication Sig Dispense Refill    ALPRAZolam (XANAX) 0.25 MG tablet Take 1 tablet (0.25 mg total) by mouth 2 (two) times daily. 60 tablet 3    amLODIPine (NORVASC) 10 MG tablet TAKE 1 TABLET BY MOUTH ONCE DAILY 90 tablet 3    cetirizine (ZYRTEC) 10 MG tablet Take 10 mg by mouth daily as needed for Allergies.      diazePAM (VALIUM) 5 MG tablet Take 1 tablet (5 mg total) by mouth once. for 1 dose 1 tablet 0    diclofenac sodium (VOLTAREN) 1 % Gel APPLY TWO GRAMS TO THE AFFECTED AREA FOUR TIMES DAILY AS NEEDED FOR PAIN 200 g 1    diphenhydrAMINE (BENADRYL) 25 mg capsule Take 25 mg by mouth nightly as needed for Allergies.      divalproex (DEPAKOTE) 500 MG TbEC Take 1 tablet (500 mg total) by mouth 2 (two) times daily. 60 tablet 3    docusate sodium (COLACE) 100 MG capsule Take 1 capsule (100 mg total) by mouth once daily. 90 capsule 3    folic acid (FOLVITE) 1 MG tablet TAKE ONE-HALF TABLET BY MOUTH TWICE DAILY 30 tablet 5    furosemide (LASIX) 20 MG tablet TAKE 1 TABLET BY  MOUTH DAILY AS NEEDED FOR SWELLING 30 tablet 11    glycerin adult suppository Place 1 suppository rectally as needed for Constipation. 25 suppository 0    ibuprofen (ADVIL,MOTRIN) 200 MG tablet Take 200 mg by mouth every 6 (six) hours as needed.      lactulose (CHRONULAC) 10 gram/15 mL solution TAKE 15 ML BY MOUTH TWICE DAILY FOR 7 DAYS 210 mL 0    melatonin 10 mg TbSR Take 1 tablet by mouth every evening. 30 tablet 11    mirtazapine (REMERON) 45 MG tablet Take 1 tablet (45 mg total) by mouth every evening. 30 tablet 3    mupirocin (BACTROBAN) 2 % ointment Takes prn      polyethylene glycol (GLYCOLAX) 17 gram/dose powder SMARTSI Capful(s) By Mouth Daily      QUEtiapine (SEROQUEL) 400 MG tablet TAKE 1/2 TABLET BY MOUTH EVERY MORNING and 1 AND 1/2 TABLET EVERY NIGHT AT BEDTIME. 60 tablet 3    simethicone (MYLICON) 125 mg Cap capsule 1 capsule after meals and at bedtime as needed      simvastatin (ZOCOR) 40 MG tablet TAKE 1 TABLET BY MOUTH EVERY EVENING 30 tablet 11    triamterene-hydrochlorothiazide 37.5-25 mg (MAXZIDE-25) 37.5-25 mg per tablet TAKE 1 TABLET BY MOUTH EVERY MORNING 90 tablet 3     No current facility-administered medications on file prior to visit.     Review of patient's allergies indicates:  No Known Allergies    Objective:     Physical Exam  Eyes:      Pupils: Pupils are equal, round, and reactive to light.   Neck:      Trachea: No tracheal deviation.   Cardiovascular:      Rate and Rhythm: Normal rate and regular rhythm.      Pulses: Intact distal pulses.           Carotid pulses are 2+ on the right side and 2+ on the left side.       Radial pulses are 2+ on the right side and 2+ on the left side.        Femoral pulses are 2+ on the right side and 2+ on the left side.       Popliteal pulses are 2+ on the right side and 2+ on the left side.        Dorsalis pedis pulses are 2+ on the right side and 2+ on the left side.        Posterior tibial pulses are 2+ on the right side and 2+ on the left  side.      Heart sounds: Normal heart sounds. No murmur heard.    No friction rub. No gallop.   Pulmonary:      Effort: Pulmonary effort is normal. No respiratory distress.      Breath sounds: Normal breath sounds. No stridor. No wheezing or rales.   Chest:      Chest wall: No tenderness.   Abdominal:      General: There is no distension.      Tenderness: There is no abdominal tenderness. There is no rebound.   Musculoskeletal:         General: No tenderness.      Cervical back: Normal range of motion.   Skin:     General: Skin is warm and dry.   Neurological:      Mental Status: She is alert and oriented to person, place, and time.       Cardiac echo from 4/02/21:   Concentric remodeling and normal systolic function.  With normal right ventricular systolic function.  The estimated ejection fraction is 60%.  Normal left ventricular diastolic function.  Normal central venous pressure (3 mmHg).  The estimated PA systolic pressure is 17 mmHg.   EKG NSR no changes  Assessment:     1. Essential hypertension    2. Coronary artery calcification    3. Calcification of aorta        Plan:     Essential hypertension    Coronary artery calcification    Calcification of aorta      Impression 1 hypertension stable on current medications and can not continues on current medications including amlodipine   2 peripheral edema stable continue doses of diuretics as needed.  Otherwise doing well this time no acute change follow-up evaluation in 1 year, clinically otherwise stable this time continues with medical management.

## 2023-01-15 ENCOUNTER — PATIENT MESSAGE (OUTPATIENT)
Dept: GASTROENTEROLOGY | Facility: CLINIC | Age: 63
End: 2023-01-15
Payer: MEDICARE

## 2023-01-25 ENCOUNTER — PATIENT MESSAGE (OUTPATIENT)
Dept: ADMINISTRATIVE | Facility: HOSPITAL | Age: 63
End: 2023-01-25
Payer: MEDICARE

## 2023-02-10 ENCOUNTER — PATIENT MESSAGE (OUTPATIENT)
Dept: GASTROENTEROLOGY | Facility: CLINIC | Age: 63
End: 2023-02-10
Payer: MEDICARE

## 2023-02-15 ENCOUNTER — PATIENT MESSAGE (OUTPATIENT)
Dept: GASTROENTEROLOGY | Facility: CLINIC | Age: 63
End: 2023-02-15
Payer: MEDICARE

## 2023-02-15 ENCOUNTER — TELEPHONE (OUTPATIENT)
Dept: GASTROENTEROLOGY | Facility: CLINIC | Age: 63
End: 2023-02-15
Payer: MEDICARE

## 2023-02-15 RX ORDER — DIAZEPAM 5 MG/1
5 TABLET ORAL ONCE
Qty: 1 TABLET | Refills: 0 | Status: SHIPPED | OUTPATIENT
Start: 2023-02-15 | End: 2023-04-11

## 2023-02-15 RX ORDER — LORAZEPAM 2 MG/1
5 TABLET ORAL ONCE
Qty: 3 TABLET | Refills: 0 | Status: SHIPPED | OUTPATIENT
Start: 2023-02-15 | End: 2023-04-11

## 2023-02-15 NOTE — TELEPHONE ENCOUNTER
----- Message from Cherise Marshall MD sent at 2/15/2023  3:43 PM CST -----    Please schedule her MRCP next week on Monday ort Tuesday, can go to [place] location.    -KETAN

## 2023-02-15 NOTE — TELEPHONE ENCOUNTER
Pt's sister Alanis Bolden was called and prescription sent for Ativan 5 mg po once. Will need MRI next week scheduled.     =-S

## 2023-02-16 ENCOUNTER — PATIENT OUTREACH (OUTPATIENT)
Dept: ADMINISTRATIVE | Facility: HOSPITAL | Age: 63
End: 2023-02-16
Payer: MEDICARE

## 2023-02-16 NOTE — TELEPHONE ENCOUNTER
Phoned patient and informed her sister Alanis about the MRI/MRCP appointment date/time/place.  She verbalized understanding and requested the Valium that is usually prescribed before procedure.  Advised her that it has been sent in.

## 2023-02-16 NOTE — PROGRESS NOTES
Working Mammogram Report:     Pt overdue for mammogram. Called to offer scheduling.  Hyperlinked beast ultrasound sone 10.4.22

## 2023-03-13 ENCOUNTER — HOSPITAL ENCOUNTER (OUTPATIENT)
Dept: RADIOLOGY | Facility: HOSPITAL | Age: 63
Discharge: HOME | End: 2023-03-13
Attending: INTERNAL MEDICINE
Payer: MEDICARE

## 2023-03-13 DIAGNOSIS — K86.9 PANCREATIC LESION: ICD-10-CM

## 2023-03-13 PROCEDURE — 76376 3D RENDER W/INTRP POSTPROCES: CPT | Mod: 26,,, | Performed by: RADIOLOGY

## 2023-03-13 PROCEDURE — 74181 MRI ABDOMEN W/O CONTRAST: CPT | Mod: TC

## 2023-03-13 PROCEDURE — 76376 3D RENDER W/INTRP POSTPROCES: CPT | Mod: TC

## 2023-03-13 PROCEDURE — 76376 MRI ABDOMEN WITHOUT CONTRAST MRCP: ICD-10-PCS | Mod: 26,,, | Performed by: RADIOLOGY

## 2023-03-13 PROCEDURE — 74181 MRI ABDOMEN WITHOUT CONTRAST MRCP: ICD-10-PCS | Mod: 26,,, | Performed by: RADIOLOGY

## 2023-03-13 PROCEDURE — 74181 MRI ABDOMEN W/O CONTRAST: CPT | Mod: 26,,, | Performed by: RADIOLOGY

## 2023-03-16 ENCOUNTER — PATIENT MESSAGE (OUTPATIENT)
Dept: GASTROENTEROLOGY | Facility: CLINIC | Age: 63
End: 2023-03-16
Payer: MEDICARE

## 2023-03-16 DIAGNOSIS — K86.9 PANCREATIC LESION: Primary | ICD-10-CM

## 2023-03-16 RX ORDER — POLYETHYLENE GLYCOL 3350 17 G/17G
POWDER, FOR SOLUTION ORAL
Qty: 90 EACH | Refills: 3 | Status: SHIPPED | OUTPATIENT
Start: 2023-03-16 | End: 2023-05-07

## 2023-03-17 ENCOUNTER — PATIENT MESSAGE (OUTPATIENT)
Dept: GASTROENTEROLOGY | Facility: CLINIC | Age: 63
End: 2023-03-17
Payer: MEDICARE

## 2023-03-20 ENCOUNTER — TELEPHONE (OUTPATIENT)
Dept: SURGERY | Facility: CLINIC | Age: 63
End: 2023-03-20
Payer: MEDICARE

## 2023-03-20 NOTE — TELEPHONE ENCOUNTER
Call placed to pt. Spoke to Alanis. Appt made per request. Time date location reviewed. Alanis verbalized understanding.

## 2023-03-20 NOTE — TELEPHONE ENCOUNTER
Spoke to pt's sister (caretaker) regarding scheduling appointment in [place]. Attempted to schedule pt on 3/24/23 but sister stated she cannot make it due to work schedule. Pt's sister requesting an appt during Easter holiday, anytime between 4/7-4/17. Dr. Hardwick available on 4/14, msg forwarded to JOSE Mcelroy RN.

## 2023-03-20 NOTE — TELEPHONE ENCOUNTER
----- Message from Corinne Larsen RN sent at 3/20/2023 10:44 AM CDT -----  This pt saw Dr Guteirrez in [place] 6/2022.  She's technically an EP.  Please schedule with either of the docs as appropriate.    Alanis  ----- Message -----  From: Raleigh Hardwick Jr., MD  Sent: 3/20/2023   8:40 AM CDT  To: Estefany Forrester PA-C, [facility]    Yes either me or Dr. Mejia can see her in the [place] clinic on a date that is convenient with her.    Thanks, Raleigh    ----- Message -----  From: Estefany Forrester PA-C  Sent: 3/17/2023   3:00 PM CDT  To: Raleigh Hardwick Jr., MD    Hi Dr. Hardwick,    I was wondering if you would mind please seeing Ms. Almeida in clinic. She has history of cerebral palsy and her sister is her caretaker. She has history of multiple pancreatic cysts. Dr. Kulkarni did EUS last year with normal pathology but CEA >8000. Repeat MRI completed but limited findings due to motion. She continues with multiple cysts (>3cm) throughout the head, body and tail, and there is also noted pancreatic ductal dilation. I spoke with Dr. Kulkarni who recommended she see you for further evaluation. I just wanted to give you a heads up.    Please let me know if you have any questions or recommendations.    Thank you!  Estefany Forrester Pa-C  Gastroenterology

## 2023-03-30 ENCOUNTER — PATIENT MESSAGE (OUTPATIENT)
Dept: PRIMARY CARE CLINIC | Facility: CLINIC | Age: 63
End: 2023-03-30
Payer: MEDICARE

## 2023-03-30 ENCOUNTER — PATIENT MESSAGE (OUTPATIENT)
Dept: GASTROENTEROLOGY | Facility: CLINIC | Age: 63
End: 2023-03-30
Payer: MEDICARE

## 2023-03-30 ENCOUNTER — PATIENT MESSAGE (OUTPATIENT)
Dept: PSYCHIATRY | Facility: CLINIC | Age: 63
End: 2023-03-30
Payer: MEDICARE

## 2023-03-30 DIAGNOSIS — I10 ESSENTIAL HYPERTENSION: ICD-10-CM

## 2023-03-31 RX ORDER — QUETIAPINE FUMARATE 400 MG/1
TABLET, FILM COATED ORAL
Qty: 60 TABLET | Refills: 1 | Status: SHIPPED | OUTPATIENT
Start: 2023-03-31 | End: 2023-05-30 | Stop reason: SDUPTHER

## 2023-03-31 RX ORDER — MIRTAZAPINE 45 MG/1
45 TABLET, FILM COATED ORAL NIGHTLY
Qty: 30 TABLET | Refills: 1 | Status: SHIPPED | OUTPATIENT
Start: 2023-03-31 | End: 2023-05-30 | Stop reason: SDUPTHER

## 2023-03-31 RX ORDER — DIVALPROEX SODIUM 500 MG/1
500 TABLET, DELAYED RELEASE ORAL 2 TIMES DAILY
Qty: 60 TABLET | Refills: 1 | Status: SHIPPED | OUTPATIENT
Start: 2023-03-31 | End: 2023-05-30 | Stop reason: SDUPTHER

## 2023-03-31 RX ORDER — SIMVASTATIN 40 MG/1
TABLET, FILM COATED ORAL
Qty: 30 TABLET | Refills: 11 | OUTPATIENT
Start: 2023-03-31

## 2023-03-31 NOTE — TELEPHONE ENCOUNTER
Care Due:                  Date            Visit Type   Department     Provider  --------------------------------------------------------------------------------                                EP -                              PRIMARY  Last Visit: 10-      CARE ([facility])   None Found     Brooke Goldberg                               -                              PRIMARY  Next Visit: 04-      CARE ([facility])   None Found     Brooke Goldberg                                                            Last  Test          Frequency    Reason                     Performed    Due Date  --------------------------------------------------------------------------------    Lipid Panel.  12 months..  simvastatin..............  Not Found    Overdue    [employer] Embedded Care Gaps. Reference number: [id number]. 3/31/2023   11:53:19 AM QUENTINT

## 2023-03-31 NOTE — TELEPHONE ENCOUNTER
Provider Staff:  Action required for this patient     Please see care gap opportunities below in Care Due Message: Labs (lipid panel) outdated.     Thanks!  [facility]     Appointments     Last Visit   10/4/2022 Brooke Goldberg MD   Next Visit   4/11/2023 Brooke Goldberg MD     Refill Decision Note   Juan Pablo Bolden  is requesting a refill authorization.  Brief Assessment and Rationale for Refill:  Quick Discontinue       Medication Therapy Plan:  Labs (lipid panel) outdated         Comments:     Note composed:3:29 PM 03/31/2023

## 2023-04-02 RX ORDER — TRIAMTERENE/HYDROCHLOROTHIAZID 37.5-25 MG
1 TABLET ORAL EVERY MORNING
Qty: 90 TABLET | Refills: 3 | Status: SHIPPED | OUTPATIENT
Start: 2023-04-02 | End: 2023-04-11 | Stop reason: SDUPTHER

## 2023-04-02 RX ORDER — DOCUSATE SODIUM 100 MG/1
100 CAPSULE, LIQUID FILLED ORAL DAILY
Qty: 90 CAPSULE | Refills: 3 | Status: SHIPPED | OUTPATIENT
Start: 2023-04-02 | End: 2024-03-06

## 2023-04-02 RX ORDER — AMLODIPINE BESYLATE 10 MG/1
10 TABLET ORAL DAILY
Qty: 90 TABLET | Refills: 3 | Status: SHIPPED | OUTPATIENT
Start: 2023-04-02 | End: 2023-04-11 | Stop reason: SDUPTHER

## 2023-04-02 RX ORDER — ALPRAZOLAM 0.25 MG/1
0.25 TABLET ORAL 2 TIMES DAILY
Qty: 60 TABLET | Refills: 3 | OUTPATIENT
Start: 2023-04-02

## 2023-04-02 RX ORDER — SIMVASTATIN 40 MG/1
40 TABLET, FILM COATED ORAL NIGHTLY
Qty: 90 TABLET | Refills: 3 | Status: SHIPPED | OUTPATIENT
Start: 2023-04-02 | End: 2023-04-11 | Stop reason: SDUPTHER

## 2023-04-05 ENCOUNTER — TELEPHONE (OUTPATIENT)
Dept: PSYCHIATRY | Facility: CLINIC | Age: 63
End: 2023-04-05
Payer: MEDICARE

## 2023-04-05 NOTE — TELEPHONE ENCOUNTER
----- Message from Libertad Early sent at 4/5/2023  3:53 PM CDT -----  Regarding: Dr. Zapata  Contact: Alanis Thapa is requesting a callback in regards to the patient need to have a virtual visit in the afternoon in May please.       Alanis can be reached at [phone] (home)  or send the appt via [facility].    Thanks

## 2023-04-10 ENCOUNTER — PATIENT MESSAGE (OUTPATIENT)
Dept: PRIMARY CARE CLINIC | Facility: CLINIC | Age: 63
End: 2023-04-10
Payer: MEDICARE

## 2023-04-10 RX ORDER — ALPRAZOLAM 0.25 MG/1
0.25 TABLET ORAL 2 TIMES DAILY
Qty: 60 TABLET | Refills: 3 | OUTPATIENT
Start: 2023-04-10

## 2023-04-11 ENCOUNTER — OFFICE VISIT (OUTPATIENT)
Dept: PRIMARY CARE CLINIC | Facility: CLINIC | Age: 63
End: 2023-04-11
Payer: MEDICARE

## 2023-04-11 VITALS
BODY MASS INDEX: 16.71 KG/M2 | DIASTOLIC BLOOD PRESSURE: 62 MMHG | HEIGHT: 64 IN | SYSTOLIC BLOOD PRESSURE: 102 MMHG | TEMPERATURE: 98 F | WEIGHT: 97.88 LBS

## 2023-04-11 DIAGNOSIS — R60.0 LOWER EXTREMITY EDEMA: ICD-10-CM

## 2023-04-11 DIAGNOSIS — I10 ESSENTIAL HYPERTENSION: ICD-10-CM

## 2023-04-11 DIAGNOSIS — G40.909 SEIZURE DISORDER: ICD-10-CM

## 2023-04-11 DIAGNOSIS — D69.6 THROMBOCYTOPENIA: ICD-10-CM

## 2023-04-11 DIAGNOSIS — F25.9 SCHIZOAFFECTIVE DISORDER, UNSPECIFIED TYPE: ICD-10-CM

## 2023-04-11 DIAGNOSIS — K86.2 PANCREAS CYST: ICD-10-CM

## 2023-04-11 DIAGNOSIS — F84.9 PDD (PERVASIVE DEVELOPMENTAL DISORDER): ICD-10-CM

## 2023-04-11 DIAGNOSIS — E78.2 MIXED HYPERLIPIDEMIA: Primary | ICD-10-CM

## 2023-04-11 DIAGNOSIS — E03.8 CENTRAL HYPOTHYROIDISM: ICD-10-CM

## 2023-04-11 DIAGNOSIS — E22.1 HYPERPROLACTINEMIA: ICD-10-CM

## 2023-04-11 PROCEDURE — 99999 PR PBB SHADOW E&M-EST. PATIENT-LVL IV: CPT | Mod: PBBFAC,,, | Performed by: INTERNAL MEDICINE

## 2023-04-11 PROCEDURE — 99214 OFFICE O/P EST MOD 30 MIN: CPT | Mod: PBBFAC,PN | Performed by: INTERNAL MEDICINE

## 2023-04-11 PROCEDURE — 99999 PR PBB SHADOW E&M-EST. PATIENT-LVL IV: ICD-10-PCS | Mod: PBBFAC,,, | Performed by: INTERNAL MEDICINE

## 2023-04-11 PROCEDURE — 99214 PR OFFICE/OUTPT VISIT, EST, LEVL IV, 30-39 MIN: ICD-10-PCS | Mod: S$PBB,,, | Performed by: INTERNAL MEDICINE

## 2023-04-11 PROCEDURE — 99214 OFFICE O/P EST MOD 30 MIN: CPT | Mod: S$PBB,,, | Performed by: INTERNAL MEDICINE

## 2023-04-11 RX ORDER — SIMVASTATIN 40 MG/1
40 TABLET, FILM COATED ORAL NIGHTLY
Qty: 90 TABLET | Refills: 3 | Status: SHIPPED | OUTPATIENT
Start: 2023-04-11 | End: 2024-02-27

## 2023-04-11 RX ORDER — ALPRAZOLAM 1 MG/1
1 TABLET ORAL DAILY PRN
COMMUNITY
Start: 2022-12-16 | End: 2023-05-24 | Stop reason: SDUPTHER

## 2023-04-11 RX ORDER — DEXTROMETHORPHAN HYDROBROMIDE, GUAIFENESIN 5; 100 MG/5ML; MG/5ML
650 LIQUID ORAL EVERY 8 HOURS PRN
COMMUNITY

## 2023-04-11 RX ORDER — TRIAMTERENE/HYDROCHLOROTHIAZID 37.5-25 MG
1 TABLET ORAL EVERY MORNING
Qty: 90 TABLET | Refills: 3 | Status: SHIPPED | OUTPATIENT
Start: 2023-04-11 | End: 2024-03-01 | Stop reason: SDUPTHER

## 2023-04-11 RX ORDER — FOLIC ACID 1 MG/1
1 TABLET ORAL DAILY
Qty: 90 TABLET | Refills: 3 | Status: SHIPPED | OUTPATIENT
Start: 2023-04-11 | End: 2024-02-27

## 2023-04-11 RX ORDER — AMLODIPINE BESYLATE 10 MG/1
10 TABLET ORAL DAILY
Qty: 90 TABLET | Refills: 3 | Status: SHIPPED | OUTPATIENT
Start: 2023-04-11 | End: 2024-03-01 | Stop reason: SDUPTHER

## 2023-04-11 NOTE — PATIENT INSTRUCTIONS
-start using benadryl 25 mg tablet as needed at night for sleep. She can take 12.5 mg (1/2 tablet) first and if this does not help, then give her the other 1/2.  -Ok to continue tylenol arthritis every 8 hours as needed for joint pain

## 2023-04-14 ENCOUNTER — OFFICE VISIT (OUTPATIENT)
Dept: SURGICAL ONCOLOGY | Facility: CLINIC | Age: 63
End: 2023-04-14
Payer: MEDICARE

## 2023-04-14 VITALS — HEART RATE: 97 BPM | DIASTOLIC BLOOD PRESSURE: 61 MMHG | SYSTOLIC BLOOD PRESSURE: 114 MMHG

## 2023-04-14 DIAGNOSIS — D49.0 IPMN (INTRADUCTAL PAPILLARY MUCINOUS NEOPLASM): Primary | ICD-10-CM

## 2023-04-14 DIAGNOSIS — K86.2 PANCREAS CYST: ICD-10-CM

## 2023-04-14 PROCEDURE — 99214 PR OFFICE/OUTPT VISIT, EST, LEVL IV, 30-39 MIN: ICD-10-PCS | Mod: S$PBB,,, | Performed by: STUDENT IN AN ORGANIZED HEALTH CARE EDUCATION/TRAINING PROGRAM

## 2023-04-14 PROCEDURE — 99213 OFFICE O/P EST LOW 20 MIN: CPT | Mod: PBBFAC | Performed by: STUDENT IN AN ORGANIZED HEALTH CARE EDUCATION/TRAINING PROGRAM

## 2023-04-14 PROCEDURE — 99999 PR PBB SHADOW E&M-EST. PATIENT-LVL III: ICD-10-PCS | Mod: PBBFAC,,, | Performed by: STUDENT IN AN ORGANIZED HEALTH CARE EDUCATION/TRAINING PROGRAM

## 2023-04-14 PROCEDURE — 99999 PR PBB SHADOW E&M-EST. PATIENT-LVL III: CPT | Mod: PBBFAC,,, | Performed by: STUDENT IN AN ORGANIZED HEALTH CARE EDUCATION/TRAINING PROGRAM

## 2023-04-14 PROCEDURE — 99214 OFFICE O/P EST MOD 30 MIN: CPT | Mod: S$PBB,,, | Performed by: STUDENT IN AN ORGANIZED HEALTH CARE EDUCATION/TRAINING PROGRAM

## 2023-04-14 NOTE — PROGRESS NOTES
Surgical Oncology Clinic Note  [facility]       PCP: Brooke Goldberg MD    Reason For Visit:   Chief Complaint   Patient presents with    IPMN       Oncologic History:  4/2022: CT: 2.1 cm pancreas cyst  10/2022: EUS: 2.3 cm pancreas head cyst, 1.2 cm and 7.5 mm body cyst, 9.1 mm tail cyst  3/2023: MRI: approximately 3 cm pancreas head cyst, 3 cm pancreas body cyst. Significant motion artifact.    History of Present Illness    Juan Pablo Bolden is a 62 y.o. female with history of pervasive developmental disorder, seizure disorder, who is mentally disabled and lives in a group home presents today for pancreas cyst surveillance.  She underwent cross-sectional imaging in April of 2022 demonstrating a pancreas cyst measuring 2.1 cm.  At that time she was set up for endoscopic ultrasound which was performed in October of 2022 demonstrating a 2.3 cm pancreas head cyst, 1.2 cm and 7.5 mm pancreas body cyst, and 9.1 mm pancreas tail cyst.  The largest cyst was sampled and demonstrated an elevated CEA, confirming the diagnosis of side-branch IPMN.  She presents today after undergoing a six-month MRI/MRCP for surveillance.  She is done well otherwise, is not experiencing any ongoing abdominal pain or digestive issues.  Her MRI did demonstrate increase in cyst size, though there was severe motion artifact on MRI making the results and images difficult to interpret.    Pathology:   Final Pathologic Diagnosis Pancreatic head cyst, fine needle aspiration (1 thin prep):   - Scant benign ductal epithelium   -  Negative  for malignant cells    Comment: Interp By Merissa Elias M.D., Signed on 05/23/2022 at 08:51         Current Outpatient Medications:     acetaminophen (TYLENOL) 650 MG TbSR, Take 650 mg by mouth every 8 (eight) hours as needed for Pain., Disp: , Rfl:     ALPRAZolam (XANAX) 1 MG tablet, Take 1 mg by mouth daily as needed., Disp: , Rfl:     amLODIPine (NORVASC) 10 MG tablet, Take 1 tablet (10 mg  total) by mouth once daily., Disp: 90 tablet, Rfl: 3    cetirizine (ZYRTEC) 10 MG tablet, Take 10 mg by mouth daily as needed for Allergies., Disp: , Rfl:     diclofenac sodium (VOLTAREN) 1 % Gel, APPLY TWO GRAMS TO THE AFFECTED AREA FOUR TIMES DAILY AS NEEDED FOR PAIN, Disp: 200 g, Rfl: 1    diphenhydrAMINE (BENADRYL) 25 mg capsule, Take 25 mg by mouth nightly as needed for Allergies., Disp: , Rfl:     divalproex (DEPAKOTE) 500 MG TbEC, Take 1 tablet (500 mg total) by mouth 2 (two) times daily., Disp: 60 tablet, Rfl: 1    docusate sodium (COLACE) 100 MG capsule, Take 1 capsule (100 mg total) by mouth once daily., Disp: 90 capsule, Rfl: 3    folic acid (FOLVITE) 1 MG tablet, Take 1 tablet (1 mg total) by mouth once daily., Disp: 90 tablet, Rfl: 3    furosemide (LASIX) 20 MG tablet, TAKE 1 TABLET BY MOUTH DAILY AS NEEDED FOR SWELLING, Disp: 30 tablet, Rfl: 11    glycerin adult suppository, Place 1 suppository rectally as needed for Constipation., Disp: 25 suppository, Rfl: 0    ibuprofen (ADVIL,MOTRIN) 200 MG tablet, Take 200 mg by mouth every 6 (six) hours as needed., Disp: , Rfl:     lactulose (CHRONULAC) 10 gram/15 mL solution, TAKE 15 ML BY MOUTH TWICE DAILY FOR 7 DAYS, Disp: 210 mL, Rfl: 0    melatonin 10 mg TbSR, Take 1 tablet by mouth every evening., Disp: 30 tablet, Rfl: 11    mirtazapine (REMERON) 45 MG tablet, Take 1 tablet (45 mg total) by mouth every evening., Disp: 30 tablet, Rfl: 1    mupirocin (BACTROBAN) 2 % ointment, Takes prn, Disp: , Rfl:     polyethylene glycol (GLYCOLAX) 17 gram/dose powder, SMARTSI Capful(s) By Mouth Daily, Disp: 90 each, Rfl: 3    QUEtiapine (SEROQUEL) 400 MG tablet, TAKE 1/2 TABLET BY MOUTH EVERY MORNING and TAKE 1 AND 1/2 TABLETS BY MOUTH AT BEDTIME, Disp: 60 tablet, Rfl: 1    simethicone (MYLICON) 125 mg Cap capsule, 1 capsule after meals and at bedtime as needed, Disp: , Rfl:     simvastatin (ZOCOR) 40 MG tablet, Take 1 tablet (40 mg total) by mouth every evening., Disp:  90 tablet, Rfl: 3    triamterene-hydrochlorothiazide 37.5-25 mg (MAXZIDE-25) 37.5-25 mg per tablet, Take 1 tablet by mouth every morning., Disp: 90 tablet, Rfl: 3    Review of patient's allergies indicates:  No Known Allergies    Past Medical History:   Diagnosis Date    Bipolar 1 disorder     Chronic constipation     Galactorrhea     Growth retardation     Profound mental retardation    High cholesterol     Hyperlipidemia     Hypertension     Leukopenia     Neuroleptic-induced tardive dyskinesia     Schizoaffective disorder     Stereotypic movement disorder        Past Surgical History:   Procedure Laterality Date    COLONOSCOPY N/A 5/17/2021    Procedure: COLONOSCOPY;  Surgeon: Jeffrey Ayala MD;  Location: [facility];  Service: Gastroenterology;  Laterality: N/A;    ENDOSCOPIC ULTRASOUND OF UPPER GASTROINTESTINAL TRACT N/A 5/16/2022    Procedure: ULTRASOUND, UPPER GI TRACT, ENDOSCOPIC;  Surgeon: Cherise Marshall MD;  Location: [facility];  Service: Endoscopy;  Laterality: N/A;  Upper and linear, 22 shark core, slides and formalin.    ESOPHAGOGASTRODUODENOSCOPY N/A 11/5/2020    Procedure: EGD (ESOPHAGOGASTRODUODENOSCOPY);  Surgeon: John Batista MD;  Location: [facility];  Service: Endoscopy;  Laterality: N/A;       Family History   Problem Relation Age of Onset    Lung cancer Mother     Hypertension Father     Prostate cancer Father     Stroke Maternal Uncle     Heart disease Maternal Grandmother        Social History     Socioeconomic History    Marital status: Single   Tobacco Use    Smoking status: Never    Smokeless tobacco: Never   Substance and Sexual Activity    Alcohol use: No    Drug use: No    Sexual activity: Never     Social Determinants of Health     Financial Resource Strain: Low Risk     Difficulty of Paying Living Expenses: Not hard at all   Food Insecurity: No Food Insecurity    Worried About Running Out of Food in the Last Year: Never true    Ran Out of Food in the Last Year:  Never true   Transportation Needs: No Transportation Needs    Lack of Transportation (Medical): No    Lack of Transportation (Non-Medical): No   Physical Activity: Inactive    Days of Exercise per Week: 0 days    Minutes of Exercise per Session: 0 min   Stress: Unknown    Feeling of Stress : Patient refused   Social Connections: Unknown    Frequency of Communication with Friends and Family: Three times a week    Frequency of Social Gatherings with Friends and Family: More than three times a week    Attends Christianity Services: Never    Active Member of Clubs or Organizations: No    Attends Club or Organization Meetings: Patient refused    Marital Status: Patient refused   Housing Stability: Low Risk     Unable to Pay for Housing in the Last Year: No    Number of Places Lived in the Last Year: 1    Unstable Housing in the Last Year: No       Review of Systems   Constitutional:  Negative for chills, fever and weight loss.   Respiratory:  Negative for cough and shortness of breath.    Cardiovascular:  Negative for chest pain.   Gastrointestinal:  Positive for diarrhea. Negative for abdominal pain, heartburn, nausea and vomiting.   Musculoskeletal:  Negative for joint pain.   Skin:  Negative for itching and rash.   Psychiatric/Behavioral:  The patient is not nervous/anxious.        Vitals:    04/14/23 1013   BP: 114/61   Pulse: 97     There is no height or weight on file to calculate BMI.  ECOG SCORE           Physical Exam  Abdominal:      General: Abdomen is flat. There is no distension.      Palpations: Abdomen is soft.      Tenderness: There is no abdominal tenderness. There is no guarding.          DATA REVIEW:  I personally reviewed the following records for this visit: lab work from prior visit, notes from other physicians, magnetic resonance/MR imaging, computed tomography/CT imaging, assessment of correct protocol, surgical pathology results, endoscopy results, and radiographic study evaluation      Lab Results    Component Value Date    WBC 2.16 (L) 12/17/2022    HGB 14.7 12/17/2022    HCT 43.0 12/17/2022     (L) 12/17/2022    CHOL 234 (H) 10/14/2014    TRIG 97 10/14/2014     (H) 10/14/2014    LDLCALC 106.6 10/14/2014    ALT 17 12/17/2022    AST 21 12/17/2022     12/17/2022    K 3.9 12/17/2022     12/17/2022    CREATININE 0.8 12/17/2022    BUN 20 12/17/2022    CO2 28 12/17/2022    TSH 1.177 04/09/2021    HGBA1C 5.2 10/14/2014       Lab Results   Component Value Date    CEA 8859 05/16/2022        Radiology:       ASSESSMENT & PLAN:  1. IPMN (intraductal papillary mucinous neoplasm)    2. Pancreas cyst       Juan Pablo Bolden is a 62 y.o. female with mental disability who presents today for ongoing pancreas cyst surveillance for her previously diagnosed side-branch IPMN.  I reviewed the imaging with the patient and her sister today.  Though the MRI does demonstrate increase in pancreas cysts size there is significant motion artifact making the study difficult to interpret.  I do not see any evidence of high-risk stigmata or worrisome features aside from the size change.  Given the other challenges Ms. Bolden faces I believe continued surveillance is best. That being said, studies going forward should be done under anesthesia to ensure the best results. I have ordered an EUS in 6 months for ongoing surveillance and will refer Ms. Bolden to pancreas cyst clinic for ongoing surveillance.    Ms. Bolden's sister expressed understanding today and agreed with the plan going forward.         Follow-up: Follow up in about 6 months (around 10/14/2023) for with pancreas cyst surveillance clinic to discuss 6 month EUS results.                  Raleigh Hardwick Jr., MD              Surgical Oncology              [facility] [place], [place]              Office: [phone]              Fax: [phone]    Communications: 30 minutes were spent on today's visit in  face-to-face and non face-to-face time with the patient. This patient was recently diagnosed with pancreas cyst/side branch IPMN and the time was required to provide counseling and guidance regarding their new diagnosis. Time was spent reviewing all outside records and information pertaining to their work-up and formulating a treatment plan in line with standardized guidelines. Additional time was spent communicating with referring physicians and facilities to facilitate the efficient exchange of previous healthcare records and radiographic imaging pertinent to the diagnosis and disease management.

## 2023-04-18 ENCOUNTER — PES CALL (OUTPATIENT)
Dept: ADMINISTRATIVE | Facility: CLINIC | Age: 63
End: 2023-04-18
Payer: MEDICARE

## 2023-04-26 ENCOUNTER — PATIENT MESSAGE (OUTPATIENT)
Dept: PRIMARY CARE CLINIC | Facility: CLINIC | Age: 63
End: 2023-04-26
Payer: MEDICARE

## 2023-05-01 DIAGNOSIS — D69.6 THROMBOCYTOPENIA: Primary | ICD-10-CM

## 2023-05-02 ENCOUNTER — PATIENT MESSAGE (OUTPATIENT)
Dept: HEMATOLOGY/ONCOLOGY | Facility: CLINIC | Age: 63
End: 2023-05-02
Payer: MEDICARE

## 2023-05-03 DIAGNOSIS — Z71.89 COMPLEX CARE COORDINATION: ICD-10-CM

## 2023-05-05 ENCOUNTER — OFFICE VISIT (OUTPATIENT)
Dept: INTERNAL MEDICINE | Facility: CLINIC | Age: 63
End: 2023-05-05
Payer: MEDICARE

## 2023-05-05 VITALS — DIASTOLIC BLOOD PRESSURE: 74 MMHG | SYSTOLIC BLOOD PRESSURE: 104 MMHG | RESPIRATION RATE: 18 BRPM

## 2023-05-05 DIAGNOSIS — I10 ESSENTIAL HYPERTENSION: Primary | ICD-10-CM

## 2023-05-05 DIAGNOSIS — F84.9 PDD (PERVASIVE DEVELOPMENTAL DISORDER): ICD-10-CM
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
## 2023-05-05 DIAGNOSIS — F79 INTELLECTUAL DISABILITY: ICD-10-CM

## 2023-05-05 DIAGNOSIS — M79.642 LEFT HAND PAIN: ICD-10-CM

## 2023-05-05 DIAGNOSIS — S09.90XA HEAD TRAUMA, INITIAL ENCOUNTER: ICD-10-CM

## 2023-05-05 PROCEDURE — 99999 PR PBB SHADOW E&M-EST. PATIENT-LVL IV: CPT | Mod: PBBFAC,,,

## 2023-05-05 PROCEDURE — 99999 PR PBB SHADOW E&M-EST. PATIENT-LVL IV: ICD-10-PCS | Mod: PBBFAC,,,

## 2023-05-05 PROCEDURE — 99214 OFFICE O/P EST MOD 30 MIN: CPT | Mod: PBBFAC

## 2023-05-05 PROCEDURE — 99214 PR OFFICE/OUTPT VISIT, EST, LEVL IV, 30-39 MIN: ICD-10-PCS | Mod: S$PBB,,,

## 2023-05-05 PROCEDURE — 99214 OFFICE O/P EST MOD 30 MIN: CPT | Mod: S$PBB,,,

## 2023-05-05 RX ORDER — DIAZEPAM 5 MG/1
10 TABLET ORAL EVERY 6 HOURS PRN
Qty: 4 TABLET | Refills: 0 | Status: SHIPPED | OUTPATIENT
Start: 2023-05-05 | End: 2024-03-26 | Stop reason: ALTCHOICE

## 2023-05-05 NOTE — PROGRESS NOTES
Juan Pablo Bolden  05/05/2023  [id number]    Brooke Goldberg MD  Patient Care Team:  Brooke Goldberg MD as PCP - General (Internal Medicine)  Dolly Casillas NP (Inactive) as Nurse Practitioner (Neurology)  Elia Wilkins MD as Consulting Physician (Cardiology)  Richy Gutierrez MD as Consulting Physician (Surgical Oncology)  Cherise Marshall MD as Consulting Physician (Gastroenterology)  Dipti Zayas NP as Nurse Practitioner (Hematology and Oncology)  Yulissa Alexander as Digital Medicine Health   Minh Rock PharmD as Hypertension Digital Medicine Clinician (Pharmacist)  Brooke Goldberg MD as Hypertension Digital Medicine Responsible Provider (Internal Medicine)  Misael Trujillo as Digital Medicine Health           Visit Type:an urgent visit for a new problem    Chief Complaint:  Chief Complaint   Patient presents with    Fall     Fall off the toilet on Geraldo morning possibly. They didn't report until later that she had fell to the family member. Hit the tub with her head.        History of Present Illness:    62 year old female presents today with her sister  She fell on Sunday morning. Her sitter did not report to the patient's sister that she fell  She has a knot on her head    One of the sitters said that Juan Pablo said her head was hurting  The sitter did give her a tylenol  Sister does not know if tylenol helped with pain     Today is the first day that the sister has seen Juan Pablo  Sister has not noticed a change in behavior  Sitters have not noticed a change in behavior  No vomiting   On Monday slept longer than usual    Was told that she fell out the bed  Noticed that when she has falls when she is with a particular sitter       History:  Past Medical History:   Diagnosis Date    Bipolar 1 disorder     Chronic constipation     Galactorrhea     Growth retardation     Profound mental retardation    High cholesterol     Hyperlipidemia     Hypertension     Leukopenia     Neuroleptic-induced  tardive dyskinesia     Schizoaffective disorder     Stereotypic movement disorder      Past Surgical History:   Procedure Laterality Date    COLONOSCOPY N/A 5/17/2021    Procedure: COLONOSCOPY;  Surgeon: Jeffrey Ayala MD;  Location: [facility];  Service: Gastroenterology;  Laterality: N/A;    ENDOSCOPIC ULTRASOUND OF UPPER GASTROINTESTINAL TRACT N/A 5/16/2022    Procedure: ULTRASOUND, UPPER GI TRACT, ENDOSCOPIC;  Surgeon: Cherise Marshall MD;  Location: [facility];  Service: Endoscopy;  Laterality: N/A;  Upper and linear, 22 shark core, slides and formalin.    ESOPHAGOGASTRODUODENOSCOPY N/A 11/5/2020    Procedure: EGD (ESOPHAGOGASTRODUODENOSCOPY);  Surgeon: John Batista MD;  Location: [facility];  Service: Endoscopy;  Laterality: N/A;     Family History   Problem Relation Age of Onset    Lung cancer Mother     Hypertension Father     Prostate cancer Father     Stroke Maternal Uncle     Heart disease Maternal Grandmother      Social History     Socioeconomic History    Marital status: Single   Tobacco Use    Smoking status: Never     Passive exposure: Never    Smokeless tobacco: Never   Substance and Sexual Activity    Alcohol use: No    Drug use: No    Sexual activity: Never     Social Determinants of Health     Financial Resource Strain: Low Risk     Difficulty of Paying Living Expenses: Not hard at all   Food Insecurity: No Food Insecurity    Worried About Running Out of Food in the Last Year: Never true    Ran Out of Food in the Last Year: Never true   Transportation Needs: No Transportation Needs    Lack of Transportation (Medical): No    Lack of Transportation (Non-Medical): No   Physical Activity: Inactive    Days of Exercise per Week: 0 days    Minutes of Exercise per Session: 0 min   Stress: Unknown    Feeling of Stress : Patient refused   Social Connections: Unknown    Frequency of Communication with Friends and Family: Three times a week    Frequency of Social Gatherings with Friends and  Family: More than three times a week    Attends Denominational Services: Never    Active Member of Clubs or Organizations: No    Attends Club or Organization Meetings: Patient refused    Marital Status: Patient refused   Housing Stability: Low Risk     Unable to Pay for Housing in the Last Year: No    Number of Places Lived in the Last Year: 1    Unstable Housing in the Last Year: No     Patient Active Problem List   Diagnosis    Central hypothyroidism    Lipodystrophy    Osteopenia    Hyperprolactinemia    PDD (pervasive developmental disorder)    Tardive dyskinesia    Restlessness and agitation    Seizure disorder    Essential hypertension    Leukopenia    Thrombocytopenia    Iron deficiency anemia due to chronic blood loss    Schizoaffective disorder    Other constipation    H. pylori infection    Other symptoms and signs involving the nervous system    Coronary artery calcification    Abnormal CT scan    Calcification of aorta    Liver lesion    Abnormal brain MRI    Pancreas cyst     Review of patient's allergies indicates:  No Known Allergies    The following were reviewed at this visit: active problem list, medication list, allergies, family history, social history, and health maintenance.    Medications:  Current Outpatient Medications on File Prior to Visit   Medication Sig Dispense Refill    acetaminophen (TYLENOL) 650 MG TbSR Take 650 mg by mouth every 8 (eight) hours as needed for Pain.      ALPRAZolam (XANAX) 1 MG tablet Take 1 mg by mouth daily as needed.      amLODIPine (NORVASC) 10 MG tablet Take 1 tablet (10 mg total) by mouth once daily. 90 tablet 3    cetirizine (ZYRTEC) 10 MG tablet Take 10 mg by mouth daily as needed for Allergies.      diclofenac sodium (VOLTAREN) 1 % Gel APPLY TWO GRAMS TO THE AFFECTED AREA FOUR TIMES DAILY AS NEEDED FOR PAIN 200 g 1    diphenhydrAMINE (BENADRYL) 25 mg capsule Take 25 mg by mouth nightly as needed for Allergies.      divalproex (DEPAKOTE) 500 MG TbEC Take 1 tablet  (500 mg total) by mouth 2 (two) times daily. 60 tablet 1    docusate sodium (COLACE) 100 MG capsule Take 1 capsule (100 mg total) by mouth once daily. 90 capsule 3    folic acid (FOLVITE) 1 MG tablet Take 1 tablet (1 mg total) by mouth once daily. 90 tablet 3    furosemide (LASIX) 20 MG tablet TAKE 1 TABLET BY MOUTH DAILY AS NEEDED FOR SWELLING 30 tablet 11    melatonin 10 mg TbSR Take 1 tablet by mouth every evening. 30 tablet 11    mirtazapine (REMERON) 45 MG tablet Take 1 tablet (45 mg total) by mouth every evening. 30 tablet 1    QUEtiapine (SEROQUEL) 400 MG tablet TAKE 1/2 TABLET BY MOUTH EVERY MORNING and TAKE 1 AND 1/2 TABLETS BY MOUTH AT BEDTIME 60 tablet 1    simvastatin (ZOCOR) 40 MG tablet Take 1 tablet (40 mg total) by mouth every evening. 90 tablet 3    triamterene-hydrochlorothiazide 37.5-25 mg (MAXZIDE-25) 37.5-25 mg per tablet Take 1 tablet by mouth every morning. 90 tablet 3    glycerin adult suppository Place 1 suppository rectally as needed for Constipation. (Patient not taking: Reported on 2023) 25 suppository 0    ibuprofen (ADVIL,MOTRIN) 200 MG tablet Take 200 mg by mouth every 6 (six) hours as needed.      lactulose (CHRONULAC) 10 gram/15 mL solution TAKE 15 ML BY MOUTH TWICE DAILY FOR 7 DAYS (Patient not taking: Reported on 2023) 210 mL 0    mupirocin (BACTROBAN) 2 % ointment Takes prn      polyethylene glycol (GLYCOLAX) 17 gram/dose powder SMARTSI Capful(s) By Mouth Daily (Patient not taking: Reported on 2023) 90 each 3    simethicone (MYLICON) 125 mg Cap capsule 1 capsule after meals and at bedtime as needed       No current facility-administered medications on file prior to visit.       Medications have been reviewed and reconciled with patient at this visit.  Barriers to medications reviewed with patient.    Adverse reactions to current medications reviewed with patient..    Over the counter medications reviewed and reconciled with patient.    Exam:  Wt Readings from Last 3  Encounters:   04/11/23 44.4 kg (97 lb 14.2 oz)   01/05/23 47.9 kg (105 lb 9.6 oz)   10/19/22 50.3 kg (110 lb 14.3 oz)     Temp Readings from Last 3 Encounters:   04/11/23 98.2 °F (36.8 °C)   10/04/22 98.4 °F (36.9 °C)   08/30/22 98.1 °F (36.7 °C)     BP Readings from Last 3 Encounters:   05/05/23 104/74   04/14/23 114/61   04/11/23 102/62     Pulse Readings from Last 3 Encounters:   04/14/23 97   01/05/23 90   10/04/22 89     There is no height or weight on file to calculate BMI.      Review of Systems   Unable to perform ROS: Mental acuity   Physical Exam  Cardiovascular:      Rate and Rhythm: Normal rate and regular rhythm.   Pulmonary:      Effort: Pulmonary effort is normal. No respiratory distress.      Breath sounds: Normal breath sounds. No wheezing or rales.   Abdominal:      General: Bowel sounds are normal.   Musculoskeletal:         General: Swelling and tenderness present.      Left hand: Swelling and tenderness present.      Comments: Swelling to left hand. Pt jumps when left hand is touched    Skin:     Comments: Knot on forehead        Laboratory Reviewed ({Yes)  Lab Results   Component Value Date    WBC 2.16 (L) 12/17/2022    HGB 14.7 12/17/2022    HCT 43.0 12/17/2022     (L) 12/17/2022    CHOL 234 (H) 10/14/2014    TRIG 97 10/14/2014     (H) 10/14/2014    ALT 17 12/17/2022    AST 21 12/17/2022     12/17/2022    K 3.9 12/17/2022     12/17/2022    CREATININE 0.8 12/17/2022    BUN 20 12/17/2022    CO2 28 12/17/2022    TSH 1.177 04/09/2021    HGBA1C 5.2 10/14/2014           Juan Pablo was seen today for fall.    Diagnoses and all orders for this visit:    Essential hypertension    Intellectual disability    PDD (pervasive developmental disorder)    Fall, initial encounter  -     diazePAM (VALIUM) 5 MG tablet; Take 2 tablets (10 mg total) by mouth every 6 (six) hours as needed for Anxiety.  -     CT Head Without Contrast; Future    Head trauma, initial encounter  -     diazePAM  (VALIUM) 5 MG tablet; Take 2 tablets (10 mg total) by mouth every 6 (six) hours as needed for Anxiety.  -     CT Head Without Contrast; Future    Left hand pain  -     X-Ray Hand 2 View Left; Future      Will order a CT scan   Pt previously had Valium before MRI  Will order Valium just for the CT scan  Discussed holding Xanax    Ice to head 2-3 times/day   Taking OTC tylenol as needed for headaches     Pt had was tender and swollen during visit  Order xray of hand  Can get OTC brace to help with swelling     Care Plan/Goals: Reviewed    Goals         Blood Pressure < 130/80 (pt-stated)       Exercise at least 150 minutes per week.       Take at least one BP reading per week at various times of the day             Follow up: No follow-ups on file.    After visit summary was printed and given to patient upon discharge today.  Patient goals and care plan are included in After Visit Summary.

## 2023-05-07 PROBLEM — E78.2 MIXED HYPERLIPIDEMIA: Status: ACTIVE | Noted: 2023-05-07

## 2023-05-07 NOTE — PROGRESS NOTES
Assessment/Plan:    Problem List Items Addressed This Visit          Neuro    PDD (pervasive developmental disorder)    Overview     -intellectual  disability  -followed by both psychiatry and neurology  -currently depakote er 500 mg Bid, quetiapine 200 mg am/400 mg pm, remeron 45 mg qhs, Xanax PRN, Diazepam PRN  -sister/caregiver reports stable behavior           Seizure disorder    Overview     -follow by neurology  -no recent seizure activity  -on depakote              Psychiatric    Schizoaffective disorder       Cardiac/Vascular    Essential hypertension    Overview     -at goal today  -currently on Dyazide 37.5-25 mg and amlodipine 10 mg QD  -continue lifestyle modification with low sodium diet and exercise   -discussed hypertension disease course and importance of treating high blood pressure  -patient caregiver understood and advised of risk of untreated blood pressure.  ER precautions were given   for symptoms of hypertensive urgency and emergency.         Relevant Medications    triamterene-hydrochlorothiazide 37.5-25 mg (MAXZIDE-25) 37.5-25 mg per tablet    amLODIPine (NORVASC) 10 MG tablet    Mixed hyperlipidemia - Primary    Overview     Hyperlipidemia Medications               simvastatin (ZOCOR) 40 MG tablet Take 1 tablet (40 mg total) by mouth every evening.   -chronic condition. Currently stable.    -reports compliance with hyperlipidemia treatment as prescribed  -denies any known adverse effects of medications         Relevant Medications    simvastatin (ZOCOR) 40 MG tablet       Hematology    Thrombocytopenia    Overview     -stable  -followed by hematology  -thought likely medication induced              Endocrine    Central hypothyroidism    Overview     -no longer on treatment  -need to check updated thyroid labs with next labs  Lab Results   Component Value Date    TSH 1.177 04/09/2021            Hyperprolactinemia    Overview     -no longer followed by endocrinology  -need to repeat prolactin  level with next labs              GI    Pancreas cyst    Overview     -incidental finding on imaging  -followed regularly with surg onc  -plan for alternating MRCP and EUS  -recent MRCP performed with upcoming surg onc appt to discuss results            Other Visit Diagnoses       Lower extremity edema                Brooke Goldberg MD  _____________________________________________________________________________________________________________________________________________________    CC: follow up of chronic medical conditions     HPI:    Patient is in clinic today as an established patient.    Patient here today with her sister. She is still being cared for by sitters. She has had no significant changes in her healthcare recently. Continues to follow with multiple specialists, including cardiology, psychiatry, neurology, hematology and surgical oncology. She recently had an MRCP for surveillance of known pancreatic cyst and will follow up with surg onc soon to discuss results.    No other new complaints today.  Remaining chronic conditions have been reviewed and remain stable. Further detail as stated above.     HM reviewed.     No recent changes to medical/surgical history.    Current Outpatient Medications on File Prior to Visit   Medication Sig Dispense Refill    acetaminophen (TYLENOL) 650 MG TbSR Take 650 mg by mouth every 8 (eight) hours as needed for Pain.      ALPRAZolam (XANAX) 1 MG tablet Take 1 mg by mouth daily as needed.      cetirizine (ZYRTEC) 10 MG tablet Take 10 mg by mouth daily as needed for Allergies.      diclofenac sodium (VOLTAREN) 1 % Gel APPLY TWO GRAMS TO THE AFFECTED AREA FOUR TIMES DAILY AS NEEDED FOR PAIN 200 g 1    diphenhydrAMINE (BENADRYL) 25 mg capsule Take 25 mg by mouth nightly as needed for Allergies.      divalproex (DEPAKOTE) 500 MG TbEC Take 1 tablet (500 mg total) by mouth 2 (two) times daily. 60 tablet 1    docusate sodium (COLACE) 100 MG capsule Take 1 capsule (100 mg  "total) by mouth once daily. 90 capsule 3    furosemide (LASIX) 20 MG tablet TAKE 1 TABLET BY MOUTH DAILY AS NEEDED FOR SWELLING 30 tablet 11    ibuprofen (ADVIL,MOTRIN) 200 MG tablet Take 200 mg by mouth every 6 (six) hours as needed.      melatonin 10 mg TbSR Take 1 tablet by mouth every evening. 30 tablet 11    mirtazapine (REMERON) 45 MG tablet Take 1 tablet (45 mg total) by mouth every evening. 30 tablet 1    mupirocin (BACTROBAN) 2 % ointment Takes prn      QUEtiapine (SEROQUEL) 400 MG tablet TAKE 1/2 TABLET BY MOUTH EVERY MORNING and TAKE 1 AND 1/2 TABLETS BY MOUTH AT BEDTIME 60 tablet 1    simethicone (MYLICON) 125 mg Cap capsule 1 capsule after meals and at bedtime as needed      [DISCONTINUED] glycerin adult suppository Place 1 suppository rectally as needed for Constipation. (Patient not taking: Reported on 2023) 25 suppository 0    [DISCONTINUED] lactulose (CHRONULAC) 10 gram/15 mL solution TAKE 15 ML BY MOUTH TWICE DAILY FOR 7 DAYS (Patient not taking: Reported on 2023) 210 mL 0    [DISCONTINUED] polyethylene glycol (GLYCOLAX) 17 gram/dose powder SMARTSI Capful(s) By Mouth Daily (Patient not taking: Reported on 2023) 90 each 3     No current facility-administered medications on file prior to visit.       Review of Systems   Unable to perform ROS: Psychiatric disorder     Vitals:    23 1045   BP: 102/62   Temp: 98.2 °F (36.8 °C)   Weight: 44.4 kg (97 lb 14.2 oz)   Height: 5' 4" (1.626 m)       Wt Readings from Last 3 Encounters:   23 44.4 kg (97 lb 14.2 oz)   23 47.9 kg (105 lb 9.6 oz)   10/19/22 50.3 kg (110 lb 14.3 oz)       Physical Exam  Constitutional:       General: She is not in acute distress.     Appearance: Normal appearance. She is well-developed.   HENT:      Head: Normocephalic and atraumatic.   Eyes:      Conjunctiva/sclera: Conjunctivae normal.   Cardiovascular:      Rate and Rhythm: Normal rate and regular rhythm.      Pulses: Normal pulses.      Heart " sounds: Normal heart sounds. No murmur heard.  Pulmonary:      Effort: Pulmonary effort is normal. No respiratory distress.      Breath sounds: Normal breath sounds.   Abdominal:      General: Bowel sounds are normal. There is no distension.      Palpations: Abdomen is soft.      Tenderness: There is no abdominal tenderness.   Musculoskeletal:         General: Normal range of motion.      Cervical back: Normal range of motion and neck supple.   Skin:     General: Skin is warm and dry.      Findings: No rash.   Neurological:      General: No focal deficit present.      Mental Status: She is alert. Mental status is at baseline.   Psychiatric:      Comments: Baseline behavior      Health Maintenance   Topic Date Due    Aspirin/Antiplatelet Therapy  Never done    Mammogram  10/04/2023    High Dose Statin  05/05/2024    TETANUS VACCINE  05/30/2024    Lipid Panel  12/23/2024    Hepatitis C Screening  Completed    DEXA Scan  Discontinued

## 2023-05-09 ENCOUNTER — TELEPHONE (OUTPATIENT)
Dept: ADMINISTRATIVE | Facility: HOSPITAL | Age: 63
End: 2023-05-09
Payer: MEDICARE

## 2023-05-11 ENCOUNTER — HOSPITAL ENCOUNTER (OUTPATIENT)
Dept: RADIOLOGY | Facility: HOSPITAL | Age: 63
Discharge: HOME | End: 2023-05-11
Payer: MEDICARE
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
## 2023-05-11 DIAGNOSIS — S09.90XA HEAD TRAUMA, INITIAL ENCOUNTER: ICD-10-CM

## 2023-05-11 DIAGNOSIS — M79.642 LEFT HAND PAIN: ICD-10-CM

## 2023-05-11 PROCEDURE — 73120 X-RAY EXAM OF HAND: CPT | Mod: TC,LT

## 2023-05-11 PROCEDURE — 70450 CT HEAD WITHOUT CONTRAST: ICD-10-PCS | Mod: 26,,, | Performed by: RADIOLOGY

## 2023-05-11 PROCEDURE — 73120 X-RAY EXAM OF HAND: CPT | Mod: 26,LT,, | Performed by: RADIOLOGY

## 2023-05-11 PROCEDURE — 70450 CT HEAD/BRAIN W/O DYE: CPT | Mod: TC

## 2023-05-11 PROCEDURE — 73120 XR HAND 2 VIEW LEFT: ICD-10-PCS | Mod: 26,LT,, | Performed by: RADIOLOGY

## 2023-05-11 PROCEDURE — 70450 CT HEAD/BRAIN W/O DYE: CPT | Mod: 26,,, | Performed by: RADIOLOGY

## 2023-05-21 ENCOUNTER — TELEPHONE (OUTPATIENT)
Dept: ENDOSCOPY | Facility: HOSPITAL | Age: 63
End: 2023-05-21
Payer: MEDICARE

## 2023-05-24 ENCOUNTER — PATIENT MESSAGE (OUTPATIENT)
Dept: ENDOSCOPY | Facility: HOSPITAL | Age: 63
End: 2023-05-24
Payer: MEDICARE

## 2023-05-24 ENCOUNTER — PATIENT MESSAGE (OUTPATIENT)
Dept: PSYCHIATRY | Facility: CLINIC | Age: 63
End: 2023-05-24
Payer: MEDICARE

## 2023-05-24 ENCOUNTER — PATIENT MESSAGE (OUTPATIENT)
Dept: HEMATOLOGY/ONCOLOGY | Facility: CLINIC | Age: 63
End: 2023-05-24
Payer: MEDICARE

## 2023-05-24 ENCOUNTER — PATIENT MESSAGE (OUTPATIENT)
Dept: PRIMARY CARE CLINIC | Facility: CLINIC | Age: 63
End: 2023-05-24
Payer: MEDICARE

## 2023-05-24 RX ORDER — ALPRAZOLAM 1 MG/1
1 TABLET ORAL DAILY PRN
Qty: 30 TABLET | Refills: 0 | Status: SHIPPED | OUTPATIENT
Start: 2023-05-24 | End: 2023-05-30

## 2023-05-29 ENCOUNTER — TELEPHONE (OUTPATIENT)
Dept: PSYCHIATRY | Facility: CLINIC | Age: 63
End: 2023-05-29
Payer: MEDICARE

## 2023-05-30 ENCOUNTER — PATIENT MESSAGE (OUTPATIENT)
Dept: PSYCHIATRY | Facility: CLINIC | Age: 63
End: 2023-05-30

## 2023-05-30 ENCOUNTER — TELEPHONE (OUTPATIENT)
Dept: FAMILY MEDICINE | Facility: CLINIC | Age: 63
End: 2023-05-30
Payer: MEDICARE

## 2023-05-30 ENCOUNTER — TELEPHONE (OUTPATIENT)
Dept: PRIMARY CARE CLINIC | Facility: CLINIC | Age: 63
End: 2023-05-30
Payer: MEDICARE

## 2023-05-30 ENCOUNTER — OFFICE VISIT (OUTPATIENT)
Dept: PSYCHIATRY | Facility: CLINIC | Age: 63
End: 2023-05-30
Payer: MEDICARE

## 2023-05-30 DIAGNOSIS — G47.00 INSOMNIA, UNSPECIFIED TYPE: ICD-10-CM

## 2023-05-30 DIAGNOSIS — R45.1 RESTLESSNESS AND AGITATION: ICD-10-CM

## 2023-05-30 DIAGNOSIS — Z79.899 ON VALPROIC ACID THERAPY: ICD-10-CM

## 2023-05-30 DIAGNOSIS — F79 INTELLECTUAL DISABILITY: Primary | ICD-10-CM

## 2023-05-30 PROCEDURE — 99214 PR OFFICE/OUTPT VISIT, EST, LEVL IV, 30-39 MIN: ICD-10-PCS | Mod: 95,,, | Performed by: PSYCHIATRY & NEUROLOGY

## 2023-05-30 PROCEDURE — 99214 OFFICE O/P EST MOD 30 MIN: CPT | Mod: 95,,, | Performed by: PSYCHIATRY & NEUROLOGY

## 2023-05-30 RX ORDER — QUETIAPINE FUMARATE 400 MG/1
TABLET, FILM COATED ORAL
Qty: 180 TABLET | Refills: 1 | Status: SHIPPED | OUTPATIENT
Start: 2023-05-30 | End: 2023-09-05 | Stop reason: SDUPTHER

## 2023-05-30 RX ORDER — MIRTAZAPINE 45 MG/1
45 TABLET, FILM COATED ORAL NIGHTLY
Qty: 90 TABLET | Refills: 1 | Status: SHIPPED | OUTPATIENT
Start: 2023-05-30 | End: 2023-09-05 | Stop reason: SDUPTHER

## 2023-05-30 RX ORDER — DIVALPROEX SODIUM 500 MG/1
500 TABLET, DELAYED RELEASE ORAL 2 TIMES DAILY
Qty: 180 TABLET | Refills: 1 | Status: SHIPPED | OUTPATIENT
Start: 2023-05-30 | End: 2023-09-05 | Stop reason: SDUPTHER

## 2023-05-30 RX ORDER — ALPRAZOLAM 0.25 MG/1
0.25 TABLET ORAL 2 TIMES DAILY
Qty: 60 TABLET | Refills: 3 | Status: SHIPPED | OUTPATIENT
Start: 2023-05-30 | End: 2023-09-05 | Stop reason: SDUPTHER
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
## 2023-05-30 NOTE — TELEPHONE ENCOUNTER
----- Message from Margy Dennis sent at 5/30/2023  9:46 AM CDT -----  Contact: Alanis Thapa was calling back to speak with the nurse. Please call her back at [phone].    Thanks  TS

## 2023-05-30 NOTE — TELEPHONE ENCOUNTER
----- Message from Margy Dennis sent at 5/30/2023  8:12 AM CDT -----  Contact: Juan Pablo Payton returned the phone call. Please call her back at [phone].    Thanks  TS

## 2023-05-30 NOTE — TELEPHONE ENCOUNTER
Pt states that she was able to get a virtual booked today with psych to discuss sleep med. Also will be cancelling her appt today with Dr. Goldberg.

## 2023-06-02 ENCOUNTER — TELEPHONE (OUTPATIENT)
Dept: FAMILY MEDICINE | Facility: CLINIC | Age: 63
End: 2023-06-02
Payer: MEDICARE

## 2023-06-02 ENCOUNTER — PATIENT MESSAGE (OUTPATIENT)
Dept: PRIMARY CARE CLINIC | Facility: CLINIC | Age: 63
End: 2023-06-02
Payer: MEDICARE

## 2023-06-02 NOTE — TELEPHONE ENCOUNTER
Spoke with pt's sister, stated that we needed to contact [facility] to make sure Benadryl was added to her MAR. Also, stated that she would send housing forms to complete for Juan Pablo through her [employer]. Spoke with pharmacy regarding adding medication.

## 2023-06-02 NOTE — TELEPHONE ENCOUNTER
----- Message from Alexandra Mcgovern sent at 6/2/2023  3:09 PM CDT -----  Regarding: pt call back  Name of Who is Calling:Sister            What is the request in detail: pt needs to be called soon as possible regarding medication and forms to complete            Can the clinic reply by [facility]:           What Number to Call Back if not in [facility]: [phone]

## 2023-06-12 ENCOUNTER — TELEPHONE (OUTPATIENT)
Dept: ADMINISTRATIVE | Facility: HOSPITAL | Age: 63
End: 2023-06-12
Payer: MEDICARE

## 2023-06-20 ENCOUNTER — PATIENT MESSAGE (OUTPATIENT)
Dept: GASTROENTEROLOGY | Facility: CLINIC | Age: 63
End: 2023-06-20
Payer: MEDICARE

## 2023-06-24 ENCOUNTER — LAB VISIT (OUTPATIENT)
Dept: LAB | Facility: HOSPITAL | Age: 63
End: 2023-06-24
Attending: INTERNAL MEDICINE
Payer: MEDICARE

## 2023-06-24 DIAGNOSIS — D69.6 THROMBOCYTOPENIA: ICD-10-CM

## 2023-06-24 LAB
ALBUMIN SERPL BCP-MCNC: 3 G/DL (ref 3.5–5.2)
ALP SERPL-CCNC: 91 U/L (ref 55–135)
ALT SERPL W/O P-5'-P-CCNC: 14 U/L (ref 10–44)
ANION GAP SERPL CALC-SCNC: 12 MMOL/L (ref 8–16)
AST SERPL-CCNC: 14 U/L (ref 10–40)
BASOPHILS # BLD AUTO: 0.03 K/UL (ref 0–0.2)
BASOPHILS NFR BLD: 0.8 % (ref 0–1.9)
BILIRUB SERPL-MCNC: 0.2 MG/DL (ref 0.1–1)
BUN SERPL-MCNC: 19 MG/DL (ref 8–23)
CALCIUM SERPL-MCNC: 9.6 MG/DL (ref 8.7–10.5)
CHLORIDE SERPL-SCNC: 101 MMOL/L (ref 95–110)
CO2 SERPL-SCNC: 27 MMOL/L (ref 23–29)
CREAT SERPL-MCNC: 0.7 MG/DL (ref 0.5–1.4)
DIFFERENTIAL METHOD: ABNORMAL
EOSINOPHIL # BLD AUTO: 0 K/UL (ref 0–0.5)
EOSINOPHIL NFR BLD: 1.1 % (ref 0–8)
ERYTHROCYTE [DISTWIDTH] IN BLOOD BY AUTOMATED COUNT: 12.5 % (ref 11.5–14.5)
EST. GFR  (NO RACE VARIABLE): >60 ML/MIN/1.73 M^2
GLUCOSE SERPL-MCNC: 107 MG/DL (ref 70–110)
HCT VFR BLD AUTO: 38.8 % (ref 37–48.5)
HGB BLD-MCNC: 13.3 G/DL (ref 12–16)
IMM GRANULOCYTES # BLD AUTO: 0.1 K/UL (ref 0–0.04)
IMM GRANULOCYTES NFR BLD AUTO: 2.7 % (ref 0–0.5)
LYMPHOCYTES # BLD AUTO: 1 K/UL (ref 1–4.8)
LYMPHOCYTES NFR BLD: 25.5 % (ref 18–48)
MCH RBC QN AUTO: 31.1 PG (ref 27–31)
MCHC RBC AUTO-ENTMCNC: 34.3 G/DL (ref 32–36)
MCV RBC AUTO: 91 FL (ref 82–98)
MONOCYTES # BLD AUTO: 0.4 K/UL (ref 0.3–1)
MONOCYTES NFR BLD: 10.6 % (ref 4–15)
NEUTROPHILS # BLD AUTO: 2.2 K/UL (ref 1.8–7.7)
NEUTROPHILS NFR BLD: 59.3 % (ref 38–73)
NRBC BLD-RTO: 0 /100 WBC
PLATELET # BLD AUTO: 178 K/UL (ref 150–450)
PMV BLD AUTO: 9.8 FL (ref 9.2–12.9)
POTASSIUM SERPL-SCNC: 4.1 MMOL/L (ref 3.5–5.1)
PROT SERPL-MCNC: 6 G/DL (ref 6–8.4)
RBC # BLD AUTO: 4.27 M/UL (ref 4–5.4)
SODIUM SERPL-SCNC: 140 MMOL/L (ref 136–145)
WBC # BLD AUTO: 3.77 K/UL (ref 3.9–12.7)

## 2023-06-24 PROCEDURE — 80053 COMPREHEN METABOLIC PANEL: CPT | Performed by: INTERNAL MEDICINE

## 2023-06-24 PROCEDURE — 85025 COMPLETE CBC W/AUTO DIFF WBC: CPT | Performed by: INTERNAL MEDICINE

## 2023-06-24 PROCEDURE — 36415 COLL VENOUS BLD VENIPUNCTURE: CPT | Performed by: INTERNAL MEDICINE

## 2023-06-26 ENCOUNTER — PATIENT MESSAGE (OUTPATIENT)
Dept: PRIMARY CARE CLINIC | Facility: CLINIC | Age: 63
End: 2023-06-26
Payer: MEDICARE

## 2023-06-26 ENCOUNTER — OFFICE VISIT (OUTPATIENT)
Dept: PODIATRY | Facility: CLINIC | Age: 63
End: 2023-06-26
Payer: MEDICARE

## 2023-06-26 ENCOUNTER — PATIENT MESSAGE (OUTPATIENT)
Dept: PODIATRY | Facility: CLINIC | Age: 63
End: 2023-06-26

## 2023-06-26 VITALS — WEIGHT: 97 LBS | BODY MASS INDEX: 16.56 KG/M2 | HEIGHT: 64 IN

## 2023-06-26 DIAGNOSIS — F79 MENTAL HANDICAP: ICD-10-CM

## 2023-06-26 DIAGNOSIS — B35.1 ONYCHOMYCOSIS: ICD-10-CM

## 2023-06-26 DIAGNOSIS — L02.415 CUTANEOUS ABSCESS OF RIGHT ANKLE: Primary | ICD-10-CM

## 2023-06-26 PROCEDURE — 99999 PR PBB SHADOW E&M-EST. PATIENT-LVL III: CPT | Mod: PBBFAC,,, | Performed by: PODIATRIST

## 2023-06-26 PROCEDURE — 99999 PR PBB SHADOW E&M-EST. PATIENT-LVL III: ICD-10-PCS | Mod: PBBFAC,,, | Performed by: PODIATRIST

## 2023-06-26 PROCEDURE — 99213 PR OFFICE/OUTPT VISIT, EST, LEVL III, 20-29 MIN: ICD-10-PCS | Mod: S$PBB,,, | Performed by: PODIATRIST

## 2023-06-26 PROCEDURE — 99213 OFFICE O/P EST LOW 20 MIN: CPT | Mod: PBBFAC | Performed by: PODIATRIST

## 2023-06-26 PROCEDURE — 99213 OFFICE O/P EST LOW 20 MIN: CPT | Mod: S$PBB,,, | Performed by: PODIATRIST

## 2023-06-26 RX ORDER — CLINDAMYCIN HYDROCHLORIDE 300 MG/1
300 CAPSULE ORAL EVERY 12 HOURS
Qty: 20 CAPSULE | Refills: 0 | Status: SHIPPED | OUTPATIENT
Start: 2023-06-26 | End: 2023-07-06

## 2023-06-27 ENCOUNTER — PATIENT MESSAGE (OUTPATIENT)
Dept: PSYCHIATRY | Facility: CLINIC | Age: 63
End: 2023-06-27
Payer: MEDICARE

## 2023-06-27 NOTE — TELEPHONE ENCOUNTER
She needs to see wound care. Please continue the antibiotics as prescribed. ER precautions for severe or worsening of symptoms.     I have signed for the following orders AND/OR meds.  Please call the patient and ask the patient to schedule the testing AND/OR inform about any medications that were sent. Medications have been sent to pharmacy listed below      Orders Placed This Encounter   Procedures    Ambulatory referral/consult to Wound Clinic     Standing Status:   Future     Standing Expiration Date:   7/27/2024     Referral Priority:   Routine     Referral Type:   Consultation     Referral Reason:   Specialty Services Required     Referred to Provider:   [facility]     Requested Specialty:   Wound Care     Number of Visits Requested:   1              [facility] [place] - [address] [id number] [facility] [address]  Phone: [phone] Fax: [phone]    [id number] - [place] - [address] AT [facility]  [address]  [place] [address]  Phone: [phone] Fax: [phone]

## 2023-06-28 ENCOUNTER — OFFICE VISIT (OUTPATIENT)
Dept: HEMATOLOGY/ONCOLOGY | Facility: CLINIC | Age: 63
End: 2023-06-28
Payer: MEDICARE

## 2023-06-28 VITALS
HEIGHT: 64 IN | SYSTOLIC BLOOD PRESSURE: 111 MMHG | WEIGHT: 107.56 LBS | OXYGEN SATURATION: 95 % | DIASTOLIC BLOOD PRESSURE: 73 MMHG | HEART RATE: 89 BPM | TEMPERATURE: 98 F | BODY MASS INDEX: 18.36 KG/M2

## 2023-06-28 DIAGNOSIS — D69.6 THROMBOCYTOPENIA: ICD-10-CM

## 2023-06-28 DIAGNOSIS — D72.819 LEUKOPENIA, UNSPECIFIED TYPE: Primary | ICD-10-CM

## 2023-06-28 PROCEDURE — 99213 OFFICE O/P EST LOW 20 MIN: CPT | Mod: PBBFAC | Performed by: INTERNAL MEDICINE

## 2023-06-28 PROCEDURE — 99213 PR OFFICE/OUTPT VISIT, EST, LEVL III, 20-29 MIN: ICD-10-PCS | Mod: S$PBB,,, | Performed by: INTERNAL MEDICINE

## 2023-06-28 PROCEDURE — 99999 PR PBB SHADOW E&M-EST. PATIENT-LVL III: CPT | Mod: PBBFAC,,, | Performed by: INTERNAL MEDICINE

## 2023-06-28 PROCEDURE — 99213 OFFICE O/P EST LOW 20 MIN: CPT | Mod: S$PBB,,, | Performed by: INTERNAL MEDICINE

## 2023-06-28 PROCEDURE — 99999 PR PBB SHADOW E&M-EST. PATIENT-LVL III: ICD-10-PCS | Mod: PBBFAC,,, | Performed by: INTERNAL MEDICINE

## 2023-06-28 NOTE — PROGRESS NOTES
Subjective:      DATE OF VISIT: 23     Patient ID:?Juan Pablo Bolden is a 62 y.o. female.?? MR#: [id number]   ?   REFERRING PROVIDER: No referring provider defined for this encounter.     ? Primary Care Providers:  Brooke Goldberg MD, MD (General)     CHIEF COMPLAINT: ?  Chronic leukopenia, thrombocytopenia?   ?   HPI    Ms. Bolden follows up for leukopenia and thrombocytopenia.  She had recent fall and scalp hematoma on CT head May 2023.  No subsequent fall.  Recent insect bite plan to see wound care right lateral malleolus region bandaged.  No known infection fevers chills night sweats.  She has had successful weight gain with improved nutrition at home care.    Review of Systems    ?   A comprehensive 14-point review of systems was reviewed with patient and was negative other than as specified above.   ?      Objective:      Physical Exam      ?   Vitals:    23 1258   BP: 111/73   Pulse: 89   Temp: 97.5 °F (36.4 °C)        ?   ECO   General appearance:   Agitation, limited interaction, developmental delay  Head, eyes, ears, nose, and throat: moist mucous membranes.   Respiratory:  Normal work of breathing  Abdomen: nontender, nondistended.   Extremities: Warm, without edema.  Right lateral malleolus bandaged insect bite wound  Skin: No rashes, ecchymoses or petechial lesion.   Psychiatric:  Normal mood and affect.    ?   Laboratory:  ?   No visits with results within 1 Day(s) from this visit.   Latest known visit with results is:   Lab Visit on 2023   Component Date Value Ref Range Status    WBC 2023 3.77 (L)  3.90 - 12.70 K/uL Final    RBC 2023 4.27  4.00 - 5.40 M/uL Final    Hemoglobin 2023 13.3  12.0 - 16.0 g/dL Final    Hematocrit 2023 38.8  37.0 - 48.5 % Final    MCV 2023 91  82 - 98 fL Final    MCH 2023 31.1 (H)  27.0 - 31.0 pg Final    MCHC 2023 34.3  32.0 - 36.0 g/dL Final    RDW 2023 12.5  11.5 - 14.5 % Final    Platelets 2023 178  150  - 450 K/uL Final    MPV 06/24/2023 9.8  9.2 - 12.9 fL Final    Immature Granulocytes 06/24/2023 2.7 (H)  0.0 - 0.5 % Final    Gran # (ANC) 06/24/2023 2.2  1.8 - 7.7 K/uL Final    Immature Grans (Abs) 06/24/2023 0.10 (H)  0.00 - 0.04 K/uL Final    Lymph # 06/24/2023 1.0  1.0 - 4.8 K/uL Final    Mono # 06/24/2023 0.4  0.3 - 1.0 K/uL Final    Eos # 06/24/2023 0.0  0.0 - 0.5 K/uL Final    Baso # 06/24/2023 0.03  0.00 - 0.20 K/uL Final    nRBC 06/24/2023 0  0 /100 WBC Final    Gran % 06/24/2023 59.3  38.0 - 73.0 % Final    Lymph % 06/24/2023 25.5  18.0 - 48.0 % Final    Mono % 06/24/2023 10.6  4.0 - 15.0 % Final    Eosinophil % 06/24/2023 1.1  0.0 - 8.0 % Final    Basophil % 06/24/2023 0.8  0.0 - 1.9 % Final    Differential Method 06/24/2023 Automated   Final    Sodium 06/24/2023 140  136 - 145 mmol/L Final    Potassium 06/24/2023 4.1  3.5 - 5.1 mmol/L Final    Chloride 06/24/2023 101  95 - 110 mmol/L Final    CO2 06/24/2023 27  23 - 29 mmol/L Final    Glucose 06/24/2023 107  70 - 110 mg/dL Final    BUN 06/24/2023 19  8 - 23 mg/dL Final    Creatinine 06/24/2023 0.7  0.5 - 1.4 mg/dL Final    Calcium 06/24/2023 9.6  8.7 - 10.5 mg/dL Final    Total Protein 06/24/2023 6.0  6.0 - 8.4 g/dL Final    Albumin 06/24/2023 3.0 (L)  3.5 - 5.2 g/dL Final    Total Bilirubin 06/24/2023 0.2  0.1 - 1.0 mg/dL Final    Alkaline Phosphatase 06/24/2023 91  55 - 135 U/L Final    AST 06/24/2023 14  10 - 40 U/L Final    ALT 06/24/2023 14  10 - 44 U/L Final    eGFR 06/24/2023 >60  >60 mL/min/1.73 m^2 Final    Anion Gap 06/24/2023 12  8 - 16 mmol/L Final          ?   Assessment/Plan:   There are no diagnoses linked to this encounter.       No diagnosis found.          Plan:     #  leukopenia:  Labs available since October 2020 with relatively stable leukopenia, mild lymphocyte predominance.  Flow cytometry previously performed within normal limits.  I did discuss differential diagnosis including medication effect, constitutional / benign but  cannot exclude bone marrow disorder without biopsy although no other clinical concerns/constitutional symptom and may not be tolerated by patient given developmental delay.   HIV and hepatitis-C negative.  Peripheral blood smear review without notable abnormality aside from cytopenia.   Recommend continued surveillance and consideration of further testing/bone marrow biopsy upon significant lab change or clinical concern as patient tolerance allows.     #   Thrombocytopenia:  Sister notes in her 20 use having more severe thrombocytopenia episode hospitalized without recurrent severe thrombocytopenia.  Low normal platelet count relatively stable and most recent labs with improvement to 170s K.  No clinical concern for bleeding.  Continue surveillance.  Differential diagnosis includes immune mediated, medication effect, less likely bone marrow neoplasia.  Recommend continue monitoring.  I reviewed signs and symptoms of low platelet count and recommended prompt follow-up if evidence of bleeding or petechial lesion.      June 2023 labs with relatively stable/improved leukopenia with normal differential no concerning constitutional symptoms.  Thrombocytopenia has resolved on most recent set of labs.  I did discuss routine surveillance expectant management with her primary care and yearly check in with Hematology which he is amenable to.      # Pancreatic lesion/ hepatic lesion: Recommend follow-up with Surgical Oncology/ GI.  Per notes/ caregiver plan for surveillance at this time possible IPMN.      Follow-Up:   Route Chart for Scheduling    Med Onc Chart Routing      Follow up with physician    Follow up with [facility] 1 year.   Infusion scheduling note    Injection scheduling note    Labs CBC   Scheduling:  Preferred lab:  Lab interval:     Imaging    Pharmacy appointment    Other referrals

## 2023-06-28 NOTE — Clinical Note
Kit Goldberg, Patient has been following in our clinic for chronic leukopenia and thrombocytopenia for some time which has been stable waxing and waning likely constitutional.  I do not think she needs hematology follow-up unless other concerns or lab abnormalities develop in future however she would like to follow-up with us in Hematology yearly.  But please let us know if there is any concerns you may have on interval follow-ups with her.  Thank you.

## 2023-06-29 ENCOUNTER — TELEPHONE (OUTPATIENT)
Dept: FAMILY MEDICINE | Facility: CLINIC | Age: 63
End: 2023-06-29
Payer: MEDICARE

## 2023-06-29 NOTE — TELEPHONE ENCOUNTER
----- Message from Radha Antonio sent at 6/29/2023  9:08 AM CDT -----  Contact: Jessie/[facility]  Jessie is needing the patients records faxed over as soon as possible. Please fax over information to [phone]

## 2023-07-02 NOTE — PROGRESS NOTES
Subjective:     Patient ID: Juan Pablo Bolden is a 62 y.o. female.    Chief Complaint: Nail Care (Nail care, also c/o wound on right ankle, lateral aspect, non diabetic wears tennis shoes and socks, Last seen Mary Norwood NP 05/05/2023)      Juan Pablo is a 62 y.o. female who presents to the podiatry clinic  with complaint of left nail toe pain. Patient is accompanied by her sister today, who states she notices concerning area on the right ankle just before her appointment today. Patient's sister states patient's aide has no stated any areas of concern. Patients sister denied any injury to the foot.     Patient Active Problem List   Diagnosis    Central hypothyroidism    Lipodystrophy    Osteopenia    Hyperprolactinemia    PDD (pervasive developmental disorder)    Tardive dyskinesia    Restlessness and agitation    Seizure disorder    Essential hypertension    Leukopenia    Thrombocytopenia    Iron deficiency anemia due to chronic blood loss    Schizoaffective disorder    Other constipation    H. pylori infection    Other symptoms and signs involving the nervous system    Coronary artery calcification    Abnormal CT scan    Calcification of aorta    Liver lesion    Abnormal brain MRI    Pancreas cyst    Mixed hyperlipidemia       Medication List with Changes/Refills   New Medications    CLINDAMYCIN (CLEOCIN) 300 MG CAPSULE    Take 1 capsule (300 mg total) by mouth every 12 (twelve) hours. for 10 days   Current Medications    ACETAMINOPHEN (TYLENOL) 650 MG TBSR    Take 650 mg by mouth every 8 (eight) hours as needed for Pain.    ALPRAZOLAM (XANAX) 0.25 MG TABLET    Take 1 tablet (0.25 mg total) by mouth 2 (two) times a day.    AMLODIPINE (NORVASC) 10 MG TABLET    Take 1 tablet (10 mg total) by mouth once daily.    CETIRIZINE (ZYRTEC) 10 MG TABLET    Take 10 mg by mouth daily as needed for Allergies.    DIAZEPAM (VALIUM) 5 MG TABLET    Take 2 tablets (10 mg total) by mouth every 6 (six) hours as needed for Anxiety.     DICLOFENAC SODIUM (VOLTAREN) 1 % GEL    APPLY TWO GRAMS TO THE AFFECTED AREA FOUR TIMES DAILY AS NEEDED FOR PAIN    DIPHENHYDRAMINE (BENADRYL) 25 MG CAPSULE    Take 25 mg by mouth nightly as needed for Allergies.    DIVALPROEX (DEPAKOTE) 500 MG TBEC    Take 1 tablet (500 mg total) by mouth 2 (two) times daily.    DOCUSATE SODIUM (COLACE) 100 MG CAPSULE    Take 1 capsule (100 mg total) by mouth once daily.    FOLIC ACID (FOLVITE) 1 MG TABLET    Take 1 tablet (1 mg total) by mouth once daily.    FUROSEMIDE (LASIX) 20 MG TABLET    TAKE 1 TABLET BY MOUTH DAILY AS NEEDED FOR SWELLING    MELATONIN 10 MG TBSR    Take 1 tablet by mouth every evening.    MIRTAZAPINE (REMERON) 45 MG TABLET    Take 1 tablet (45 mg total) by mouth every evening.    QUETIAPINE (SEROQUEL) 400 MG TABLET    TAKE 1/2 TABLET BY MOUTH EVERY MORNING and TAKE 1 AND 1/2 TABLETS BY MOUTH AT BEDTIME    SIMETHICONE (MYLICON) 125 MG CAP CAPSULE    1 capsule after meals and at bedtime as needed    SIMVASTATIN (ZOCOR) 40 MG TABLET    Take 1 tablet (40 mg total) by mouth every evening.    TRIAMTERENE-HYDROCHLOROTHIAZIDE 37.5-25 MG (MAXZIDE-25) 37.5-25 MG PER TABLET    Take 1 tablet by mouth every morning.       Review of patient's allergies indicates:  No Known Allergies    Past Surgical History:   Procedure Laterality Date    COLONOSCOPY N/A 5/17/2021    Procedure: COLONOSCOPY;  Surgeon: Jeffrey Ayala MD;  Location: [facility];  Service: Gastroenterology;  Laterality: N/A;    ENDOSCOPIC ULTRASOUND OF UPPER GASTROINTESTINAL TRACT N/A 5/16/2022    Procedure: ULTRASOUND, UPPER GI TRACT, ENDOSCOPIC;  Surgeon: Cherise Marshall MD;  Location: [facility];  Service: Endoscopy;  Laterality: N/A;  Upper and linear, 22 shark core, slides and formalin.    ESOPHAGOGASTRODUODENOSCOPY N/A 11/5/2020    Procedure: EGD (ESOPHAGOGASTRODUODENOSCOPY);  Surgeon: John Batista MD;  Location: [facility];  Service: Endoscopy;  Laterality: N/A;       Family History  "  Problem Relation Age of Onset    Lung cancer Mother     Hypertension Father     Prostate cancer Father     Stroke Maternal Uncle     Heart disease Maternal Grandmother        Social History     Socioeconomic History    Marital status: Single   Tobacco Use    Smoking status: Never     Passive exposure: Never    Smokeless tobacco: Never   Substance and Sexual Activity    Alcohol use: No    Drug use: No    Sexual activity: Never     Social Determinants of Health     Financial Resource Strain: Low Risk     Difficulty of Paying Living Expenses: Not hard at all   Food Insecurity: No Food Insecurity    Worried About Running Out of Food in the Last Year: Never true    Ran Out of Food in the Last Year: Never true   Transportation Needs: No Transportation Needs    Lack of Transportation (Medical): No    Lack of Transportation (Non-Medical): No   Physical Activity: Unknown    Days of Exercise per Week: 0 days   Stress: Unknown    Feeling of Stress : Patient refused   Social Connections: Unknown    Frequency of Communication with Friends and Family: Three times a week    Frequency of Social Gatherings with Friends and Family: More than three times a week    Active Member of Clubs or Organizations: No    Attends Club or Organization Meetings: Patient refused    Marital Status: Patient refused   Housing Stability: Low Risk     Unable to Pay for Housing in the Last Year: No    Number of Places Lived in the Last Year: 1    Unstable Housing in the Last Year: No       Vitals:    06/26/23 1128   Weight: 44 kg (97 lb)   Height: 5' 4" (1.626 m)       Hemoglobin A1C   Date Value Ref Range Status   10/14/2014 5.2 4.5 - 6.2 % Final       Review of Systems   Unable to perform ROS: Mental acuity       Objective:      PHYSICAL EXAM: Apperance: Alert and orient in no distress,well developed, and with good attention to grooming and body habits  Patient presents ambulating in tennis shoes.   LOWER EXTREMITY EXAM:  VASCULAR: Dorsalis pedis " pulses 2/4 bilateral and Posterior Tibial pulses 2/4 bilateral. Capillary fill time <4 seconds bilateral. No edema observed bilateral. Varicosities absent bilateral. Skin temperature of the lower extremities is warm to warm, proximal to distal. Hair growth WNL bilateral. (--) lymphangitis or (--) cellulitis noted right.  DERMATOLOGICAL: (+) edema, (+) erythema, (--) malodor, (--) drainage, (+) warmth to right foot. Intact area of fluctuance noted to right lateral malleoli.     Nails 1,2,3,4,5 bilateral thickened, and discolored with subungual debris. Webspaces 1,2,3,4 bilateral clean, dry and without evidence of break in skin integrity.   NEUROLOGICAL: Light touch, sharp-dull, proprioception all present and equal bilaterally.   MUSCULOSKELETAL: Muscle strength is 5/5 for foot inverters, everters, plantarflexors, and dorsiflexors. Muscle tone is normal. Pain on palpation of right lateral ankle.         Assessment:       Encounter Diagnoses   Name Primary?    Cutaneous abscess of right ankle Yes    Onychomycosis     Mental handicap            Plan:   Cutaneous abscess of right ankle  -     clindamycin (CLEOCIN) 300 MG capsule; Take 1 capsule (300 mg total) by mouth every 12 (twelve) hours. for 10 days  Dispense: 20 capsule; Refill: 0    Onychomycosis    Mental handicap        I counseled the patient on her conditions, regarding findings of my examination, my impressions, and usual treatment plan.   Due to patient being mildly combative when area of right ankle was touched, I will defer from draining area. Patient did allow me to apply Betadine and Mepilex pad to the area. Patients sister was given instructions on dressing changes. Patient was also instructed on the importance of keeping the wound and dressings clean dry and intact and keeping pressure off the wound area until complete healing of the wound.The patient is alerted to watch for any signs of infection (redness, pus, pain, increased swelling or fever) and  call if such occurs. Home wound care instructions are provided.  Prescription written for clindamycin 300 mg to be taken every 12 hours for the next 10 days.  Patients sister instructed on keeping nails filed down.  The patient and I reviewed the types of shoes she should be wearing, my recommendation includes generally the best time of the day for a shoe fitting is the afternoon, shoes with a wide toe box, very good cushion, and tennis shoes with removable inner soles.The patient and I reviewed my recommendations for over-the-counter orthotic inserts.   Patient to return in 2 weeks or sooner if needed.     Addendum: spoke to patient's sister and instructed patient's sister to leave dressing on.  Patient sister did state she spoke with 8 once returning patient to home, and states there may have been a possible insect/spider bite.  Patient sister stated she has appointment with wound care clinic on Thursday June 28th.  Patient's sister also stated that patient has began taking the clindamycin.      Elsa Wilkes DPM  [facility]

## 2023-07-12 ENCOUNTER — OFFICE VISIT (OUTPATIENT)
Dept: PODIATRY | Facility: CLINIC | Age: 63
End: 2023-07-12
Payer: MEDICARE

## 2023-07-12 VITALS — HEIGHT: 64 IN | BODY MASS INDEX: 18.27 KG/M2 | WEIGHT: 107 LBS

## 2023-07-12 DIAGNOSIS — L97.311 ANKLE ULCER, RIGHT, LIMITED TO BREAKDOWN OF SKIN: Primary | ICD-10-CM

## 2023-07-12 DIAGNOSIS — F79 MENTAL HANDICAP: ICD-10-CM

## 2023-07-12 PROCEDURE — 99999 PR PBB SHADOW E&M-EST. PATIENT-LVL III: ICD-10-PCS | Mod: PBBFAC,,, | Performed by: PODIATRIST

## 2023-07-12 PROCEDURE — 99213 OFFICE O/P EST LOW 20 MIN: CPT | Mod: PBBFAC | Performed by: PODIATRIST

## 2023-07-12 PROCEDURE — 99213 PR OFFICE/OUTPT VISIT, EST, LEVL III, 20-29 MIN: ICD-10-PCS | Mod: S$PBB,,, | Performed by: PODIATRIST

## 2023-07-12 PROCEDURE — 99999 PR PBB SHADOW E&M-EST. PATIENT-LVL III: CPT | Mod: PBBFAC,,, | Performed by: PODIATRIST

## 2023-07-12 PROCEDURE — 99213 OFFICE O/P EST LOW 20 MIN: CPT | Mod: S$PBB,,, | Performed by: PODIATRIST

## 2023-07-23 NOTE — PROGRESS NOTES
Subjective:     Patient ID: Juan Pablo Bolden is a 63 y.o. female.    Chief Complaint: Follow-up (2 week f/u right ankle wound, non-diabetic pt, last seen on 05/05/23 with Mary Norwood NP)      Juan Pablo is a 63 y.o. female who presents to the podiatry clinic for follow up of right ankle wound.  Patient's sister states she took patient to wound care clinic.  Patient sister states they have been putting Medihoney on wound.  Patient sister states patient did complete the antibiotics.     Patient Active Problem List   Diagnosis    Central hypothyroidism    Lipodystrophy    Osteopenia    Hyperprolactinemia    PDD (pervasive developmental disorder)    Tardive dyskinesia    Restlessness and agitation    Seizure disorder    Essential hypertension    Leukopenia    Thrombocytopenia    Iron deficiency anemia due to chronic blood loss    Schizoaffective disorder    Other constipation    H. pylori infection    Other symptoms and signs involving the nervous system    Coronary artery calcification    Abnormal CT scan    Calcification of aorta    Liver lesion    Abnormal brain MRI    Pancreas cyst    Mixed hyperlipidemia       Medication List with Changes/Refills   Current Medications    ACETAMINOPHEN (TYLENOL) 650 MG TBSR    Take 650 mg by mouth every 8 (eight) hours as needed for Pain.    ALPRAZOLAM (XANAX) 0.25 MG TABLET    Take 1 tablet (0.25 mg total) by mouth 2 (two) times a day.    AMLODIPINE (NORVASC) 10 MG TABLET    Take 1 tablet (10 mg total) by mouth once daily.    CETIRIZINE (ZYRTEC) 10 MG TABLET    Take 10 mg by mouth daily as needed for Allergies.    DIAZEPAM (VALIUM) 5 MG TABLET    Take 2 tablets (10 mg total) by mouth every 6 (six) hours as needed for Anxiety.    DICLOFENAC SODIUM (VOLTAREN) 1 % GEL    APPLY TWO GRAMS TO THE AFFECTED AREA FOUR TIMES DAILY AS NEEDED FOR PAIN    DIPHENHYDRAMINE (BENADRYL) 25 MG CAPSULE    Take 25 mg by mouth nightly as needed for Allergies.    DIVALPROEX (DEPAKOTE) 500 MG TBEC    Take 1  tablet (500 mg total) by mouth 2 (two) times daily.    DOCUSATE SODIUM (COLACE) 100 MG CAPSULE    Take 1 capsule (100 mg total) by mouth once daily.    FOLIC ACID (FOLVITE) 1 MG TABLET    Take 1 tablet (1 mg total) by mouth once daily.    FUROSEMIDE (LASIX) 20 MG TABLET    TAKE 1 TABLET BY MOUTH DAILY AS NEEDED FOR SWELLING    MELATONIN 10 MG TBSR    Take 1 tablet by mouth every evening.    MIRTAZAPINE (REMERON) 45 MG TABLET    Take 1 tablet (45 mg total) by mouth every evening.    QUETIAPINE (SEROQUEL) 400 MG TABLET    TAKE 1/2 TABLET BY MOUTH EVERY MORNING and TAKE 1 AND 1/2 TABLETS BY MOUTH AT BEDTIME    SIMETHICONE (MYLICON) 125 MG CAP CAPSULE    1 capsule after meals and at bedtime as needed    SIMVASTATIN (ZOCOR) 40 MG TABLET    Take 1 tablet (40 mg total) by mouth every evening.    TRIAMTERENE-HYDROCHLOROTHIAZIDE 37.5-25 MG (MAXZIDE-25) 37.5-25 MG PER TABLET    Take 1 tablet by mouth every morning.       Review of patient's allergies indicates:  No Known Allergies    Past Surgical History:   Procedure Laterality Date    COLONOSCOPY N/A 5/17/2021    Procedure: COLONOSCOPY;  Surgeon: Jeffrey Ayala MD;  Location: [facility];  Service: Gastroenterology;  Laterality: N/A;    ENDOSCOPIC ULTRASOUND OF UPPER GASTROINTESTINAL TRACT N/A 5/16/2022    Procedure: ULTRASOUND, UPPER GI TRACT, ENDOSCOPIC;  Surgeon: Cherise Marshall MD;  Location: [facility];  Service: Endoscopy;  Laterality: N/A;  Upper and linear, 22 shark core, slides and formalin.    ESOPHAGOGASTRODUODENOSCOPY N/A 11/5/2020    Procedure: EGD (ESOPHAGOGASTRODUODENOSCOPY);  Surgeon: John Batista MD;  Location: [facility];  Service: Endoscopy;  Laterality: N/A;       Family History   Problem Relation Age of Onset    Lung cancer Mother     Hypertension Father     Prostate cancer Father     Stroke Maternal Uncle     Heart disease Maternal Grandmother        Social History     Socioeconomic History    Marital status: Single   Tobacco Use     "Smoking status: Never     Passive exposure: Never    Smokeless tobacco: Never   Substance and Sexual Activity    Alcohol use: No    Drug use: No    Sexual activity: Never     Social Determinants of Health     Financial Resource Strain: Low Risk     Difficulty of Paying Living Expenses: Not hard at all   Food Insecurity: No Food Insecurity    Worried About Running Out of Food in the Last Year: Never true    Ran Out of Food in the Last Year: Never true   Transportation Needs: No Transportation Needs    Lack of Transportation (Medical): No    Lack of Transportation (Non-Medical): No   Physical Activity: Unknown    Days of Exercise per Week: 0 days   Stress: Unknown    Feeling of Stress : Patient refused   Social Connections: Unknown    Frequency of Communication with Friends and Family: Three times a week    Frequency of Social Gatherings with Friends and Family: More than three times a week    Active Member of Clubs or Organizations: No    Attends Club or Organization Meetings: Patient refused    Marital Status: Patient refused   Housing Stability: Low Risk     Unable to Pay for Housing in the Last Year: No    Number of Places Lived in the Last Year: 1    Unstable Housing in the Last Year: No       Vitals:    07/12/23 1430   Weight: 48.5 kg (107 lb)   Height: 5' 4" (1.626 m)   PainSc: 0-No pain   PainLoc: Foot       Hemoglobin A1C   Date Value Ref Range Status   10/14/2014 5.2 4.5 - 6.2 % Final       Review of Systems   Unable to perform ROS: Mental acuity       Objective:      PHYSICAL EXAM: Apperance: Alert and orient in no distress,well developed, and with good attention to grooming and body habits  Patient presents ambulating in tennis shoes.   LOWER EXTREMITY EXAM:  VASCULAR: Dorsalis pedis pulses 2/4 bilateral and Posterior Tibial pulses 2/4 bilateral. Capillary fill time <4 seconds bilateral. No edema observed bilateral. Varicosities absent bilateral. Skin temperature of the lower extremities is warm to warm, " proximal to distal. Hair growth WNL bilateral. (--) lymphangitis or (--) cellulitis noted right.  DERMATOLOGICAL:  (--) edema, (--) erythema, (--) malodor, (--) drainage, (+) warmth to right foot.  Ulcer noted to right lateral malleoli  with pink granular base. Ulcer measurement pinpoint.  The ulcer does not extend into deeper tissue and (--) sinus tracts exist.  The dorsum surface of the feet are soft and supple.  The plantar aspects of feet are dry and scaly.   NEUROLOGICAL: Light touch, sharp-dull, proprioception all present and equal bilaterally.   MUSCULOSKELETAL: Muscle strength is 5/5 for foot inverters, everters, plantarflexors, and dorsiflexors. Muscle tone is normal. Pain on palpation of right lateral ankle.         Assessment:       Encounter Diagnoses   Name Primary?    Ankle ulcer, right, limited to breakdown of skin - Right Foot Yes    Mental handicap            Plan:   Ankle ulcer, right, limited to breakdown of skin - Right Foot    Mental handicap    I counseled the patient on her conditions, regarding findings of my examination, my impressions, and usual treatment plan.   With patient's permission, the right lateral malleoli wound was irrigated with sterile saline and bleeding was controlled with direct pressure. Minimal blood loss.  Post debridement measurements are contained in the above progress note. The patient tolerated this well. Wound was then dressed with MediHoney and Mepilex pad. Patient was given instructions on daily dressing changes. Patient was also instructed on the importance of keeping the wound and dressings clean dry and intact and keeping pressure off the wound area until complete healing of the wound.The patient is alerted to watch for any signs of infection (redness, pus, pain, increased swelling or fever) and call if such occurs. Home wound care instructions are provided.  Patient's sister will upload image in 1-2 weeks.   Patient to return as needed.       Elsa Wilkes,  DPM [facility]

## 2023-07-30 ENCOUNTER — TELEPHONE (OUTPATIENT)
Dept: ADMINISTRATIVE | Facility: HOSPITAL | Age: 63
End: 2023-07-30
Payer: MEDICARE

## 2023-08-01 ENCOUNTER — TELEPHONE (OUTPATIENT)
Dept: ENDOSCOPY | Facility: HOSPITAL | Age: 63
End: 2023-08-01

## 2023-08-01 DIAGNOSIS — R93.89 ABNORMAL FINDING ON IMAGING: Primary | ICD-10-CM

## 2023-08-15 ENCOUNTER — OFFICE VISIT (OUTPATIENT)
Dept: URGENT CARE | Facility: CLINIC | Age: 63
End: 2023-08-15
Payer: MEDICARE

## 2023-08-15 VITALS
OXYGEN SATURATION: 98 % | RESPIRATION RATE: 18 BRPM | DIASTOLIC BLOOD PRESSURE: 80 MMHG | TEMPERATURE: 97 F | SYSTOLIC BLOOD PRESSURE: 136 MMHG | HEIGHT: 64 IN | HEART RATE: 89 BPM | WEIGHT: 100.19 LBS | BODY MASS INDEX: 17.11 KG/M2

## 2023-08-15 DIAGNOSIS — M25.472 LEFT ANKLE SWELLING: Primary | ICD-10-CM

## 2023-08-15 PROCEDURE — 99213 PR OFFICE/OUTPT VISIT, EST, LEVL III, 20-29 MIN: ICD-10-PCS | Mod: S$GLB,,,

## 2023-08-15 PROCEDURE — 99213 OFFICE O/P EST LOW 20 MIN: CPT | Mod: S$GLB,,,

## 2023-08-16 NOTE — PROGRESS NOTES
"Subjective:      Patient ID: Juan Pablo Bolden is a 63 y.o. female.    Vitals:  height is 5' 4" (1.626 m) and weight is 45.5 kg (100 lb 3.2 oz). Her temporal temperature is 97.2 °F (36.2 °C). Her blood pressure is 136/80 and her pulse is 89. Her respiration is 18 and oxygen saturation is 98%.     Chief Complaint: Leg Swelling    62 yo female Pt presents with sister for concerns of improved swelling in left lower extremity x yesterday. Sister made sure caregivers gave her an extra fluid pill today and symptoms have improved. Sister was concerned because a caregiver mentioned patient was limping prior to visit but unknown of any trauma. Sister concerned about potential blood clots or trauma.  Pt has taken tylenol for pain with mild relief. Patient has no history of cancer, no recent surgeries or long travel or being immobilized/bed ridden.  Patient not proper historian  with PMHx significant of intellectual disability. NKDA.    Leg Pain   The incident occurred 12 to 24 hours ago. The incident occurred at home. There was no injury mechanism. The pain is present in the left leg and left foot. The pain is at a severity of 7/10. The pain is moderate. The pain has been Improving since onset. She reports no foreign bodies present. Nothing aggravates the symptoms. She has tried acetaminophen for the symptoms. The treatment provided mild relief.       Unable to perform ROS: Other (intellectual disability)   Constitution: Negative for chills and fever.   Cardiovascular:  Positive for leg swelling.   Skin:  Negative for erythema.      Objective:     Vitals:    08/15/23 1910   BP: 136/80   Pulse: 89   Resp: 18   Temp: 97.2 °F (36.2 °C)       Physical Exam   Constitutional: She is oriented to person, place, and time. She appears well-developed.  Non-toxic appearance. She does not appear ill. No distress.      Comments:Patient talking in room in NAD, VSS, sitting in chair while kicking legs up to get footwear on asking about dinner "     HENT:   Head: Normocephalic and atraumatic. Head is without abrasion, without contusion and without laceration.   Ears:   Right Ear: External ear normal.   Left Ear: External ear normal.   Nose: Nose normal.   Mouth/Throat: Oropharynx is clear and moist and mucous membranes are normal.   Eyes: Conjunctivae, EOM and lids are normal. Pupils are equal, round, and reactive to light.   Neck: Trachea normal and phonation normal. Neck supple.   Cardiovascular: Normal rate, regular rhythm and normal heart sounds.   Pulmonary/Chest: Effort normal and breath sounds normal. No stridor. No respiratory distress.   Musculoskeletal:         General: No swelling.      Right knee: Normal. She exhibits normal range of motion.      Left knee: Normal. She exhibits normal range of motion, normal alignment, no LCL laxity, normal patellar mobility and no MCL laxity.      Right ankle: Normal. Achilles tendon normal.      Left ankle: Normal. She exhibits normal range of motion. No tenderness. Achilles tendon normal.      Right lower leg: Normal. She exhibits no swelling. No edema.      Left lower leg: Normal. She exhibits no swelling. No edema.        Legs:       Right foot: Normal range of motion. No deformity. No bruising present.      Left foot: Normal range of motion. No deformity. No bruising present. Anterior drawer test: negative. Comments: Unable to assess sensation due to patient's mental status      Comments: Mild swelling to Left ankle compared to R side with no obvious deformities or tenderness to palpation. No skin abnormalities, evidence of erythema or cellulitis. No baker's cyst. Patient able to bear weight unassisted.    Neurological: She is alert and oriented to person, place, and time. She displays no weakness. Gait (No limp seen while patient entering and exiting room) normal.   Skin: Skin is warm, dry, intact, not diaphoretic and no rash. Capillary refill takes less than 2 seconds. No abrasion, No burn, No bruising,  No erythema and No ecchymosis   Psychiatric: Her speech is normal and behavior is normal. Judgment and thought content normal.   Nursing note and vitals reviewed.      Assessment:     1. Left ankle swelling        Plan:       Left ankle swelling        Patient Instructions   Keep legs propped up on pillows tonight  Continue to take fluid pills as directed by provider   Maintain a low salt diet  You can use over the counter compression stockings   Make sure you move around and avoid a sedentary lifestyle   It can be normal for your feet or ankles to swell after a long day. Monitor your symptoms closely.  If you notice any leg swelling on one side, increased warmth or redness to one leg, pain with walking, shortness of breath, or chest pains, go to the ER immediately.       - You must understand that you have received an Urgent Care treatment only and that you may be released before all of your medical problems are known or treated.   - You, the patient, will arrange for follow up care as instructed with your primary care provider or recommended specialist.   - If your condition worsens or fails to improve we recommend that you receive another evaluation at the ER immediately or contact your PCP to discuss your concerns, or return here.   - Please do not drive or make any important decisions for 24 hours if you have received any pain medications, sedatives or mood altering drugs during your visit.    Disclaimer: This document was drafted with the use of a voice recognition device and is likely to have sound alike errors.          Additional MDM:     Well's Criteria Score:  -Clinical symptoms of DVT (leg swelling, pain with palpation) = 0.0  -Other diagnosis less likely than pulmonary embolism =            0.0  -Heart Rate >100 =   0.0  -Immobilization (= or > than 3 days) or surgery in the previous 4 weeks = 0.0  -Previous DVT/PE = 0.0  -Hemoptysis =          0.0  -Malignancy =           0.0  Well's Probability Score  =    0

## 2023-08-16 NOTE — PATIENT INSTRUCTIONS
Keep legs propped up on pillows tonight  Continue to take fluid pills as directed by provider   Maintain a low salt diet  You can use over the counter compression stockings   Make sure you move around and avoid a sedentary lifestyle   It can be normal for your feet or ankles to swell after a long day. Monitor your symptoms closely.  If you notice any leg swelling on one side, increased warmth or redness to one leg, pain with walking, shortness of breath, or chest pains, go to the ER immediately.       - You must understand that you have received an Urgent Care treatment only and that you may be released before all of your medical problems are known or treated.   - You, the patient, will arrange for follow up care as instructed with your primary care provider or recommended specialist.   - If your condition worsens or fails to improve we recommend that you receive another evaluation at the ER immediately or contact your PCP to discuss your concerns, or return here.   - Please do not drive or make any important decisions for 24 hours if you have received any pain medications, sedatives or mood altering drugs during your visit.    Disclaimer: This document was drafted with the use of a voice recognition device and is likely to have sound alike errors.

## 2023-08-17 ENCOUNTER — TELEPHONE (OUTPATIENT)
Dept: ENDOSCOPY | Facility: HOSPITAL | Age: 63
End: 2023-08-17
Payer: MEDICARE

## 2023-08-18 ENCOUNTER — TELEPHONE (OUTPATIENT)
Dept: URGENT CARE | Facility: CLINIC | Age: 63
End: 2023-08-18
Payer: MEDICARE

## 2023-09-05 ENCOUNTER — OFFICE VISIT (OUTPATIENT)
Dept: PSYCHIATRY | Facility: CLINIC | Age: 63
End: 2023-09-05
Payer: MEDICARE

## 2023-09-05 DIAGNOSIS — Z79.899 ON VALPROIC ACID THERAPY: ICD-10-CM

## 2023-09-05 DIAGNOSIS — R45.1 RESTLESSNESS AND AGITATION: ICD-10-CM

## 2023-09-05 DIAGNOSIS — F79 INTELLECTUAL DISABILITY: Primary | ICD-10-CM

## 2023-09-05 DIAGNOSIS — G47.00 INSOMNIA, UNSPECIFIED TYPE: ICD-10-CM

## 2023-09-05 PROCEDURE — 99214 OFFICE O/P EST MOD 30 MIN: CPT | Mod: 95,,, | Performed by: PSYCHIATRY & NEUROLOGY

## 2023-09-05 PROCEDURE — 99214 PR OFFICE/OUTPT VISIT, EST, LEVL IV, 30-39 MIN: ICD-10-PCS | Mod: 95,,, | Performed by: PSYCHIATRY & NEUROLOGY

## 2023-09-05 RX ORDER — QUETIAPINE FUMARATE 400 MG/1
TABLET, FILM COATED ORAL
Qty: 180 TABLET | Refills: 0 | Status: SHIPPED | OUTPATIENT
Start: 2023-09-05 | End: 2023-10-11

## 2023-09-05 RX ORDER — DIVALPROEX SODIUM 500 MG/1
500 TABLET, DELAYED RELEASE ORAL 2 TIMES DAILY
Qty: 180 TABLET | Refills: 0 | Status: SHIPPED | OUTPATIENT
Start: 2023-09-05 | End: 2023-12-14 | Stop reason: SDUPTHER

## 2023-09-05 RX ORDER — ALPRAZOLAM 0.25 MG/1
0.25 TABLET ORAL 2 TIMES DAILY
Qty: 60 TABLET | Refills: 3 | Status: SHIPPED | OUTPATIENT
Start: 2023-09-05 | End: 2023-10-12

## 2023-09-05 RX ORDER — MIRTAZAPINE 45 MG/1
45 TABLET, FILM COATED ORAL NIGHTLY
Qty: 90 TABLET | Refills: 0 | Status: SHIPPED | OUTPATIENT
Start: 2023-09-05 | End: 2023-12-14 | Stop reason: SDUPTHER

## 2023-09-05 RX ORDER — ALPRAZOLAM 0.25 MG/1
0.25 TABLET ORAL 2 TIMES DAILY PRN
Qty: 10 TABLET | Refills: 0 | Status: SHIPPED | OUTPATIENT
Start: 2023-09-05 | End: 2023-10-05

## 2023-09-06 ENCOUNTER — TELEPHONE (OUTPATIENT)
Dept: GASTROENTEROLOGY | Facility: CLINIC | Age: 63
End: 2023-09-06
Payer: MEDICARE

## 2023-09-06 ENCOUNTER — PATIENT MESSAGE (OUTPATIENT)
Dept: GASTROENTEROLOGY | Facility: CLINIC | Age: 63
End: 2023-09-06
Payer: MEDICARE

## 2023-09-06 NOTE — TELEPHONE ENCOUNTER
----- Message from Megha Laughlin sent at 9/6/2023  4:19 PM CDT -----  Type:  Patient Returning Call    Who Called:sister Alanis Noyola  Who Left Message for Patient:William  Does the patient know what this is regarding?:scheduling with   Would the patient rather a call back or a response via [facility]? 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

## 2023-09-08 ENCOUNTER — TELEPHONE (OUTPATIENT)
Dept: GASTROENTEROLOGY | Facility: CLINIC | Age: 63
End: 2023-09-08
Payer: MEDICARE

## 2023-09-08 NOTE — TELEPHONE ENCOUNTER
----- Message from Rosmery Matamoros MA sent at 9/6/2023  8:38 PM CDT -----  Contact: pt/ sister  William,   I think she is returning your call.  Maude  ----- Message -----  From: Rosangela Aguilar MA  Sent: 9/6/2023   4:04 PM CDT  To: Eduardo Angelo Staff    Patient sister Alanis requesting a call back states that she got a appointment for her sister she was not sure of the doctor name she needed to contact, please call her back at [phone] anytime after 3:30pm, pt sister was not sure if it was  nurse who was the one who called and left the message or someone else, she want this message to  go to the referral doctor who her sister is going to go too for the appointment.       Thanks   Sj

## 2023-09-10 ENCOUNTER — PATIENT MESSAGE (OUTPATIENT)
Dept: PSYCHIATRY | Facility: CLINIC | Age: 63
End: 2023-09-10
Payer: MEDICARE
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
## 2023-09-11 ENCOUNTER — TELEPHONE (OUTPATIENT)
Dept: GASTROENTEROLOGY | Facility: CLINIC | Age: 63
End: 2023-09-11
Payer: MEDICARE

## 2023-09-11 NOTE — TELEPHONE ENCOUNTER
----- Message from Juan Pablo Valladares MA sent at 9/11/2023  7:49 AM CDT -----  Contact: sister [phone]    ----- Message -----  From: Dora Mcgovern  Sent: 9/8/2023   3:50 PM CDT  To: Wilma Patino Staff    Patients sister called back after missing a call from your office. Please call back [phone]. Thanks joseph

## 2023-09-15 ENCOUNTER — TELEPHONE (OUTPATIENT)
Dept: ENDOSCOPY | Facility: HOSPITAL | Age: 63
End: 2023-09-15
Payer: MEDICARE

## 2023-09-19 ENCOUNTER — PATIENT MESSAGE (OUTPATIENT)
Dept: PSYCHIATRY | Facility: CLINIC | Age: 63
End: 2023-09-19
Payer: MEDICARE

## 2023-09-20 RX ORDER — CLONAZEPAM 0.5 MG/1
TABLET ORAL
Qty: 60 TABLET | Refills: 2 | Status: SHIPPED | OUTPATIENT
Start: 2023-09-20 | End: 2023-12-14 | Stop reason: SDUPTHER

## 2023-09-22 ENCOUNTER — TELEPHONE (OUTPATIENT)
Dept: ENDOSCOPY | Facility: HOSPITAL | Age: 63
End: 2023-09-22
Payer: MEDICARE

## 2023-09-26 ENCOUNTER — TELEPHONE (OUTPATIENT)
Dept: ENDOSCOPY | Facility: HOSPITAL | Age: 63
End: 2023-09-26
Payer: MEDICARE

## 2023-09-26 ENCOUNTER — PATIENT MESSAGE (OUTPATIENT)
Dept: ENDOSCOPY | Facility: HOSPITAL | Age: 63
End: 2023-09-26
Payer: MEDICARE

## 2023-09-26 NOTE — TELEPHONE ENCOUNTER
Spoke to sister to schedule procedure(s) Upper Endoscopy Ultrasound (EUS)       Physician to perform procedure(s) Dr. LAURIE Angelo  Date of Procedure (s) 11/21/23  Arrival Time 6:30 AM  Time of Procedure(s) 7:30 AM   Location of Procedure(s) [address]  Type of Rx Prep sent to patient: Other  Instructions provided to patient via Email/[facility]    Patient was informed on the following information and verbalized understanding. Screening questionnaire reviewed with patient and complete. If procedure requires anesthesia, a responsible adult needs to be present to accompany the patient home, patient cannot drive after receiving anesthesia. Appointment details are tentative, especially check-in time. Patient will receive a prep-op call 4 days prior to confirm check-in time for procedure. If applicable the patient should contact their pharmacy to verify Rx for procedure prep is ready for pick-up. Patient was advised to call the scheduling department at [phone] if pharmacy states no Rx is available. Patient was advised to call the endoscopy scheduling department if any questions or concerns arise.      [facility] Scheduling Department

## 2023-10-03 ENCOUNTER — PATIENT MESSAGE (OUTPATIENT)
Dept: PSYCHIATRY | Facility: CLINIC | Age: 63
End: 2023-10-03
Payer: MEDICARE

## 2023-10-03 RX ORDER — TRAZODONE HYDROCHLORIDE 150 MG/1
150 TABLET ORAL NIGHTLY PRN
Qty: 30 TABLET | Refills: 2 | Status: SHIPPED | OUTPATIENT
Start: 2023-10-03 | End: 2023-12-14 | Stop reason: SDUPTHER

## 2023-10-10 ENCOUNTER — PATIENT MESSAGE (OUTPATIENT)
Dept: PSYCHIATRY | Facility: CLINIC | Age: 63
End: 2023-10-10
Payer: MEDICARE

## 2023-10-11 RX ORDER — OLANZAPINE 20 MG/1
TABLET ORAL
Qty: 60 TABLET | Refills: 2 | Status: SHIPPED | OUTPATIENT
Start: 2023-10-11 | End: 2023-12-14

## 2023-10-12 ENCOUNTER — APPOINTMENT (OUTPATIENT)
Dept: RADIOLOGY | Facility: HOSPITAL | Age: 63
End: 2023-10-12
Attending: NURSE PRACTITIONER
Payer: MEDICARE

## 2023-10-12 ENCOUNTER — TELEPHONE (OUTPATIENT)
Dept: FAMILY MEDICINE | Facility: CLINIC | Age: 63
End: 2023-10-12
Payer: MEDICARE

## 2023-10-12 ENCOUNTER — OFFICE VISIT (OUTPATIENT)
Dept: PRIMARY CARE CLINIC | Facility: CLINIC | Age: 63
End: 2023-10-12
Payer: MEDICARE

## 2023-10-12 VITALS
DIASTOLIC BLOOD PRESSURE: 84 MMHG | WEIGHT: 109.13 LBS | OXYGEN SATURATION: 98 % | HEIGHT: 64 IN | SYSTOLIC BLOOD PRESSURE: 136 MMHG | BODY MASS INDEX: 18.63 KG/M2 | HEART RATE: 80 BPM

## 2023-10-12 DIAGNOSIS — M79.605 PAIN OF LEFT LOWER EXTREMITY: Primary | ICD-10-CM

## 2023-10-12 DIAGNOSIS — M79.605 PAIN OF LEFT LOWER EXTREMITY: ICD-10-CM

## 2023-10-12 DIAGNOSIS — Z23 NEED FOR INFLUENZA VACCINATION: ICD-10-CM

## 2023-10-12 PROCEDURE — 99999 PR PBB SHADOW E&M-EST. PATIENT-LVL V: ICD-10-PCS | Mod: PBBFAC,,, | Performed by: NURSE PRACTITIONER

## 2023-10-12 PROCEDURE — 93971 EXTREMITY STUDY: CPT | Mod: 26,LT,, | Performed by: RADIOLOGY

## 2023-10-12 PROCEDURE — 93971 EXTREMITY STUDY: CPT | Mod: TC,PO,LT

## 2023-10-12 PROCEDURE — 99999PBSHW FLU VACCINE (QUAD) GREATER THAN OR EQUAL TO 3YO PRESERVATIVE FREE IM: Mod: PBBFAC,,,

## 2023-10-12 PROCEDURE — 93971 US LOWER EXTREMITY VEINS LEFT: ICD-10-PCS | Mod: 26,LT,, | Performed by: RADIOLOGY

## 2023-10-12 PROCEDURE — 99999 PR PBB SHADOW E&M-EST. PATIENT-LVL V: CPT | Mod: PBBFAC,,, | Performed by: NURSE PRACTITIONER

## 2023-10-12 PROCEDURE — 99213 PR OFFICE/OUTPT VISIT, EST, LEVL III, 20-29 MIN: ICD-10-PCS | Mod: S$PBB,,, | Performed by: NURSE PRACTITIONER

## 2023-10-12 PROCEDURE — 99213 OFFICE O/P EST LOW 20 MIN: CPT | Mod: S$PBB,,, | Performed by: NURSE PRACTITIONER

## 2023-10-12 PROCEDURE — 99215 OFFICE O/P EST HI 40 MIN: CPT | Mod: PBBFAC,PN,25 | Performed by: NURSE PRACTITIONER

## 2023-10-12 PROCEDURE — G0008 ADMIN INFLUENZA VIRUS VAC: HCPCS | Mod: PBBFAC,PN

## 2023-10-12 PROCEDURE — 99999PBSHW FLU VACCINE (QUAD) GREATER THAN OR EQUAL TO 3YO PRESERVATIVE FREE IM: ICD-10-PCS | Mod: PBBFAC,,,

## 2023-10-12 NOTE — TELEPHONE ENCOUNTER
----- Message from Amira Miller sent at 10/12/2023  9:15 AM CDT -----  Contact: Alanis/sister  Patient's sister is calling to speak with a nurse regarding possibly being seen today . States pt left elg/foot has been swollen for going on a month now . Please give a call back at [phone] . (home)

## 2023-10-12 NOTE — PROGRESS NOTES
Assessment/Plan:    Problem List Items Addressed This Visit    None  Visit Diagnoses       Pain of left lower extremity    -  Primary    Relevant Orders    US Lower Extremity Veins Left    US Lower Extremity Veins Left    Need for influenza vaccination        Relevant Orders    Influenza - Quadrivalent *Preferred* (6 months+) (PF) (Completed)            No follow-ups on file.    Sandra Alvarado, ROSE  _____________________________________________________________________________________________________________________________________________________    CC: leg swelling and pain     HPI:    Patient is in clinic today as an established patient. Patient complaining of pain in left leg. Sister reports patient has been having left lower extremity swelling over the past 2 months despite taking lasix. Denies any injury or trauma. No shortness of breath.     No other new complaints today.  Remaining chronic conditions have been reviewed and remain stable. Further detail as stated above.     HM reviewed.     No recent changes to medical/surgical history.    Current Outpatient Medications on File Prior to Visit   Medication Sig Dispense Refill    acetaminophen (TYLENOL) 650 MG TbSR Take 650 mg by mouth every 8 (eight) hours as needed for Pain.      amLODIPine (NORVASC) 10 MG tablet Take 1 tablet (10 mg total) by mouth once daily. 90 tablet 3    cetirizine (ZYRTEC) 10 MG tablet Take 10 mg by mouth daily as needed for Allergies.      clonazePAM (KLONOPIN) 0.5 MG tablet Take 1 nightly at bedtime and one daily as needed for agitation 60 tablet 2    diclofenac sodium (VOLTAREN) 1 % Gel APPLY TWO GRAMS TO THE AFFECTED AREA FOUR TIMES DAILY AS NEEDED FOR PAIN 200 g 1    diphenhydrAMINE (BENADRYL) 25 mg capsule Take 25 mg by mouth nightly as needed for Allergies.      divalproex (DEPAKOTE) 500 MG TbEC Take 1 tablet (500 mg total) by mouth 2 (two) times daily. 180 tablet 0    docusate sodium (COLACE) 100 MG capsule Take 1 capsule (100 mg  "total) by mouth once daily. 90 capsule 3    folic acid (FOLVITE) 1 MG tablet Take 1 tablet (1 mg total) by mouth once daily. 90 tablet 3    furosemide (LASIX) 20 MG tablet TAKE 1 TABLET BY MOUTH DAILY AS NEEDED FOR SWELLING 30 tablet 11    melatonin 10 mg TbSR Take 1 tablet by mouth every evening. 30 tablet 11    mirtazapine (REMERON) 45 MG tablet Take 1 tablet (45 mg total) by mouth every evening. 90 tablet 0    OLANZapine (ZYPREXA) 20 MG tablet Take 1/2 tablet each morning and 1 and 1/2 tablets at bedtime. 60 tablet 2    simethicone (MYLICON) 125 mg Cap capsule 1 capsule after meals and at bedtime as needed      simvastatin (ZOCOR) 40 MG tablet Take 1 tablet (40 mg total) by mouth every evening. 90 tablet 3    traZODone (DESYREL) 150 MG tablet Take 1 tablet (150 mg total) by mouth nightly as needed for Insomnia. 30 tablet 2    triamterene-hydrochlorothiazide 37.5-25 mg (MAXZIDE-25) 37.5-25 mg per tablet Take 1 tablet by mouth every morning. 90 tablet 3    diazePAM (VALIUM) 5 MG tablet Take 2 tablets (10 mg total) by mouth every 6 (six) hours as needed for Anxiety. 4 tablet 0    [DISCONTINUED] ALPRAZolam (XANAX) 0.25 MG tablet Take 1 tablet (0.25 mg total) by mouth 2 (two) times a day. (Patient not taking: Reported on 10/12/2023) 60 tablet 3     No current facility-administered medications on file prior to visit.       Review of Systems   Cardiovascular:  Positive for leg swelling (left lower and pain).       Vitals:    10/12/23 1347   BP: 136/84   Pulse: 80   SpO2: 98%   Weight: 49.5 kg (109 lb 2 oz)   Height: 5' 4" (1.626 m)       Wt Readings from Last 3 Encounters:   10/12/23 49.5 kg (109 lb 2 oz)   08/15/23 45.5 kg (100 lb 3.2 oz)   07/12/23 48.5 kg (107 lb)       Physical Exam  Vitals and nursing note reviewed.   Constitutional:       Appearance: She is well-developed.   HENT:      Head: Normocephalic and atraumatic.   Eyes:      Conjunctiva/sclera: Conjunctivae normal.   Cardiovascular:      Rate and Rhythm: " Normal rate and regular rhythm.      Pulses:           Dorsalis pedis pulses are 2+ on the left side.      Heart sounds: Normal heart sounds.      Comments: LLE: Neurovascularly intact   Pulmonary:      Effort: Pulmonary effort is normal.      Breath sounds: Normal breath sounds.   Musculoskeletal:         General: Normal range of motion.      Cervical back: Normal range of motion and neck supple.      Left lower le+ Edema present.   Skin:     General: Skin is warm and dry.   Neurological:      Mental Status: She is alert. Mental status is at baseline.   Psychiatric:      Comments: Baseline behavior          Health Maintenance   Topic Date Due    Aspirin/Antiplatelet Therapy  Never done    Shingles Vaccine (1 of 2) Never done    Mammogram  10/04/2023    TETANUS VACCINE  2024    High Dose Statin  10/12/2024    Lipid Panel  2024    Colorectal Cancer Screening  2031    Hepatitis C Screening  Completed    DEXA Scan  Discontinued

## 2023-10-13 ENCOUNTER — PATIENT MESSAGE (OUTPATIENT)
Dept: PRIMARY CARE CLINIC | Facility: CLINIC | Age: 63
End: 2023-10-13
Payer: MEDICARE

## 2023-10-13 DIAGNOSIS — M79.89 LEG SWELLING: Primary | ICD-10-CM

## 2023-10-13 DIAGNOSIS — R60.0 LOWER EXTREMITY EDEMA: ICD-10-CM

## 2023-10-13 NOTE — TELEPHONE ENCOUNTER
I have signed for the following orders AND/OR meds.  Please call the patient and ask the patient to schedule the testing AND/OR inform about any medications that were sent. Medications have been sent to pharmacy listed below      Orders Placed This Encounter   Procedures    X-Ray Foot Complete 3 view Left     Standing Status:   Future     Standing Expiration Date:   10/13/2024     Order Specific Question:   May the Radiologist modify the order per protocol to meet the clinical needs of the patient?     Answer:   Yes     Order Specific Question:   Release to patient     Answer:   Immediate    X-Ray Ankle Complete 3 View Left     Standing Status:   Future     Standing Expiration Date:   10/13/2024     Order Specific Question:   May the Radiologist modify the order per protocol to meet the clinical needs of the patient?     Answer:   Yes     Order Specific Question:   Release to patient     Answer:   Immediate    US Lower Extremity Veins Bilateral     Standing Status:   Future     Standing Expiration Date:   10/13/2024     Order Specific Question:   Reason for Exam:     Answer:   venous insufficiency              [facility] - [place] - [address] [address] [facility]  [place] [address]  Phone: [phone] Fax: [phone]    [id number] - [place] - [address] AT [facility]  [address]  [place] [address]  Phone: [phone] Fax: [phone]

## 2023-10-18 ENCOUNTER — TELEPHONE (OUTPATIENT)
Dept: FAMILY MEDICINE | Facility: CLINIC | Age: 63
End: 2023-10-18
Payer: MEDICARE

## 2023-10-18 NOTE — TELEPHONE ENCOUNTER
----- Message from Nancy Gao sent at 10/18/2023  4:08 PM CDT -----  Patients sister is requesting a call back concerning the call she missed to schedule. [phone]

## 2023-10-31 ENCOUNTER — OFFICE VISIT (OUTPATIENT)
Dept: PRIMARY CARE CLINIC | Facility: CLINIC | Age: 63
End: 2023-10-31
Payer: MEDICARE

## 2023-10-31 VITALS
HEART RATE: 85 BPM | TEMPERATURE: 98 F | SYSTOLIC BLOOD PRESSURE: 118 MMHG | BODY MASS INDEX: 18.28 KG/M2 | OXYGEN SATURATION: 100 % | DIASTOLIC BLOOD PRESSURE: 70 MMHG | WEIGHT: 106.5 LBS

## 2023-10-31 DIAGNOSIS — R60.0 LOWER EXTREMITY EDEMA: ICD-10-CM

## 2023-10-31 DIAGNOSIS — E03.8 CENTRAL HYPOTHYROIDISM: ICD-10-CM

## 2023-10-31 DIAGNOSIS — F84.9 PDD (PERVASIVE DEVELOPMENTAL DISORDER): ICD-10-CM

## 2023-10-31 DIAGNOSIS — K86.2 PANCREAS CYST: ICD-10-CM

## 2023-10-31 DIAGNOSIS — D72.819 LEUKOPENIA, UNSPECIFIED TYPE: ICD-10-CM

## 2023-10-31 DIAGNOSIS — I10 ESSENTIAL HYPERTENSION: ICD-10-CM

## 2023-10-31 DIAGNOSIS — M15.9 PRIMARY OSTEOARTHRITIS INVOLVING MULTIPLE JOINTS: Primary | ICD-10-CM

## 2023-10-31 DIAGNOSIS — D70.9 NEUTROPENIA, UNSPECIFIED TYPE: ICD-10-CM

## 2023-10-31 PROCEDURE — 99214 OFFICE O/P EST MOD 30 MIN: CPT | Mod: S$PBB,,, | Performed by: INTERNAL MEDICINE

## 2023-10-31 PROCEDURE — 99213 OFFICE O/P EST LOW 20 MIN: CPT | Mod: PBBFAC,PN | Performed by: INTERNAL MEDICINE

## 2023-10-31 PROCEDURE — 99999 PR PBB SHADOW E&M-EST. PATIENT-LVL III: CPT | Mod: PBBFAC,,, | Performed by: INTERNAL MEDICINE

## 2023-10-31 PROCEDURE — 99999 PR PBB SHADOW E&M-EST. PATIENT-LVL III: ICD-10-PCS | Mod: PBBFAC,,, | Performed by: INTERNAL MEDICINE

## 2023-10-31 PROCEDURE — 99214 PR OFFICE/OUTPT VISIT, EST, LEVL IV, 30-39 MIN: ICD-10-PCS | Mod: S$PBB,,, | Performed by: INTERNAL MEDICINE

## 2023-10-31 RX ORDER — DICLOFENAC SODIUM 10 MG/G
GEL TOPICAL
Qty: 200 G | Refills: 1 | Status: SHIPPED | OUTPATIENT
Start: 2023-10-31

## 2023-10-31 RX ORDER — FUROSEMIDE 20 MG/1
TABLET ORAL
Qty: 30 TABLET | Refills: 11 | Status: SHIPPED | OUTPATIENT
Start: 2023-10-31

## 2023-11-12 ENCOUNTER — HOSPITAL ENCOUNTER (EMERGENCY)
Facility: HOSPITAL | Age: 63
Discharge: HOME | End: 2023-11-13
Attending: EMERGENCY MEDICINE
Payer: MEDICARE

## 2023-11-12 VITALS
RESPIRATION RATE: 20 BRPM | WEIGHT: 106 LBS | SYSTOLIC BLOOD PRESSURE: 143 MMHG | HEART RATE: 66 BPM | DIASTOLIC BLOOD PRESSURE: 69 MMHG | OXYGEN SATURATION: 97 % | BODY MASS INDEX: 18.19 KG/M2 | TEMPERATURE: 97 F

## 2023-11-12 DIAGNOSIS — K59.00 CONSTIPATION, UNSPECIFIED CONSTIPATION TYPE: Primary | ICD-10-CM

## 2023-11-12 DIAGNOSIS — M79.89 LEG SWELLING: ICD-10-CM

## 2023-11-12 LAB
ALBUMIN SERPL BCP-MCNC: 3 G/DL (ref 3.5–5.2)
ALP SERPL-CCNC: 78 U/L (ref 55–135)
ALT SERPL W/O P-5'-P-CCNC: 19 U/L (ref 10–44)
ANION GAP SERPL CALC-SCNC: 8 MMOL/L (ref 8–16)
AST SERPL-CCNC: 24 U/L (ref 10–40)
BASOPHILS # BLD AUTO: 0.03 K/UL (ref 0–0.2)
BASOPHILS NFR BLD: 1.1 % (ref 0–1.9)
BILIRUB SERPL-MCNC: 0.3 MG/DL (ref 0.1–1)
BUN SERPL-MCNC: 16 MG/DL (ref 8–23)
CALCIUM SERPL-MCNC: 8.7 MG/DL (ref 8.7–10.5)
CHLORIDE SERPL-SCNC: 97 MMOL/L (ref 95–110)
CO2 SERPL-SCNC: 31 MMOL/L (ref 23–29)
CREAT SERPL-MCNC: 0.7 MG/DL (ref 0.5–1.4)
DIFFERENTIAL METHOD: ABNORMAL
EOSINOPHIL # BLD AUTO: 0.1 K/UL (ref 0–0.5)
EOSINOPHIL NFR BLD: 3.3 % (ref 0–8)
ERYTHROCYTE [DISTWIDTH] IN BLOOD BY AUTOMATED COUNT: 11.9 % (ref 11.5–14.5)
EST. GFR  (NO RACE VARIABLE): >60 ML/MIN/1.73 M^2
GLUCOSE SERPL-MCNC: 87 MG/DL (ref 70–110)
HCT VFR BLD AUTO: 35.8 % (ref 37–48.5)
HGB BLD-MCNC: 12.1 G/DL (ref 12–16)
IMM GRANULOCYTES # BLD AUTO: 0.06 K/UL (ref 0–0.04)
IMM GRANULOCYTES NFR BLD AUTO: 2.2 % (ref 0–0.5)
LIPASE SERPL-CCNC: 22 U/L (ref 4–60)
LYMPHOCYTES # BLD AUTO: 0.9 K/UL (ref 1–4.8)
LYMPHOCYTES NFR BLD: 31.3 % (ref 18–48)
MCH RBC QN AUTO: 29.9 PG (ref 27–31)
MCHC RBC AUTO-ENTMCNC: 33.8 G/DL (ref 32–36)
MCV RBC AUTO: 88 FL (ref 82–98)
MONOCYTES # BLD AUTO: 0.5 K/UL (ref 0.3–1)
MONOCYTES NFR BLD: 19.5 % (ref 4–15)
NEUTROPHILS # BLD AUTO: 1.2 K/UL (ref 1.8–7.7)
NEUTROPHILS NFR BLD: 42.6 % (ref 38–73)
NRBC BLD-RTO: 0 /100 WBC
PLATELET # BLD AUTO: 92 K/UL (ref 150–450)
PMV BLD AUTO: 11.1 FL (ref 9.2–12.9)
POTASSIUM SERPL-SCNC: 4.3 MMOL/L (ref 3.5–5.1)
PROT SERPL-MCNC: 5.7 G/DL (ref 6–8.4)
RBC # BLD AUTO: 4.05 M/UL (ref 4–5.4)
SODIUM SERPL-SCNC: 136 MMOL/L (ref 136–145)
WBC # BLD AUTO: 2.72 K/UL (ref 3.9–12.7)

## 2023-11-12 PROCEDURE — 80053 COMPREHEN METABOLIC PANEL: CPT | Performed by: NURSE PRACTITIONER

## 2023-11-12 PROCEDURE — 96374 THER/PROPH/DIAG INJ IV PUSH: CPT

## 2023-11-12 PROCEDURE — 25500020 PHARM REV CODE 255: Performed by: EMERGENCY MEDICINE

## 2023-11-12 PROCEDURE — 83690 ASSAY OF LIPASE: CPT | Performed by: NURSE PRACTITIONER

## 2023-11-12 PROCEDURE — 63600175 PHARM REV CODE 636 W HCPCS: Performed by: EMERGENCY MEDICINE

## 2023-11-12 PROCEDURE — 85025 COMPLETE CBC W/AUTO DIFF WBC: CPT | Performed by: NURSE PRACTITIONER

## 2023-11-12 PROCEDURE — 99900035 HC TECH TIME PER 15 MIN (STAT)

## 2023-11-12 PROCEDURE — 96361 HYDRATE IV INFUSION ADD-ON: CPT

## 2023-11-12 PROCEDURE — 99285 EMERGENCY DEPT VISIT HI MDM: CPT | Mod: 25

## 2023-11-12 PROCEDURE — 96372 THER/PROPH/DIAG INJ SC/IM: CPT | Performed by: EMERGENCY MEDICINE

## 2023-11-12 PROCEDURE — 25000003 PHARM REV CODE 250: Performed by: EMERGENCY MEDICINE

## 2023-11-12 RX ORDER — MIRTAZAPINE 15 MG/1
45 TABLET, FILM COATED ORAL ONCE
Status: COMPLETED | OUTPATIENT
Start: 2023-11-12 | End: 2023-11-12

## 2023-11-12 RX ORDER — QUETIAPINE FUMARATE 100 MG/1
400 TABLET, FILM COATED ORAL
Status: COMPLETED | OUTPATIENT
Start: 2023-11-12 | End: 2023-11-12

## 2023-11-12 RX ORDER — CLONAZEPAM 0.5 MG/1
0.5 TABLET ORAL
Status: COMPLETED | OUTPATIENT
Start: 2023-11-12 | End: 2023-11-12

## 2023-11-12 RX ORDER — MIDAZOLAM HYDROCHLORIDE 5 MG/ML
2 INJECTION INTRAMUSCULAR; INTRAVENOUS
Status: COMPLETED | OUTPATIENT
Start: 2023-11-12 | End: 2023-11-12

## 2023-11-12 RX ORDER — KETAMINE HYDROCHLORIDE 10 MG/ML
2 INJECTION, SOLUTION INTRAMUSCULAR; INTRAVENOUS
Status: COMPLETED | OUTPATIENT
Start: 2023-11-12 | End: 2023-11-12

## 2023-11-12 RX ORDER — KETAMINE HYDROCHLORIDE 100 MG/ML
50 INJECTION, SOLUTION INTRAMUSCULAR; INTRAVENOUS
Status: COMPLETED | OUTPATIENT
Start: 2023-11-12 | End: 2023-11-12

## 2023-11-12 RX ORDER — TALC
9 POWDER (GRAM) TOPICAL ONCE
Status: COMPLETED | OUTPATIENT
Start: 2023-11-12 | End: 2023-11-12

## 2023-11-12 RX ORDER — LACTULOSE 10 G/15ML
30 SOLUTION ORAL
Status: DISCONTINUED | OUTPATIENT
Start: 2023-11-12 | End: 2023-11-12

## 2023-11-12 RX ORDER — GLYCERIN 1 G/1
1 SUPPOSITORY RECTAL ONCE
Status: COMPLETED | OUTPATIENT
Start: 2023-11-12 | End: 2023-11-12

## 2023-11-12 RX ORDER — DIVALPROEX SODIUM 500 MG/1
500 TABLET, DELAYED RELEASE ORAL
Status: COMPLETED | OUTPATIENT
Start: 2023-11-12 | End: 2023-11-12

## 2023-11-12 RX ORDER — ATORVASTATIN CALCIUM 40 MG/1
40 TABLET, FILM COATED ORAL
Status: COMPLETED | OUTPATIENT
Start: 2023-11-12 | End: 2023-11-12

## 2023-11-12 RX ORDER — GLYCERIN 1 G/1
1 SUPPOSITORY RECTAL ONCE
Status: DISCONTINUED | OUTPATIENT
Start: 2023-11-12 | End: 2023-11-12

## 2023-11-12 RX ORDER — TRAZODONE HYDROCHLORIDE 50 MG/1
150 TABLET ORAL ONCE
Status: COMPLETED | OUTPATIENT
Start: 2023-11-12 | End: 2023-11-12

## 2023-11-12 RX ADMIN — MIDAZOLAM HYDROCHLORIDE 2 MG: 5 INJECTION, SOLUTION INTRAMUSCULAR; INTRAVENOUS at 09:11

## 2023-11-12 RX ADMIN — GLYCERIN 1 SUPPOSITORY: 2 SUPPOSITORY RECTAL at 11:11

## 2023-11-12 RX ADMIN — TRAZODONE HYDROCHLORIDE 150 MG: 50 TABLET ORAL at 11:11

## 2023-11-12 RX ADMIN — MIRTAZAPINE 45 MG: 15 TABLET, FILM COATED ORAL at 11:11

## 2023-11-12 RX ADMIN — SODIUM CHLORIDE, SODIUM LACTATE, POTASSIUM CHLORIDE, AND CALCIUM CHLORIDE 1000 ML: .6; .31; .03; .02 INJECTION, SOLUTION INTRAVENOUS at 10:11

## 2023-11-12 RX ADMIN — QUETIAPINE FUMARATE 400 MG: 100 TABLET ORAL at 11:11

## 2023-11-12 RX ADMIN — IOHEXOL 100 ML: 350 INJECTION, SOLUTION INTRAVENOUS at 09:11

## 2023-11-12 RX ADMIN — KETAMINE HYDROCHLORIDE 50 MG: 100 INJECTION, SOLUTION, CONCENTRATE INTRAMUSCULAR; INTRAVENOUS at 09:11

## 2023-11-12 RX ADMIN — ATORVASTATIN CALCIUM 40 MG: 40 TABLET, FILM COATED ORAL at 11:11

## 2023-11-12 RX ADMIN — DIVALPROEX SODIUM 500 MG: 500 TABLET, DELAYED RELEASE ORAL at 11:11

## 2023-11-12 RX ADMIN — CLONAZEPAM 0.5 MG: 0.5 TABLET ORAL at 11:11

## 2023-11-12 RX ADMIN — Medication 9 MG: at 11:11

## 2023-11-12 RX ADMIN — KETAMINE HYDROCHLORIDE 96.2 MG: 10 INJECTION INTRAMUSCULAR; INTRAVENOUS at 09:11

## 2023-11-12 NOTE — Clinical Note
Alanis Bolden accompanied their sister(s) to the emergency department on 11/12/2023. They may return to work on 11/15/2023.      If you have any questions or concerns, please don't hesitate to call.      Danitza Ribeiro RN

## 2023-11-13 ENCOUNTER — TELEPHONE (OUTPATIENT)
Dept: GASTROENTEROLOGY | Facility: CLINIC | Age: 63
End: 2023-11-13
Payer: MEDICARE

## 2023-11-13 RX ORDER — GLYCERIN 1 G/1
1 SUPPOSITORY RECTAL
Qty: 12 SUPPOSITORY | Refills: 0 | Status: ON HOLD | OUTPATIENT
Start: 2023-11-13 | End: 2024-03-29 | Stop reason: HOSPADM

## 2023-11-13 RX ORDER — POLYETHYLENE GLYCOL 3350 17 G/17G
POWDER, FOR SOLUTION ORAL
Qty: 850 G | Refills: 0 | Status: SHIPPED | OUTPATIENT
Start: 2023-11-13 | End: 2024-04-02

## 2023-11-13 RX ORDER — POLYETHYLENE GLYCOL 3350 17 G/17G
17 POWDER, FOR SOLUTION ORAL DAILY
Qty: 116 G | Refills: 0 | Status: SHIPPED | OUTPATIENT
Start: 2023-11-13 | End: 2023-11-13 | Stop reason: SDUPTHER

## 2023-11-13 NOTE — FIRST PROVIDER EVALUATION
Medical screening examination initiated.  I have conducted a focused provider triage encounter, findings are as follows:    Brief history of present illness:  Patient sent to the emergency room from urgent care with possible small bowel obstruction.  Constipation x1 week.    There were no vitals filed for this visit.    Pertinent physical exam:      Brief workup plan:      Preliminary workup initiated; this workup will be continued and followed by the physician or advanced practice provider that is assigned to the patient when roomed.

## 2023-11-13 NOTE — ED PROVIDER NOTES
"SCRIBE #1 NOTE: I, Precious Beard, am scribing for, and in the presence of, Mae Rico DO. I have scribed the entire note.       History     Chief Complaint   Patient presents with    Abdominal Pain     Pt sister reports they were sent by urgent care for possible ileus. LBM x1 week.      Review of patient's allergies indicates:  No Known Allergies      History of Present Illness     HPI    11/12/2023, 8:32 PM  History obtained from the pt's sister      History of Present Illness: Juan Pablo Bolden is a 63 y.o. female patient with a PMHx of galactorrhea, MR, HTN, chronic constipation, Bipolar 1 disorder, leukopenia, Schizoaffective disorder, and neuroleptic-induced tardive dyskinesia who presents to the Emergency Department due to recommendation from urgent care for evaluation of possible ileus. The pt's sister reports some of her care takers feed her without hydrating her enough and do not keep up with stool softeners and laxatives. She notes pt's last bm was 1 week ago despite the pt having 60 mL dulcolax twice, half a cup Mg, and 64 oz prune juice. The sister notes the pt did have two loose, watery stools last night but is still "impacted". Symptoms are constant and moderate in severity. No mitigating or exacerbating factors reported. Associated sxs include swelling in LLE. No further complaints or concerns at this time.       Arrival mode: Personal vehicle      PCP: Brooke Goldberg MD        Past Medical History:  Past Medical History:   Diagnosis Date    Bipolar 1 disorder     Chronic constipation     Galactorrhea     Growth retardation     Profound mental retardation    High cholesterol     Hyperlipidemia     Hypertension     Leukopenia     Neuroleptic-induced tardive dyskinesia     Schizoaffective disorder     Stereotypic movement disorder        Past Surgical History:  Past Surgical History:   Procedure Laterality Date    COLONOSCOPY N/A 5/17/2021    Procedure: COLONOSCOPY;  Surgeon: Jeffrey Ayala MD;  " Location: [facility];  Service: Gastroenterology;  Laterality: N/A;    ENDOSCOPIC ULTRASOUND OF UPPER GASTROINTESTINAL TRACT N/A 5/16/2022    Procedure: ULTRASOUND, UPPER GI TRACT, ENDOSCOPIC;  Surgeon: Cherise Marshall MD;  Location: [facility];  Service: Endoscopy;  Laterality: N/A;  Upper and linear, 22 shark core, slides and formalin.    ESOPHAGOGASTRODUODENOSCOPY N/A 11/5/2020    Procedure: EGD (ESOPHAGOGASTRODUODENOSCOPY);  Surgeon: John Batista MD;  Location: [facility];  Service: Endoscopy;  Laterality: N/A;         Family History:  Family History   Problem Relation Age of Onset    Lung cancer Mother     Hypertension Father     Prostate cancer Father     Stroke Maternal Uncle     Heart disease Maternal Grandmother        Social History:  Social History     Tobacco Use    Smoking status: Never     Passive exposure: Never    Smokeless tobacco: Never   Substance and Sexual Activity    Alcohol use: No    Drug use: No    Sexual activity: Never        Review of Systems     Review of Systems   Unable to perform ROS: Psychiatric disorder   Constitutional:  Positive for activity change (Decreased).        Decreased water intake.    Cardiovascular:  Positive for leg swelling (Left).   Gastrointestinal:  Positive for constipation and diarrhea.        Physical Exam     Initial Vitals [11/12/23 1957]   BP Pulse Resp Temp SpO2   (!) 152/79 84 18 97.3 °F (36.3 °C) 96 %      MAP       --          Physical Exam  Nursing Notes and Vital Signs Reviewed.  Constitutional: Patient is in no acute distress.   Head: Atraumatic.  Eyes: PERRL. EOM intact. Conjunctivae are not pale. No scleral icterus.  ENT: Mucous membranes are moist.   Neck: Supple. Full ROM.  Cardiovascular: Regular rate. Regular rhythm. No murmurs, rubs, or gallops. Distal pulses are 2+ and symmetric.  Pulmonary/Chest: No respiratory distress. Clear to auscultation bilaterally. No wheezing or rales.  Abdominal: Distended abd.  There is tenderness.  No  "rebound, guarding, or rigidity. Quiet bowel sounds.   Rectal exam: No stool in rectal vault.   Musculoskeletal: Moves all extremities. No obvious deformities. 1+ pitting edema to LLE.   Skin: Warm and dry.  Neurological:  Alert, awake. Severe  Unable to verbally redirect.        ED Course   Procedural Sedation        Date/Time: 11/12/2023 10:08 PM    Performed by: Mae Rico DO  Authorized by: Mae Rico DO  Consent Done: Yes  Consent: Verbal consent obtained. Written consent obtained.  Risks and benefits: risks, benefits and alternatives were discussed  Consent given by: sibling  Patient understanding: patient states understanding of the procedure being performed  Patient consent: the patient's understanding of the procedure matches consent given  Procedure consent: procedure consent matches procedure scheduled  Relevant documents: relevant documents present and verified  Patient identity confirmed: provided demographic data  Time out: Immediately prior to procedure a "time out" was called to verify the correct patient, procedure, equipment, support staff and site/side marked as required.  ASA Class: Class 3 - Systemic Illness with functional impairment.NPO STATUS:  Date/Time of last solid: 11/12/2023 4:30 PM  Date/Time of last fluid: 11/12/2023 4:30 PM    Equipment: on cardiac monitor., on BP monitor., on CO2 monitor., on supplemental oxygen., suction available. and airway equipment available.     Sedation type: moderate (conscious) sedation    Sedatives: midazolam  Analgesia: ketamine  Vitals: Vital signs were monitored during sedation.  Complications: No complications.   Patient/Family history of anesthesia or sedation complications: No      ED Vital Signs:  Vitals:    11/12/23 1957 11/12/23 2033 11/12/23 2205 11/12/23 2217   BP: (!) 152/79  (!) 178/82 (!) 149/75   Pulse: 84  72 69   Resp: 18  20 20   Temp: 97.3 °F (36.3 °C)      TempSrc: Oral      SpO2: 96%  96% 96%   Weight:  48.1 kg (106 lb)  "     11/12/23 2233   BP: (!) 143/69   Pulse: 66   Resp:    Temp:    TempSrc:    SpO2: 97%   Weight:        Abnormal Lab Results:  Labs Reviewed   CBC W/ AUTO DIFFERENTIAL - Abnormal; Notable for the following components:       Result Value    WBC 2.72 (*)     Hematocrit 35.8 (*)     Platelets 92 (*)     Immature Granulocytes 2.2 (*)     Gran # (ANC) 1.2 (*)     Immature Grans (Abs) 0.06 (*)     Lymph # 0.9 (*)     Mono % 19.5 (*)     All other components within normal limits   COMPREHENSIVE METABOLIC PANEL - Abnormal; Notable for the following components:    CO2 31 (*)     Total Protein 5.7 (*)     Albumin 3.0 (*)     All other components within normal limits   LIPASE        All Lab Results:  Results for orders placed or performed during the hospital encounter of 11/12/23   CBC auto differential   Result Value Ref Range    WBC 2.72 (L) 3.90 - 12.70 K/uL    RBC 4.05 4.00 - 5.40 M/uL    Hemoglobin 12.1 12.0 - 16.0 g/dL    Hematocrit 35.8 (L) 37.0 - 48.5 %    MCV 88 82 - 98 fL    MCH 29.9 27.0 - 31.0 pg    MCHC 33.8 32.0 - 36.0 g/dL    RDW 11.9 11.5 - 14.5 %    Platelets 92 (L) 150 - 450 K/uL    MPV 11.1 9.2 - 12.9 fL    Immature Granulocytes 2.2 (H) 0.0 - 0.5 %    Gran # (ANC) 1.2 (L) 1.8 - 7.7 K/uL    Immature Grans (Abs) 0.06 (H) 0.00 - 0.04 K/uL    Lymph # 0.9 (L) 1.0 - 4.8 K/uL    Mono # 0.5 0.3 - 1.0 K/uL    Eos # 0.1 0.0 - 0.5 K/uL    Baso # 0.03 0.00 - 0.20 K/uL    nRBC 0 0 /100 WBC    Gran % 42.6 38.0 - 73.0 %    Lymph % 31.3 18.0 - 48.0 %    Mono % 19.5 (H) 4.0 - 15.0 %    Eosinophil % 3.3 0.0 - 8.0 %    Basophil % 1.1 0.0 - 1.9 %    Differential Method Automated    Comprehensive metabolic panel   Result Value Ref Range    Sodium 136 136 - 145 mmol/L    Potassium 4.3 3.5 - 5.1 mmol/L    Chloride 97 95 - 110 mmol/L    CO2 31 (H) 23 - 29 mmol/L    Glucose 87 70 - 110 mg/dL    BUN 16 8 - 23 mg/dL    Creatinine 0.7 0.5 - 1.4 mg/dL    Calcium 8.7 8.7 - 10.5 mg/dL    Total Protein 5.7 (L) 6.0 - 8.4 g/dL    Albumin  3.0 (L) 3.5 - 5.2 g/dL    Total Bilirubin 0.3 0.1 - 1.0 mg/dL    Alkaline Phosphatase 78 55 - 135 U/L    AST 24 10 - 40 U/L    ALT 19 10 - 44 U/L    eGFR >60 >60 mL/min/1.73 m^2    Anion Gap 8 8 - 16 mmol/L   Lipase   Result Value Ref Range    Lipase 22 4 - 60 U/L     *Note: Due to a large number of results and/or encounters for the requested time period, some results have not been displayed. A complete set of results can be found in Results Review.        Imaging Results:  Imaging Results              US Lower Extremity Veins Bilateral (Final result)  Result time 11/12/23 22:05:21      Final result by Shai Park MD (11/12/23 22:05:21)                   Impression:      No evidence of deep venous thrombosis in either lower extremity.      Electronically signed by: Shai Park  Date:    11/12/2023  Time:    22:05               Narrative:    EXAMINATION:  US LOWER EXTREMITY VEINS BILATERAL    CLINICAL HISTORY:  Other specified soft tissue disorders    TECHNIQUE:  Duplex and color flow Doppler and dynamic compression was performed of the bilateral lower extremity veins was performed.    COMPARISON:  None    FINDINGS:  Right thigh veins: The common femoral, femoral, popliteal, upper greater saphenous, and deep femoral veins are patent and free of thrombus. The veins are normally compressible and have normal phasic flow and augmentation response.    Right calf veins: The visualized calf veins are patent.    Left thigh veins: The common femoral, femoral, popliteal, upper greater saphenous, and deep femoral veins are patent and free of thrombus. The veins are normally compressible and have normal phasic flow and augmentation response.    Left calf veins: The visualized calf veins are patent.    Miscellaneous: None                                       CT Abdomen Pelvis With IV Contrast (Final result)  Result time 11/12/23 21:59:55      Final result by Shai Park MD (11/12/23 21:59:55)                    Impression:      .  Multiple complex septated cysts scattered throughout the pancreas with mild pancreatic ductal dilation most suggestive of multifocal IPMN.  Correlate with pathology results.  See recent MRI examination    Mild motion artifacts    Atherosclerotic changes    Right renal cyst    Fatty infiltration liver    18 mm enhancing lesion of the right lobe of the liver incompletely evaluated.    No bowel obstruction    All CT scans at this facility use dose modulation, iterative reconstruction, and/or weight based dosing when appropriate to reduce radiation dose to as low as reasonably achievable.      Electronically signed by: Shai Park  Date:    11/12/2023  Time:    21:59               Narrative:    EXAMINATION:  CT ABDOMEN PELVIS WITH IV CONTRAST    CLINICAL HISTORY:  Bowel obstruction suspected;    TECHNIQUE:  Low dose axial images, sagittal and coronal reformations were obtained from the lung bases to the pubic symphysis following the IV administration of 100 mL of Omnipaque 350.    COMPARISON:  None    FINDINGS:  Mild motion artifacts.    Heart: Normal size as far as seen. No effusion as far as seen.    Lung Bases: Slight basilar atelectasis    Liver: Fatty infiltration of the liver.  18 mm enhancing lesion of the right lobe of the liver incompletely evaluated.    Gallbladder: No calcified gallstones.    Bile Ducts: No dilatation.    Pancreas: .  Multiple complex septated cysts scattered throughout the pancreas with mild pancreatic ductal dilation most suggestive of multifocal IPMN.  Correlate with pathology results.  See recent MRI examination.    Spleen: Normal.    Adrenals: Normal.    Kidneys/Ureters: Normal enhancement.  No mass or  hydroureteronephrosis.  Right renal cyst.    Bladder: No wall thickening.    Reproductive organs: Normal.    GI Tract/Mesentery: No evidence of bowel obstruction or inflammation.  No evidence of acute appendicitis.    Peritoneal Space: No ascites or free  air.    Retroperitoneum: No significant adenopathy.    Abdominal wall: Normal.    Vasculature: No aneurysm.  Coronary artery calcification.    Bones: No acute fracture. No suspicious lytic or sclerotic lesions.  Mild anterolisthesis of L4 respect to L5                                              The Emergency Provider reviewed the vital signs and test results, which are outlined above.     ED Discussion   12:02 AM: Reassessed pt at this time. Discussed with pt all pertinent ED information and results. Discussed pt dx and plan of tx. Gave pt all f/u and return to the ED instructions. All questions and concerns were addressed at this time. Pt expresses understanding of information and instructions, and is comfortable with plan to discharge. Pt is stable for discharge.    I discussed with patient and/or family/caretaker that evaluation in the ED does not suggest any emergent or life threatening medical conditions requiring immediate intervention beyond what was provided in the ED, and I believe patient is safe for discharge.  Regardless, an unremarkable evaluation in the ED does not preclude the development or presence of a serious of life threatening condition. As such, patient was instructed to return immediately for any worsening or change in current symptoms.       ED Course as of 11/13/23 0016   Sun Nov 12, 2023 2203 Negative for DVT [NF]   2216  Pancreatic head cyst, fine needle aspiration (1 thin prep):   - Scant benign ductal epithelium   -  Negative  for malignant cells   Comment: Interp By Merissa Elias M.D., Signed on 05/23/2022 at 08:51     [NF]      ED Course User Index  [NF] Mae Rico, DO     Medical Decision Making  63-year-old female presents from urgent care for concern for ileus.  She has a history of chronic constipation.  Given her significant MR patient requires procedural sedation for any imaging or physical exam.  Unable to give oral contrast as patient can not follow commands.  CT  abdomen and pelvis does not show any signs of small-bowel obstruction, volvulus, ileus, or fecal impaction.  Additionally there was no signs of incarcerated or strangulated hernia, acute appendicitis, acute cholecystitis, perforated diverticulitis or ulcer, tubo-ovarian abscess, or ovarian torsion.  Lipase is negative for acute pancreatitis.  Patient has no signs of significant dehydration or infection.  Unsuccessful with suppository.  Given instructions on daily use of laxatives/stool softeners and suppositories/enemas as needed.  Also discussed appropriate diet and hydration.      Amount and/or Complexity of Data Reviewed  Labs: ordered. Decision-making details documented in ED Course.  Radiology: ordered and independent interpretation performed. Decision-making details documented in ED Course.    Risk  OTC drugs.  Prescription drug management.  Drug therapy requiring intensive monitoring for toxicity.  Diagnosis or treatment significantly limited by social determinants of health.                ED Medication(s):  Medications   midazolam (VERSED) 5 mg/mL injection 2 mg (2 mg Intramuscular Given 11/12/23 2100)   lactated ringers bolus 1,000 mL (0 mLs Intravenous Stopped 11/12/23 2305)   ketamine injection 96.2 mg (96.2 mg Intravenous Given 11/12/23 2126)   midazolam (VERSED) 5 mg/mL injection 2 mg (2 mg Intramuscular Given 11/12/23 2115)   iohexoL (OMNIPAQUE 350) injection 100 mL (100 mLs Intravenous Given 11/12/23 2146)   ketamine injection 50 mg (50 mg Intravenous Given 11/12/23 2132)   ketamine injection 50 mg (50 mg Intravenous Given 11/12/23 2135)   clonazePAM tablet 0.5 mg (0.5 mg Oral Given 11/12/23 2303)   melatonin tablet 9 mg (9 mg Oral Given 11/12/23 2303)   mirtazapine tablet 45 mg (45 mg Oral Given 11/12/23 2303)   traZODone tablet 150 mg (150 mg Oral Given 11/12/23 2303)   glycerin adult suppository 1 suppository (1 suppository Rectal Given 11/12/23 2327)   divalproex EC tablet 500 mg (500 mg Oral  Given 11/12/23 2342)   atorvastatin tablet 40 mg (40 mg Oral Given 11/12/23 2342)   QUEtiapine tablet 400 mg (400 mg Oral Given 11/12/23 2343)       New Prescriptions    POLYETHYLENE GLYCOL (GLYCOLAX) 17 GRAM/DOSE POWDER    Take 17 g by mouth once daily.        Follow-up Information       Brooke Goldberg MD On 11/13/2023.    Specialty: Internal Medicine  Contact information:  [address]  [address]  [facility] [facility] [address]  [phone]               Formerly [facility] - Emergency Dept..    Specialty: Emergency Medicine  Why: As needed, If symptoms worsen  Contact information:  [address]  [facility] [address] [phone]                               Scribe Attestation:   Scribe #1: I performed the above scribed service and the documentation accurately describes the services I performed. I attest to the accuracy of the note.     Attending:   Physician Attestation Statement for Scribe #1: I, Mae Rico DO, personally performed the services described in this documentation, as scribed by Precious Beard, in my presence, and it is both accurate and complete.           Clinical Impression       ICD-10-CM ICD-9-CM   1. Constipation, unspecified constipation type  K59.00 564.00   2. Leg swelling  M79.89 729.81       Disposition:   Disposition: Discharged  Condition: Stable        Mae Rico DO  11/19/23 6534

## 2023-11-13 NOTE — TELEPHONE ENCOUNTER
----- Message from Radha Antonio sent at 11/13/2023  3:51 PM CST -----  Contact: Alanis/sister  Alanis is needing a call back in regards to rescheduling the patients appt. Please give her a call back at [phone]

## 2023-11-14 ENCOUNTER — TELEPHONE (OUTPATIENT)
Dept: ENDOSCOPY | Facility: HOSPITAL | Age: 63
End: 2023-11-14
Payer: MEDICARE

## 2023-11-14 NOTE — TELEPHONE ENCOUNTER
Contacted the patient sister/jimmy to reschedule an endoscopy procedure(s) . The patient sister did not answer the call and left a voice message requesting a call back.

## 2023-11-14 NOTE — TELEPHONE ENCOUNTER
----- Message from Jasmyn Hauser sent at 11/14/2023  8:20 AM CST -----  Contact: Alanis/sister    ----- Message -----  From: Sarkis Rodrigez  Sent: 11/13/2023   1:31 PM CST  To: [facility] Endo Schedulers    Alanis/sister would like a call back at [phone] in regards to procedure that's scheduled for 11/21/23 for 7:30am. She would like to see if patient can have it done sometime this week if possible.  Thanks   Am

## 2023-11-16 ENCOUNTER — PATIENT MESSAGE (OUTPATIENT)
Dept: PRIMARY CARE CLINIC | Facility: CLINIC | Age: 63
End: 2023-11-16
Payer: MEDICARE

## 2023-11-17 ENCOUNTER — TELEPHONE (OUTPATIENT)
Dept: GASTROENTEROLOGY | Facility: CLINIC | Age: 63
End: 2023-11-17
Payer: MEDICARE

## 2023-11-20 ENCOUNTER — TELEPHONE (OUTPATIENT)
Dept: GASTROENTEROLOGY | Facility: CLINIC | Age: 63
End: 2023-11-20
Payer: MEDICARE

## 2023-11-20 ENCOUNTER — TELEPHONE (OUTPATIENT)
Dept: ENDOSCOPY | Facility: HOSPITAL | Age: 63
End: 2023-11-20
Payer: MEDICARE

## 2023-11-20 NOTE — TELEPHONE ENCOUNTER
Incoming call  Spoke to patient  Reason for the call: patient asking to reschedule procedure  Information confirmed:   Date of procedure:  11/21/2023  Arrival time: 6:30 am  Procedure (s): EUS

## 2023-11-20 NOTE — TELEPHONE ENCOUNTER
----- Message from Lashonda Acosta sent at 11/20/2023  3:48 PM CST -----  Regarding: Rescheduled Appt  Contact: Sster @ [phone]  Pt's sister is calling to get appt rescheduled to December 15-January 8th. Asking for a call back. Due to weather

## 2023-11-21 ENCOUNTER — TELEPHONE (OUTPATIENT)
Dept: ENDOSCOPY | Facility: HOSPITAL | Age: 63
End: 2023-11-21
Payer: MEDICARE

## 2023-11-21 ENCOUNTER — OFFICE VISIT (OUTPATIENT)
Dept: URGENT CARE | Facility: CLINIC | Age: 63
End: 2023-11-21
Payer: MEDICARE

## 2023-11-21 ENCOUNTER — PATIENT MESSAGE (OUTPATIENT)
Dept: ENDOSCOPY | Facility: HOSPITAL | Age: 63
End: 2023-11-21
Payer: MEDICARE

## 2023-11-21 VITALS
HEART RATE: 88 BPM | BODY MASS INDEX: 21.76 KG/M2 | WEIGHT: 107.94 LBS | SYSTOLIC BLOOD PRESSURE: 136 MMHG | HEIGHT: 59 IN | DIASTOLIC BLOOD PRESSURE: 65 MMHG | RESPIRATION RATE: 18 BRPM | OXYGEN SATURATION: 98 % | TEMPERATURE: 98 F

## 2023-11-21 DIAGNOSIS — R09.89 RUNNY NOSE: ICD-10-CM

## 2023-11-21 DIAGNOSIS — J01.90 ACUTE BACTERIAL SINUSITIS: Primary | ICD-10-CM

## 2023-11-21 DIAGNOSIS — R05.8 PRODUCTIVE COUGH: ICD-10-CM

## 2023-11-21 DIAGNOSIS — B96.89 ACUTE BACTERIAL SINUSITIS: Primary | ICD-10-CM

## 2023-11-21 DIAGNOSIS — R09.81 NASAL CONGESTION: ICD-10-CM

## 2023-11-21 LAB
CTP QC/QA: YES
SARS-COV-2 AG RESP QL IA.RAPID: NEGATIVE

## 2023-11-21 PROCEDURE — 99213 PR OFFICE/OUTPT VISIT, EST, LEVL III, 20-29 MIN: ICD-10-PCS | Mod: S$GLB,,,

## 2023-11-21 PROCEDURE — 99213 OFFICE O/P EST LOW 20 MIN: CPT | Mod: S$GLB,,,

## 2023-11-21 PROCEDURE — 87811 SARS-COV-2 COVID19 W/OPTIC: CPT | Mod: QW,S$GLB,,

## 2023-11-21 PROCEDURE — 87811 SARS CORONAVIRUS 2 ANTIGEN POCT, MANUAL READ: ICD-10-PCS | Mod: QW,S$GLB,,

## 2023-11-21 RX ORDER — FLUTICASONE PROPIONATE 50 MCG
2 SPRAY, SUSPENSION (ML) NASAL DAILY
Qty: 9.9 ML | Refills: 0 | Status: SHIPPED | OUTPATIENT
Start: 2023-11-21 | End: 2023-12-20

## 2023-11-21 RX ORDER — AMOXICILLIN 875 MG/1
875 TABLET, FILM COATED ORAL EVERY 12 HOURS
Qty: 20 TABLET | Refills: 0 | Status: SHIPPED | OUTPATIENT
Start: 2023-11-21 | End: 2023-12-01

## 2023-11-21 RX ORDER — PROMETHAZINE HYDROCHLORIDE AND DEXTROMETHORPHAN HYDROBROMIDE 6.25; 15 MG/5ML; MG/5ML
5 SYRUP ORAL NIGHTLY PRN
Qty: 50 ML | Refills: 0 | Status: SHIPPED | OUTPATIENT
Start: 2023-11-21 | End: 2023-12-01

## 2023-11-21 NOTE — PATIENT INSTRUCTIONS
Reviewed negative COVID-19 virus test with patient who verbalized understanding.  Advised patient that symptoms are indicative of an upper respiratory infection which is viral in nature and should be treated symptomatically.  We discussed over-the-counter medications as well as home remedies to help with current symptoms.  We also discussed a wait and see antibiotic plan which the patient verbalized understanding.  Patient educational handouts also included in discharge paperwork for patient who verbalized understanding agrees with plan of care.  They deny any further questions or concerns at this time.  Patient exits exam room in no acute distress.      PLEASE READ YOUR DISCHARGE INSTRUCTIONS ENTIRELY AS IT CONTAINS IMPORTANT INFORMATION.    - Take antibiotics as prescribed  - Please drink plenty of fluids.  - Please get plenty of rest.  - You can take plain Mucinex (guaifenesin) 1200 mg twice a day to help loosen mucous.   - Use over the counter Flonase as directed  Please return here or go to the Emergency Department for any concerns or worsening of condition.  - Please take an over the counter antihistamine medication (Allegra/Claritin/Zyrtec/Xyzal) of your choice as directed. These are antihistamines that can help with runny nose, nasal congestion, sneezing, and helps to dry up post-nasal drip, which usually causes sore throat and cough.    -If you do NOT have high blood pressure, you may use a decongestant form (D)  of this medication (ie. Claritin- D, zyrtec-D, allegra-D, Mucinex-D) or if you do not take the D form, you can take sudafed (pseudoephedrine) over the counter, which is a decongestant. Do NOT take two decongestant (D) medications at the same time (such as mucinex-D and claritin-D or plain sudafed and claritin D). Dextromethorphan (DM) is a cough suppressant over the counter (ie. mucinex DM, robitussin, delsym; dayquil/nyquil has DM as well.)    If you do have Hypertension or palpitations, it is  safe to take Coricidin HBP for relief of sinus symptoms.    - If not allergic, please take over the counter Tylenol (Acetaminophen) and/or Motrin (Ibuprofen) as directed for control of pain and/or fever.  Avoid tylenol if you have a history of liver disease. Do not take ibuprofen if you have a history of GI bleeding, kidney disease, or if you take blood thinners.  Please follow up with your primary care doctor or specialist as needed.    -IF YOU RECEIVED PRESCRIPTION COUGH SUPPRESSANTS: Take prescription cough meds (pills) as prescribed; take prescription cough syrup at night as needed for cough.  Do not take both the prescribed cough pills and syrup at the same time or within 6 hours of each other.  Do not take the cough syrup with any other sedative medication as it can can cause drowsiness. Do not operate any heavy machinery, drink or drive while taking the cough syrup.    Try taking half a dose first of the cough syrup to see how it affects you.     Sore throat recommendations: Warm fluids, warm salt water gargles, throat lozenges, tea, honey, soup, rest, hydration.    Use over the counter flonase: one spray each nostril twice daily OR two sprays each nostril once daily for sinus congestion and postnasal drip. This is a steroid nasal spray that works locally over time to decrease the inflammation in your nose/sinuses and help with allergic symptoms. This is not an quick- relief spray like afrin, but it works well if used daily.  Discontinue if you develop nose bleed    Sinus rinses DO NOT USE TAP WATER, if you must, water must be a rolling boil for 1 minute, let it cool, then use.  May use distilled water, or over the counter nasal saline rinses.  Vics vapor rub in shower to help open nasal passages.  May use nasal gel to keep passages moisturized.  May use Nasal saline sprays during the day for added relief of congestion.   For those who go to the gym, please do not use the sauna or steam room now to clear  sinuses.    If you  smoke, please stop smoking.    Please return or see your primary care doctor if you develop new or worsening symptoms.     Please arrange follow up with your primary medical clinic as soon as possible. You must understand that you've received an Urgent Care treatment only and that you may be released before all of your medical problems are known or treated. You, the patient, will arrange for follow up as instructed. If your symptoms worsen or fail to improve you should go to the Emergency Room.

## 2023-11-21 NOTE — PROGRESS NOTES
"Subjective:      Patient ID: Juan Pablo Bolden is a 63 y.o. female.    Vitals:  height is 4' 11" (1.499 m) and weight is 48.9 kg (107 lb 14.6 oz). Her oral temperature is 98 °F (36.7 °C). Her blood pressure is 136/65 and her pulse is 88. Her respiration is 18 and oxygen saturation is 98%.     Chief Complaint: Cough    62 y/o female presents with productive cough, cough inducing retching, nasal congestion and runny nose  that worsened Thursday or Friday of last week but had initially started earlier that week. Patient was treated with mucinex and delsym. Denies known fever, chills but states around sick contacts. Denies appetite change, ear tugging, voice change, wheezing. NKDA.     Cough  This is a new problem. The current episode started in the past 7 days. The cough is Productive of sputum. Associated symptoms include nasal congestion. Pertinent negatives include no chills, ear pain, fever, rash or sore throat.       Constitution: Negative for appetite change, chills and fever.   HENT:  Positive for congestion. Negative for ear pain and sore throat.    Neck: Negative for neck pain and neck stiffness.   Eyes:  Negative for eye discharge.   Respiratory:  Positive for cough and sputum production.    Gastrointestinal:  Negative for vomiting.   Skin:  Negative for rash.   Allergic/Immunologic: Negative for sneezing.      Objective:     Vitals:    11/21/23 1011   BP: 136/65   Pulse: 88   Resp: 18   Temp: 98 °F (36.7 °C)       Physical Exam   Constitutional: She is oriented to person, place, and time. She appears well-developed. She is cooperative.  Non-toxic appearance. She does not appear ill. No distress.   HENT:   Head: Normocephalic and atraumatic.   Ears:   Right Ear: Hearing and external ear normal.   Left Ear: Hearing and external ear normal.   Nose: Nose normal. No mucosal edema, rhinorrhea or nasal deformity. No epistaxis.   Mouth/Throat: Uvula is midline, oropharynx is clear and moist and mucous membranes are " normal. Mucous membranes are moist. No trismus in the jaw. Normal dentition. No uvula swelling. No oropharyngeal exudate, posterior oropharyngeal edema, posterior oropharyngeal erythema or cobblestoning.   Eyes: Conjunctivae and lids are normal. Right eye exhibits no discharge. Left eye exhibits no discharge. No scleral icterus. Extraocular movement intact   Neck: Trachea normal and phonation normal. Neck supple. No edema present. No erythema present. No neck rigidity present.   Cardiovascular: Normal rate, regular rhythm, normal heart sounds and normal pulses.   Pulmonary/Chest: Effort normal and breath sounds normal. No accessory muscle usage. No respiratory distress. She has no decreased breath sounds. She has no wheezes. She has no rhonchi. She has no rales.   Abdominal: Normal appearance.   Musculoskeletal: Normal range of motion.         General: No deformity. Normal range of motion.   Neurological: She is alert and oriented to person, place, and time. She displays no weakness. She exhibits normal muscle tone. Coordination and gait normal.   Skin: Skin is warm, dry, intact, not diaphoretic, not pale and no rash.   Psychiatric: Her speech is normal and behavior is normal. Judgment and thought content normal.   Nursing note and vitals reviewed.      Assessment:     1. Acute bacterial sinusitis    2. Productive cough    3. Runny nose    4. Nasal congestion      Results for orders placed or performed in visit on 11/21/23   SARS Coronavirus 2 Antigen, POCT Manual Read   Result Value Ref Range    SARS Coronavirus 2 Antigen Negative Negative     Acceptable Yes      *Note: Due to a large number of results and/or encounters for the requested time period, some results have not been displayed. A complete set of results can be found in Results Review.       Plan:       Acute bacterial sinusitis  -     amoxicillin (AMOXIL) 875 MG tablet; Take 1 tablet (875 mg total) by mouth every 12 (twelve) hours. for 10  days  Dispense: 20 tablet; Refill: 0    Productive cough  -     SARS Coronavirus 2 Antigen, POCT Manual Read  -     promethazine-dextromethorphan (PROMETHAZINE-DM) 6.25-15 mg/5 mL Syrp; Take 5 mLs by mouth nightly as needed (cough).  Dispense: 50 mL; Refill: 0    Runny nose  -     fluticasone propionate (FLONASE) 50 mcg/actuation nasal spray; 2 sprays (100 mcg total) by Each Nostril route once daily.  Dispense: 9.9 mL; Refill: 0    Nasal congestion  -     fluticasone propionate (FLONASE) 50 mcg/actuation nasal spray; 2 sprays (100 mcg total) by Each Nostril route once daily.  Dispense: 9.9 mL; Refill: 0        Patient Instructions   Reviewed negative COVID-19 virus test with patient who verbalized understanding.  Advised patient that symptoms are indicative of an upper respiratory infection which is viral in nature and should be treated symptomatically.  We discussed over-the-counter medications as well as home remedies to help with current symptoms.  We also discussed a wait and see antibiotic plan which the patient verbalized understanding.  Patient educational handouts also included in discharge paperwork for patient who verbalized understanding agrees with plan of care.  They deny any further questions or concerns at this time.  Patient exits exam room in no acute distress.      PLEASE READ YOUR DISCHARGE INSTRUCTIONS ENTIRELY AS IT CONTAINS IMPORTANT INFORMATION.    - Take antibiotics as prescribed  - Please drink plenty of fluids.  - Please get plenty of rest.  - You can take plain Mucinex (guaifenesin) 1200 mg twice a day to help loosen mucous.   - Use over the counter Flonase as directed  Please return here or go to the Emergency Department for any concerns or worsening of condition.  - Please take an over the counter antihistamine medication (Allegra/Claritin/Zyrtec/Xyzal) of your choice as directed. These are antihistamines that can help with runny nose, nasal congestion, sneezing, and helps to dry up  post-nasal drip, which usually causes sore throat and cough.    -If you do NOT have high blood pressure, you may use a decongestant form (D)  of this medication (ie. Claritin- D, zyrtec-D, allegra-D, Mucinex-D) or if you do not take the D form, you can take sudafed (pseudoephedrine) over the counter, which is a decongestant. Do NOT take two decongestant (D) medications at the same time (such as mucinex-D and claritin-D or plain sudafed and claritin D). Dextromethorphan (DM) is a cough suppressant over the counter (ie. mucinex DM, robitussin, delsym; dayquil/nyquil has DM as well.)    If you do have Hypertension or palpitations, it is safe to take Coricidin HBP for relief of sinus symptoms.    - If not allergic, please take over the counter Tylenol (Acetaminophen) and/or Motrin (Ibuprofen) as directed for control of pain and/or fever.  Avoid tylenol if you have a history of liver disease. Do not take ibuprofen if you have a history of GI bleeding, kidney disease, or if you take blood thinners.  Please follow up with your primary care doctor or specialist as needed.    -IF YOU RECEIVED PRESCRIPTION COUGH SUPPRESSANTS: Take prescription cough meds (pills) as prescribed; take prescription cough syrup at night as needed for cough.  Do not take both the prescribed cough pills and syrup at the same time or within 6 hours of each other.  Do not take the cough syrup with any other sedative medication as it can can cause drowsiness. Do not operate any heavy machinery, drink or drive while taking the cough syrup.    Try taking half a dose first of the cough syrup to see how it affects you.     Sore throat recommendations: Warm fluids, warm salt water gargles, throat lozenges, tea, honey, soup, rest, hydration.    Use over the counter flonase: one spray each nostril twice daily OR two sprays each nostril once daily for sinus congestion and postnasal drip. This is a steroid nasal spray that works locally over time to decrease  the inflammation in your nose/sinuses and help with allergic symptoms. This is not an quick- relief spray like afrin, but it works well if used daily.  Discontinue if you develop nose bleed    Sinus rinses DO NOT USE TAP WATER, if you must, water must be a rolling boil for 1 minute, let it cool, then use.  May use distilled water, or over the counter nasal saline rinses.  Vics vapor rub in shower to help open nasal passages.  May use nasal gel to keep passages moisturized.  May use Nasal saline sprays during the day for added relief of congestion.   For those who go to the gym, please do not use the sauna or steam room now to clear sinuses.    If you  smoke, please stop smoking.    Please return or see your primary care doctor if you develop new or worsening symptoms.     Please arrange follow up with your primary medical clinic as soon as possible. You must understand that you've received an Urgent Care treatment only and that you may be released before all of your medical problems are known or treated. You, the patient, will arrange for follow up as instructed. If your symptoms worsen or fail to improve you should go to the Emergency Room.        Medical Decision Making:   History:   I obtained history from: someone other than patient.       <> Summary of History: Sister states these symptoms have progressively worsened over a week and worried about progressing to pneumonia/bronchitis/sinus infections. States she tried delsym without relief and mucinex. Caregiver states she was coughing so much last night that her eyes were watery and she started retching.   Old Medical Records: I decided to obtain old medical records.  Clinical Tests:   Lab Tests: Ordered and Reviewed       <> Summary of Lab: COVID negative  Urgent Care Management:  Reviewed negative COVID-19 virus test with patient who verbalized understanding.  Advised patient that his symptoms are indicative of a sinus infection which will be treated with  antibiotics  We discussed over-the-counter medications as well as home remedies to help with current symptoms.  Patient educational handouts also included in discharge paperwork for patient who verbalized understanding agrees with plan of care.  Patient denies any further questions or concerns at this time.  Patient exits exam room in no acute distress.

## 2023-11-21 NOTE — TELEPHONE ENCOUNTER
Spoke to Pt to schedule procedure(s) Upper Endoscopy Ultrasound (EUS)       Physician to perform procedure(s) Dr. PETER Ojeda  Date of Procedure (s) 12/20/23  Arrival Time 10:15 AM  Time of Procedure(s) 11:15 AM   Location of Procedure(s) [address]  Type of Rx Prep sent to patient: N/A  Instructions provided to patient via [facility]    Patient was informed on the following information and verbalized understanding. Screening questionnaire reviewed with patient and complete. If procedure requires anesthesia, a responsible adult needs to be present to accompany the patient home, patient cannot drive after receiving anesthesia. Appointment details are tentative, especially check-in time. Patient will receive a prep-op call 7 days prior to confirm check-in time for procedure. If applicable the patient should contact their pharmacy to verify Rx for procedure prep is ready for pick-up. Patient was advised to call the scheduling department at [phone] if pharmacy states no Rx is available. Patient was advised to call the endoscopy scheduling department if any questions or concerns arise.      [facility] Scheduling Department

## 2023-11-22 ENCOUNTER — TELEPHONE (OUTPATIENT)
Dept: FAMILY MEDICINE | Facility: CLINIC | Age: 63
End: 2023-11-22
Payer: MEDICARE

## 2023-11-22 NOTE — TELEPHONE ENCOUNTER
----- Message from Idalmis Julio sent at 11/22/2023 10:23 AM CST -----  Contact: Alanis- pt sister   Alanis Would like a call back at [phone], in regards to the pt paperwork. She states she is needing to pick it up at the [place] location. (She states it's urgent she is needing the paperwork for the pt ).

## 2023-11-26 PROBLEM — D70.9 NEUTROPENIA, UNSPECIFIED TYPE: Status: ACTIVE | Noted: 2023-11-26

## 2023-11-26 NOTE — PROGRESS NOTES
This note is specifically for wellness visit performed today.       Assessment / Plan:      Patient here for annual wellness exam. Health maintenance was reviewed and ordered.    Complete history and physical was completed today.  Complete and thorough medication reconciliation was performed.  Discussed risks and benefits of medications.  Advised patient on orders and health maintenance. Continue current medications listed on your summary sheet.    All questions were answered. Patient had no further concerns. Advised of diagnoses and plan.     Problem List Items Addressed This Visit          Neuro    PDD (pervasive developmental disorder)    Overview     -intellectual  disability  -followed by both psychiatry and neurology  -currently depakote er 500 mg Bid, zyprexa 20 mg qd, remeron 45 mg qhs, Xanax PRN, Diazepam PRN  -sister/caregiver reports stable behavior            Cardiac/Vascular    Essential hypertension    Overview     Hypertension Medications               amLODIPine (NORVASC) 10 MG tablet Take 1 tablet (10 mg total) by mouth once daily.    furosemide (LASIX) 20 MG tablet TAKE 1 TABLET BY MOUTH DAILY AS NEEDED FOR SWELLING    triamterene-hydrochlorothiazide 37.5-25 mg (MAXZIDE-25) 37.5-25 mg per tablet Take 1 tablet by mouth every morning.   -at goal today  -continue lifestyle modification with low sodium diet and exercise   -discussed hypertension disease course and importance of treating high blood pressure  -patient understood and advised of risk of untreated blood pressure.  ER precautions were given   for symptoms of hypertensive urgency and emergency.             Oncology    Leukopenia    Overview     -hx of both leukopenia and thrombocytopenia  -evaluated by hematology  -thought likely multifactorial  -plan to continue surveillance and if worsening, will pursue bone marrow bx         Neutropenia, unspecified type    Overview     -see above            Endocrine    Central hypothyroidism    Overview      -no longer on treatment  -need to check updated thyroid labs with next labs  Lab Results   Component Value Date    TSH 1.177 04/09/2021             GI    Pancreas cyst    Overview     -incidental finding on imaging  -followed regularly with surg onc  -plan for alternating MRCP and EUS  -upcoming EUS scheduled for continued surviellance          Other Visit Diagnoses       Primary osteoarthritis involving multiple joints    -  Primary    Relevant Medications    diclofenac sodium (VOLTAREN) 1 % Gel    Lower extremity edema        Relevant Medications    furosemide (LASIX) 20 MG tablet            Brooke Goldberg MD       WELLNESS EXAM      Patient ID: Juan Pablo Bolden is a 63 y.o. female.  has a past medical history of Bipolar 1 disorder, Chronic constipation, Galactorrhea, Growth retardation, High cholesterol, Hyperlipidemia, Hypertension, Leukopenia, Neuroleptic-induced tardive dyskinesia, Schizoaffective disorder, and Stereotypic movement disorder.     Chief Complaint:  Encounter for wellness exam    Well Adult Physical: Patient here for a comprehensive physical exam.     Health Maintenance Topics with due status: Not Due       Topic Last Completion Date    TETANUS VACCINE 05/30/2014    Lipid Panel 12/23/2019    Colorectal Cancer Screening 05/17/2021    High Dose Statin 10/31/2023        ==============================================    Health Maintenance Due   Topic Date Due    Aspirin/Antiplatelet Therapy  Never done    Shingles Vaccine (1 of 2) Never done    RSV Vaccine (Age 60+ and Pregnant patients) (1 - 1-dose 60+ series) Never done    COVID-19 Vaccine (4 - 2023-24 season) 09/01/2023    Mammogram  10/04/2023       Past Medical History:  Past Medical History:   Diagnosis Date    Bipolar 1 disorder     Chronic constipation     Galactorrhea     Growth retardation     Profound mental retardation    High cholesterol     Hyperlipidemia     Hypertension     Leukopenia     Neuroleptic-induced tardive  dyskinesia     Schizoaffective disorder     Stereotypic movement disorder      Past Surgical History:   Procedure Laterality Date    COLONOSCOPY N/A 5/17/2021    Procedure: COLONOSCOPY;  Surgeon: Jeffrey Ayala MD;  Location: [facility];  Service: Gastroenterology;  Laterality: N/A;    ENDOSCOPIC ULTRASOUND OF UPPER GASTROINTESTINAL TRACT N/A 5/16/2022    Procedure: ULTRASOUND, UPPER GI TRACT, ENDOSCOPIC;  Surgeon: Cherise Marshall MD;  Location: [facility];  Service: Endoscopy;  Laterality: N/A;  Upper and linear, 22 shark core, slides and formalin.    ESOPHAGOGASTRODUODENOSCOPY N/A 11/5/2020    Procedure: EGD (ESOPHAGOGASTRODUODENOSCOPY);  Surgeon: John Batista MD;  Location[facility];  Service: Endoscopy;  Laterality: N/A;     Review of patient's allergies indicates:  No Known Allergies  Current Outpatient Medications on File Prior to Visit   Medication Sig Dispense Refill    acetaminophen (TYLENOL) 650 MG TbSR Take 650 mg by mouth every 8 (eight) hours as needed for Pain.      amLODIPine (NORVASC) 10 MG tablet Take 1 tablet (10 mg total) by mouth once daily. 90 tablet 3    cetirizine (ZYRTEC) 10 MG tablet Take 10 mg by mouth daily as needed for Allergies.      clonazePAM (KLONOPIN) 0.5 MG tablet Take 1 nightly at bedtime and one daily as needed for agitation 60 tablet 2    diphenhydrAMINE (BENADRYL) 25 mg capsule Take 25 mg by mouth nightly as needed for Allergies.      divalproex (DEPAKOTE) 500 MG TbEC Take 1 tablet (500 mg total) by mouth 2 (two) times daily. 180 tablet 0    docusate sodium (COLACE) 100 MG capsule Take 1 capsule (100 mg total) by mouth once daily. 90 capsule 3    folic acid (FOLVITE) 1 MG tablet Take 1 tablet (1 mg total) by mouth once daily. 90 tablet 3    melatonin 10 mg TbSR Take 1 tablet by mouth every evening. 30 tablet 11    mirtazapine (REMERON) 45 MG tablet Take 1 tablet (45 mg total) by mouth every evening. 90 tablet 0    OLANZapine (ZYPREXA) 20 MG  tablet Take 1/2 tablet each morning and 1 and 1/2 tablets at bedtime. 60 tablet 2    simethicone (MYLICON) 125 mg Cap capsule 1 capsule after meals and at bedtime as needed      simvastatin (ZOCOR) 40 MG tablet Take 1 tablet (40 mg total) by mouth every evening. 90 tablet 3    traZODone (DESYREL) 150 MG tablet Take 1 tablet (150 mg total) by mouth nightly as needed for Insomnia. 30 tablet 2    triamterene-hydrochlorothiazide 37.5-25 mg (MAXZIDE-25) 37.5-25 mg per tablet Take 1 tablet by mouth every morning. 90 tablet 3    diazePAM (VALIUM) 5 MG tablet Take 2 tablets (10 mg total) by mouth every 6 (six) hours as needed for Anxiety. 4 tablet 0     No current facility-administered medications on file prior to visit.     Social History     Socioeconomic History    Marital status: Single   Tobacco Use    Smoking status: Never     Passive exposure: Never    Smokeless tobacco: Never   Substance and Sexual Activity    Alcohol use: No    Drug use: No    Sexual activity: Never     Social Determinants of Health     Financial Resource Strain: Low Risk  (12/19/2022)    Overall Financial Resource Strain (CARDIA)     Difficulty of Paying Living Expenses: Not hard at all   Food Insecurity: No Food Insecurity (12/19/2022)    Hunger Vital Sign     Worried About Running Out of Food in the Last Year: Never true     Ran Out of Food in the Last Year: Never true   Transportation Needs: No Transportation Needs (12/19/2022)    PRAPARE - Transportation     Lack of Transportation (Medical): No     Lack of Transportation (Non-Medical): No   Physical Activity: Unknown (12/19/2022)    Exercise Vital Sign     Days of Exercise per Week: 0 days   Stress: Unknown (12/19/2022)    [place] [employer] - Occupational Stress Questionnaire     Feeling of Stress : Patient refused   Social Connections: Unknown (12/19/2022)    Social Connection and Isolation Panel [NHANES]     Frequency of Communication with Friends  and Family: Three times a week     Frequency of Social Gatherings with Friends and Family: More than three times a week     Active Member of Clubs or Organizations: No     Attends Club or Organization Meetings: Patient refused     Marital Status: Patient refused   Housing Stability: Low Risk  (12/19/2022)    Housing Stability Vital Sign     Unable to Pay for Housing in the Last Year: No     Number of Places Lived in the Last Year: 1     Unstable Housing in the Last Year: No     Family History   Problem Relation Age of Onset    Lung cancer Mother     Hypertension Father     Prostate cancer Father     Stroke Maternal Uncle     Heart disease Maternal Grandmother        Review of Systems   Unable to perform ROS: Medical condition         Objective:      Vitals:    10/31/23 1448   BP: 118/70   Pulse: 85   Temp: 97.9 °F (36.6 °C)     Body mass index is 18.28 kg/m².     Physical Exam  Vitals reviewed.   Constitutional:       General: She is not in acute distress.     Appearance: Normal appearance. She is well-developed.   HENT:      Head: Normocephalic and atraumatic.   Eyes:      Conjunctiva/sclera: Conjunctivae normal.   Cardiovascular:      Rate and Rhythm: Normal rate and regular rhythm.      Pulses: Normal pulses.      Heart sounds: Normal heart sounds. No murmur heard.  Pulmonary:      Effort: Pulmonary effort is normal. No respiratory distress.      Breath sounds: Normal breath sounds.   Abdominal:      General: Bowel sounds are normal. There is no distension.      Palpations: Abdomen is soft.      Tenderness: There is no abdominal tenderness.   Musculoskeletal:         General: Normal range of motion.      Cervical back: Normal range of motion and neck supple.      Comments: Trace edema bilateral ankles   Skin:     General: Skin is warm and dry.      Findings: No rash.   Neurological:      Mental Status: She is alert. Mental status is at baseline.   Psychiatric:      Comments: Stable behavior compared  to baseline         All labs, imaging and procedures performed since last visit have been personally reviewed.

## 2023-12-02 NOTE — PROGRESS NOTES
Subjective:       Patient ID: Juan Pablo Bolden is a 60 y.o. female.    Chief Complaint: Leukopenia, unspecified type [D72.819]  HPI: We have an opportunity to see Ms. Juan Pablo Bolden in Hematology Oncology clinic at [facility] on 11/03/2020.  Ms. Juan Pablo Bolden is a 60 y.o. woman with pervasive development disorder, seizures disorder, presents for evaluation of leukopenia, thrombocytopenia. She is dependent on care 24 hours per day.    Oncology History    No history exists.     Past Medical History:   Diagnosis Date    Galactorrhea      No family history on file.  Social History     Socioeconomic History    Marital status: Single     Spouse name: Not on file    Number of children: Not on file    Years of education: Not on file    Highest education level: Not on file   Occupational History    Not on file   Social Needs    Financial resource strain: Not hard at all    Food insecurity     Worry: Never true     Inability: Never true    Transportation needs     Medical: No     Non-medical: No   Tobacco Use    Smoking status: Never Smoker    Smokeless tobacco: Never Used   Substance and Sexual Activity    Alcohol use: No    Drug use: No    Sexual activity: Never   Lifestyle    Physical activity     Days per week: Not on file     Minutes per session: 20 min    Stress: Very much   Relationships    Social connections     Talks on phone: Once a week     Gets together: Once a week     Attends [employer] service: Not on file     Active member of club or organization: No     Attends meetings of clubs or organizations: Never     Relationship status: Never    Other Topics Concern    Not on file   Social History Narrative    Not on file     No past surgical history on file.  Current Outpatient Medications   Medication Sig Dispense Refill    amLODIPine (NORVASC) 10 MG tablet Take 1 tablet (10 mg total) by mouth once daily. 30 tablet 11    diazePAM (VALIUM) 5 MG tablet Take 1 tablet (5 mg total) by mouth  every 6 (six) hours as needed for Anxiety. 30 tablet 0    divalproex (DEPAKOTE) 250 MG EC tablet Take 1 tablet (250 mg total) by mouth 3 (three) times daily. 90 tablet 0    mirtazapine (REMERON) 30 MG tablet Take 1 tablet (30 mg total) by mouth every evening. 30 tablet 0    MUCINEX 600 mg 12 hr tablet TAKE 1 TABLET BY MOUTH EVERY TWELVE HOURS AS NEEDED  0    multivit-iron-FA-calcium-mins (THERAPEUTIC-M) 9 mg iron-400 mcg Tab tablet Take 1 tablet by mouth once daily.      polyethylene glycol (GLYCOLAX) 17 gram/dose powder Take 17 g by mouth once daily.      QUEtiapine (SEROQUEL) 400 MG tablet Take 1/2 tablet each morning and 1 tablet at bedtime 45 tablet 0    simvastatin (ZOCOR) 40 MG tablet Take 1 tablet (40 mg total) by mouth every evening. 30 tablet 1    triamterene-hydrochlorothiazide 37.5-25 mg (DYAZIDE) 37.5-25 mg per capsule Take 1 capsule by mouth every morning. 30 capsule 11     No current facility-administered medications for this visit.        Labs:  Lab Results   Component Value Date    WBC 1.96 (LL) 10/01/2020    HGB 13.7 10/01/2020    HCT 42.4 10/01/2020    MCV 87 10/01/2020     (L) 10/01/2020     BMP  Lab Results   Component Value Date     10/01/2020    K 4.2 10/01/2020     10/01/2020    CO2 31 (H) 10/01/2020    BUN 20 10/01/2020    CREATININE 0.8 10/01/2020    CALCIUM 9.7 10/01/2020    ANIONGAP 11 10/01/2020    ESTGFRAFRICA >60.0 10/01/2020    EGFRNONAA >60.0 10/01/2020     Lab Results   Component Value Date    ALT 13 10/01/2020    AST 17 10/01/2020    ALKPHOS 97 10/01/2020    BILITOT 0.2 10/01/2020       Lab Results   Component Value Date    IRON 80 10/01/2020    TIBC 450 10/01/2020    FERRITIN 83 10/01/2020     Lab Results   Component Value Date    QGXKOZXS88 586 10/01/2020     Lab Results   Component Value Date    FOLATE 8.4 10/01/2020     Lab Results   Component Value Date    TSH 1.230 10/01/2020       I have reviewed the radiology reports and examined the scan/xray  images.    Review of Systems   Constitutional: Negative.    HENT: Negative.    Eyes: Negative.    Respiratory: Negative.    Cardiovascular: Negative.    Gastrointestinal: Negative.    Endocrine: Negative.    Genitourinary: Negative.    Musculoskeletal: Negative.    Skin: Negative.    Allergic/Immunologic: Negative.    Neurological: Negative.    Hematological: Negative.    Psychiatric/Behavioral: Negative.      ECOG SCORE    2 - Capable of all selfcare but unable to carry out any work activities, active > 50% of hours            Objective:     Vitals:    11/04/20 1016   BP: (!) 140/85   Resp: 18   Body mass index is 27.05 kg/m².  Physical Exam  Vitals signs and nursing note reviewed.   Constitutional:       Appearance: She is well-developed.   HENT:      Head: Normocephalic and atraumatic.   Eyes:      Conjunctiva/sclera: Conjunctivae normal.   Neck:      Musculoskeletal: Normal range of motion and neck supple.   Cardiovascular:      Rate and Rhythm: Normal rate and regular rhythm.   Pulmonary:      Effort: Pulmonary effort is normal.      Breath sounds: Normal breath sounds.   Abdominal:      General: Bowel sounds are normal.      Palpations: Abdomen is soft.   Musculoskeletal: Normal range of motion.   Skin:     General: Skin is warm and dry.   Neurological:      Mental Status: She is alert and oriented to person, place, and time.   Psychiatric:         Behavior: Behavior normal.         Thought Content: Thought content normal.         Judgment: Judgment normal.           Assessment:      1. Leukopenia, unspecified type    2. Thrombocytopenia           Plan:     Leukopenia, unspecified type  Likely recent UTI, depakote.  Will recheck, if worsen, would do bone marrow biopsy to evaluate for MDS.  -     iron fum-B12-IF-C-folic acid (FOLTRIN) 110-0.5 mg capsule; Take 1 capsule by mouth 2 (two) times daily.  Dispense: 60 capsule; Refill: 0  -     docusate sodium (COLACE) 100 MG capsule; Take 1 capsule (100 mg total) by  mouth 2 (two) times daily as needed for Constipation.  Dispense: 60 capsule; Refill: 0  -     Valproic Acid; Future; Expected date: 11/04/2020  -     CBC Auto Differential; Future; Expected date: 11/04/2020    Thrombocytopenia  -     iron fum-B12-IF-C-folic acid (FOLTRIN) 110-0.5 mg capsule; Take 1 capsule by mouth 2 (two) times daily.  Dispense: 60 capsule; Refill: 0  -     docusate sodium (COLACE) 100 MG capsule; Take 1 capsule (100 mg total) by mouth 2 (two) times daily as needed for Constipation.  Dispense: 60 capsule; Refill: 0  -     Valproic Acid; Future; Expected date: 11/04/2020  -     CBC Auto Differential; Future; Expected date: 11/04/2020             atrial fibrillation/cancer/congestive heart failure/heart disease

## 2023-12-03 DIAGNOSIS — Z71.89 COMPLEX CARE COORDINATION: ICD-10-CM

## 2023-12-04 ENCOUNTER — DOCUMENTATION ONLY (OUTPATIENT)
Dept: SURGERY | Facility: CLINIC | Age: 63
End: 2023-12-04
Payer: MEDICARE

## 2023-12-04 ENCOUNTER — TELEPHONE (OUTPATIENT)
Dept: SURGICAL ONCOLOGY | Facility: CLINIC | Age: 63
End: 2023-12-04
Payer: MEDICARE

## 2023-12-04 NOTE — TELEPHONE ENCOUNTER
Called and spoke with patients sister Alanis regarding appointment with . During the phone call Alanis stated a few times she is unsure why she is being scheduled with  and why  referred her when the patient has never seen Dr. Hardwick. I tired to explain the reason for the appointment but Alanis denied appointment. Alanis also stated she will contact the doctor the patient was seeing in [place] to ask if patient needs this appointment with Dr. Hyman. I then sent message to 's staff explaining what happened and what was said during phone call.    ----- Message from Joanne Brennan RN sent at 12/4/2023  1:18 PM CST -----  Good afternoon, Dr Hardwick is referring this pt to Dr Hyman. She is a a [facility] pt. Please call and francy pt appt.   Thanks  Joanne LEE RN CCRR

## 2023-12-05 ENCOUNTER — TELEPHONE (OUTPATIENT)
Dept: SURGERY | Facility: CLINIC | Age: 63
End: 2023-12-05
Payer: MEDICARE

## 2023-12-05 NOTE — TELEPHONE ENCOUNTER
Placed call to pt sister Alanis. No answer. Left a message on her vm to call back. Telephone number provided.

## 2023-12-07 ENCOUNTER — TELEPHONE (OUTPATIENT)
Dept: SURGERY | Facility: CLINIC | Age: 63
End: 2023-12-07
Payer: MEDICARE

## 2023-12-07 ENCOUNTER — PATIENT MESSAGE (OUTPATIENT)
Dept: ENDOSCOPY | Facility: HOSPITAL | Age: 63
End: 2023-12-07
Payer: MEDICARE

## 2023-12-07 NOTE — TELEPHONE ENCOUNTER
Placed call to pt sister Alanis. She states there were some confusion regarding doctors appt for pt. She states it all taken care of now. Informed her pt is francy for EUS on 12/20/23 px time 11:30 am and pt has to check in at the facility @ 10:30 am. Informed her Rosa Sky RN from [facility] will send pre op instruction to pt portal. Informed her pt has an appt with Dr Hyman in  on 12/26/23. Pt sister verbalized understanding to all of the above.

## 2023-12-13 ENCOUNTER — TELEPHONE (OUTPATIENT)
Dept: ENDOSCOPY | Facility: HOSPITAL | Age: 63
End: 2023-12-13
Payer: MEDICARE

## 2023-12-13 ENCOUNTER — TELEPHONE (OUTPATIENT)
Dept: PSYCHIATRY | Facility: CLINIC | Age: 63
End: 2023-12-13
Payer: MEDICARE

## 2023-12-13 NOTE — TELEPHONE ENCOUNTER
Patient's sister returning missed call to confirm EUS procedure scheduled on 12/20/23. Procedure confirmed. All questions answered.

## 2023-12-14 ENCOUNTER — OFFICE VISIT (OUTPATIENT)
Dept: PSYCHIATRY | Facility: CLINIC | Age: 63
End: 2023-12-14
Payer: MEDICARE

## 2023-12-14 DIAGNOSIS — G47.00 INSOMNIA, UNSPECIFIED TYPE: ICD-10-CM

## 2023-12-14 DIAGNOSIS — F79 INTELLECTUAL DISABILITY: Primary | ICD-10-CM

## 2023-12-14 DIAGNOSIS — R45.1 RESTLESSNESS AND AGITATION: ICD-10-CM

## 2023-12-14 DIAGNOSIS — Z79.899 ON VALPROATE THERAPY: ICD-10-CM

## 2023-12-14 PROCEDURE — 99214 OFFICE O/P EST MOD 30 MIN: CPT | Mod: 95,,, | Performed by: PSYCHIATRY & NEUROLOGY

## 2023-12-14 PROCEDURE — 99214 PR OFFICE/OUTPT VISIT, EST, LEVL IV, 30-39 MIN: ICD-10-PCS | Mod: 95,,, | Performed by: PSYCHIATRY & NEUROLOGY

## 2023-12-14 RX ORDER — MIRTAZAPINE 45 MG/1
45 TABLET, FILM COATED ORAL NIGHTLY
Qty: 90 TABLET | Refills: 0 | Status: SHIPPED | OUTPATIENT
Start: 2023-12-14 | End: 2024-02-27

## 2023-12-14 RX ORDER — CLONAZEPAM 0.5 MG/1
TABLET ORAL
Qty: 60 TABLET | Refills: 2 | Status: SHIPPED | OUTPATIENT
Start: 2023-12-14 | End: 2024-04-03

## 2023-12-14 RX ORDER — TRAZODONE HYDROCHLORIDE 150 MG/1
150 TABLET ORAL NIGHTLY PRN
Qty: 30 TABLET | Refills: 2 | Status: SHIPPED | OUTPATIENT
Start: 2023-12-14 | End: 2024-02-27

## 2023-12-14 RX ORDER — DIVALPROEX SODIUM 500 MG/1
500 TABLET, DELAYED RELEASE ORAL 2 TIMES DAILY
Qty: 180 TABLET | Refills: 0 | Status: SHIPPED | OUTPATIENT
Start: 2023-12-14 | End: 2024-02-27

## 2023-12-14 RX ORDER — QUETIAPINE FUMARATE 400 MG/1
TABLET, FILM COATED ORAL
Qty: 45 TABLET | Refills: 2 | Status: SHIPPED | OUTPATIENT
Start: 2023-12-14 | End: 2024-02-27
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
## 2023-12-18 DIAGNOSIS — R09.81 NASAL CONGESTION: ICD-10-CM

## 2023-12-18 DIAGNOSIS — R09.89 RUNNY NOSE: ICD-10-CM

## 2023-12-20 ENCOUNTER — ANESTHESIA (OUTPATIENT)
Dept: ENDOSCOPY | Facility: HOSPITAL | Age: 63
End: 2023-12-20
Payer: MEDICARE

## 2023-12-20 ENCOUNTER — HOSPITAL ENCOUNTER (OUTPATIENT)
Facility: HOSPITAL | Age: 63
Discharge: HOME | End: 2023-12-20
Attending: INTERNAL MEDICINE | Admitting: INTERNAL MEDICINE
Payer: MEDICARE

## 2023-12-20 ENCOUNTER — ANESTHESIA EVENT (OUTPATIENT)
Dept: ENDOSCOPY | Facility: HOSPITAL | Age: 63
End: 2023-12-20
Payer: MEDICARE

## 2023-12-20 ENCOUNTER — PATIENT MESSAGE (OUTPATIENT)
Dept: PRIMARY CARE CLINIC | Facility: CLINIC | Age: 63
End: 2023-12-20
Payer: MEDICARE

## 2023-12-20 VITALS
SYSTOLIC BLOOD PRESSURE: 145 MMHG | TEMPERATURE: 98 F | DIASTOLIC BLOOD PRESSURE: 80 MMHG | HEART RATE: 78 BPM | OXYGEN SATURATION: 99 % | BODY MASS INDEX: 21.57 KG/M2 | HEIGHT: 59 IN | RESPIRATION RATE: 18 BRPM | WEIGHT: 107 LBS

## 2023-12-20 DIAGNOSIS — K86.2 PANCREAS CYST: ICD-10-CM

## 2023-12-20 DIAGNOSIS — R05.9 COUGH, UNSPECIFIED TYPE: Primary | ICD-10-CM

## 2023-12-20 PROCEDURE — 88305 TISSUE EXAM BY PATHOLOGIST: ICD-10-PCS | Mod: 26,,, | Performed by: PATHOLOGY

## 2023-12-20 PROCEDURE — 88173 CYTOPATH EVAL FNA REPORT: CPT | Performed by: PATHOLOGY

## 2023-12-20 PROCEDURE — 43242 EGD US FINE NEEDLE BX/ASPIR: CPT | Performed by: INTERNAL MEDICINE

## 2023-12-20 PROCEDURE — D9220A PRA ANESTHESIA: ICD-10-PCS | Mod: CRNA,,, | Performed by: NURSE ANESTHETIST, CERTIFIED REGISTERED

## 2023-12-20 PROCEDURE — 88305 TISSUE EXAM BY PATHOLOGIST: CPT | Mod: 26,,, | Performed by: PATHOLOGY

## 2023-12-20 PROCEDURE — D9220A PRA ANESTHESIA: ICD-10-PCS | Mod: ANES,,, | Performed by: ANESTHESIOLOGY

## 2023-12-20 PROCEDURE — 88305 TISSUE EXAM BY PATHOLOGIST: CPT | Performed by: PATHOLOGY

## 2023-12-20 PROCEDURE — 37000008 HC ANESTHESIA 1ST 15 MINUTES: Performed by: INTERNAL MEDICINE

## 2023-12-20 PROCEDURE — 88173 CYTOPATH EVAL FNA REPORT: CPT | Mod: 26,,, | Performed by: PATHOLOGY

## 2023-12-20 PROCEDURE — 37000009 HC ANESTHESIA EA ADD 15 MINS: Performed by: INTERNAL MEDICINE

## 2023-12-20 PROCEDURE — 43242 EGD US FINE NEEDLE BX/ASPIR: CPT | Mod: ,,, | Performed by: INTERNAL MEDICINE

## 2023-12-20 PROCEDURE — 88173 PR  INTERPRETATION OF FNA SMEAR: ICD-10-PCS | Mod: 26,,, | Performed by: PATHOLOGY

## 2023-12-20 PROCEDURE — 43242 PR UPGI ENDOSCOPY,FN NEEDLE BX,GUIDED: ICD-10-PCS | Mod: ,,, | Performed by: INTERNAL MEDICINE

## 2023-12-20 PROCEDURE — D9220A PRA ANESTHESIA: Mod: CRNA,,, | Performed by: NURSE ANESTHETIST, CERTIFIED REGISTERED

## 2023-12-20 PROCEDURE — 63600175 PHARM REV CODE 636 W HCPCS: Performed by: NURSE ANESTHETIST, CERTIFIED REGISTERED

## 2023-12-20 PROCEDURE — 27202131 HC NEEDLE, FNB SINGLE (ANY): Performed by: INTERNAL MEDICINE

## 2023-12-20 PROCEDURE — D9220A PRA ANESTHESIA: Mod: ANES,,, | Performed by: ANESTHESIOLOGY

## 2023-12-20 PROCEDURE — 25000003 PHARM REV CODE 250: Performed by: NURSE ANESTHETIST, CERTIFIED REGISTERED

## 2023-12-20 PROCEDURE — 25000003 PHARM REV CODE 250: Performed by: ANESTHESIOLOGY

## 2023-12-20 RX ORDER — FLUTICASONE PROPIONATE 50 MCG
2 SPRAY, SUSPENSION (ML) NASAL
Qty: 48 G | Refills: 0 | Status: SHIPPED | OUTPATIENT
Start: 2023-12-20

## 2023-12-20 RX ORDER — SODIUM CHLORIDE 9 MG/ML
INJECTION, SOLUTION INTRAVENOUS CONTINUOUS
Status: ACTIVE | OUTPATIENT
Start: 2023-12-20

## 2023-12-20 RX ORDER — PROPOFOL 10 MG/ML
VIAL (ML) INTRAVENOUS
Status: DISCONTINUED | OUTPATIENT
Start: 2023-12-20 | End: 2023-12-20

## 2023-12-20 RX ORDER — ONDANSETRON 2 MG/ML
4 INJECTION INTRAMUSCULAR; INTRAVENOUS DAILY PRN
Status: DISCONTINUED | OUTPATIENT
Start: 2023-12-20 | End: 2023-12-20 | Stop reason: HOSPADM

## 2023-12-20 RX ORDER — PROPOFOL 10 MG/ML
VIAL (ML) INTRAVENOUS CONTINUOUS PRN
Status: DISCONTINUED | OUTPATIENT
Start: 2023-12-20 | End: 2023-12-20

## 2023-12-20 RX ORDER — MIDAZOLAM HYDROCHLORIDE 2 MG/ML
20 SYRUP ORAL ONCE
Status: COMPLETED | OUTPATIENT
Start: 2023-12-20 | End: 2023-12-20

## 2023-12-20 RX ORDER — LIDOCAINE HYDROCHLORIDE 20 MG/ML
INJECTION INTRAVENOUS
Status: DISCONTINUED | OUTPATIENT
Start: 2023-12-20 | End: 2023-12-20

## 2023-12-20 RX ORDER — SODIUM CHLORIDE 0.9 % (FLUSH) 0.9 %
10 SYRINGE (ML) INJECTION
Status: ACTIVE | OUTPATIENT
Start: 2023-12-20

## 2023-12-20 RX ORDER — HYDROMORPHONE HYDROCHLORIDE 1 MG/ML
0.2 INJECTION, SOLUTION INTRAMUSCULAR; INTRAVENOUS; SUBCUTANEOUS EVERY 5 MIN PRN
Status: DISCONTINUED | OUTPATIENT
Start: 2023-12-20 | End: 2023-12-20 | Stop reason: HOSPADM

## 2023-12-20 RX ORDER — SODIUM CHLORIDE 9 MG/ML
INJECTION, SOLUTION INTRAVENOUS CONTINUOUS PRN
Status: DISCONTINUED | OUTPATIENT
Start: 2023-12-20 | End: 2023-12-20

## 2023-12-20 RX ORDER — MEPERIDINE HYDROCHLORIDE 50 MG/ML
12.5 INJECTION INTRAMUSCULAR; INTRAVENOUS; SUBCUTANEOUS ONCE AS NEEDED
Status: DISCONTINUED | OUTPATIENT
Start: 2023-12-20 | End: 2023-12-20 | Stop reason: HOSPADM

## 2023-12-20 RX ORDER — SODIUM CHLORIDE 9 MG/ML
INJECTION, SOLUTION INTRAVENOUS CONTINUOUS
Status: DISCONTINUED | OUTPATIENT
Start: 2023-12-20 | End: 2023-12-20 | Stop reason: HOSPADM

## 2023-12-20 RX ORDER — LORAZEPAM 2 MG/ML
0.25 INJECTION INTRAMUSCULAR ONCE AS NEEDED
Status: DISCONTINUED | OUTPATIENT
Start: 2023-12-20 | End: 2023-12-20 | Stop reason: HOSPADM

## 2023-12-20 RX ORDER — BENZONATATE 100 MG/1
100 CAPSULE ORAL 3 TIMES DAILY PRN
Qty: 30 CAPSULE | Refills: 1 | Status: SHIPPED | OUTPATIENT
Start: 2023-12-20 | End: 2024-01-09

## 2023-12-20 RX ADMIN — SODIUM CHLORIDE: 0.9 INJECTION, SOLUTION INTRAVENOUS at 11:12

## 2023-12-20 RX ADMIN — GLYCOPYRROLATE 0.2 MG: 0.2 INJECTION, SOLUTION INTRAMUSCULAR; INTRAVENOUS at 12:12

## 2023-12-20 RX ADMIN — PROPOFOL 50 MG: 10 INJECTION, EMULSION INTRAVENOUS at 12:12

## 2023-12-20 RX ADMIN — MIDAZOLAM HYDROCHLORIDE 20 MG: 2 SYRUP ORAL at 11:12

## 2023-12-20 RX ADMIN — PROPOFOL 175 MCG/KG/MIN: 10 INJECTION, EMULSION INTRAVENOUS at 12:12

## 2023-12-20 RX ADMIN — LIDOCAINE HYDROCHLORIDE 100 MG: 20 INJECTION INTRAVENOUS at 12:12

## 2023-12-20 NOTE — TRANSFER OF CARE
"Anesthesia Transfer of Care Note    Patient: Juan Pablo Bolden    Procedure(s) Performed: Procedure(s) (LRB):  ULTRASOUND, UPPER GI TRACT, ENDOSCOPIC (N/A)    Patient location: [facility]    Anesthesia Type: general    Transport from OR: Transported from OR on room air with adequate spontaneous ventilation    Post pain: adequate analgesia    Post assessment: no apparent anesthetic complications and tolerated procedure well    Post vital signs: stable    Level of consciousness: awake, alert and oriented    Nausea/Vomiting: no nausea/vomiting    Complications: none    Transfer of care protocol was followed      Last vitals: Visit Vitals  /77 (BP Location: Left arm, Patient Position: Lying)   Pulse 97   Temp 36.5 °C (97.7 °F) (Temporal)   Resp 16   Ht 4' 11" (1.499 m)   Wt 48.5 kg (107 lb)   SpO2 97%   Breastfeeding No   BMI 21.61 kg/m²     "

## 2023-12-20 NOTE — PROVATION PATIENT INSTRUCTIONS
Discharge Summary/Instructions after an Endoscopic Procedure  Patient Name: Juan Pablo Bolden  Patient MRN: [id number]  Patient YOB: 1960 Wednesday, December 20, 2023  Reed Ojeda MD  Dear patient,  As a result of recent federal legislation (The Federal Cures Act), you may   receive lab or pathology results from your procedure in your [facility]   account before your physician is able to contact you. Your physician or   their representative will relay the results to you with their   recommendations at their soonest availability.  Thank you,  RESTRICTIONS:  During your procedure today, you received medications for sedation.  These   medications may affect your judgment, balance and coordination.  Therefore,   for 24 hours, you have the following restrictions:   - DO NOT drive a car, operate machinery, make legal/financial decisions,   sign important papers or drink alcohol.    ACTIVITY:  Today: no heavy lifting, straining or running due to procedural   sedation/anesthesia.  The following day: return to full activity including work.  DIET:  Eat and drink normally unless instructed otherwise.     TREATMENT FOR COMMON SIDE EFFECTS:  - Mild abdominal pain, nausea, belching, bloating or excessive gas:  rest,   eat lightly and use a heating pad.  - Sore Throat: treat with throat lozenges and/or gargle with warm salt   water.  - Because air was used during the procedure, expelling large amounts of air   from your rectum or belching is normal.  - If a bowel prep was taken, you may not have a bowel movement for 1-3 days.    This is normal.  SYMPTOMS TO WATCH FOR AND REPORT TO YOUR PHYSICIAN:  1. Abdominal pain or bloating, other than gas cramps.  2. Chest pain.  3. Back pain.  4. Signs of infection such as: chills or fever occurring within 24 hours   after the procedure.  5. Rectal bleeding, which would show as bright red, maroon, or black stools.   (A tablespoon of blood from the rectum is not serious, especially if    hemorrhoids are present.)  6. Vomiting.  7. Weakness or dizziness.  GO DIRECTLY TO THE NEAREST EMERGENCY ROOM IF YOU HAVE ANY OF THE FOLLOWING:      Difficulty breathing              Chills and/or fever over 101 F   Persistent vomiting and/or vomiting blood   Severe abdominal pain   Severe chest pain   Black, tarry stools   Bleeding- more than one tablespoon   Any other symptom or condition that you feel may need urgent attention  Your doctor recommends these additional instructions:  If any biopsies were taken, your doctors clinic will contact you in 1 to 2   weeks with any results.  - Discharge patient to home (ambulatory).   - Resume previous diet; Discharge to home (ambulatory); Resume outpatient   medications  - Return to primary care physician as previously scheduled.   - Await path results. Further recommendations will be made pending biopsy   results.  For questions, problems or results please call your physician - Reed Ojeda MD at Work:  [phone].  [facility], EMERGENCY ROOM PHONE NUMBER: [phone]  IF A COMPLICATION OR EMERGENCY SITUATION ARISES AND YOU ARE UNABLE TO REACH   YOUR PHYSICIAN - GO DIRECTLY TO THE EMERGENCY ROOM.  Reed Ojeda MD  12/20/2023 12:42:50 PM  This report has been verified and signed electronically.  Dear patient,  As a result of recent federal legislation (The Federal Cures Act), you may   receive lab or pathology results from your procedure in your [facility]   account before your physician is able to contact you. Your physician or   their representative will relay the results to you with their   recommendations at their soonest availability.  Thank you,  PROVATION

## 2023-12-20 NOTE — H&P
Short Stay Endoscopy History and Physical    PCP - Brooke Goldberg MD  Referring Physician - Kevin Nj MD  [address] [place] [address]    Procedure - EUS  ASA - per anesthesia  Mallampati - per anesthesia  History of Anesthesia problems - no  Family history Anesthesia problems -  no   Plan of anesthesia - General    HPI:  This is a 63 y.o. female here for evaluation of: pancreatic cyst    Reflux - no  Dysphagia - no  Abdominal pain - no  Diarrhea - no    ROS:  Constitutional: No fevers, chills, No weight loss  CV: No chest pain  Pulm: No cough, No shortness of breath  GI: see HPI    Medical History:  has a past medical history of Bipolar 1 disorder, Chronic constipation, Galactorrhea, Growth retardation, High cholesterol, Hyperlipidemia, Hypertension, Leukopenia, Neuroleptic-induced tardive dyskinesia, Schizoaffective disorder, and Stereotypic movement disorder.    Surgical History:  has a past surgical history that includes Esophagogastroduodenoscopy (N/A, 11/5/2020); Colonoscopy (N/A, 5/17/2021); and Endoscopic ultrasound of upper gastrointestinal tract (N/A, 5/16/2022).    Family History: family history includes Heart disease in her maternal grandmother; Hypertension in her father; Lung cancer in her mother; Prostate cancer in her father; Stroke in her maternal uncle..    Social History:  reports that she has never smoked. She has never been exposed to tobacco smoke. She has never used smokeless tobacco. She reports that she does not drink alcohol and does not use drugs.    Review of patient's allergies indicates:  No Known Allergies    Medications:   Medications Prior to Admission   Medication Sig Dispense Refill Last Dose    amLODIPine (NORVASC) 10 MG tablet Take 1 tablet (10 mg total) by mouth once daily. 90 tablet 3 12/20/2023    cetirizine (ZYRTEC) 10 MG tablet Take 10 mg by mouth daily as needed for Allergies.   12/20/2023    clonazePAM (KLONOPIN) 0.5 MG tablet Take 1 nightly  at bedtime and one daily as needed for agitation 60 tablet 2 12/19/2023    divalproex (DEPAKOTE) 500 MG TbEC Take 1 tablet (500 mg total) by mouth 2 (two) times daily. 180 tablet 0 12/20/2023    folic acid (FOLVITE) 1 MG tablet Take 1 tablet (1 mg total) by mouth once daily. 90 tablet 3 12/19/2023    QUEtiapine (SEROQUEL) 400 MG tablet Take 1 and 1/2 tablets at bedtime. 45 tablet 2 12/20/2023    acetaminophen (TYLENOL) 650 MG TbSR Take 650 mg by mouth every 8 (eight) hours as needed for Pain.       diazePAM (VALIUM) 5 MG tablet Take 2 tablets (10 mg total) by mouth every 6 (six) hours as needed for Anxiety. 4 tablet 0     diclofenac sodium (VOLTAREN) 1 % Gel APPLY TWO GRAMS TO THE AFFECTED AREA FOUR TIMES DAILY AS NEEDED FOR PAIN 200 g 1     diphenhydrAMINE (BENADRYL) 25 mg capsule Take 25 mg by mouth nightly as needed for Allergies.       docusate sodium (COLACE) 100 MG capsule Take 1 capsule (100 mg total) by mouth once daily. 90 capsule 3     fluticasone propionate (FLONASE) 50 mcg/actuation nasal spray SHAKE LIQUID AND USE 2 SPRAYS IN EACH NOSTRIL EVERY DAY 48 g 0     furosemide (LASIX) 20 MG tablet TAKE 1 TABLET BY MOUTH DAILY AS NEEDED FOR SWELLING 30 tablet 11     glycerin adult suppository Place 1 suppository rectally as needed for Constipation. 12 suppository 0     melatonin 10 mg TbSR Take 1 tablet by mouth every evening. 30 tablet 11     mirtazapine (REMERON) 45 MG tablet Take 1 tablet (45 mg total) by mouth every evening. 90 tablet 0     polyethylene glycol (GLYCOLAX) 17 gram/dose powder One cup t.i.d. for 3 days, 2 cups b.i.d. for 2 days, and then 1 cup daily. 850 g 0     simethicone (MYLICON) 125 mg Cap capsule 1 capsule after meals and at bedtime as needed       simvastatin (ZOCOR) 40 MG tablet Take 1 tablet (40 mg total) by mouth every evening. 90 tablet 3     traZODone (DESYREL) 150 MG tablet Take 1 tablet (150 mg total) by mouth nightly as needed for Insomnia. 30 tablet 2      triamterene-hydrochlorothiazide 37.5-25 mg (MAXZIDE-25) 37.5-25 mg per tablet Take 1 tablet by mouth every morning. 90 tablet 3        Physical Exam:    Vital Signs:   Vitals:    12/20/23 1108   BP: 137/77   Pulse: 97   Resp: 16   Temp: 97.7 °F (36.5 °C)       General Appearance: Well appearing in no acute distress  Lungs: no labored breathing  CVS:  regular rate  Abdomen: non tender    Labs:  Lab Results   Component Value Date    WBC 2.72 (L) 11/12/2023    HGB 12.1 11/12/2023    HCT 35.8 (L) 11/12/2023    PLT 92 (L) 11/12/2023    CHOL 234 (H) 10/14/2014    TRIG 97 10/14/2014     (H) 10/14/2014    ALT 19 11/12/2023    AST 24 11/12/2023     11/12/2023    K 4.3 11/12/2023    CL 97 11/12/2023    CREATININE 0.7 11/12/2023    BUN 16 11/12/2023    CO2 31 (H) 11/12/2023    TSH 1.177 04/09/2021    HGBA1C 5.2 10/14/2014       I have explained the risks and benefits of this endoscopic procedure to the patient including but not limited to bleeding, inflammation, infection, perforation, and death.      Reed Ojeda MD

## 2023-12-20 NOTE — ANESTHESIA PREPROCEDURE EVALUATION
12/20/2023  Juan Pablo Bolden is a 63 y.o., female.      Pre-op Assessment    I have reviewed the Patient Summary Reports.     I have reviewed the Nursing Notes.       Review of Systems  Anesthesia Hx:  No problems with previous Anesthesia                Hematology/Oncology:  Hematology Normal   Oncology Normal                                   EENT/Dental:  EENT/Dental Normal           Cardiovascular:     Hypertension   CAD                                        Pulmonary:  Pulmonary Normal                       Renal/:  Renal/ Normal                 Hepatic/GI:      Liver Disease,            Musculoskeletal:  Musculoskeletal Normal                Neurological:       Seizures                                Endocrine:   Hypothyroidism          Dermatological:  Skin Normal    Psych:  Psychiatric History                  Physical Exam  General: Well nourished    Airway:  Mallampati: II   Mouth Opening: Normal  TM Distance: Normal  Tongue: Normal  Neck ROM: Normal ROM    Dental:  Intact        Anesthesia Plan  Type of Anesthesia, risks & benefits discussed:    Anesthesia Type: Gen Natural Airway  Intra-op Monitoring Plan: Standard ASA Monitors  Post Op Pain Control Plan: multimodal analgesia  Induction:  IV  Airway Plan: Direct  Informed Consent: Informed consent signed with the Patient and all parties understand the risks and agree with anesthesia plan.  All questions answered. Patient consented to blood products? No  ASA Score: 3  Day of Surgery Review of History & Physical: H&P Update referred to the surgeon/provider.    Ready For Surgery From Anesthesia Perspective.     .

## 2023-12-22 LAB — FINAL PATHOLOGIC DIAGNOSIS: NORMAL

## 2023-12-22 NOTE — ANESTHESIA POSTPROCEDURE EVALUATION
Anesthesia Post Evaluation    Patient: Juan Pablo Bolden    Procedure(s) Performed: Procedure(s) (LRB):  ULTRASOUND, UPPER GI TRACT, ENDOSCOPIC (N/A)    Final Anesthesia Type: general      Patient location during evaluation: PACU  Patient participation: Yes- Able to Participate  Level of consciousness: awake and alert  Post-procedure vital signs: reviewed and stable  Pain management: adequate  Airway patency: patent    PONV status at discharge: No PONV  Anesthetic complications: no      Cardiovascular status: blood pressure returned to baseline  Respiratory status: spontaneous ventilation and room air  Hydration status: euvolemic  Follow-up not needed.              Vitals Value Taken Time   /80 12/20/23 1331   Temp 36.4 °C (97.6 °F) 12/20/23 1230   Pulse 84 12/20/23 1341   Resp 40 12/20/23 1341   SpO2 92 % 12/20/23 1340   Vitals shown include unvalidated device data.      No case tracking events are documented in the log.      Pain/Shala Score: No data recorded

## 2023-12-23 ENCOUNTER — OFFICE VISIT (OUTPATIENT)
Dept: URGENT CARE | Facility: CLINIC | Age: 63
End: 2023-12-23
Payer: MEDICARE

## 2023-12-23 ENCOUNTER — TELEPHONE (OUTPATIENT)
Dept: URGENT CARE | Facility: CLINIC | Age: 63
End: 2023-12-23

## 2023-12-23 VITALS
SYSTOLIC BLOOD PRESSURE: 119 MMHG | HEART RATE: 65 BPM | RESPIRATION RATE: 18 BRPM | DIASTOLIC BLOOD PRESSURE: 64 MMHG | OXYGEN SATURATION: 98 % | TEMPERATURE: 98 F

## 2023-12-23 DIAGNOSIS — R05.9 COUGH, UNSPECIFIED TYPE: Primary | ICD-10-CM

## 2023-12-23 DIAGNOSIS — J10.1 INFLUENZA A: ICD-10-CM

## 2023-12-23 LAB
CTP QC/QA: YES
CTP QC/QA: YES
POC MOLECULAR INFLUENZA A AGN: POSITIVE
POC MOLECULAR INFLUENZA B AGN: NEGATIVE
SARS-COV-2 AG RESP QL IA.RAPID: NEGATIVE

## 2023-12-23 PROCEDURE — 99213 PR OFFICE/OUTPT VISIT, EST, LEVL III, 20-29 MIN: ICD-10-PCS | Mod: S$GLB,,,

## 2023-12-23 PROCEDURE — 87811 SARS CORONAVIRUS 2 ANTIGEN POCT, MANUAL READ: ICD-10-PCS | Mod: QW,S$GLB,,

## 2023-12-23 PROCEDURE — 87502 POCT INFLUENZA A/B MOLECULAR: ICD-10-PCS | Mod: QW,S$GLB,,

## 2023-12-23 PROCEDURE — 99213 OFFICE O/P EST LOW 20 MIN: CPT | Mod: S$GLB,,,

## 2023-12-23 PROCEDURE — 87811 SARS-COV-2 COVID19 W/OPTIC: CPT | Mod: QW,S$GLB,,

## 2023-12-23 PROCEDURE — 87502 INFLUENZA DNA AMP PROBE: CPT | Mod: QW,S$GLB,,

## 2023-12-23 RX ORDER — PROMETHAZINE HYDROCHLORIDE AND DEXTROMETHORPHAN HYDROBROMIDE 6.25; 15 MG/5ML; MG/5ML
5 SYRUP ORAL NIGHTLY PRN
Qty: 50 ML | Refills: 0 | Status: SHIPPED | OUTPATIENT
Start: 2023-12-23 | End: 2024-01-02

## 2023-12-23 RX ORDER — BENZONATATE 100 MG/1
100 CAPSULE ORAL 3 TIMES DAILY PRN
Qty: 30 CAPSULE | Refills: 0 | Status: SHIPPED | OUTPATIENT
Start: 2023-12-23 | End: 2024-01-02

## 2023-12-23 NOTE — PROGRESS NOTES
Subjective:      Patient ID: Juan Pablo Bolden is a 63 y.o. female.    Vitals:  temporal temperature is 97.5 °F (36.4 °C). Her blood pressure is 119/64 and her pulse is 65. Her respiration is 18 and oxygen saturation is 98%.     Chief Complaint: Fever    64yo female pt presents today with cough x4 days and started with fever last night. Pt caregiver report she has taken mucinex, cough syrup and tylenol with mild relief. Pt has no known exposure to covid, flu or strep.     Fever   This is a new problem. The current episode started in the past 7 days. The problem occurs constantly. The problem has been unchanged. Her temperature was unmeasured prior to arrival. Associated symptoms include coughing. Pertinent negatives include no chest pain, diarrhea, nausea, vomiting or wheezing. She has tried acetaminophen for the symptoms. The treatment provided mild relief.       Constitution: Positive for fever. Negative for chills.   Cardiovascular:  Negative for chest pain.   Respiratory:  Positive for cough. Negative for shortness of breath and wheezing.    Gastrointestinal:  Negative for nausea, vomiting and diarrhea.   Neurological:  Negative for dizziness and light-headedness.      Objective:     Physical Exam   Constitutional: She appears well-developed. She is cooperative.  Non-toxic appearance. She does not appear ill. No distress.   HENT:   Head: Normocephalic and atraumatic.   Ears:   Right Ear: Hearing, tympanic membrane, external ear and ear canal normal.   Left Ear: Hearing, tympanic membrane, external ear and ear canal normal.   Nose: Congestion present. No mucosal edema or rhinorrhea. Right sinus exhibits no maxillary sinus tenderness and no frontal sinus tenderness. Left sinus exhibits no maxillary sinus tenderness and no frontal sinus tenderness.   Mouth/Throat: Uvula is midline and mucous membranes are normal. No trismus in the jaw. No uvula swelling. No oropharyngeal exudate, posterior oropharyngeal edema or  posterior oropharyngeal erythema.   Eyes: Lids are normal.   Neck: Trachea normal and phonation normal. Neck supple. No edema present. No erythema present.   Cardiovascular: Normal rate, regular rhythm, normal heart sounds and normal pulses.   Pulmonary/Chest: Effort normal and breath sounds normal. No respiratory distress. She has no decreased breath sounds. She has no wheezes. She has no rhonchi.   Abdominal: Normal appearance.   Musculoskeletal: Normal range of motion.         General: Normal range of motion.   Lymphadenopathy:     She has no cervical adenopathy.   Neurological: She is alert. She exhibits normal muscle tone.      Comments: Pt at baseline   Skin: Skin is warm, dry, intact and not diaphoretic.   Psychiatric: Her speech is normal and behavior is normal.   Nursing note and vitals reviewed.      Results for orders placed or performed in visit on 12/23/23   POCT Influenza A/B MOLECULAR   Result Value Ref Range    POC Molecular Influenza A Ag Positive (A) Negative, Not Reported    POC Molecular Influenza B Ag Negative Negative, Not Reported     Acceptable Yes    SARS Coronavirus 2 Antigen, POCT Manual Read   Result Value Ref Range    SARS Coronavirus 2 Antigen Negative Negative     Acceptable Yes      *Note: Due to a large number of results and/or encounters for the requested time period, some results have not been displayed. A complete set of results can be found in Results Review.       Assessment:     1. Cough, unspecified type    2. Influenza A        Plan:       Cough, unspecified type  -     POCT Influenza A/B MOLECULAR  -     SARS Coronavirus 2 Antigen, POCT Manual Read  -     benzonatate (TESSALON) 100 MG capsule; Take 1 capsule (100 mg total) by mouth 3 (three) times daily as needed for Cough.  Dispense: 30 capsule; Refill: 0    Influenza A    Patient in no acute distress. Vitals reassuring. Non-toxic appearing. Reviewed positive flu A test results in detail.  Discussed OTC medications for symptom relief.  Discussed the importance of further evaluation if symptoms worsen. Patient stated verbal understanding.    Patient Instructions   Tested positive for Influenza A today. Take Tylenol/ibuprofen with food for fevers/pain. Drink plenty of fluids and get plenty of rest. Do not return to school/work until 24 hour fever free without the use of tylenol/ibuprofen. If your symptoms worsen please return or go to the ER for further evaluation.     Patient Instructions   PLEASE READ YOUR DISCHARGE INSTRUCTIONS ENTIRELY AS IT CONTAINS IMPORTANT INFORMATION.     Please drink plenty of fluids.     Please get plenty of rest.     Please return here or go to the Emergency Department for any concerns or worsening of condition.     Please take an over the counter antihistamine medication (allegra/Claritin/Zyrtec) of your choice as directed.     Try an over the counter decongestant like Mucinex D or Sudafed. You buy this behind the pharmacy counter     If not allergic, please take over the counter Tylenol (Acetaminophen) and/or Motrin (Ibuprofen) as directed for control of pain and/or fever.  Please follow up with your primary care doctor or specialist as needed.     Sore throat recommendations: Warm fluids, warm salt water gargles, throat lozenges, tea, honey, soup, rest, hydration.     Use over the counter flonase: one spray each nostril twice daily OR two sprays each nostril once daily.      If you  smoke, please stop smoking.     Please return or see your primary care doctor if you develop new or worsening symptoms.      Please arrange follow up with your primary medical clinic as soon as possible. You must understand that you've received an Urgent Care treatment only and that you may be released before all of your medical problems are known or treated. You, the patient, will arrange for follow up as instructed. If your symptoms worsen or fail to improve you should go to the Emergency  Room.

## 2023-12-23 NOTE — PATIENT INSTRUCTIONS
Tested positive for Influenza A today. Take Tylenol/ibuprofen with food for fevers/pain. Drink plenty of fluids and get plenty of rest. Do not return to school/work until 24 hour fever free without the use of tylenol/ibuprofen. If your symptoms worsen please return or go to the ER for further evaluation.     Patient Instructions   PLEASE READ YOUR DISCHARGE INSTRUCTIONS ENTIRELY AS IT CONTAINS IMPORTANT INFORMATION.     Please drink plenty of fluids.     Please get plenty of rest.     Please return here or go to the Emergency Department for any concerns or worsening of condition.     Please take an over the counter antihistamine medication (allegra/Claritin/Zyrtec) of your choice as directed.     Try an over the counter decongestant like Mucinex D or Sudafed. You buy this behind the pharmacy counter     If not allergic, please take over the counter Tylenol (Acetaminophen) and/or Motrin (Ibuprofen) as directed for control of pain and/or fever.  Please follow up with your primary care doctor or specialist as needed.     Sore throat recommendations: Warm fluids, warm salt water gargles, throat lozenges, tea, honey, soup, rest, hydration.     Use over the counter flonase: one spray each nostril twice daily OR two sprays each nostril once daily.      If you  smoke, please stop smoking.     Please return or see your primary care doctor if you develop new or worsening symptoms.      Please arrange follow up with your primary medical clinic as soon as possible. You must understand that you've received an Urgent Care treatment only and that you may be released before all of your medical problems are known or treated. You, the patient, will arrange for follow up as instructed. If your symptoms worsen or fail to improve you should go to the Emergency Room.

## 2023-12-23 NOTE — TELEPHONE ENCOUNTER
Pt sister called regarding liquid cough medication. Requesting liquid instead of tessalon perles because she has a difficult time swallowing pills. Pt tolerates promethazine-DM in past. No further questions or concerns. Prescription sent.

## 2023-12-26 ENCOUNTER — OFFICE VISIT (OUTPATIENT)
Dept: URGENT CARE | Facility: CLINIC | Age: 63
End: 2023-12-26
Payer: MEDICARE

## 2023-12-26 ENCOUNTER — TELEPHONE (OUTPATIENT)
Dept: FAMILY MEDICINE | Facility: CLINIC | Age: 63
End: 2023-12-26
Payer: MEDICARE

## 2023-12-26 VITALS
RESPIRATION RATE: 20 BRPM | HEIGHT: 59 IN | SYSTOLIC BLOOD PRESSURE: 117 MMHG | DIASTOLIC BLOOD PRESSURE: 73 MMHG | WEIGHT: 107 LBS | TEMPERATURE: 98 F | BODY MASS INDEX: 21.57 KG/M2 | HEART RATE: 78 BPM | OXYGEN SATURATION: 96 %

## 2023-12-26 DIAGNOSIS — R05.9 COUGH, UNSPECIFIED TYPE: ICD-10-CM

## 2023-12-26 DIAGNOSIS — J10.1 INFLUENZA A: ICD-10-CM

## 2023-12-26 DIAGNOSIS — J22 BACTERIAL LOWER RESPIRATORY INFECTION: Primary | ICD-10-CM

## 2023-12-26 DIAGNOSIS — B96.89 BACTERIAL LOWER RESPIRATORY INFECTION: Primary | ICD-10-CM

## 2023-12-26 PROCEDURE — 71046 X-RAY EXAM CHEST 2 VIEWS: CPT | Mod: S$GLB,,, | Performed by: RADIOLOGY

## 2023-12-26 PROCEDURE — 71046 XR CHEST PA AND LATERAL: ICD-10-PCS | Mod: S$GLB,,, | Performed by: RADIOLOGY

## 2023-12-26 PROCEDURE — 99214 PR OFFICE/OUTPT VISIT, EST, LEVL IV, 30-39 MIN: ICD-10-PCS | Mod: S$GLB,,, | Performed by: PHYSICIAN ASSISTANT

## 2023-12-26 PROCEDURE — 99214 OFFICE O/P EST MOD 30 MIN: CPT | Mod: S$GLB,,, | Performed by: PHYSICIAN ASSISTANT

## 2023-12-26 RX ORDER — BENZONATATE 200 MG/1
200 CAPSULE ORAL 3 TIMES DAILY PRN
Qty: 21 CAPSULE | Refills: 0 | Status: SHIPPED | OUTPATIENT
Start: 2023-12-26 | End: 2024-01-02

## 2023-12-26 RX ORDER — DOXYCYCLINE 100 MG/1
100 CAPSULE ORAL EVERY 12 HOURS
Qty: 14 CAPSULE | Refills: 0 | Status: SHIPPED | OUTPATIENT
Start: 2023-12-26 | End: 2024-01-02

## 2023-12-26 NOTE — TELEPHONE ENCOUNTER
----- Message from Demi Aguilar sent at 12/26/2023  8:54 AM CST -----  Name of Who is Calling:patient           What is the request in detail:Sister is requesting a call to discuss a recent Urgent Care visit. Patient was prescribed priscila pearls and is handicap. Patient is not able to cough up mucus as the standard person and is having difficulty per nursing aid. Patient is also showing yellow mucus when she does cough. And sister would like to either get an appt asap or possible additional medication.           Can the clinic reply by [facility]:no           What Number to Call Back if not in [facility]: [phone]

## 2023-12-26 NOTE — PROGRESS NOTES
"Subjective:      Patient ID: Juan Pablo Bolden is a 63 y.o. female.    Vitals:  height is 4' 11" (1.499 m) and weight is 48.5 kg (107 lb). Her temperature is 97.7 °F (36.5 °C). Her blood pressure is 117/73 and her pulse is 78. Her respiration is 20 and oxygen saturation is 96%.     Chief Complaint: Cough    Patient presents to clinic with a cough, patient was here on 12/23 and tested positive for the flu. Patients cough is worse and sounds deep, she is coughing a lot of mucus up. No fever since Saturday. She is taking tesslon pearls and mucinex for symptoms.     Cough  This is a new problem. The current episode started in the past 7 days. The cough is Productive of sputum. Associated symptoms include wheezing. She has tried prescription cough suppressant for the symptoms.       Respiratory:  Positive for cough and wheezing.       Objective:     Physical Exam   Constitutional: She is oriented to person, place, and time. She appears well-developed.   HENT:   Head: Normocephalic and atraumatic.   Ears:   Right Ear: External ear normal.   Left Ear: External ear normal.   Nose: Nose normal.   Mouth/Throat: Oropharynx is clear and moist. Mucous membranes are moist.   Eyes: Conjunctivae and EOM are normal. Pupils are equal, round, and reactive to light.   Neck: Neck supple.   Cardiovascular: Normal rate, regular rhythm, normal heart sounds and normal pulses.   Pulmonary/Chest: Effort normal.   Difficult to get a good exam.         Comments: Difficult to get a good exam.    Musculoskeletal: Normal range of motion.         General: Normal range of motion.   Neurological: She is alert and oriented to person, place, and time.   Skin: Skin is warm and dry.   Vitals reviewed.      Assessment:     1. Bacterial lower respiratory infection    2. Influenza A    3. Cough, unspecified type        Plan:       Bacterial lower respiratory infection  -     doxycycline (VIBRAMYCIN) 100 MG Cap; Take 1 capsule (100 mg total) by mouth every 12 " (twelve) hours. for 7 days  Dispense: 14 capsule; Refill: 0    Influenza A  -     XR CHEST PA AND LATERAL; Future; Expected date: 12/26/2023    Cough, unspecified type  -     XR CHEST PA AND LATERAL; Future; Expected date: 12/26/2023  -     benzonatate (TESSALON) 200 MG capsule; Take 1 capsule (200 mg total) by mouth 3 (three) times daily as needed for Cough.  Dispense: 21 capsule; Refill: 0      XR CHEST PA AND LATERAL    Result Date: 12/26/2023  EXAM:  XR CHEST PA AND LATERAL CLINICAL INDICATION:  Cough. Influenza due to other identified influenza virus with other respiratory manifestations FINDINGS: PA and lateral views of the chest were obtained. No comparison studies are available. The study is rotated to the right.  A 2.5 cm well-defined nodule overlies the right lower lung.  The lungs are otherwise clear. The cardiac silhouette size is normal. The trachea is midline and the mediastinal width is normal. Negative for focal infiltrate, effusion or pneumothorax. Pulmonary vasculature is normal. Negative for osseous abnormalities.  Ectatic and tortuous aorta.     1.  Negative for acute process involving the chest. 2.  2.5 cm well-defined nodule overlies the right lower lung.  A dedicated PA and lateral chest x-rays recommended to determine if this is within the chest are clear the chest. 3.  Incidental findings as noted above. Finalized on: 12/26/2023 4:26 PM By:  Ralph Gillette MD [facility]# [id number]      2023-12-26 16:[id number]    [facility]    US Endoscopic Ultrasound    Result Date: 12/20/2023  See OP Notes for results. IMPRESSION: See OP Notes for results. This procedure was auto-finalized by: [facility]     Follow up with PCP regarding nodule in right lung.  May benefit from repeat CXR in 1-2 weeks.  Doxycycline and Tessalon sent to pharmacy.        Increase fluids.  Get plenty of rest.   Normal saline nasal wash to irrigate sinuses and for congestion/runny nose.  Cool mist humidifier/vaporizer.  Practice good  handwashing.  Mucinex for cough and chest congestion.  Tylenol or Ibuprofen for fever, headache and body aches.  Warm salt water gargles for throat comfort.  Chloraseptic spray or lozenges for throat comfort.  See PCP or go to ER if symptoms worsen or fail to improve with treatment.

## 2023-12-26 NOTE — TELEPHONE ENCOUNTER
Spoke to pt sister jimmy informed her that our next available visit will be Thursday , she declined visit. Pt will be going to urgent care

## 2024-01-02 ENCOUNTER — OFFICE VISIT (OUTPATIENT)
Dept: SURGICAL ONCOLOGY | Facility: CLINIC | Age: 64
End: 2024-01-02
Payer: MEDICARE

## 2024-01-02 ENCOUNTER — HOSPITAL ENCOUNTER (OUTPATIENT)
Dept: RADIOLOGY | Facility: HOSPITAL | Age: 64
Discharge: HOME | End: 2024-01-02
Attending: SURGERY
Payer: MEDICARE

## 2024-01-02 VITALS
WEIGHT: 110 LBS | BODY MASS INDEX: 22.18 KG/M2 | HEIGHT: 59 IN | HEART RATE: 67 BPM | DIASTOLIC BLOOD PRESSURE: 68 MMHG | SYSTOLIC BLOOD PRESSURE: 122 MMHG

## 2024-01-02 DIAGNOSIS — R91.1 PULMONARY NODULE: ICD-10-CM

## 2024-01-02 DIAGNOSIS — D49.0 IPMN (INTRADUCTAL PAPILLARY MUCINOUS NEOPLASM): Primary | ICD-10-CM

## 2024-01-02 PROCEDURE — 99215 OFFICE O/P EST HI 40 MIN: CPT | Mod: S$PBB,,, | Performed by: SURGERY

## 2024-01-02 PROCEDURE — 71046 X-RAY EXAM CHEST 2 VIEWS: CPT | Mod: 26,,, | Performed by: RADIOLOGY

## 2024-01-02 PROCEDURE — 99999 PR PBB SHADOW E&M-EST. PATIENT-LVL III: CPT | Mod: PBBFAC,,, | Performed by: SURGERY

## 2024-01-02 PROCEDURE — 99213 OFFICE O/P EST LOW 20 MIN: CPT | Mod: PBBFAC | Performed by: SURGERY

## 2024-01-02 PROCEDURE — 71046 X-RAY EXAM CHEST 2 VIEWS: CPT | Mod: TC

## 2024-01-02 NOTE — PROGRESS NOTES
Surgical Oncology Clinic Note      Referring Provider: Dr. Raleigh Hardwick Jr.   PCP: Brooke Goldberg MD    Reason For Visit: Pancreas: cyst (IPMN)    Oncologic History:   4/2022: CT: 2.1 cm pancreas cyst  10/2022: EUS: 2.3 cm pancreas head cyst, 1.2 cm and 7.5 mm body cyst, 9.1 mm tail cyst  3/2023: MRI: multiple complex multiloculated, septated cysts throughout the pancreas, measuring 3.3 x 3.2 cm in head and neck, 3.0 x 2.5 cm in body, and 1.5 x 1.1 cm in tail. Significant motion artifact.  12/20/2023:  EUS:  2.8 cm x 2.6 cm pancreas head cyst w/ associated mural nodule.  Multiple cystic lesions in body and tail     History of Present Illness:    Juan Pablo Bolden is a 62 y.o. female with history of pervasive developmental disorder, seizure disorder, who is mentally disabled and lives in a group home presents today for pancreas cyst surveillance.  She underwent cross-sectional imaging in April of 2022 demonstrating a pancreas cyst measuring 2.1 cm.  At that time she was set up for endoscopic ultrasound which was performed in October of 2022 demonstrating a 2.3 cm pancreas head cyst, 1.2 cm and 7.5 mm pancreas body cyst, and 9.1 mm pancreas tail cyst.  The largest cyst was sampled and demonstrated an elevated CEA, confirming the diagnosis of side-branch IPMN.  She was seen by my partner Dr. Raleigh Hardwick in April of 2023 after undergoing a six-month MRI/MRCP for surveillance.  At that time she was doing well, not experiencing any digestive issues.  Her MRI did demonstrate increase in cyst size, though there was severe motion artifact on MRI making the results and images difficult to interpret.  Plan at that time was for f/u EUS and visit in 6 months.      She underwent a EUS with Dr. Ojeda in [place] on 12/20/23 which showed a 2.8 cm cyst in the head of the pancreas with an associated mural nodule which was biopsied and was negative for dysplasia or malignancy.  She also had multiple other cystic lesions in  the body and the tail.  She otherwise has been doing well without any GI complaints aside from some constipation.  She did recently however have the flu and was taken to the urgent care due to congestion.  While there she had a chest x-ray which showed a 2.5 cm well-defined nodule overlying the right lower lung.  Her sister was instructed to let the primary care physician know so that it an additional workup could be done.  Her sister is understandably concerned especially since their mother  from lung cancer despite having never been a smoker.    Pathology:  Final Pathologic Diagnosis Pancreatic head cyst, fine needle aspiration (1 thin prep):   - Scant benign ductal epithelium   -  Negative  for malignant cells    Comment: Interp By Merissa Elias M.D., Signed on 2022 at 08:51      2023   Final Pathologic Diagnosis Pancreas, cystic lesion, EUS guided fine-needle aspiration/biopsy:  - Negative for dysplasia and malignancy.    - Scant benign pancreatic parenchyma.         Prior Cancer Treatment:  None    Past Medical History:   Diagnosis Date    Bipolar 1 disorder     Chronic constipation     Galactorrhea     Growth retardation     Profound mental retardation    High cholesterol     Hyperlipidemia     Hypertension     Leukopenia     Neuroleptic-induced tardive dyskinesia     Schizoaffective disorder     Stereotypic movement disorder        Past Surgical History:   Procedure Laterality Date    COLONOSCOPY N/A 2021    Procedure: COLONOSCOPY;  Surgeon: Jeffrey Ayala MD;  Location: [facility];  Service: Gastroenterology;  Laterality: N/A;    ENDOSCOPIC ULTRASOUND OF UPPER GASTROINTESTINAL TRACT N/A 2022    Procedure: ULTRASOUND, UPPER GI TRACT, ENDOSCOPIC;  Surgeon: Cherise Marshall MD;  Location: [facility] ENDO;  Service: Endoscopy;  Laterality: N/A;  Upper and linear, 22 shark core, slides and formalin.    ENDOSCOPIC ULTRASOUND OF UPPER GASTROINTESTINAL TRACT N/A 2023     Procedure: ULTRASOUND, UPPER GI TRACT, ENDOSCOPIC;  Surgeon: Reed Ojeda MD;  [facility]  Service: Endoscopy;  Laterality: N/A;  Need EUS (linear) for pancreas cyst surveillance. 45 minutes. Main or [facility]. -lewis  instr irfsfn-pu-le seizures for many years  11/15/23-LVM for precall-DS  11/16-precall complete-Kpvt  11/21/23: instructions sent via portal for reschedule-GD  1    ESOPHAGOGASTRODUODENOSCOPY N/A 11/5/2020    Procedure: EGD (ESOPHAGOGASTRODUODENOSCOPY);  Surgeon: John Batista MD;  Location: [facility] ENDO;  Service: Endoscopy;  Laterality: N/A;       Family History   Problem Relation Age of Onset    Lung cancer Mother     Hypertension Father     Prostate cancer Father     Stroke Maternal Uncle     Heart disease Maternal Grandmother        Social History     Socioeconomic History    Marital status: Single   Tobacco Use    Smoking status: Never     Passive exposure: Never    Smokeless tobacco: Never   Substance and Sexual Activity    Alcohol use: No    Drug use: No    Sexual activity: Never     Social Determinants of Health     Financial Resource Strain: Low Risk  (12/19/2022)    Overall Financial Resource Strain (CARDIA)     Difficulty of Paying Living Expenses: Not hard at all   Food Insecurity: No Food Insecurity (12/19/2022)    Hunger Vital Sign     Worried About Running Out of Food in the Last Year: Never true     Ran Out of Food in the Last Year: Never true   Transportation Needs: No Transportation Needs (12/19/2022)    PRAPARE - Transportation     Lack of Transportation (Medical): No     Lack of Transportation (Non-Medical): No   Physical Activity: Unknown (12/19/2022)    Exercise Vital Sign     Days of Exercise per Week: 0 days   Stress: Patient Declined (12/19/2022)    [employer] [place] [employer] - Occupational Stress Questionnaire     Feeling of Stress : Patient declined   Social Connections: Unknown (12/19/2022)    Social Connection and Isolation Panel  [NHANES]     Frequency of Communication with Friends and Family: Three times a week     Frequency of Social Gatherings with Friends and Family: More than three times a week     Active Member of Clubs or Organizations: No     Attends Club or Organization Meetings: Patient declined     Marital Status: Patient declined   Housing Stability: Low Risk  (12/19/2022)    Housing Stability Vital Sign     Unable to Pay for Housing in the Last Year: No     Number of Places Lived in the Last Year: 1     Unstable Housing in the Last Year: No          Medication List            Accurate as of January 2, 2024 10:56 AM. If you have any questions, ask your nurse or doctor.                CONTINUE taking these medications      acetaminophen 650 MG Tbsr  Commonly known as: TYLENOL     amLODIPine 10 MG tablet  Commonly known as: NORVASC  Take 1 tablet (10 mg total) by mouth once daily.     * benzonatate 100 MG capsule  Commonly known as: TESSALON  Take 1 capsule (100 mg total) by mouth 3 (three) times daily as needed for Cough.     * benzonatate 100 MG capsule  Commonly known as: TESSALON  Take 1 capsule (100 mg total) by mouth 3 (three) times daily as needed for Cough.     * benzonatate 200 MG capsule  Commonly known as: TESSALON  Take 1 capsule (200 mg total) by mouth 3 (three) times daily as needed for Cough.     cetirizine 10 MG tablet  Commonly known as: ZYRTEC     clonazePAM 0.5 MG tablet  Commonly known as: KlonoPIN  Take 1 nightly at bedtime and one daily as needed for agitation     diazePAM 5 MG tablet  Commonly known as: VALIUM  Take 2 tablets (10 mg total) by mouth every 6 (six) hours as needed for Anxiety.     diclofenac sodium 1 % Gel  Commonly known as: VOLTAREN  APPLY TWO GRAMS TO THE AFFECTED AREA FOUR TIMES DAILY AS NEEDED FOR PAIN     diphenhydrAMINE 25 mg capsule  Commonly known as: BENADRYL     divalproex 500 MG Tbec  Commonly known as: DEPAKOTE  Take 1 tablet (500 mg total) by mouth 2 (two) times daily.     docusate  sodium 100 MG capsule  Commonly known as: COLACE  Take 1 capsule (100 mg total) by mouth once daily.     doxycycline 100 MG Cap  Commonly known as: VIBRAMYCIN  Take 1 capsule (100 mg total) by mouth every 12 (twelve) hours. for 7 days     fluticasone propionate 50 mcg/actuation nasal spray  Commonly known as: FLONASE  SHAKE LIQUID AND USE 2 SPRAYS IN EACH NOSTRIL EVERY DAY     folic acid 1 MG tablet  Commonly known as: FOLVITE  Take 1 tablet (1 mg total) by mouth once daily.     furosemide 20 MG tablet  Commonly known as: LASIX  TAKE 1 TABLET BY MOUTH DAILY AS NEEDED FOR SWELLING     glycerin adult suppository  Place 1 suppository rectally as needed for Constipation.     melatonin 10 mg Tbsr  Take 1 tablet by mouth every evening.     mirtazapine 45 MG tablet  Commonly known as: REMERON  Take 1 tablet (45 mg total) by mouth every evening.     polyethylene glycol 17 gram/dose powder  Commonly known as: GLYCOLAX  One cup t.i.d. for 3 days, 2 cups b.i.d. for 2 days, and then 1 cup daily.     * promethazine-dextromethorphan 6.25-15 mg/5 mL Syrp  Commonly known as: PROMETHAZINE-DM  Take 5 mLs by mouth nightly as needed (cough).     * promethazine-dextromethorphan 6.25-15 mg/5 mL Syrp  Commonly known as: PROMETHAZINE-DM  Take 5 mLs by mouth nightly as needed (cough).     QUEtiapine 400 MG tablet  Commonly known as: SEROQUEL  Take 1 and 1/2 tablets at bedtime.     simethicone 125 mg Cap capsule  Commonly known as: MYLICON     simvastatin 40 MG tablet  Commonly known as: ZOCOR  Take 1 tablet (40 mg total) by mouth every evening.     traZODone 150 MG tablet  Commonly known as: DESYREL  Take 1 tablet (150 mg total) by mouth nightly as needed for Insomnia.     triamterene-hydrochlorothiazide 37.5-25 mg 37.5-25 mg per tablet  Commonly known as: MAXZIDE-25  Take 1 tablet by mouth every morning.           * This list has 5 medication(s) that are the same as other medications prescribed for you. Read the directions carefully, and  ask your doctor or other care provider to review them with you.                  Review of patient's allergies indicates:  No Known Allergies    Review of Systems   Constitutional:  Negative for chills and fever.   Respiratory:  Negative for cough and shortness of breath.    Cardiovascular:  Negative for chest pain.   Gastrointestinal:  Positive for constipation. Negative for nausea and vomiting.   Musculoskeletal:  Negative for myalgias and neck pain.   Skin:  Negative for rash.   Neurological:  Positive for weakness.         There were no vitals filed for this visit.  There is no height or weight on file to calculate BMI.  ECOG SCORE           Physical Exam  Vitals reviewed.   Constitutional:       General: She is not in acute distress.     Appearance: She is not toxic-appearing.      Comments: Sitting in wheelchair, minimally verbal but pleasant, at her neurologic baseline    HENT:      Mouth/Throat:      Mouth: Mucous membranes are moist.   Eyes:      Conjunctiva/sclera: Conjunctivae normal.   Cardiovascular:      Rate and Rhythm: Normal rate.   Abdominal:      General: There is distension.      Palpations: Abdomen is soft.      Tenderness: There is no abdominal tenderness. There is no guarding.   Skin:     General: Skin is warm and dry.   Neurological:      Mental Status: She is alert. Mental status is at baseline.          DATA REVIEW:  I personally reviewed the following records for this visit: history from someone besides the patient, lab work from prior visit, notes from other physicians, surgical pathology results, endoscopy results, radiographic study evaluation, laboratory results done by primary care physician, and previous consult notes    Labs:    Lab Results   Component Value Date    WBC 2.72 (L) 11/12/2023    HGB 12.1 11/12/2023    HCT 35.8 (L) 11/12/2023    PLT 92 (L) 11/12/2023     Lab Results   Component Value Date    GLU 87 11/12/2023    CALCIUM 8.7 11/12/2023    ALBUMIN 3.0 (L) 11/12/2023     PROT 5.7 (L) 11/12/2023     11/12/2023    K 4.3 11/12/2023    CO2 31 (H) 11/12/2023    CL 97 11/12/2023    BUN 16 11/12/2023    CREATININE 0.7 11/12/2023    ALKPHOS 78 11/12/2023    ALT 19 11/12/2023    AST 24 11/12/2023    BILITOT 0.3 11/12/2023       Tumor Markers:  Lab Results   Component Value Date    CEA 8859 05/16/2022    AMYLASE 450 (H) 04/08/2022         Radiographic Findings:  3/2023: MRI/MRCP  multiple complex multiloculated, septated cysts throughout the pancreas, measuring 3.3 x 3.2 cm in head and neck, 3.0 x 2.5 cm in body, and 1.5 x 1.1 cm in tail. Significant motion artifact.      Assessment & Plan:  1. IPMN (intraductal papillary mucinous neoplasm)    2. Pulmonary nodule       Juan Pablo Bolden is a 63 y.o. female with multiple medical issues as well as developmental delays who presents for ongoing pancreas cyst surveillance for her previously diagnosed multifocal side-branch IPMN.    I reviewed her imaging and her history with the patient and her sister.  I discussed with them the natural history, surveillance, and treatment of IPMNs.  Specifically we discussed that there is an approximately 20% risk of malignancy for side-branch IPMNs over the course of 10 years.  We discussed the high risk features and indications for resection.  Namely resection is recommended for IPMNs with high grade dysplasia, invasive carcinoma, and features concerning for malignancy or that are high risk for developing malignancy.  IPMNs that don't meet this criteria are typically followed with surveillance.  High risk features include obstructed common bile duct, enhancing solid component in the cyst, and a main pancreatic duct diameter >10mm.  Other moderate-risks include cyst >30mm in diameter, thickened or enhancing cyst walls, main pancreatic duct 5-9mm, non-enhancing mural nodules, and abrupt change in main pancreatic duct claiber with distal pancreatic atrophy.  I discussed with them that she did have a solid  component which was biopsied by Dr. Ojeda during her recent EUS, which did not show evidence of dysplasia or malignancy.  She otherwise has no symptoms and on most recent EUS her largest cyst was still >3cm in diameter and she had no evidence of main pancreatic ductal dilation.  I also discussed with them that it is believed that this truly does represent a field defect which is consistent in her with her having multiple cysts throughout the pancreas.      In light of her other challenges and her medical complexity I believe it is most prudent at this time to continue surveillance.  I discussed with her sister that my concerns regarding surgery are that it is 1st of all and not clearly indicated in her at this time but more importantly has a potential to be a very morbid procedure and could certainly impact her ability to live independently with the assistance as she is currently doing.  My inclination is to continue surveillance at this time with a repeat MRI in 6 months with sedation.  I did discuss with them that I would present her at our pancreas cyst conference at the end of this month to make sure that everyone is in agreement with that plan.    Regarding her pulmonary nodule, this needs additional workup especially in light of her mother's history of lung cancer.  I have ordered a chest x-ray PA and lateral and pending those results will refer her for additional workup.    PLAN:  -- CXR PA and lateral for pulmonary nodule  -- pancreas cyst board at end of the month  -- plan for MRI w/ sedation in 6 months with follow up then     Ms. Bolden's sister expressed understanding in regards to our discussion today. Many good questions were asked on today's visit, all of which were answered to their satisfaction.    Follow-up: Follow up in about 6 months (around 7/2/2024).                  Dolly Hyman MD MS              Surgical Oncology              [facility] [place], [place]               Office: [phone]     Communications: 60 minutes were spent on today's visit in face-to-face and non face-to-face time with the patient. This patient was recently diagnosed with pancreas cyst/side branch IPMN and the time was required to provide counseling and guidance regarding their new diagnosis.  Time was spent reviewing all outside records and information pertaining to their work-up and formulating a treatment plan in line with standardized guidelines. Additional time was spent communicating with referring physicians and facilities to facilitate the efficient exchange of previous healthcare records and radiographic imaging pertinent to the diagnosis and disease management.       Orders Placed This Encounter   Procedures    X-Ray Chest PA And Lateral     Standing Status:   Future     Standing Expiration Date:   1/2/2025     Order Specific Question:   Reason for Exam:     Answer:   pulmonary nodule     Order Specific Question:   May the Radiologist modify the order per protocol to meet the clinical needs of the patient?     Answer:   Yes     Order Specific Question:   Release to patient     Answer:   Immediate

## 2024-01-11 ENCOUNTER — OFFICE VISIT (OUTPATIENT)
Dept: CARDIOLOGY | Facility: CLINIC | Age: 64
End: 2024-01-11
Payer: MEDICARE

## 2024-01-11 VITALS
HEIGHT: 59 IN | WEIGHT: 108 LBS | HEART RATE: 88 BPM | DIASTOLIC BLOOD PRESSURE: 82 MMHG | BODY MASS INDEX: 21.77 KG/M2 | OXYGEN SATURATION: 98 % | SYSTOLIC BLOOD PRESSURE: 122 MMHG

## 2024-01-11 DIAGNOSIS — I25.84 CORONARY ARTERY CALCIFICATION: ICD-10-CM

## 2024-01-11 DIAGNOSIS — I25.10 CORONARY ARTERY CALCIFICATION: ICD-10-CM

## 2024-01-11 DIAGNOSIS — Z00.00 ENCOUNTER FOR MEDICARE ANNUAL WELLNESS EXAM: ICD-10-CM

## 2024-01-11 DIAGNOSIS — I70.0 CALCIFICATION OF AORTA: ICD-10-CM

## 2024-01-11 DIAGNOSIS — I10 ESSENTIAL HYPERTENSION: Primary | ICD-10-CM

## 2024-01-11 PROCEDURE — 93005 ELECTROCARDIOGRAM TRACING: CPT

## 2024-01-11 PROCEDURE — 99999 PR PBB SHADOW E&M-EST. PATIENT-LVL III: CPT | Mod: PBBFAC,,, | Performed by: INTERNAL MEDICINE

## 2024-01-11 PROCEDURE — 99213 OFFICE O/P EST LOW 20 MIN: CPT | Mod: S$PBB,,, | Performed by: INTERNAL MEDICINE

## 2024-01-11 PROCEDURE — 93010 ELECTROCARDIOGRAM REPORT: CPT | Mod: ,,, | Performed by: STUDENT IN AN ORGANIZED HEALTH CARE EDUCATION/TRAINING PROGRAM

## 2024-01-11 PROCEDURE — 99213 OFFICE O/P EST LOW 20 MIN: CPT | Mod: PBBFAC | Performed by: INTERNAL MEDICINE

## 2024-01-11 NOTE — PROGRESS NOTES
Subjective:   Patient ID:  Juan Pablo Bolden is a 63 y.o. female who presents for follow-up of No chief complaint on file.    Hypertension  This is a chronic problem. The current episode started more than 1 year ago. The problem has been gradually improving since onset. The problem is controlled. Pertinent negatives include no chest pain, palpitations or shortness of breath. Past treatments include calcium channel blockers and diuretics. The current treatment provides moderate improvement. Compliance problems include psychosocial issues.    Hyperlipidemia  This is a chronic problem. The current episode started more than 1 year ago. The problem is controlled. Pertinent negatives include no chest pain or shortness of breath. Current antihyperlipidemic treatment includes statins. The current treatment provides moderate improvement of lipids. There are no compliance problems     Clinically the patient is stable.  Recent cardiac echo shows normal LV function.  There has been no heart rate blood pressure changes.  The sister was present today and patient is stable.  Otherwise doing well this time.  Recent colonoscopy stable and no polyps.     This finds the patient agitated and probably not getting medications properly and will happy go back to the facility to improve overall compliance is of medications. The sister was present today and she is in agreement.        01/05/2023: Overall patient is doing fairly well better compliance with medications stable heart rate blood pressure overall feeling improved no significant edema today otherwise stable current medications.  Hypertension hyperlipidemia are stable.     01/11/2024 overall stable no acute changes will do an EKG and cardiac echo in follow-up.  She has had some mild left leg edema although ultrasounds of the leg repeatedly have showed no evidence of thrombosis.  Clinically otherwise stable and stable today.        Review of Systems   Constitutional: Negative for chills,  diaphoresis, night sweats, weight gain and weight loss.   HENT:  Negative for congestion, hoarse voice, sore throat and stridor.    Eyes:  Negative for double vision and pain.   Cardiovascular:  Negative for chest pain, claudication, cyanosis, dyspnea on exertion, irregular heartbeat, leg swelling, near-syncope, orthopnea, palpitations, paroxysmal nocturnal dyspnea and syncope.   Respiratory:  Negative for cough, hemoptysis, shortness of breath, sleep disturbances due to breathing, snoring, sputum production and wheezing.    Endocrine: Negative for cold intolerance, heat intolerance and polydipsia.   Hematologic/Lymphatic: Negative for bleeding problem. Does not bruise/bleed easily.   Skin:  Negative for color change, dry skin and rash.   Musculoskeletal:  Negative for joint swelling and muscle cramps.   Gastrointestinal:  Negative for bloating, abdominal pain, constipation, diarrhea, dysphagia, melena, nausea and vomiting.   Genitourinary:  Negative for flank pain and urgency.   Neurological:  Negative for dizziness, focal weakness, headaches, light-headedness, loss of balance, seizures and weakness.   Psychiatric/Behavioral:  Negative for altered mental status and memory loss. The patient is not nervous/anxious.      Family History   Problem Relation Age of Onset    Lung cancer Mother     Hypertension Father     Prostate cancer Father     Stroke Maternal Uncle     Heart disease Maternal Grandmother      Past Medical History:   Diagnosis Date    Bipolar 1 disorder     Chronic constipation     Galactorrhea     Growth retardation     Profound mental retardation    High cholesterol     Hyperlipidemia     Hypertension     Leukopenia     Neuroleptic-induced tardive dyskinesia     Schizoaffective disorder     Stereotypic movement disorder      Social History     Socioeconomic History    Marital status: Single   Tobacco Use    Smoking status: Never     Passive exposure: Never    Smokeless tobacco: Never   Substance and  Sexual Activity    Alcohol use: No    Drug use: No    Sexual activity: Never     Social Determinants of Health     Financial Resource Strain: Low Risk  (12/19/2022)    Overall Financial Resource Strain (CARDIA)     Difficulty of Paying Living Expenses: Not hard at all   Food Insecurity: No Food Insecurity (12/19/2022)    Hunger Vital Sign     Worried About Running Out of Food in the Last Year: Never true     Ran Out of Food in the Last Year: Never true   Transportation Needs: No Transportation Needs (12/19/2022)    PRAPARE - Transportation     Lack of Transportation (Medical): No     Lack of Transportation (Non-Medical): No   Physical Activity: Unknown (12/19/2022)    Exercise Vital Sign     Days of Exercise per Week: 0 days   Stress: Patient Declined (12/19/2022)    [employer] - Occupational Stress Questionnaire     Feeling of Stress : Patient declined   Social Connections: Unknown (12/19/2022)    Social Connection and Isolation Panel [NHANES]     Frequency of Communication with Friends and Family: Three times a week     Frequency of Social Gatherings with Friends and Family: More than three times a week     Active Member of Clubs or Organizations: No     Attends Club or Organization Meetings: Patient declined     Marital Status: Patient declined   Housing Stability: Low Risk  (12/19/2022)    Housing Stability Vital Sign     Unable to Pay for Housing in the Last Year: No     Number of Places Lived in the Last Year: 1     Unstable Housing in the Last Year: No     Current Outpatient Medications on File Prior to Visit   Medication Sig Dispense Refill    acetaminophen (TYLENOL) 650 MG TbSR Take 650 mg by mouth every 8 (eight) hours as needed for Pain.      amLODIPine (NORVASC) 10 MG tablet Take 1 tablet (10 mg total) by mouth once daily. 90 tablet 3    cetirizine (ZYRTEC) 10 MG tablet Take 10 mg by mouth daily as needed for Allergies.      clonazePAM (KLONOPIN) 0.5 MG tablet Take 1 nightly  at bedtime and one daily as needed for agitation 60 tablet 2    diazePAM (VALIUM) 5 MG tablet Take 2 tablets (10 mg total) by mouth every 6 (six) hours as needed for Anxiety. 4 tablet 0    diclofenac sodium (VOLTAREN) 1 % Gel APPLY TWO GRAMS TO THE AFFECTED AREA FOUR TIMES DAILY AS NEEDED FOR PAIN 200 g 1    diphenhydrAMINE (BENADRYL) 25 mg capsule Take 25 mg by mouth nightly as needed for Allergies.      divalproex (DEPAKOTE) 500 MG TbEC Take 1 tablet (500 mg total) by mouth 2 (two) times daily. 180 tablet 0    docusate sodium (COLACE) 100 MG capsule Take 1 capsule (100 mg total) by mouth once daily. 90 capsule 3    fluticasone propionate (FLONASE) 50 mcg/actuation nasal spray SHAKE LIQUID AND USE 2 SPRAYS IN EACH NOSTRIL EVERY DAY 48 g 0    folic acid (FOLVITE) 1 MG tablet Take 1 tablet (1 mg total) by mouth once daily. 90 tablet 3    furosemide (LASIX) 20 MG tablet TAKE 1 TABLET BY MOUTH DAILY AS NEEDED FOR SWELLING 30 tablet 11    glycerin adult suppository Place 1 suppository rectally as needed for Constipation. 12 suppository 0    melatonin 10 mg TbSR Take 1 tablet by mouth every evening. 30 tablet 11    mirtazapine (REMERON) 45 MG tablet Take 1 tablet (45 mg total) by mouth every evening. 90 tablet 0    polyethylene glycol (GLYCOLAX) 17 gram/dose powder One cup t.i.d. for 3 days, 2 cups b.i.d. for 2 days, and then 1 cup daily. 850 g 0    QUEtiapine (SEROQUEL) 400 MG tablet Take 1 and 1/2 tablets at bedtime. 45 tablet 2    simethicone (MYLICON) 125 mg Cap capsule 1 capsule after meals and at bedtime as needed      simvastatin (ZOCOR) 40 MG tablet Take 1 tablet (40 mg total) by mouth every evening. 90 tablet 3    traZODone (DESYREL) 150 MG tablet Take 1 tablet (150 mg total) by mouth nightly as needed for Insomnia. 30 tablet 2    triamterene-hydrochlorothiazide 37.5-25 mg (MAXZIDE-25) 37.5-25 mg per tablet Take 1 tablet by mouth every morning. 90 tablet 3     Current Facility-Administered Medications on File  Prior to Visit   Medication Dose Route Frequency Provider Last Rate Last Admin    0.9%  NaCl infusion   Intravenous Continuous Eduardo Angelo MD        sodium chloride 0.9% flush 10 mL  10 mL Intravenous PRN Eduardo Angelo MD         Review of patient's allergies indicates:  No Known Allergies    Objective:     Physical Exam  Eyes:      Pupils: Pupils are equal, round, and reactive to light.   Neck:      Trachea: No tracheal deviation.   Cardiovascular:      Rate and Rhythm: Normal rate and regular rhythm.      Pulses: Intact distal pulses.           Carotid pulses are 2+ on the right side and 2+ on the left side.       Radial pulses are 2+ on the right side and 2+ on the left side.        Femoral pulses are 2+ on the right side and 2+ on the left side.       Popliteal pulses are 2+ on the right side and 2+ on the left side.        Dorsalis pedis pulses are 2+ on the right side and 2+ on the left side.        Posterior tibial pulses are 2+ on the right side and 2+ on the left side.      Heart sounds: Normal heart sounds. No murmur heard.     No friction rub. No gallop.   Pulmonary:      Effort: Pulmonary effort is normal. No respiratory distress.      Breath sounds: Normal breath sounds. No stridor. No wheezing or rales.   Chest:      Chest wall: No tenderness.   Abdominal:      General: There is no distension.      Tenderness: There is no abdominal tenderness. There is no rebound.   Musculoskeletal:         General: No tenderness.      Cervical back: Normal range of motion.   Skin:     General: Skin is warm and dry.   Neurological:      Mental Status: She is alert and oriented to person, place, and time.         Assessment:     1. Essential hypertension    2. Calcification of aorta    3. Coronary artery calcification        Plan:     Essential hypertension    Calcification of aorta    Coronary artery calcification    Impression 1. Hypertension good control today   2. Peripheral edema stable   All questions  answered patient doing well cardiac echo be done and re-evaluate the patient.  Follow-up evaluation with Dr. Myers in the future.

## 2024-01-23 ENCOUNTER — OFFICE VISIT (OUTPATIENT)
Dept: URGENT CARE | Facility: CLINIC | Age: 64
End: 2024-01-23
Payer: MEDICARE

## 2024-01-23 VITALS
TEMPERATURE: 96 F | HEIGHT: 59 IN | OXYGEN SATURATION: 98 % | WEIGHT: 108 LBS | HEART RATE: 75 BPM | RESPIRATION RATE: 18 BRPM | BODY MASS INDEX: 21.77 KG/M2

## 2024-01-23 DIAGNOSIS — H57.89 DISCHARGE OF EYE, LEFT: ICD-10-CM

## 2024-01-23 DIAGNOSIS — Z20.822 EXPOSURE TO COVID-19 VIRUS: Primary | ICD-10-CM

## 2024-01-23 LAB
CTP QC/QA: YES
SARS-COV-2 AG RESP QL IA.RAPID: NEGATIVE

## 2024-01-23 PROCEDURE — 99213 OFFICE O/P EST LOW 20 MIN: CPT | Mod: S$GLB,,,

## 2024-01-23 PROCEDURE — 87811 SARS-COV-2 COVID19 W/OPTIC: CPT | Mod: QW,S$GLB,,

## 2024-01-23 RX ORDER — POLYMYXIN B SULFATE AND TRIMETHOPRIM 1; 10000 MG/ML; [USP'U]/ML
1 SOLUTION OPHTHALMIC EVERY 4 HOURS
Qty: 10 ML | Refills: 0 | Status: SHIPPED | OUTPATIENT
Start: 2024-01-23 | End: 2024-01-23

## 2024-01-23 RX ORDER — POLYMYXIN B SULFATE AND TRIMETHOPRIM 1; 10000 MG/ML; [USP'U]/ML
1 SOLUTION OPHTHALMIC EVERY 4 HOURS
Qty: 10 ML | Refills: 0 | Status: SHIPPED | OUTPATIENT
Start: 2024-01-23 | End: 2024-01-30

## 2024-01-23 NOTE — PROGRESS NOTES
"Subjective:      Patient ID: Juan Pablo Bolden is a 63 y.o. female.    Vitals:  height is 4' 11" (1.499 m) and weight is 49 kg (108 lb). Her tympanic temperature is 96.2 °F (35.7 °C). Her pulse is 75. Her respiration is 18 and oxygen saturation is 98%.     Chief Complaint: COVID-19 Concerns and Eye Problem    62 yo female Patient presents to clinic with covid 19 exposure from caretaker who was with her over the weekend and yesterday, caretaker stated that patient also had L yellow crusty eye discharge this morning in her left eye. She has had a runny nose and a little congestion also not exactly sure how long that has been going on. Tried OTC eye drops and eye appears better without discharge. Denies known fever. Sister states patient ran out of mucinex but plans on getting some when she leaves clinic. NKDA.      Eye Problem   The left eye is affected. Associated symptoms include an eye discharge and eye redness. Pertinent negatives include no fever.   Sinus Problem  This is a new problem. The current episode started in the past 7 days. Associated symptoms include congestion.       Unable to perform ROS: Psychiatric disorder   Constitution: Negative for fever.   HENT:  Positive for congestion.    Eyes:  Positive for eye discharge and eye redness.      Objective:     Vitals:    01/23/24 1736   Pulse: 75   Resp: 18   Temp: 96.2 °F (35.7 °C)       Physical Exam   Constitutional: She is oriented to person, place, and time. She appears well-developed. She is cooperative.  Non-toxic appearance. She does not appear ill. No distress.   HENT:   Head: Normocephalic and atraumatic.   Ears:   Right Ear: Hearing, tympanic membrane, external ear and ear canal normal. impacted cerumen  Left Ear: Hearing, tympanic membrane, external ear and ear canal normal. impacted cerumen  Nose: Nose normal. No mucosal edema, rhinorrhea or nasal deformity. No epistaxis. Right sinus exhibits no maxillary sinus tenderness and no frontal sinus " tenderness. Left sinus exhibits no maxillary sinus tenderness and no frontal sinus tenderness.   Mouth/Throat: Uvula is midline, oropharynx is clear and moist and mucous membranes are normal. No trismus in the jaw. Normal dentition. No uvula swelling. No oropharyngeal exudate, posterior oropharyngeal edema, posterior oropharyngeal erythema or cobblestoning.   Eyes: Conjunctivae and lids are normal. Pupils are equal, round, and reactive to light. Right eye exhibits no discharge. Left eye exhibits no discharge. No scleral icterus. periorbital hyperpigmentation     Comments: Patient presents non-toxic, afebrile with VSS.  EOMI without pain. There is no proptosis, scleral icterus , erythema or increased warmth in periorbital area.  No visible injection resembling conjunctivitis. Low suspicion for iritis, acute angle closure glaucoma, orbital cellulitis, herpetic involvement, open globe injury. Picture on phone this morning had crusty eye discharge on L eye only.      Neck: Trachea normal and phonation normal. Neck supple. No edema present. No erythema present. No neck rigidity present.   Cardiovascular: Normal rate, regular rhythm, normal heart sounds and normal pulses.   Pulmonary/Chest: Effort normal and breath sounds normal. No accessory muscle usage or stridor. No respiratory distress. She has no decreased breath sounds. She has no wheezes. She has no rhonchi. She has no rales.   Abdominal: Normal appearance.   Musculoskeletal: Normal range of motion.         General: No deformity. Normal range of motion.   Neurological: She is alert and oriented to person, place, and time. She displays no weakness. She exhibits normal muscle tone. Coordination and gait normal.   Skin: Skin is warm, dry, intact, not diaphoretic, not pale and no rash.   Psychiatric: Her speech is normal and behavior is normal. Judgment and thought content normal.   Nursing note and vitals reviewed.      Assessment:     1. Exposure to COVID-19 virus     2. Discharge of eye, left        COVID risk score: 3  Results for orders placed or performed in visit on 01/23/24   SARS Coronavirus 2 Antigen, POCT Manual Read   Result Value Ref Range    SARS Coronavirus 2 Antigen Negative Negative     Acceptable Yes      *Note: Due to a large number of results and/or encounters for the requested time period, some results have not been displayed. A complete set of results can be found in Results Review.       Plan:       Exposure to COVID-19 virus  -     SARS Coronavirus 2 Antigen, POCT Manual Read    Discharge of eye, left  -     Discontinue: polymyxin B sulf-trimethoprim (POLYTRIM) 10,000 unit- 1 mg/mL Drop; Place 1 drop into the left eye every 4 (four) hours. for 7 days  Dispense: 10 mL; Refill: 0  -     polymyxin B sulf-trimethoprim (POLYTRIM) 10,000 unit- 1 mg/mL Drop; Place 1 drop into the left eye every 4 (four) hours. for 7 days  Dispense: 10 mL; Refill: 0    Other orders  -     molnupiravir 200 mg capsule (EUA); Take 4 capsules (800 mg total) by mouth every 12 (twelve) hours. for 5 days  Dispense: 40 capsule; Refill: 0        Patient Instructions   PLEASE READ YOUR DISCHARGE INSTRUCTIONS ENTIRELY AS IT CONTAINS IMPORTANT INFORMATION.                      Conjunctivitis  If your condition worsens or fails to improve we recommend that you receive another evaluation at the ER immediately or contact your PCP to discuss your concerns or return here. If you develop increase eye symptoms or change in your vision seek medical care immediately either with your ophthalomologist or the ER or return here. You must understand that you've received an urgent care treatment only and that you may be released before all your medical problems are known or treated. You the patient will arrange for followup care as instructed.     - Keep eyes cleaned.  You should avoid touching your eyes and wash your hands to avoid spreading the infection.  Infected individuals should not  share handkerchiefs, tissues, towels, cosmetics, linens, or eating utensils.  - Use the eye drops/ointment as prescribed.    - Do not wear your contact lens ( if you use them) for at least 1 week after you stop having symptoms and are rechecked by your doctor. Throw away the contacts, contact solution and carrying case you were using and start with new material.   - Cool compresses to affected eye.  - You can use over the counter artificial tears for gritty or dry eye sensation    - Change/ wash bedding.       *Your COVID test was negative however, you have a known exposure:      CDC Testing and Quarantine Guidelines for patients with exposure to a known-positive COVID-19 person:  A 'close exposure' is defined as anyone who has had an exposure (masked or unmasked) to a known COVID -19 positive person   within 6 feet of someone   for a cumulative total of 15 minutes or more over a 24-hour period.   vaccinated Have been boosted or completed the primary series of Pfizer or Moderna vaccine within the last 6 months or completed the primary series of J&J vaccine within the last 2 months and/or had a positive test within 90 days   do NOT need to quarantine after contact with someone who had COVID-19 unless they have symptoms.   fully vaccinated people who have not had a positive test within 90 days, should get tested 3-5 days after their exposure, even if they don't have symptoms and wear a mask indoors in public for 10 days following exposure or until their test result is negative on day 5.  If you develop symptoms test and quarantine.    Unvaccinated, or are more than six months out from their second mRNA dose (or more than 2 months after the J&J vaccine) and not yet boosted,  and/or NOT had a positive test within 90 days and meet 'close exposure'  you are required by CDC guidelines to quarantine for at least 5 days from time of exposure followed by 5 days of strict masking. It is recommended, but not required to test  after 5 days, unless you develop symptoms, in which case you should test at that time.  If you do decide to test at 5 days and are asymptomatic, the risk is that if you test without symptoms on Day 5 for example) and you are positive, your 5 day isolation begins on that day, and you turned your 5 day quarantine into 10 days.  If your exposure does not meet the above definition, you can return to your normal daily activities to include social distancing, wearing a mask and frequent handwashing.  Alternatively, if a 5-day quarantine is not feasible, it is imperative that an exposed person wear a well-fitting mask at all times when around others for 10 days after exposure.        - Please drink plenty of fluids.  - Please get plenty of rest.  - You can take plain Mucinex (guaifenesin) 1200 mg twice a day to help loosen mucous.   - Use over the counter Flonase as directed  Please return here or go to the Emergency Department for any concerns or worsening of condition.  - Please take an over the counter antihistamine medication (Allegra/Claritin/Zyrtec/Xyzal) of your choice as directed. These are antihistamines that can help with runny nose, nasal congestion, sneezing, and helps to dry up post-nasal drip, which usually causes sore throat and cough.    -If you do NOT have high blood pressure, you may use a decongestant form (D)  of this medication (ie. Claritin- D, zyrtec-D, allegra-D, Mucinex-D) or if you do not take the D form, you can take sudafed (pseudoephedrine) over the counter, which is a decongestant. Do NOT take two decongestant (D) medications at the same time (such as mucinex-D and claritin-D or plain sudafed and claritin D). Dextromethorphan (DM) is a cough suppressant over the counter (ie. mucinex DM, robitussin, delsym; dayquil/nyquil has DM as well.)    If you do have Hypertension or palpitations, it is safe to take Coricidin HBP for relief of sinus symptoms.    - If not allergic, please take over the  counter Tylenol (Acetaminophen) and/or Motrin (Ibuprofen) as directed for control of pain and/or fever.  Avoid tylenol if you have a history of liver disease. Do not take ibuprofen if you have a history of GI bleeding, kidney disease, or if you take blood thinners.  Please follow up with your primary care doctor or specialist as needed.    Sore throat recommendations: Warm fluids, warm salt water gargles, throat lozenges, tea, honey, soup, rest, hydration.    Use over the counter flonase: one spray each nostril twice daily OR two sprays each nostril once daily for sinus congestion and postnasal drip. This is a steroid nasal spray that works locally over time to decrease the inflammation in your nose/sinuses and help with allergic symptoms. This is not an quick- relief spray like afrin, but it works well if used daily.  Discontinue if you develop nose bleed    Sinus rinses DO NOT USE TAP WATER, if you must, water must be a rolling boil for 1 minute, let it cool, then use.  May use distilled water, or over the counter nasal saline rinses.  Vics vapor rub in shower to help open nasal passages.  May use nasal gel to keep passages moisturized.  May use Nasal saline sprays during the day for added relief of congestion.   For those who go to the gym, please do not use the sauna or steam room now to clear sinuses.    If you  smoke, please stop smoking.    Please return or see your primary care doctor if you develop new or worsening symptoms.     Please arrange follow up with your primary medical clinic as soon as possible. You must understand that you've received an Urgent Care treatment only and that you may be released before all of your medical problems are known or treated. You, the patient, will arrange for follow up as instructed. If your symptoms worsen or fail to improve you should go to the Emergency Room.

## 2024-01-23 NOTE — PATIENT INSTRUCTIONS
PLEASE READ YOUR DISCHARGE INSTRUCTIONS ENTIRELY AS IT CONTAINS IMPORTANT INFORMATION.                      Conjunctivitis  If your condition worsens or fails to improve we recommend that you receive another evaluation at the ER immediately or contact your PCP to discuss your concerns or return here. If you develop increase eye symptoms or change in your vision seek medical care immediately either with your ophthalomologist or the ER or return here. You must understand that you've received an urgent care treatment only and that you may be released before all your medical problems are known or treated. You the patient will arrange for followup care as instructed.     - Keep eyes cleaned.  You should avoid touching your eyes and wash your hands to avoid spreading the infection.  Infected individuals should not share handkerchiefs, tissues, towels, cosmetics, linens, or eating utensils.  - Use the eye drops/ointment as prescribed.    - Do not wear your contact lens ( if you use them) for at least 1 week after you stop having symptoms and are rechecked by your doctor. Throw away the contacts, contact solution and carrying case you were using and start with new material.   - Cool compresses to affected eye.  - You can use over the counter artificial tears for gritty or dry eye sensation    - Change/ wash bedding.       *Your COVID test was negative however, you have a known exposure:      CDC Testing and Quarantine Guidelines for patients with exposure to a known-positive COVID-19 person:  A 'close exposure' is defined as anyone who has had an exposure (masked or unmasked) to a known COVID -19 positive person   within 6 feet of someone   for a cumulative total of 15 minutes or more over a 24-hour period.   vaccinated Have been boosted or completed the primary series of Pfizer or Moderna vaccine within the last 6 months or completed the primary series of J&J vaccine within the last 2 months and/or had a positive test  within 90 days   do NOT need to quarantine after contact with someone who had COVID-19 unless they have symptoms.   fully vaccinated people who have not had a positive test within 90 days, should get tested 3-5 days after their exposure, even if they don't have symptoms and wear a mask indoors in public for 10 days following exposure or until their test result is negative on day 5.  If you develop symptoms test and quarantine.    Unvaccinated, or are more than six months out from their second mRNA dose (or more than 2 months after the J&J vaccine) and not yet boosted,  and/or NOT had a positive test within 90 days and meet 'close exposure'  you are required by CDC guidelines to quarantine for at least 5 days from time of exposure followed by 5 days of strict masking. It is recommended, but not required to test after 5 days, unless you develop symptoms, in which case you should test at that time.  If you do decide to test at 5 days and are asymptomatic, the risk is that if you test without symptoms on Day 5 for example) and you are positive, your 5 day isolation begins on that day, and you turned your 5 day quarantine into 10 days.  If your exposure does not meet the above definition, you can return to your normal daily activities to include social distancing, wearing a mask and frequent handwashing.  Alternatively, if a 5-day quarantine is not feasible, it is imperative that an exposed person wear a well-fitting mask at all times when around others for 10 days after exposure.        - Please drink plenty of fluids.  - Please get plenty of rest.  - You can take plain Mucinex (guaifenesin) 1200 mg twice a day to help loosen mucous.   - Use over the counter Flonase as directed  Please return here or go to the Emergency Department for any concerns or worsening of condition.  - Please take an over the counter antihistamine medication (Allegra/Claritin/Zyrtec/Xyzal) of your choice as directed. These are antihistamines  that can help with runny nose, nasal congestion, sneezing, and helps to dry up post-nasal drip, which usually causes sore throat and cough.    -If you do NOT have high blood pressure, you may use a decongestant form (D)  of this medication (ie. Claritin- D, zyrtec-D, allegra-D, Mucinex-D) or if you do not take the D form, you can take sudafed (pseudoephedrine) over the counter, which is a decongestant. Do NOT take two decongestant (D) medications at the same time (such as mucinex-D and claritin-D or plain sudafed and claritin D). Dextromethorphan (DM) is a cough suppressant over the counter (ie. mucinex DM, robitussin, delsym; dayquil/nyquil has DM as well.)    If you do have Hypertension or palpitations, it is safe to take Coricidin HBP for relief of sinus symptoms.    - If not allergic, please take over the counter Tylenol (Acetaminophen) and/or Motrin (Ibuprofen) as directed for control of pain and/or fever.  Avoid tylenol if you have a history of liver disease. Do not take ibuprofen if you have a history of GI bleeding, kidney disease, or if you take blood thinners.  Please follow up with your primary care doctor or specialist as needed.    Sore throat recommendations: Warm fluids, warm salt water gargles, throat lozenges, tea, honey, soup, rest, hydration.    Use over the counter flonase: one spray each nostril twice daily OR two sprays each nostril once daily for sinus congestion and postnasal drip. This is a steroid nasal spray that works locally over time to decrease the inflammation in your nose/sinuses and help with allergic symptoms. This is not an quick- relief spray like afrin, but it works well if used daily.  Discontinue if you develop nose bleed    Sinus rinses DO NOT USE TAP WATER, if you must, water must be a rolling boil for 1 minute, let it cool, then use.  May use distilled water, or over the counter nasal saline rinses.  Vics vapor rub in shower to help open nasal passages.  May use nasal gel  to keep passages moisturized.  May use Nasal saline sprays during the day for added relief of congestion.   For those who go to the gym, please do not use the sauna or steam room now to clear sinuses.    If you  smoke, please stop smoking.    Please return or see your primary care doctor if you develop new or worsening symptoms.     Please arrange follow up with your primary medical clinic as soon as possible. You must understand that you've received an Urgent Care treatment only and that you may be released before all of your medical problems are known or treated. You, the patient, will arrange for follow up as instructed. If your symptoms worsen or fail to improve you should go to the Emergency Room.

## 2024-01-30 ENCOUNTER — TELEPHONE (OUTPATIENT)
Dept: SURGICAL ONCOLOGY | Facility: CLINIC | Age: 64
End: 2024-01-30
Payer: MEDICARE

## 2024-01-30 NOTE — TELEPHONE ENCOUNTER
Lm on vm for sister, Alanis, to call back to schedule an appt with Dr. Hyman so they can go over the findings from the pancreas tumor board and recommendations. Gave good call back number

## 2024-02-01 ENCOUNTER — PATIENT MESSAGE (OUTPATIENT)
Dept: SURGICAL ONCOLOGY | Facility: CLINIC | Age: 64
End: 2024-02-01
Payer: MEDICARE

## 2024-02-13 ENCOUNTER — HOSPITAL ENCOUNTER (OUTPATIENT)
Dept: CARDIOLOGY | Facility: HOSPITAL | Age: 64
Discharge: HOME | End: 2024-02-13
Attending: INTERNAL MEDICINE
Payer: MEDICARE

## 2024-02-13 VITALS
WEIGHT: 108 LBS | DIASTOLIC BLOOD PRESSURE: 82 MMHG | HEIGHT: 59 IN | SYSTOLIC BLOOD PRESSURE: 122 MMHG | BODY MASS INDEX: 21.77 KG/M2

## 2024-02-13 DIAGNOSIS — I10 ESSENTIAL HYPERTENSION: ICD-10-CM

## 2024-02-13 DIAGNOSIS — I25.10 CORONARY ARTERY CALCIFICATION: ICD-10-CM

## 2024-02-13 DIAGNOSIS — I25.84 CORONARY ARTERY CALCIFICATION: ICD-10-CM

## 2024-02-13 DIAGNOSIS — I70.0 CALCIFICATION OF AORTA: ICD-10-CM

## 2024-02-13 LAB
AORTIC ROOT ANNULUS: 2.55 CM
AV INDEX (PROSTH): 0.85
AV MEAN GRADIENT: 3 MMHG
AV PEAK GRADIENT: 6 MMHG
AV VALVE AREA BY VELOCITY RATIO: 2.51 CM²
AV VALVE AREA: 2.61 CM²
AV VELOCITY RATIO: 0.82
BSA FOR ECHO PROCEDURE: 1.43 M2
CV ECHO LV RWT: 0.49 CM
DOP CALC AO PEAK VEL: 1.2 M/S
DOP CALC AO VTI: 22.2 CM
DOP CALC LVOT AREA: 3.1 CM2
DOP CALC LVOT DIAMETER: 1.98 CM
DOP CALC LVOT PEAK VEL: 0.98 M/S
DOP CALC LVOT STROKE VOLUME: 57.86 CM3
DOP CALC RVOT PEAK VEL: 0.74 M/S
DOP CALC RVOT VTI: 13.6 CM
DOP CALCLVOT PEAK VEL VTI: 18.8 CM
E WAVE DECELERATION TIME: 209.69 MSEC
E/A RATIO: 0.6
E/E' RATIO: 8.67 M/S
ECHO LV POSTERIOR WALL: 0.88 CM (ref 0.6–1.1)
FRACTIONAL SHORTENING: 31 % (ref 28–44)
INTERVENTRICULAR SEPTUM: 0.95 CM (ref 0.6–1.1)
IVRT: 102.76 MSEC
LA MAJOR: 4.43 CM
LA MINOR: 3.57 CM
LA WIDTH: 2.7 CM
LEFT ATRIUM SIZE: 3.15 CM
LEFT ATRIUM VOLUME INDEX MOD: 15.5 ML/M2
LEFT ATRIUM VOLUME INDEX: 20.1 ML/M2
LEFT ATRIUM VOLUME MOD: 21.97 CM3
LEFT ATRIUM VOLUME: 28.58 CM3
LEFT INTERNAL DIMENSION IN SYSTOLE: 2.44 CM (ref 2.1–4)
LEFT VENTRICLE DIASTOLIC VOLUME INDEX: 37.27 ML/M2
LEFT VENTRICLE DIASTOLIC VOLUME: 52.92 ML
LEFT VENTRICLE MASS INDEX: 66 G/M2
LEFT VENTRICLE SYSTOLIC VOLUME INDEX: 14.8 ML/M2
LEFT VENTRICLE SYSTOLIC VOLUME: 21.03 ML
LEFT VENTRICULAR INTERNAL DIMENSION IN DIASTOLE: 3.56 CM (ref 3.5–6)
LEFT VENTRICULAR MASS: 93.35 G
LV LATERAL E/E' RATIO: 8.67 M/S
LV SEPTAL E/E' RATIO: 8.67 M/S
LVOT MG: 1.84 MMHG
LVOT MV: 0.64 CM/S
MV PEAK A VEL: 0.87 M/S
MV PEAK E VEL: 0.52 M/S
PV MEAN GRADIENT: 1 MMHG
PV MV: 0.48 M/S
PV PEAK GRADIENT: 2 MMHG
PV PEAK VELOCITY: 0.68 M/S
RA MAJOR: 3.64 CM
RA PRESSURE ESTIMATED: 3 MMHG
RA WIDTH: 2.13 CM
RIGHT VENTRICULAR END-DIASTOLIC DIMENSION: 1.66 CM
SINUS: 2.22 CM
STJ: 2.48 CM
TDI LATERAL: 0.06 M/S
TDI SEPTAL: 0.06 M/S
TDI: 0.06 M/S
Z-SCORE OF LEFT VENTRICULAR DIMENSION IN END DIASTOLE: -2.03
Z-SCORE OF LEFT VENTRICULAR DIMENSION IN END SYSTOLE: -0.82

## 2024-02-13 PROCEDURE — 93306 TTE W/DOPPLER COMPLETE: CPT

## 2024-02-13 PROCEDURE — 93306 TTE W/DOPPLER COMPLETE: CPT | Mod: 26,,, | Performed by: INTERNAL MEDICINE

## 2024-02-20 ENCOUNTER — OFFICE VISIT (OUTPATIENT)
Dept: SURGICAL ONCOLOGY | Facility: CLINIC | Age: 64
End: 2024-02-20
Payer: MEDICARE

## 2024-02-20 VITALS — OXYGEN SATURATION: 94 % | BODY MASS INDEX: 21.77 KG/M2 | WEIGHT: 108 LBS | HEIGHT: 59 IN | HEART RATE: 71 BPM

## 2024-02-20 DIAGNOSIS — D49.0 IPMN (INTRADUCTAL PAPILLARY MUCINOUS NEOPLASM): Primary | ICD-10-CM

## 2024-02-20 PROCEDURE — 99999 PR PBB SHADOW E&M-EST. PATIENT-LVL III: CPT | Mod: PBBFAC,,, | Performed by: SURGERY

## 2024-02-20 PROCEDURE — 99215 OFFICE O/P EST HI 40 MIN: CPT | Mod: S$PBB,,, | Performed by: SURGERY

## 2024-02-20 PROCEDURE — 99213 OFFICE O/P EST LOW 20 MIN: CPT | Mod: PBBFAC | Performed by: SURGERY

## 2024-02-21 NOTE — PROGRESS NOTES
Surgical Oncology Clinic Note      Referring Provider: No ref. provider found   PCP: Brooke Goldberg MD    Reason For Visit: Pancreas: cyst (IPMN)    Oncologic History:   4/2022: CT: 2.1 cm pancreas cyst  10/2022: EUS: 2.3 cm pancreas head cyst, 1.2 cm and 7.5 mm body cyst, 9.1 mm tail cyst  3/2023: MRI: multiple complex multiloculated, septated cysts throughout the pancreas, measuring 3.3 x 3.2 cm in head and neck, 3.0 x 2.5 cm in body, and 1.5 x 1.1 cm in tail. Significant motion artifact.  12/20/2023:  EUS:  2.8 cm x 2.6 cm pancreas head cyst w/ associated mural nodule.  Multiple cystic lesions in body and tail     History of Present Illness:    Juan Pablo Bolden is a 62 y.o. female with history of pervasive developmental disorder, seizure disorder, who is mentally disabled and lives in a group home presents today for pancreas cyst surveillance.  She underwent cross-sectional imaging in April of 2022 demonstrating a pancreas cyst measuring 2.1 cm.  At that time she was set up for endoscopic ultrasound which was performed in October of 2022 demonstrating a 2.3 cm pancreas head cyst, 1.2 cm and 7.5 mm pancreas body cyst, and 9.1 mm pancreas tail cyst.  The largest cyst was sampled and demonstrated an elevated CEA, confirming the diagnosis of side-branch IPMN.  She was seen by my partner Dr. Raleigh Hardwick in April of 2023 after undergoing a six-month MRI/MRCP for surveillance.  At that time she was doing well, not experiencing any digestive issues.  Her MRI did demonstrate increase in cyst size, though there was severe motion artifact on MRI making the results and images difficult to interpret.  Plan at that time was for f/u EUS and visit in 6 months.      She underwent a EUS with Dr. Ojeda in [place] on 12/20/23 which showed a 2.8 cm cyst in the head of the pancreas with an associated mural nodule which was biopsied and was negative for dysplasia or malignancy.  She also had multiple other cystic lesions in the  body and the tail.  She otherwise has been doing well without any GI complaints aside from some constipation.  She did recently however have the flu and was taken to the urgent care due to congestion.  While there she had a chest x-ray which showed a 2.5 cm well-defined nodule overlying the right lower lung.  Her sister was instructed to let the primary care physician know so that it an additional workup could be done.  Her sister is understandably concerned especially since their mother  from lung cancer despite having never been a smoker.    Interval History:  2024:  discussed at pancreas cyst conference- although pathology negative, imaging findings are very high risk.       Prior Cancer Treatment:  None    Past Medical History:   Diagnosis Date    Bipolar 1 disorder     Chronic constipation     Galactorrhea     Growth retardation     Profound mental retardation    High cholesterol     Hyperlipidemia     Hypertension     Leukopenia     Neuroleptic-induced tardive dyskinesia     Schizoaffective disorder     Stereotypic movement disorder        Past Surgical History:   Procedure Laterality Date    COLONOSCOPY N/A 2021    Procedure: COLONOSCOPY;  Surgeon: Jeffrey Ayala MD;  Location: [facility];  Service: Gastroenterology;  Laterality: N/A;    ENDOSCOPIC ULTRASOUND OF UPPER GASTROINTESTINAL TRACT N/A 2022    Procedure: ULTRASOUND, UPPER GI TRACT, ENDOSCOPIC;  Surgeon: Cherise Marshall MD;  Location: [facility];  Service: Endoscopy;  Laterality: N/A;  Upper and linear, 22 shark core, slides and formalin.    ENDOSCOPIC ULTRASOUND OF UPPER GASTROINTESTINAL TRACT N/A 2023    Procedure: ULTRASOUND, UPPER GI TRACT, ENDOSCOPIC;  Surgeon: Reed Ojeda MD;  Location: [facility] ([address] [place] [address]);  Service: Endoscopy;  Laterality: N/A;  Need EUS (linear) for pancreas cyst surveillance. 45 minutes. Main or [facility]. -lewis  instr jrvdbz-dc-ks seizures for many years  11/15/23-[facility] for  precall-DS  11/16-precall complete-Kpvt  11/21/23: instructions sent via portal for reschedule-GD  1    ESOPHAGOGASTRODUODENOSCOPY N/A 11/5/2020    Procedure: EGD (ESOPHAGOGASTRODUODENOSCOPY);  Surgeon: John Batista MD;  Location: [facility];  Service: Endoscopy;  Laterality: N/A;       Family History   Problem Relation Age of Onset    Lung cancer Mother     Hypertension Father     Prostate cancer Father     Stroke Maternal Uncle     Heart disease Maternal Grandmother        Social History     Socioeconomic History    Marital status: Single   Tobacco Use    Smoking status: Never     Passive exposure: Never    Smokeless tobacco: Never   Substance and Sexual Activity    Alcohol use: No    Drug use: No    Sexual activity: Never     Social Determinants of Health     Financial Resource Strain: Low Risk  (12/19/2022)    Overall Financial Resource Strain (CARDIA)     Difficulty of Paying Living Expenses: Not hard at all   Food Insecurity: No Food Insecurity (12/19/2022)    Hunger Vital Sign     Worried About Running Out of Food in the Last Year: Never true     Ran Out of Food in the Last Year: Never true   Transportation Needs: No Transportation Needs (12/19/2022)    PRAPARE - Transportation     Lack of Transportation (Medical): No     Lack of Transportation (Non-Medical): No   Physical Activity: Unknown (12/19/2022)    Exercise Vital Sign     Days of Exercise per Week: 0 days   Stress: Patient Declined (12/19/2022)    [employer] [place] [employer] - Occupational Stress Questionnaire     Feeling of Stress : Patient declined   Social Connections: Unknown (12/19/2022)    Social Connection and Isolation Panel [NHANES]     Frequency of Communication with Friends and Family: Three times a week     Frequency of Social Gatherings with Friends and Family: More than three times a week     Active Member of Clubs or Organizations: No     Attends Club or Organization Meetings: Patient declined     Marital Status:  Patient declined   Housing Stability: Low Risk  (12/19/2022)    Housing Stability Vital Sign     Unable to Pay for Housing in the Last Year: No     Number of Places Lived in the Last Year: 1     Unstable Housing in the Last Year: No          Medication List            Accurate as of February 20, 2024 11:59 PM. If you have any questions, ask your nurse or doctor.                CONTINUE taking these medications      acetaminophen 650 MG Tbsr  Commonly known as: TYLENOL     amLODIPine 10 MG tablet  Commonly known as: NORVASC  Take 1 tablet (10 mg total) by mouth once daily.     cetirizine 10 MG tablet  Commonly known as: ZYRTEC     clonazePAM 0.5 MG tablet  Commonly known as: KlonoPIN  Take 1 nightly at bedtime and one daily as needed for agitation     diazePAM 5 MG tablet  Commonly known as: VALIUM  Take 2 tablets (10 mg total) by mouth every 6 (six) hours as needed for Anxiety.     diclofenac sodium 1 % Gel  Commonly known as: VOLTAREN  APPLY TWO GRAMS TO THE AFFECTED AREA FOUR TIMES DAILY AS NEEDED FOR PAIN     diphenhydrAMINE 25 mg capsule  Commonly known as: BENADRYL     divalproex 500 MG Tbec  Commonly known as: DEPAKOTE  Take 1 tablet (500 mg total) by mouth 2 (two) times daily.     docusate sodium 100 MG capsule  Commonly known as: COLACE  Take 1 capsule (100 mg total) by mouth once daily.     fluticasone propionate 50 mcg/actuation nasal spray  Commonly known as: FLONASE  SHAKE LIQUID AND USE 2 SPRAYS IN EACH NOSTRIL EVERY DAY     folic acid 1 MG tablet  Commonly known as: FOLVITE  Take 1 tablet (1 mg total) by mouth once daily.     furosemide 20 MG tablet  Commonly known as: LASIX  TAKE 1 TABLET BY MOUTH DAILY AS NEEDED FOR SWELLING     glycerin adult suppository  Place 1 suppository rectally as needed for Constipation.     melatonin 10 mg Tbsr  Take 1 tablet by mouth every evening.     mirtazapine 45 MG tablet  Commonly known as: REMERON  Take 1 tablet (45 mg total) by mouth every evening.     polyethylene  glycol 17 gram/dose powder  Commonly known as: GLYCOLAX  One cup t.i.d. for 3 days, 2 cups b.i.d. for 2 days, and then 1 cup daily.     QUEtiapine 400 MG tablet  Commonly known as: SEROQUEL  Take 1 and 1/2 tablets at bedtime.     simethicone 125 mg Cap capsule  Commonly known as: MYLICON     simvastatin 40 MG tablet  Commonly known as: ZOCOR  Take 1 tablet (40 mg total) by mouth every evening.     traZODone 150 MG tablet  Commonly known as: DESYREL  Take 1 tablet (150 mg total) by mouth nightly as needed for Insomnia.     triamterene-hydrochlorothiazide 37.5-25 mg 37.5-25 mg per tablet  Commonly known as: MAXZIDE-25  Take 1 tablet by mouth every morning.              Review of patient's allergies indicates:  No Known Allergies    Review of Systems   Constitutional:  Negative for chills and fever.   Respiratory:  Negative for cough and shortness of breath.    Cardiovascular:  Negative for chest pain.   Gastrointestinal:  Positive for constipation. Negative for nausea and vomiting.   Musculoskeletal:  Negative for myalgias and neck pain.   Skin:  Negative for rash.   Neurological:  Positive for weakness.         Vitals:    02/20/24 1621   Pulse: 71     Body mass index is 21.81 kg/m².    Physical Exam  Vitals reviewed.   Constitutional:       General: She is not in acute distress.     Appearance: She is not toxic-appearing.      Comments: Sitting in wheelchair, minimally verbal but pleasant, at her neurologic baseline    HENT:      Mouth/Throat:      Mouth: Mucous membranes are moist.   Eyes:      Conjunctiva/sclera: Conjunctivae normal.   Cardiovascular:      Rate and Rhythm: Normal rate.   Abdominal:      General: There is distension.      Palpations: Abdomen is soft.      Tenderness: There is no abdominal tenderness. There is no guarding.   Skin:     General: Skin is warm and dry.   Neurological:      Mental Status: She is alert. Mental status is at baseline.          DATA REVIEW:  I personally reviewed the  following records for this visit: history from someone besides the patient, lab work from prior visit, notes from other physicians, surgical pathology results, endoscopy results, radiographic study evaluation, laboratory results done by primary care physician, and previous consult notes    Labs:    Lab Results   Component Value Date    WBC 2.72 (L) 11/12/2023    HGB 12.1 11/12/2023    HCT 35.8 (L) 11/12/2023    PLT 92 (L) 11/12/2023     Lab Results   Component Value Date    GLU 87 11/12/2023    CALCIUM 8.7 11/12/2023    ALBUMIN 3.0 (L) 11/12/2023    PROT 5.7 (L) 11/12/2023     11/12/2023    K 4.3 11/12/2023    CO2 31 (H) 11/12/2023    CL 97 11/12/2023    BUN 16 11/12/2023    CREATININE 0.7 11/12/2023    ALKPHOS 78 11/12/2023    ALT 19 11/12/2023    AST 24 11/12/2023    BILITOT 0.3 11/12/2023       Tumor Markers:  Lab Results   Component Value Date    CEA 8859 05/16/2022    AMYLASE 450 (H) 04/08/2022       Pathology:  Final Pathologic Diagnosis Pancreatic head cyst, fine needle aspiration (1 thin prep):   - Scant benign ductal epithelium   -  Negative  for malignant cells    Comment: Interp By Merissa Elias M.D., Signed on 05/23/2022 at 08:51      12/22/2023   Final Pathologic Diagnosis Pancreas, cystic lesion, EUS guided fine-needle aspiration/biopsy:  - Negative for dysplasia and malignancy.    - Scant benign pancreatic parenchyma.       Radiographic Findings:  3/2023: MRI/MRCP  multiple complex multiloculated, septated cysts throughout the pancreas, measuring 3.3 x 3.2 cm in head and neck, 3.0 x 2.5 cm in body, and 1.5 x 1.1 cm in tail. Significant motion artifact.      Assessment & Plan:  1. IPMN (intraductal papillary mucinous neoplasm)         Juan Pablo Bolden is a 63 y.o. female with multiple medical issues as well as developmental delays who presents for ongoing pancreas cyst surveillance for her previously diagnosed multifocal side-branch IPMN.    Reviewed with the patient and her sister the  discussion from the pancreas cyst board.  Unfortunately her lesion, although it has negative pathology, does have several high-risk features.  Most importantly would be the large mural nodule that is very solid appearing.  No discussed with Juan Pablo and her sister that under normal circumstances the recommendation would be to pursue resection with a Whipple.  However as we had discussed during her previous visit with her medical complexity in her baseline functional status she would not tolerate this well and would not tolerate any subsequent complications well.  Her sutures in agreement with not pursuing any surgical interventions even should she develop a pancreatic malignancy due to the significant compromise on her quality of life.  I discussed with her then the role for continued surveillance in this situation.  As I discussed with her that she is already high-risk and meets criteria for surgical intervention and the point of surveillance would be to monitor and determine when to intervene.  If we do not plan on intervening regardless of the findings then continued sedation for MRCP and for EUS and biopsy is a risk that does not have many benefits to her.  Understandably her sister is conflicted about not pursuing surveillance and wants to make sure that she is taking care of her sister.  I discussed with her that my recommendation would be to stop surveillance at this point in time and should she develop a malignancy and become symptomatic intervention at that time to palliate her symptoms would be reasonable.  She would like some time to think about it and talk with her family.  At this time I will leave her MRI with sedation on the schedule for 6 months from now.        Ms. Bolden's sister expressed understanding in regards to our discussion today. Many good questions were asked on today's visit, all of which were answered to their satisfaction.                  Dolly Hyman MD MS              Surgical  Oncology              [facility] [place]              Office: [phone]     Communications: 60 minutes were spent on today's visit in face-to-face and non face-to-face time with the patient. This patient was recently diagnosed with pancreas cyst/side branch IPMN and the time was required to provide counseling and guidance regarding their new diagnosis.  Time was spent reviewing all outside records and information pertaining to their work-up and formulating a treatment plan in line with standardized guidelines. Additional time was spent communicating with referring physicians and facilities to facilitate the efficient exchange of previous healthcare records and radiographic imaging pertinent to the diagnosis and disease management.

## 2024-02-23 ENCOUNTER — TELEPHONE (OUTPATIENT)
Dept: ENDOSCOPY | Facility: HOSPITAL | Age: 64
End: 2024-02-23
Payer: MEDICARE

## 2024-02-23 NOTE — TELEPHONE ENCOUNTER
[facility]    Date: 02/23/2024      Diagnosis  [x]Pancreatic Cyst []PD Dilation []Peripancreatic Fluid Collection    []Pancreatic Mass []PD Stricture  []Biliary Stricture    []Chronic Pancreatitis  []Acute/Acute Recurrent/ Idiopathic Pancreatitis  []Other:        Presenter    [x]Reed Ojeda  []Richy Rodriguez  []Kevin Nj   []Eduardo Angelo []Khalif Logan []Abdulhameed Al-Sabban   []Dawson Gutierrez []Richy Gutierrez  []Rao Nguyen   []Other:          Collaborators  []Reed Ojeda  []Richy Rodriguez  []Kevin Nj   []Eduardo Angelo []Khalif Logan []Abdulhameed Al-Sabban   []Dawson Gutierrez []Richy Gutierrez  []Rao Nguyen   []Other:            HPI  Juan Pablo Bolden is a 63 y.o. female,  has a past medical history of Bipolar 1 disorder, Chronic constipation, Galactorrhea, Growth retardation, High cholesterol, Hyperlipidemia, Hypertension, Leukopenia, Neuroleptic-induced tardive dyskinesia, Schizoaffective disorder, and Stereotypic movement disorder.    has a past surgical history that includes Esophagogastroduodenoscopy (N/A, 11/5/2020); Colonoscopy (N/A, 5/17/2021); Endoscopic ultrasound of upper gastrointestinal tract (N/A, 5/16/2022); and Endoscopic ultrasound of upper gastrointestinal tract (N/A, 12/20/2023).      Previous Imaging/Procedures  The following has been reviewed:     Details / Date    []   Imaging     []   Surgeries     [x]   Endoscopies     []   Other:       Labs/Pathology    Pancreas, cystic lesion, EUS guided fine-needle aspiration/biopsy:   - Negative for dysplasia and malignancy.    - Scant benign pancreatic parenchyma.       After review of pertinent history, procedures, and imaging, the group consensus for the plan of care for Juan Pablo Bolden will include the following recommendations:    Follow up in Next available for:    Imaging  []MRI MRCP W Contrast []CT Abd W Contrast  []UGI    []MRI MRCP WO Contrast []CT Abd WO Contrast [] Other:      Procedures    []EUS   []ERCP   []IR for:     []Other:        Clinical Consultation   [][facility] [x]Surgery Clinic ([place]) []IR   []Pain Management []Other:               [unfilled]  Rosa Sky RN

## 2024-02-26 DIAGNOSIS — E78.2 MIXED HYPERLIPIDEMIA: ICD-10-CM

## 2024-02-26 NOTE — TELEPHONE ENCOUNTER
Care Due:                  Date            Visit Type   Department     Provider  --------------------------------------------------------------------------------                                EP -                              PRIMARY  Last Visit: 10-      CARE ([facility])   None Found     Brooke Goldberg  Next Visit: None Scheduled  None         None Found                                                            Last  Test          Frequency    Reason                     Performed    Due Date  --------------------------------------------------------------------------------    Lipid Panel.  12 months..  simvastatin..............  Not Found    Overdue    [employer] Embedded Care Due Messages. Reference number: [id number].   2/26/2024 5:57:40 PM CST

## 2024-02-27 RX ORDER — QUETIAPINE FUMARATE 400 MG/1
TABLET, FILM COATED ORAL
Qty: 180 TABLET | Refills: 2 | Status: SHIPPED | OUTPATIENT
Start: 2024-02-27 | End: 2024-03-26 | Stop reason: ALTCHOICE

## 2024-02-27 RX ORDER — DIVALPROEX SODIUM 500 MG/1
500 TABLET, DELAYED RELEASE ORAL 2 TIMES DAILY
Qty: 180 TABLET | Refills: 2 | Status: SHIPPED | OUTPATIENT
Start: 2024-02-27 | End: 2024-03-26 | Stop reason: ALTCHOICE

## 2024-02-27 RX ORDER — TRAZODONE HYDROCHLORIDE 150 MG/1
150 TABLET ORAL NIGHTLY
Qty: 30 TABLET | Refills: 2 | Status: SHIPPED | OUTPATIENT
Start: 2024-02-27

## 2024-02-27 RX ORDER — FOLIC ACID 1 MG/1
1 TABLET ORAL DAILY
Qty: 90 TABLET | Refills: 3 | Status: SHIPPED | OUTPATIENT
Start: 2024-02-27 | End: 2024-03-01 | Stop reason: SDUPTHER

## 2024-02-27 RX ORDER — SIMVASTATIN 40 MG/1
40 TABLET, FILM COATED ORAL NIGHTLY
Qty: 90 TABLET | Refills: 3 | Status: SHIPPED | OUTPATIENT
Start: 2024-02-27

## 2024-02-27 RX ORDER — ACETAMINOPHEN, DIPHENHYDRAMINE HCL, PHENYLEPHRINE HCL 325; 25; 5 MG/1; MG/1; MG/1
1 TABLET ORAL NIGHTLY
Qty: 90 TABLET | Refills: 3 | Status: SHIPPED | OUTPATIENT
Start: 2024-02-27 | End: 2024-03-01 | Stop reason: SDUPTHER

## 2024-02-27 RX ORDER — MIRTAZAPINE 45 MG/1
45 TABLET, FILM COATED ORAL NIGHTLY
Qty: 90 TABLET | Refills: 2 | Status: SHIPPED | OUTPATIENT
Start: 2024-02-27 | End: 2024-03-26 | Stop reason: ALTCHOICE

## 2024-02-27 NOTE — TELEPHONE ENCOUNTER
Refill Routing Note   Medication(s) are not appropriate for processing by [facility] for the following reason(s):        Outside of protocol  Required labs outdated  ED/Hospital Visit since last OV with provider    [facility] action(s):  Defer  Route     Requires labs : Yes      Medication Therapy Plan: Melatonin and folic acid are off protocol, labs outdated for statin      Appointments  past 12m or future 3m with PCP    Date Provider   Last Visit   10/31/2023 Brooke Goldberg MD   Next Visit   Visit date not found Brooke Goldberg MD   ED visits in past 90 days: 0        Note composed:7:25 AM 02/27/2024

## 2024-02-29 ENCOUNTER — PATIENT MESSAGE (OUTPATIENT)
Dept: PRIMARY CARE CLINIC | Facility: CLINIC | Age: 64
End: 2024-02-29
Payer: MEDICARE

## 2024-02-29 ENCOUNTER — PATIENT MESSAGE (OUTPATIENT)
Dept: SURGICAL ONCOLOGY | Facility: CLINIC | Age: 64
End: 2024-02-29
Payer: MEDICARE

## 2024-02-29 ENCOUNTER — PATIENT MESSAGE (OUTPATIENT)
Dept: PSYCHIATRY | Facility: CLINIC | Age: 64
End: 2024-02-29
Payer: MEDICARE

## 2024-02-29 DIAGNOSIS — E53.8 FOLATE DEFICIENCY: Primary | ICD-10-CM

## 2024-02-29 DIAGNOSIS — I10 ESSENTIAL HYPERTENSION: ICD-10-CM

## 2024-02-29 DIAGNOSIS — R45.1 RESTLESSNESS AND AGITATION: ICD-10-CM

## 2024-03-01 RX ORDER — AMLODIPINE BESYLATE 10 MG/1
10 TABLET ORAL DAILY
Qty: 90 TABLET | Refills: 3 | Status: SHIPPED | OUTPATIENT
Start: 2024-03-01

## 2024-03-01 RX ORDER — FOLIC ACID 1 MG/1
1 TABLET ORAL DAILY
Qty: 90 TABLET | Refills: 3 | Status: SHIPPED | OUTPATIENT
Start: 2024-03-01 | End: 2024-04-25 | Stop reason: SDUPTHER

## 2024-03-01 RX ORDER — ACETAMINOPHEN, DIPHENHYDRAMINE HCL, PHENYLEPHRINE HCL 325; 25; 5 MG/1; MG/1; MG/1
1 TABLET ORAL NIGHTLY
Qty: 90 TABLET | Refills: 3 | Status: SHIPPED | OUTPATIENT
Start: 2024-03-01 | End: 2024-04-25 | Stop reason: SDUPTHER

## 2024-03-01 RX ORDER — TRIAMTERENE/HYDROCHLOROTHIAZID 37.5-25 MG
1 TABLET ORAL EVERY MORNING
Qty: 90 TABLET | Refills: 3 | Status: SHIPPED | OUTPATIENT
Start: 2024-03-01

## 2024-03-06 RX ORDER — OLANZAPINE 10 MG/1
10 TABLET ORAL NIGHTLY
Qty: 30 TABLET | Refills: 2 | Status: SHIPPED | OUTPATIENT
Start: 2024-03-06 | End: 2024-03-26 | Stop reason: ALTCHOICE

## 2024-03-06 RX ORDER — DOCUSATE SODIUM 100 MG/1
100 CAPSULE, LIQUID FILLED ORAL DAILY
Qty: 90 CAPSULE | Refills: 3 | Status: SHIPPED | OUTPATIENT
Start: 2024-03-06

## 2024-03-06 RX ORDER — TRIAMTERENE/HYDROCHLOROTHIAZID 37.5-25 MG
1 TABLET ORAL EVERY MORNING
Qty: 90 TABLET | Refills: 2 | OUTPATIENT
Start: 2024-03-06

## 2024-03-06 RX ORDER — ACETAMINOPHEN, DIPHENHYDRAMINE HCL, PHENYLEPHRINE HCL 325; 25; 5 MG/1; MG/1; MG/1
1 TABLET ORAL NIGHTLY
Qty: 90 TABLET | Refills: 3 | OUTPATIENT
Start: 2024-03-06

## 2024-03-06 RX ORDER — FOLIC ACID 1 MG/1
1 TABLET ORAL DAILY
Qty: 90 TABLET | Refills: 3 | OUTPATIENT
Start: 2024-03-06

## 2024-03-06 RX ORDER — AMLODIPINE BESYLATE 10 MG/1
10 TABLET ORAL DAILY
Qty: 90 TABLET | Refills: 2 | OUTPATIENT
Start: 2024-03-06

## 2024-03-06 NOTE — TELEPHONE ENCOUNTER
Refill Routing Note   Medication(s) are not appropriate for processing by [facility] for the following reason(s):        Outside of protocol  Drug-disease interaction: triamterine, amLODIPine and Liver lesion     [facility] action(s):  Defer  Route      Medication Therapy Plan:       Pharmacist review requested: Yes     Appointments  past 12m or future 3m with PCP    Date Provider   Last Visit   10/31/2023 Brooke Goldberg MD   Next Visit   3/6/2024 Brooke Goldberg MD   ED visits in past 90 days: 0        Note composed:7:48 AM 03/06/2024

## 2024-03-06 NOTE — TELEPHONE ENCOUNTER
Refill Routing Note   Medication(s) are not appropriate for processing by [facility] for the following reason(s):        Outside of protocol    [facility] action(s):  Route               Appointments  past 12m or future 3m with PCP    Date Provider   Last Visit   10/31/2023 Brooke Goldberg MD   Next Visit   Visit date not found Brooke Goldberg MD   ED visits in past 90 days: 0        Note composed:7:18 AM 03/06/2024

## 2024-03-06 NOTE — TELEPHONE ENCOUNTER
Refill Decision Note   Juan Pablo Bolden  is requesting a refill authorization.  Brief Assessment and Rationale for Refill:  Quick Discontinue     Medication Therapy Plan: Receipt confirmed by pharmacy (3/1/2024  3:51 PM CST)      Pharmacist review requested: Yes   Comments:     Note composed:2:04 PM 03/06/2024

## 2024-03-08 ENCOUNTER — PATIENT MESSAGE (OUTPATIENT)
Dept: PSYCHIATRY | Facility: CLINIC | Age: 64
End: 2024-03-08
Payer: MEDICARE

## 2024-03-10 ENCOUNTER — HOSPITAL ENCOUNTER (EMERGENCY)
Facility: HOSPITAL | Age: 64
Discharge: PSYCHIATRIC OR SUBSTANCE USE FACILITY | End: 2024-03-11
Attending: EMERGENCY MEDICINE
Payer: MEDICARE

## 2024-03-10 DIAGNOSIS — N39.0 URINARY TRACT INFECTION WITH HEMATURIA, SITE UNSPECIFIED: ICD-10-CM
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
## 2024-03-10 DIAGNOSIS — F23 ACUTE PSYCHOSIS: ICD-10-CM

## 2024-03-10 DIAGNOSIS — F29 PSYCHOSIS, UNSPECIFIED PSYCHOSIS TYPE: Primary | ICD-10-CM

## 2024-03-10 DIAGNOSIS — R31.9 URINARY TRACT INFECTION WITH HEMATURIA, SITE UNSPECIFIED: ICD-10-CM

## 2024-03-10 DIAGNOSIS — R07.9 CHEST PAIN: ICD-10-CM

## 2024-03-10 LAB
ALBUMIN SERPL BCP-MCNC: 2.3 G/DL (ref 3.5–5.2)
ALP SERPL-CCNC: 160 U/L (ref 55–135)
ALT SERPL W/O P-5'-P-CCNC: 85 U/L (ref 10–44)
AMPHET+METHAMPHET UR QL: NEGATIVE
ANION GAP SERPL CALC-SCNC: 11 MMOL/L (ref 8–16)
APAP SERPL-MCNC: <3 UG/ML (ref 10–20)
AST SERPL-CCNC: 86 U/L (ref 10–40)
BACTERIA #/AREA URNS HPF: ABNORMAL /HPF
BARBITURATES UR QL SCN>200 NG/ML: NEGATIVE
BASOPHILS # BLD AUTO: 0.06 K/UL (ref 0–0.2)
BASOPHILS NFR BLD: 0.9 % (ref 0–1.9)
BENZODIAZ UR QL SCN>200 NG/ML: NEGATIVE
BILIRUB SERPL-MCNC: 0.6 MG/DL (ref 0.1–1)
BILIRUB UR QL STRIP: NEGATIVE
BUN SERPL-MCNC: 31 MG/DL (ref 8–23)
BZE UR QL SCN: NEGATIVE
CALCIUM SERPL-MCNC: 10 MG/DL (ref 8.7–10.5)
CANNABINOIDS UR QL SCN: NEGATIVE
CHLORIDE SERPL-SCNC: 99 MMOL/L (ref 95–110)
CK SERPL-CCNC: 329 U/L (ref 20–180)
CLARITY UR: CLEAR
CO2 SERPL-SCNC: 27 MMOL/L (ref 23–29)
COLOR UR: YELLOW
CREAT SERPL-MCNC: 0.8 MG/DL (ref 0.5–1.4)
CREAT UR-MCNC: 55.7 MG/DL (ref 15–325)
DIFFERENTIAL METHOD BLD: ABNORMAL
EOSINOPHIL # BLD AUTO: 0.4 K/UL (ref 0–0.5)
EOSINOPHIL NFR BLD: 5.1 % (ref 0–8)
ERYTHROCYTE [DISTWIDTH] IN BLOOD BY AUTOMATED COUNT: 13 % (ref 11.5–14.5)
EST. GFR  (NO RACE VARIABLE): >60 ML/MIN/1.73 M^2
ETHANOL SERPL-MCNC: <10 MG/DL
GLUCOSE SERPL-MCNC: 101 MG/DL (ref 70–110)
GLUCOSE UR QL STRIP: NEGATIVE
HCT VFR BLD AUTO: 37.4 % (ref 37–48.5)
HGB BLD-MCNC: 12.6 G/DL (ref 12–16)
HGB UR QL STRIP: ABNORMAL
HYALINE CASTS #/AREA URNS LPF: 0 /LPF
IMM GRANULOCYTES # BLD AUTO: 0.25 K/UL (ref 0–0.04)
IMM GRANULOCYTES NFR BLD AUTO: 3.6 % (ref 0–0.5)
KETONES UR QL STRIP: NEGATIVE
LEUKOCYTE ESTERASE UR QL STRIP: ABNORMAL
LYMPHOCYTES # BLD AUTO: 0.3 K/UL (ref 1–4.8)
LYMPHOCYTES NFR BLD: 3.9 % (ref 18–48)
MCH RBC QN AUTO: 29.4 PG (ref 27–31)
MCHC RBC AUTO-ENTMCNC: 33.7 G/DL (ref 32–36)
MCV RBC AUTO: 87 FL (ref 82–98)
METHADONE UR QL SCN>300 NG/ML: NEGATIVE
MICROSCOPIC COMMENT: ABNORMAL
MONOCYTES # BLD AUTO: 1 K/UL (ref 0.3–1)
MONOCYTES NFR BLD: 14.5 % (ref 4–15)
NEUTROPHILS # BLD AUTO: 5 K/UL (ref 1.8–7.7)
NEUTROPHILS NFR BLD: 72 % (ref 38–73)
NITRITE UR QL STRIP: NEGATIVE
NRBC BLD-RTO: 0 /100 WBC
OPIATES UR QL SCN: NEGATIVE
PCP UR QL SCN>25 NG/ML: NEGATIVE
PH UR STRIP: 6 [PH] (ref 5–8)
PLATELET # BLD AUTO: 80 K/UL (ref 150–450)
PMV BLD AUTO: 12.1 FL (ref 9.2–12.9)
POTASSIUM SERPL-SCNC: 4.6 MMOL/L (ref 3.5–5.1)
PROT SERPL-MCNC: 6.4 G/DL (ref 6–8.4)
PROT UR QL STRIP: ABNORMAL
RBC # BLD AUTO: 4.29 M/UL (ref 4–5.4)
RBC #/AREA URNS HPF: 60 /HPF (ref 0–4)
SARS-COV-2 RDRP RESP QL NAA+PROBE: NEGATIVE
SODIUM SERPL-SCNC: 137 MMOL/L (ref 136–145)
SP GR UR STRIP: 1.01 (ref 1–1.03)
SQUAMOUS #/AREA URNS HPF: 3 /HPF
TOXICOLOGY INFORMATION: NORMAL
TSH SERPL DL<=0.005 MIU/L-ACNC: 0.47 UIU/ML (ref 0.4–4)
URN SPEC COLLECT METH UR: ABNORMAL
UROBILINOGEN UR STRIP-ACNC: 1 EU/DL
WBC # BLD AUTO: 6.91 K/UL (ref 3.9–12.7)
WBC #/AREA URNS HPF: >100 /HPF (ref 0–5)
WBC CLUMPS URNS QL MICRO: ABNORMAL

## 2024-03-10 PROCEDURE — 25000003 PHARM REV CODE 250: Performed by: EMERGENCY MEDICINE

## 2024-03-10 PROCEDURE — 96375 TX/PRO/DX INJ NEW DRUG ADDON: CPT

## 2024-03-10 PROCEDURE — 93010 ELECTROCARDIOGRAM REPORT: CPT | Mod: ,,, | Performed by: INTERNAL MEDICINE

## 2024-03-10 PROCEDURE — 96372 THER/PROPH/DIAG INJ SC/IM: CPT | Performed by: EMERGENCY MEDICINE

## 2024-03-10 PROCEDURE — 87088 URINE BACTERIA CULTURE: CPT | Performed by: EMERGENCY MEDICINE

## 2024-03-10 PROCEDURE — 93005 ELECTROCARDIOGRAM TRACING: CPT

## 2024-03-10 PROCEDURE — 87086 URINE CULTURE/COLONY COUNT: CPT | Performed by: EMERGENCY MEDICINE

## 2024-03-10 PROCEDURE — 82077 ASSAY SPEC XCP UR&BREATH IA: CPT | Performed by: EMERGENCY MEDICINE

## 2024-03-10 PROCEDURE — 87186 SC STD MICRODIL/AGAR DIL: CPT | Performed by: EMERGENCY MEDICINE

## 2024-03-10 PROCEDURE — 85025 COMPLETE CBC W/AUTO DIFF WBC: CPT | Performed by: EMERGENCY MEDICINE

## 2024-03-10 PROCEDURE — 80053 COMPREHEN METABOLIC PANEL: CPT | Performed by: EMERGENCY MEDICINE

## 2024-03-10 PROCEDURE — 99285 EMERGENCY DEPT VISIT HI MDM: CPT | Mod: 25

## 2024-03-10 PROCEDURE — 80307 DRUG TEST PRSMV CHEM ANLYZR: CPT | Performed by: EMERGENCY MEDICINE

## 2024-03-10 PROCEDURE — 82550 ASSAY OF CK (CPK): CPT | Performed by: EMERGENCY MEDICINE

## 2024-03-10 PROCEDURE — 81000 URINALYSIS NONAUTO W/SCOPE: CPT | Mod: 59 | Performed by: EMERGENCY MEDICINE

## 2024-03-10 PROCEDURE — 84443 ASSAY THYROID STIM HORMONE: CPT | Performed by: EMERGENCY MEDICINE

## 2024-03-10 PROCEDURE — U0002 COVID-19 LAB TEST NON-CDC: HCPCS | Performed by: EMERGENCY MEDICINE

## 2024-03-10 PROCEDURE — 80143 DRUG ASSAY ACETAMINOPHEN: CPT | Performed by: EMERGENCY MEDICINE

## 2024-03-10 PROCEDURE — 63600175 PHARM REV CODE 636 W HCPCS: Performed by: EMERGENCY MEDICINE

## 2024-03-10 PROCEDURE — 87077 CULTURE AEROBIC IDENTIFY: CPT | Performed by: EMERGENCY MEDICINE

## 2024-03-10 RX ORDER — KETOROLAC TROMETHAMINE 30 MG/ML
15 INJECTION, SOLUTION INTRAMUSCULAR; INTRAVENOUS
Status: COMPLETED | OUTPATIENT
Start: 2024-03-10 | End: 2024-03-10

## 2024-03-10 RX ORDER — ZIPRASIDONE MESYLATE 20 MG/ML
20 INJECTION, POWDER, LYOPHILIZED, FOR SOLUTION INTRAMUSCULAR
Status: COMPLETED | OUTPATIENT
Start: 2024-03-10 | End: 2024-03-10

## 2024-03-10 RX ORDER — MORPHINE SULFATE 4 MG/ML
4 INJECTION, SOLUTION INTRAMUSCULAR; INTRAVENOUS
Status: COMPLETED | OUTPATIENT
Start: 2024-03-10 | End: 2024-03-10

## 2024-03-10 RX ORDER — DIPHENHYDRAMINE HYDROCHLORIDE 50 MG/ML
50 INJECTION INTRAMUSCULAR; INTRAVENOUS
Status: COMPLETED | OUTPATIENT
Start: 2024-03-10 | End: 2024-03-10

## 2024-03-10 RX ORDER — LORAZEPAM 2 MG/ML
2 INJECTION INTRAMUSCULAR
Status: COMPLETED | OUTPATIENT
Start: 2024-03-10 | End: 2024-03-10

## 2024-03-10 RX ADMIN — KETOROLAC TROMETHAMINE 15 MG: 30 INJECTION, SOLUTION INTRAMUSCULAR at 11:03

## 2024-03-10 RX ADMIN — LORAZEPAM 2 MG: 2 INJECTION INTRAMUSCULAR; INTRAVENOUS at 11:03

## 2024-03-10 RX ADMIN — CEFTRIAXONE 1 G: 1 INJECTION, POWDER, FOR SOLUTION INTRAMUSCULAR; INTRAVENOUS at 11:03

## 2024-03-10 RX ADMIN — DIPHENHYDRAMINE HYDROCHLORIDE 50 MG: 50 INJECTION INTRAMUSCULAR; INTRAVENOUS at 11:03

## 2024-03-10 RX ADMIN — MORPHINE SULFATE 4 MG: 4 INJECTION INTRAVENOUS at 11:03

## 2024-03-10 RX ADMIN — ZIPRASIDONE MESYLATE 20 MG: 20 INJECTION, POWDER, LYOPHILIZED, FOR SOLUTION INTRAMUSCULAR at 09:03

## 2024-03-10 RX ADMIN — SODIUM CHLORIDE 1000 ML: 9 INJECTION, SOLUTION INTRAVENOUS at 11:03

## 2024-03-11 VITALS
HEART RATE: 93 BPM | WEIGHT: 111.19 LBS | OXYGEN SATURATION: 98 % | BODY MASS INDEX: 22.46 KG/M2 | SYSTOLIC BLOOD PRESSURE: 159 MMHG | RESPIRATION RATE: 16 BRPM | TEMPERATURE: 100 F | DIASTOLIC BLOOD PRESSURE: 81 MMHG

## 2024-03-11 LAB
OHS QRS DURATION: 72 MS
OHS QTC CALCULATION: 397 MS

## 2024-03-11 PROCEDURE — 96376 TX/PRO/DX INJ SAME DRUG ADON: CPT

## 2024-03-11 PROCEDURE — 96372 THER/PROPH/DIAG INJ SC/IM: CPT | Mod: 59 | Performed by: EMERGENCY MEDICINE

## 2024-03-11 PROCEDURE — 63600175 PHARM REV CODE 636 W HCPCS: Performed by: EMERGENCY MEDICINE

## 2024-03-11 PROCEDURE — 96365 THER/PROPH/DIAG IV INF INIT: CPT

## 2024-03-11 RX ORDER — LORAZEPAM 2 MG/ML
1 INJECTION INTRAMUSCULAR
Status: DISCONTINUED | OUTPATIENT
Start: 2024-03-11 | End: 2024-03-11

## 2024-03-11 RX ORDER — CEFUROXIME AXETIL 500 MG/1
500 TABLET ORAL 2 TIMES DAILY
Qty: 20 TABLET | Refills: 0 | Status: SHIPPED | OUTPATIENT
Start: 2024-03-11 | End: 2024-03-21

## 2024-03-11 RX ORDER — DIPHENHYDRAMINE HYDROCHLORIDE 50 MG/ML
25 INJECTION INTRAMUSCULAR; INTRAVENOUS
Status: COMPLETED | OUTPATIENT
Start: 2024-03-11 | End: 2024-03-11

## 2024-03-11 RX ORDER — HALOPERIDOL 5 MG/ML
5 INJECTION INTRAMUSCULAR
Status: COMPLETED | OUTPATIENT
Start: 2024-03-11 | End: 2024-03-11

## 2024-03-11 RX ADMIN — HALOPERIDOL LACTATE 5 MG: 5 INJECTION, SOLUTION INTRAMUSCULAR at 08:03

## 2024-03-11 RX ADMIN — DIPHENHYDRAMINE HYDROCHLORIDE 25 MG: 50 INJECTION, SOLUTION INTRAMUSCULAR; INTRAVENOUS at 10:03

## 2024-03-11 NOTE — ED NOTES
Belongings: black tennis shoes, socks, under shirt, black t shirt, bra, floral tights, black beanie, purple jacket, venu purse (misc papers 2 one dollar bills, constantino gras beads) , 3 bracelets . Bottom locked cabinet in psych room.

## 2024-03-11 NOTE — ED NOTES
Called [facility] and spoke to Zainab, notified that pt was leaving [facility] and heading to her hospital. Also has paper prescription written by Dr. Vazquez for pt's UTI.

## 2024-03-11 NOTE — ED NOTES
Pt becoming more agitated, screaming and yelling. Calling out what sounds like Vanessa or Evon. Difficult to understand what pt is saying. Dr. Vazquez notified.

## 2024-03-11 NOTE — ED NOTES
Spoke to pt's sister Alanis Noyola [phone]. Explained placement process. Also explained that we are monitoring pt's blood pressure and vital signs. Pt is on CM. Sister reported that pt started having symptoms of agitation, screaming out, when she started taking Olanzepine. Sister contacted pt's psychiatrist but has not received a response. Olanzepine has been discontinued per family.

## 2024-03-11 NOTE — ED NOTES
Spoke to Alanis Noyola, pt sister and notified her of pending transport to [facility].

## 2024-03-11 NOTE — ED NOTES
Pt is still agitated and yelling out names, now also calling what sounds like Alanis. Rogelio, sitter at bedside trying to comfort pt. CM in place.

## 2024-03-11 NOTE — ED NOTES
AASI given 1 bag of clothes, which included coat, pink lunch bag, tennis shoes and other items.   - - -

## 2024-03-11 NOTE — ED PROVIDER NOTES
SCRIBE #1 NOTE: I, Me-Toan Charli, am scribing for, and in the presence of, Sofi Washington MD. I have scribed the entire note.       History     Chief Complaint   Patient presents with    Psychiatric Evaluation     Pt has been screaming, falling to floor, resident at [facility]; delirious per family since medicine change (olanzipine 10mg); yelling in triage     Review of patient's allergies indicates:  No Known Allergies      History of Present Illness     HPI    3/10/2024, 9:17 PM  History obtained from the patient's sister  HPI and ROS limited due to psychiatric disorder      History of Present Illness: Juan Pablo Bolden is a 63 y.o. female patient with a PMHx of Bipolar 1 disorder, galactorrhea, high cholesterol, HLD, HTN, leukopenia, neuroleptic-induced tardive dyskinesia, schizoaffective disorder, and stereotypic movement disorder who presents to the Emergency Department for a psychiatric evaluation. Sister states that for the past week, pt has been delusional, speaking out-of-context, and screaming and yelling uncontrollably. Sister also reports that pt has a history of acute psychosis that causes frequent falls, and this past week, sister reports that pt has been falling more frequently, unable to ambulate on her own -- pt is requiring two people and her wheelchair to assist her. Sister called Dr. Roxana MD (Psychiatrist) some time last week, and she reports that they discontinued Trazodone and started the pt on Olanzapine 10 mg. Per sister, this has not improved pt's condition but only worsened her symptoms.  She was removed patient is not sleeping she is screaming at a time keeping neighbors up all day and all night.     Pt's sister states that pt has been this way since birth; mother reportedly placed pt in an institution as a baby. Pt is a resident at [facility] with 24 hours care. No further concerns at this time.    Arrival mode: Personal vehicle    PCP: Brooke Goldberg MD         Past Medical History:  Past Medical History:   Diagnosis Date    Bipolar 1 disorder     Chronic constipation     Galactorrhea     Growth retardation     Profound mental retardation    High cholesterol     Hyperlipidemia     Hypertension     Leukopenia     Neuroleptic-induced tardive dyskinesia     Schizoaffective disorder     Stereotypic movement disorder        Past Surgical History:  Past Surgical History:   Procedure Laterality Date    COLONOSCOPY N/A 5/17/2021    Procedure: COLONOSCOPY;  Surgeon: Jeffrey Ayala MD;  Location: [facility];  Service: Gastroenterology;  Laterality: N/A;    ENDOSCOPIC ULTRASOUND OF UPPER GASTROINTESTINAL TRACT N/A 5/16/2022    Procedure: ULTRASOUND, UPPER GI TRACT, ENDOSCOPIC;  Surgeon: Cherise Marshall MD;  Location: [facility];  Service: Endoscopy;  Laterality: N/A;  Upper and linear, 22 shark core, slides and formalin.    ENDOSCOPIC ULTRASOUND OF UPPER GASTROINTESTINAL TRACT N/A 12/20/2023    Procedure: ULTRASOUND, UPPER GI TRACT, ENDOSCOPIC;  Surgeon: Reed Ojeda MD;  Location: [facility] ([address] [place] [address]);  Service: Endoscopy;  Laterality: N/A;  Need EUS (linear) for pancreas cyst surveillance. 45 minutes. Main or [facility]. -lewis  instr rtmjte-yb-bm seizures for many years  11/15/23-LVM for precall-DS  11/16-precall complete-Kpvt  11/21/23: instructions sent via portal for reschedule-GD  1    ESOPHAGOGASTRODUODENOSCOPY N/A 11/5/2020    Procedure: EGD (ESOPHAGOGASTRODUODENOSCOPY);  Surgeon: John Batista MD;  [facility];  Service: Endoscopy;  Laterality: N/A;         Family History:  Family History   Problem Relation Age of Onset    Lung cancer Mother     Hypertension Father     Prostate cancer Father     Stroke Maternal Uncle     Heart disease Maternal Grandmother        Social History:  Social History     Tobacco Use    Smoking status: Never     Passive exposure: Never    Smokeless tobacco: Never   Substance and Sexual Activity    Alcohol use: No     Drug use: No    Sexual activity: Never        Review of Systems     Review of Systems   Unable to perform ROS: Psychiatric disorder        Physical Exam     Initial Vitals [03/10/24 2028]   BP Pulse Resp Temp SpO2   (!) 171/90 96 20 99.8 °F (37.7 °C) 98 %      MAP       --          Physical Exam  Nursing Notes and Vital Signs Reviewed.  Constitutional: Patient is screaming out kicking her legs but then she stays still and then has more outbursts.  Head: no Signs or symptoms of basilar skull fracture.    Eyes: PERRL. EOM intact. Conjunctivae are not pale. No scleral icterus.  ENT: Mucous membranes are moist. Oropharynx is clear and symmetric.  Dried blood on face - no facial swelling or nasal swelling, no nasal bleeding.  Neck: Supple. Full ROM. No lymphadenopathy.   Cardiovascular: Regular rate. Regular rhythm. No murmurs, rubs, or gallops. Distal pulses are 1+ and symmetric.  Pulmonary/Chest: No respiratory distress. Clear to auscultation bilaterally. No wheezing or rales.  Abdominal: Soft and non-distended.  There is no tenderness.  No rebound, guarding, or rigidity. Good bowel sound.  Genitourinary: No CVA tenderness.  Patient is in a diaper  Musculoskeletal: Moves all extremities.  She is able to move her arms in all directions.  She frequently flexes at the knees and extends her legs.  No obvious deformities. No edema. No calf tenderness.  Skin: Warm and dry.  Neurological:  No acute focal neurological deficits are appreciated.  However patient at times screaming out  Psychiatric: Pt is screaming in his having outburst.  She does not follow commands.  She cannot be redirected.         ED Course   Procedures  ED Vital Signs:  Vitals:    03/10/24 2015 03/10/24 2028 03/10/24 2031 03/10/24 2236   BP:  (!) 171/90  (!) 159/78   Pulse:  96 97 97   Resp:  20     Temp:  99.8 °F (37.7 °C)     TempSrc: Oral Axillary     SpO2:  98%  96%   Weight:   50.4 kg (111 lb 3.2 oz)     03/10/24 2337 03/11/24 0011 03/11/24 0012  03/11/24 0100   BP: 127/88 127/88 127/88 (!) 152/66   Pulse: 102 93 92 94   Resp: 20 (!) 22 (!) 22 (!) 22   Temp: 99.8 °F (37.7 °C) 99.8 °F (37.7 °C) 99.7 °F (37.6 °C)    TempSrc:  Axillary Axillary    SpO2: 95%  (!) 94% 97%   Weight:           Abnormal Lab Results:  Labs Reviewed   CBC W/ AUTO DIFFERENTIAL - Abnormal; Notable for the following components:       Result Value    Platelets 80 (*)     Immature Granulocytes 3.6 (*)     Immature Grans (Abs) 0.25 (*)     Lymph # 0.3 (*)     Lymph % 3.9 (*)     All other components within normal limits   COMPREHENSIVE METABOLIC PANEL - Abnormal; Notable for the following components:    BUN 31 (*)     Albumin 2.3 (*)     Alkaline Phosphatase 160 (*)     AST 86 (*)     ALT 85 (*)     All other components within normal limits   URINALYSIS, REFLEX TO URINE CULTURE - Abnormal; Notable for the following components:    Protein, UA 1+ (*)     Occult Blood UA 2+ (*)     Leukocytes, UA 2+ (*)     All other components within normal limits    Narrative:     Specimen Source->Urine   ACETAMINOPHEN LEVEL - Abnormal; Notable for the following components:    Acetaminophen (Tylenol), Serum <3.0 (*)     All other components within normal limits   CK - Abnormal; Notable for the following components:     (*)     All other components within normal limits   URINALYSIS MICROSCOPIC - Abnormal; Notable for the following components:    RBC, UA 60 (*)     WBC, UA >100 (*)     WBC Clumps, UA Occasional (*)     All other components within normal limits    Narrative:     Specimen Source->Urine   CULTURE, URINE   TSH   DRUG SCREEN PANEL, URINE EMERGENCY    Narrative:     Specimen Source->Urine   ALCOHOL,MEDICAL (ETHANOL)   SARS-COV-2 RNA AMPLIFICATION, QUAL        All Lab Results:  Results for orders placed or performed during the hospital encounter of 03/10/24   CBC auto differential   Result Value Ref Range    WBC 6.91 3.90 - 12.70 K/uL    RBC 4.29 4.00 - 5.40 M/uL    Hemoglobin 12.6 12.0 - 16.0  g/dL    Hematocrit 37.4 37.0 - 48.5 %    MCV 87 82 - 98 fL    MCH 29.4 27.0 - 31.0 pg    MCHC 33.7 32.0 - 36.0 g/dL    RDW 13.0 11.5 - 14.5 %    Platelets 80 (L) 150 - 450 K/uL    MPV 12.1 9.2 - 12.9 fL    Immature Granulocytes 3.6 (H) 0.0 - 0.5 %    Gran # (ANC) 5.0 1.8 - 7.7 K/uL    Immature Grans (Abs) 0.25 (H) 0.00 - 0.04 K/uL    Lymph # 0.3 (L) 1.0 - 4.8 K/uL    Mono # 1.0 0.3 - 1.0 K/uL    Eos # 0.4 0.0 - 0.5 K/uL    Baso # 0.06 0.00 - 0.20 K/uL    nRBC 0 0 /100 WBC    Gran % 72.0 38.0 - 73.0 %    Lymph % 3.9 (L) 18.0 - 48.0 %    Mono % 14.5 4.0 - 15.0 %    Eosinophil % 5.1 0.0 - 8.0 %    Basophil % 0.9 0.0 - 1.9 %    Differential Method Automated    Comprehensive metabolic panel   Result Value Ref Range    Sodium 137 136 - 145 mmol/L    Potassium 4.6 3.5 - 5.1 mmol/L    Chloride 99 95 - 110 mmol/L    CO2 27 23 - 29 mmol/L    Glucose 101 70 - 110 mg/dL    BUN 31 (H) 8 - 23 mg/dL    Creatinine 0.8 0.5 - 1.4 mg/dL    Calcium 10.0 8.7 - 10.5 mg/dL    Total Protein 6.4 6.0 - 8.4 g/dL    Albumin 2.3 (L) 3.5 - 5.2 g/dL    Total Bilirubin 0.6 0.1 - 1.0 mg/dL    Alkaline Phosphatase 160 (H) 55 - 135 U/L    AST 86 (H) 10 - 40 U/L    ALT 85 (H) 10 - 44 U/L    eGFR >60 >60 mL/min/1.73 m^2    Anion Gap 11 8 - 16 mmol/L   TSH   Result Value Ref Range    TSH 0.471 0.400 - 4.000 uIU/mL   Urinalysis, Reflex to Urine Culture Urine, Clean Catch    Specimen: Urine   Result Value Ref Range    Specimen UA Urine, Clean Catch     Color, UA Yellow Yellow, Straw, Demi    Appearance, UA Clear Clear    pH, UA 6.0 5.0 - 8.0    Specific Gravity, UA 1.015 1.005 - 1.030    Protein, UA 1+ (A) Negative    Glucose, UA Negative Negative    Ketones, UA Negative Negative    Bilirubin (UA) Negative Negative    Occult Blood UA 2+ (A) Negative    Nitrite, UA Negative Negative    Urobilinogen, UA 1.0 <2.0 EU/dL    Leukocytes, UA 2+ (A) Negative   Drug screen panel, emergency   Result Value Ref Range    Benzodiazepines Negative Negative    Methadone  metabolites Negative Negative    Cocaine (Metab.) Negative Negative    Opiate Scrn, Ur Negative Negative    Barbiturate Screen, Ur Negative Negative    Amphetamine Screen, Ur Negative Negative    THC Negative Negative    Phencyclidine Negative Negative    Creatinine, Urine 55.7 15.0 - 325.0 mg/dL    Toxicology Information SEE COMMENT    Ethanol   Result Value Ref Range    Alcohol, Serum <10 <10 mg/dL   Acetaminophen level   Result Value Ref Range    Acetaminophen (Tylenol), Serum <3.0 (L) 10.0 - 20.0 ug/mL   CPK   Result Value Ref Range     (H) 20 - 180 U/L   Urinalysis Microscopic   Result Value Ref Range    RBC, UA 60 (H) 0 - 4 /hpf    WBC, UA >100 (H) 0 - 5 /hpf    WBC Clumps, UA Occasional (A) None-Rare    Bacteria Rare None-Occ /hpf    Squam Epithel, UA 3 /hpf    Hyaline Casts, UA 0 0-1/lpf /lpf    Microscopic Comment SEE COMMENT    COVID-19 Rapid Screening   Result Value Ref Range    SARS-CoV-2 RNA, Amplification, Qual Negative Negative     *Note: Due to a large number of results and/or encounters for the requested time period, some results have not been displayed. A complete set of results can be found in Results Review.         Imaging Results:  Imaging Results              CT Head Without Contrast (Final result)  Result time 03/10/24 23:12:57      Final result by Denise Martinez MD (03/10/24 23:12:57)                   Impression:      No overt acute finding as visualized significant motion degradation nearly nondiagnostic exam      Electronically signed by: Denise Martinez  Date:    03/10/2024  Time:    23:12               Narrative:    EXAMINATION:  CT HEAD WITHOUT CONTRAST    CLINICAL HISTORY:  Mental status change, unknown cause;    TECHNIQUE:  Low dose axial images were obtained through the head.  Coronal and sagittal reformations were also performed. Contrast was not administered.    COMPARISON:  05/11/2023    FINDINGS:  Imaging significantly degraded by motion.  As visualized no overt  acute intracranial hemorrhage or mass effect                                       X-Ray Lumbar Spine Ap And Lateral (Final result)  Result time 03/10/24 22:16:44      Final result by Denise Martinez MD (03/10/24 22:16:44)                   Narrative:    EXAMINATION:  XR LUMBAR SPINE AP AND LATERAL    CLINICAL HISTORY:  Fall;    COMPARISON:  None available    FINDINGS/IMPRESSION  Three-view exam appear demineralized.    There is grade 1 anterolisthesis L4 in relation L5.  Vertebral body heights and intervertebral disc spaces maintained.  No acute fractures seen    No acute osseous finding      Electronically signed by: Denise Martinez  Date:    03/10/2024  Time:    22:16                                     The EKG was ordered, reviewed, and independently interpreted by the ED provider.  Interpretation time: 22:37  Rate: 94 BPM  Rhythm:  Sinus rhythm with short OK.  Interpretation: Biatrial enlargement. Possible Right ventricular hypertrophy. Left ventricular hypertrophy. Nonspecific ST and T wave abnormality. No STEMI.           The Emergency Provider reviewed the vital signs and test results, which are outlined above.     ED Discussion     12:30 PM: The PEC hold has been issued by Dr. Washingtno at this time for being gravely disabled.    1:38 AM: Pt has been medically cleared by Dr. Washington at this time. Reassessed pt at this time. Pt is resting comfortably and appears in no acute distress. There are no psychiatric services offered at this facility. D/w pt all pertinent ED information and plan to transfer to psychiatric facility for psychiatric treatment. Pt verbalizes understanding. [facility]. Pt has been made aware of all risks and benefits associated with transfer, including but not limited to death, MVC, loss of vital signs, and/or permanent disability. Benefits include ability to be treated at an inpatient psychiatric facility. Pt will be transported  by personnel trained in CPR and CPI. Patient understands that there could be unforeseen motor vehicle accidents, inclement weather, or loss of vital signs that could result in potential death or permanent disability. All questions and complaints have been addressed at this time. Pt condition is stable at this time and is clear to transfer to psychiatric facility at this time.        Medical Decision Making  Patient has been institution since she was a baby.  She is on psychiatric medication with a recent change in her medication.  Her sister reports that she has been falling a lot.  Her sister reports that when she was younger when she had have a psychotic episode she would just flap to the floor.  She has been doing that for the past week.  And her outbursts have been increased.  She has been screaming at nighttime and waking up neighbors.     CT head with no acute findings.  No fracture on her lumbar spine.  I did not do a pelvic x-ray she is able to flex and extend at the hips and knees on her own.  She flexes to 90° at times.  She is moving both of her arms and no difficulty.  I she does not follow command but she seemed to able to flex and extend her arms and legs without difficulty.  Do not suspect a fracture at this time as she is moving all of her extremities.    CBC was also within normal limits.  CMP with elevated liver enzymes but she does not seem to have any abdominal tenderness on exam she does not have any distention.  She does have urinary tract infection given Rocephin in the emergency room.  She is COVID negative normal TSH, CPK elevated around 390s and she was given IV fluids.      She did have to get Geodon in the emergency room.  After an hours she was still screaming out periodically.  She was also kicking her arms and her legs.  She was asking for her pain medication.  Benadryl and Ativan was ordered.  We watched her for some time and then gave her some morphine for her pain and  Toradol.    She is currently sleeping at this time.  PC will be done and will transfer her to a psychiatric facility .  Her sister has already called several psychiatric facilities and they notify her that she would have to be medically cleared in the emergency room 1st.    Amount and/or Complexity of Data Reviewed  Independent Historian:      Details: sister  Labs: ordered. Decision-making details documented in ED Course.  Radiology: ordered. Decision-making details documented in ED Course.  ECG/medicine tests: ordered and independent interpretation performed. Decision-making details documented in ED Course.  Discussion of management or test interpretation with external provider(s): Differential diagnosis: Dehydration, electrolyte abnormality, arrhythmia, infection, STEMI, NSTEMI, UTI, infection, CVA, TIA, intracranial hemorrhage, acute psychosis, bipolar schizophrenia      Risk  Prescription drug management.                ED Medication(s):  Medications   ziprasidone injection 20 mg (20 mg Intramuscular Given 3/10/24 2138)   sodium chloride 0.9% bolus 1,000 mL 1,000 mL (1,000 mLs Intravenous New Bag 3/10/24 2339)   LORazepam injection 2 mg (2 mg Intravenous Given 3/10/24 2312)   diphenhydrAMINE injection 50 mg (50 mg Intravenous Given 3/10/24 2310)   cefTRIAXone (Rocephin) 1 g in dextrose 5 % in water (D5W) 100 mL IVPB (MB+) (0 g Intravenous Stopped 3/11/24 0056)   morphine injection 4 mg (4 mg Intravenous Given 3/10/24 2337)   ketorolac injection 15 mg (15 mg Intravenous Given 3/10/24 2338)       New Prescriptions    No medications on file               Scribe Attestation:   Scribe #1: I performed the above scribed service and the documentation accurately describes the services I performed. I attest to the accuracy of the note.     Attending:   Physician Attestation Statement for Scribe #1: Do SHAW Thuytien Wendy, MD, personally performed the services described in this documentation, as scribed by Kathryn Augustin,  in my presence, and it is both accurate and complete.           Clinical Impression       ICD-10-CM ICD-9-CM   1. Psychosis, unspecified psychosis type  F29 298.9   2. Chest pain  R07.9 786.50   3. Fall  W19.XXXA E888.9   4. Urinary tract infection with hematuria, site unspecified  N39.0 599.0    R31.9 599.70   5. Acute psychosis  F23 298.9       Disposition:   Disposition: Transferred  Condition: Stable         Sofi Washington MD  03/11/24 0148       Sofi Washington MD  03/11/24 0202

## 2024-03-13 LAB — BACTERIA UR CULT: ABNORMAL

## 2024-03-21 ENCOUNTER — TELEPHONE (OUTPATIENT)
Dept: FAMILY MEDICINE | Facility: CLINIC | Age: 64
End: 2024-03-21
Payer: MEDICARE

## 2024-03-21 NOTE — TELEPHONE ENCOUNTER
----- Message from Radha Antonio sent at 3/21/2024  4:12 PM CDT -----  Contact: alanis/sister  Alanis is needing a call back in regards to the patient being in the hospital and discuss some PT. Please give her a call back at [phone]

## 2024-03-22 ENCOUNTER — TELEPHONE (OUTPATIENT)
Dept: PSYCHIATRY | Facility: CLINIC | Age: 64
End: 2024-03-22
Payer: MEDICARE

## 2024-03-22 NOTE — TELEPHONE ENCOUNTER
----- Message from Smita Zayas sent at 3/22/2024 11:09 AM CDT -----  Contact: [facility] Is requesting a call back to schedule a hospital f/u for the patient with the next two weeks. Please call back @ [phone]

## 2024-03-25 ENCOUNTER — NURSE TRIAGE (OUTPATIENT)
Dept: ADMINISTRATIVE | Facility: CLINIC | Age: 64
End: 2024-03-25
Payer: MEDICARE

## 2024-03-25 ENCOUNTER — HOSPITAL ENCOUNTER (INPATIENT)
Facility: HOSPITAL | Age: 64
LOS: 3 days | Discharge: HOME | DRG: 603 | End: 2024-03-29
Attending: EMERGENCY MEDICINE | Admitting: INTERNAL MEDICINE
Payer: MEDICARE

## 2024-03-25 DIAGNOSIS — F25.9 SCHIZOAFFECTIVE DISORDER, UNSPECIFIED TYPE: ICD-10-CM

## 2024-03-25 DIAGNOSIS — K59.00 CONSTIPATION: Primary | ICD-10-CM

## 2024-03-25 DIAGNOSIS — R07.9 CHEST PAIN: ICD-10-CM

## 2024-03-25 DIAGNOSIS — F31.9 BIPOLAR AFFECTIVE DISORDER, REMISSION STATUS UNSPECIFIED: ICD-10-CM

## 2024-03-25 DIAGNOSIS — R62.50 DEVELOPMENTAL DELAY: ICD-10-CM

## 2024-03-25 DIAGNOSIS — L02.31 GLUTEAL ABSCESS: ICD-10-CM

## 2024-03-25 LAB
ALBUMIN SERPL BCP-MCNC: 2.9 G/DL (ref 3.5–5.2)
ALP SERPL-CCNC: 158 U/L (ref 55–135)
ALT SERPL W/O P-5'-P-CCNC: 27 U/L (ref 10–44)
ANION GAP SERPL CALC-SCNC: 8 MMOL/L (ref 8–16)
AST SERPL-CCNC: 14 U/L (ref 10–40)
BASOPHILS # BLD AUTO: 0.04 K/UL (ref 0–0.2)
BASOPHILS NFR BLD: 0.9 % (ref 0–1.9)
BILIRUB SERPL-MCNC: 0.3 MG/DL (ref 0.1–1)
BUN SERPL-MCNC: 17 MG/DL (ref 8–23)
CALCIUM SERPL-MCNC: 10.3 MG/DL (ref 8.7–10.5)
CHLORIDE SERPL-SCNC: 103 MMOL/L (ref 95–110)
CO2 SERPL-SCNC: 28 MMOL/L (ref 23–29)
CREAT SERPL-MCNC: 0.7 MG/DL (ref 0.5–1.4)
DIFFERENTIAL METHOD BLD: ABNORMAL
EOSINOPHIL # BLD AUTO: 0.1 K/UL (ref 0–0.5)
EOSINOPHIL NFR BLD: 1.8 % (ref 0–8)
ERYTHROCYTE [DISTWIDTH] IN BLOOD BY AUTOMATED COUNT: 13.6 % (ref 11.5–14.5)
EST. GFR  (NO RACE VARIABLE): >60 ML/MIN/1.73 M^2
GLUCOSE SERPL-MCNC: 129 MG/DL (ref 70–110)
HCT VFR BLD AUTO: 32.3 % (ref 37–48.5)
HGB BLD-MCNC: 10.6 G/DL (ref 12–16)
IMM GRANULOCYTES # BLD AUTO: 0.04 K/UL (ref 0–0.04)
IMM GRANULOCYTES NFR BLD AUTO: 0.9 % (ref 0–0.5)
LIPASE SERPL-CCNC: 83 U/L (ref 4–60)
LYMPHOCYTES # BLD AUTO: 0.7 K/UL (ref 1–4.8)
LYMPHOCYTES NFR BLD: 15.7 % (ref 18–48)
MCH RBC QN AUTO: 29.4 PG (ref 27–31)
MCHC RBC AUTO-ENTMCNC: 32.8 G/DL (ref 32–36)
MCV RBC AUTO: 90 FL (ref 82–98)
MONOCYTES # BLD AUTO: 0.3 K/UL (ref 0.3–1)
MONOCYTES NFR BLD: 6.2 % (ref 4–15)
NEUTROPHILS # BLD AUTO: 3.4 K/UL (ref 1.8–7.7)
NEUTROPHILS NFR BLD: 74.5 % (ref 38–73)
NRBC BLD-RTO: 0 /100 WBC
PLATELET # BLD AUTO: 175 K/UL (ref 150–450)
PMV BLD AUTO: 10.3 FL (ref 9.2–12.9)
POTASSIUM SERPL-SCNC: 4.2 MMOL/L (ref 3.5–5.1)
PROT SERPL-MCNC: 6.5 G/DL (ref 6–8.4)
RBC # BLD AUTO: 3.61 M/UL (ref 4–5.4)
SODIUM SERPL-SCNC: 139 MMOL/L (ref 136–145)
WBC # BLD AUTO: 4.52 K/UL (ref 3.9–12.7)

## 2024-03-25 PROCEDURE — 85025 COMPLETE CBC W/AUTO DIFF WBC: CPT | Performed by: NURSE PRACTITIONER

## 2024-03-25 PROCEDURE — 96366 THER/PROPH/DIAG IV INF ADDON: CPT

## 2024-03-25 PROCEDURE — P9612 CATHETERIZE FOR URINE SPEC: HCPCS

## 2024-03-25 PROCEDURE — 99285 EMERGENCY DEPT VISIT HI MDM: CPT | Mod: 25

## 2024-03-25 PROCEDURE — 96367 TX/PROPH/DG ADDL SEQ IV INF: CPT

## 2024-03-25 PROCEDURE — 83690 ASSAY OF LIPASE: CPT | Performed by: NURSE PRACTITIONER

## 2024-03-25 PROCEDURE — 96365 THER/PROPH/DIAG IV INF INIT: CPT

## 2024-03-25 PROCEDURE — 80053 COMPREHEN METABOLIC PANEL: CPT | Performed by: NURSE PRACTITIONER

## 2024-03-25 NOTE — TELEPHONE ENCOUNTER
Pt's sister calls on behalf of pt who was discharged from an inpatient psych facility today. Sister states that she feels pt is constipated. Pt was given prune juice and apple sauce and had two large bowel movements but sister feels that pt is still constipated. Stomach is extended. When pt tries to pass BM she cries. Urine smells concentrated per sister and pt has decreased uop even with adequate fluid intake.    Care Advice recommends that pt be seen within the next 3-4 hours. D/t pt's symptoms, NT advises pt's sister to take pt to ED now. Pt's sister verbalizes understanding and is instructed to call back if pt develops any new/worsening sxs or if she has any additional questions or concerns.   Reason for Disposition   [1] Constant abdominal pain AND [2] present > 2 hours    Additional Information   Negative: [1] Vomiting AND [2] contains bile (green color)   Negative: Patient sounds very sick or weak to the triager   Negative: [1] Vomiting AND [2] abdomen looks much more swollen than usual    Protocols used: Constipation-A-AH

## 2024-03-26 PROBLEM — R62.50 DEVELOPMENTAL DELAY: Status: ACTIVE | Noted: 2024-03-26

## 2024-03-26 PROBLEM — R60.0 BILATERAL LEG EDEMA: Status: ACTIVE | Noted: 2024-03-26

## 2024-03-26 PROBLEM — L02.31 GLUTEAL ABSCESS: Status: ACTIVE | Noted: 2024-03-26

## 2024-03-26 PROBLEM — F31.9 BIPOLAR DISORDER: Status: ACTIVE | Noted: 2024-03-26

## 2024-03-26 PROBLEM — N30.00 ACUTE CYSTITIS WITHOUT HEMATURIA: Status: ACTIVE | Noted: 2024-03-26

## 2024-03-26 LAB
ALBUMIN SERPL BCP-MCNC: 2.8 G/DL (ref 3.5–5.2)
ALP SERPL-CCNC: 143 U/L (ref 55–135)
ALT SERPL W/O P-5'-P-CCNC: 23 U/L (ref 10–44)
ANION GAP SERPL CALC-SCNC: 8 MMOL/L (ref 8–16)
AST SERPL-CCNC: 13 U/L (ref 10–40)
BASOPHILS # BLD AUTO: 0.03 K/UL (ref 0–0.2)
BASOPHILS NFR BLD: 1.1 % (ref 0–1.9)
BILIRUB SERPL-MCNC: 0.4 MG/DL (ref 0.1–1)
BILIRUB UR QL STRIP: NEGATIVE
BNP SERPL-MCNC: 21 PG/ML (ref 0–99)
BUN SERPL-MCNC: 13 MG/DL (ref 8–23)
CALCIUM SERPL-MCNC: 10.6 MG/DL (ref 8.7–10.5)
CHLORIDE SERPL-SCNC: 102 MMOL/L (ref 95–110)
CLARITY UR: CLEAR
CO2 SERPL-SCNC: 29 MMOL/L (ref 23–29)
COLOR UR: YELLOW
CREAT SERPL-MCNC: 0.7 MG/DL (ref 0.5–1.4)
DIFFERENTIAL METHOD BLD: ABNORMAL
EOSINOPHIL # BLD AUTO: 0.1 K/UL (ref 0–0.5)
EOSINOPHIL NFR BLD: 3 % (ref 0–8)
ERYTHROCYTE [DISTWIDTH] IN BLOOD BY AUTOMATED COUNT: 13.7 % (ref 11.5–14.5)
EST. GFR  (NO RACE VARIABLE): >60 ML/MIN/1.73 M^2
GLUCOSE SERPL-MCNC: 109 MG/DL (ref 70–110)
GLUCOSE UR QL STRIP: NEGATIVE
HCT VFR BLD AUTO: 31.4 % (ref 37–48.5)
HGB BLD-MCNC: 10.3 G/DL (ref 12–16)
HGB UR QL STRIP: NEGATIVE
HYALINE CASTS #/AREA URNS LPF: 12 /LPF
IMM GRANULOCYTES # BLD AUTO: 0.03 K/UL (ref 0–0.04)
IMM GRANULOCYTES NFR BLD AUTO: 1.1 % (ref 0–0.5)
INR PPP: 1 (ref 0.8–1.2)
KETONES UR QL STRIP: NEGATIVE
LACTATE SERPL-SCNC: 0.6 MMOL/L (ref 0.5–2.2)
LACTATE SERPL-SCNC: 0.7 MMOL/L (ref 0.5–2.2)
LEUKOCYTE ESTERASE UR QL STRIP: ABNORMAL
LYMPHOCYTES # BLD AUTO: 0.5 K/UL (ref 1–4.8)
LYMPHOCYTES NFR BLD: 19.7 % (ref 18–48)
MCH RBC QN AUTO: 29.3 PG (ref 27–31)
MCHC RBC AUTO-ENTMCNC: 32.8 G/DL (ref 32–36)
MCV RBC AUTO: 90 FL (ref 82–98)
MICROSCOPIC COMMENT: ABNORMAL
MONOCYTES # BLD AUTO: 0.2 K/UL (ref 0.3–1)
MONOCYTES NFR BLD: 8.3 % (ref 4–15)
NEUTROPHILS # BLD AUTO: 1.8 K/UL (ref 1.8–7.7)
NEUTROPHILS NFR BLD: 66.8 % (ref 38–73)
NITRITE UR QL STRIP: NEGATIVE
NRBC BLD-RTO: 0 /100 WBC
PH UR STRIP: 8 [PH] (ref 5–8)
PLATELET # BLD AUTO: 147 K/UL (ref 150–450)
PMV BLD AUTO: 9.8 FL (ref 9.2–12.9)
POTASSIUM SERPL-SCNC: 4.1 MMOL/L (ref 3.5–5.1)
PROCALCITONIN SERPL IA-MCNC: 0.11 NG/ML
PROT SERPL-MCNC: 6.2 G/DL (ref 6–8.4)
PROT UR QL STRIP: ABNORMAL
PROTHROMBIN TIME: 10.7 SEC (ref 9–12.5)
RBC # BLD AUTO: 3.51 M/UL (ref 4–5.4)
SODIUM SERPL-SCNC: 139 MMOL/L (ref 136–145)
SP GR UR STRIP: >1.03 (ref 1–1.03)
SQUAMOUS #/AREA URNS HPF: 4 /HPF
T3 SERPL-MCNC: 62 NG/DL (ref 60–180)
T4 FREE SERPL-MCNC: 1.01 NG/DL (ref 0.71–1.51)
TSH SERPL DL<=0.005 MIU/L-ACNC: 1.42 UIU/ML (ref 0.4–4)
URN SPEC COLLECT METH UR: ABNORMAL
UROBILINOGEN UR STRIP-ACNC: NEGATIVE EU/DL
VANCOMYCIN SERPL-MCNC: 5.9 UG/ML
WBC # BLD AUTO: 2.64 K/UL (ref 3.9–12.7)
WBC #/AREA URNS HPF: 27 /HPF (ref 0–5)

## 2024-03-26 PROCEDURE — 87040 BLOOD CULTURE FOR BACTERIA: CPT | Performed by: EMERGENCY MEDICINE

## 2024-03-26 PROCEDURE — 84480 ASSAY TRIIODOTHYRONINE (T3): CPT | Performed by: INTERNAL MEDICINE

## 2024-03-26 PROCEDURE — 25500020 PHARM REV CODE 255: Performed by: EMERGENCY MEDICINE

## 2024-03-26 PROCEDURE — 97162 PT EVAL MOD COMPLEX 30 MIN: CPT

## 2024-03-26 PROCEDURE — 83605 ASSAY OF LACTIC ACID: CPT | Performed by: EMERGENCY MEDICINE

## 2024-03-26 PROCEDURE — 85025 COMPLETE CBC W/AUTO DIFF WBC: CPT | Performed by: INTERNAL MEDICINE

## 2024-03-26 PROCEDURE — 63600175 PHARM REV CODE 636 W HCPCS: Performed by: INTERNAL MEDICINE

## 2024-03-26 PROCEDURE — 97535 SELF CARE MNGMENT TRAINING: CPT

## 2024-03-26 PROCEDURE — 97530 THERAPEUTIC ACTIVITIES: CPT

## 2024-03-26 PROCEDURE — 80053 COMPREHEN METABOLIC PANEL: CPT | Performed by: INTERNAL MEDICINE

## 2024-03-26 PROCEDURE — 81000 URINALYSIS NONAUTO W/SCOPE: CPT | Performed by: NURSE PRACTITIONER

## 2024-03-26 PROCEDURE — 83605 ASSAY OF LACTIC ACID: CPT | Mod: 91 | Performed by: INTERNAL MEDICINE

## 2024-03-26 PROCEDURE — 11000001 HC ACUTE MED/SURG PRIVATE ROOM

## 2024-03-26 PROCEDURE — 85610 PROTHROMBIN TIME: CPT | Performed by: INTERNAL MEDICINE

## 2024-03-26 PROCEDURE — 25000003 PHARM REV CODE 250: Performed by: INTERNAL MEDICINE

## 2024-03-26 PROCEDURE — 99222 1ST HOSP IP/OBS MODERATE 55: CPT | Mod: ,,, | Performed by: SURGERY

## 2024-03-26 PROCEDURE — 25000003 PHARM REV CODE 250: Performed by: EMERGENCY MEDICINE

## 2024-03-26 PROCEDURE — 63600175 PHARM REV CODE 636 W HCPCS: Performed by: EMERGENCY MEDICINE

## 2024-03-26 PROCEDURE — 87086 URINE CULTURE/COLONY COUNT: CPT | Performed by: NURSE PRACTITIONER

## 2024-03-26 PROCEDURE — 84443 ASSAY THYROID STIM HORMONE: CPT | Performed by: INTERNAL MEDICINE

## 2024-03-26 PROCEDURE — 83880 ASSAY OF NATRIURETIC PEPTIDE: CPT | Performed by: INTERNAL MEDICINE

## 2024-03-26 PROCEDURE — 36415 COLL VENOUS BLD VENIPUNCTURE: CPT | Performed by: INTERNAL MEDICINE

## 2024-03-26 PROCEDURE — G0425 INPT/ED TELECONSULT30: HCPCS | Mod: 95,,, | Performed by: PSYCHIATRY & NEUROLOGY

## 2024-03-26 PROCEDURE — 84145 PROCALCITONIN (PCT): CPT | Performed by: INTERNAL MEDICINE

## 2024-03-26 PROCEDURE — 80202 ASSAY OF VANCOMYCIN: CPT | Performed by: INTERNAL MEDICINE

## 2024-03-26 PROCEDURE — 84439 ASSAY OF FREE THYROXINE: CPT | Performed by: INTERNAL MEDICINE

## 2024-03-26 PROCEDURE — 92610 EVALUATE SWALLOWING FUNCTION: CPT

## 2024-03-26 RX ORDER — DOCUSATE SODIUM 100 MG/1
100 CAPSULE, LIQUID FILLED ORAL 2 TIMES DAILY
Status: DISCONTINUED | OUTPATIENT
Start: 2024-03-26 | End: 2024-03-29 | Stop reason: HOSPADM

## 2024-03-26 RX ORDER — DOCUSATE SODIUM 100 MG/1
100 CAPSULE, LIQUID FILLED ORAL DAILY
Status: DISCONTINUED | OUTPATIENT
Start: 2024-03-26 | End: 2024-03-26

## 2024-03-26 RX ORDER — IBUPROFEN 200 MG
16 TABLET ORAL
Status: DISCONTINUED | OUTPATIENT
Start: 2024-03-26 | End: 2024-03-29 | Stop reason: HOSPADM

## 2024-03-26 RX ORDER — LITHIUM CARBONATE 300 MG/1
300 CAPSULE ORAL 2 TIMES DAILY
COMMUNITY
Start: 2024-03-25 | End: 2024-05-14 | Stop reason: DRUGHIGH

## 2024-03-26 RX ORDER — ACETAMINOPHEN 325 MG/1
650 TABLET ORAL EVERY 6 HOURS PRN
Status: DISCONTINUED | OUTPATIENT
Start: 2024-03-26 | End: 2024-03-29 | Stop reason: HOSPADM

## 2024-03-26 RX ORDER — QUETIAPINE FUMARATE 200 MG/1
200 TABLET, FILM COATED ORAL DAILY
COMMUNITY
End: 2024-04-03

## 2024-03-26 RX ORDER — PSEUDOEPHEDRINE/ACETAMINOPHEN 30MG-500MG
100 TABLET ORAL
Status: COMPLETED | OUTPATIENT
Start: 2024-03-26 | End: 2024-03-26

## 2024-03-26 RX ORDER — SODIUM CHLORIDE 9 MG/ML
INJECTION, SOLUTION INTRAVENOUS
Status: DISCONTINUED | OUTPATIENT
Start: 2024-03-26 | End: 2024-03-29 | Stop reason: HOSPADM

## 2024-03-26 RX ORDER — ONDANSETRON HYDROCHLORIDE 2 MG/ML
4 INJECTION, SOLUTION INTRAVENOUS EVERY 6 HOURS PRN
Status: DISCONTINUED | OUTPATIENT
Start: 2024-03-26 | End: 2024-03-29 | Stop reason: HOSPADM

## 2024-03-26 RX ORDER — SYRING-NEEDL,DISP,INSUL,0.3 ML 29 G X1/2"
296 SYRINGE, EMPTY DISPOSABLE MISCELLANEOUS
Status: COMPLETED | OUTPATIENT
Start: 2024-03-26 | End: 2024-03-26

## 2024-03-26 RX ORDER — HYDRALAZINE HYDROCHLORIDE 20 MG/ML
10 INJECTION INTRAMUSCULAR; INTRAVENOUS EVERY 6 HOURS PRN
Status: DISCONTINUED | OUTPATIENT
Start: 2024-03-26 | End: 2024-03-29 | Stop reason: HOSPADM

## 2024-03-26 RX ORDER — SODIUM CHLORIDE 0.9 % (FLUSH) 0.9 %
10 SYRINGE (ML) INJECTION EVERY 12 HOURS PRN
Status: DISCONTINUED | OUTPATIENT
Start: 2024-03-26 | End: 2024-03-29 | Stop reason: HOSPADM

## 2024-03-26 RX ORDER — AMLODIPINE BESYLATE 10 MG/1
10 TABLET ORAL DAILY
Status: DISCONTINUED | OUTPATIENT
Start: 2024-03-26 | End: 2024-03-29 | Stop reason: HOSPADM

## 2024-03-26 RX ORDER — QUETIAPINE FUMARATE 200 MG/1
400 TABLET, FILM COATED ORAL NIGHTLY
COMMUNITY
End: 2024-04-03

## 2024-03-26 RX ORDER — IBUPROFEN 200 MG
24 TABLET ORAL
Status: DISCONTINUED | OUTPATIENT
Start: 2024-03-26 | End: 2024-03-29 | Stop reason: HOSPADM

## 2024-03-26 RX ORDER — NALOXONE HCL 0.4 MG/ML
0.02 VIAL (ML) INJECTION
Status: DISCONTINUED | OUTPATIENT
Start: 2024-03-26 | End: 2024-03-29 | Stop reason: HOSPADM

## 2024-03-26 RX ORDER — GLUCAGON 1 MG
1 KIT INJECTION
Status: DISCONTINUED | OUTPATIENT
Start: 2024-03-26 | End: 2024-03-29 | Stop reason: HOSPADM

## 2024-03-26 RX ORDER — DIVALPROEX SODIUM 500 MG/1
500 TABLET, DELAYED RELEASE ORAL 2 TIMES DAILY
Status: DISCONTINUED | OUTPATIENT
Start: 2024-03-26 | End: 2024-03-26

## 2024-03-26 RX ORDER — POLYETHYLENE GLYCOL 3350 17 G/17G
17 POWDER, FOR SOLUTION ORAL 2 TIMES DAILY
Status: DISCONTINUED | OUTPATIENT
Start: 2024-03-26 | End: 2024-03-27

## 2024-03-26 RX ORDER — GLYCERIN 1 G/1
1 SUPPOSITORY RECTAL ONCE
Status: COMPLETED | OUTPATIENT
Start: 2024-03-26 | End: 2024-03-26

## 2024-03-26 RX ORDER — VALBENAZINE 80 MG/1
1 CAPSULE ORAL DAILY
COMMUNITY
Start: 2024-03-25 | End: 2024-04-03

## 2024-03-26 RX ORDER — LACTULOSE 10 G/15ML
20 SOLUTION ORAL 2 TIMES DAILY PRN
Status: DISCONTINUED | OUTPATIENT
Start: 2024-03-26 | End: 2024-03-28

## 2024-03-26 RX ORDER — BISACODYL 10 MG/1
10 SUPPOSITORY RECTAL DAILY PRN
Status: DISCONTINUED | OUTPATIENT
Start: 2024-03-26 | End: 2024-03-29 | Stop reason: HOSPADM

## 2024-03-26 RX ADMIN — DOCUSATE SODIUM 100 MG: 100 CAPSULE, LIQUID FILLED ORAL at 10:03

## 2024-03-26 RX ADMIN — BISACODYL 10 MG: 10 SUPPOSITORY RECTAL at 03:03

## 2024-03-26 RX ADMIN — GLYCERIN 1 SUPPOSITORY: 2 SUPPOSITORY RECTAL at 12:03

## 2024-03-26 RX ADMIN — IOHEXOL 100 ML: 350 INJECTION, SOLUTION INTRAVENOUS at 12:03

## 2024-03-26 RX ADMIN — SODIUM CHLORIDE: 9 INJECTION, SOLUTION INTRAVENOUS at 10:03

## 2024-03-26 RX ADMIN — DIVALPROEX SODIUM 500 MG: 500 TABLET, DELAYED RELEASE ORAL at 10:03

## 2024-03-26 RX ADMIN — POLYETHYLENE GLYCOL 3350 17 G: 17 POWDER, FOR SOLUTION ORAL at 10:03

## 2024-03-26 RX ADMIN — PIPERACILLIN SODIUM AND TAZOBACTAM SODIUM 4.5 G: 4; .5 INJECTION, POWDER, FOR SOLUTION INTRAVENOUS at 02:03

## 2024-03-26 RX ADMIN — VANCOMYCIN HYDROCHLORIDE 1000 MG: 1 INJECTION, POWDER, LYOPHILIZED, FOR SOLUTION INTRAVENOUS at 03:03

## 2024-03-26 RX ADMIN — LACTULOSE 20 G: 20 SOLUTION ORAL at 03:03

## 2024-03-26 RX ADMIN — AMLODIPINE BESYLATE 10 MG: 10 TABLET ORAL at 10:03

## 2024-03-26 RX ADMIN — DOCUSATE SODIUM 100 MG: 100 CAPSULE, LIQUID FILLED ORAL at 09:03

## 2024-03-26 RX ADMIN — HYDRALAZINE HYDROCHLORIDE 10 MG: 20 INJECTION, SOLUTION INTRAMUSCULAR; INTRAVENOUS at 05:03

## 2024-03-26 RX ADMIN — BE HEALTH MAGNESIUM CITRATE ORAL SOLUTION - LEMON 296 ML: 1.75 LIQUID ORAL at 02:03

## 2024-03-26 RX ADMIN — LACTULOSE 20 G: 20 SOLUTION ORAL at 07:03

## 2024-03-26 RX ADMIN — Medication 100 ML: at 02:03

## 2024-03-26 RX ADMIN — CEFTRIAXONE 2 G: 2 INJECTION, POWDER, FOR SOLUTION INTRAMUSCULAR; INTRAVENOUS at 10:03

## 2024-03-26 NOTE — ASSESSMENT & PLAN NOTE
Chronic, controlled. Latest blood pressure and vitals reviewed-     Temp:  [96.4 °F (35.8 °C)]   Pulse:  [73]   Resp:  [19]   BP: (139)/(76)   SpO2:  [98 %] .   Home meds for hypertension were reviewed and noted below.   Hypertension Medications               amLODIPine (NORVASC) 10 MG tablet Take 1 tablet (10 mg total) by mouth once daily.    furosemide (LASIX) 20 MG tablet TAKE 1 TABLET BY MOUTH DAILY AS NEEDED FOR SWELLING    triamterene-hydrochlorothiazide 37.5-25 mg (MAXZIDE-25) 37.5-25 mg per tablet Take 1 tablet by mouth every morning.            While in the hospital, will manage blood pressure as follows; Adjust home antihypertensive regimen as follows- continue Norvasc, hold Maxzide    Will utilize p.r.n. blood pressure medication only if patient's blood pressure greater than 160/100 and she develops symptoms such as worsening chest pain or shortness of breath.

## 2024-03-26 NOTE — PLAN OF CARE
[facility] - Med Surg  Discharge Assessment    Primary Care Provider: Brooke Goldberg MD     Discharge Assessment (most recent)       BRIEF DISCHARGE ASSESSMENT - 03/26/24 1053          Discharge Planning    Assessment Type Discharge Planning Brief Assessment     Resource/Environmental Concerns none     Support Systems Spouse/significant other;Home care staff;Family members     Equipment Currently Used at Home other (see comments)   transportation chair    Current Living Arrangements residential facility     Care Facility Name [facility] [id number] [place] [address]     Patient/Family Anticipates Transition to home     Discharge Plan A Home                   Pt lives at [facility] at [address] [address]. Sean Dasilva is the caregiver. Pt's sister stated if any information needs to be discussed to call her.

## 2024-03-26 NOTE — ASSESSMENT & PLAN NOTE
"-CT abdomen/pelvis with "redemonstration of 1.8 cm enhancing lesion in the right lobe of the liver, incompletely evaluated but similar in appearance to the 11/12/2023 exam; multiple complex septated cysts throughout the pancreas, pancreatic ductal dilatation and calcifications in the pancreatic head all of which appear similar to prior study, as noted on prior study, findings may relate to multifocal IPMN, reportedly there has been prior endoscopic aspiration, recommend correlation with pathology results; pancreatic calcifications may be sequela of chronic pancreatitis."  -EUS guided FNA/biopsy of pancreatic cystic lesion 12/20/2023 was negative for dysplasia and/or malignancy  -FNA of pancreatic head cyst 05/16/2022 was negative for malignancy  -LFTs with alk phos 158 and otherwise unremarkable, lipase 83  -outpatient follow-up, no acute process at this time    "

## 2024-03-26 NOTE — ASSESSMENT & PLAN NOTE
-abnormal urinalysis on this admission  -check urine culture  -empiric antibiotics for right gluteal abscess will provide adequate coverage for UTI

## 2024-03-26 NOTE — H&P
[facility] - Emergency Dept.  [facility] Medicine  History & Physical    Patient Name: Juan Pablo Bolden  MRN: [id number]  Patient Class: OP- Observation  Admission Date: 3/25/2024  Attending Physician:  Augusta Reid MD  Primary Care Provider: Brooke Goldberg MD         Patient information was obtained from relative(s), ER records, and ER physician .     Subjective:     Principal Problem:Gluteal abscess    Chief Complaint:   Chief Complaint   Patient presents with    Abdominal Pain     MR pt to ED for abd pain/distention noticed by sister when pt Dc'd today from [facility]. Sister reports decreased urination. LBM x4days ago. Pt took mag citrate & supp today w/ very small Bms. Recently treated for UTI & manic behavior from med reaction        HPI: 63-year-old  woman with history of central hypothyroidism, lipodystrophy, osteopenia, hyperprolactinemia, galactorrhea, growth retardation, severe developmental delay, tardive dyskinesia, seizure disorder, hypertension, hyperlipidemia, pancreatic cysts, liver lesion, iron deficiency anemia, schizoaffective disorder, bipolar disorder, constipation, H pylori infection, coronary artery calcifications, and abnormal brain MRI who was brought in by family for abdominal pain and distention noticed by her sister.  Constipation over the last 4 days per family members.  She was given Mag citrate and a suppository today with very small bowel movements.  Patient had recently had a 2 week hospitalization at [facility] inpatient psychiatric unit and was just discharged 03/25/2024.  Family reports that patient was recently treated for UTI.  Urine culture 03/10/2024 with E coli.  They also report that patient had been having some difficulty with ambulation felt to be related to her psychiatric medications which have been adjusted during her recent inpatient psychiatric stay.  At baseline, family reports that she is able to ambulate on her own and is verbal although she does  not always make sense.  She has her own apartment with [address] [place] [address].  At the time of my exam, patient is nonverbal.    Past Medical History:   Diagnosis Date    Bipolar 1 disorder     Chronic constipation     Galactorrhea     Growth retardation     Profound mental retardation    High cholesterol     Hyperlipidemia     Hypertension     Leukopenia     Neuroleptic-induced tardive dyskinesia     Schizoaffective disorder     Stereotypic movement disorder        Past Surgical History:   Procedure Laterality Date    COLONOSCOPY N/A 5/17/2021    Procedure: COLONOSCOPY;  Surgeon: Jeffrey Ayala MD;  Location: [facility];  Service: Gastroenterology;  Laterality: N/A;    ENDOSCOPIC ULTRASOUND OF UPPER GASTROINTESTINAL TRACT N/A 5/16/2022    Procedure: ULTRASOUND, UPPER GI TRACT, ENDOSCOPIC;  Surgeon: Cherise Marshall MD;  Location: [facility];  Service: Endoscopy;  Laterality: N/A;  Upper and linear, 22 shark core, slides and formalin.    ENDOSCOPIC ULTRASOUND OF UPPER GASTROINTESTINAL TRACT N/A 12/20/2023    Procedure: ULTRASOUND, UPPER GI TRACT, ENDOSCOPIC;  Surgeon: Reed Ojeda MD;  Location: [facility] ([address] [place] [address]);  Service: Endoscopy;  Laterality: N/A;  Need EUS (linear) for pancreas cyst surveillance. 45 minutes. Main or [facility]. -lewis  instr yyjbca-mw-wl seizures for many years  11/15/23-LVM for precall-DS  11/16-precall complete-Kpvt  11/21/23: instructions sent via portal for reschedule-GD  1    ESOPHAGOGASTRODUODENOSCOPY N/A 11/5/2020    Procedure: EGD (ESOPHAGOGASTRODUODENOSCOPY);  Surgeon: John Batista MD;  Location: [facility];  Service: Endoscopy;  Laterality: N/A;       Review of patient's allergies indicates:  No Known Allergies    Current Facility-Administered Medications on File Prior to Encounter   Medication    0.9%  NaCl infusion    sodium chloride 0.9% flush 10 mL     Current Outpatient Medications on File Prior to Encounter   Medication Sig    acetaminophen (TYLENOL) 650 MG TbSR Take 650  mg by mouth every 8 (eight) hours as needed for Pain.    amLODIPine (NORVASC) 10 MG tablet Take 1 tablet (10 mg total) by mouth once daily.    cetirizine (ZYRTEC) 10 MG tablet Take 10 mg by mouth daily as needed for Allergies.    clonazePAM (KLONOPIN) 0.5 MG tablet Take 1 nightly at bedtime and one daily as needed for agitation    diazePAM (VALIUM) 5 MG tablet Take 2 tablets (10 mg total) by mouth every 6 (six) hours as needed for Anxiety.    diclofenac sodium (VOLTAREN) 1 % Gel APPLY TWO GRAMS TO THE AFFECTED AREA FOUR TIMES DAILY AS NEEDED FOR PAIN    diphenhydrAMINE (BENADRYL) 25 mg capsule Take 25 mg by mouth nightly as needed for Allergies.    divalproex (DEPAKOTE) 500 MG TbEC TAKE 1 TABLET BY MOUTH TWICE DAILY    docusate sodium (COLACE) 100 MG capsule Take 1 capsule (100 mg total) by mouth once daily.    fluticasone propionate (FLONASE) 50 mcg/actuation nasal spray SHAKE LIQUID AND USE 2 SPRAYS IN EACH NOSTRIL EVERY DAY    folic acid (FOLVITE) 1 MG tablet Take 1 tablet (1 mg total) by mouth once daily.    furosemide (LASIX) 20 MG tablet TAKE 1 TABLET BY MOUTH DAILY AS NEEDED FOR SWELLING    glycerin adult suppository Place 1 suppository rectally as needed for Constipation.    melatonin 10 mg Tab Take 1 tablet (10 mg total) by mouth every evening.    mirtazapine (REMERON) 45 MG tablet TAKE 1 TABLET BY MOUTH EVERY EVENING    OLANZapine (ZYPREXA) 10 MG tablet Take 1 tablet (10 mg total) by mouth every evening.    polyethylene glycol (GLYCOLAX) 17 gram/dose powder One cup t.i.d. for 3 days, 2 cups b.i.d. for 2 days, and then 1 cup daily.    QUEtiapine (SEROQUEL) 400 MG tablet TAKE 1/2 TABLET BY MOUTH EVERY MORNING and TAKE 1 AND 1/2 TABLETS BY MOUTH AT BEDTIME    simethicone (MYLICON) 125 mg Cap capsule 1 capsule after meals and at bedtime as needed    simvastatin (ZOCOR) 40 MG tablet Take 1 tablet (40 mg total) by mouth every evening.    traZODone (DESYREL) 150 MG tablet TAKE 1 TABLET BY MOUTH AT BEDTIME AS  NEEDED FOR INSOMNIA    triamterene-hydrochlorothiazide 37.5-25 mg (MAXZIDE-25) 37.5-25 mg per tablet Take 1 tablet by mouth every morning.     Family History       Problem Relation (Age of Onset)    Heart disease Maternal Grandmother    Hypertension Father    Lung cancer Mother    Prostate cancer Father    Stroke Maternal Uncle          Tobacco Use    Smoking status: Never     Passive exposure: Never    Smokeless tobacco: Never   Substance and Sexual Activity    Alcohol use: No    Drug use: No    Sexual activity: Never     Review of Systems   Unable to perform ROS: Patient nonverbal (severe developmental delay at baseline)     Objective:     Vital Signs (Most Recent):  Temp: 96.4 °F (35.8 °C) (03/25/24 2003)  Pulse: 73 (03/25/24 2003)  Resp: 19 (03/25/24 2003)  BP: 139/76 (03/25/24 2003)  SpO2: 98 % (03/25/24 2003) Vital Signs (24h Range):  Temp:  [96.4 °F (35.8 °C)] 96.4 °F (35.8 °C)  Pulse:  [73] 73  Resp:  [19] 19  SpO2:  [98 %] 98 %  BP: (139)/(76) 139/76     Weight: 45.3 kg (99 lb 13.9 oz)  Body mass index is 20.17 kg/m².     Physical Exam  Vitals reviewed.   Constitutional:       General: She is not in acute distress.     Appearance: She is not toxic-appearing or diaphoretic.   HENT:      Nose: Nose normal.   Eyes:      Conjunctiva/sclera: Conjunctivae normal.   Cardiovascular:      Rate and Rhythm: Normal rate.   Pulmonary:      Effort: Pulmonary effort is normal. No respiratory distress.   Abdominal:      General: There is distension.      Tenderness: There is abdominal tenderness. There is no rebound.   Musculoskeletal:         General: Swelling present.      Right lower leg: Edema present.      Left lower leg: Edema present.   Skin:     General: Skin is warm and dry.   Neurological:      Comments: Unable to assess   Psychiatric:      Comments: Unable to assess                Significant Labs: All pertinent labs within the past 24 hours have been reviewed.  Recent Lab Results         03/26/24  0206    03/26/24  0024   03/25/24 2043        Albumin     2.9       ALP     158       ALT     27       Anion Gap     8       Appearance, UA   Clear         AST     14       Baso #     0.04       Basophil %     0.9       Bilirubin (UA)   Negative         BILIRUBIN TOTAL     0.3  Comment: For infants and newborns, interpretation of results should be based  on gestational age, weight and in agreement with clinical  observations.    Premature Infant recommended reference ranges:  Up to 24 hours.............<8.0 mg/dL  Up to 48 hours............<12.0 mg/dL  3-5 days..................<15.0 mg/dL  6-29 days.................<15.0 mg/dL         BUN     17       Calcium     10.3       Chloride     103       CO2     28       Color, UA   Yellow         Creatinine     0.7       Differential Method     Automated       eGFR     >60       Eos #     0.1       Eos %     1.8       Glucose     129       Glucose, UA   Negative         Gran # (ANC)     3.4       Gran %     74.5       Hematocrit     32.3       Hemoglobin     10.6       Hyaline Casts, UA   12         Immature Grans (Abs)     0.04  Comment: Mild elevation in immature granulocytes is non specific and   can be seen in a variety of conditions including stress response,   acute inflammation, trauma and pregnancy. Correlation with other   laboratory and clinical findings is essential.         Immature Granulocytes     0.9       Ketones, UA   Negative         Lactic Acid Level 0.6  Comment: Falsely low lactic acid results can be found in samples   containing >=13.0 mg/dL total bilirubin and/or >=3.5 mg/dL   direct bilirubin.             Leukocyte Esterase, UA   1+         Lipase     83       Lymph #     0.7       Lymph %     15.7       MCH     29.4       MCHC     32.8       MCV     90       Microscopic Comment   SEE COMMENT  Comment: Other formed elements not mentioned in the report are not   present in the microscopic examination.            Mono #     0.3       Mono %     6.2        MPV     10.3       NITRITE UA   Negative         nRBC     0       Blood, UA   Negative         pH, UA   8.0         Platelet Count     175       Potassium     4.2       PROTEIN TOTAL     6.5       Protein, UA   Trace  Comment: Recommend a 24 hour urine protein or a urine   protein/creatinine ratio if globulin induced proteinuria is  clinically suspected.           RBC     3.61       RDW     13.6       Sodium     139       Specific Gravity, UA   >1.030         Specimen UA   Urine, Catheterized         Squam Epithel, UA   4         UROBILINOGEN UA   Negative         WBC, UA   27         WBC     4.52               Significant Imaging: I have reviewed all pertinent imaging results/findings within the past 24 hours.  CT Abdomen Pelvis With IV Contrast NO Oral Contrast   Final Result   Abnormal      No bowel obstruction.  Large amount of stool throughout the colon.  Correlate clinically for constipation.      Rim enhancing fluid collection in the right gluteal muscles measuring 4.3 x 2.8 x 4.5 cm concerning for intramuscular abscess.      Redemonstration of 1.8 cm enhancing lesion in the right lobe of the liver, incompletely evaluated but similar in appearance to the 11/12/2023 exam.      Multiple complex septated cysts throughout the pancreas, pancreatic ductal dilatation and calcifications in the pancreatic head all of which appear similar to prior study.  As noted on prior study, findings may relate to multifocal IPMN.  Reportedly there has been prior endoscopic aspiration.  Recommend correlation with pathology results.  Pancreatic calcifications may be sequela of chronic pancreatitis.      Grade 1 spondylolisthesis L4 on L5 with degenerative changes in the lumbar facet joints, similar compared to prior.      Small amount of free fluid in the pelvic cul-de-sac.      Additional findings as above.      This report was flagged in Epic as abnormal.      The critical information above was relayed directly by Librado Barger,  "MD via epic secure chat to Mae Rico on 3/26/2024 at 01:09.         Electronically signed by: Librado Barger MD   Date:    03/26/2024   Time:    01:11      X-Ray Abdomen Flat And Erect   Final Result      Nonobstructed bowel-gas pattern.      Moderate to large amount of stool throughout the colon.         Electronically signed by: Librado Barger MD   Date:    03/25/2024   Time:    23:59      US Lower Extremity Veins Bilateral    (Results Pending)   IR Abscess Aspiration    (Results Pending)      Assessment/Plan:     * Gluteal abscess  -gait disturbance may be related to pain  -CT scan of abdomen and pelvis with "rim enhancing fluid collection in the right gluteal muscles measuring 4.3 x 2.8 x 4.5 cm concerning for intramuscular abscess."  -white blood cell count 4.52, afebrile, blood cultures pending  -check lactic acid level, procalcitonin level, and INR  -empiric IV Zosyn and IV vancomycin  -general surgery is aware of this patient has recommended IR consult  -formal surgery consult pending  -IR consult for abscess drainage has been ordered  -fall precautions, PT evaluation  -NPO except for small sips with medications and ice chips  -no IV fluids in the setting of significant bilateral lower extremity edema until further evaluation has been obtained  -no DVT prophylaxis with Lovenox until patient has been evaluated by IR and surgery      Other constipation  -CT scan of abdomen pelvis with large amount of stool throughout the colon but without any evidence of obstruction  -continue bowel regimen      Bilateral leg edema  -may be complicated by hypoalbuminemia and history of central hypothyroidism  -albumin level 2.9  -echo 01/11/2024 with EF 55-60%, normal systolic and diastolic function  -check BNP and thyroid studies  -check bilateral lower extremity venous ultrasound  -no SCDs until DVT ruled out  -diuretics have been held temporarily with NPO status      Bipolar disorder  -continue Depakote  -remaining " "psychiatric medications have been held until we can confirm adjustments made during recent inpatient psychiatric hospitalization      Acute cystitis without hematuria  -abnormal urinalysis on this admission  -check urine culture  -empiric antibiotics for right gluteal abscess will provide adequate coverage for UTI      Developmental delay  -supportive care  -aspiration and fall precautions  -PT and ST evaluations      Pancreas cyst  -CT abdomen/pelvis with "redemonstration of 1.8 cm enhancing lesion in the right lobe of the liver, incompletely evaluated but similar in appearance to the 11/12/2023 exam; multiple complex septated cysts throughout the pancreas, pancreatic ductal dilatation and calcifications in the pancreatic head all of which appear similar to prior study, as noted on prior study, findings may relate to multifocal IPMN, reportedly there has been prior endoscopic aspiration, recommend correlation with pathology results; pancreatic calcifications may be sequela of chronic pancreatitis."  -EUS guided FNA/biopsy of pancreatic cystic lesion 12/20/2023 was negative for dysplasia and/or malignancy  -FNA of pancreatic head cyst 05/16/2022 was negative for malignancy  -LFTs with alk phos 158 and otherwise unremarkable, lipase 83  -outpatient follow-up, no acute process at this time      Essential hypertension  Chronic, controlled. Latest blood pressure and vitals reviewed-     Temp:  [96.4 °F (35.8 °C)]   Pulse:  [73]   Resp:  [19]   BP: (139)/(76)   SpO2:  [98 %] .   Home meds for hypertension were reviewed and noted below.   Hypertension Medications               amLODIPine (NORVASC) 10 MG tablet Take 1 tablet (10 mg total) by mouth once daily.    furosemide (LASIX) 20 MG tablet TAKE 1 TABLET BY MOUTH DAILY AS NEEDED FOR SWELLING    triamterene-hydrochlorothiazide 37.5-25 mg (MAXZIDE-25) 37.5-25 mg per tablet Take 1 tablet by mouth every morning.            While in the hospital, will manage blood pressure " as follows; Adjust home antihypertensive regimen as follows- continue Norvasc, hold Maxzide    Will utilize p.r.n. blood pressure medication only if patient's blood pressure greater than 160/100 and she develops symptoms such as worsening chest pain or shortness of breath.    Seizure disorder  -seizure precautions  -continue Depakote      Central hypothyroidism  -currently on no treatment  -check TSH, free T4, and T3        VTE Risk Mitigation (From admission, onward)           Ordered     Reason for No Pharmacological VTE Prophylaxis  Once        Question:  Reasons:  Answer:  Physician Provided (leave comment)  Comment:  IR procedure planned    03/26/24 0238     Reason for no Mechanical VTE Prophylaxis  Once        Question:  Reasons:  Answer:  Physician Provided (leave comment)  Comment:  r/o DVT, review BLE US prior to SCD placement    03/26/24 0238                       On 03/26/2024, patient should be placed in hospital observation services under my care.             Augusta Reid MD  Department of Hospital Medicine  [facility] - Emergency Dept.

## 2024-03-26 NOTE — PT/OT/SLP EVAL
Speech Language Pathology Evaluation  Bedside Swallow    Patient Name:  Juan Pablo Bolden   MRN:  [id number]  Admitting Diagnosis: Gluteal abscess    Recommendations:                 General Recommendations:   ongoing swallowing assessment/diet follow up  Diet recommendations:  Soft & Bite Sized Diet - IDDSI Level 6, Thin liquids - IDDSI Level 0   Aspiration Precautions: 1 bite/sip at a time, Assistance with meals, Check for pocketing/oral residue, Eliminate distractions, Feed only when awake/alert, Frequent oral care, HOB to 90 degrees, Meds crushed in puree, Monitor for s/s of aspiration, Remain upright 30 minutes post meal, Small bites/sips, and Standard aspiration precautions   General Precautions: Standard, aspiration  Communication strategies:  provide increased time to answer; hx developmental delay    Assessment:     Juan Pablo Bolden is a 63 y.o. female admitted to [facility] acute with dx gluteal abscess and CT imaging significant for constipation.   She presents with adequate TOO and ability to occasionally communicate needs with baseline cognitive-linguistic impairment s/t developmental delay, psych and CVA hx.  No overt s/s of aspiration present during bedside CSE; however, increased risk s/t cognitive deficits/impulsivity and reduced OM efficiency.  Pt is currently consuming full liquid diet s/t current medical dx; however, she is recommended for IDDSI 6-soft/bite sized solids and IDDSI 0-thin liquids once advanced by MD, following swallowing precautions, oral care/hygiene and meal assist.  ST to f/u diet tolerance.    History:     Past Medical History:   Diagnosis Date    Bipolar 1 disorder     Chronic constipation     Galactorrhea     Growth retardation     Profound mental retardation    High cholesterol     Hyperlipidemia     Hypertension     Leukopenia     Neuroleptic-induced tardive dyskinesia     Schizoaffective disorder     Seizures     Stereotypic movement disorder        Past Surgical History:   Procedure  Laterality Date    COLONOSCOPY N/A 5/17/2021    Procedure: COLONOSCOPY;  Surgeon: Jeffrey Ayala MD;  Location: [facility] ENDO;  Service: Gastroenterology;  Laterality: N/A;    ENDOSCOPIC ULTRASOUND OF UPPER GASTROINTESTINAL TRACT N/A 5/16/2022    Procedure: ULTRASOUND, UPPER GI TRACT, ENDOSCOPIC;  Surgeon: Cherise Marshall MD;  Location: [facility] ENDO;  Service: Endoscopy;  Laterality: N/A;  Upper and linear, 22 shark core, slides and formalin.    ENDOSCOPIC ULTRASOUND OF UPPER GASTROINTESTINAL TRACT N/A 12/20/2023    Procedure: ULTRASOUND, UPPER GI TRACT, ENDOSCOPIC;  Surgeon: Reed Ojeda MD;  Location: [facility] ENDO (2ND FLR);  Service: Endoscopy;  Laterality: N/A;  Need EUS (linear) for pancreas cyst surveillance. 45 minutes. Main or [facility]. -lewis  instr kobxmv-jy-tl seizures for many years  11/15/23-LVM for precall-DS  11/16-precall complete-Kpvt  11/21/23: instructions sent via portal for reschedule-GD  1    ESOPHAGOGASTRODUODENOSCOPY N/A 11/5/2020    Procedure: EGD (ESOPHAGOGASTRODUODENOSCOPY);  Surgeon: John Batista MD;  Location: [facility];  Service: Endoscopy;  Laterality: N/A;       Social History: At baseline, family reports that she is able to ambulate on her own and is verbal although cognitive-linguistic impairment impacts communicative effectiveness. She has her own apartment with [address] [place] [address].  Hx severe developmental delay with psychiatric hx--recently d/c from [facility].     Prior Intubation HX:  N/A this admission    Modified Barium Swallow: N/A; however, see Esophagram 12/14/2018 below.    CT ABDOMEN PELVIS WITH IV CONTRAST     CLINICAL HISTORY:  Bowel obstruction suspected;     TECHNIQUE:  Low dose axial images, sagittal and coronal reformations were obtained from the lung bases to the pubic symphysis following the IV administration of 100 mL of Omnipaque 350 .  Oral contrast was not administered.     COMPARISON:  None.     FINDINGS:  Abdomen:     - Lower thorax:Unremarkable.      - Lung bases: Linear scarring versus platelike atelectatic change right base, similar compared to prior.     - Liver: Normal in size.  1.8 cm enhancing lesion in the right lobe of the liver, incompletely evaluated but similar in appearance to the 11/12/2023 exam.     - Gallbladder: No calcified gallstones.     - Bile Ducts: No evidence of intra or extra hepatic biliary ductal dilation.     - Spleen: Negative.     - Kidneys: No mass or hydronephrosis.  Right upper pole renal cysts, similar to prior.     - Adrenals: Unremarkable.     - Pancreas: Multiple complex septated cysts throughout the pancreas, pancreatic ductal dilatation and calcifications in the pancreatic head all of which appear similar to prior study.     - Retroperitoneum:  No significant adenopathy.     - Vascular: No abdominal aortic aneurysm.     - Abdominal wall:  Rim enhancing fluid collection in the right gluteal muscles measuring 4.3 x 2.8 x 4.5 cm.     Pelvis:     No pelvic mass or lymphadenopathy.  Uterus appears similar to prior.  Small amount of free fluid in the cul-de-sac.     Bowel/Mesentery:     No evidence of bowel obstruction or inflammation.  Large amount of stool throughout the colon.     Bones:  Grade 1 spondylolisthesis L4 on L5 with degenerative changes in the lumbar facet joints, similar compared to prior.     Impression:     No bowel obstruction.  Large amount of stool throughout the colon.  Correlate clinically for constipation.     Rim enhancing fluid collection in the right gluteal muscles measuring 4.3 x 2.8 x 4.5 cm concerning for intramuscular abscess.     Redemonstration of 1.8 cm enhancing lesion in the right lobe of the liver, incompletely evaluated but similar in appearance to the 11/12/2023 exam.     Multiple complex septated cysts throughout the pancreas, pancreatic ductal dilatation and calcifications in the pancreatic head all of which appear similar to prior study.  As noted on prior study, findings may relate to  multifocal IPMN.  Reportedly there has been prior endoscopic aspiration.  Recommend correlation with pathology results.  Pancreatic calcifications may be sequela of chronic pancreatitis.     Grade 1 spondylolisthesis L4 on L5 with degenerative changes in the lumbar facet joints, similar compared to prior.     Small amount of free fluid in the pelvic cul-de-sac.     Additional findings as above.     This report was flagged in Epic as abnormal.     The critical information above was relayed directly by Librado Barger MD via [facility] secure chat to Mae Rico on 3/26/2024 at 01:09.        Electronically signed by: Librado Barger MD  Date:                                            03/26/2024  Time:                                           01:11    XR ABDOMEN FLAT AND ERECT     CLINICAL HISTORY:  Constipation, unspecified     TECHNIQUE:  Flat and erect AP views of the abdomen were preformed.     COMPARISON:  None     FINDINGS:  Moderate to large amount of stool throughout the colon.  Bowel gas pattern is non-obstructive.  No evidence for pneumoperitoneum.  Regional osseous structures are similar to prior.  No calcifications seen overlying the kidneys.     Impression:     Nonobstructed bowel-gas pattern.     Moderate to large amount of stool throughout the colon.        Electronically signed by: Librado Barger MD  Date:                                            03/25/2024  Time:                                           23:59    12/14/2018 FL ESOPHAGRAM PHARYNX AND/OR CERVICAL  Order: [id number]  Narrative    REASON FOR EXAM: [G24.01]-Drug induced subacute dyskinesia / [T43.505A]-Adverse effect of unspecified antipsychotics and neuroleptics, initial encounter / [R47.02]-Dysphasia      TECHNICAL FACTORS: Fluoroscopic imaging was obtained of the esophagus.    FLUOROSCOPY TIME: 0.9 minutes, 26 images    COMPARISON: None    FINDINGS: The study was limited due to the patient refusing to drink more barium. Swallowing  mechanism and esophageal motility are normal. The esophagus appeared mildly narrowed at the gastroesophageal junction. There is no evidence of a hiatal hernia.  There was no gastroesophageal reflux seen.    IMPRESSION:  Possible stricture at the gastroesophageal junction.    Approved by ORIANA Muir on 12/14/2018 11:18 AM    Electronically signed by Mendoza Penn MD on 12/14/2018 1:05 PM  Exam End: 12/14/18 10:44    Specimen Collected: 12/14/18 11:17 Last Resulted: 12/14/18 13:05   Received From: [facility]  Result Received: 11/21/23 09:54      6/17/2022 MRI Brain W WO Contrast  Order: [id number]  Impression    1.  Chronic ischemic changes including several chronic lacunar infarcts both supra and infratentorially in the basal ganglia, thalamus, and right cerebellar hemisphere. There are also scattered chronic microhemorrhages both supra and infratentorially which are most likely secondary to hypertension.  2.  Atherosclerotic disease of the posterior circulation with nondominant right vertebral artery occluding at the craniocervical junction and multifocal stenoses of the basilar artery. There are fetal origins of the posterior cerebral arteries.  Narrative    EXAM: MRI BRAIN W WO CONTRAST, MRI ANGIOGRAM HEAD WO CONTRAST, 6/17/2022 7:22 AM    COMPARISON: 2/7/20 head ct    HISTORY: G24.01 G24.01:Drug induced subacute dyskinesia (accession [id number]), G24.01:Drug induced subacute dyskinesia (accession [id number]) R29.818:Other symptoms and signs involving the nervous system (accession [id number]), R29.818:Other symptoms and signs involving the nervous system (accession [id number])    TECHNIQUE: Multiplanar, multisequence MR imaging of the brain was obtained before and after IV contrast administration. 3-D time-of-flight MR angiographic images of the [place] were also obtained and 3D rotating MIP projections were made.    FINDINGS:  There is no restricted diffusion. There is no  "mass or mass effect. Chronic lacunar infarcts in the right cerebellar hemisphere, basal ganglia bilaterally, left thalamus, and left frontal periventricular white matter.  Mild chronic ischemic white matter changes. Numerous chronic microhemorrhages cerebral and cerebellar hemispheres but most pronounced in the basal ganglia and thalami.    There is no abnormal contrast enhancement. The ventricles are normal in size and configuration. There are mild chronic ischemic white matter changes. There are no abnormal extraaxial collections. The major intracranial arterial flow voids are patent. There is no parenchymal hemorrhage. Corpus callosum is normal. Pituitary and optic chiasm are grossly normal. Cerebellar tonsils are appropriately positioned.    MRA HEAD:  The right vertebral artery is the nondominant side but occludes at the craniocervical junction and appears to fill retrograde to the PICA. Left vertebral artery is patent to the basilar. Left PICA is widely patent. Multifocal stenosis of the basilar artery. Fetal origins of both posterior cerebral arteries which are widely patent.. Intracranial internal carotid and middle cerebral arteries are widely patent. Right A1 segment is patent. The left A1 segment is hypoplastic but filling of both anterior cerebral arteries via patent anterior communicating artery.  Exam End: 06/17/22 09:42 Last Resulted: 06/17/22 10:09   [facility] From: [facility]  Result Received: 11/21/23 09:54       Prior diet: Pt consumes a regular diet per sitter report.    Subjective     Pt seen bedside for ST swallowing evaluation.  No c/o pain.  Awake/alert and communicating wants/needs at times, especially regarding food. "I want Reji Hickman!"  Sitter at bedside.  Patient goals: To eat     Pain/Comfort:  Pain Rating 1: 0/10  Pain Rating Post-Intervention 1: 0/10  Pain Rating 2: 0/10  Pain Rating Post-Intervention 2: " "0/10    Respiratory Status: Room air    Objective:     Oral Musculature Evaluation  Oral Musculature: general weakness, unable to assess due to poor participation/comprehension  Dentition: edentulous  Secretion Management: adequate  Mucosal Quality: good  Mandibular Strength and Mobility: impaired (edentulous)  Oral Labial Strength and Mobility: impaired coordination, impaired seal  Lingual Strength and Mobility: impaired strength  Velar Elevation: WFL  Buccal Strength and Mobility: decreased tone  Volitional Cough: not present  Volitional Swallow: present  Voice Prior to PO Intake: WFL    Bedside Clinical Swallow Evaluation:   Clinical Swallow Examination:   Of note, patient self-fed with left UE and assist by SLP throughout evaluation. Patient presented with:     CONSISTENCY  NOTES   THIN (IDDSI 0) Water --preferred "sippy cup"   No overt s/s of aspiration   PUREE (IDDSI 4/Extremely Thick)   TSP/TBSP bites of pudding/applesauce  No overt s/s of aspiration   SOLID (IDDSI 7/Regular) Bite of Salena Doone cookie    Very impulsive-requesting more food while masticating/manipulating previously given solids.  Cued sitter on small bites--present small quantity at a time during meals.  Independently cleared residue following prolonged mastication and liquid rinse.     Thickened liquids were not used in this assessment. ONarciso (2018) reported that thickened liquids have no sound evidence at reducing the risk of pneumonia in patients with dysphagia and can cause harm by increasing their risk of dehydration. It also presents an increased risk of UTI, electrolyte imbalance, constipation, fecal impaction, cognitive impairment, functional decline and even death (Liset, 2002; Negrito, 2016).  Thickened liquids are associated with risks including dehydration, increased pharyngeal residue, potential interference with medication absorption, and decreased quality of life (Harmony, 2013). Thickened liquids are also more likely to " be silently aspirated than thin liquids (Chung et al., 2018). This supports the assertion that we should confirm a patient requires thickened liquids with an instrumental swallow study prior to recommending them.    References:   Harmony LAYNE (2013). Thickening agents used for dysphagia management: Effect on bioavailability of water, medication and feelings of satiety. Nutrition Journal, 12, 54. https://doi.org/10.1186/2236-8111-36-54    XI Wilkes, PONCE Perdue, SRI Hu, & XI Price (2018). Cough response to aspiration in thin and thick fluids during FEES in hospitalized inpatients. International journal of language & communication disorders, 53(5), 909-918. https://doi.org/10.1111/0732-4527.55446    INTERPRETATION AND RISK ASSESSMENT:  Clinical swallow evaluation (CSE) revealed oral phase characterized by lingual, labial, and buccal strength and range of motion reduced for lip closure, bolus preparation and propulsion. The patient had no anterior loss of the bolus with complete closure of the lips around the utensils, sippy cup. No residue remained in the oral cavity following the swallow. Patient without overt clinical signs/symptoms of aspiration on any PO trials given; however, she presents with a possibly inefficient swallow as indicated by prolonged manipulation/mastication and cognitive impairment. Contributing risk factors for dysphagia include developmental delay, hx CVAs and impulsivity during po intake. Patient with increased risk for silent aspiration given potential sensory deficits related to stroke hx.    Clinical signs of oropharyngeal dysphagia, likely chronic related to medical co-morbidities and cognitive impairment. Swallowing prognosis is guarded/poor.       Goals:   Multidisciplinary Problems       SLP Goals          Problem: SLP    Goal Priority Disciplines Outcome   SLP Goal     SLP    Description: 1.  Pt will consume least restrictive PO diet without incident.                        Plan:     Patient to be seen:  2 x/week   Plan of Care expires:  04/02/24  Plan of Care reviewed with:  patient, caregiver   SLP Follow-Up:  Yes (f/u diet)       Discharge recommendations:  Low Intensity Therapy (Disposition deferred to PT)   Barriers to Discharge:  None    Time Tracking:     SLP Treatment Date:   03/26/24  Speech Start Time:  1000  Speech Stop Time:  1030     Speech Total Time (min):  30 min    Billable Minutes: Eval Swallow and Oral Function 15 minutes and Self Care/Home Management Training 15 minutes    03/26/2024

## 2024-03-26 NOTE — CONSULTS
[facility] Surg  General Surgery  Consult Note    Patient Name: Juan Pablo Bolden  MRN: [id number]  Code Status: Full Code  Admission Date: 3/25/2024  Hospital Length of Stay: 0 days  Attending Physician: Mary Vega MD  Primary Care Provider: Brooke Goldberg MD    Patient information was obtained from patient, caregiver / friend, past medical records, ER records, and primary team.     Inpatient consult to General Surgery  Consult performed by: Dolly Hyman MD  Consult ordered by: Augusta Reid MD  Reason for consult: gluteal abscess and constipation        Subjective:     Principal Problem: Gluteal abscess    History of Present Illness: Juan Pablo Bolden is a 63 y.o. female history of developmental delays, tardive dyskinesia, seizure disorder, schizoaffective disorder bipolar disorder, as well as multiple other medical issues well known to me for evaluation of her pancreatic cysts, on whom we were consulted for a gluteal abscess and constipation.  She originally presented to the emergency department last night for abdominal pain and distention that which was noticed by her sister.  She just returned from a 2 week hospitalization at [facility] psychiatric unit and returned home yesterday.  They do report however that she has been constipated for about 4 days and also that she recently had a UTI (urine culture on 03/10/2024 showed E coli).  Based on my review of her records it does appear that she was having frequent falls as well as having psychiatric issues which prompted her hospitalization due to some changes in her medications.  Workup in the emergency department revealed some hypertension, normal white count, drop in her hemoglobin to 10.6 from 12.6 on 03/10/2024, and labs were otherwise overall pretty unremarkable.  CT scan was done which does show a large amount of stool throughout her colon as well as a rim enhancing fluid collection in her right gluteal muscle measuring approximately 4.3  x 4.5 cm concerning for an intramuscular abscess.  CT scan findings were otherwise stable including her known IPMN.    Upon my evaluation this morning she is complaining of some tenderness to her left buttocks, and her caregiver who is with her does report that she has been complaining of some tenderness to her buttocks not wanting to sit for long periods of time.    Current Facility-Administered Medications on File Prior to Encounter   Medication    0.9%  NaCl infusion    sodium chloride 0.9% flush 10 mL     Current Outpatient Medications on File Prior to Encounter   Medication Sig    acetaminophen (TYLENOL) 650 MG TbSR Take 650 mg by mouth every 8 (eight) hours as needed for Pain.    amLODIPine (NORVASC) 10 MG tablet Take 1 tablet (10 mg total) by mouth once daily.    cetirizine (ZYRTEC) 10 MG tablet Take 10 mg by mouth daily as needed for Allergies.    clonazePAM (KLONOPIN) 0.5 MG tablet Take 1 nightly at bedtime and one daily as needed for agitation    diclofenac sodium (VOLTAREN) 1 % Gel APPLY TWO GRAMS TO THE AFFECTED AREA FOUR TIMES DAILY AS NEEDED FOR PAIN    diphenhydrAMINE (BENADRYL) 25 mg capsule Take 25 mg by mouth nightly as needed for Allergies.    docusate sodium (COLACE) 100 MG capsule Take 1 capsule (100 mg total) by mouth once daily.    fluticasone propionate (FLONASE) 50 mcg/actuation nasal spray SHAKE LIQUID AND USE 2 SPRAYS IN EACH NOSTRIL EVERY DAY    folic acid (FOLVITE) 1 MG tablet Take 1 tablet (1 mg total) by mouth once daily.    furosemide (LASIX) 20 MG tablet TAKE 1 TABLET BY MOUTH DAILY AS NEEDED FOR SWELLING (Patient taking differently: Take 20 mg by mouth daily as needed. TAKE 1 TABLET BY MOUTH DAILY AS NEEDED FOR SWELLING)    glycerin adult suppository Place 1 suppository rectally as needed for Constipation.    INGREZZA 80 mg Cap Take 1 capsule by mouth once daily.    lithium (ESKALITH) 300 MG capsule Take 300 mg by mouth 2 (two) times daily.    melatonin 10 mg Tab Take 1 tablet (10  mg total) by mouth every evening.    polyethylene glycol (GLYCOLAX) 17 gram/dose powder One cup t.i.d. for 3 days, 2 cups b.i.d. for 2 days, and then 1 cup daily.    QUEtiapine (SEROQUEL) 200 MG Tab Take 200 mg by mouth 2 (two) times daily. 8am and 5pm    QUEtiapine (SEROQUEL) 200 MG Tab Take 400 mg by mouth every evening. 10pm    simethicone (MYLICON) 125 mg Cap capsule 1 capsule after meals and at bedtime as needed    simvastatin (ZOCOR) 40 MG tablet Take 1 tablet (40 mg total) by mouth every evening.    traZODone (DESYREL) 150 MG tablet TAKE 1 TABLET BY MOUTH AT BEDTIME AS NEEDED FOR INSOMNIA    triamterene-hydrochlorothiazide 37.5-25 mg (MAXZIDE-25) 37.5-25 mg per tablet Take 1 tablet by mouth every morning.    [DISCONTINUED] diazePAM (VALIUM) 5 MG tablet Take 2 tablets (10 mg total) by mouth every 6 (six) hours as needed for Anxiety.    [DISCONTINUED] divalproex (DEPAKOTE) 500 MG TbEC TAKE 1 TABLET BY MOUTH TWICE DAILY    [DISCONTINUED] mirtazapine (REMERON) 45 MG tablet TAKE 1 TABLET BY MOUTH EVERY EVENING    [DISCONTINUED] OLANZapine (ZYPREXA) 10 MG tablet Take 1 tablet (10 mg total) by mouth every evening.    [DISCONTINUED] QUEtiapine (SEROQUEL) 400 MG tablet TAKE 1/2 TABLET BY MOUTH EVERY MORNING and TAKE 1 AND 1/2 TABLETS BY MOUTH AT BEDTIME       Review of patient's allergies indicates:  No Known Allergies    Past Medical History:   Diagnosis Date    Bipolar 1 disorder     Chronic constipation     Galactorrhea     Growth retardation     Profound mental retardation    High cholesterol     Hyperlipidemia     Hypertension     Leukopenia     Neuroleptic-induced tardive dyskinesia     Schizoaffective disorder     Seizures     Stereotypic movement disorder      Past Surgical History:   Procedure Laterality Date    COLONOSCOPY N/A 5/17/2021    Procedure: COLONOSCOPY;  Surgeon: Jeffrey Ayala MD;  Location: [facility];  Service: Gastroenterology;  Laterality: N/A;    ENDOSCOPIC ULTRASOUND OF UPPER  GASTROINTESTINAL TRACT N/A 5/16/2022    Procedure: ULTRASOUND, UPPER GI TRACT, ENDOSCOPIC;  Surgeon: Cherise Marshall MD;  Location: [facility] ENDO;  Service: Endoscopy;  Laterality: N/A;  Upper and linear, 22 shark core, slides and formalin.    ENDOSCOPIC ULTRASOUND OF UPPER GASTROINTESTINAL TRACT N/A 12/20/2023    Procedure: ULTRASOUND, UPPER GI TRACT, ENDOSCOPIC;  Surgeon: Reed Ojeda MD;  Location: [facility] ENDO (2ND FLR);  Service: Endoscopy;  Laterality: N/A;  Need EUS (linear) for pancreas cyst surveillance. 45 minutes. Main or [facility]. -lewis  instr njbllp-gq-pt seizures for many years  11/15/23-LVM for precall-DS  11/16-precall complete-Kpvt  11/21/23: instructions sent via portal for reschedule-GD  1    ESOPHAGOGASTRODUODENOSCOPY N/A 11/5/2020    Procedure: EGD (ESOPHAGOGASTRODUODENOSCOPY);  Surgeon: John Batista MD;  Location: [facility] ENDO;  Service: Endoscopy;  Laterality: N/A;     Family History       Problem Relation (Age of Onset)    Heart disease Maternal Grandmother    Hypertension Father    Lung cancer Mother    Prostate cancer Father    Stroke Maternal Uncle          Tobacco Use    Smoking status: Never     Passive exposure: Never    Smokeless tobacco: Never   Substance and Sexual Activity    Alcohol use: No    Drug use: No    Sexual activity: Never     Review of Systems   Unable to perform ROS: Patient nonverbal     Objective:     Vital Signs (Most Recent):  Temp: 97.4 °F (36.3 °C) (03/26/24 0846)  Pulse: 73 (03/26/24 0846)  Resp: 18 (03/26/24 0846)  BP: (!) 160/81 (03/26/24 0846)  SpO2: 98 % (03/26/24 0846) Vital Signs (24h Range):  Temp:  [96.4 °F (35.8 °C)-97.4 °F (36.3 °C)] 97.4 °F (36.3 °C)  Pulse:  [73-79] 73  Resp:  [18-19] 18  SpO2:  [98 %-99 %] 98 %  BP: (133-188)/(76-90) 160/81     Weight: 45.3 kg (99 lb 13.9 oz)  Body mass index is 20.17 kg/m².     Physical Exam  Vitals reviewed.   Constitutional:       General: She is not in acute distress.     Appearance: She is not  toxic-appearing or diaphoretic.   HENT:      Nose: Nose normal.   Eyes:      Conjunctiva/sclera: Conjunctivae normal.   Cardiovascular:      Rate and Rhythm: Normal rate.   Pulmonary:      Effort: Pulmonary effort is normal. No respiratory distress.   Abdominal:      General: There is distension.      Tenderness: There is abdominal tenderness. There is no rebound.   Musculoskeletal:         General: Swelling present.      Right lower leg: Edema present.      Left lower leg: Edema present.   Skin:     General: Skin is warm and dry.      Comments: No erythema or induration on the external of either gluteus muscle, she does complain of tenderness to palpation on the left buttocks but not the right   Psychiatric:      Comments: Behavior similar to when previously seen in clinic, child-like but redirectable            I have reviewed all pertinent lab results within the past 24 hours.  CBC:   Recent Labs   Lab 03/26/24  0635   WBC 2.64*   RBC 3.51*   HGB 10.3*   HCT 31.4*   *   MCV 90   MCH 29.3   MCHC 32.8     BMP:   Recent Labs   Lab 03/26/24  0635         K 4.1      CO2 29   BUN 13   CREATININE 0.7   CALCIUM 10.6*     CMP:   Recent Labs   Lab 03/26/24  0635      CALCIUM 10.6*   ALBUMIN 2.8*   PROT 6.2      K 4.1   CO2 29      BUN 13   CREATININE 0.7   ALKPHOS 143*   ALT 23   AST 13   BILITOT 0.4       Significant Diagnostics:  I have reviewed all pertinent imaging results/findings within the past 24 hours.  CT: I have reviewed all pertinent results/findings within the past 24 hours and my personal findings are:  Small fluid collection in the right gluteus with rim enhancing    Imaging Results               CT Abdomen Pelvis With IV Contrast NO Oral Contrast (Final result)  Result time 03/26/24 01:11:35      Final result by Librado Barger MD (03/26/24 01:11:35)                   Impression:      No bowel obstruction.  Large amount of stool throughout the colon.   Correlate clinically for constipation.    Rim enhancing fluid collection in the right gluteal muscles measuring 4.3 x 2.8 x 4.5 cm concerning for intramuscular abscess.    Redemonstration of 1.8 cm enhancing lesion in the right lobe of the liver, incompletely evaluated but similar in appearance to the 11/12/2023 exam.    Multiple complex septated cysts throughout the pancreas, pancreatic ductal dilatation and calcifications in the pancreatic head all of which appear similar to prior study.  As noted on prior study, findings may relate to multifocal IPMN.  Reportedly there has been prior endoscopic aspiration.  Recommend correlation with pathology results.  Pancreatic calcifications may be sequela of chronic pancreatitis.    Grade 1 spondylolisthesis L4 on L5 with degenerative changes in the lumbar facet joints, similar compared to prior.    Small amount of free fluid in the pelvic cul-de-sac.    Additional findings as above.    This report was flagged in Epic as abnormal.    The critical information above was relayed directly by Librado Barger MD via [employer] secure chat to Mae Rico on 3/26/2024 at 01:09.      Electronically signed by: Librado Barger MD  Date:    03/26/2024  Time:    01:11               Narrative:    EXAMINATION:  CT ABDOMEN PELVIS WITH IV CONTRAST    CLINICAL HISTORY:  Bowel obstruction suspected;    TECHNIQUE:  Low dose axial images, sagittal and coronal reformations were obtained from the lung bases to the pubic symphysis following the IV administration of 100 mL of Omnipaque 350 .  Oral contrast was not administered.    COMPARISON:  None.    FINDINGS:  Abdomen:    - Lower thorax:Unremarkable.    - Lung bases: Linear scarring versus platelike atelectatic change right base, similar compared to prior.    - Liver: Normal in size.  1.8 cm enhancing lesion in the right lobe of the liver, incompletely evaluated but similar in appearance to the 11/12/2023 exam.    - Gallbladder: No calcified  gallstones.    - Bile Ducts: No evidence of intra or extra hepatic biliary ductal dilation.    - Spleen: Negative.    - Kidneys: No mass or hydronephrosis.  Right upper pole renal cysts, similar to prior.    - Adrenals: Unremarkable.    - Pancreas: Multiple complex septated cysts throughout the pancreas, pancreatic ductal dilatation and calcifications in the pancreatic head all of which appear similar to prior study.    - Retroperitoneum:  No significant adenopathy.    - Vascular: No abdominal aortic aneurysm.    - Abdominal wall:  Rim enhancing fluid collection in the right gluteal muscles measuring 4.3 x 2.8 x 4.5 cm.    Pelvis:    No pelvic mass or lymphadenopathy.  Uterus appears similar to prior.  Small amount of free fluid in the cul-de-sac.    Bowel/Mesentery:    No evidence of bowel obstruction or inflammation.  Large amount of stool throughout the colon.    Bones:  Grade 1 spondylolisthesis L4 on L5 with degenerative changes in the lumbar facet joints, similar compared to prior.                                       X-Ray Abdomen Flat And Erect (Final result)  Result time 03/25/24 23:59:24      Final result by Librado Barger MD (03/25/24 23:59:24)                   Impression:      Nonobstructed bowel-gas pattern.    Moderate to large amount of stool throughout the colon.      Electronically signed by: Librado Barger MD  Date:    03/25/2024  Time:    23:59               Narrative:    EXAMINATION:  XR ABDOMEN FLAT AND ERECT    CLINICAL HISTORY:  Constipation, unspecified    TECHNIQUE:  Flat and erect AP views of the abdomen were preformed.    COMPARISON:  None    FINDINGS:  Moderate to large amount of stool throughout the colon.  Bowel gas pattern is non-obstructive.  No evidence for pneumoperitoneum.  Regional osseous structures are similar to prior.  No calcifications seen overlying the kidneys.                                        Assessment/Plan:     * Gluteal abscess  Unclear etiology of this.   May be an infected hematoma as she was having falls due to her psychotic issues several weeks ago, may also be due to intramuscular injections during that same time period.  Abscesses very deep into her gluteus and unfortunately due to her mental status and her debilitation I think any local wound care for her would be extremely challenging.  I reviewed her images with Dr. Brady with IR and he agrees this appears to be amenable to aspiration.    -- plan for IR aspiration of right gluteal abscess tomorrow  -- okay for diet as tolerated today as per primary team  -- continue antibiotics for now, we will evaluate what grows from aspiration     Bipolar disorder  -- as per primary team     Acute cystitis without hematuria  -- as per primary team     Developmental delay  -- as per primary team     Bilateral leg edema  -- as per primary team     Pancreas cyst  -- no changes.  Follows with me for evaluation.  No need for any acute intervention     Other constipation  -- Recommend miralax and senna.  Can consider enemas     Schizoaffective disorder  -- as per primary team     Essential hypertension  -- as per primary team     Seizure disorder  -- as per primary team     Central hypothyroidism  -- as per primary team       VTE Risk Mitigation (From admission, onward)           Ordered     Reason for No Pharmacological VTE Prophylaxis  Once        Question:  Reasons:  Answer:  Physician Provided (leave comment)  Comment:  IR procedure planned    03/26/24 0238     Reason for no Mechanical VTE Prophylaxis  Once        Question:  Reasons:  Answer:  Physician Provided (leave comment)  Comment:  r/o DVT, review BLE US prior to SCD placement    03/26/24 0238                    Thank you for your consult. I will follow-up with patient. Please contact us if you have any additional questions.    Dolly Hyman MD  General Surgery  [facility]

## 2024-03-26 NOTE — PLAN OF CARE
Pt remains free of injury  Tolerated enema well from fecal impaction  Pt was able to swallow meds with pudding with no difficulty  Caregiver understands POC

## 2024-03-26 NOTE — PROGRESS NOTES
Pharmacokinetic Initial Assessment: IV Vancomycin    Assessment/Plan:    Initiate intravenous vancomycin with loading dose of 1000 mg once with subsequent doses when random concentrations are less than 20 mcg/mL  Desired empiric serum trough concentration is 10 to 20 mcg/mL  Draw vancomycin random level on 3/26/24 at 2100.  Pharmacy will continue to follow and monitor vancomycin.      Please contact pharmacy at extension [phone] with any questions regarding this assessment.     Thank you for the consult,   Warren Larsen       Patient brief summary:  Juan Pablo Bolden is a 63 y.o. female initiated on antimicrobial therapy with IV Vancomycin for treatment of suspected skin & soft tissue infection    Drug Allergies:   Review of patient's allergies indicates:  No Known Allergies    Actual Body Weight:   45.3 kg    Renal Function:   Estimated Creatinine Clearance: 58.1 mL/min (based on SCr of 0.7 mg/dL).,     Dialysis Method (if applicable):  N/A    CBC (last 72 hours):  Recent Labs   Lab Result Units 03/25/24  2043   WBC K/uL 4.52   Hemoglobin g/dL 10.6*   Hematocrit % 32.3*   Platelets K/uL 175   Gran % % 74.5*   Lymph % % 15.7*   Mono % % 6.2   Eosinophil % % 1.8   Basophil % % 0.9   Differential Method  Automated       Metabolic Panel (last 72 hours):  Recent Labs   Lab Result Units 03/25/24 2043 03/26/24  0024   Sodium mmol/L 139  --    Potassium mmol/L 4.2  --    Chloride mmol/L 103  --    CO2 mmol/L 28  --    Glucose mg/dL 129*  --    Glucose, UA   --  Negative   BUN mg/dL 17  --    Creatinine mg/dL 0.7  --    Albumin g/dL 2.9*  --    Total Bilirubin mg/dL 0.3  --    Alkaline Phosphatase U/L 158*  --    AST U/L 14  --    ALT U/L 27  --          Microbiologic Results:  Microbiology Results (last 7 days)       Procedure Component Value Units Date/Time    Urine Culture High Risk [[id number]]     Order Status: No result Specimen: Urine     Blood culture #1 **CANNOT BE ORDERED STAT** [[id number]] Collected: 03/26/24 0233     Order Status: Sent Specimen: Blood from Peripheral, Antecubital, Left     Blood culture #2 **CANNOT BE ORDERED STAT** [[id number]] Collected: 03/26/24 0200    Order Status: Sent Specimen: Blood from Peripheral, Forearm, Right

## 2024-03-26 NOTE — HPI
Juan Pablo Bolden is a 63 y.o. female history of developmental delays, tardive dyskinesia, seizure disorder, schizoaffective disorder bipolar disorder, as well as multiple other medical issues well known to me for evaluation of her pancreatic cysts, on whom we were consulted for a gluteal abscess and constipation.  She originally presented to the emergency department last night for abdominal pain and distention that which was noticed by her sister.  She just returned from a 2 week hospitalization at [facility] psychiatric unit and returned home yesterday.  They do report however that she has been constipated for about 4 days and also that she recently had a UTI (urine culture on 03/10/2024 showed E coli).  Based on my review of her records it does appear that she was having frequent falls as well as having psychiatric issues which prompted her hospitalization due to some changes in her medications.  Workup in the emergency department revealed some hypertension, normal white count, drop in her hemoglobin to 10.6 from 12.6 on 03/10/2024, and labs were otherwise overall pretty unremarkable.  CT scan was done which does show a large amount of stool throughout her colon as well as a rim enhancing fluid collection in her right gluteal muscle measuring approximately 4.3 x 4.5 cm concerning for an intramuscular abscess.  CT scan findings were otherwise stable including her known IPMN.    Upon my evaluation this morning she is complaining of some tenderness to her left buttocks, and her caregiver who is with her does report that she has been complaining of some tenderness to her buttocks not wanting to sit for long periods of time.

## 2024-03-26 NOTE — SUBJECTIVE & OBJECTIVE
Past Medical History:   Diagnosis Date    Bipolar 1 disorder     Chronic constipation     Galactorrhea     Growth retardation     Profound mental retardation    High cholesterol     Hyperlipidemia     Hypertension     Leukopenia     Neuroleptic-induced tardive dyskinesia     Schizoaffective disorder     Stereotypic movement disorder        Past Surgical History:   Procedure Laterality Date    COLONOSCOPY N/A 5/17/2021    Procedure: COLONOSCOPY;  Surgeon: Jeffrey Ayala MD;  Location: [facility];  Service: Gastroenterology;  Laterality: N/A;    ENDOSCOPIC ULTRASOUND OF UPPER GASTROINTESTINAL TRACT N/A 5/16/2022    Procedure: ULTRASOUND, UPPER GI TRACT, ENDOSCOPIC;  Surgeon: Cherise Marshall MD;  Location: [facility];  Service: Endoscopy;  Laterality: N/A;  Upper and linear, 22 shark core, slides and formalin.    ENDOSCOPIC ULTRASOUND OF UPPER GASTROINTESTINAL TRACT N/A 12/20/2023    Procedure: ULTRASOUND, UPPER GI TRACT, ENDOSCOPIC;  Surgeon: Reed Ojeda MD;  [facility] ([facility]);  Service: Endoscopy;  Laterality: N/A;  Need EUS (linear) for pancreas cyst surveillance. 45 minutes. Main or [facility]. -lewis  instr [id number] seizures for many years  11/15/23-LVM for precall-DS  11/16-precall complete-Kpvt  11/21/23: instructions sent via portal for reschedule-GD  1    ESOPHAGOGASTRODUODENOSCOPY N/A 11/5/2020    Procedure: EGD (ESOPHAGOGASTRODUODENOSCOPY);  Surgeon: John Batista MD;  Location: [facility];  Service: Endoscopy;  Laterality: N/A;       Review of patient's allergies indicates:  No Known Allergies    Current Facility-Administered Medications on File Prior to Encounter   Medication    0.9%  NaCl infusion    sodium chloride 0.9% flush 10 mL     Current Outpatient Medications on File Prior to Encounter   Medication Sig    acetaminophen (TYLENOL) 650 MG TbSR Take 650 mg by mouth every 8 (eight) hours as needed for Pain.    amLODIPine (NORVASC) 10 MG tablet Take 1 tablet (10 mg  total) by mouth once daily.    cetirizine (ZYRTEC) 10 MG tablet Take 10 mg by mouth daily as needed for Allergies.    clonazePAM (KLONOPIN) 0.5 MG tablet Take 1 nightly at bedtime and one daily as needed for agitation    diazePAM (VALIUM) 5 MG tablet Take 2 tablets (10 mg total) by mouth every 6 (six) hours as needed for Anxiety.    diclofenac sodium (VOLTAREN) 1 % Gel APPLY TWO GRAMS TO THE AFFECTED AREA FOUR TIMES DAILY AS NEEDED FOR PAIN    diphenhydrAMINE (BENADRYL) 25 mg capsule Take 25 mg by mouth nightly as needed for Allergies.    divalproex (DEPAKOTE) 500 MG TbEC TAKE 1 TABLET BY MOUTH TWICE DAILY    docusate sodium (COLACE) 100 MG capsule Take 1 capsule (100 mg total) by mouth once daily.    fluticasone propionate (FLONASE) 50 mcg/actuation nasal spray SHAKE LIQUID AND USE 2 SPRAYS IN EACH NOSTRIL EVERY DAY    folic acid (FOLVITE) 1 MG tablet Take 1 tablet (1 mg total) by mouth once daily.    furosemide (LASIX) 20 MG tablet TAKE 1 TABLET BY MOUTH DAILY AS NEEDED FOR SWELLING    glycerin adult suppository Place 1 suppository rectally as needed for Constipation.    melatonin 10 mg Tab Take 1 tablet (10 mg total) by mouth every evening.    mirtazapine (REMERON) 45 MG tablet TAKE 1 TABLET BY MOUTH EVERY EVENING    OLANZapine (ZYPREXA) 10 MG tablet Take 1 tablet (10 mg total) by mouth every evening.    polyethylene glycol (GLYCOLAX) 17 gram/dose powder One cup t.i.d. for 3 days, 2 cups b.i.d. for 2 days, and then 1 cup daily.    QUEtiapine (SEROQUEL) 400 MG tablet TAKE 1/2 TABLET BY MOUTH EVERY MORNING and TAKE 1 AND 1/2 TABLETS BY MOUTH AT BEDTIME    simethicone (MYLICON) 125 mg Cap capsule 1 capsule after meals and at bedtime as needed    simvastatin (ZOCOR) 40 MG tablet Take 1 tablet (40 mg total) by mouth every evening.    traZODone (DESYREL) 150 MG tablet TAKE 1 TABLET BY MOUTH AT BEDTIME AS NEEDED FOR INSOMNIA    triamterene-hydrochlorothiazide 37.5-25 mg (MAXZIDE-25) 37.5-25 mg per tablet Take 1 tablet  by mouth every morning.     Family History       Problem Relation (Age of Onset)    Heart disease Maternal Grandmother    Hypertension Father    Lung cancer Mother    Prostate cancer Father    Stroke Maternal Uncle          Tobacco Use    Smoking status: Never     Passive exposure: Never    Smokeless tobacco: Never   Substance and Sexual Activity    Alcohol use: No    Drug use: No    Sexual activity: Never     Review of Systems   Unable to perform ROS: Patient nonverbal (severe developmental delay at baseline)     Objective:     Vital Signs (Most Recent):  Temp: 96.4 °F (35.8 °C) (03/25/24 2003)  Pulse: 73 (03/25/24 2003)  Resp: 19 (03/25/24 2003)  BP: 139/76 (03/25/24 2003)  SpO2: 98 % (03/25/24 2003) Vital Signs (24h Range):  Temp:  [96.4 °F (35.8 °C)] 96.4 °F (35.8 °C)  Pulse:  [73] 73  Resp:  [19] 19  SpO2:  [98 %] 98 %  BP: (139)/(76) 139/76     Weight: 45.3 kg (99 lb 13.9 oz)  Body mass index is 20.17 kg/m².     Physical Exam  Vitals reviewed.   Constitutional:       General: She is not in acute distress.     Appearance: She is not toxic-appearing or diaphoretic.   HENT:      Nose: Nose normal.   Eyes:      Conjunctiva/sclera: Conjunctivae normal.   Cardiovascular:      Rate and Rhythm: Normal rate.   Pulmonary:      Effort: Pulmonary effort is normal. No respiratory distress.   Abdominal:      General: There is distension.      Tenderness: There is abdominal tenderness. There is no rebound.   Musculoskeletal:         General: Swelling present.      Right lower leg: Edema present.      Left lower leg: Edema present.   Skin:     General: Skin is warm and dry.   Neurological:      Comments: Unable to assess   Psychiatric:      Comments: Unable to assess                Significant Labs: All pertinent labs within the past 24 hours have been reviewed.  Recent Lab Results         03/26/24  0206   03/26/24  0024   03/25/24  2043        Albumin     2.9       ALP     158       ALT     27       Anion Gap     8        Appearance, UA   Clear         AST     14       Baso #     0.04       Basophil %     0.9       Bilirubin (UA)   Negative         BILIRUBIN TOTAL     0.3  Comment: For infants and newborns, interpretation of results should be based  on gestational age, weight and in agreement with clinical  observations.    Premature Infant recommended reference ranges:  Up to 24 hours.............<8.0 mg/dL  Up to 48 hours............<12.0 mg/dL  3-5 days..................<15.0 mg/dL  6-29 days.................<15.0 mg/dL         BUN     17       Calcium     10.3       Chloride     103       CO2     28       Color, UA   Yellow         Creatinine     0.7       Differential Method     Automated       eGFR     >60       Eos #     0.1       Eos %     1.8       Glucose     129       Glucose, UA   Negative         Gran # (ANC)     3.4       Gran %     74.5       Hematocrit     32.3       Hemoglobin     10.6       Hyaline Casts, UA   12         Immature Grans (Abs)     0.04  Comment: Mild elevation in immature granulocytes is non specific and   can be seen in a variety of conditions including stress response,   acute inflammation, trauma and pregnancy. Correlation with other   laboratory and clinical findings is essential.         Immature Granulocytes     0.9       Ketones, UA   Negative         Lactic Acid Level 0.6  Comment: Falsely low lactic acid results can be found in samples   containing >=13.0 mg/dL total bilirubin and/or >=3.5 mg/dL   direct bilirubin.             Leukocyte Esterase, UA   1+         Lipase     83       Lymph #     0.7       Lymph %     15.7       MCH     29.4       MCHC     32.8       MCV     90       Microscopic Comment   SEE COMMENT  Comment: Other formed elements not mentioned in the report are not   present in the microscopic examination.            Mono #     0.3       Mono %     6.2       MPV     10.3       NITRITE UA   Negative         nRBC     0       Blood, UA   Negative         pH, UA   8.0          Platelet Count     175       Potassium     4.2       PROTEIN TOTAL     6.5       Protein, UA   Trace  Comment: Recommend a 24 hour urine protein or a urine   protein/creatinine ratio if globulin induced proteinuria is  clinically suspected.           RBC     3.61       RDW     13.6       Sodium     139       Specific Gravity, UA   >1.030         Specimen UA   Urine, Catheterized         Squam Epithel, UA   4         UROBILINOGEN UA   Negative         WBC, UA   27         WBC     4.52               Significant Imaging: I have reviewed all pertinent imaging results/findings within the past 24 hours.  CT Abdomen Pelvis With IV Contrast NO Oral Contrast   Final Result   Abnormal      No bowel obstruction.  Large amount of stool throughout the colon.  Correlate clinically for constipation.      Rim enhancing fluid collection in the right gluteal muscles measuring 4.3 x 2.8 x 4.5 cm concerning for intramuscular abscess.      Redemonstration of 1.8 cm enhancing lesion in the right lobe of the liver, incompletely evaluated but similar in appearance to the 11/12/2023 exam.      Multiple complex septated cysts throughout the pancreas, pancreatic ductal dilatation and calcifications in the pancreatic head all of which appear similar to prior study.  As noted on prior study, findings may relate to multifocal IPMN.  Reportedly there has been prior endoscopic aspiration.  Recommend correlation with pathology results.  Pancreatic calcifications may be sequela of chronic pancreatitis.      Grade 1 spondylolisthesis L4 on L5 with degenerative changes in the lumbar facet joints, similar compared to prior.      Small amount of free fluid in the pelvic cul-de-sac.      Additional findings as above.      This report was flagged in Epic as abnormal.      The critical information above was relayed directly by Librado Barger MD via [employer] secure chat to Mae Rico on 3/26/2024 at 01:09.         Electronically signed by: Librado Barger  MD   Date:    03/26/2024   Time:    01:11      X-Ray Abdomen Flat And Erect   Final Result      Nonobstructed bowel-gas pattern.      Moderate to large amount of stool throughout the colon.         Electronically signed by: Librado Barger MD   Date:    03/25/2024   Time:    23:59      US Lower Extremity Veins Bilateral    (Results Pending)   IR Abscess Aspiration    (Results Pending)

## 2024-03-26 NOTE — ASSESSMENT & PLAN NOTE
-CT scan of abdomen pelvis with large amount of stool throughout the colon but without any evidence of obstruction  -continue bowel regimen

## 2024-03-26 NOTE — ED PROVIDER NOTES
SCRIBE #1 NOTE: I, Neeraj Noble, am scribing for, and in the presence of, Mae Rico, DO. I have scribed the entire note.       History     Chief Complaint   Patient presents with    Abdominal Pain     MR pt to ED for abd pain/distention noticed by sister when pt Dc'd today from [facility]. Sister reports decreased urination. LBM x4days ago. Pt took mag citrate & supp today w/ very small Bms. Recently treated for UTI & manic behavior from med reaction     Review of patient's allergies indicates:  No Known Allergies      History of Present Illness     HPI    3/25/2024, 10:54 PM  History obtained from the patient's family       History of Present Illness: Juan Pablo Bolden is a 63 y.o. female patient with a PMHx of bipolar 1 disorder, chronic constipation, growth retardation who presents to the Emergency Department for evaluation of constipation which onset gradually 4 days ago. Family states patient was discharged today from inpatient psych facility.  Family reports patient cries when she attempts to have BMs. Associated sxs include generalized abdominal pain, decreased urine output, and decreased appetite. Patient family denies any N/V. Prior Tx includes Mag citrate with 2 BMs. No further complaints or concerns at this time.       Arrival mode: Personal vehicle    PCP: Brooke Goldberg MD        Past Medical History:  Past Medical History:   Diagnosis Date    Bipolar 1 disorder     Chronic constipation     Galactorrhea     Growth retardation     Profound mental retardation    High cholesterol     Hyperlipidemia     Hypertension     Leukopenia     Neuroleptic-induced tardive dyskinesia     Schizoaffective disorder     Seizures     Stereotypic movement disorder        Past Surgical History:  Past Surgical History:   Procedure Laterality Date    COLONOSCOPY N/A 5/17/2021    Procedure: COLONOSCOPY;  Surgeon: Jeffrey Ayala MD;  Location: [facility];  Service: Gastroenterology;  Laterality: N/A;    ENDOSCOPIC  ULTRASOUND OF UPPER GASTROINTESTINAL TRACT N/A 5/16/2022    Procedure: ULTRASOUND, UPPER GI TRACT, ENDOSCOPIC;  Surgeon: Cherise Marshall MD;  Location: [facility] ENDO;  Service: Endoscopy;  Laterality: N/A;  Upper and linear, 22 shark core, slides and formalin.    ENDOSCOPIC ULTRASOUND OF UPPER GASTROINTESTINAL TRACT N/A 12/20/2023    Procedure: ULTRASOUND, UPPER GI TRACT, ENDOSCOPIC;  Surgeon: Reed Ojeda MD;  Location: [facility] ENDO ([facility] FLR);  Service: Endoscopy;  Laterality: N/A;  Need EUS (linear) for pancreas cyst surveillance. 45 minutes. Main or [facility]olfo. -lewis  instr nolqqs-hf-yi seizures for many years  11/15/23-LVM for precall-DS  11/16-precall complete-Kpvt  11/21/23: instructions sent via portal for reschedule-GD  1    ESOPHAGOGASTRODUODENOSCOPY N/A 11/5/2020    Procedure: EGD (ESOPHAGOGASTRODUODENOSCOPY);  Surgeon: John Batista MD;  Location: [facility];  Service: Endoscopy;  Laterality: N/A;         Family History:  Family History   Problem Relation Age of Onset    Lung cancer Mother     Hypertension Father     Prostate cancer Father     Stroke Maternal Uncle     Heart disease Maternal Grandmother        Social History:  Social History     Tobacco Use    Smoking status: Never     Passive exposure: Never    Smokeless tobacco: Never   Substance and Sexual Activity    Alcohol use: No    Drug use: No    Sexual activity: Never        Review of Systems     Review of Systems   Constitutional:  Positive for appetite change (decreased).   Gastrointestinal:  Positive for abdominal distention, abdominal pain and constipation. Negative for nausea and vomiting.   Genitourinary:  Positive for decreased urine volume.      Physical Exam     Initial Vitals [03/25/24 2003]   BP Pulse Resp Temp SpO2   139/76 73 19 96.4 °F (35.8 °C) 98 %      MAP       --          Physical Exam   Nursing Notes and Vital Signs Reviewed.  Constitutional: Patient is in no acute distress.   Head: Atraumatic.   Eyes: EOM intact. No  scleral icterus.  ENT: Mucous membranes are moist.  Cardiovascular: Regular rate. Regular rhythm. No murmurs, rubs, or gallops. Pulmonary/Chest: No respiratory distress. Clear to auscultation bilaterally. No wheezing or rales.  Abdominal: Firm and distended.  There is mild generalized tenderness.  No rebound, guarding, or rigidity. Bowel sounds are normal. Musculoskeletal: Moves all extremities. No obvious deformities. 1+ pitting edema to BLE. No calf tenderness.  Skin: Warm and dry.  Neurological:  No acute focal neurological deficits are appreciated.     ED Course   Procedures  ED Vital Signs:  Vitals:    03/27/24 0404 03/27/24 0425 03/27/24 0712 03/27/24 1158   BP: (!) 158/66 (!) 158/68 (!) 143/68 (!) 160/82   Pulse: 102 108 90 87   Resp: 18 18 18 20   Temp: 98.2 °F (36.8 °C)  97.6 °F (36.4 °C) 97.8 °F (36.6 °C)   TempSrc: Axillary  Oral Axillary   SpO2: 97% 100% 97% 95%   Weight:       Height:        03/27/24 1947 03/28/24 0038 03/28/24 0511 03/28/24 0813   BP: (!) 141/69 114/60 124/67 138/67   Pulse: 86 83 82 78   Resp: 18 18 18 18   Temp: 97.5 °F (36.4 °C) 99.9 °F (37.7 °C) 98.2 °F (36.8 °C) 98.1 °F (36.7 °C)   TempSrc: Oral Oral Oral Oral   SpO2: 98% (!) 94% 100% (!) 92%   Weight:       Height:        03/28/24 1126 03/28/24 1600 03/28/24 1605 03/28/24 1610   BP: 132/68 116/63 123/67 118/64   Pulse: 77 66 71 69   Resp: 17 16 16 16   Temp: 97.1 °F (36.2 °C)      TempSrc: Oral      SpO2: 95% 97% 96% 97%   Weight:       Height:        03/28/24 1615 03/28/24 1620 03/28/24 2011   BP: 121/68 121/67 (!) 143/75   Pulse: 72 66 73   Resp: 16 16 14   Temp:   97.2 °F (36.2 °C)   TempSrc:   Axillary   SpO2: 95% 97% 97%   Weight:      Height:          Abnormal Lab Results:  Labs Reviewed   CBC W/ AUTO DIFFERENTIAL - Abnormal; Notable for the following components:       Result Value    RBC 3.61 (*)     Hemoglobin 10.6 (*)     Hematocrit 32.3 (*)     Immature Granulocytes 0.9 (*)     Lymph # 0.7 (*)     Gran % 74.5 (*)      Lymph % 15.7 (*)     All other components within normal limits   COMPREHENSIVE METABOLIC PANEL - Abnormal; Notable for the following components:    Glucose 129 (*)     Albumin 2.9 (*)     Alkaline Phosphatase 158 (*)     All other components within normal limits   LIPASE - Abnormal; Notable for the following components:    Lipase 83 (*)     All other components within normal limits   URINALYSIS - Abnormal; Notable for the following components:    Specific Gravity, UA >1.030 (*)     Protein, UA Trace (*)     Leukocytes, UA 1+ (*)     All other components within normal limits   URINALYSIS MICROSCOPIC - Abnormal; Notable for the following components:    WBC, UA 27 (*)     Hyaline Casts, UA 12 (*)     All other components within normal limits   CBC W/ AUTO DIFFERENTIAL - Abnormal; Notable for the following components:    WBC 2.64 (*)     RBC 3.51 (*)     Hemoglobin 10.3 (*)     Hematocrit 31.4 (*)     Platelets 147 (*)     Immature Granulocytes 1.1 (*)     Lymph # 0.5 (*)     Mono # 0.2 (*)     All other components within normal limits   COMPREHENSIVE METABOLIC PANEL - Abnormal; Notable for the following components:    Calcium 10.6 (*)     Albumin 2.8 (*)     Alkaline Phosphatase 143 (*)     All other components within normal limits   CULTURE, URINE   LACTIC ACID, PLASMA   TSH   T4, FREE   B-TYPE NATRIURETIC PEPTIDE   PROTIME-INR   LACTIC ACID, PLASMA   PROCALCITONIN        All Lab Results:  Results for orders placed or performed during the hospital encounter of 03/25/24   Blood culture #1 **CANNOT BE ORDERED STAT**    Specimen: Peripheral, Antecubital, Left; Blood   Result Value Ref Range    Blood Culture, Routine No Growth to date     Blood Culture, Routine No Growth to date     Blood Culture, Routine No Growth to date    Blood culture #2 **CANNOT BE ORDERED STAT**    Specimen: Peripheral, Forearm, Right; Blood   Result Value Ref Range    Blood Culture, Routine No Growth to date     Blood Culture, Routine No Growth to  date     Blood Culture, Routine No Growth to date    Urine culture    Specimen: Urine   Result Value Ref Range    Urine Culture, Routine No growth    CBC auto differential   Result Value Ref Range    WBC 4.52 3.90 - 12.70 K/uL    RBC 3.61 (L) 4.00 - 5.40 M/uL    Hemoglobin 10.6 (L) 12.0 - 16.0 g/dL    Hematocrit 32.3 (L) 37.0 - 48.5 %    MCV 90 82 - 98 fL    MCH 29.4 27.0 - 31.0 pg    MCHC 32.8 32.0 - 36.0 g/dL    RDW 13.6 11.5 - 14.5 %    Platelets 175 150 - 450 K/uL    MPV 10.3 9.2 - 12.9 fL    Immature Granulocytes 0.9 (H) 0.0 - 0.5 %    Gran # (ANC) 3.4 1.8 - 7.7 K/uL    Immature Grans (Abs) 0.04 0.00 - 0.04 K/uL    Lymph # 0.7 (L) 1.0 - 4.8 K/uL    Mono # 0.3 0.3 - 1.0 K/uL    Eos # 0.1 0.0 - 0.5 K/uL    Baso # 0.04 0.00 - 0.20 K/uL    nRBC 0 0 /100 WBC    Gran % 74.5 (H) 38.0 - 73.0 %    Lymph % 15.7 (L) 18.0 - 48.0 %    Mono % 6.2 4.0 - 15.0 %    Eosinophil % 1.8 0.0 - 8.0 %    Basophil % 0.9 0.0 - 1.9 %    Differential Method Automated    Comprehensive metabolic panel   Result Value Ref Range    Sodium 139 136 - 145 mmol/L    Potassium 4.2 3.5 - 5.1 mmol/L    Chloride 103 95 - 110 mmol/L    CO2 28 23 - 29 mmol/L    Glucose 129 (H) 70 - 110 mg/dL    BUN 17 8 - 23 mg/dL    Creatinine 0.7 0.5 - 1.4 mg/dL    Calcium 10.3 8.7 - 10.5 mg/dL    Total Protein 6.5 6.0 - 8.4 g/dL    Albumin 2.9 (L) 3.5 - 5.2 g/dL    Total Bilirubin 0.3 0.1 - 1.0 mg/dL    Alkaline Phosphatase 158 (H) 55 - 135 U/L    AST 14 10 - 40 U/L    ALT 27 10 - 44 U/L    eGFR >60 >60 mL/min/1.73 m^2    Anion Gap 8 8 - 16 mmol/L   Lipase   Result Value Ref Range    Lipase 83 (H) 4 - 60 U/L   Urinalysis Catheterized   Result Value Ref Range    Specimen UA Urine, Catheterized     Color, UA Yellow Yellow, Straw, Demi    Appearance, UA Clear Clear    pH, UA 8.0 5.0 - 8.0    Specific Gravity, UA >1.030 (A) 1.005 - 1.030    Protein, UA Trace (A) Negative    Glucose, UA Negative Negative    Ketones, UA Negative Negative    Bilirubin (UA) Negative Negative     Occult Blood UA Negative Negative    Nitrite, UA Negative Negative    Urobilinogen, UA Negative <2.0 EU/dL    Leukocytes, UA 1+ (A) Negative   Urinalysis Microscopic   Result Value Ref Range    WBC, UA 27 (H) 0 - 5 /hpf    Squam Epithel, UA 4 /hpf    Hyaline Casts, UA 12 (A) 0-1/lpf /lpf    Microscopic Comment SEE COMMENT    Lactic acid, plasma   Result Value Ref Range    Lactate (Lactic Acid) 0.6 0.5 - 2.2 mmol/L   CBC Auto Differential   Result Value Ref Range    WBC 2.64 (L) 3.90 - 12.70 K/uL    RBC 3.51 (L) 4.00 - 5.40 M/uL    Hemoglobin 10.3 (L) 12.0 - 16.0 g/dL    Hematocrit 31.4 (L) 37.0 - 48.5 %    MCV 90 82 - 98 fL    MCH 29.3 27.0 - 31.0 pg    MCHC 32.8 32.0 - 36.0 g/dL    RDW 13.7 11.5 - 14.5 %    Platelets 147 (L) 150 - 450 K/uL    MPV 9.8 9.2 - 12.9 fL    Immature Granulocytes 1.1 (H) 0.0 - 0.5 %    Gran # (ANC) 1.8 1.8 - 7.7 K/uL    Immature Grans (Abs) 0.03 0.00 - 0.04 K/uL    Lymph # 0.5 (L) 1.0 - 4.8 K/uL    Mono # 0.2 (L) 0.3 - 1.0 K/uL    Eos # 0.1 0.0 - 0.5 K/uL    Baso # 0.03 0.00 - 0.20 K/uL    nRBC 0 0 /100 WBC    Gran % 66.8 38.0 - 73.0 %    Lymph % 19.7 18.0 - 48.0 %    Mono % 8.3 4.0 - 15.0 %    Eosinophil % 3.0 0.0 - 8.0 %    Basophil % 1.1 0.0 - 1.9 %    Differential Method Automated    Comprehensive metabolic panel   Result Value Ref Range    Sodium 139 136 - 145 mmol/L    Potassium 4.1 3.5 - 5.1 mmol/L    Chloride 102 95 - 110 mmol/L    CO2 29 23 - 29 mmol/L    Glucose 109 70 - 110 mg/dL    BUN 13 8 - 23 mg/dL    Creatinine 0.7 0.5 - 1.4 mg/dL    Calcium 10.6 (H) 8.7 - 10.5 mg/dL    Total Protein 6.2 6.0 - 8.4 g/dL    Albumin 2.8 (L) 3.5 - 5.2 g/dL    Total Bilirubin 0.4 0.1 - 1.0 mg/dL    Alkaline Phosphatase 143 (H) 55 - 135 U/L    AST 13 10 - 40 U/L    ALT 23 10 - 44 U/L    eGFR >60 >60 mL/min/1.73 m^2    Anion Gap 8 8 - 16 mmol/L   TSH   Result Value Ref Range    TSH 1.423 0.400 - 4.000 uIU/mL   T4, free   Result Value Ref Range    Free T4 1.01 0.71 - 1.51 ng/dL   T3   Result  Value Ref Range    T3, Total 62 60 - 180 ng/dL   Brain natriuretic peptide   Result Value Ref Range    BNP 21 0 - 99 pg/mL   Protime-INR   Result Value Ref Range    Prothrombin Time 10.7 9.0 - 12.5 sec    INR 1.0 0.8 - 1.2   Lactic acid, plasma   Result Value Ref Range    Lactate (Lactic Acid) 0.7 0.5 - 2.2 mmol/L   Procalcitonin   Result Value Ref Range    Procalcitonin 0.11 <0.25 ng/mL   Vancomycin, random   Result Value Ref Range    Vancomycin, Random 5.9 Not established ug/mL   CBC Auto Differential   Result Value Ref Range    WBC 3.00 (L) 3.90 - 12.70 K/uL    RBC 3.44 (L) 4.00 - 5.40 M/uL    Hemoglobin 9.8 (L) 12.0 - 16.0 g/dL    Hematocrit 30.7 (L) 37.0 - 48.5 %    MCV 89 82 - 98 fL    MCH 28.5 27.0 - 31.0 pg    MCHC 31.9 (L) 32.0 - 36.0 g/dL    RDW 14.0 11.5 - 14.5 %    Platelets 182 150 - 450 K/uL    MPV 10.1 9.2 - 12.9 fL    Immature Granulocytes 0.7 (H) 0.0 - 0.5 %    Gran # (ANC) 2.0 1.8 - 7.7 K/uL    Immature Grans (Abs) 0.02 0.00 - 0.04 K/uL    Lymph # 0.7 (L) 1.0 - 4.8 K/uL    Mono # 0.2 (L) 0.3 - 1.0 K/uL    Eos # 0.1 0.0 - 0.5 K/uL    Baso # 0.02 0.00 - 0.20 K/uL    nRBC 0 0 /100 WBC    Gran % 67.3 38.0 - 73.0 %    Lymph % 22.3 18.0 - 48.0 %    Mono % 5.7 4.0 - 15.0 %    Eosinophil % 3.3 0.0 - 8.0 %    Basophil % 0.7 0.0 - 1.9 %    Differential Method Automated    Basic Metabolic Panel   Result Value Ref Range    Sodium 141 136 - 145 mmol/L    Potassium 4.0 3.5 - 5.1 mmol/L    Chloride 106 95 - 110 mmol/L    CO2 27 23 - 29 mmol/L    Glucose 103 70 - 110 mg/dL    BUN 9 8 - 23 mg/dL    Creatinine 0.7 0.5 - 1.4 mg/dL    Calcium 9.6 8.7 - 10.5 mg/dL    Anion Gap 8 8 - 16 mmol/L    eGFR >60 >60 mL/min/1.73 m^2   CBC Auto Differential   Result Value Ref Range    WBC 2.77 (L) 3.90 - 12.70 K/uL    RBC 3.14 (L) 4.00 - 5.40 M/uL    Hemoglobin 9.1 (L) 12.0 - 16.0 g/dL    Hematocrit 27.9 (L) 37.0 - 48.5 %    MCV 89 82 - 98 fL    MCH 29.0 27.0 - 31.0 pg    MCHC 32.6 32.0 - 36.0 g/dL    RDW 13.7 11.5 - 14.5 %     Platelets 154 150 - 450 K/uL    MPV 10.5 9.2 - 12.9 fL    Immature Granulocytes 0.4 0.0 - 0.5 %    Gran # (ANC) 1.5 (L) 1.8 - 7.7 K/uL    Immature Grans (Abs) 0.01 0.00 - 0.04 K/uL    Lymph # 0.7 (L) 1.0 - 4.8 K/uL    Mono # 0.3 0.3 - 1.0 K/uL    Eos # 0.2 0.0 - 0.5 K/uL    Baso # 0.03 0.00 - 0.20 K/uL    nRBC 0 0 /100 WBC    Gran % 55.5 38.0 - 73.0 %    Lymph % 26.4 18.0 - 48.0 %    Mono % 9.7 4.0 - 15.0 %    Eosinophil % 6.9 0.0 - 8.0 %    Basophil % 1.1 0.0 - 1.9 %    Differential Method Automated    Basic Metabolic Panel   Result Value Ref Range    Sodium 139 136 - 145 mmol/L    Potassium 3.7 3.5 - 5.1 mmol/L    Chloride 104 95 - 110 mmol/L    CO2 27 23 - 29 mmol/L    Glucose 116 (H) 70 - 110 mg/dL    BUN 10 8 - 23 mg/dL    Creatinine 0.7 0.5 - 1.4 mg/dL    Calcium 9.6 8.7 - 10.5 mg/dL    Anion Gap 8 8 - 16 mmol/L    eGFR >60 >60 mL/min/1.73 m^2   VANCOMYCIN, TROUGH   Result Value Ref Range    Vancomycin-Trough 5.7 (L) 10.0 - 22.0 ug/mL     *Note: Due to a large number of results and/or encounters for the requested time period, some results have not been displayed. A complete set of results can be found in Results Review.         Imaging Results:  Imaging Results               CT Abdomen Pelvis With IV Contrast NO Oral Contrast (Final result)  Result time 03/26/24 01:11:35      Final result by Librado Barger MD (03/26/24 01:11:35)                   Impression:      No bowel obstruction.  Large amount of stool throughout the colon.  Correlate clinically for constipation.    Rim enhancing fluid collection in the right gluteal muscles measuring 4.3 x 2.8 x 4.5 cm concerning for intramuscular abscess.    Redemonstration of 1.8 cm enhancing lesion in the right lobe of the liver, incompletely evaluated but similar in appearance to the 11/12/2023 exam.    Multiple complex septated cysts throughout the pancreas, pancreatic ductal dilatation and calcifications in the pancreatic head all of which appear similar to  prior study.  As noted on prior study, findings may relate to multifocal IPMN.  Reportedly there has been prior endoscopic aspiration.  Recommend correlation with pathology results.  Pancreatic calcifications may be sequela of chronic pancreatitis.    Grade 1 spondylolisthesis L4 on L5 with degenerative changes in the lumbar facet joints, similar compared to prior.    Small amount of free fluid in the pelvic cul-de-sac.    Additional findings as above.    This report was flagged in Epic as abnormal.    The critical information above was relayed directly by Librado Barger MD via [employer] secure chat to Mae Rico on 3/26/2024 at 01:09.      Electronically signed by: Librado Barger MD  Date:    03/26/2024  Time:    01:11               Narrative:    EXAMINATION:  CT ABDOMEN PELVIS WITH IV CONTRAST    CLINICAL HISTORY:  Bowel obstruction suspected;    TECHNIQUE:  Low dose axial images, sagittal and coronal reformations were obtained from the lung bases to the pubic symphysis following the IV administration of 100 mL of Omnipaque 350 .  Oral contrast was not administered.    COMPARISON:  None.    FINDINGS:  Abdomen:    - Lower thorax:Unremarkable.    - Lung bases: Linear scarring versus platelike atelectatic change right base, similar compared to prior.    - Liver: Normal in size.  1.8 cm enhancing lesion in the right lobe of the liver, incompletely evaluated but similar in appearance to the 11/12/2023 exam.    - Gallbladder: No calcified gallstones.    - Bile Ducts: No evidence of intra or extra hepatic biliary ductal dilation.    - Spleen: Negative.    - Kidneys: No mass or hydronephrosis.  Right upper pole renal cysts, similar to prior.    - Adrenals: Unremarkable.    - Pancreas: Multiple complex septated cysts throughout the pancreas, pancreatic ductal dilatation and calcifications in the pancreatic head all of which appear similar to prior study.    - Retroperitoneum:  No significant adenopathy.    - Vascular: No  abdominal aortic aneurysm.    - Abdominal wall:  Rim enhancing fluid collection in the right gluteal muscles measuring 4.3 x 2.8 x 4.5 cm.    Pelvis:    No pelvic mass or lymphadenopathy.  Uterus appears similar to prior.  Small amount of free fluid in the cul-de-sac.    Bowel/Mesentery:    No evidence of bowel obstruction or inflammation.  Large amount of stool throughout the colon.    Bones:  Grade 1 spondylolisthesis L4 on L5 with degenerative changes in the lumbar facet joints, similar compared to prior.                                       X-Ray Abdomen Flat And Erect (Final result)  Result time 03/25/24 23:59:24      Final result by Librado Barger MD (03/25/24 23:59:24)                   Impression:      Nonobstructed bowel-gas pattern.    Moderate to large amount of stool throughout the colon.      Electronically signed by: Librado Barger MD  Date:    03/25/2024  Time:    23:59               Narrative:    EXAMINATION:  XR ABDOMEN FLAT AND ERECT    CLINICAL HISTORY:  Constipation, unspecified    TECHNIQUE:  Flat and erect AP views of the abdomen were preformed.    COMPARISON:  None    FINDINGS:  Moderate to large amount of stool throughout the colon.  Bowel gas pattern is non-obstructive.  No evidence for pneumoperitoneum.  Regional osseous structures are similar to prior.  No calcifications seen overlying the kidneys.                                          ED Discussion     ED Course as of 03/29/24 0009   Tue Mar 26, 2024   0113 63-year-old female presents with constipation.  I was concerned for SBO therefore obtained a CT abdomen and pelvis with IV contrast that was negative for obstruction but did show rim enhancing fluid collection in the right gluteal muscles measuring 4.3 x 2.8 x 4.5 cm.  Nothing on external exam to I&D.  Afebrile here.  CBC shows baseline normocytic anemia.  CMP showed normal renal function and LFTs.  UA is unremarkable.  Lactic acid normal.  Blood cultures pending.  Admit for  IV antibiotics and IR consultation.     Differential diagnosis includes but is not limited to constipation, small-bowel obstruction, infected hematoma, abscess from IM injections. [NF]      ED Course User Index  [NF] Mae Rico,      Medical Decision Making  Amount and/or Complexity of Data Reviewed  Independent Historian: caregiver  Labs: ordered. Decision-making details documented in ED Course.  Radiology: ordered and independent interpretation performed. Decision-making details documented in ED Course.  Discussion of management or test interpretation with external provider(s):     1:47 AM: Discussed pt's case with Dr. Hyman (General Surgery) who recommends IR evaluation for aspiration.    1:58 AM: Discussed case with Augusta Reid MD (Hospital Medicine). Dr. Reid agrees with current care and management of pt and accepts admission.   Admitting Service: Hospital Medicine  Admitting Physician: Dr. Reid  Admit to: Obs med surg    1:58 AM: Re-evaluated pt. I have discussed test results, shared treatment plan, and the need for admission with patient and family at bedside. Pt and family express understanding at this time and agree with all information. All questions answered. Pt and family have no further questions or concerns at this time. Pt is ready for admit.      Risk  OTC drugs.  Prescription drug management.                 ED Medication(s):  Medications   vancomycin - pharmacy to dose (has no administration in time range)   amLODIPine tablet 10 mg (10 mg Oral Given 3/28/24 0818)   sodium chloride 0.9% flush 10 mL (has no administration in time range)   naloxone 0.4 mg/mL injection 0.02 mg (has no administration in time range)   glucose chewable tablet 16 g (has no administration in time range)   glucose chewable tablet 24 g (has no administration in time range)   glucagon (human recombinant) injection 1 mg (has no administration in time range)   acetaminophen tablet 650 mg (has no administration in  time range)   ondansetron injection 4 mg (has no administration in time range)   hydrALAZINE injection 10 mg (10 mg Intravenous Given 3/26/24 1730)   bisacodyL suppository 10 mg (10 mg Rectal Given 3/26/24 1516)   cefTRIAXone (ROCEPHIN) 2 g in dextrose 5 % in water (D5W) 100 mL IVPB (MB+) (0 g Intravenous Stopped 3/28/24 0853)   0.9%  NaCl infusion ( Intravenous Stopped 3/26/24 1459)   docusate sodium capsule 100 mg (100 mg Oral Given 3/28/24 2217)   clonazePAM tablet 0.5 mg (0.5 mg Oral Given 3/27/24 1429)   QUEtiapine tablet 200 mg (200 mg Oral Given 3/28/24 1806)   QUEtiapine tablet 400 mg (400 mg Oral Given 3/28/24 2217)   traZODone tablet 150 mg (150 mg Oral Given 3/28/24 2217)   lithium capsule 300 mg (300 mg Oral Given 3/28/24 2217)   atorvastatin tablet 20 mg (20 mg Oral Given 3/28/24 2217)   triamterene-hydrochlorothiazide 37.5-25 mg per capsule 1 capsule (1 capsule Oral Given 3/28/24 0817)   valbenazine Cap 80 mg (80 mg Oral Given 3/28/24 0816)   clonazePAM tablet 0.5 mg (0.5 mg Oral Given 3/28/24 2217)   vancomycin (VANCOCIN) 1,000 mg in dextrose 5 % (D5W) 250 mL IVPB (Vial-Mate) (1,000 mg Intravenous Trough Due As Scheduled Before Dose 3/29/24 1430)   lactulose 20 gram/30 mL solution Soln 20 g (has no administration in time range)   iohexoL (OMNIPAQUE 350) injection 100 mL (100 mLs Intravenous Given 3/26/24 0014)   glycerin adult suppository 1 suppository (1 suppository Rectal Given 3/26/24 0059)   piperacillin-tazobactam (ZOSYN) 4.5 g in dextrose 5 % in water (D5W) 100 mL IVPB (MB+) (0 g Intravenous Stopped 3/26/24 0308)   vancomycin (VANCOCIN) 1,000 mg in dextrose 5 % (D5W) 250 mL IVPB (Vial-Mate) (0 mg Intravenous Stopped 3/26/24 0501)   glycerin 99.5% topical solution 100 mL (100 mLs Rectal Given 3/26/24 1451)     And   magnesium citrate solution 296 mL (296 mLs Rectal Given 3/26/24 1451)     And   sodium chloride 0.9% bolus 500 mL 500 mL (500 mLs Rectal Bolus from Bag 3/26/24 1455)   vancomycin  (VANCOCIN) 1,000 mg in dextrose 5 % (D5W) 250 mL IVPB (Vial-Mate) (0 mg Intravenous Stopped 3/28/24 0519)   LORazepam (ATIVAN) injection 1 mg (1 mg Intravenous Given 3/27/24 1213)   midazolam injection (1 mg  Given 3/28/24 1601)   fentaNYL 50 mcg/mL injection (50 mcg Intravenous Given 3/28/24 1601)       Current Discharge Medication List           Follow-up Information       [facility] .    Contact information:  [address] Annalisa Summers [place] [id number] [phone]                               Scribe Attestation:   Scribe #1: I performed the above scribed service and the documentation accurately describes the services I performed. I attest to the accuracy of the note.     Attending:   Physician Attestation Statement for Scribe #1: I, Mae Rico DO, personally performed the services described in this documentation, as scribed by Neeraj Noble, in my presence, and it is both accurate and complete.           Clinical Impression       ICD-10-CM ICD-9-CM   1. Schizoaffective disorder, unspecified type  F25.9 295.70   2. Constipation  K59.00 564.00   3. Gluteal abscess  L02.31 682.5   4. Chest pain  R07.9 786.50   5. Developmental delay  R62.50 783.40   6. Bipolar affective disorder, remission status unspecified  F31.9 296.80       Disposition:   Disposition: Placed in Observation  Condition: Stable         Mae Rico DO  03/29/24 0010

## 2024-03-26 NOTE — H&P (VIEW-ONLY)
[facility] Surg  General Surgery  Consult Note    Patient Name: Juan Pablo Bolden  MRN: [id number]  Code Status: Full Code  Admission Date: 3/25/2024  Hospital Length of Stay: 0 days  Attending Physician: Mary Vega MD  Primary Care Provider: Brooke Goldberg MD    Patient information was obtained from patient, caregiver / friend, past medical records, ER records, and primary team.     Inpatient consult to General Surgery  Consult performed by: Dolly Hyman MD  Consult ordered by: Augusta Reid MD  Reason for consult: gluteal abscess and constipation        Subjective:     Principal Problem: Gluteal abscess    History of Present Illness: Juan Pablo Bolden is a 63 y.o. female history of developmental delays, tardive dyskinesia, seizure disorder, schizoaffective disorder bipolar disorder, as well as multiple other medical issues well known to me for evaluation of her pancreatic cysts, on whom we were consulted for a gluteal abscess and constipation.  She originally presented to the emergency department last night for abdominal pain and distention that which was noticed by her sister.  She just returned from a 2 week hospitalization at [facility] psychiatric unit and returned home yesterday.  They do report however that she has been constipated for about 4 days and also that she recently had a UTI (urine culture on 03/10/2024 showed E coli).  Based on my review of her records it does appear that she was having frequent falls as well as having psychiatric issues which prompted her hospitalization due to some changes in her medications.  Workup in the emergency department revealed some hypertension, normal white count, drop in her hemoglobin to 10.6 from 12.6 on 03/10/2024, and labs were otherwise overall pretty unremarkable.  CT scan was done which does show a large amount of stool throughout her colon as well as a rim enhancing fluid collection in her right gluteal muscle measuring approximately 4.3  x 4.5 cm concerning for an intramuscular abscess.  CT scan findings were otherwise stable including her known IPMN.    Upon my evaluation this morning she is complaining of some tenderness to her left buttocks, and her caregiver who is with her does report that she has been complaining of some tenderness to her buttocks not wanting to sit for long periods of time.    Current Facility-Administered Medications on File Prior to Encounter   Medication    0.9%  NaCl infusion    sodium chloride 0.9% flush 10 mL     Current Outpatient Medications on File Prior to Encounter   Medication Sig    acetaminophen (TYLENOL) 650 MG TbSR Take 650 mg by mouth every 8 (eight) hours as needed for Pain.    amLODIPine (NORVASC) 10 MG tablet Take 1 tablet (10 mg total) by mouth once daily.    cetirizine (ZYRTEC) 10 MG tablet Take 10 mg by mouth daily as needed for Allergies.    clonazePAM (KLONOPIN) 0.5 MG tablet Take 1 nightly at bedtime and one daily as needed for agitation    diclofenac sodium (VOLTAREN) 1 % Gel APPLY TWO GRAMS TO THE AFFECTED AREA FOUR TIMES DAILY AS NEEDED FOR PAIN    diphenhydrAMINE (BENADRYL) 25 mg capsule Take 25 mg by mouth nightly as needed for Allergies.    docusate sodium (COLACE) 100 MG capsule Take 1 capsule (100 mg total) by mouth once daily.    fluticasone propionate (FLONASE) 50 mcg/actuation nasal spray SHAKE LIQUID AND USE 2 SPRAYS IN EACH NOSTRIL EVERY DAY    folic acid (FOLVITE) 1 MG tablet Take 1 tablet (1 mg total) by mouth once daily.    furosemide (LASIX) 20 MG tablet TAKE 1 TABLET BY MOUTH DAILY AS NEEDED FOR SWELLING (Patient taking differently: Take 20 mg by mouth daily as needed. TAKE 1 TABLET BY MOUTH DAILY AS NEEDED FOR SWELLING)    glycerin adult suppository Place 1 suppository rectally as needed for Constipation.    INGREZZA 80 mg Cap Take 1 capsule by mouth once daily.    lithium (ESKALITH) 300 MG capsule Take 300 mg by mouth 2 (two) times daily.    melatonin 10 mg Tab Take 1 tablet (10  mg total) by mouth every evening.    polyethylene glycol (GLYCOLAX) 17 gram/dose powder One cup t.i.d. for 3 days, 2 cups b.i.d. for 2 days, and then 1 cup daily.    QUEtiapine (SEROQUEL) 200 MG Tab Take 200 mg by mouth 2 (two) times daily. 8am and 5pm    QUEtiapine (SEROQUEL) 200 MG Tab Take 400 mg by mouth every evening. 10pm    simethicone (MYLICON) 125 mg Cap capsule 1 capsule after meals and at bedtime as needed    simvastatin (ZOCOR) 40 MG tablet Take 1 tablet (40 mg total) by mouth every evening.    traZODone (DESYREL) 150 MG tablet TAKE 1 TABLET BY MOUTH AT BEDTIME AS NEEDED FOR INSOMNIA    triamterene-hydrochlorothiazide 37.5-25 mg (MAXZIDE-25) 37.5-25 mg per tablet Take 1 tablet by mouth every morning.    [DISCONTINUED] diazePAM (VALIUM) 5 MG tablet Take 2 tablets (10 mg total) by mouth every 6 (six) hours as needed for Anxiety.    [DISCONTINUED] divalproex (DEPAKOTE) 500 MG TbEC TAKE 1 TABLET BY MOUTH TWICE DAILY    [DISCONTINUED] mirtazapine (REMERON) 45 MG tablet TAKE 1 TABLET BY MOUTH EVERY EVENING    [DISCONTINUED] OLANZapine (ZYPREXA) 10 MG tablet Take 1 tablet (10 mg total) by mouth every evening.    [DISCONTINUED] QUEtiapine (SEROQUEL) 400 MG tablet TAKE 1/2 TABLET BY MOUTH EVERY MORNING and TAKE 1 AND 1/2 TABLETS BY MOUTH AT BEDTIME       Review of patient's allergies indicates:  No Known Allergies    Past Medical History:   Diagnosis Date    Bipolar 1 disorder     Chronic constipation     Galactorrhea     Growth retardation     Profound mental retardation    High cholesterol     Hyperlipidemia     Hypertension     Leukopenia     Neuroleptic-induced tardive dyskinesia     Schizoaffective disorder     Seizures     Stereotypic movement disorder      Past Surgical History:   Procedure Laterality Date    COLONOSCOPY N/A 5/17/2021    Procedure: COLONOSCOPY;  Surgeon: Jeffrey Ayala MD;  Location: [facility];  Service: Gastroenterology;  Laterality: N/A;    ENDOSCOPIC ULTRASOUND OF UPPER  GASTROINTESTINAL TRACT N/A 5/16/2022    Procedure: ULTRASOUND, UPPER GI TRACT, ENDOSCOPIC;  Surgeon: Cherise Marshall MD;  Location: [facility] ENDO;  Service: Endoscopy;  Laterality: N/A;  Upper and linear, 22 shark core, slides and formalin.    ENDOSCOPIC ULTRASOUND OF UPPER GASTROINTESTINAL TRACT N/A 12/20/2023    Procedure: ULTRASOUND, UPPER GI TRACT, ENDOSCOPIC;  Surgeon: Reed Ojeda MD;  Location: [facility] ENDO (2ND FLR);  Service: Endoscopy;  Laterality: N/A;  Need EUS (linear) for pancreas cyst surveillance. 45 minutes. Main or [facility]. -elwis  instr pnrvle-ey-xt seizures for many years  11/15/23-LVM for precall-DS  11/16-precall complete-Kpvt  11/21/23: instructions sent via portal for reschedule-GD  1    ESOPHAGOGASTRODUODENOSCOPY N/A 11/5/2020    Procedure: EGD (ESOPHAGOGASTRODUODENOSCOPY);  Surgeon: John Batista MD;  Location: [facility] ENDO;  Service: Endoscopy;  Laterality: N/A;     Family History       Problem Relation (Age of Onset)    Heart disease Maternal Grandmother    Hypertension Father    Lung cancer Mother    Prostate cancer Father    Stroke Maternal Uncle          Tobacco Use    Smoking status: Never     Passive exposure: Never    Smokeless tobacco: Never   Substance and Sexual Activity    Alcohol use: No    Drug use: No    Sexual activity: Never     Review of Systems   Unable to perform ROS: Patient nonverbal     Objective:     Vital Signs (Most Recent):  Temp: 97.4 °F (36.3 °C) (03/26/24 0846)  Pulse: 73 (03/26/24 0846)  Resp: 18 (03/26/24 0846)  BP: (!) 160/81 (03/26/24 0846)  SpO2: 98 % (03/26/24 0846) Vital Signs (24h Range):  Temp:  [96.4 °F (35.8 °C)-97.4 °F (36.3 °C)] 97.4 °F (36.3 °C)  Pulse:  [73-79] 73  Resp:  [18-19] 18  SpO2:  [98 %-99 %] 98 %  BP: (133-188)/(76-90) 160/81     Weight: 45.3 kg (99 lb 13.9 oz)  Body mass index is 20.17 kg/m².     Physical Exam  Vitals reviewed.   Constitutional:       General: She is not in acute distress.     Appearance: She is not  toxic-appearing or diaphoretic.   HENT:      Nose: Nose normal.   Eyes:      Conjunctiva/sclera: Conjunctivae normal.   Cardiovascular:      Rate and Rhythm: Normal rate.   Pulmonary:      Effort: Pulmonary effort is normal. No respiratory distress.   Abdominal:      General: There is distension.      Tenderness: There is abdominal tenderness. There is no rebound.   Musculoskeletal:         General: Swelling present.      Right lower leg: Edema present.      Left lower leg: Edema present.   Skin:     General: Skin is warm and dry.      Comments: No erythema or induration on the external of either gluteus muscle, she does complain of tenderness to palpation on the left buttocks but not the right   Psychiatric:      Comments: Behavior similar to when previously seen in clinic, child-like but redirectable            I have reviewed all pertinent lab results within the past 24 hours.  CBC:   Recent Labs   Lab 03/26/24  0635   WBC 2.64*   RBC 3.51*   HGB 10.3*   HCT 31.4*   *   MCV 90   MCH 29.3   MCHC 32.8     BMP:   Recent Labs   Lab 03/26/24  0635         K 4.1      CO2 29   BUN 13   CREATININE 0.7   CALCIUM 10.6*     CMP:   Recent Labs   Lab 03/26/24  0635      CALCIUM 10.6*   ALBUMIN 2.8*   PROT 6.2      K 4.1   CO2 29      BUN 13   CREATININE 0.7   ALKPHOS 143*   ALT 23   AST 13   BILITOT 0.4       Significant Diagnostics:  I have reviewed all pertinent imaging results/findings within the past 24 hours.  CT: I have reviewed all pertinent results/findings within the past 24 hours and my personal findings are:  Small fluid collection in the right gluteus with rim enhancing    Imaging Results               CT Abdomen Pelvis With IV Contrast NO Oral Contrast (Final result)  Result time 03/26/24 01:11:35      Final result by Librado Barger MD (03/26/24 01:11:35)                   Impression:      No bowel obstruction.  Large amount of stool throughout the colon.   Correlate clinically for constipation.    Rim enhancing fluid collection in the right gluteal muscles measuring 4.3 x 2.8 x 4.5 cm concerning for intramuscular abscess.    Redemonstration of 1.8 cm enhancing lesion in the right lobe of the liver, incompletely evaluated but similar in appearance to the 11/12/2023 exam.    Multiple complex septated cysts throughout the pancreas, pancreatic ductal dilatation and calcifications in the pancreatic head all of which appear similar to prior study.  As noted on prior study, findings may relate to multifocal IPMN.  Reportedly there has been prior endoscopic aspiration.  Recommend correlation with pathology results.  Pancreatic calcifications may be sequela of chronic pancreatitis.    Grade 1 spondylolisthesis L4 on L5 with degenerative changes in the lumbar facet joints, similar compared to prior.    Small amount of free fluid in the pelvic cul-de-sac.    Additional findings as above.    This report was flagged in Epic as abnormal.    The critical information above was relayed directly by Librado Barger MD via [employer] secure chat to Mae Rico on 3/26/2024 at 01:09.      Electronically signed by: Librado Barger MD  Date:    03/26/2024  Time:    01:11               Narrative:    EXAMINATION:  CT ABDOMEN PELVIS WITH IV CONTRAST    CLINICAL HISTORY:  Bowel obstruction suspected;    TECHNIQUE:  Low dose axial images, sagittal and coronal reformations were obtained from the lung bases to the pubic symphysis following the IV administration of 100 mL of Omnipaque 350 .  Oral contrast was not administered.    COMPARISON:  None.    FINDINGS:  Abdomen:    - Lower thorax:Unremarkable.    - Lung bases: Linear scarring versus platelike atelectatic change right base, similar compared to prior.    - Liver: Normal in size.  1.8 cm enhancing lesion in the right lobe of the liver, incompletely evaluated but similar in appearance to the 11/12/2023 exam.    - Gallbladder: No calcified  gallstones.    - Bile Ducts: No evidence of intra or extra hepatic biliary ductal dilation.    - Spleen: Negative.    - Kidneys: No mass or hydronephrosis.  Right upper pole renal cysts, similar to prior.    - Adrenals: Unremarkable.    - Pancreas: Multiple complex septated cysts throughout the pancreas, pancreatic ductal dilatation and calcifications in the pancreatic head all of which appear similar to prior study.    - Retroperitoneum:  No significant adenopathy.    - Vascular: No abdominal aortic aneurysm.    - Abdominal wall:  Rim enhancing fluid collection in the right gluteal muscles measuring 4.3 x 2.8 x 4.5 cm.    Pelvis:    No pelvic mass or lymphadenopathy.  Uterus appears similar to prior.  Small amount of free fluid in the cul-de-sac.    Bowel/Mesentery:    No evidence of bowel obstruction or inflammation.  Large amount of stool throughout the colon.    Bones:  Grade 1 spondylolisthesis L4 on L5 with degenerative changes in the lumbar facet joints, similar compared to prior.                                       X-Ray Abdomen Flat And Erect (Final result)  Result time 03/25/24 23:59:24      Final result by Librado Barger MD (03/25/24 23:59:24)                   Impression:      Nonobstructed bowel-gas pattern.    Moderate to large amount of stool throughout the colon.      Electronically signed by: Librado Barger MD  Date:    03/25/2024  Time:    23:59               Narrative:    EXAMINATION:  XR ABDOMEN FLAT AND ERECT    CLINICAL HISTORY:  Constipation, unspecified    TECHNIQUE:  Flat and erect AP views of the abdomen were preformed.    COMPARISON:  None    FINDINGS:  Moderate to large amount of stool throughout the colon.  Bowel gas pattern is non-obstructive.  No evidence for pneumoperitoneum.  Regional osseous structures are similar to prior.  No calcifications seen overlying the kidneys.                                        Assessment/Plan:     * Gluteal abscess  Unclear etiology of this.   May be an infected hematoma as she was having falls due to her psychotic issues several weeks ago, may also be due to intramuscular injections during that same time period.  Abscesses very deep into her gluteus and unfortunately due to her mental status and her debilitation I think any local wound care for her would be extremely challenging.  I reviewed her images with Dr. Brady with IR and he agrees this appears to be amenable to aspiration.    -- plan for IR aspiration of right gluteal abscess tomorrow  -- okay for diet as tolerated today as per primary team  -- continue antibiotics for now, we will evaluate what grows from aspiration     Bipolar disorder  -- as per primary team     Acute cystitis without hematuria  -- as per primary team     Developmental delay  -- as per primary team     Bilateral leg edema  -- as per primary team     Pancreas cyst  -- no changes.  Follows with me for evaluation.  No need for any acute intervention     Other constipation  -- Recommend miralax and senna.  Can consider enemas     Schizoaffective disorder  -- as per primary team     Essential hypertension  -- as per primary team     Seizure disorder  -- as per primary team     Central hypothyroidism  -- as per primary team       VTE Risk Mitigation (From admission, onward)           Ordered     Reason for No Pharmacological VTE Prophylaxis  Once        Question:  Reasons:  Answer:  Physician Provided (leave comment)  Comment:  IR procedure planned    03/26/24 0238     Reason for no Mechanical VTE Prophylaxis  Once        Question:  Reasons:  Answer:  Physician Provided (leave comment)  Comment:  r/o DVT, review BLE US prior to SCD placement    03/26/24 0238                    Thank you for your consult. I will follow-up with patient. Please contact us if you have any additional questions.    Dolly Hyman MD  General Surgery  [facility]

## 2024-03-26 NOTE — PLAN OF CARE
Brief Plan of Care Update    Pt admitted to hospital medicine earlier this AM for management of constipation, BLE edema and gluteal abscess vs. Hematoma.     Pt seen and examined with caregiver at bedside, she is awake, speaking in short words, her only complaint is hunger. Per caregiver, has not had a BM yet. Abd is distended but soft, + bowel sounds. + TTP over L gluteal area.     Plan   - continue aggressive bowel regimen, HOGS enema   - discussed with Gen Surg- plan for IR guided aspiration of R gluteal abscess vs. Hematoma in AM   - continue IV Vanc + Rocephin for now for abscess + UTI   - follow blood and urine cultures     Full note to follow in AM.

## 2024-03-26 NOTE — ASSESSMENT & PLAN NOTE
Unclear etiology of this.  May be an infected hematoma as she was having falls due to her psychotic issues several weeks ago, may also be due to intramuscular injections during that same time period.  Abscesses very deep into her gluteus and unfortunately due to her mental status and her debilitation I think any local wound care for her would be extremely challenging.  I reviewed her images with Dr. Brady with IR and he agrees this appears to be amenable to aspiration.    -- plan for IR aspiration of right gluteal abscess tomorrow  -- okay for diet as tolerated today as per primary team  -- continue antibiotics for now, we will evaluate what grows from aspiration

## 2024-03-26 NOTE — ASSESSMENT & PLAN NOTE
-may be complicated by hypoalbuminemia and history of central hypothyroidism  -albumin level 2.9  -echo 01/11/2024 with EF 55-60%, normal systolic and diastolic function  -check BNP and thyroid studies  -check bilateral lower extremity venous ultrasound  -no SCDs until DVT ruled out  -diuretics have been held temporarily with NPO status

## 2024-03-26 NOTE — TELEPHONE ENCOUNTER
Currently in the ED. States she is number 4 in line and wants to know what is taking so long. Explained to caller that we are unable to do anything further for the pt once they are in the ED. Advised to express concerns for pt to the present staff. Caller NISA.     Reason for Disposition   General information question, no triage required and triager able to answer question    Protocols used: Information Only Call - No Triage-A-

## 2024-03-26 NOTE — ASSESSMENT & PLAN NOTE
-continue Depakote  -remaining psychiatric medications have been held until we can confirm adjustments made during recent inpatient psychiatric hospitalization

## 2024-03-26 NOTE — FIRST PROVIDER EVALUATION
Medical screening examination initiated.  I have conducted a focused provider triage encounter, findings are as follows:    Brief history of present illness:  abdominal pain with decreased UO    Vitals:    03/25/24 2003   BP: 139/76   BP Location: Right arm   Patient Position: Sitting   Pulse: 73   Resp: 19   Temp: 96.4 °F (35.8 °C)   TempSrc: Axillary   SpO2: 98%       Pertinent physical exam:  nad    Brief workup plan:  albs, further eval    Preliminary workup initiated; this workup will be continued and followed by the physician or advanced practice provider that is assigned to the patient when roomed.

## 2024-03-26 NOTE — PLAN OF CARE
PT EVAL complete. Required MIN A for bed mobility, MOD A for ambulation progressing to MAX A. Recommending low intensity therapy with 24/7 SPV and A.

## 2024-03-26 NOTE — HPI
63-year-old  woman with history of central hypothyroidism, lipodystrophy, osteopenia, hyperprolactinemia, galactorrhea, growth retardation, severe developmental delay, tardive dyskinesia, seizure disorder, hypertension, hyperlipidemia, pancreatic cysts, liver lesion, iron deficiency anemia, schizoaffective disorder, bipolar disorder, constipation, H pylori infection, coronary artery calcifications, and abnormal brain MRI who was brought in by family for abdominal pain and distention noticed by her sister.  Constipation over the last 4 days per family members.  She was given Mag citrate and a suppository today with very small bowel movements.  Patient had recently had a 2 week hospitalization at [facility] psychiatric unit and was just discharged 03/25/2024.  Family reports that patient was recently treated for UTI.  Urine culture 03/10/2024 with E coli.  They also report that patient had been having some difficulty with ambulation felt to be related to her psychiatric medications which have been adjusted during her recent inpatient psychiatric stay.  At baseline, family reports that she is able to ambulate on her own and is verbal although she does not always make sense.  She has her own apartment with 24/7 care.  At the time of my exam, patient is nonverbal.

## 2024-03-26 NOTE — PT/OT/SLP EVAL
Physical Therapy Evaluation and Treatment    Patient Name: Juan Pablo Bolden   MRN: [id number]  Recent Surgery: * No surgery found *      Recommendations:     Discharge Recommendations: Low Intensity Therapy (with 24/7 SPV and A)   Discharge Equipment Recommendations: walker, rolling   Barriers to discharge: None    Assessment:     Juan Pablo Bolden is a 63 y.o. female admitted with a medical diagnosis of Gluteal abscess. She presents with the following impairments/functional limitations: weakness, impaired endurance, impaired functional mobility, gait instability, impaired balance, decreased safety awareness, decreased lower extremity function, impaired cognition, decreased ROM.    The mobility limitation cannot be sufficiently resolved by the use of a cane.  Patient's functional mobility deficit can be sufficiently resolved with the use of a rolling walker. Patient's mobility limitation significantly impairs their ability to participate in one of more activities of daily living. The use of a rolling walker will significantly improve the patient's ability to participate in MRADLS and the patient will use it on regular basis in the home.     Rehab Prognosis: Good; patient would benefit from acute PT services to address these deficits and reach maximum level of function.    Plan:     During this hospitalization, patient to be seen 3 x/week to address the above listed problems via gait training, therapeutic activities, therapeutic exercises    Plan of Care Expires: 04/09/24    Subjective     Chief Complaint: Pt is motivated to participate  Patient Comments/Goals: none stated  Pain/Comfort:  Pain Rating 1: 0/10    Social History:  Living Environment: Patient lives alone with 24/7 caregiver in a single story home with number of outside stair(s): 0  Prior Level of Function: Prior to admission, patient requires assistance with ADLs including bathing/dressing, not driving and not working, and ambulated household distances using  hand-held assist and assistance from caregiver.   Equipment Used at Home: shower chair, bedside commode, grab bar  DME owned (not currently used): none  Assistance Upon Discharge:  caregivers    Objective:     Communicated with nurse and epic chart review prior to session. Patient found with bed in chair position with peripheral IV upon PT entry to room.    General Precautions: Standard, fall   Orthopedic Precautions: N/A   Braces: N/A    Respiratory Status: Room air    Exams:  Cognition: Patient is oriented to Person, pt with history of developmental delay, difficulty following commands  RLE ROM: WFL  RLE Strength:  Grossly 3+/5, assessed functionally   LLE ROM: WFL  LLE Strength:  Grossly 3+/5, assessed functionally  Sensation:    -       Intact  Skin Integrity/Edema:     -       Skin integrity: Visible skin intact    Functional Mobility:  Gait belt applied - Yes  Bed Mobility  Rolling Left: minimum assistance  Scooting: minimum assistance  Supine to Sit: minimum assistance for LE management and trunk management  Sit to Supine: moderate assistance for LE management and trunk management  Supine Scooting: total A of 2  Transfers  Sit to Stand: minimum assistance with rolling walker  Bed to Chair: not progressed due to pt safety concerns  Gait  Patient ambulated 20ft with rolling walker and moderate assistance; pt became frustrated towards end of gait requiring MAX A. Patient demonstrates unsteady gait and decreased step length. No c/o dizziness or SOB. All lines remained intact throughout ambulation trail.  Balance  Sitting: contact guard assistance  Standing: moderate assistance  Pt with difficulty following commands, decreased safety awareness, constant verbal cuing throughout to maintain safety and to complete task    Therapeutic Activities and Exercises:   Pt and caregiver educated on role of PT in acute care and POC. Educated on importance of OOB activities, activity pacing, and HEP (marching/hip flex, hip abd,  "heel slides/LAQ, quad sets, ankle pumps) in order to maintain/regain strength. Encouraged to sit up for all meals. Educated on proper use of RW for safety and to reduce risk of falling. Educated on "call don't fall" policy and increased risk of falling due to weakness, instructed to utilize call bell for assistance with all transfers. Caregiver agreeable to all requests.    AM-PAC 6 CLICK MOBILITY  Total Score:14    Patient left with bed in chair position with all lines intact, call button in reach, bed alarm on, and caregiver present.    GOALS:   Multidisciplinary Problems       Physical Therapy Goals          Problem: Physical Therapy    Goal Priority Disciplines Outcome Goal Variances Interventions   Physical Therapy Goal     PT, PT/OT      Description: Goals to be met by 4/9/24.  1. Pt will complete bed mobility MIN A.  2. Pt will complete sit to stand MIN A.  3. Pt will ambulate 100ft MIN A with RW.  4. Pt will increase AMPAC score by 2 points to progress functional mobility.                       History:     Past Medical History:   Diagnosis Date    Bipolar 1 disorder     Chronic constipation     Galactorrhea     Growth retardation     Profound mental retardation    High cholesterol     Hyperlipidemia     Hypertension     Leukopenia     Neuroleptic-induced tardive dyskinesia     Schizoaffective disorder     Seizures     Stereotypic movement disorder        Past Surgical History:   Procedure Laterality Date    COLONOSCOPY N/A 5/17/2021    Procedure: COLONOSCOPY;  Surgeon: Jeffrey Ayala MD;  Location: [facility];  Service: Gastroenterology;  Laterality: N/A;    ENDOSCOPIC ULTRASOUND OF UPPER GASTROINTESTINAL TRACT N/A 5/16/2022    Procedure: ULTRASOUND, UPPER GI TRACT, ENDOSCOPIC;  Surgeon: Cherise Marshall MD;  Location: [facility];  Service: Endoscopy;  Laterality: N/A;  Upper and linear, 22 shark core, slides and formalin.    ENDOSCOPIC ULTRASOUND OF UPPER GASTROINTESTINAL TRACT N/A 12/20/2023 "    Procedure: ULTRASOUND, UPPER GI TRACT, ENDOSCOPIC;  Surgeon: Reed Ojeda MD;  Location: [facility] ENDO ([address] [place] [address]);  Service: Endoscopy;  Laterality: N/A;  Need EUS (linear) for pancreas cyst surveillance. 45 minutes. Main or Rodolfo. -lweis  instr edhejv-gh-kh seizures for many years  11/15/23-LVM for precall-DS  11/16-precall complete-Kpvt  11/21/23: instructions sent via portal for reschedule-GD  1    ESOPHAGOGASTRODUODENOSCOPY N/A 11/5/2020    Procedure: EGD (ESOPHAGOGASTRODUODENOSCOPY);  Surgeon: John Batista MD;  Location: [facility];  Service: Endoscopy;  Laterality: N/A;       Time Tracking:     PT Received On: 03/26/24  PT Start Time: 1036  PT Stop Time: 1101  PT Total Time (min): 25 min     Billable Minutes: Evaluation 15min and Therapeutic Activity 10min    3/26/2024

## 2024-03-26 NOTE — ASSESSMENT & PLAN NOTE
"-gait disturbance may be related to pain  -CT scan of abdomen and pelvis with "rim enhancing fluid collection in the right gluteal muscles measuring 4.3 x 2.8 x 4.5 cm concerning for intramuscular abscess."  -white blood cell count 4.52, afebrile, blood cultures pending  -check lactic acid level, procalcitonin level, and INR  -empiric IV Zosyn and IV vancomycin  -general surgery is aware of this patient has recommended IR consult  -formal surgery consult pending  -IR consult for abscess drainage has been ordered  -fall precautions, PT evaluation  -NPO except for small sips with medications and ice chips  -no IV fluids in the setting of significant bilateral lower extremity edema until further evaluation has been obtained  -no DVT prophylaxis with Lovenox until patient has been evaluated by IR and surgery    "

## 2024-03-26 NOTE — PROGRESS NOTES
ST swallowing evaluation orders received and chart reviewed.  Pt with abnormal imaging and dx gluteal abscess with constipation.  She is currently NPO pending IR and Sx consult.  ST spoke with attending and will f/u for assessment once medically cleared.

## 2024-03-26 NOTE — SUBJECTIVE & OBJECTIVE
Current Facility-Administered Medications on File Prior to Encounter   Medication    0.9%  NaCl infusion    sodium chloride 0.9% flush 10 mL     Current Outpatient Medications on File Prior to Encounter   Medication Sig    acetaminophen (TYLENOL) 650 MG TbSR Take 650 mg by mouth every 8 (eight) hours as needed for Pain.    amLODIPine (NORVASC) 10 MG tablet Take 1 tablet (10 mg total) by mouth once daily.    cetirizine (ZYRTEC) 10 MG tablet Take 10 mg by mouth daily as needed for Allergies.    clonazePAM (KLONOPIN) 0.5 MG tablet Take 1 nightly at bedtime and one daily as needed for agitation    diclofenac sodium (VOLTAREN) 1 % Gel APPLY TWO GRAMS TO THE AFFECTED AREA FOUR TIMES DAILY AS NEEDED FOR PAIN    diphenhydrAMINE (BENADRYL) 25 mg capsule Take 25 mg by mouth nightly as needed for Allergies.    docusate sodium (COLACE) 100 MG capsule Take 1 capsule (100 mg total) by mouth once daily.    fluticasone propionate (FLONASE) 50 mcg/actuation nasal spray SHAKE LIQUID AND USE 2 SPRAYS IN EACH NOSTRIL EVERY DAY    folic acid (FOLVITE) 1 MG tablet Take 1 tablet (1 mg total) by mouth once daily.    furosemide (LASIX) 20 MG tablet TAKE 1 TABLET BY MOUTH DAILY AS NEEDED FOR SWELLING (Patient taking differently: Take 20 mg by mouth daily as needed. TAKE 1 TABLET BY MOUTH DAILY AS NEEDED FOR SWELLING)    glycerin adult suppository Place 1 suppository rectally as needed for Constipation.    INGREZZA 80 mg Cap Take 1 capsule by mouth once daily.    lithium (ESKALITH) 300 MG capsule Take 300 mg by mouth 2 (two) times daily.    melatonin 10 mg Tab Take 1 tablet (10 mg total) by mouth every evening.    polyethylene glycol (GLYCOLAX) 17 gram/dose powder One cup t.i.d. for 3 days, 2 cups b.i.d. for 2 days, and then 1 cup daily.    QUEtiapine (SEROQUEL) 200 MG Tab Take 200 mg by mouth 2 (two) times daily. 8am and 5pm    QUEtiapine (SEROQUEL) 200 MG Tab Take 400 mg by mouth every evening. 10pm    simethicone (MYLICON) 125 mg Cap  capsule 1 capsule after meals and at bedtime as needed    simvastatin (ZOCOR) 40 MG tablet Take 1 tablet (40 mg total) by mouth every evening.    traZODone (DESYREL) 150 MG tablet TAKE 1 TABLET BY MOUTH AT BEDTIME AS NEEDED FOR INSOMNIA    triamterene-hydrochlorothiazide 37.5-25 mg (MAXZIDE-25) 37.5-25 mg per tablet Take 1 tablet by mouth every morning.    [DISCONTINUED] diazePAM (VALIUM) 5 MG tablet Take 2 tablets (10 mg total) by mouth every 6 (six) hours as needed for Anxiety.    [DISCONTINUED] divalproex (DEPAKOTE) 500 MG TbEC TAKE 1 TABLET BY MOUTH TWICE DAILY    [DISCONTINUED] mirtazapine (REMERON) 45 MG tablet TAKE 1 TABLET BY MOUTH EVERY EVENING    [DISCONTINUED] OLANZapine (ZYPREXA) 10 MG tablet Take 1 tablet (10 mg total) by mouth every evening.    [DISCONTINUED] QUEtiapine (SEROQUEL) 400 MG tablet TAKE 1/2 TABLET BY MOUTH EVERY MORNING and TAKE 1 AND 1/2 TABLETS BY MOUTH AT BEDTIME       Review of patient's allergies indicates:  No Known Allergies    Past Medical History:   Diagnosis Date    Bipolar 1 disorder     Chronic constipation     Galactorrhea     Growth retardation     Profound mental retardation    High cholesterol     Hyperlipidemia     Hypertension     Leukopenia     Neuroleptic-induced tardive dyskinesia     Schizoaffective disorder     Seizures     Stereotypic movement disorder      Past Surgical History:   Procedure Laterality Date    COLONOSCOPY N/A 5/17/2021    Procedure: COLONOSCOPY;  Surgeon: Jeffrey Ayala MD;  Location: [facility];  Service: Gastroenterology;  Laterality: N/A;    ENDOSCOPIC ULTRASOUND OF UPPER GASTROINTESTINAL TRACT N/A 5/16/2022    Procedure: ULTRASOUND, UPPER GI TRACT, ENDOSCOPIC;  Surgeon: Cherise Marshall MD;  Location: [facility];  Service: Endoscopy;  Laterality: N/A;  Upper and linear, 22 shark core, slides and formalin.    ENDOSCOPIC ULTRASOUND OF UPPER GASTROINTESTINAL TRACT N/A 12/20/2023    Procedure: ULTRASOUND, UPPER GI TRACT, ENDOSCOPIC;   Surgeon: Reed Ojeda MD;  Location: [facility] ENDO (2ND FLR);  Service: Endoscopy;  Laterality: N/A;  Need EUS (linear) for pancreas cyst surveillance. 45 minutes. Main or Colon. -lewis  instr petiel-ld-xm seizures for many years  11/15/23-LVM for precall-DS  11/16-precall complete-Kpvt  11/21/23: instructions sent via portal for reschedule-GD  1    ESOPHAGOGASTRODUODENOSCOPY N/A 11/5/2020    Procedure: EGD (ESOPHAGOGASTRODUODENOSCOPY);  Surgeon: John Batista MD;  Location: [facility] ENDO;  Service: Endoscopy;  Laterality: N/A;     Family History       Problem Relation (Age of Onset)    Heart disease Maternal Grandmother    Hypertension Father    Lung cancer Mother    Prostate cancer Father    Stroke Maternal Uncle          Tobacco Use    Smoking status: Never     Passive exposure: Never    Smokeless tobacco: Never   Substance and Sexual Activity    Alcohol use: No    Drug use: No    Sexual activity: Never     Review of Systems   Unable to perform ROS: Patient nonverbal     Objective:     Vital Signs (Most Recent):  Temp: 97.4 °F (36.3 °C) (03/26/24 0846)  Pulse: 73 (03/26/24 0846)  Resp: 18 (03/26/24 0846)  BP: (!) 160/81 (03/26/24 0846)  SpO2: 98 % (03/26/24 0846) Vital Signs (24h Range):  Temp:  [96.4 °F (35.8 °C)-97.4 °F (36.3 °C)] 97.4 °F (36.3 °C)  Pulse:  [73-79] 73  Resp:  [18-19] 18  SpO2:  [98 %-99 %] 98 %  BP: (133-188)/(76-90) 160/81     Weight: 45.3 kg (99 lb 13.9 oz)  Body mass index is 20.17 kg/m².     Physical Exam  Vitals reviewed.   Constitutional:       General: She is not in acute distress.     Appearance: She is not toxic-appearing or diaphoretic.   HENT:      Nose: Nose normal.   Eyes:      Conjunctiva/sclera: Conjunctivae normal.   Cardiovascular:      Rate and Rhythm: Normal rate.   Pulmonary:      Effort: Pulmonary effort is normal. No respiratory distress.   Abdominal:      General: There is distension.      Tenderness: There is abdominal tenderness. There is no rebound.    Musculoskeletal:         General: Swelling present.      Right lower leg: Edema present.      Left lower leg: Edema present.   Skin:     General: Skin is warm and dry.      Comments: No erythema or induration on the external of either gluteus muscle, she does complain of tenderness to palpation on the left buttocks but not the right   Psychiatric:      Comments: Behavior similar to when previously seen in clinic, child-like but redirectable            I have reviewed all pertinent lab results within the past 24 hours.  CBC:   Recent Labs   Lab 03/26/24  0635   WBC 2.64*   RBC 3.51*   HGB 10.3*   HCT 31.4*   *   MCV 90   MCH 29.3   MCHC 32.8     BMP:   Recent Labs   Lab 03/26/24  0635         K 4.1      CO2 29   BUN 13   CREATININE 0.7   CALCIUM 10.6*     CMP:   Recent Labs   Lab 03/26/24  0635      CALCIUM 10.6*   ALBUMIN 2.8*   PROT 6.2      K 4.1   CO2 29      BUN 13   CREATININE 0.7   ALKPHOS 143*   ALT 23   AST 13   BILITOT 0.4       Significant Diagnostics:  I have reviewed all pertinent imaging results/findings within the past 24 hours.  CT: I have reviewed all pertinent results/findings within the past 24 hours and my personal findings are:  Small fluid collection in the right gluteus with rim enhancing    Imaging Results               CT Abdomen Pelvis With IV Contrast NO Oral Contrast (Final result)  Result time 03/26/24 01:11:35      Final result by Librado Barger MD (03/26/24 01:11:35)                   Impression:      No bowel obstruction.  Large amount of stool throughout the colon.  Correlate clinically for constipation.    Rim enhancing fluid collection in the right gluteal muscles measuring 4.3 x 2.8 x 4.5 cm concerning for intramuscular abscess.    Redemonstration of 1.8 cm enhancing lesion in the right lobe of the liver, incompletely evaluated but similar in appearance to the 11/12/2023 exam.    Multiple complex septated cysts throughout the  pancreas, pancreatic ductal dilatation and calcifications in the pancreatic head all of which appear similar to prior study.  As noted on prior study, findings may relate to multifocal IPMN.  Reportedly there has been prior endoscopic aspiration.  Recommend correlation with pathology results.  Pancreatic calcifications may be sequela of chronic pancreatitis.    Grade 1 spondylolisthesis L4 on L5 with degenerative changes in the lumbar facet joints, similar compared to prior.    Small amount of free fluid in the pelvic cul-de-sac.    Additional findings as above.    This report was flagged in Epic as abnormal.    The critical information above was relayed directly by Librado Barger MD via [employer] secure chat to Mae Rico on 3/26/2024 at 01:09.      Electronically signed by: Librado Barger MD  Date:    03/26/2024  Time:    01:11               Narrative:    EXAMINATION:  CT ABDOMEN PELVIS WITH IV CONTRAST    CLINICAL HISTORY:  Bowel obstruction suspected;    TECHNIQUE:  Low dose axial images, sagittal and coronal reformations were obtained from the lung bases to the pubic symphysis following the IV administration of 100 mL of Omnipaque 350 .  Oral contrast was not administered.    COMPARISON:  None.    FINDINGS:  Abdomen:    - Lower thorax:Unremarkable.    - Lung bases: Linear scarring versus platelike atelectatic change right base, similar compared to prior.    - Liver: Normal in size.  1.8 cm enhancing lesion in the right lobe of the liver, incompletely evaluated but similar in appearance to the 11/12/2023 exam.    - Gallbladder: No calcified gallstones.    - Bile Ducts: No evidence of intra or extra hepatic biliary ductal dilation.    - Spleen: Negative.    - Kidneys: No mass or hydronephrosis.  Right upper pole renal cysts, similar to prior.    - Adrenals: Unremarkable.    - Pancreas: Multiple complex septated cysts throughout the pancreas, pancreatic ductal dilatation and calcifications in the pancreatic head  all of which appear similar to prior study.    - Retroperitoneum:  No significant adenopathy.    - Vascular: No abdominal aortic aneurysm.    - Abdominal wall:  Rim enhancing fluid collection in the right gluteal muscles measuring 4.3 x 2.8 x 4.5 cm.    Pelvis:    No pelvic mass or lymphadenopathy.  Uterus appears similar to prior.  Small amount of free fluid in the cul-de-sac.    Bowel/Mesentery:    No evidence of bowel obstruction or inflammation.  Large amount of stool throughout the colon.    Bones:  Grade 1 spondylolisthesis L4 on L5 with degenerative changes in the lumbar facet joints, similar compared to prior.                                       X-Ray Abdomen Flat And Erect (Final result)  Result time 03/25/24 23:59:24      Final result by Librado Barger MD (03/25/24 23:59:24)                   Impression:      Nonobstructed bowel-gas pattern.    Moderate to large amount of stool throughout the colon.      Electronically signed by: Librado Barger MD  Date:    03/25/2024  Time:    23:59               Narrative:    EXAMINATION:  XR ABDOMEN FLAT AND ERECT    CLINICAL HISTORY:  Constipation, unspecified    TECHNIQUE:  Flat and erect AP views of the abdomen were preformed.    COMPARISON:  None    FINDINGS:  Moderate to large amount of stool throughout the colon.  Bowel gas pattern is non-obstructive.  No evidence for pneumoperitoneum.  Regional osseous structures are similar to prior.  No calcifications seen overlying the kidneys.

## 2024-03-27 LAB
ANION GAP SERPL CALC-SCNC: 8 MMOL/L (ref 8–16)
BACTERIA UR CULT: NO GROWTH
BASOPHILS # BLD AUTO: 0.02 K/UL (ref 0–0.2)
BASOPHILS NFR BLD: 0.7 % (ref 0–1.9)
BUN SERPL-MCNC: 9 MG/DL (ref 8–23)
CALCIUM SERPL-MCNC: 9.6 MG/DL (ref 8.7–10.5)
CHLORIDE SERPL-SCNC: 106 MMOL/L (ref 95–110)
CO2 SERPL-SCNC: 27 MMOL/L (ref 23–29)
CREAT SERPL-MCNC: 0.7 MG/DL (ref 0.5–1.4)
DIFFERENTIAL METHOD BLD: ABNORMAL
EOSINOPHIL # BLD AUTO: 0.1 K/UL (ref 0–0.5)
EOSINOPHIL NFR BLD: 3.3 % (ref 0–8)
ERYTHROCYTE [DISTWIDTH] IN BLOOD BY AUTOMATED COUNT: 14 % (ref 11.5–14.5)
EST. GFR  (NO RACE VARIABLE): >60 ML/MIN/1.73 M^2
GLUCOSE SERPL-MCNC: 103 MG/DL (ref 70–110)
HCT VFR BLD AUTO: 30.7 % (ref 37–48.5)
HGB BLD-MCNC: 9.8 G/DL (ref 12–16)
IMM GRANULOCYTES # BLD AUTO: 0.02 K/UL (ref 0–0.04)
IMM GRANULOCYTES NFR BLD AUTO: 0.7 % (ref 0–0.5)
LYMPHOCYTES # BLD AUTO: 0.7 K/UL (ref 1–4.8)
LYMPHOCYTES NFR BLD: 22.3 % (ref 18–48)
MCH RBC QN AUTO: 28.5 PG (ref 27–31)
MCHC RBC AUTO-ENTMCNC: 31.9 G/DL (ref 32–36)
MCV RBC AUTO: 89 FL (ref 82–98)
MONOCYTES # BLD AUTO: 0.2 K/UL (ref 0.3–1)
MONOCYTES NFR BLD: 5.7 % (ref 4–15)
NEUTROPHILS # BLD AUTO: 2 K/UL (ref 1.8–7.7)
NEUTROPHILS NFR BLD: 67.3 % (ref 38–73)
NRBC BLD-RTO: 0 /100 WBC
PLATELET # BLD AUTO: 182 K/UL (ref 150–450)
PMV BLD AUTO: 10.1 FL (ref 9.2–12.9)
POTASSIUM SERPL-SCNC: 4 MMOL/L (ref 3.5–5.1)
RBC # BLD AUTO: 3.44 M/UL (ref 4–5.4)
SODIUM SERPL-SCNC: 141 MMOL/L (ref 136–145)
WBC # BLD AUTO: 3 K/UL (ref 3.9–12.7)

## 2024-03-27 PROCEDURE — 63600175 PHARM REV CODE 636 W HCPCS: Performed by: INTERNAL MEDICINE

## 2024-03-27 PROCEDURE — 97530 THERAPEUTIC ACTIVITIES: CPT | Mod: CQ

## 2024-03-27 PROCEDURE — 36415 COLL VENOUS BLD VENIPUNCTURE: CPT | Performed by: INTERNAL MEDICINE

## 2024-03-27 PROCEDURE — 85025 COMPLETE CBC W/AUTO DIFF WBC: CPT | Performed by: INTERNAL MEDICINE

## 2024-03-27 PROCEDURE — 11000001 HC ACUTE MED/SURG PRIVATE ROOM

## 2024-03-27 PROCEDURE — 63600175 PHARM REV CODE 636 W HCPCS: Performed by: EMERGENCY MEDICINE

## 2024-03-27 PROCEDURE — 25000003 PHARM REV CODE 250: Performed by: EMERGENCY MEDICINE

## 2024-03-27 PROCEDURE — 97116 GAIT TRAINING THERAPY: CPT | Mod: CQ

## 2024-03-27 PROCEDURE — 25000003 PHARM REV CODE 250: Performed by: INTERNAL MEDICINE

## 2024-03-27 PROCEDURE — 80048 BASIC METABOLIC PNL TOTAL CA: CPT | Performed by: INTERNAL MEDICINE

## 2024-03-27 RX ORDER — LITHIUM CARBONATE 300 MG/1
300 CAPSULE ORAL 2 TIMES DAILY
Status: DISCONTINUED | OUTPATIENT
Start: 2024-03-27 | End: 2024-03-27

## 2024-03-27 RX ORDER — QUETIAPINE FUMARATE 100 MG/1
200 TABLET, FILM COATED ORAL 3 TIMES DAILY
Status: DISCONTINUED | OUTPATIENT
Start: 2024-03-27 | End: 2024-03-27

## 2024-03-27 RX ORDER — QUETIAPINE FUMARATE 100 MG/1
400 TABLET, FILM COATED ORAL NIGHTLY
Status: DISCONTINUED | OUTPATIENT
Start: 2024-03-27 | End: 2024-03-29 | Stop reason: HOSPADM

## 2024-03-27 RX ORDER — QUETIAPINE FUMARATE 100 MG/1
200 TABLET, FILM COATED ORAL 2 TIMES DAILY
Status: DISCONTINUED | OUTPATIENT
Start: 2024-03-27 | End: 2024-03-29 | Stop reason: HOSPADM

## 2024-03-27 RX ORDER — CLONAZEPAM 0.5 MG/1
0.5 TABLET ORAL NIGHTLY
Status: DISCONTINUED | OUTPATIENT
Start: 2024-03-27 | End: 2024-03-29 | Stop reason: HOSPADM

## 2024-03-27 RX ORDER — TRIAMTERENE AND HYDROCHLOROTHIAZIDE 37.5; 25 MG/1; MG/1
1 CAPSULE ORAL DAILY
Status: DISCONTINUED | OUTPATIENT
Start: 2024-03-27 | End: 2024-03-29 | Stop reason: HOSPADM

## 2024-03-27 RX ORDER — CLONAZEPAM 0.5 MG/1
0.5 TABLET ORAL 2 TIMES DAILY PRN
Status: DISCONTINUED | OUTPATIENT
Start: 2024-03-27 | End: 2024-03-29 | Stop reason: HOSPADM

## 2024-03-27 RX ORDER — LITHIUM CARBONATE 300 MG/1
300 CAPSULE ORAL 2 TIMES DAILY
Status: DISCONTINUED | OUTPATIENT
Start: 2024-03-27 | End: 2024-03-29 | Stop reason: HOSPADM

## 2024-03-27 RX ORDER — ATORVASTATIN CALCIUM 10 MG/1
20 TABLET, FILM COATED ORAL NIGHTLY
Status: DISCONTINUED | OUTPATIENT
Start: 2024-03-27 | End: 2024-03-29 | Stop reason: HOSPADM

## 2024-03-27 RX ADMIN — LORAZEPAM 1 MG: 2 INJECTION INTRAMUSCULAR; INTRAVENOUS at 12:03

## 2024-03-27 RX ADMIN — AMLODIPINE BESYLATE 10 MG: 10 TABLET ORAL at 12:03

## 2024-03-27 RX ADMIN — TRIAMTERENE AND HYDROCHLOROTHIAZIDE 1 CAPSULE: 37.5; 25 CAPSULE ORAL at 12:03

## 2024-03-27 RX ADMIN — QUETIAPINE FUMARATE 200 MG: 100 TABLET ORAL at 05:03

## 2024-03-27 RX ADMIN — CEFTRIAXONE 2 G: 2 INJECTION, POWDER, FOR SOLUTION INTRAMUSCULAR; INTRAVENOUS at 12:03

## 2024-03-27 RX ADMIN — ATORVASTATIN CALCIUM 20 MG: 10 TABLET, FILM COATED ORAL at 10:03

## 2024-03-27 RX ADMIN — VANCOMYCIN HYDROCHLORIDE 1000 MG: 1 INJECTION, POWDER, LYOPHILIZED, FOR SOLUTION INTRAVENOUS at 04:03

## 2024-03-27 RX ADMIN — CLONAZEPAM 0.5 MG: 0.5 TABLET ORAL at 02:03

## 2024-03-27 RX ADMIN — DOCUSATE SODIUM 100 MG: 100 CAPSULE, LIQUID FILLED ORAL at 10:03

## 2024-03-27 RX ADMIN — LITHIUM CARBONATE 300 MG: 300 CAPSULE, GELATIN COATED ORAL at 10:03

## 2024-03-27 RX ADMIN — QUETIAPINE FUMARATE 400 MG: 100 TABLET ORAL at 10:03

## 2024-03-27 RX ADMIN — CLONAZEPAM 0.5 MG: 0.5 TABLET ORAL at 10:03

## 2024-03-27 RX ADMIN — TRAZODONE HYDROCHLORIDE 150 MG: 100 TABLET ORAL at 10:03

## 2024-03-27 NOTE — PLAN OF CARE
Pt remains free of injury  Pt able to resume on psyc meds  Tolerates diet well  Family and caregiver understands POC

## 2024-03-27 NOTE — ASSESSMENT & PLAN NOTE
"-CT scan of abdomen and pelvis with "rim enhancing fluid collection in the right gluteal muscles measuring 4.3 x 2.8 x 4.5 cm concerning for intramuscular abscess."  - Gen Surg and IR consulted- IR planning for aspiration 03/27/24  - Continue empiric IV Vanc + Rocephin pending aspirate cultures; vanc dosing and monitoring per pharmacy   "

## 2024-03-27 NOTE — PLAN OF CARE
Problem: Adult Inpatient Plan of Care  Goal: Plan of Care Review  Outcome: Ongoing, Progressing     Problem: Adult Inpatient Plan of Care  Goal: Patient-Specific Goal (Individualized)  Outcome: Ongoing, Progressing  Flowsheets (Taken 3/27/2024 0323)  Anxieties, Fears or Concerns: personal sitter at bedside  Individualized Care Needs: blankets     Problem: Skin Injury Risk Increased  Goal: Skin Health and Integrity  Outcome: Ongoing, Progressing       Discussed POC with pt and sister, verbalized understanding.  Patient remains free from injury.  Safety precautions maintained.   Call light and personal belongings within reach, bed in lowest position with bed wheels locked.   No s/s of acute distress.  Purposeful rounding continued this shift.  Diet orders continued, pt diet: full liquid  Vital signs continued per orders this shift.   Pt personal sitter at bedside.   Chart and orders review completed. Pt education about care completed.

## 2024-03-27 NOTE — PROGRESS NOTES
Pharmacokinetic Assessment Follow Up: IV Vancomycin    Vancomycin serum concentration assessment(s):    The random level was drawn correctly and can be used to guide therapy at this time. The measurement is below the desired definitive target range of 10 to 20 mcg/mL.    Vancomycin Regimen Plan:    Change regimen to Vancomycin 1000  mg IV every 24 hours with next serum trough concentration measured at 0200 prior to third dose on 3/28/24    Drug levels (last 3 results):  Recent Labs   Lab Result Units 03/26/24  2215   Vancomycin, Random ug/mL 5.9       Pharmacy will continue to follow and monitor vancomycin.    Please contact pharmacy at extension [phone] for questions regarding this assessment.    Thank you for the consult,   Warren Villarrealry       Patient brief summary:  Juan Pablo Bolden is a 63 y.o. female initiated on antimicrobial therapy with IV Vancomycin for treatment of skin & soft tissue infection    Drug Allergies:   Review of patient's allergies indicates:  No Known Allergies    Actual Body Weight:   45.3 kg    Renal Function:   Estimated Creatinine Clearance: 58.1 mL/min (based on SCr of 0.7 mg/dL).,     Dialysis Method (if applicable):  N/A    CBC (last 72 hours):  Recent Labs   Lab Result Units 03/25/24 2043 03/26/24  0635   WBC K/uL 4.52 2.64*   Hemoglobin g/dL 10.6* 10.3*   Hematocrit % 32.3* 31.4*   Platelets K/uL 175 147*   Gran % % 74.5* 66.8   Lymph % % 15.7* 19.7   Mono % % 6.2 8.3   Eosinophil % % 1.8 3.0   Basophil % % 0.9 1.1   Differential Method  Automated Automated       Metabolic Panel (last 72 hours):  Recent Labs   Lab Result Units 03/25/24 2043 03/26/24  0024 03/26/24  0635   Sodium mmol/L 139  --  139   Potassium mmol/L 4.2  --  4.1   Chloride mmol/L 103  --  102   CO2 mmol/L 28  --  29   Glucose mg/dL 129*  --  109   Glucose, UA   --  Negative  --    BUN mg/dL 17  --  13   Creatinine mg/dL 0.7  --  0.7   Albumin g/dL 2.9*  --  2.8*   Total Bilirubin mg/dL 0.3  --  0.4   Alkaline  Phosphatase U/L 158*  --  143*   AST U/L 14  --  13   ALT U/L 27  --  23       Vancomycin Administrations:  vancomycin given in the last 96 hours                     vancomycin (VANCOCIN) 1,000 mg in dextrose 5 % (D5W) 250 mL IVPB (Vial-Mate) ()  Restarted 03/26/24 0417     1,000 mg New Bag  0326                    Microbiologic Results:  Microbiology Results (last 7 days)       Procedure Component Value Units Date/Time    Blood culture #1 **CANNOT BE ORDERED STAT** [[id number]] Collected: 03/26/24 0206    Order Status: Completed Specimen: Blood from Peripheral, Antecubital, Left Updated: 03/26/24 1715     Blood Culture, Routine No Growth to date    Blood culture #2 **CANNOT BE ORDERED STAT** [[id number]] Collected: 03/26/24 0200    Order Status: Completed Specimen: Blood from Peripheral, Forearm, Right Updated: 03/26/24 1715     Blood Culture, Routine No Growth to date    Urine culture [[id number]] Collected: 03/26/24 0024    Order Status: No result Specimen: Urine Updated: 03/26/24 0652    Urine Culture High Risk [[id number]]     Order Status: Completed Specimen: Urine, Catheterized     Urine Culture High Risk [[id number]]     Order Status: Canceled Specimen: Urine

## 2024-03-27 NOTE — ASSESSMENT & PLAN NOTE
-CT scan of abdomen pelvis with large amount of stool throughout the colon but without any evidence of obstruction  - s/p HOG enema 03/26  - continue stool softener + PRN Bisacodyl suppository

## 2024-03-27 NOTE — ASSESSMENT & PLAN NOTE
-may be complicated by hypoalbuminemia    - BNP and TFTs wnl   -echo 01/11/2024 with EF 55-60%, normal systolic and diastolic function  - BLE duplex negative for DVT   - SCDs

## 2024-03-27 NOTE — HOSPITAL COURSE
Gen surg consulted and recommended IR drainage of gluteal abscess vs. Hematoma. Pt continued on broad spectrum abx pending aspiration.  IR consulted and plan for CT guided aspiration 03/28. When IR attempted to aspirate, there was minimal fluid noted on CT and resolution of abscess/hematoma and they were unable to aspirate.     Pt was started on aggressive bowel regimen for constipation and was able to have bowel movement.     Patient was evaluated by PT/OT during hospitalization and deemed to be at her prior level of functioning.     Pt seen and examined on day of discharge, caregiver at bedside. Pt appears in NAD, abd is soft, nontender on exam with good bowel sounds. Discussed with Gen Surg Dr. Hyman- no additional intervention warranted at this time and pt was deemed stable for discharge from surgical standpoint. Pt appears stable for discharge home with caregiver per my exam. Will continue PO Keflex to complete total 7d course of antibiotics. She will continue bisacodyl [facility] as needed for constipation.  Followup with PCP within 3-5 days. Discharge plan was discussed via phone with pt's sister Ms. Noyola and all questions answered to her satisfaction.     See below for further details.

## 2024-03-27 NOTE — NURSING
Met with patient's sister Alanis along with LisaRN, DarrionRN and Dr Vega to review home medications to ensure accuracy. Dr Vega to resume medications as discussed with patient's sister

## 2024-03-27 NOTE — SUBJECTIVE & OBJECTIVE
Interval History: Tele psychiatry was consulted overnight to assist with med management per night team at request of patient's sister but no psychiatric medications were restarted at the time as nursing was unable to verify correct home meds with sister/caregiver      STUART. Pt seen and examined with caregiver at bedside. Pt denies pain, repeatedly asking for a pen. She does not appear uncomfortable.     After rounds, met with pt's sister with charge nurse and bedside nurse present, home medications were verified in Epic and restarted according to patient's home regimen as reported by her sister.     Review of Systems  Objective:     Vital Signs (Most Recent):  Temp: 97.8 °F (36.6 °C) (03/27/24 1158)  Pulse: 87 (03/27/24 1158)  Resp: 20 (03/27/24 1158)  BP: (!) 160/82 (03/27/24 1158)  SpO2: 95 % (03/27/24 1158) Vital Signs (24h Range):  Temp:  [97.3 °F (36.3 °C)-98.2 °F (36.8 °C)] 97.8 °F (36.6 °C)  Pulse:  [] 87  Resp:  [18-20] 20  SpO2:  [95 %-100 %] 95 %  BP: (143-173)/(66-84) 160/82     Weight: 45.3 kg (99 lb 13.9 oz)  Body mass index is 20.17 kg/m².    Intake/Output Summary (Last 24 hours) at 3/27/2024 1315  Last data filed at 3/27/2024 0735  Gross per 24 hour   Intake 117.15 ml   Output 1 ml   Net 116.15 ml         Physical Exam  Vitals and nursing note reviewed. Exam conducted with a chaperone present.   Constitutional:       General: She is not in acute distress.     Appearance: Ill appearance: chronic.   Cardiovascular:      Rate and Rhythm: Normal rate and regular rhythm.      Heart sounds: No murmur heard.     No friction rub. No gallop.   Pulmonary:      Effort: Pulmonary effort is normal.      Breath sounds: Normal breath sounds. No wheezing, rhonchi or rales.   Abdominal:      Comments: Abd is less distended, soft, + bowel sounds, nontender    Musculoskeletal:      Right lower leg: Edema present.      Left lower leg: Edema present.   Neurological:      Mental Status: She is alert.      Comments:  Verbalizing intermittently, moves all extremities spontaneously, asking for a pen              Significant Labs: All pertinent labs within the past 24 hours have been reviewed.    Significant Imaging: I have reviewed all pertinent imaging results/findings within the past 24 hours.

## 2024-03-27 NOTE — PROGRESS NOTES
[facility] Medicine  Progress Note    Patient Name: Juan Pablo Bolden  MRN: [id number]  Patient Class: IP- Inpatient   Admission Date: 3/25/2024  Length of Stay: 1 days  Attending Physician: Mary Vega MD  Primary Care Provider: Brooke Goldberg MD        Subjective:     Principal Problem:Gluteal abscess        HPI:  63-year-old  woman with history of central hypothyroidism, lipodystrophy, osteopenia, hyperprolactinemia, galactorrhea, growth retardation, severe developmental delay, tardive dyskinesia, seizure disorder, hypertension, hyperlipidemia, pancreatic cysts, liver lesion, iron deficiency anemia, schizoaffective disorder, bipolar disorder, constipation, H pylori infection, coronary artery calcifications, and abnormal brain MRI who was brought in by family for abdominal pain and distention noticed by her sister.  Constipation over the last 4 days per family members.  She was given Mag citrate and a suppository today with very small bowel movements.  Patient had recently had a 2 week hospitalization at [facility] psychiatric unit and was just discharged 03/25/2024.  Family reports that patient was recently treated for UTI.  Urine culture 03/10/2024 with E coli.  They also report that patient had been having some difficulty with ambulation felt to be related to her psychiatric medications which have been adjusted during her recent inpatient psychiatric stay.  At baseline, family reports that she is able to ambulate on her own and is verbal although she does not always make sense.  She has her own apartment with 24/7 care.  At the time of my exam, patient is nonverbal.    Overview/Hospital Course:  Gen surg consulted and recommended IR drainage of gluteal abscess vs. Hematoma. IR consulted and plan for CT guided aspiration. Pt continued on broad spectrum abx pending aspiration. Pt was started on aggressive bowel regimen for constipation and was able to have a bowel  movement.     Interval History: Tele psychiatry was consulted overnight to assist with med management per night team at request of patient's sister but no psychiatric medications were restarted at the time as nursing was unable to verify correct home meds with sister/caregiver      STUART. Pt seen and examined with caregiver at bedside. Pt denies pain, repeatedly asking for a pen. She does not appear uncomfortable.     After rounds, met with pt's sister with charge nurse and bedside nurse present, home medications were verified in Epic and restarted according to patient's home regimen as reported by her sister.     Review of Systems  Objective:     Vital Signs (Most Recent):  Temp: 97.8 °F (36.6 °C) (03/27/24 1158)  Pulse: 87 (03/27/24 1158)  Resp: 20 (03/27/24 1158)  BP: (!) 160/82 (03/27/24 1158)  SpO2: 95 % (03/27/24 1158) Vital Signs (24h Range):  Temp:  [97.3 °F (36.3 °C)-98.2 °F (36.8 °C)] 97.8 °F (36.6 °C)  Pulse:  [] 87  Resp:  [18-20] 20  SpO2:  [95 %-100 %] 95 %  BP: (143-173)/(66-84) 160/82     Weight: 45.3 kg (99 lb 13.9 oz)  Body mass index is 20.17 kg/m².    Intake/Output Summary (Last 24 hours) at 3/27/2024 1315  Last data filed at 3/27/2024 0735  Gross per 24 hour   Intake 117.15 ml   Output 1 ml   Net 116.15 ml         Physical Exam  Vitals and nursing note reviewed. Exam conducted with a chaperone present.   Constitutional:       General: She is not in acute distress.     Appearance: Ill appearance: chronic.   Cardiovascular:      Rate and Rhythm: Normal rate and regular rhythm.      Heart sounds: No murmur heard.     No friction rub. No gallop.   Pulmonary:      Effort: Pulmonary effort is normal.      Breath sounds: Normal breath sounds. No wheezing, rhonchi or rales.   Abdominal:      Comments: Abd is less distended, soft, + bowel sounds, nontender    Musculoskeletal:      Right lower leg: Edema present.      Left lower leg: Edema present.   Neurological:      Mental Status: She is alert.  "     Comments: Verbalizing intermittently, moves all extremities spontaneously, asking for a pen              Significant Labs: All pertinent labs within the past 24 hours have been reviewed.    Significant Imaging: I have reviewed all pertinent imaging results/findings within the past 24 hours.    Assessment/Plan:      * Gluteal abscess  -CT scan of abdomen and pelvis with "rim enhancing fluid collection in the right gluteal muscles measuring 4.3 x 2.8 x 4.5 cm concerning for intramuscular abscess."  - Gen Surg and IR consulted- IR planning for aspiration 03/27/24  - Continue empiric IV Vanc + Rocephin pending aspirate cultures; vanc dosing and monitoring per pharmacy     Other constipation  -CT scan of abdomen pelvis with large amount of stool throughout the colon but without any evidence of obstruction  - s/p HOG enema 03/26  - continue stool softener + PRN Bisacodyl suppository       Bilateral leg edema  -may be complicated by hypoalbuminemia    - BNP and TFTs wnl   -echo 01/11/2024 with EF 55-60%, normal systolic and diastolic function  - BLE duplex negative for DVT   - SCDs       Acute cystitis without hematuria  - Continue empiric IV Rocephin   - Urine culture pending       Bipolar disorder   See above       Schizoaffective disorder  - recent IP psychiatric hospitalization at [facility]   - tele psych consulted   - resume home medications as reported by patient's sister       Essential hypertension  Chronic, controlled. Latest blood pressure and vitals reviewed-     Temp:  [97.3 °F (36.3 °C)-98.2 °F (36.8 °C)]   Pulse:  []   Resp:  [18-20]   BP: (143-173)/(66-84)   SpO2:  [95 %-100 %] .   Home meds for hypertension were reviewed and noted below.   Hypertension Medications               amLODIPine (NORVASC) 10 MG tablet Take 1 tablet (10 mg total) by mouth once daily.    furosemide (LASIX) 20 MG tablet TAKE 1 TABLET BY MOUTH DAILY AS NEEDED FOR SWELLING    triamterene-hydrochlorothiazide 37.5-25 mg " "(MAXZIDE-25) 37.5-25 mg per tablet Take 1 tablet by mouth every morning.            While in the hospital, will manage blood pressure as follows; Adjust home antihypertensive regimen as follows- continue Norvasc, Maxzide     Will utilize p.r.n. blood pressure medication only if patient's blood pressure greater than 160/100 and she develops symptoms such as worsening chest pain or shortness of breath.    Developmental delay  -supportive care  -aspiration and fall precautions  -PT and ST evaluations      Pancreas cyst  -CT abdomen/pelvis with "redemonstration of 1.8 cm enhancing lesion in the right lobe of the liver, incompletely evaluated but similar in appearance to the 11/12/2023 exam; multiple complex septated cysts throughout the pancreas, pancreatic ductal dilatation and calcifications in the pancreatic head all of which appear similar to prior study, as noted on prior study, findings may relate to multifocal IPMN, reportedly there has been prior endoscopic aspiration, recommend correlation with pathology results; pancreatic calcifications may be sequela of chronic pancreatitis."  -EUS guided FNA/biopsy of pancreatic cystic lesion 12/20/2023 was negative for dysplasia and/or malignancy  -FNA of pancreatic head cyst 05/16/2022 was negative for malignancy  -LFTs with alk phos 158 and otherwise unremarkable, lipase 83  -outpatient follow-up with Dr Anibal Hyman as prev scheduled - discussed with Dr. Hyman       Seizure disorder  -seizure precautions  - Pt reportedly does not take depakote any longer per family       Central hypothyroidism  -currently on no treatment  - TFTs wnl         VTE Risk Mitigation (From admission, onward)           Ordered     Place sequential compression device  Until discontinued         03/27/24 1341     Reason for No Pharmacological VTE Prophylaxis  Once        Question:  Reasons:  Answer:  Physician Provided (leave comment)  Comment:  IR procedure planned    03/26/24 0238         03/26/24 " 0238                    Discharge Planning   EDMUNDO:      Code Status: Full Code   Is the patient medically ready for discharge?:     Reason for patient still in hospital (select all that apply): Patient trending condition, Treatment, Imaging, and Consult recommendations  Discharge Plan A: Home                  Mary Vega MD  Department of Hospital Medicine   '[facility]

## 2024-03-27 NOTE — ASSESSMENT & PLAN NOTE
- recent IP psychiatric hospitalization at [facility]   - tele psych consulted   - resume home medications as reported by patient's sister

## 2024-03-27 NOTE — ASSESSMENT & PLAN NOTE
"-CT abdomen/pelvis with "redemonstration of 1.8 cm enhancing lesion in the right lobe of the liver, incompletely evaluated but similar in appearance to the 11/12/2023 exam; multiple complex septated cysts throughout the pancreas, pancreatic ductal dilatation and calcifications in the pancreatic head all of which appear similar to prior study, as noted on prior study, findings may relate to multifocal IPMN, reportedly there has been prior endoscopic aspiration, recommend correlation with pathology results; pancreatic calcifications may be sequela of chronic pancreatitis."  -EUS guided FNA/biopsy of pancreatic cystic lesion 12/20/2023 was negative for dysplasia and/or malignancy  -FNA of pancreatic head cyst 05/16/2022 was negative for malignancy  -LFTs with alk phos 158 and otherwise unremarkable, lipase 83  -outpatient follow-up with Dr Anibal Hyman as prev scheduled - discussed with Dr. Hyman     "

## 2024-03-27 NOTE — ASSESSMENT & PLAN NOTE
Chronic, controlled. Latest blood pressure and vitals reviewed-     Temp:  [97.3 °F (36.3 °C)-98.2 °F (36.8 °C)]   Pulse:  []   Resp:  [18-20]   BP: (143-173)/(66-84)   SpO2:  [95 %-100 %] .   Home meds for hypertension were reviewed and noted below.   Hypertension Medications               amLODIPine (NORVASC) 10 MG tablet Take 1 tablet (10 mg total) by mouth once daily.    furosemide (LASIX) 20 MG tablet TAKE 1 TABLET BY MOUTH DAILY AS NEEDED FOR SWELLING    triamterene-hydrochlorothiazide 37.5-25 mg (MAXZIDE-25) 37.5-25 mg per tablet Take 1 tablet by mouth every morning.            While in the hospital, will manage blood pressure as follows; Adjust home antihypertensive regimen as follows- continue Norvasc, Maxzide     Will utilize p.r.n. blood pressure medication only if patient's blood pressure greater than 160/100 and she develops symptoms such as worsening chest pain or shortness of breath.

## 2024-03-27 NOTE — CONSULTS
Chart reviewed by Dr. Colvin.       ASSESSMENT/PLAN:    Gluteal abscess    The order for a CT abscess drain has been placed and the procedure will be performed asap.          Thank you for the consult.

## 2024-03-27 NOTE — NURSING
"Contacted patient's sister (Alanis) attempting to confirm patient's home medication list, so provider can resume correct medications.  The sister repeatedly verbalized that we should have the medication list, because she gave it to the ER staff and she spoke with Michelle on last night and went over the med list again.  I told her that I just wanted to go over the med list to ensure the patient have the correct medication as well as dosages resumed by HM.  She kept repeating she can't understand why she is having to go over this again.  I just reassured her that we were trying to get the patient the right medications and not being able to get the correct medications and dosages would  causes a delay in care.  She then stated "if something happens to my sister I am suing [facility] and I am on my way!"  I continued to try and reason with her and was unable to do so.  I notified the CN and the provider of the situation.  At this time still unable to administer medications.  "

## 2024-03-27 NOTE — CONSULTS
"[facility]  Psychiatry  Telepsychiatry Consult Note    Please see previous notes:    Patient agreeable to consultation via telepsychiatry.    Tele-Consultation from Psychiatry started: 3/26/2024 at 10:30pm  The chief complaint leading to psychiatric consultation is: medication change  This consultation was requested by Dr Vega, the Emergency Department attending physician.  The location of the consulting psychiatrist is  [place] .  The patient location is  [facility]   The patient arrived at the ED at: [facility]    Also present with the patient at the time of the consultation: nobody    Patient Identification:   Juan Pablo Bolden is a 63 y.o. female.    Patient information was obtained from patient and past medical records.  Patient presented voluntarily to the Emergency Department     Consults  Teleconsult Time Documentation  Subjective:     History of Present Illness:  62yo F with hx of intellectual disability with diagnosis of Kluver-Bucy,  admitted for a gluteal mass.    Per ED note-  "  History of Present Illness: Juan Pablo Bolden is a 63 y.o. female patient with a PMHx of bipolar 1 disorder, chronic constipation, growth retardation who presents to the Emergency Department for evaluation of constipation which onset gradually 4 days ago. Family states patient was discharged today from inpatient psych facility.  Family reports patient cries when she attempts to have BMs. Symptoms are constant and moderate in severity. No mitigating or exacerbating factors reported. Associated sxs include generalized abdominal pain, decreased urine output, and decreased appetite. Patient family denies any N/V. Prior Tx includes Mag citrate with 2 BMs. No further complaints or concerns at this time.    "    On interview, patient was quite upset, would yell out when nursing attempted to get her to speak with writer. Caretaker at bedside reported patient got out of the psych facility yesterday and had been fairly " "quiet aside from her stomach bothering her. Caretaker has been working with her for one year. Caretaker reports patient able to vocalize one word needs like "hungry". Caretaker did not know which meds patient was taking outpatient.  Unable to obtain ros due to inability to participate.  This is the extent of patients complaints at this time  I spoke with nurse in detail about what meds patient was recently discharged on from the psychiatric hospital and nurse indicated patients sister was unclear about this.     Psychiatric History:   Previous Psychiatric Hospitalizations: Yes per caretaker x 1  Previous Medication Trials: Yes   Previous Suicide Attempts: unknown  History of Violence: yes  History of Depression:   History of Marisel: unclear about this  History of Auditory/Visual Hallucination possible  History of Delusions: unclear about this  Outpatient psychiatrist (current & past): yes    Substance Abuse History:  Tobacco:No  Alcohol: No  Illicit Substances:No  Detox/Rehab: No    Legal History: Past charges/incarcerations: No     Family Psychiatric History: unknown      Social History:  Lives by herself. Sister visits her frequently. Caretakers present 24/7. Disabled    Psychiatric Mental Status Exam:  Arousal: lethargic  Sensorium/Orientation: oriented to unable to assess due to inability to participate  Behavior/Cooperation: not cooperative  Speech: yelling louid  Language: not tested  Mood:  unable to assess due to inability to participate  Affect: irritable  Thought Process:  unable to assess due to inability to participate  Thought Content:   Auditory hallucinations:  unable to assess due to inability to participate  Visual hallucinations:  unable to assess due to inability to participate  Paranoia:  unable to assess due to inability to participate  Delusions:   unable to assess due to inability to participate  Suicidal ideation:  unable to assess due to inability to participate  Homicidal ideation:  unable " to assess due to inability to participate  Attention/Concentration:   unable to assess due to inability to participate  Memory:    Recent:   unable to assess due to inability to participate   Remote:  unable to assess due to inability to participate     Fund of Knowledge:  unable to assess due to inability to participate  Abstract reasoning:  unable to assess due to inability to participate  Insight:  unable to assess due to inability to participate  Judgment:  unable to assess due to inability to participate      Past Medical History:   Past Medical History:   Diagnosis Date    Bipolar 1 disorder     Chronic constipation     Galactorrhea     Growth retardation     Profound mental retardation    High cholesterol     Hyperlipidemia     Hypertension     Leukopenia     Neuroleptic-induced tardive dyskinesia     Schizoaffective disorder     Seizures     Stereotypic movement disorder       Laboratory Data:   Labs Reviewed   CBC W/ AUTO DIFFERENTIAL - Abnormal; Notable for the following components:       Result Value    RBC 3.61 (*)     Hemoglobin 10.6 (*)     Hematocrit 32.3 (*)     Immature Granulocytes 0.9 (*)     Lymph # 0.7 (*)     Gran % 74.5 (*)     Lymph % 15.7 (*)     All other components within normal limits   COMPREHENSIVE METABOLIC PANEL - Abnormal; Notable for the following components:    Glucose 129 (*)     Albumin 2.9 (*)     Alkaline Phosphatase 158 (*)     All other components within normal limits   LIPASE - Abnormal; Notable for the following components:    Lipase 83 (*)     All other components within normal limits   URINALYSIS - Abnormal; Notable for the following components:    Specific Gravity, UA >1.030 (*)     Protein, UA Trace (*)     Leukocytes, UA 1+ (*)     All other components within normal limits   URINALYSIS MICROSCOPIC - Abnormal; Notable for the following components:    WBC, UA 27 (*)     Hyaline Casts, UA 12 (*)     All other components within normal limits   CBC W/ AUTO DIFFERENTIAL -  Abnormal; Notable for the following components:    WBC 2.64 (*)     RBC 3.51 (*)     Hemoglobin 10.3 (*)     Hematocrit 31.4 (*)     Platelets 147 (*)     Immature Granulocytes 1.1 (*)     Lymph # 0.5 (*)     Mono # 0.2 (*)     All other components within normal limits   COMPREHENSIVE METABOLIC PANEL - Abnormal; Notable for the following components:    Calcium 10.6 (*)     Albumin 2.8 (*)     Alkaline Phosphatase 143 (*)     All other components within normal limits   CULTURE, URINE   CULTURE, URINE   LACTIC ACID, PLASMA   TSH   T4, FREE   B-TYPE NATRIURETIC PEPTIDE   PROTIME-INR   LACTIC ACID, PLASMA   PROCALCITONIN     Allergies:   Review of patient's allergies indicates:  No Known Allergies    Medications in ER:   Medications   vancomycin - pharmacy to dose (has no administration in time range)   amLODIPine tablet 10 mg (10 mg Oral Given 3/26/24 1032)   sodium chloride 0.9% flush 10 mL (has no administration in time range)   naloxone 0.4 mg/mL injection 0.02 mg (has no administration in time range)   glucose chewable tablet 16 g (has no administration in time range)   glucose chewable tablet 24 g (has no administration in time range)   glucagon (human recombinant) injection 1 mg (has no administration in time range)   acetaminophen tablet 650 mg (has no administration in time range)   ondansetron injection 4 mg (has no administration in time range)   hydrALAZINE injection 10 mg (10 mg Intravenous Given 3/26/24 1730)   bisacodyL suppository 10 mg (10 mg Rectal Given 3/26/24 1516)   lactulose 20 gram/30 mL solution Soln 20 g (20 g Oral Given 3/26/24 1923)   polyethylene glycol packet 17 g (17 g Oral Given 3/26/24 2214)   cefTRIAXone (ROCEPHIN) 2 g in dextrose 5 % in water (D5W) 100 mL IVPB (MB+) (0 g Intravenous Stopped 3/26/24 1105)   0.9%  NaCl infusion ( Intravenous Stopped 3/26/24 1459)   docusate sodium capsule 100 mg (100 mg Oral Given 3/26/24 2214)   iohexoL (OMNIPAQUE 350) injection 100 mL (100 mLs  Intravenous Given 3/26/24 0014)   glycerin adult suppository 1 suppository (1 suppository Rectal Given 3/26/24 0059)   piperacillin-tazobactam (ZOSYN) 4.5 g in dextrose 5 % in water (D5W) 100 mL IVPB (MB+) (0 g Intravenous Stopped 3/26/24 0308)   vancomycin (VANCOCIN) 1,000 mg in dextrose 5 % (D5W) 250 mL IVPB (Vial-Mate) (0 mg Intravenous Stopped 3/26/24 0501)   glycerin 99.5% topical solution 100 mL (100 mLs Rectal Given 3/26/24 1451)     And   magnesium citrate solution 296 mL (296 mLs Rectal Given 3/26/24 1451)     And   sodium chloride 0.9% bolus 500 mL 500 mL (500 mLs Rectal Bolus from Bag 3/26/24 1455)       Medications at home: none    No new subjective & objective note has been filed under this hospital service since the last note was generated.      Assessment - Diagnosis - Goals:     Diagnosis/Impression:   Adjustment disorder with mixed emotional features  Intellectual disability    Rec:   Please try to obtain discharge med reconciliation paperwork from patients sister for recent psychiatric hospital stay in the AM and restart patient on her psychiatric medications she was discharged on. Nurse indicated sister was quite unclear about this verbally and caretaker did not know. That said, please have sister bring in the paperwork tomorrow.  For time being control agitation via ativan 1-2mg IV/IM q 2 hours prn severe agitation  Use caretaker at patient's bedside to redirect and de-escalate patient as patient is familiar with this person and seems to listen to her.  If no caretaker in patients room, I would consider placing a sitter for redirection as needed  Clearly lacks capacity to make medical decisions.    Time with patient, coordinating care: 31min      More than 50% of the time was spent counseling/coordinating care    Consulting clinician was informed of the encounter and consult note.    Consultation ended: 3/26/2024 at 11:20pm    Jose Aguilar MD  Psychiatry  [facility]

## 2024-03-27 NOTE — PT/OT/SLP PROGRESS
Physical Therapy  Treatment    Juan Pablo Bolden   MRN: [id number]   Admitting Diagnosis: Gluteal abscess    PT Received On: 03/27/24  PT Start Time: 0720     PT Stop Time: 0745    PT Total Time (min): 25 min       Billable Minutes:  Gait Training 10 and Therapeutic Activity 15    Treatment Type: Treatment  PT/PTA: PTA     Number of PTA visits since last PT visit: 1       General Precautions: Standard, fall  Orthopedic Precautions: N/A  Braces: N/A  Respiratory Status: Room air    Spiritual, Cultural Beliefs, Caodaism Practices, Values that Affect Care: no    Subjective:  Communicated with charge nurse, Maricruz, and completed Epic chart review prior to session.  Patient not receptive to therapist but would listen to caregiver instruction ~80% of the time. Frequent yelling and screaming by patient. Hyper focused on eating homar crackers.   Caregiver reports patient is more irritable and difficult to re direct this morning because she has not yet received her medication.     Pain/Comfort  Pain Rating 1: 0/10  Pain Rating Post-Intervention 1: 0/10    Objective:   Patient found with: peripheral IV    Caregiver completed the following tasks and demonstrated success with patient cooperation as patient was not receptive to therapist:  Supine <> sit EOB  STS from EOB NO AD  STS from couch No AD  Stand pivot T/F to couch No AD   Stand pivot T/F to EOB no AD    Therapist  initiated gait training with RW which patient was able to complete for ~3 feet before becoming irritable and stopping.   Spoke with caregiver who reported patient does not use a RW at home and holds caregivers hand to walk.   Attempted HHA x2 for re initiation of gait training. Patient held caregivers hand with R and immediately wrapped L arm around waist of therapist despite being offered therapist hand. Completed 15ft in this manner with Min-Mod A and max encouragement from therapist and caregiver.     Educated patient's caregiver on importance of increased  tolerance to upright position and direct impact on CV endurance and strength. Encouraged caregiver to have patient sit up in chair/ EOB, for a minimum of 2 consecutive hours, 3x per day. If patient is willing, encouraged caregiver to have patient perform AROM TE to BLE throughout the day within all available planes of motion. Re enforced importance of utilizing call light to meet needs in room and not attempt to get up without staff assistance. Patient's caregiver was very receptive and verbalized understanding agreeing to comply.         AM-PAC 6 CLICK MOBILITY  How much help from another person does this patient currently need?   1 = Unable, Total/Dependent Assistance  2 = A lot, Maximum/Moderate Assistance  3 = A little, Minimum/Contact Guard/Supervision  4 = None, Modified [place]/Independent    Turning over in bed (including adjusting bedclothes, sheets and blankets)?: 1 (PERFORMED BY CAREGIVER)  Sitting down on and standing up from a chair with arms (e.g., wheelchair, bedside commode, etc.): 1 (PERFORMED BY CAREGIVER)  Moving from lying on back to sitting on the side of the bed?: 1 (PERFORMED BY CAREGIVER)  Moving to and from a bed to a chair (including a wheelchair)?: 1 (PERFORMED BY CAREGIVER)  Need to walk in hospital room?: 2  Climbing 3-5 steps with a railing?: 1 (NT)  Basic Mobility Total Score: 7    AM-PAC Raw Score CMS G-Code Modifier Level of Impairment Assistance   6 % Total / Unable   7 - 9 CM 80 - 100% Maximal Assist   10 - 14 CL 60 - 80% Moderate Assist   15 - 19 CK 40 - 60% Moderate Assist   20 - 22 CJ 20 - 40% Minimal Assist   23 CI 1-20% SBA / CGA   24 CH 0% Independent/ Mod I     Patient left with bed in chair position with call button in reach and caregiver present.    Assessment:  Juan Pablo Bolden is a 63 y.o. female with a medical diagnosis of Gluteal abscess and presents with overall decline in functional mobility. At this time, patient is unable to follow cues for skilled PT and  responds only to caregiver instruction. Caregiver demonstrates good understanding of assisting patient and utilizing education given. Plan to speak with [facility] PT to determine if patient is appropriate to remain on skilled PT services since she currently appears to be at baseline minus behavioral issues.     Rehab identified problem list/impairments: weakness, impaired endurance, impaired functional mobility, gait instability, impaired balance, decreased safety awareness, decreased lower extremity function, impaired cognition, decreased ROM    Rehab potential is fair.    Activity tolerance: Fair    Discharge recommendations: Low Intensity Therapy (24/7 [facility] & A)      Barriers to discharge:      Equipment recommendations: none     GOALS:   Multidisciplinary Problems       Physical Therapy Goals          Problem: Physical Therapy    Goal Priority Disciplines Outcome Goal Variances Interventions   Physical Therapy Goal     PT, PT/OT      Description: Goals to be met by 4/9/24.  1. Pt will complete bed mobility MIN A.  2. Pt will complete sit to stand MIN A.  3. Pt will ambulate 100ft MIN A with RW.  4. Pt will increase AMPAC score by 2 points to progress functional mobility.                       PLAN:    Patient to be seen 3 x/week to address the above listed problems via gait training, therapeutic exercises, therapeutic activities  Plan of Care expires: 04/09/24  Plan of Care reviewed with: patient, caregiver         03/27/2024

## 2024-03-28 ENCOUNTER — PATIENT OUTREACH (OUTPATIENT)
Dept: ADMINISTRATIVE | Facility: CLINIC | Age: 64
End: 2024-03-28
Payer: MEDICARE

## 2024-03-28 LAB
ANION GAP SERPL CALC-SCNC: 8 MMOL/L (ref 8–16)
BASOPHILS # BLD AUTO: 0.03 K/UL (ref 0–0.2)
BASOPHILS NFR BLD: 1.1 % (ref 0–1.9)
BUN SERPL-MCNC: 10 MG/DL (ref 8–23)
CALCIUM SERPL-MCNC: 9.6 MG/DL (ref 8.7–10.5)
CHLORIDE SERPL-SCNC: 104 MMOL/L (ref 95–110)
CO2 SERPL-SCNC: 27 MMOL/L (ref 23–29)
CREAT SERPL-MCNC: 0.7 MG/DL (ref 0.5–1.4)
DIFFERENTIAL METHOD BLD: ABNORMAL
EOSINOPHIL # BLD AUTO: 0.2 K/UL (ref 0–0.5)
EOSINOPHIL NFR BLD: 6.9 % (ref 0–8)
ERYTHROCYTE [DISTWIDTH] IN BLOOD BY AUTOMATED COUNT: 13.7 % (ref 11.5–14.5)
EST. GFR  (NO RACE VARIABLE): >60 ML/MIN/1.73 M^2
GLUCOSE SERPL-MCNC: 116 MG/DL (ref 70–110)
HCT VFR BLD AUTO: 27.9 % (ref 37–48.5)
HGB BLD-MCNC: 9.1 G/DL (ref 12–16)
IMM GRANULOCYTES # BLD AUTO: 0.01 K/UL (ref 0–0.04)
IMM GRANULOCYTES NFR BLD AUTO: 0.4 % (ref 0–0.5)
LYMPHOCYTES # BLD AUTO: 0.7 K/UL (ref 1–4.8)
LYMPHOCYTES NFR BLD: 26.4 % (ref 18–48)
MCH RBC QN AUTO: 29 PG (ref 27–31)
MCHC RBC AUTO-ENTMCNC: 32.6 G/DL (ref 32–36)
MCV RBC AUTO: 89 FL (ref 82–98)
MONOCYTES # BLD AUTO: 0.3 K/UL (ref 0.3–1)
MONOCYTES NFR BLD: 9.7 % (ref 4–15)
NEUTROPHILS # BLD AUTO: 1.5 K/UL (ref 1.8–7.7)
NEUTROPHILS NFR BLD: 55.5 % (ref 38–73)
NRBC BLD-RTO: 0 /100 WBC
PLATELET # BLD AUTO: 154 K/UL (ref 150–450)
PMV BLD AUTO: 10.5 FL (ref 9.2–12.9)
POTASSIUM SERPL-SCNC: 3.7 MMOL/L (ref 3.5–5.1)
RBC # BLD AUTO: 3.14 M/UL (ref 4–5.4)
SODIUM SERPL-SCNC: 139 MMOL/L (ref 136–145)
VANCOMYCIN TROUGH SERPL-MCNC: 5.7 UG/ML (ref 10–22)
WBC # BLD AUTO: 2.77 K/UL (ref 3.9–12.7)

## 2024-03-28 PROCEDURE — 87075 CULTR BACTERIA EXCEPT BLOOD: CPT | Performed by: INTERNAL MEDICINE

## 2024-03-28 PROCEDURE — 97530 THERAPEUTIC ACTIVITIES: CPT

## 2024-03-28 PROCEDURE — 87070 CULTURE OTHR SPECIMN AEROBIC: CPT | Performed by: INTERNAL MEDICINE

## 2024-03-28 PROCEDURE — 80202 ASSAY OF VANCOMYCIN: CPT | Performed by: INTERNAL MEDICINE

## 2024-03-28 PROCEDURE — 63600175 PHARM REV CODE 636 W HCPCS: Performed by: INTERNAL MEDICINE

## 2024-03-28 PROCEDURE — 99152 MOD SED SAME PHYS/QHP 5/>YRS: CPT | Performed by: RADIOLOGY

## 2024-03-28 PROCEDURE — 77012 CT SCAN FOR NEEDLE BIOPSY: CPT | Mod: TC | Performed by: RADIOLOGY

## 2024-03-28 PROCEDURE — 25000003 PHARM REV CODE 250: Performed by: INTERNAL MEDICINE

## 2024-03-28 PROCEDURE — 85025 COMPLETE CBC W/AUTO DIFF WBC: CPT | Performed by: INTERNAL MEDICINE

## 2024-03-28 PROCEDURE — 63600175 PHARM REV CODE 636 W HCPCS: Performed by: RADIOLOGY

## 2024-03-28 PROCEDURE — 76937 US GUIDE VASCULAR ACCESS: CPT

## 2024-03-28 PROCEDURE — 87205 SMEAR GRAM STAIN: CPT | Performed by: INTERNAL MEDICINE

## 2024-03-28 PROCEDURE — 25000003 PHARM REV CODE 250: Performed by: EMERGENCY MEDICINE

## 2024-03-28 PROCEDURE — 63600175 PHARM REV CODE 636 W HCPCS: Performed by: EMERGENCY MEDICINE

## 2024-03-28 PROCEDURE — 36415 COLL VENOUS BLD VENIPUNCTURE: CPT | Performed by: INTERNAL MEDICINE

## 2024-03-28 PROCEDURE — 11000001 HC ACUTE MED/SURG PRIVATE ROOM

## 2024-03-28 PROCEDURE — 80048 BASIC METABOLIC PNL TOTAL CA: CPT | Performed by: INTERNAL MEDICINE

## 2024-03-28 PROCEDURE — 99231 SBSQ HOSP IP/OBS SF/LOW 25: CPT | Mod: ,,, | Performed by: SURGERY

## 2024-03-28 PROCEDURE — 0J993ZX DRAINAGE OF BUTTOCK SUBCUTANEOUS TISSUE AND FASCIA, PERCUTANEOUS APPROACH, DIAGNOSTIC: ICD-10-PCS | Performed by: RADIOLOGY

## 2024-03-28 RX ORDER — MIDAZOLAM HYDROCHLORIDE 1 MG/ML
INJECTION, SOLUTION INTRAMUSCULAR; INTRAVENOUS CODE/TRAUMA/SEDATION MEDICATION
Status: COMPLETED | OUTPATIENT
Start: 2024-03-28 | End: 2024-03-28

## 2024-03-28 RX ORDER — LACTULOSE 10 G/15ML
20 SOLUTION ORAL 2 TIMES DAILY PRN
Status: DISCONTINUED | OUTPATIENT
Start: 2024-03-28 | End: 2024-03-29 | Stop reason: HOSPADM

## 2024-03-28 RX ORDER — FENTANYL CITRATE 50 UG/ML
INJECTION, SOLUTION INTRAMUSCULAR; INTRAVENOUS CODE/TRAUMA/SEDATION MEDICATION
Status: COMPLETED | OUTPATIENT
Start: 2024-03-28 | End: 2024-03-28

## 2024-03-28 RX ADMIN — CEFTRIAXONE 2 G: 2 INJECTION, POWDER, FOR SOLUTION INTRAMUSCULAR; INTRAVENOUS at 08:03

## 2024-03-28 RX ADMIN — TRIAMTERENE AND HYDROCHLOROTHIAZIDE 1 CAPSULE: 37.5; 25 CAPSULE ORAL at 08:03

## 2024-03-28 RX ADMIN — QUETIAPINE FUMARATE 200 MG: 100 TABLET ORAL at 08:03

## 2024-03-28 RX ADMIN — LITHIUM CARBONATE 300 MG: 300 CAPSULE, GELATIN COATED ORAL at 08:03

## 2024-03-28 RX ADMIN — QUETIAPINE FUMARATE 400 MG: 100 TABLET ORAL at 10:03

## 2024-03-28 RX ADMIN — CLONAZEPAM 0.5 MG: 0.5 TABLET ORAL at 10:03

## 2024-03-28 RX ADMIN — LITHIUM CARBONATE 300 MG: 300 CAPSULE, GELATIN COATED ORAL at 10:03

## 2024-03-28 RX ADMIN — TRAZODONE HYDROCHLORIDE 150 MG: 100 TABLET ORAL at 10:03

## 2024-03-28 RX ADMIN — QUETIAPINE FUMARATE 200 MG: 100 TABLET ORAL at 06:03

## 2024-03-28 RX ADMIN — MIDAZOLAM 1 MG: 1 INJECTION INTRAMUSCULAR; INTRAVENOUS at 04:03

## 2024-03-28 RX ADMIN — VALBENAZINE 80 MG: 80 CAPSULE ORAL at 08:03

## 2024-03-28 RX ADMIN — AMLODIPINE BESYLATE 10 MG: 10 TABLET ORAL at 08:03

## 2024-03-28 RX ADMIN — DOCUSATE SODIUM 100 MG: 100 CAPSULE, LIQUID FILLED ORAL at 10:03

## 2024-03-28 RX ADMIN — VANCOMYCIN HYDROCHLORIDE 1000 MG: 1 INJECTION, POWDER, LYOPHILIZED, FOR SOLUTION INTRAVENOUS at 03:03

## 2024-03-28 RX ADMIN — MIDAZOLAM 1 MG: 1 INJECTION INTRAMUSCULAR; INTRAVENOUS at 03:03

## 2024-03-28 RX ADMIN — FENTANYL CITRATE 50 MCG: 50 INJECTION, SOLUTION INTRAMUSCULAR; INTRAVENOUS at 04:03

## 2024-03-28 RX ADMIN — ATORVASTATIN CALCIUM 20 MG: 10 TABLET, FILM COATED ORAL at 10:03

## 2024-03-28 RX ADMIN — DOCUSATE SODIUM 100 MG: 100 CAPSULE, LIQUID FILLED ORAL at 08:03

## 2024-03-28 RX ADMIN — VANCOMYCIN HYDROCHLORIDE 1000 MG: 1 INJECTION, POWDER, LYOPHILIZED, FOR SOLUTION INTRAVENOUS at 06:03

## 2024-03-28 RX ADMIN — FENTANYL CITRATE 50 MCG: 50 INJECTION, SOLUTION INTRAMUSCULAR; INTRAVENOUS at 03:03

## 2024-03-28 NOTE — PROGRESS NOTES
[facility] Medicine  Progress Note    Patient Name: Juan Pablo Bolden  MRN: [id number]  Patient Class: IP- Inpatient   Admission Date: 3/25/2024  Length of Stay: 2 days  Attending Physician: Mary Vega MD  Primary Care Provider: Brooke Goldberg MD        Subjective:     Principal Problem:Gluteal abscess        HPI:  63-year-old  woman with history of central hypothyroidism, lipodystrophy, osteopenia, hyperprolactinemia, galactorrhea, growth retardation, severe developmental delay, tardive dyskinesia, seizure disorder, hypertension, hyperlipidemia, pancreatic cysts, liver lesion, iron deficiency anemia, schizoaffective disorder, bipolar disorder, constipation, H pylori infection, coronary artery calcifications, and abnormal brain MRI who was brought in by family for abdominal pain and distention noticed by her sister.  Constipation over the last 4 days per family members.  She was given Mag citrate and a suppository today with very small bowel movements.  Patient had recently had a 2 week hospitalization at [facility] psychiatric unit and was just discharged 03/25/2024.  Family reports that patient was recently treated for UTI.  Urine culture 03/10/2024 with E coli.  They also report that patient had been having some difficulty with ambulation felt to be related to her psychiatric medications which have been adjusted during her recent inpatient psychiatric stay.  At baseline, family reports that she is able to ambulate on her own and is verbal although she does not always make sense.  She has her own apartment with 24/7 care.  At the time of my exam, patient is nonverbal.    Overview/Hospital Course:  Gen surg consulted and recommended IR drainage of gluteal abscess vs. Hematoma. IR consulted and plan for CT guided aspiration 03/28. Pt continued on broad spectrum abx pending aspiration. Pt was started on aggressive bowel regimen for constipation and was able to have a  bowel movement.     Interval History: NAEON. PT seen and examined with bedside RN and caregiver present. Pt awake, alert, asking for pen. Planned for aspiration with IR today.     Review of Systems  Objective:     Vital Signs (Most Recent):  Temp: 98.1 °F (36.7 °C) (03/28/24 0813)  Pulse: 78 (03/28/24 0813)  Resp: 18 (03/28/24 0813)  BP: 138/67 (03/28/24 0813)  SpO2: (!) 92 % (03/28/24 0813) Vital Signs (24h Range):  Temp:  [97.5 °F (36.4 °C)-99.9 °F (37.7 °C)] 98.1 °F (36.7 °C)  Pulse:  [78-87] 78  Resp:  [18-20] 18  SpO2:  [92 %-100 %] 92 %  BP: (114-160)/(60-82) 138/67     Weight: 45.3 kg (99 lb 13.9 oz)  Body mass index is 20.17 kg/m².    Intake/Output Summary (Last 24 hours) at 3/28/2024 1056  Last data filed at 3/27/2024 1831  Gross per 24 hour   Intake 345.92 ml   Output 1 ml   Net 344.92 ml         Physical Exam  Vitals and nursing note reviewed. Exam conducted with a chaperone present.   Constitutional:       General: She is not in acute distress.     Appearance: Ill appearance: chronic.   HENT:      Mouth/Throat:      Mouth: Mucous membranes are dry.   Cardiovascular:      Rate and Rhythm: Normal rate and regular rhythm.      Heart sounds: No murmur heard.     No friction rub. No gallop.   Pulmonary:      Effort: Pulmonary effort is normal.      Breath sounds: Normal breath sounds. No wheezing, rhonchi or rales.   Abdominal:      General: Bowel sounds are normal. There is no distension.      Palpations: Abdomen is soft.      Tenderness: There is no abdominal tenderness.   Musculoskeletal:      Comments: Trace BLE edema    Skin:     General: Skin is warm and dry.      Comments: No erythema or induration to buttocks   Neurological:      Mental Status: She is alert.      Comments: Asking for pen, redirectable when caregiver at bedside              Significant Labs: All pertinent labs within the past 24 hours have been reviewed.    Significant Imaging: I have reviewed all pertinent imaging results/findings  "within the past 24 hours.    Assessment/Plan:      * Gluteal abscess  -CT scan of abdomen and pelvis with "rim enhancing fluid collection in the right gluteal muscles measuring 4.3 x 2.8 x 4.5 cm concerning for intramuscular abscess."  - Gen Surg and IR consulted- IR planning for aspiration 03/28/24  - Continue empiric IV Vanc + Rocephin pending aspirate cultures; vanc dosing and monitoring per pharmacy     Other constipation  -CT scan of abdomen pelvis with large amount of stool throughout the colon but without any evidence of obstruction  - s/p HOG enema 03/26 and was able to have BM   - continue stool softener + PRN Bisacodyl suppository       Bilateral leg edema  -may be complicated by hypoalbuminemia    - BNP and TFTs wnl   -echo 01/11/2024 with EF 55-60%, normal systolic and diastolic function  - BLE duplex negative for DVT   - SCDs       Acute cystitis without hematuria  - Continue empiric IV Rocephin   - Urine culture pending       Bipolar disorder   See above       Schizoaffective disorder  - recent IP psychiatric hospitalization at [facility]   - tele psych consulted   -  home medications continued as reported by patient's sister       Essential hypertension  Chronic, controlled. Latest blood pressure and vitals reviewed-     Temp:  [97.5 °F (36.4 °C)-99.9 °F (37.7 °C)]   Pulse:  [78-87]   Resp:  [18-20]   BP: (114-160)/(60-82)   SpO2:  [92 %-100 %] .   Home meds for hypertension were reviewed and noted below.   Hypertension Medications               amLODIPine (NORVASC) 10 MG tablet Take 1 tablet (10 mg total) by mouth once daily.    furosemide (LASIX) 20 MG tablet TAKE 1 TABLET BY MOUTH DAILY AS NEEDED FOR SWELLING    triamterene-hydrochlorothiazide 37.5-25 mg (MAXZIDE-25) 37.5-25 mg per tablet Take 1 tablet by mouth every morning.            While in the hospital, will manage blood pressure as follows; Adjust home antihypertensive regimen as follows- continue Norvasc, Maxzide     Will utilize " "p.r.n. blood pressure medication only if patient's blood pressure greater than 160/100 and she develops symptoms such as worsening chest pain or shortness of breath.    Developmental delay  -supportive care  -aspiration and fall precautions         Pancreas cyst  -CT abdomen/pelvis with "redemonstration of 1.8 cm enhancing lesion in the right lobe of the liver, incompletely evaluated but similar in appearance to the 11/12/2023 exam; multiple complex septated cysts throughout the pancreas, pancreatic ductal dilatation and calcifications in the pancreatic head all of which appear similar to prior study, as noted on prior study, findings may relate to multifocal IPMN, reportedly there has been prior endoscopic aspiration, recommend correlation with pathology results; pancreatic calcifications may be sequela of chronic pancreatitis."  -EUS guided FNA/biopsy of pancreatic cystic lesion 12/20/2023 was negative for dysplasia and/or malignancy  -FNA of pancreatic head cyst 05/16/2022 was negative for malignancy  -LFTs with alk phos 158 and otherwise unremarkable, lipase 83  -outpatient follow-up with Dr Anibal Hyman as prev scheduled - discussed with Dr. Hyman       Seizure disorder  -seizure precautions  - Pt reportedly does not take depakote any longer per family       Central hypothyroidism  -currently on no treatment  - TFTs wnl         VTE Risk Mitigation (From admission, onward)           Ordered     Place sequential compression device  Until discontinued         03/27/24 1341     Reason for No Pharmacological VTE Prophylaxis  Once        Question:  Reasons:  Answer:  Physician Provided (leave comment)  Comment:  IR procedure planned    03/26/24 0238     Reason for no Mechanical VTE Prophylaxis  Once        Question:  Reasons:  Answer:  Physician Provided (leave comment)  Comment:  r/o DVT, review BLE US prior to SCD placement    03/26/24 0238                    Discharge Planning   EDMUNDO:      Code Status: Full Code   Is " the patient medically ready for discharge?: No    Reason for patient still in hospital (select all that apply): Treatment and Consult recommendations  Discharge Plan A: Home                  Mary Vega MD  Department of Hospital Medicine   [facility] Surg

## 2024-03-28 NOTE — ASSESSMENT & PLAN NOTE
Unclear etiology of this.  May be an infected hematoma as she was having falls due to her psychotic issues several weeks ago, may also be due to intramuscular injections during that same time period.  Abscesses very deep into her gluteus and unfortunately due to her mental status and her debilitation I think any local wound care for her would be extremely challenging.  I reviewed her images with Dr. Brady with IR and he agrees this appears to be amenable to aspiration.    -- no acute surgical intervention   -- plan for IR aspiration of right gluteal abscess today  -- continue antibiotics for now, we will evaluate what grows from aspiration

## 2024-03-28 NOTE — SUBJECTIVE & OBJECTIVE
Interval History: NAEON. PT seen and examined with bedside RN and caregiver present. Pt awake, alert, asking for pen. Planned for aspiration with IR today.     Review of Systems  Objective:     Vital Signs (Most Recent):  Temp: 98.1 °F (36.7 °C) (03/28/24 0813)  Pulse: 78 (03/28/24 0813)  Resp: 18 (03/28/24 0813)  BP: 138/67 (03/28/24 0813)  SpO2: (!) 92 % (03/28/24 0813) Vital Signs (24h Range):  Temp:  [97.5 °F (36.4 °C)-99.9 °F (37.7 °C)] 98.1 °F (36.7 °C)  Pulse:  [78-87] 78  Resp:  [18-20] 18  SpO2:  [92 %-100 %] 92 %  BP: (114-160)/(60-82) 138/67     Weight: 45.3 kg (99 lb 13.9 oz)  Body mass index is 20.17 kg/m².    Intake/Output Summary (Last 24 hours) at 3/28/2024 1056  Last data filed at 3/27/2024 1831  Gross per 24 hour   Intake 345.92 ml   Output 1 ml   Net 344.92 ml         Physical Exam  Vitals and nursing note reviewed. Exam conducted with a chaperone present.   Constitutional:       General: She is not in acute distress.     Appearance: Ill appearance: chronic.   HENT:      Mouth/Throat:      Mouth: Mucous membranes are dry.   Cardiovascular:      Rate and Rhythm: Normal rate and regular rhythm.      Heart sounds: No murmur heard.     No friction rub. No gallop.   Pulmonary:      Effort: Pulmonary effort is normal.      Breath sounds: Normal breath sounds. No wheezing, rhonchi or rales.   Abdominal:      General: Bowel sounds are normal. There is no distension.      Palpations: Abdomen is soft.      Tenderness: There is no abdominal tenderness.   Musculoskeletal:      Comments: Trace BLE edema    Skin:     General: Skin is warm and dry.      Comments: No erythema or induration to buttocks   Neurological:      Mental Status: She is alert.      Comments: Asking for pen, redirectable when caregiver at bedside              Significant Labs: All pertinent labs within the past 24 hours have been reviewed.    Significant Imaging: I have reviewed all pertinent imaging results/findings within the past 24  hours.

## 2024-03-28 NOTE — PLAN OF CARE
Pt tolerated interventions fair. Required MIN A-MOD A for bed mobility, Ambulated 20ft MOD A progressing to MAX A with increased frustration by pt. Pt's caregiver reports she is at her PLOF at this time. Pt is to be d/c from acute PT services. Please re-consult if situation changes.

## 2024-03-28 NOTE — PROGRESS NOTES
Pharmacokinetic Assessment Follow Up: IV Vancomycin    Vancomycin serum concentration assessment(s):    The trough level was drawn correctly and can be used to guide therapy at this time. The measurement is below the desired definitive target range of 15 to 20 mcg/mL.    Vancomycin Regimen Plan:    Change regimen to Vancomycin 1000 mg IV every 12 hours with next serum trough concentration measured at 1430 prior to 4th dose on 03/29    Drug levels (last 3 results):  Recent Labs   Lab Result Units 03/26/24 2215 03/28/24  0323   Vancomycin, Random ug/mL 5.9  --    Vancomycin-Trough ug/mL  --  5.7*       Pharmacy will continue to follow and monitor vancomycin.    Please contact pharmacy at extension [phone] for questions regarding this assessment.    Thank you for the consult,   Selma Cabello       Patient brief summary:  Juan Pablo Bolden is a 63 y.o. female initiated on antimicrobial therapy with IV Vancomycin for treatment of skin & soft tissue infection    The patient's current regimen is [id number]    Drug Allergies:   Review of patient's allergies indicates:  No Known Allergies    Actual Body Weight:   45.3 kg    Renal Function:   Estimated Creatinine Clearance: 58.1 mL/min (based on SCr of 0.7 mg/dL).,     Dialysis Method (if applicable):  N/A    CBC (last 72 hours):  Recent Labs   Lab Result Units 03/25/24 2043 03/26/24 0635 03/27/24  0544 03/28/24  0323   WBC K/uL 4.52 2.64* 3.00* 2.77*   Hemoglobin g/dL 10.6* 10.3* 9.8* 9.1*   Hematocrit % 32.3* 31.4* 30.7* 27.9*   Platelets K/uL 175 147* 182 154   Gran % % 74.5* 66.8 67.3 55.5   Lymph % % 15.7* 19.7 22.3 26.4   Mono % % 6.2 8.3 5.7 9.7   Eosinophil % % 1.8 3.0 3.3 6.9   Basophil % % 0.9 1.1 0.7 1.1   Differential Method  Automated Automated Automated Automated       Metabolic Panel (last 72 hours):  Recent Labs   Lab Result Units 03/25/24 2043 03/26/24  0024 03/26/24 0635 03/27/24  0544 03/28/24  0323   Sodium mmol/L 139  --  139 141 139   Potassium mmol/L 4.2   --  4.1 4.0 3.7   Chloride mmol/L 103  --  102 106 104   CO2 mmol/L 28  --  29 27 27   Glucose mg/dL 129*  --  109 103 116*   Glucose, UA   --  Negative  --   --   --    BUN mg/dL 17  --  13 9 10   Creatinine mg/dL 0.7  --  0.7 0.7 0.7   Albumin g/dL 2.9*  --  2.8*  --   --    Total Bilirubin mg/dL 0.3  --  0.4  --   --    Alkaline Phosphatase U/L 158*  --  143*  --   --    AST U/L 14  --  13  --   --    ALT U/L 27  --  23  --   --        Vancomycin Administrations:  vancomycin given in the last 96 hours                     vancomycin (VANCOCIN) 1,000 mg in dextrose 5 % (D5W) 250 mL IVPB (Vial-Mate) (mg) 1,000 mg New Bag 03/28/24 0349     1,000 mg New Bag 03/27/24 0403    vancomycin (VANCOCIN) 1,000 mg in dextrose 5 % (D5W) 250 mL IVPB (Vial-Mate) ()  Restarted 03/26/24 0417     1,000 mg New Bag  0326                    Microbiologic Results:  Microbiology Results (last 7 days)       Procedure Component Value Units Date/Time    Urine culture [[id number]] Collected: 03/26/24 0024    Order Status: Completed Specimen: Urine Updated: 03/27/24 2120     Urine Culture, Routine No growth    Blood culture #1 **CANNOT BE ORDERED STAT** [[id number]] Collected: 03/26/24 0206    Order Status: Completed Specimen: Blood from Peripheral, Antecubital, Left Updated: 03/27/24 1012     Blood Culture, Routine No Growth to date      No Growth to date    Blood culture #2 **CANNOT BE ORDERED STAT** [[id number]] Collected: 03/26/24 0200    Order Status: Completed Specimen: Blood from Peripheral, Forearm, Right Updated: 03/27/24 1012     Blood Culture, Routine No Growth to date      No Growth to date    Urine Culture High Risk [[id number]]     Order Status: Completed Specimen: Urine, Catheterized     Urine Culture High Risk [[id number]]     Order Status: Canceled Specimen: Urine

## 2024-03-28 NOTE — OP NOTE
Pre Op Diagnosis: right lateral pelvis fluid collection     Post Op Diagnosis: same     Procedure:  aspirate     Procedure performed by: Vinicius CERRATO, Sujatha COLE     Written Informed Consent Obtained: Yes     Specimen Removed:  yes     Estimated Blood Loss:  minimal     Findings: Local anesthesia     Sedation:  yes     The patient tolerated the procedure well and there were no complications.      Disposition:  F/U in clinic or with ordering physician    Discharge instructions:  Light activity for 24 hours.  Remove band aid in 24 hours.  No baths (showers are appropriate).      Sterile technique was performed in the lateral right pelvis, lidocaine was used as a local anesthetic.  Unable to aspirate, flush aspirate sent for cultures.  Pt tolerated the procedure well without immediate complications.  Please see radiologist report for details. F/u with PCP and/or ordering physician.

## 2024-03-28 NOTE — PT/OT/SLP PROGRESS
Physical Therapy Treatment and Discharge    Patient Name:  Juan Pablo Bolden   MRN:  [id number]    Recommendations:     Discharge Recommendations: No Therapy Indicated  Discharge Equipment Recommendations: none  Barriers to discharge: None    Assessment:     Juan Pablo Bolden is a 63 y.o. female admitted with a medical diagnosis of Gluteal abscess.  At this time, patient is functioning at their prior level of function and does not require further acute PT services.    Rehab Prognosis: Poor. At this time, pt is unable to follow cues to participate in skilled PT due to hx of developmental delay. Pt responds to caregiver instruction and caregiver reports the pt is functioning at her baseline. Caregiver demonstrated good understanding of transfer techniques to assist pt.  Recent Surgery: * No surgery found *      Plan:     During this hospitalization, patient does not require further acute PT services.  Please re-consult if situation changes.    Subjective     Chief Complaint: Pt agreed to tx  Patient/Family Comments/goals: none stated  Pain/Comfort:  Pain Rating 1: 0/10      Objective:     Communicated with nurse El and epic chart review prior to session.  Patient found supine with peripheral IV, telemetry upon PT entry to room.     General Precautions: Standard, fall  Orthopedic Precautions: N/A  Braces: N/A  Respiratory Status: Room air     Functional Mobility:  Gait belt applied - Yes  Bed Mobility  Rolling Left: minimum assistance  Scooting: minimum assistance  Supine to Sit: contact guard assistance  Sit to Supine: moderate assistance and of 2 persons for LE management and trunk management  Supine Scoot: total A of 2  Transfers  Sit to Stand: minimum assistance with hand-held assist  MAX A to assist pt EOB.  Gait  Patient ambulated 20ft with rolling walker and  MOD A progressing to MAX A with increasing frustration towards end of gait . Patient demonstrates unsteady gait and flexed posture, impaired  "coordination.  Balance  Sitting: minimum assistance  Standing: moderate assistance  Constant verbal cuing from therapist and caregiver throughout for safety, pt receptive to caregiver instruction.    AM-PAC 6 CLICK MOBILITY  Turning over in bed (including adjusting bedclothes, sheets and blankets)?: 3  Sitting down on and standing up from a chair with arms (e.g., wheelchair, bedside commode, etc.): 3  Moving from lying on back to sitting on the side of the bed?: 3  Moving to and from a bed to a chair (including a wheelchair)?: 2  Need to walk in hospital room?: 2  Climbing 3-5 steps with a railing?: 2  Basic Mobility Total Score: 15       Treatment & Education:  Pt tolerated interventions fair. Reviewed importance of OOB activities, activity pacing, and HEP (marching/hip flex, hip abd, heel slides/LAQ, quad sets, ankle pumps) in order to maintain/regain strength. Encouraged to sit up for all meals. Reviewed "call don't fall" policy and increased risk of falling due to weakness, instructed to utilize call bell for assistance with all transfers. Pt's caregiver agreeable to all requests.      Patient left HOB elevated with all lines intact, call button in reach, bed alarm on, and caregiver present..    GOALS:   Multidisciplinary Problems       Physical Therapy Goals          Problem: Physical Therapy    Goal Priority Disciplines Outcome Goal Variances Interventions   Physical Therapy Goal     PT, PT/OT Unable to Meet, Plan Revised     Description: Goals to be met by 4/9/24.  1. Pt will complete bed mobility MIN A.  2. Pt will complete sit to stand MIN A.  3. Pt will ambulate 100ft MIN A with RW.  4. Pt will increase AMPAC score by 2 points to progress functional mobility.                       Time Tracking:     PT Received On: 03/28/24  PT Start Time: 1206     PT Stop Time: 1221  PT Total Time (min): 15 min     Billable Minutes: Therapeutic Activity 15min    Treatment Type: Treatment  PT/PTA: PT     Number of PTA " visits since last PT visit: 0     03/28/2024

## 2024-03-28 NOTE — PROGRESS NOTES
ST swallowing intervention deferred as pt NPO pending I & D.  Pt is reportedly consuming recommended diet and PO medications without incident.  ST will continue POC/diet follow up as previously established next session.

## 2024-03-28 NOTE — ASSESSMENT & PLAN NOTE
Chronic, controlled. Latest blood pressure and vitals reviewed-     Temp:  [97.5 °F (36.4 °C)-99.9 °F (37.7 °C)]   Pulse:  [78-87]   Resp:  [18-20]   BP: (114-160)/(60-82)   SpO2:  [92 %-100 %] .   Home meds for hypertension were reviewed and noted below.   Hypertension Medications               amLODIPine (NORVASC) 10 MG tablet Take 1 tablet (10 mg total) by mouth once daily.    furosemide (LASIX) 20 MG tablet TAKE 1 TABLET BY MOUTH DAILY AS NEEDED FOR SWELLING    triamterene-hydrochlorothiazide 37.5-25 mg (MAXZIDE-25) 37.5-25 mg per tablet Take 1 tablet by mouth every morning.            While in the hospital, will manage blood pressure as follows; Adjust home antihypertensive regimen as follows- continue Norvasc, Maxzide     Will utilize p.r.n. blood pressure medication only if patient's blood pressure greater than 160/100 and she develops symptoms such as worsening chest pain or shortness of breath.

## 2024-03-28 NOTE — SUBJECTIVE & OBJECTIVE
Interval History: AFVSS.  GABRIELLE.  Plan for aspiration today     Medications:  Continuous Infusions:  Scheduled Meds:   amLODIPine  10 mg Oral Daily    atorvastatin  20 mg Oral QHS    cefTRIAXone (Rocephin) IV (PEDS and ADULTS)  2 g Intravenous Q24H    clonazePAM  0.5 mg Oral QHS    docusate sodium  100 mg Oral BID    lithium  300 mg Oral BID    QUEtiapine  200 mg Oral BID    QUEtiapine  400 mg Oral QHS    traZODone  150 mg Oral QHS    triamterene-hydrochlorothiazide 37.5-25 mg  1 capsule Oral Daily    valbenazine  80 mg Oral Daily    vancomycin (VANCOCIN) IV (PEDS and ADULTS)  1,000 mg Intravenous Q12H     PRN Meds:sodium chloride 0.9%, acetaminophen, bisacodyL, clonazePAM, glucagon (human recombinant), glucose, glucose, hydrALAZINE, lactulose, naloxone, ondansetron, sodium chloride 0.9%, Pharmacy to dose Vancomycin consult **AND** vancomycin - pharmacy to dose     Review of patient's allergies indicates:  No Known Allergies  Objective:     Vital Signs (Most Recent):  Temp: 97.1 °F (36.2 °C) (03/28/24 1126)  Pulse: 77 (03/28/24 1126)  Resp: 17 (03/28/24 1126)  BP: 132/68 (03/28/24 1126)  SpO2: 95 % (03/28/24 1126) Vital Signs (24h Range):  Temp:  [97.1 °F (36.2 °C)-99.9 °F (37.7 °C)] 97.1 °F (36.2 °C)  Pulse:  [77-86] 77  Resp:  [17-18] 17  SpO2:  [92 %-100 %] 95 %  BP: (114-141)/(60-69) 132/68     Weight: 45.3 kg (99 lb 13.9 oz)  Body mass index is 20.17 kg/m².    Intake/Output - Last 3 Shifts         03/26 0700 03/27 0659 03/27 0700 03/28 0659 03/28 0700 03/29 0659    I.V. (mL/kg) 19.5 (0.4)      IV Piggyback 97.6 345.9     Total Intake(mL/kg) 117.2 (2.6) 345.9 (7.6)     Urine (mL/kg/hr)  2 (0)     Total Output  2     Net +117.2 +343.9            Urine Occurrence 3 x      Stool Occurrence 5 x               Physical Exam     Significant Labs:  I have reviewed all pertinent lab results within the past 24 hours.  CBC:   Recent Labs   Lab 03/28/24  0323   WBC 2.77*   RBC 3.14*   HGB 9.1*   HCT 27.9*      MCV  89   MCH 29.0   MCHC 32.6     BMP:   Recent Labs   Lab 03/28/24  0323   *      K 3.7      CO2 27   BUN 10   CREATININE 0.7   CALCIUM 9.6       Significant Diagnostics:  I have reviewed all pertinent imaging results/findings within the past 24 hours.

## 2024-03-28 NOTE — ASSESSMENT & PLAN NOTE
-CT scan of abdomen pelvis with large amount of stool throughout the colon but without any evidence of obstruction  - s/p HOG enema 03/26 and was able to have BM   - continue stool softener + PRN Bisacodyl suppository

## 2024-03-28 NOTE — PLAN OF CARE
Free from falls. 24hr caregiver at bedside. IV abx maintained. Swallowed meds whole with applesauce. No s/s of acute distress. 12hr chart check complete.

## 2024-03-28 NOTE — ASSESSMENT & PLAN NOTE
- recent IP psychiatric hospitalization at [facility]   - tele psych consulted   -  home medications continued as reported by patient's sister

## 2024-03-28 NOTE — PROGRESS NOTES
[facility]  General Surgery  Progress Note    Subjective:     History of Present Illness:  Juan Pablo Bolden is a 63 y.o. female history of developmental delays, tardive dyskinesia, seizure disorder, schizoaffective disorder bipolar disorder, as well as multiple other medical issues well known to me for evaluation of her pancreatic cysts, on whom we were consulted for a gluteal abscess and constipation.  She originally presented to the emergency department last night for abdominal pain and distention that which was noticed by her sister.  She just returned from a 2 week hospitalization at [facility] psychiatric unit and returned home yesterday.  They do report however that she has been constipated for about 4 days and also that she recently had a UTI (urine culture on 03/10/2024 showed E coli).  Based on my review of her records it does appear that she was having frequent falls as well as having psychiatric issues which prompted her hospitalization due to some changes in her medications.  Workup in the emergency department revealed some hypertension, normal white count, drop in her hemoglobin to 10.6 from 12.6 on 03/10/2024, and labs were otherwise overall pretty unremarkable.  CT scan was done which does show a large amount of stool throughout her colon as well as a rim enhancing fluid collection in her right gluteal muscle measuring approximately 4.3 x 4.5 cm concerning for an intramuscular abscess.  CT scan findings were otherwise stable including her known IPMN.    Upon my evaluation this morning she is complaining of some tenderness to her left buttocks, and her caregiver who is with her does report that she has been complaining of some tenderness to her buttocks not wanting to sit for long periods of time.    Post-Op Info:  * No surgery found *         Interval History: AFVSS.  GABRIELLE.  Plan for aspiration today     Medications:  Continuous Infusions:  Scheduled Meds:   amLODIPine  10 mg Oral  Daily    atorvastatin  20 mg Oral QHS    cefTRIAXone (Rocephin) IV (PEDS and ADULTS)  2 g Intravenous Q24H    clonazePAM  0.5 mg Oral QHS    docusate sodium  100 mg Oral BID    lithium  300 mg Oral BID    QUEtiapine  200 mg Oral BID    QUEtiapine  400 mg Oral QHS    traZODone  150 mg Oral QHS    triamterene-hydrochlorothiazide 37.5-25 mg  1 capsule Oral Daily    valbenazine  80 mg Oral Daily    vancomycin (VANCOCIN) IV (PEDS and ADULTS)  1,000 mg Intravenous Q12H     PRN Meds:sodium chloride 0.9%, acetaminophen, bisacodyL, clonazePAM, glucagon (human recombinant), glucose, glucose, hydrALAZINE, lactulose, naloxone, ondansetron, sodium chloride 0.9%, Pharmacy to dose Vancomycin consult **AND** vancomycin - pharmacy to dose     Review of patient's allergies indicates:  No Known Allergies  Objective:     Vital Signs (Most Recent):  Temp: 97.1 °F (36.2 °C) (03/28/24 1126)  Pulse: 77 (03/28/24 1126)  Resp: 17 (03/28/24 1126)  BP: 132/68 (03/28/24 1126)  SpO2: 95 % (03/28/24 1126) Vital Signs (24h Range):  Temp:  [97.1 °F (36.2 °C)-99.9 °F (37.7 °C)] 97.1 °F (36.2 °C)  Pulse:  [77-86] 77  Resp:  [17-18] 17  SpO2:  [92 %-100 %] 95 %  BP: (114-141)/(60-69) 132/68     Weight: 45.3 kg (99 lb 13.9 oz)  Body mass index is 20.17 kg/m².    Intake/Output - Last 3 Shifts         03/26 0700 03/27 0659 03/27 0700 03/28 0659 03/28 0700 03/29 0659    I.V. (mL/kg) 19.5 (0.4)      IV Piggyback 97.6 345.9     Total Intake(mL/kg) 117.2 (2.6) 345.9 (7.6)     Urine (mL/kg/hr)  2 (0)     Total Output  2     Net +117.2 +343.9            Urine Occurrence 3 x      Stool Occurrence 5 x               Physical Exam     Significant Labs:  I have reviewed all pertinent lab results within the past 24 hours.  CBC:   Recent Labs   Lab 03/28/24  0323   WBC 2.77*   RBC 3.14*   HGB 9.1*   HCT 27.9*      MCV 89   MCH 29.0   MCHC 32.6     BMP:   Recent Labs   Lab 03/28/24  0323   *      K 3.7      CO2 27   BUN 10   CREATININE 0.7    CALCIUM 9.6       Significant Diagnostics:  I have reviewed all pertinent imaging results/findings within the past 24 hours.  Assessment/Plan:     * Gluteal abscess  Unclear etiology of this.  May be an infected hematoma as she was having falls due to her psychotic issues several weeks ago, may also be due to intramuscular injections during that same time period.  Abscesses very deep into her gluteus and unfortunately due to her mental status and her debilitation I think any local wound care for her would be extremely challenging.  I reviewed her images with Dr. Brady with IR and he agrees this appears to be amenable to aspiration.    -- no acute surgical intervention   -- plan for IR aspiration of right gluteal abscess today  -- continue antibiotics for now, we will evaluate what grows from aspiration     Bipolar disorder  -- as per primary team     Acute cystitis without hematuria  -- as per primary team     Developmental delay  -- as per primary team     Bilateral leg edema  -- as per primary team     Pancreas cyst  -- no changes.  Follows with me for evaluation.  No need for any acute intervention     Other constipation  -- Recommend miralax and senna.  Can consider enemas     Schizoaffective disorder  -- as per primary team     Essential hypertension  -- as per primary team     Seizure disorder  -- as per primary team     Central hypothyroidism  -- as per primary team         Dolly Hyman MD  General Surgery  [facility]

## 2024-03-28 NOTE — INTERVAL H&P NOTE
The patient has been examined and the H&P has been reviewed:    I concur with the findings and no changes have occurred since H&P was written.        Active Hospital Problems    Diagnosis  POA    *Gluteal abscess [L02.31]  Yes    Bilateral leg edema [R60.0]  Yes    Developmental delay [R62.50]  Yes    Acute cystitis without hematuria [N30.00]  Yes    Bipolar disorder [F31.9]  Yes    Pancreas cyst [K86.2]  Yes     -incidental finding on imaging  -followed regularly with surg onc  -plan for alternating MRCP and EUS  -upcoming EUS scheduled for continued surviellance      Other constipation [K59.09]  Yes    Schizoaffective disorder [F25.9]  Yes    Seizure disorder [G40.909]  Yes     -follow by neurology  -no recent seizure activity  -on depakote      Essential hypertension [I10]  Yes     Hypertension Medications               amLODIPine (NORVASC) 10 MG tablet Take 1 tablet (10 mg total) by mouth once daily.    furosemide (LASIX) 20 MG tablet TAKE 1 TABLET BY MOUTH DAILY AS NEEDED FOR SWELLING    triamterene-hydrochlorothiazide 37.5-25 mg (MAXZIDE-25) 37.5-25 mg per tablet Take 1 tablet by mouth every morning.   -at goal today  -continue lifestyle modification with low sodium diet and exercise   -discussed hypertension disease course and importance of treating high blood pressure  -patient understood and advised of risk of untreated blood pressure.  ER precautions were given   for symptoms of hypertensive urgency and emergency.       Central hypothyroidism [E03.8]  Yes     -no longer on treatment  -need to check updated thyroid labs with next labs  Lab Results   Component Value Date    TSH 1.177 04/09/2021         Resolved Hospital Problems   No resolved problems to display.

## 2024-03-28 NOTE — ASSESSMENT & PLAN NOTE
"-CT scan of abdomen and pelvis with "rim enhancing fluid collection in the right gluteal muscles measuring 4.3 x 2.8 x 4.5 cm concerning for intramuscular abscess."  - Gen Surg and IR consulted- IR planning for aspiration 03/28/24  - Continue empiric IV Vanc + Rocephin pending aspirate cultures; vanc dosing and monitoring per pharmacy   "

## 2024-03-28 NOTE — NURSING
Patient sister (Alanis) brought patient home medication Ingezza (Valbenazine) 80mg. Dr. Reid notified, medication brought to pharmacy for identification and labeling. Medication in unopened blister pack with quantity of 30.     Pharmacy returned meds to unit. Medication is in patient med bin.

## 2024-03-29 VITALS
BODY MASS INDEX: 20.13 KG/M2 | SYSTOLIC BLOOD PRESSURE: 155 MMHG | WEIGHT: 99.88 LBS | OXYGEN SATURATION: 98 % | RESPIRATION RATE: 18 BRPM | HEIGHT: 59 IN | HEART RATE: 76 BPM | DIASTOLIC BLOOD PRESSURE: 71 MMHG | TEMPERATURE: 98 F

## 2024-03-29 LAB
ANION GAP SERPL CALC-SCNC: 13 MMOL/L (ref 8–16)
BASOPHILS # BLD AUTO: 0.02 K/UL (ref 0–0.2)
BASOPHILS NFR BLD: 0.8 % (ref 0–1.9)
BUN SERPL-MCNC: 7 MG/DL (ref 8–23)
CALCIUM SERPL-MCNC: 9.9 MG/DL (ref 8.7–10.5)
CHLORIDE SERPL-SCNC: 103 MMOL/L (ref 95–110)
CO2 SERPL-SCNC: 22 MMOL/L (ref 23–29)
CREAT SERPL-MCNC: 0.7 MG/DL (ref 0.5–1.4)
DIFFERENTIAL METHOD BLD: ABNORMAL
EOSINOPHIL # BLD AUTO: 0.2 K/UL (ref 0–0.5)
EOSINOPHIL NFR BLD: 6.7 % (ref 0–8)
ERYTHROCYTE [DISTWIDTH] IN BLOOD BY AUTOMATED COUNT: 13.6 % (ref 11.5–14.5)
EST. GFR  (NO RACE VARIABLE): >60 ML/MIN/1.73 M^2
GLUCOSE SERPL-MCNC: 133 MG/DL (ref 70–110)
GRAM STN SPEC: NORMAL
HCT VFR BLD AUTO: 32.5 % (ref 37–48.5)
HGB BLD-MCNC: 10.4 G/DL (ref 12–16)
IMM GRANULOCYTES # BLD AUTO: 0.01 K/UL (ref 0–0.04)
IMM GRANULOCYTES NFR BLD AUTO: 0.4 % (ref 0–0.5)
LYMPHOCYTES # BLD AUTO: 0.5 K/UL (ref 1–4.8)
LYMPHOCYTES NFR BLD: 21.7 % (ref 18–48)
MCH RBC QN AUTO: 29.4 PG (ref 27–31)
MCHC RBC AUTO-ENTMCNC: 32 G/DL (ref 32–36)
MCV RBC AUTO: 92 FL (ref 82–98)
MONOCYTES # BLD AUTO: 0.2 K/UL (ref 0.3–1)
MONOCYTES NFR BLD: 7.9 % (ref 4–15)
NEUTROPHILS # BLD AUTO: 1.5 K/UL (ref 1.8–7.7)
NEUTROPHILS NFR BLD: 62.5 % (ref 38–73)
NRBC BLD-RTO: 0 /100 WBC
PLATELET # BLD AUTO: 126 K/UL (ref 150–450)
PMV BLD AUTO: 10.3 FL (ref 9.2–12.9)
POTASSIUM SERPL-SCNC: 3.9 MMOL/L (ref 3.5–5.1)
RBC # BLD AUTO: 3.54 M/UL (ref 4–5.4)
SODIUM SERPL-SCNC: 138 MMOL/L (ref 136–145)
WBC # BLD AUTO: 2.4 K/UL (ref 3.9–12.7)

## 2024-03-29 PROCEDURE — 25000003 PHARM REV CODE 250: Performed by: INTERNAL MEDICINE

## 2024-03-29 PROCEDURE — 85025 COMPLETE CBC W/AUTO DIFF WBC: CPT | Performed by: INTERNAL MEDICINE

## 2024-03-29 PROCEDURE — 99231 SBSQ HOSP IP/OBS SF/LOW 25: CPT | Mod: ,,, | Performed by: SURGERY

## 2024-03-29 PROCEDURE — 36415 COLL VENOUS BLD VENIPUNCTURE: CPT | Performed by: INTERNAL MEDICINE

## 2024-03-29 PROCEDURE — 80048 BASIC METABOLIC PNL TOTAL CA: CPT | Performed by: INTERNAL MEDICINE

## 2024-03-29 PROCEDURE — 63600175 PHARM REV CODE 636 W HCPCS: Performed by: INTERNAL MEDICINE

## 2024-03-29 RX ORDER — BISACODYL 10 MG/1
10 SUPPOSITORY RECTAL DAILY PRN
Qty: 12 SUPPOSITORY | Refills: 0 | Status: SHIPPED | OUTPATIENT
Start: 2024-03-29 | End: 2024-04-10

## 2024-03-29 RX ORDER — CEPHALEXIN 500 MG/1
500 CAPSULE ORAL EVERY 6 HOURS
Qty: 8 CAPSULE | Refills: 0 | Status: SHIPPED | OUTPATIENT
Start: 2024-03-29 | End: 2024-04-02

## 2024-03-29 RX ADMIN — DOCUSATE SODIUM 100 MG: 100 CAPSULE, LIQUID FILLED ORAL at 08:03

## 2024-03-29 RX ADMIN — CEFTRIAXONE 2 G: 2 INJECTION, POWDER, FOR SOLUTION INTRAMUSCULAR; INTRAVENOUS at 08:03

## 2024-03-29 RX ADMIN — QUETIAPINE FUMARATE 200 MG: 100 TABLET ORAL at 08:03

## 2024-03-29 RX ADMIN — VALBENAZINE 80 MG: 80 CAPSULE ORAL at 08:03

## 2024-03-29 RX ADMIN — AMLODIPINE BESYLATE 10 MG: 10 TABLET ORAL at 08:03

## 2024-03-29 RX ADMIN — LITHIUM CARBONATE 300 MG: 300 CAPSULE, GELATIN COATED ORAL at 08:03

## 2024-03-29 RX ADMIN — SODIUM CHLORIDE: 9 INJECTION, SOLUTION INTRAVENOUS at 03:03

## 2024-03-29 RX ADMIN — TRIAMTERENE AND HYDROCHLOROTHIAZIDE 1 CAPSULE: 37.5; 25 CAPSULE ORAL at 08:03

## 2024-03-29 RX ADMIN — VANCOMYCIN HYDROCHLORIDE 1000 MG: 1 INJECTION, POWDER, LYOPHILIZED, FOR SOLUTION INTRAVENOUS at 03:03

## 2024-03-29 NOTE — SUBJECTIVE & OBJECTIVE
Interval History: AFVSS.  GABRIELLE.  IR attempted aspiration of gluteal abscess yesterday, minimal fluid remained on repeat imaging.    Medications:  Continuous Infusions:  Scheduled Meds:   amLODIPine  10 mg Oral Daily    atorvastatin  20 mg Oral QHS    cefTRIAXone (Rocephin) IV (PEDS and ADULTS)  2 g Intravenous Q24H    clonazePAM  0.5 mg Oral QHS    docusate sodium  100 mg Oral BID    lithium  300 mg Oral BID    QUEtiapine  200 mg Oral BID    QUEtiapine  400 mg Oral QHS    traZODone  150 mg Oral QHS    triamterene-hydrochlorothiazide 37.5-25 mg  1 capsule Oral Daily    valbenazine  80 mg Oral Daily     PRN Meds:sodium chloride 0.9%, acetaminophen, bisacodyL, clonazePAM, glucagon (human recombinant), glucose, glucose, hydrALAZINE, lactulose, naloxone, ondansetron, sodium chloride 0.9%     Review of patient's allergies indicates:  No Known Allergies  Objective:     Vital Signs (Most Recent):  Temp: 97.9 °F (36.6 °C) (03/29/24 0800)  Pulse: 76 (03/29/24 0800)  Resp: 18 (03/29/24 0800)  BP: (!) 155/71 (03/29/24 0800)  SpO2: 98 % (03/29/24 0800) Vital Signs (24h Range):  Temp:  [97.1 °F (36.2 °C)-98.3 °F (36.8 °C)] 97.9 °F (36.6 °C)  Pulse:  [63-77] 76  Resp:  [14-18] 18  SpO2:  [95 %-98 %] 98 %  BP: (116-155)/(60-75) 155/71     Weight: 45.3 kg (99 lb 13.9 oz)  Body mass index is 20.17 kg/m².    Intake/Output - Last 3 Shifts         03/27 0700 03/28 0659 03/28 0700 03/29 0659 03/29 0700 03/30 0659    P.O.  20     I.V. (mL/kg)  6.1 (0.1)     IV Piggyback 345.9 495.8     Total Intake(mL/kg) 345.9 (7.6) 521.9 (11.5)     Urine (mL/kg/hr) 2 (0) 1 (0)     Other  0     Total Output 2 1     Net +343.9 +520.9            Urine Occurrence 1 x 2 x 1 x    Emesis Occurrence 1 x               Physical Exam  Vitals reviewed.   Constitutional:       General: She is not in acute distress.     Appearance: She is not toxic-appearing or diaphoretic.   HENT:      Nose: Nose normal.   Eyes:      Conjunctiva/sclera: Conjunctivae normal.    Cardiovascular:      Rate and Rhythm: Normal rate.   Pulmonary:      Effort: Pulmonary effort is normal. No respiratory distress.   Abdominal:      General: There is distension.      Tenderness: There is abdominal tenderness. There is no rebound.   Musculoskeletal:         General: Swelling present.      Right lower leg: Edema present.      Left lower leg: Edema present.   Skin:     General: Skin is warm and dry.   Psychiatric:      Comments: Sleepy this morning          Significant Labs:  I have reviewed all pertinent lab results within the past 24 hours.  CBC:   Recent Labs   Lab 03/29/24  0849   WBC 2.40*   RBC 3.54*   HGB 10.4*   HCT 32.5*   *   MCV 92   MCH 29.4   MCHC 32.0     BMP:   Recent Labs   Lab 03/29/24  0849   *      K 3.9      CO2 22*   BUN 7*   CREATININE 0.7   CALCIUM 9.9     Microbiology Results (last 7 days)       Procedure Component Value Units Date/Time    Blood culture #1 **CANNOT BE ORDERED STAT** [[id number]] Collected: 03/26/24 0206    Order Status: Completed Specimen: Blood from Peripheral, Antecubital, Left Updated: 03/29/24 1012     Blood Culture, Routine No Growth to date      No Growth to date      No Growth to date      No Growth to date    Blood culture #2 **CANNOT BE ORDERED STAT** [[id number]] Collected: 03/26/24 0200    Order Status: Completed Specimen: Blood from Peripheral, Forearm, Right Updated: 03/29/24 1012     Blood Culture, Routine No Growth to date      No Growth to date      No Growth to date      No Growth to date    Gram stain [[id number]] Collected: 03/28/24 1611    Order Status: Completed Specimen: Abscess from Buttocks, Right Updated: 03/29/24 0207     Gram Stain Result No WBC's, epithelial cells or organisms seen    Culture, Anaerobe [[id number]] Collected: 03/28/24 1611    Order Status: Sent Specimen: Abscess from Buttocks, Right Updated: 03/28/24 2307    Aerobic culture [[id number]] Collected: 03/28/24 1611    Order Status: Sent  Specimen: Abscess from Buttocks, Right Updated: 03/28/24 2307    Urine culture [[id number]] Collected: 03/26/24 0024    Order Status: Completed Specimen: Urine Updated: 03/27/24 2120     Urine Culture, Routine No growth    Urine Culture High Risk [[id number]]     Order Status: Completed Specimen: Urine, Catheterized     Urine Culture High Risk [[id number]]     Order Status: Canceled Specimen: Urine           Specimen (24h ago, onward)       Start     Ordered    03/28/24 1601  Specimen to Pathology, Radiology Abscess  Once        Question Answer Comment   Source: Abscess    Clinical Information: gluteal abscess    Release to patient Immediate        03/28/24 1610                    Significant Diagnostics:  I have reviewed all pertinent imaging results/findings within the past 24 hours.

## 2024-03-29 NOTE — PROGRESS NOTES
[facility]  General Surgery  Progress Note    Subjective:     History of Present Illness:  Juan Pablo Bolden is a 63 y.o. female history of developmental delays, tardive dyskinesia, seizure disorder, schizoaffective disorder bipolar disorder, as well as multiple other medical issues well known to me for evaluation of her pancreatic cysts, on whom we were consulted for a gluteal abscess and constipation.  She originally presented to the emergency department last night for abdominal pain and distention that which was noticed by her sister.  She just returned from a 2 week hospitalization at [facility] psychiatric unit and returned home yesterday.  They do report however that she has been constipated for about 4 days and also that she recently had a UTI (urine culture on 03/10/2024 showed E coli).  Based on my review of her records it does appear that she was having frequent falls as well as having psychiatric issues which prompted her hospitalization due to some changes in her medications.  Workup in the emergency department revealed some hypertension, normal white count, drop in her hemoglobin to 10.6 from 12.6 on 03/10/2024, and labs were otherwise overall pretty unremarkable.  CT scan was done which does show a large amount of stool throughout her colon as well as a rim enhancing fluid collection in her right gluteal muscle measuring approximately 4.3 x 4.5 cm concerning for an intramuscular abscess.  CT scan findings were otherwise stable including her known IPMN.    Upon my evaluation this morning she is complaining of some tenderness to her left buttocks, and her caregiver who is with her does report that she has been complaining of some tenderness to her buttocks not wanting to sit for long periods of time.    Post-Op Info:  * No surgery found *         Interval History: AFVSS.  GABRIELLE.  IR attempted aspiration of gluteal abscess yesterday, minimal fluid remained on repeat  imaging.    Medications:  Continuous Infusions:  Scheduled Meds:   amLODIPine  10 mg Oral Daily    atorvastatin  20 mg Oral QHS    cefTRIAXone (Rocephin) IV (PEDS and ADULTS)  2 g Intravenous Q24H    clonazePAM  0.5 mg Oral QHS    docusate sodium  100 mg Oral BID    lithium  300 mg Oral BID    QUEtiapine  200 mg Oral BID    QUEtiapine  400 mg Oral QHS    traZODone  150 mg Oral QHS    triamterene-hydrochlorothiazide 37.5-25 mg  1 capsule Oral Daily    valbenazine  80 mg Oral Daily     PRN Meds:sodium chloride 0.9%, acetaminophen, bisacodyL, clonazePAM, glucagon (human recombinant), glucose, glucose, hydrALAZINE, lactulose, naloxone, ondansetron, sodium chloride 0.9%     Review of patient's allergies indicates:  No Known Allergies  Objective:     Vital Signs (Most Recent):  Temp: 97.9 °F (36.6 °C) (03/29/24 0800)  Pulse: 76 (03/29/24 0800)  Resp: 18 (03/29/24 0800)  BP: (!) 155/71 (03/29/24 0800)  SpO2: 98 % (03/29/24 0800) Vital Signs (24h Range):  Temp:  [97.1 °F (36.2 °C)-98.3 °F (36.8 °C)] 97.9 °F (36.6 °C)  Pulse:  [63-77] 76  Resp:  [14-18] 18  SpO2:  [95 %-98 %] 98 %  BP: (116-155)/(60-75) 155/71     Weight: 45.3 kg (99 lb 13.9 oz)  Body mass index is 20.17 kg/m².    Intake/Output - Last 3 Shifts         03/27 0700  03/28 0659 03/28 0700 03/29 0659 03/29 0700 03/30 0659    P.O.  20     I.V. (mL/kg)  6.1 (0.1)     IV Piggyback 345.9 495.8     Total Intake(mL/kg) 345.9 (7.6) 521.9 (11.5)     Urine (mL/kg/hr) 2 (0) 1 (0)     Other  0     Total Output 2 1     Net +343.9 +520.9            Urine Occurrence 1 x 2 x 1 x    Emesis Occurrence 1 x               Physical Exam  Vitals reviewed.   Constitutional:       General: She is not in acute distress.     Appearance: She is not toxic-appearing or diaphoretic.   HENT:      Nose: Nose normal.   Eyes:      Conjunctiva/sclera: Conjunctivae normal.   Cardiovascular:      Rate and Rhythm: Normal rate.   Pulmonary:      Effort: Pulmonary effort is normal. No respiratory  distress.   Abdominal:      General: There is distension.      Tenderness: There is abdominal tenderness. There is no rebound.   Musculoskeletal:         General: Swelling present.      Right lower leg: Edema present.      Left lower leg: Edema present.   Skin:     General: Skin is warm and dry.   Psychiatric:      Comments: Sleepy this morning          Significant Labs:  I have reviewed all pertinent lab results within the past 24 hours.  CBC:   Recent Labs   Lab 03/29/24  0849   WBC 2.40*   RBC 3.54*   HGB 10.4*   HCT 32.5*   *   MCV 92   MCH 29.4   MCHC 32.0     BMP:   Recent Labs   Lab 03/29/24  0849   *      K 3.9      CO2 22*   BUN 7*   CREATININE 0.7   CALCIUM 9.9     Microbiology Results (last 7 days)       Procedure Component Value Units Date/Time    Blood culture #1 **CANNOT BE ORDERED STAT** [[id number]] Collected: 03/26/24 0206    Order Status: Completed Specimen: Blood from Peripheral, Antecubital, Left Updated: 03/29/24 1012     Blood Culture, Routine No Growth to date      No Growth to date      No Growth to date      No Growth to date    Blood culture #2 **CANNOT BE ORDERED STAT** [[id number]] Collected: 03/26/24 0200    Order Status: Completed Specimen: Blood from Peripheral, Forearm, Right Updated: 03/29/24 1012     Blood Culture, Routine No Growth to date      No Growth to date      No Growth to date      No Growth to date    Gram stain [[id number]] Collected: 03/28/24 1611    Order Status: Completed Specimen: Abscess from Buttocks, Right Updated: 03/29/24 0207     Gram Stain Result No WBC's, epithelial cells or organisms seen    Culture, Anaerobe [[id number]] Collected: 03/28/24 1611    Order Status: Sent Specimen: Abscess from Buttocks, Right Updated: 03/28/24 2307    Aerobic culture [[id number]] Collected: 03/28/24 1611    Order Status: Sent Specimen: Abscess from Buttocks, Right Updated: 03/28/24 2307    Urine culture [[id number]] Collected: 03/26/24 0024    Order  Status: Completed Specimen: Urine Updated: 03/27/24 2120     Urine Culture, Routine No growth    Urine Culture High Risk [[id number]]     Order Status: Completed Specimen: Urine, Catheterized     Urine Culture High Risk [[id number]]     Order Status: Canceled Specimen: Urine           Specimen (24h ago, onward)       Start     Ordered    03/28/24 1601  Specimen to Pathology, Radiology Abscess  Once        Question Answer Comment   Source: Abscess    Clinical Information: gluteal abscess    Release to patient Immediate        03/28/24 1610                    Significant Diagnostics:  I have reviewed all pertinent imaging results/findings within the past 24 hours.  Assessment/Plan:     * Gluteal abscess  Unclear etiology of this.  May be an infected hematoma as she was having falls due to her psychotic issues several weeks ago, may also be due to intramuscular injections during that same time period.  Abscesses very deep into her gluteus and unfortunately due to her mental status and her debilitation I think any local wound care for her would be extremely challenging.  I reviewed her images with Dr. Brady with IR and he agrees this appears to be amenable to aspiration.    Repeat imaging yesterday at time of attempted aspiration revealed almost complete resolution of the abscess cavity as per IR PA. cavity was irrigated and aspirated.  Patient tolerated well.    -- no acute surgical intervention   -- okay for DC from surgical standpoint.  Abscess or hematoma appears to have resolved.  Patient remains afebrile with normal labs and at baseline functional status.  -- recommend follow up with primary care physician    Bipolar disorder  -- as per primary team     Acute cystitis without hematuria  -- as per primary team     Developmental delay  -- as per primary team     Bilateral leg edema  -- as per primary team     Pancreas cyst  -- no changes.  Follows with me for evaluation.  No need for any acute intervention      Other constipation  -- Recommend miralax and senna.  Can consider enemas     Schizoaffective disorder  -- as per primary team     Essential hypertension  -- as per primary team     Seizure disorder  -- as per primary team     Central hypothyroidism  -- as per primary team         Dolly Hyman MD  General Surgery  [facility]

## 2024-03-29 NOTE — ASSESSMENT & PLAN NOTE
- recent IP psychiatric hospitalization at [facility]   - tele psych consulted   -  home medications continued as reported by patient's sister

## 2024-03-29 NOTE — PLAN OF CARE
Purposeful rounding every 2 hrs  Call light within reach  Caregiver at bedside  Bed in lowest position  Free from falls during shift  Medication given per doctors orders  Chart check complete

## 2024-03-29 NOTE — ASSESSMENT & PLAN NOTE
Unclear etiology of this.  May be an infected hematoma as she was having falls due to her psychotic issues several weeks ago, may also be due to intramuscular injections during that same time period.  Abscesses very deep into her gluteus and unfortunately due to her mental status and her debilitation I think any local wound care for her would be extremely challenging.  I reviewed her images with Dr. Brady with IR and he agrees this appears to be amenable to aspiration.    Repeat imaging yesterday at time of attempted aspiration revealed almost complete resolution of the abscess cavity as per IR PA. cavity was irrigated and aspirated.  Patient tolerated well.    -- no acute surgical intervention   -- okay for DC from surgical standpoint.  Abscess or hematoma appears to have resolved.  Patient remains afebrile with normal labs and at baseline functional status.  -- recommend follow up with primary care physician

## 2024-03-29 NOTE — ASSESSMENT & PLAN NOTE
-may be complicated by hypoalbuminemia    - BNP and TFTs wnl   -echo 01/11/2024 with EF 55-60%, normal systolic and diastolic function  - BLE duplex negative for DVT

## 2024-03-29 NOTE — DISCHARGE SUMMARY
[facility] Medicine  Discharge Summary      Patient Name: Juan Pablo Bolden  MRN: [id number]  JESE: [id number]  Patient Class: IP- Inpatient  Admission Date: 3/25/2024  Hospital Length of Stay: 3 days  Discharge Date and Time: 3/29/2024 10:50 AM  Attending Physician: Nathalia att. providers found   Discharging Provider: Mary Vega MD  Primary Care Provider: Brooke Goldberg MD    Primary Care Team: Networked reference to record PCT     HPI:   63-year-old  woman with history of central hypothyroidism, lipodystrophy, osteopenia, hyperprolactinemia, galactorrhea, growth retardation, severe developmental delay, tardive dyskinesia, seizure disorder, hypertension, hyperlipidemia, pancreatic cysts, liver lesion, iron deficiency anemia, schizoaffective disorder, bipolar disorder, constipation, H pylori infection, coronary artery calcifications, and abnormal brain MRI who was brought in by family for abdominal pain and distention noticed by her sister.  Constipation over the last 4 days per family members.  She was given Mag citrate and a suppository today with very small bowel movements.  Patient had recently had a 2 week hospitalization at [facility] psychiatric unit and was just discharged 03/25/2024.  Family reports that patient was recently treated for UTI.  Urine culture 03/10/2024 with E coli.  They also report that patient had been having some difficulty with ambulation felt to be related to her psychiatric medications which have been adjusted during her recent inpatient psychiatric stay.  At baseline, family reports that she is able to ambulate on her own and is verbal although she does not always make sense.  She has her own apartment with /7 care.  At the time of my exam, patient is nonverbal.    * No surgery found *      Hospital Course:   Gen surg consulted and recommended IR drainage of gluteal abscess vs. Hematoma. Pt continued on broad spectrum abx pending  "aspiration.  IR consulted and plan for CT guided aspiration 03/28. When IR attempted to aspirate, there was minimal fluid noted on CT and resolution of abscess/hematoma and they were unable to aspirate.     Pt was started on aggressive bowel regimen for constipation and was able to have bowel movement.     Patient was evaluated by PT/OT during hospitalization and deemed to be at her prior level of functioning.     Pt seen and examined on day of discharge, caregiver at bedside. Pt appears in NAD, abd is soft, nontender on exam with good bowel sounds. Discussed with Gen Surg Dr. Hyman- no additional intervention warranted at this time and pt was deemed stable for discharge from surgical standpoint. Pt appears stable for discharge home with caregiver per my exam. Will continue PO Keflex to complete total 7d course of antibiotics. She will continue bisacodyl NJ as needed for constipation.  Followup with PCP within 3-5 days. Discharge plan was discussed via phone with pt's sister Ms. Noyola and all questions answered to her satisfaction.     See below for further details.           Goals of Care Treatment Preferences:  Code Status: Full Code      Consults:   Consults (From admission, onward)          Status Ordering Provider     Inpatient consult to Interventional Radiology  Once        Provider:  Russell Croft MD    Completed ROBERTO BECKER     Inpatient consult to General Surgery  Once        Provider:  Dolly Hyman MD    Completed ERIK SIMPSON            Neuro  Seizure disorder  -seizure precautions  - Pt reportedly does not take depakote any longer per family       Endocrine  Central hypothyroidism  -currently on no treatment  - TFTs wnl       GI  Pancreas cyst  -CT abdomen/pelvis with "redemonstration of 1.8 cm enhancing lesion in the right lobe of the liver, incompletely evaluated but similar in appearance to the 11/12/2023 exam; multiple complex septated cysts throughout the pancreas, pancreatic " "ductal dilatation and calcifications in the pancreatic head all of which appear similar to prior study, as noted on prior study, findings may relate to multifocal IPMN, reportedly there has been prior endoscopic aspiration, recommend correlation with pathology results; pancreatic calcifications may be sequela of chronic pancreatitis."  -EUS guided FNA/biopsy of pancreatic cystic lesion 12/20/2023 was negative for dysplasia and/or malignancy  -FNA of pancreatic head cyst 05/16/2022 was negative for malignancy  -LFTs with alk phos 158 and otherwise unremarkable, lipase 83  -outpatient follow-up with Dr Anibal Hyman as prev scheduled - discussed with Dr. Hyman       Other  Bipolar disorder   See above       Schizoaffective disorder  - recent IP psychiatric hospitalization at [facility]   - tele psych consulted   -  home medications continued as reported by patient's sister       Bilateral leg edema  -may be complicated by hypoalbuminemia    - BNP and TFTs wnl   -echo 01/11/2024 with EF 55-60%, normal systolic and diastolic function  - BLE duplex negative for DVT         Final Active Diagnoses:    Diagnosis Date Noted POA    PRINCIPAL PROBLEM:  Gluteal abscess [L02.31] 03/26/2024 Yes    Other constipation [K59.09] 04/13/2021 Yes    Bilateral leg edema [R60.0] 03/26/2024 Yes    Acute cystitis without hematuria [N30.00] 03/26/2024 Yes    Bipolar disorder [F31.9] 03/26/2024 Yes    Schizoaffective disorder [F25.9]  Yes    Essential hypertension [I10] 02/05/2020 Yes    Developmental delay [R62.50] 03/26/2024 Yes    Pancreas cyst [K86.2] 04/14/2023 Yes    Seizure disorder [G40.909] 02/05/2020 Yes    Central hypothyroidism [E03.8] 07/02/2014 Yes      Problems Resolved During this Admission:       Discharged Condition: good    Disposition: Home or Self Care    Follow Up:   Follow-up Information       [facility] .    Contact information:  [facility] [place] [address] [phone]             Brooke Goldberg " MD SRI. Schedule an appointment as soon as possible for a visit in 3 day(s).    Specialty: Internal Medicine  Contact information:  [address]   [address]  [facility] [address]  [phone]               Dolly Hyman MD. Schedule an appointment as soon as possible for a visit.    Specialty: Surgical Oncology  Why: As needed  Contact information:  [address] [facility]  [facility] [address] [phone]                           Patient Instructions:      Ambulatory referral/consult to Outpatient Case Management   Referral Priority: Routine Referral Type: Consultation   Referral Reason: Specialty Services Required   Number of Visits Requested: 1     Ambulatory Referral to Chronic Care Management   Referral Priority: Routine Referral Type: Consultation   Referral Reason: Specialty Services Required Referral Location: [facility]   Requested Specialty: Behavioral Health   Number of Visits Requested: 1     Diet Adult Regular     Activity as tolerated       Significant Diagnostic Studies: See Hospital Course     Pending Diagnostic Studies:       None           Medications:  Reconciled Home Medications:      Medication List        START taking these medications      bisacodyL 10 mg Supp  Commonly known as: DULCOLAX  Place 1 suppository (10 mg total) rectally daily as needed (constipation).     cephALEXin 500 MG capsule  Commonly known as: KEFLEX  Take 1 capsule (500 mg total) by mouth every 6 (six) hours. for 2 days            CHANGE how you take these medications      furosemide 20 MG tablet  Commonly known as: LASIX  TAKE 1 TABLET BY MOUTH DAILY AS NEEDED FOR SWELLING  What changed:   how much to take  how to take this  when to take this  reasons to take this     traZODone 150 MG tablet  Commonly known as: DESYREL  TAKE 1 TABLET BY MOUTH AT BEDTIME AS NEEDED FOR INSOMNIA  What changed: when to take this            CONTINUE taking these medications      acetaminophen 650 MG Tbsr  Commonly known as:  TYLENOL  Take 650 mg by mouth every 8 (eight) hours as needed for Pain.     amLODIPine 10 MG tablet  Commonly known as: NORVASC  Take 1 tablet (10 mg total) by mouth once daily.     cetirizine 10 MG tablet  Commonly known as: ZYRTEC  Take 10 mg by mouth daily as needed for Allergies.     clonazePAM 0.5 MG tablet  Commonly known as: KlonoPIN  Take 1 nightly at bedtime and one daily as needed for agitation     diclofenac sodium 1 % Gel  Commonly known as: VOLTAREN  APPLY TWO GRAMS TO THE AFFECTED AREA FOUR TIMES DAILY AS NEEDED FOR PAIN     diphenhydrAMINE 25 mg capsule  Commonly known as: BENADRYL  Take 25 mg by mouth nightly as needed for Allergies.     docusate sodium 100 MG capsule  Commonly known as: COLACE  Take 1 capsule (100 mg total) by mouth once daily.     fluticasone propionate 50 mcg/actuation nasal spray  Commonly known as: FLONASE  SHAKE LIQUID AND USE 2 SPRAYS IN EACH NOSTRIL EVERY DAY     folic acid 1 MG tablet  Commonly known as: FOLVITE  Take 1 tablet (1 mg total) by mouth once daily.     INGREZZA 80 mg Cap  Generic drug: valbenazine  Take 1 capsule by mouth once daily.     lithium 300 MG capsule  Commonly known as: ESKALITH  Take 300 mg by mouth 2 (two) times daily.     melatonin 10 mg Tab  Take 1 tablet (10 mg total) by mouth every evening.     polyethylene glycol 17 gram/dose powder  Commonly known as: GLYCOLAX  One cup t.i.d. for 3 days, 2 cups b.i.d. for 2 days, and then 1 cup daily.     * QUEtiapine 200 MG Tab  Commonly known as: SEROQUEL  Take 200 mg by mouth 2 (two) times daily. 8am and 5pm     * QUEtiapine 200 MG Tab  Commonly known as: SEROQUEL  Take 400 mg by mouth every evening. 10pm     simethicone 125 mg Cap capsule  Commonly known as: MYLICON  1 capsule after meals and at bedtime as needed     simvastatin 40 MG tablet  Commonly known as: ZOCOR  Take 1 tablet (40 mg total) by mouth every evening.     triamterene-hydrochlorothiazide 37.5-25 mg 37.5-25 mg per tablet  Commonly known as:  MAXZIDE-25  Take 1 tablet by mouth every morning.           * This list has 2 medication(s) that are the same as other medications prescribed for you. Read the directions carefully, and ask your doctor or other care provider to review them with you.                STOP taking these medications      glycerin adult suppository              Indwelling Lines/Drains at time of discharge:   Lines/Drains/Airways       None                   Time spent on the discharge of patient: 45 minutes         Mary Vega MD  Department of Hospital Medicine  [facility] - Med Surg

## 2024-03-31 LAB
BACTERIA BLD CULT: NORMAL
BACTERIA BLD CULT: NORMAL

## 2024-04-01 ENCOUNTER — PATIENT MESSAGE (OUTPATIENT)
Dept: PRIMARY CARE CLINIC | Facility: CLINIC | Age: 64
End: 2024-04-01
Payer: MEDICARE

## 2024-04-01 ENCOUNTER — TELEPHONE (OUTPATIENT)
Dept: PSYCHIATRY | Facility: CLINIC | Age: 64
End: 2024-04-01
Payer: MEDICARE

## 2024-04-01 ENCOUNTER — TELEPHONE (OUTPATIENT)
Dept: FAMILY MEDICINE | Facility: CLINIC | Age: 64
End: 2024-04-01
Payer: MEDICARE

## 2024-04-01 DIAGNOSIS — K59.00 CONSTIPATION, UNSPECIFIED CONSTIPATION TYPE: Primary | ICD-10-CM

## 2024-04-01 LAB — BACTERIA SPEC AEROBE CULT: NO GROWTH

## 2024-04-01 RX ORDER — LACTULOSE 10 G/15ML
10 SOLUTION ORAL 3 TIMES DAILY PRN
Qty: 300 ML | Refills: 1 | Status: SHIPPED | OUTPATIENT
Start: 2024-04-01 | End: 2024-05-09

## 2024-04-01 RX ORDER — LACTULOSE 10 G/15ML
10 SOLUTION ORAL 3 TIMES DAILY PRN
Qty: 300 ML | Refills: 1 | Status: SHIPPED | OUTPATIENT
Start: 2024-04-01 | End: 2024-04-01 | Stop reason: SDUPTHER

## 2024-04-01 NOTE — TELEPHONE ENCOUNTER
I received a message about this patient in telephone calls. I ordered an xray and recommended possible enema but I have also filled the requested lactulose    I have signed for the following orders AND/OR meds.  Please call the patient and ask the patient to schedule the testing AND/OR inform about any medications that were sent. Medications have been sent to pharmacy listed below      No orders of the defined types were placed in this encounter.      Medications Ordered This Encounter   Medications    lactulose (CHRONULAC) 20 gram/30 mL Soln     Sig: Take 15 mLs (10 g total) by mouth 3 (three) times daily as needed (constipation).     Dispense:  300 mL     Refill:  1         [facility] - [place] - [address] [facility]  [address] [facility] [place] [address]  Phone: [phone] Fax: [phone]    [id number] - [place] - [address] AT [facility]  [address]  [place] [address]  Phone: [phone] Fax: [phone]    [id number] [place] - [address] AT [facility] [address]  [address]  [place] [address]  Phone: [phone] Fax: [phone]    [facility] - [facility] [address] [facility]  [address] [facility] [address]  Phone: [phone] Fax: [phone]

## 2024-04-01 NOTE — TELEPHONE ENCOUNTER
OK to come in for xray. She may need to use an enema along with her oral medications but we can check xray first.    I have signed for the following orders AND/OR meds.  Please call the patient and ask the patient to schedule the testing AND/OR inform about any medications that were sent. Medications have been sent to pharmacy listed below      Orders Placed This Encounter   Procedures    X-Ray KUB     Standing Status:   Future     Standing Expiration Date:   4/1/2025     Order Specific Question:   May the Radiologist modify the order per protocol to meet the clinical needs of the patient?     Answer:   Yes     Order Specific Question:   Release to patient     Answer:   Immediate              [facility] - [place] - [address] [facility]  [address] [facility] [address] [place] [address]  Phone: [phone] Fax: [phone]    [id number] - [place] - [address] AT [facility]  [address]  [place] [address]  Phone: [phone] Fax: [phone]    [facility] #[id number] - [place] - [address] AT [facility] [address]  [address]  [facility] [address]  Phone: [phone] Fax: [phone]    [facility] - [place], [place] - [address]  [address] [facility] [address]  Phone: [phone] Fax: [phone]

## 2024-04-01 NOTE — TELEPHONE ENCOUNTER
[facility] Hospitalist Internal Medicine   Progress Note              Subjective:  Patient seen and examined by me.       No overnight events or complaints    14 point Review of systems is negative except for as noted above.     I/O's    Intake/Output Summary (Last 24 hours) at 2023 0948  Last data filed at 2023 0500  Gross per 24 hour   Intake --   Output 820 ml   Net -820 ml         ALLERGIES:  Vancomycin, Baclofen, Shellfish allergy   (food or med), and Zosyn     No data recorded  MAP (mmHg)  Av.9  Min: 66  Max: 102          HOSPITAL MEDS  Current Facility-Administered Medications   Medication Dose Route Frequency Provider Last Rate Last Admin   • midodrine (PROAMATINE) tablet 5 mg  5 mg Oral Daily Gwendolyn Crowder CNP   5 mg at 23 0900   • atorvastatin (LIPITOR) tablet 40 mg  40 mg Oral Nightly Sangita Murray MD   40 mg at 23 2102   • B complex-vitamin C-folic acid (NEPHRO-ANJELICA) tablet 0.8 mg  1 tablet Oral Daily with dinner Sangita Murray MD   0.8 mg at 23 1719   • epoetin alejo-epbx (RETACRIT) 49363 UNIT/ML injection 30,000 Units  30,000 Units Intravenous Once per day on  Sat Sangita Murray MD   30,000 Units at 23 1816   • levothyroxine (SYNTHROID, LEVOTHROID) tablet 100 mcg  100 mcg Oral Daily Sangita Murray MD   100 mcg at 23 0545   • magnesium oxide (MAG-OX) tablet 400 mg  400 mg Oral BID Sangita Murray MD   400 mg at 23 0830   • pregabalin (LYRICA) capsule 25 mg  25 mg Oral Daily Sangita Murray MD   25 mg at 23 0832   • thiamine (VITAMIN B1) tablet 100 mg  100 mg Oral Daily Sangita Murray MD   100 mg at 23 0832   • valACYclovir (VALTREX) tablet 500 mg  500 mg Oral Daily Sangita Murray MD   500 mg at 23 1210       Current Facility-Administered Medications   Medication Dose Route Frequency Provider Last Rate Last Admin   • sodium chloride 0.9% infusion   Pt's sister states that pt hasn't had a bowel movement since last Thursday. Pt has taken Ducoloax, Colace and magnesium citrate. Pt's sister is concerned she may have an obstruction, but doesn't want to take her back to the hospital. Pt's sister asking if you think an xray may be approximate. Pls advise.    Intravenous Continuous PRN Timothy oJhnson MD       • sodium chloride 0.9% infusion   Intravenous Continuous PRN Jude Gallardo MD           Current Facility-Administered Medications   Medication Dose Route Frequency Provider Last Rate Last Admin   • sodium citrate anticoagulant 4 % flush 3 mL  3 mL Intracatheter PRN Mike Mckeon MD       • sodium chloride (NORMAL SALINE) 0.9 % bolus 100-200 mL  100-200 mL Intravenous PRN Mike Mckeon MD       • hydrALAZINE (APRESOLINE) tablet 10 mg  10 mg Oral Q8H PRN Jude Gallardo MD       • sodium chloride 0.9% infusion   Intravenous Continuous PRN Timothy Johnson MD       • sodium chloride 0.9% infusion   Intravenous Continuous PRN Jude Gallardo MD       • acetaminophen (TYLENOL) tablet 650 mg  650 mg Oral Q4H PRN Sangita Murray MD       • albumin human (SPA) 25 % injection 12.5 g  12.5 g Intravenous PRN Sangita Murray MD       • benzonatate (TESSALON PERLES) capsule 100 mg  100 mg Oral TID PRN Sangita Murray MD       • cyclobenzaprine (FLEXERIL) tablet 2.5 mg  2.5 mg Oral Daily PRN Sangita Murray MD       • cyclobenzaprine (FLEXERIL) tablet 5 mg  5 mg Oral QHS PRN Sangita Murray MD   5 mg at 01/27/23 2201   • docusate sodium-sennosides (SENOKOT S) 50-8.6 MG 1 tablet  1 tablet Oral BID PRN Sangita Murray MD       • guaiFENesin-DM (ROBITUSSIN DM) 100-10 MG/5ML syrup 10 mL  10 mL Oral Q4H PRN Sangita Murray MD   10 mL at 01/16/23 0149   • HYDROcodone-acetaminophen (NORCO) 5-325 MG per tablet 2 tablet  2 tablet Oral Q6H PRN Sangita Murray MD   2 tablet at 01/28/23 0827   • melatonin tablet 3 mg  3 mg Oral Nightly PRN Sangita Murray MD   3 mg at 01/27/23 2201   • metoCLOPramide (REGLAN) injection 5 mg  5 mg Intravenous TID PRN Sangita Murray MD       • midodrine (PROAMATINE) tablet 5 mg  5 mg Oral Q8H PRN Sangita Murray MD   5 mg at 01/17/23  0859   • ondansetron (ZOFRAN) injection 4 mg  4 mg Intravenous Q12H PRN Sangita Murray MD   4 mg at 01/27/23 0813   • psyllium (METAMUCIL MULTIHEALTH FIBER) 58.12 % packet 1 packet  1 packet Oral Daily PRN Sangita Murray MD              Last Recorded Vitals      SpO2 Readings from Last 3 Encounters:   01/28/23 99%   01/15/23 97%   12/23/22 97%        VITAL SIGNS:     Vital Last Value 24 Hour Range   Temperature 97.7 °F (36.5 °C) (01/28/23 0853) Temp  Min: 97.3 °F (36.3 °C)  Max: 97.9 °F (36.6 °C)   Pulse 94 (01/28/23 0859) Pulse  Min: 62  Max: 94   Respiratory 16 (01/28/23 0859) Resp  Min: 16  Max: 18   Non-Invasive  Blood Pressure 95/51 (01/28/23 0859) BP  Min: 95/51  Max: 160/74   Pulse Oximetry 99 % (01/28/23 0859) SpO2  Min: 97 %  Max: 100 %   Arterial   Blood Pressure   No data recorded      Vital Today Admitted   Weight 89.8 kg (198 lb) (01/27/23 1200) Weight: 90.1 kg (198 lb 10.2 oz) (01/15/23 1603)   Height N/A Height: 5' 11.5\" (181.6 cm) (01/15/23 1603)   BMI N/A BMI (Calculated): 27.32 (01/15/23 1603)            Physical Exam:  General: Alert and oriented, weak appearing   Eyes: no scleral icterus, no conjunctival erythema   Cardio: S1, S2, RRR, no murmur, rub, gallop or thrills noted.   Pulm: Lungs clear to auscultation bilaterally, no wheeze or rhonchi noted. No chest wall tenderness  GI: Soft, non-tender, nondistended. Normal bowel sounds auscultated x4 quadrants  : No suprapubic Tenderness, no CVA tenderness bilaterally  Ext: No upper or lower extremity edema noted. No cords palpated.   Musculoskeletal: 5/5 strength both upper and lower extremities. No joint tenderness or erythema.  Skin:     Areas of bruising no signs of bleeding no signs of no bruising    Psych: Appropriate mood and affect. Good Insight and Judgment  Neuro: No focal motor or sensory deficits noted. Pt appropriately follows commands.    Labs   Recent Labs     01/26/23  0834 01/27/23  1220   WBC 1.7* 2.0*    RBC 2.00* 2.20*   HGB 7.0* 7.8*   HCT 21.8* 23.4*   PLT 11* 12*   .0* 106.4*   MCH 35.0* 35.5*   MCHC 32.1 33.3   NRBCRE 0 1*   PMOR Normal  --          Recent Labs     01/26/23  0834   SODIUM 138   POTASSIUM 4.5   MG 1.7   PHOS 3.9   CO2 27   ANIONGAP 10   GLUCOSE 146*   BUN 38*   CREATININE 5.04*   BCRAT 8   CALCIUM 7.7*        Recent Labs   Lab 01/26/23  0834 01/24/23  0638 01/21/23  1513   SODIUM 138 139 142   POTASSIUM 4.5 4.7 4.5   CHLORIDE 106 106 105   CO2 27 26 27   BUN 38* 48* 44*   CREATININE 5.04* 5.29* 4.69*   GLUCOSE 146* 91 99   ALBUMIN 2.3* 2.6* 2.3*   PHOS 3.9 3.9 4.2       No results for input(s): PCT in the last 72 hours.     No results found     No results for input(s): INR, PT, PTT in the last 72 hours.    No results available in last 24 hours    Imaging    No orders to display       Cultures  Microbiology Results     None          All imaging, labs, vitals and related tests have been reviewed and interpreted by me.     Reviewed FISH results diagnosis as below will discuss with our hematology colleagues    Hypocellular bone marrow clot section (showing <10% overall cellularity) and no evidence of myeloma   -Suboptimal, subcortical marrow core biopsy showing marrow fat and no cellular elements     Comment: Overall evaluation is limited due to the suboptimal nature of the core biopsy; clot section overall appears hypocellular (less than 10% cellularity).  Trilineage hematopoiesis is decreased.  Erythroids and myeloids are present but megakaryocytes are severely decreased.  Immunostain for  and Mum 1 do not show any increase in atypical plasma cells.  SHAVON for kappa and lambda were also used in the evaluation.  CD3 and CD20 show no involvement by atypical lymphoid infiltrate.  E-cadherin marks the erythroid component.  Case subjected for intradepartmental QA review.    Assessment/Plan:    *IgG kappa multiple myeloma history   Pancytopenia with severe thrombocytopenia   Status post  chemotherapy, failed autologous SCT  Likely due to CAR-T cell therapy vs COVID-19 infection   Hypocellular bone marrow clot section (showing <10% overall cellularity) and no evidence of myeloma   -Suboptimal, subcortical marrow core biopsy showing marrow fat and no cellular elements  -Reviewed Dr. Galeana's notes  Continue Retacrit with HD   Obtain CBC with HD and transfuse as needed   -Intermittent platelet transfusion last on 1/21  -Last HGB and platelet 1/19    Reviewed this morning platelet levels are stable above 10 hemoglobin is stable.      Hypertension and Orthostatic hypotension  -monitor closely, midodrine PRN   -bp well controlled reviewed trend  Added PRN for systolic persistently elevated only since most of the time controlled without meds     He has had wide ranges of low and high blood pressure reviewed nephrology notes we will continue with midodrine    Undergoing dialysis monitor blood pressure asymptomatic     ESRD due to MM -on HD TTS, nephro recs  Hx NSTEMI  Hyperlipidemia-cont statin     HO recent COVID-19 infection-received remdesevir   HO recent Pneumonia-received levofloxacine    Immunocompromise state-continue valacyclovir   Chronic Back/abdominal spasms-on norco and flexeril   Hypothyroidism-cont levothyroxine     DVT Prophylaxis  SCDs    Resumed to atovaquone for pneumocystis prophylaxis  .      DVT Prophylaxis:    Diet: Early Tray From Dietary Continuous Breakfast; Saturday, Thursday, Tuesday  Regular Diet  Daily W Dinner; Ensure Clear Apple/clear Liquid Supplement, Apple Oral Nutrition Supplement  Daily W Breakfast; Ensure Clear Generic/clear Liquid Supplement Oral Nutrition Supplement  Baseline Activity: Ambulates Independently    CODE STATUS:   Code Status: Full Resuscitation     Physician Notification:  Consultants notified of patient via Perfect Serve.  Communication: with patient, nurse     MORE than 50 MINS WERE SPENT ON THIS PATIENTS CARE TODAY. This includes the following:  Reviewed all vitals, medications, new orders, I/O, labs, micro, radiology, nurses notes, pertinent consultant notes which are reflected in assessment and plan.This does not include time spent on other items of care such as smoking cessation counseling, prolonged care time, and or advanced care planning if applicable.       Primary Care Physician  DO Jude Henderson MD  [facility] Hospitalist  1/28/2023 9:48 AM

## 2024-04-01 NOTE — TELEPHONE ENCOUNTER
----- Message from Carolyn Pratt sent at 4/1/2024  8:39 AM CDT -----  Contact: Sister Alanis   1MEDICALADVICE     Patient is calling for Medical Advice regarding:constipated    How long has patient had these symptoms:Since procedure Thursday     Pharmacy name and phone#:    Would like response via [facility]allhart:  call back     Comments:

## 2024-04-02 ENCOUNTER — OFFICE VISIT (OUTPATIENT)
Dept: PRIMARY CARE CLINIC | Facility: CLINIC | Age: 64
End: 2024-04-02
Payer: MEDICARE

## 2024-04-02 ENCOUNTER — PATIENT MESSAGE (OUTPATIENT)
Dept: PRIMARY CARE CLINIC | Facility: CLINIC | Age: 64
End: 2024-04-02
Payer: MEDICARE

## 2024-04-02 VITALS
HEART RATE: 77 BPM | BODY MASS INDEX: 21.86 KG/M2 | OXYGEN SATURATION: 99 % | WEIGHT: 108.44 LBS | SYSTOLIC BLOOD PRESSURE: 116 MMHG | HEIGHT: 59 IN | DIASTOLIC BLOOD PRESSURE: 68 MMHG

## 2024-04-02 DIAGNOSIS — E22.1 HYPERPROLACTINEMIA: ICD-10-CM

## 2024-04-02 DIAGNOSIS — R53.81 PHYSICAL DEBILITY: ICD-10-CM

## 2024-04-02 DIAGNOSIS — F84.9 PDD (PERVASIVE DEVELOPMENTAL DISORDER): Primary | ICD-10-CM

## 2024-04-02 DIAGNOSIS — G24.01 TARDIVE DYSKINESIA: Primary | ICD-10-CM

## 2024-04-02 DIAGNOSIS — I25.84 CORONARY ARTERY CALCIFICATION: ICD-10-CM

## 2024-04-02 DIAGNOSIS — R62.50 DEVELOPMENTAL DELAY: ICD-10-CM

## 2024-04-02 DIAGNOSIS — D69.6 THROMBOCYTOPENIA: ICD-10-CM

## 2024-04-02 DIAGNOSIS — D70.9 NEUTROPENIA, UNSPECIFIED TYPE: ICD-10-CM

## 2024-04-02 DIAGNOSIS — K86.2 PANCREAS CYST: ICD-10-CM

## 2024-04-02 DIAGNOSIS — R82.90 ABNORMAL URINE ODOR: ICD-10-CM

## 2024-04-02 DIAGNOSIS — I25.10 CORONARY ARTERY CALCIFICATION: ICD-10-CM

## 2024-04-02 DIAGNOSIS — I10 ESSENTIAL HYPERTENSION: ICD-10-CM

## 2024-04-02 DIAGNOSIS — Z12.39 ENCOUNTER FOR BREAST CANCER SCREENING USING NON-MAMMOGRAM MODALITY: ICD-10-CM

## 2024-04-02 DIAGNOSIS — D61.818 PANCYTOPENIA: ICD-10-CM

## 2024-04-02 DIAGNOSIS — I70.0 CALCIFICATION OF AORTA: ICD-10-CM

## 2024-04-02 DIAGNOSIS — Z12.31 ENCOUNTER FOR SCREENING MAMMOGRAM FOR BREAST CANCER: ICD-10-CM

## 2024-04-02 PROCEDURE — 99999 PR PBB SHADOW E&M-EST. PATIENT-LVL V: CPT | Mod: PBBFAC,,, | Performed by: INTERNAL MEDICINE

## 2024-04-02 PROCEDURE — 99214 OFFICE O/P EST MOD 30 MIN: CPT | Mod: S$PBB,,, | Performed by: INTERNAL MEDICINE

## 2024-04-02 PROCEDURE — 99215 OFFICE O/P EST HI 40 MIN: CPT | Mod: PBBFAC,PN | Performed by: INTERNAL MEDICINE

## 2024-04-02 RX ORDER — ASPIRIN 81 MG/1
81 TABLET ORAL DAILY
Qty: 90 TABLET | Refills: 3 | Status: SHIPPED | OUTPATIENT
Start: 2024-04-02 | End: 2025-04-02

## 2024-04-02 NOTE — TELEPHONE ENCOUNTER
"I have signed for the following orders AND/OR meds.  Please call the patient and ask the patient to schedule the testing AND/OR inform about any medications that were sent. Medications have been sent to pharmacy listed below      Orders Placed This Encounter   Procedures    WALKER FOR HOME USE     Order Specific Question:   Type of Walker:     Answer:   Yasir (4'4"-5'6")     Order Specific Question:   With wheels?     Answer:   Yes     Order Specific Question:   Height:     Answer:   4'11"     Order Specific Question:   Weight:     Answer:   49.2 kg     Order Specific Question:   Length of need (1-99 months):     Answer:   99     Order Specific Question:   Please check all that apply:     Answer:   Patient's condition impairs ambulation.     Order Specific Question:   Please check all that apply:     Answer:   Patient is unable to safely ambulate without equipment.     Order Specific Question:   Please check all that apply:     Answer:   Patient needs help to get in and out of chair.    BATH/SHOWER CHAIR FOR HOME USE     Order Specific Question:   Height:     Answer:   4'11"     Order Specific Question:   Weight:     Answer:   49.2 kg     Order Specific Question:   Length of need (1-99 months):     Answer:   99     Order Specific Question:   Type:     Answer:   With back              [facility] - [place] - [address]  [address] [facility] [address]  Phone: [phone] Fax: [phone]    [id number] - [place] - [address] AT [facility]  [address]  [place] [address]  Phone: [phone] Fax: [phone]    [facility] #[id number] - [place] - [address] AT [facility] [address]  [address]  [place] [address]  Phone: [phone] Fax: [phone]    [facility] - [place], [place] - [address]  [id number] [address] [place] [address]  Phone: [phone] Fax: " [phone]

## 2024-04-02 NOTE — PROGRESS NOTES
Assessment/Plan:    Problem List Items Addressed This Visit          Neuro    PDD (pervasive developmental disorder) - Primary    Overview     -intellectual disability  -followed by both psychiatry and neurology  -recent inpatient psychiatric stay after having manic symptoms  -diagnosed with UTI during this time which thought to be cause of some of her symptoms however she did have her psych medications adjusted  -liver enzymes elevated inpatient so depakote stopped  -was discharged on lithium 300 mg BID, seroquel 200 mg am/noon and 400 mg QHS, ingrezza 80 mg QD, trazodone 150 mg QHS, melatonin 9 mg QHS  -sister concerned about patient being too sedated so she has held some of these medication  -as of now, currently only taking seroquel 200 mg BID and melatonin QHS. Holding all others.  -patient still sedated with current meds  -has appt with psychiatry tomorrow to discuss med adjustment         Developmental delay    Relevant Orders    Ambulatory referral/consult to Home Health       Cardiac/Vascular    Essential hypertension    Overview     Hypertension Medications               amLODIPine (NORVASC) 10 MG tablet Take 1 tablet (10 mg total) by mouth once daily.    furosemide (LASIX) 20 MG tablet TAKE 1 TABLET BY MOUTH DAILY AS NEEDED FOR SWELLING    triamterene-hydrochlorothiazide 37.5-25 mg (MAXZIDE-25) 37.5-25 mg per tablet Take 1 tablet by mouth every morning.   -at goal today  -continue lifestyle modification with low sodium diet and exercise   -discussed hypertension disease course and importance of treating high blood pressure  -patient understood and advised of risk of untreated blood pressure.  ER precautions were given   for symptoms of hypertensive urgency and emergency.          Coronary artery calcification    Overview     -Ct 4/22-Moderate to severe coronary artery calcifications are noted  -on daily asa/statin         Relevant Medications    aspirin (ECOTRIN) 81 MG EC tablet    Calcification of aorta     Overview     Ct 4/22- Mild vascular calcification seen involving the aorta with more moderate vascular calcification seen involving the iliac arteries.    -on daily asa/statin         Relevant Medications    aspirin (ECOTRIN) 81 MG EC tablet       Hematology    Thrombocytopenia    Overview     -stable  -followed by hematology  -thought likely medication induced  -last provider has left system so will need to get established with new hematologist            Oncology    Neutropenia, unspecified type    Overview     -see above            Endocrine    Hyperprolactinemia    Overview     -no longer followed by endocrinology  -need to repeat prolactin level with next labs            GI    Pancreas cyst    Overview     -incidental finding on imaging  -followed regularly with surg onc/GI  -plan for alternating MRCP and EUS          Other Visit Diagnoses       Abnormal urine odor        Relevant Orders    Urinalysis (Completed)    Physical debility        Relevant Orders    Ambulatory referral/consult to Home Health    Encounter for screening mammogram for breast cancer        Encounter for breast cancer screening using non-mammogram modality        Pancytopenia        Relevant Orders    Ambulatory referral/consult to Hematology / Oncology              Brooke Goldberg MD  _____________________________________________________________________________________________________________________________________________________    CC: hospital discharge follow up    HPI:    Patient is in clinic today as an established patient. Patient recently hospitalized at [facility] for constipation and gluteal abscess. Patient admitted to inpatient psych facility for 2 weeks prior to this most recent hospital stay. D/c summary from [facility] is below:    Admission Date: 3/25/2024  Hospital Length of Stay: 3 days  Discharge Date and Time: 3/29/2024 10:50 AM  Attending Physician: No att. providers found   Discharging Provider: Mary Vega  MD  Primary Care Provider: Brooke Goldberg MD     Primary Care Team: Networked reference to record PCT      HPI:   63-year-old  woman with history of central hypothyroidism, lipodystrophy, osteopenia, hyperprolactinemia, galactorrhea, growth retardation, severe developmental delay, tardive dyskinesia, seizure disorder, hypertension, hyperlipidemia, pancreatic cysts, liver lesion, iron deficiency anemia, schizoaffective disorder, bipolar disorder, constipation, H pylori infection, coronary artery calcifications, and abnormal brain MRI who was brought in by family for abdominal pain and distention noticed by her sister.  Constipation over the last 4 days per family members.  She was given Mag citrate and a suppository today with very small bowel movements.  Patient had recently had a 2 week hospitalization at [facility] psychiatric unit and was just discharged 03/25/2024.  Family reports that patient was recently treated for UTI.  Urine culture 03/10/2024 with E coli.  They also report that patient had been having some difficulty with ambulation felt to be related to her psychiatric medications which have been adjusted during her recent inpatient psychiatric stay.  At baseline, family reports that she is able to ambulate on her own and is verbal although she does not always make sense.  She has her own apartment with /7 care.  At the time of my exam, patient is nonverbal.    Hospital Course:   Gen surg consulted and recommended IR drainage of gluteal abscess vs. Hematoma. Pt continued on broad spectrum abx pending aspiration.  IR consulted and plan for CT guided aspiration 03/28. When IR attempted to aspirate, there was minimal fluid noted on CT and resolution of abscess/hematoma and they were unable to aspirate.      Pt was started on aggressive bowel regimen for constipation and was able to have bowel movement.      Patient was evaluated by PT/OT during hospitalization and deemed to  be at her prior level of functioning.      Pt seen and examined on day of discharge, caregiver at bedside. Pt appears in NAD, abd is soft, nontender on exam with good bowel sounds. Discussed with Gen Surg Dr. Hyman- no additional intervention warranted at this time and pt was deemed stable for discharge from surgical standpoint. Pt appears stable for discharge home with caregiver per my exam. Will continue PO Keflex to complete total 7d course of antibiotics. She will continue bisacodyl IN as needed for constipation.  Followup with PCP within 3-5 days. Discharge plan was discussed via phone with pt's sister Ms. Noyola and all questions answered to her satisfaction.      See below for further details.      Since d/c: medication changes made during inpatient psych stay. Sister has been worried that patient is overly sedated with current regimen so she has cut back the medications herself. Patient continues to appear sedated today in clinic. She is sleeping for most of visit but able to wake her with verbal and physical stimuli. She has an appt with psychiatry tomorrow to review medications. She also has appt with cardiology and podiatry for routing follow up. She is due for hematology follow up but her last provider left system so needs to establish with someone new. The gluteal abscess appears to be completely healed. Other than concern for sedation, sister has no other complaints today.    All discharge medications have been reviewed and reconciled on chart.     Current Outpatient Medications on File Prior to Visit   Medication Sig Dispense Refill    acetaminophen (TYLENOL) 650 MG TbSR Take 650 mg by mouth every 8 (eight) hours as needed for Pain.      amLODIPine (NORVASC) 10 MG tablet Take 1 tablet (10 mg total) by mouth once daily. 90 tablet 3    cetirizine (ZYRTEC) 10 MG tablet Take 10 mg by mouth daily as needed for Allergies.      diclofenac sodium (VOLTAREN) 1 % Gel APPLY TWO GRAMS TO THE AFFECTED AREA FOUR  TIMES DAILY AS NEEDED FOR PAIN 200 g 1    diphenhydrAMINE (BENADRYL) 25 mg capsule Take 25 mg by mouth nightly as needed for Allergies.      docusate sodium (COLACE) 100 MG capsule Take 1 capsule (100 mg total) by mouth once daily. 90 capsule 3    fluticasone propionate (FLONASE) 50 mcg/actuation nasal spray SHAKE LIQUID AND USE 2 SPRAYS IN EACH NOSTRIL EVERY DAY 48 g 0    folic acid (FOLVITE) 1 MG tablet Take 1 tablet (1 mg total) by mouth once daily. 90 tablet 3    furosemide (LASIX) 20 MG tablet TAKE 1 TABLET BY MOUTH DAILY AS NEEDED FOR SWELLING (Patient taking differently: Take 20 mg by mouth daily as needed. TAKE 1 TABLET BY MOUTH DAILY AS NEEDED FOR SWELLING) 30 tablet 11    lactulose (CHRONULAC) 20 gram/30 mL Soln Take 15 mLs (10 g total) by mouth 3 (three) times daily as needed (constipation). 300 mL 1    lithium (ESKALITH) 300 MG capsule Take 300 mg by mouth 2 (two) times daily.      melatonin 10 mg Tab Take 1 tablet (10 mg total) by mouth every evening. (Patient taking differently: Take 9 mg by mouth every evening.) 90 tablet 3    simethicone (MYLICON) 125 mg Cap capsule 1 capsule after meals and at bedtime as needed      simvastatin (ZOCOR) 40 MG tablet Take 1 tablet (40 mg total) by mouth every evening. 90 tablet 3    traZODone (DESYREL) 150 MG tablet TAKE 1 TABLET BY MOUTH AT BEDTIME AS NEEDED FOR INSOMNIA (Patient taking differently: Take 150 mg by mouth nightly.) 30 tablet 2    triamterene-hydrochlorothiazide 37.5-25 mg (MAXZIDE-25) 37.5-25 mg per tablet Take 1 tablet by mouth every morning. 90 tablet 3     Current Facility-Administered Medications on File Prior to Visit   Medication Dose Route Frequency Provider Last Rate Last Admin    0.9%  NaCl infusion   Intravenous Continuous Eduardo Angelo MD        sodium chloride 0.9% flush 10 mL  10 mL Intravenous PRN Eduardo Angelo MD           Review of Systems   Unable to perform ROS: Other     Vitals:    04/02/24 0905   BP: 116/68   Pulse: 77   SpO2:  "99%   Weight: 49.2 kg (108 lb 7.5 oz)   Height: 4' 11" (1.499 m)       Wt Readings from Last 3 Encounters:   04/04/24 47.6 kg (105 lb)   04/04/24 47.7 kg (105 lb 2.6 oz)   04/03/24 47.7 kg (105 lb 2.6 oz)       Physical Exam  Constitutional:       General: She is not in acute distress.     Appearance: Normal appearance. She is well-developed.   HENT:      Head: Normocephalic and atraumatic.   Eyes:      Conjunctiva/sclera: Conjunctivae normal.   Cardiovascular:      Rate and Rhythm: Normal rate and regular rhythm.      Pulses: Normal pulses.      Heart sounds: Normal heart sounds. No murmur heard.  Pulmonary:      Effort: Pulmonary effort is normal. No respiratory distress.      Breath sounds: Normal breath sounds.   Abdominal:      General: Bowel sounds are normal. There is no distension.      Palpations: Abdomen is soft.      Tenderness: There is no abdominal tenderness.   Musculoskeletal:         General: Normal range of motion.      Cervical back: Normal range of motion and neck supple.   Skin:     General: Skin is warm and dry.      Findings: No rash.   Neurological:      General: No focal deficit present.      Mental Status: She is alert.   Psychiatric:      Comments: Sleepy during visit; easily awaked with verbal and physical stimuli       "

## 2024-04-03 ENCOUNTER — TELEPHONE (OUTPATIENT)
Dept: PRIMARY CARE CLINIC | Facility: CLINIC | Age: 64
End: 2024-04-03
Payer: MEDICARE

## 2024-04-03 ENCOUNTER — OFFICE VISIT (OUTPATIENT)
Dept: PSYCHIATRY | Facility: CLINIC | Age: 64
End: 2024-04-03
Payer: MEDICARE

## 2024-04-03 ENCOUNTER — HOSPITAL ENCOUNTER (OUTPATIENT)
Dept: RADIOLOGY | Facility: HOSPITAL | Age: 64
Discharge: HOME | End: 2024-04-03
Attending: INTERNAL MEDICINE
Payer: MEDICARE

## 2024-04-03 VITALS
DIASTOLIC BLOOD PRESSURE: 73 MMHG | BODY MASS INDEX: 21.24 KG/M2 | SYSTOLIC BLOOD PRESSURE: 122 MMHG | WEIGHT: 105.19 LBS | HEART RATE: 71 BPM

## 2024-04-03 DIAGNOSIS — G47.00 INSOMNIA, UNSPECIFIED TYPE: ICD-10-CM

## 2024-04-03 DIAGNOSIS — Z12.39 ENCOUNTER FOR BREAST CANCER SCREENING USING NON-MAMMOGRAM MODALITY: ICD-10-CM

## 2024-04-03 DIAGNOSIS — F79 INTELLECTUAL DISABILITY: Primary | ICD-10-CM

## 2024-04-03 DIAGNOSIS — R45.1 RESTLESSNESS AND AGITATION: ICD-10-CM

## 2024-04-03 DIAGNOSIS — G24.01 TARDIVE DYSKINESIA: ICD-10-CM

## 2024-04-03 PROCEDURE — 99212 OFFICE O/P EST SF 10 MIN: CPT | Mod: PBBFAC,25 | Performed by: PSYCHIATRY & NEUROLOGY

## 2024-04-03 PROCEDURE — 76641 ULTRASOUND BREAST COMPLETE: CPT | Mod: 26,50,, | Performed by: RADIOLOGY

## 2024-04-03 PROCEDURE — 76641 ULTRASOUND BREAST COMPLETE: CPT | Mod: TC,50

## 2024-04-03 PROCEDURE — 99214 OFFICE O/P EST MOD 30 MIN: CPT | Mod: S$PBB,,, | Performed by: PSYCHIATRY & NEUROLOGY

## 2024-04-03 PROCEDURE — 99999 PR PBB SHADOW E&M-EST. PATIENT-LVL II: CPT | Mod: PBBFAC,,, | Performed by: PSYCHIATRY & NEUROLOGY

## 2024-04-03 RX ORDER — TRAZODONE HYDROCHLORIDE 150 MG/1
150 TABLET ORAL NIGHTLY PRN
Qty: 30 TABLET | Refills: 3 | Status: SHIPPED | OUTPATIENT
Start: 2024-04-03 | End: 2024-04-29 | Stop reason: SDUPTHER

## 2024-04-03 RX ORDER — QUETIAPINE FUMARATE 200 MG/1
TABLET, FILM COATED ORAL
Qty: 90 TABLET | Refills: 3 | Status: SHIPPED | OUTPATIENT
Start: 2024-04-03 | End: 2024-04-29 | Stop reason: SDUPTHER

## 2024-04-03 RX ORDER — VALBENAZINE 80 MG/1
80 CAPSULE ORAL DAILY
Qty: 30 CAPSULE | Refills: 3 | Status: SHIPPED | OUTPATIENT
Start: 2024-04-03 | End: 2024-04-12

## 2024-04-03 RX ORDER — CLONAZEPAM 0.5 MG/1
0.5 TABLET ORAL DAILY PRN
Qty: 30 TABLET | Refills: 3 | Status: SHIPPED | OUTPATIENT
Start: 2024-04-03 | End: 2024-04-29 | Stop reason: SDUPTHER

## 2024-04-03 NOTE — TELEPHONE ENCOUNTER
----- Message from Gil Akers sent at 4/3/2024 12:27 PM CDT -----  Contact: Olga Lidia/ Nellie Duggan is calling requesting clinical for the patient. Please call Olga Lidia at [phone] fax # [phone].
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
## 2024-04-04 ENCOUNTER — OFFICE VISIT (OUTPATIENT)
Dept: PODIATRY | Facility: CLINIC | Age: 64
End: 2024-04-04
Payer: MEDICARE

## 2024-04-04 ENCOUNTER — OFFICE VISIT (OUTPATIENT)
Dept: CARDIOLOGY | Facility: CLINIC | Age: 64
End: 2024-04-04
Payer: MEDICARE

## 2024-04-04 VITALS
HEART RATE: 70 BPM | DIASTOLIC BLOOD PRESSURE: 62 MMHG | SYSTOLIC BLOOD PRESSURE: 106 MMHG | BODY MASS INDEX: 21.21 KG/M2 | OXYGEN SATURATION: 92 % | WEIGHT: 105 LBS

## 2024-04-04 VITALS — WEIGHT: 105.19 LBS | HEIGHT: 59 IN | BODY MASS INDEX: 21.2 KG/M2

## 2024-04-04 DIAGNOSIS — F31.9 BIPOLAR AFFECTIVE DISORDER, REMISSION STATUS UNSPECIFIED: ICD-10-CM

## 2024-04-04 DIAGNOSIS — I25.84 CORONARY ARTERY CALCIFICATION: ICD-10-CM

## 2024-04-04 DIAGNOSIS — B35.1 ONYCHOMYCOSIS: Primary | ICD-10-CM

## 2024-04-04 DIAGNOSIS — E78.2 MIXED HYPERLIPIDEMIA: ICD-10-CM

## 2024-04-04 DIAGNOSIS — G40.909 SEIZURE DISORDER: Primary | ICD-10-CM

## 2024-04-04 DIAGNOSIS — F25.9 SCHIZOAFFECTIVE DISORDER, UNSPECIFIED TYPE: ICD-10-CM

## 2024-04-04 DIAGNOSIS — I25.10 CORONARY ARTERY CALCIFICATION: ICD-10-CM

## 2024-04-04 DIAGNOSIS — I10 ESSENTIAL HYPERTENSION: ICD-10-CM

## 2024-04-04 DIAGNOSIS — I70.0 CALCIFICATION OF AORTA: ICD-10-CM

## 2024-04-04 PROCEDURE — 99214 OFFICE O/P EST MOD 30 MIN: CPT | Mod: PBBFAC,25 | Performed by: STUDENT IN AN ORGANIZED HEALTH CARE EDUCATION/TRAINING PROGRAM

## 2024-04-04 PROCEDURE — 99213 OFFICE O/P EST LOW 20 MIN: CPT | Mod: PBBFAC,27 | Performed by: PODIATRIST

## 2024-04-04 PROCEDURE — 99999 PR PBB SHADOW E&M-EST. PATIENT-LVL IV: CPT | Mod: PBBFAC,,, | Performed by: STUDENT IN AN ORGANIZED HEALTH CARE EDUCATION/TRAINING PROGRAM

## 2024-04-04 PROCEDURE — 99212 OFFICE O/P EST SF 10 MIN: CPT | Mod: S$PBB,,, | Performed by: PODIATRIST

## 2024-04-04 PROCEDURE — 99214 OFFICE O/P EST MOD 30 MIN: CPT | Mod: S$PBB,,, | Performed by: STUDENT IN AN ORGANIZED HEALTH CARE EDUCATION/TRAINING PROGRAM

## 2024-04-04 PROCEDURE — G0180 MD CERTIFICATION HHA PATIENT: HCPCS | Mod: ,,, | Performed by: INTERNAL MEDICINE

## 2024-04-04 PROCEDURE — 99999 PR PBB SHADOW E&M-EST. PATIENT-LVL III: CPT | Mod: PBBFAC,,, | Performed by: PODIATRIST

## 2024-04-04 NOTE — PROGRESS NOTES
Section of Cardiology                  Cardiac Clinic Note      HPI:   Juan Pablo Bolden is a 63 y.o. female       4/4/24  Comes in with sister  Was seeing Dr. Wilkins  Lives in independent facility with 24 hour care   Gets constipation form psych meds- had to go to hospital, had an abscess in gluteal regions   Will be starting PT, HH  Good appetite   Takes fluid pill as needed       EKG 3/10/24 NSR, biatrial enlargement, LVH, nonspecific ST-T wave abn    ECHO  Results for orders placed during the hospital encounter of 02/13/24    Echo    Interpretation Summary    Left Ventricle: The left ventricle is normal in size. Ventricular mass is normal. Normal wall thickness. Normal wall motion. There is normal systolic function with a visually estimated ejection fraction of 55 - 60%. There is normal diastolic function.    Right Ventricle: Normal right ventricular cavity size. Wall thickness is normal. Right ventricle wall motion  is normal. Systolic function is normal.    IVC/SVC: Normal venous pressure at 3 mmHg.       STRESS TEST No results found for this or any previous visit.       LHC No results found for this or any previous visit.            ROS: All 10 systems reviewed. Please refer to the HPI for pertinent positives. All other systems negative.     Past Medical History  Past Medical History:   Diagnosis Date    Bipolar 1 disorder     Chronic constipation     Galactorrhea     Growth retardation     Profound mental retardation    High cholesterol     Hyperlipidemia     Hypertension     Leukopenia     Neuroleptic-induced tardive dyskinesia     Schizoaffective disorder     Seizures     Stereotypic movement disorder        Surgical History  Past Surgical History:   Procedure Laterality Date    COLONOSCOPY N/A 5/17/2021    Procedure: COLONOSCOPY;  Surgeon: Jeffrey Ayala MD;  Location: [facility];  Service: Gastroenterology;  Laterality: N/A;    ENDOSCOPIC ULTRASOUND OF UPPER GASTROINTESTINAL TRACT N/A  5/16/2022    Procedure: ULTRASOUND, UPPER GI TRACT, ENDOSCOPIC;  Surgeon: Cherise Marshall MD;  Location: [facility] ENDO;  Service: Endoscopy;  Laterality: N/A;  Upper and linear, 22 shark core, slides and formalin.    ENDOSCOPIC ULTRASOUND OF UPPER GASTROINTESTINAL TRACT N/A 12/20/2023    Procedure: ULTRASOUND, UPPER GI TRACT, ENDOSCOPIC;  Surgeon: Reed Ojeda MD;  Location: [facility] ENDO (2ND FLR);  Service: Endoscopy;  Laterality: N/A;  Need EUS (linear) for pancreas cyst surveillance. 45 minutes. Main or [place]. -lewis  instr [id number]-mp seizures for many years  11/15/23-LVM for precall-DS  11/16-precall complete-Kpvt  11/21/23: instructions sent via portal for reschedule-GD  1    ESOPHAGOGASTRODUODENOSCOPY N/A 11/5/2020    Procedure: EGD (ESOPHAGOGASTRODUODENOSCOPY);  Surgeon: John Batista MD;  Location: [facility] ENDO;  Service: Endoscopy;  Laterality: N/A;          Allergies:   Review of patient's allergies indicates:  No Known Allergies    Social History:  Social History     Socioeconomic History    Marital status: Single   Tobacco Use    Smoking status: Never     Passive exposure: Never    Smokeless tobacco: Never   Substance and Sexual Activity    Alcohol use: No    Drug use: No    Sexual activity: Never     Social Determinants of Health     Financial Resource Strain: Low Risk  (3/27/2024)    Overall Financial Resource Strain (CARDIA)     Difficulty of Paying Living Expenses: Not hard at all   Food Insecurity: No Food Insecurity (3/27/2024)    Hunger Vital Sign     Worried About Running Out of Food in the Last Year: Never true     Ran Out of Food in the Last Year: Never true   Transportation Needs: No Transportation Needs (3/27/2024)    PRAPARE - Transportation     Lack of Transportation (Medical): No     Lack of Transportation (Non-Medical): No   Physical Activity: Unknown (12/19/2022)    Exercise Vital Sign     Days of Exercise per Week: 0 days   Stress: Patient Declined (3/27/2024)    [employer] - Occupational Stress Questionnaire     Feeling of Stress : Patient declined   Social Connections: Unknown (12/19/2022)    Social Connection and Isolation Panel [NHANES]     Frequency of Communication with Friends and Family: Three times a week     Frequency of Social Gatherings with Friends and Family: More than three times a week     Active Member of Clubs or Organizations: No     Attends Club or Organization Meetings: Patient declined     Marital Status: Patient declined   Housing Stability: Low Risk  (3/27/2024)    Housing Stability Vital Sign     Unable to Pay for Housing in the Last Year: No     Number of Places Lived in the Last Year: 1     Unstable Housing in the Last Year: No       Family History:  family history includes Heart disease in her maternal grandmother; Hypertension in her father; Lung cancer in her mother; Prostate cancer in her father; Stroke in her maternal uncle.    Home Medications:  Current Outpatient Medications on File Prior to Visit   Medication Sig Dispense Refill    acetaminophen (TYLENOL) 650 MG TbSR Take 650 mg by mouth every 8 (eight) hours as needed for Pain.      amLODIPine (NORVASC) 10 MG tablet Take 1 tablet (10 mg total) by mouth once daily. 90 tablet 3    aspirin (ECOTRIN) 81 MG EC tablet Take 1 tablet (81 mg total) by mouth once daily. 90 tablet 3    bisacodyL (DULCOLAX) 10 mg Supp Place 1 suppository (10 mg total) rectally daily as needed (constipation). 12 suppository 0    cetirizine (ZYRTEC) 10 MG tablet Take 10 mg by mouth daily as needed for Allergies.      clonazePAM (KLONOPIN) 0.5 MG tablet Take 1 tablet (0.5 mg total) by mouth daily as needed for Anxiety. 30 tablet 3    diclofenac sodium (VOLTAREN) 1 % Gel APPLY TWO GRAMS TO THE AFFECTED AREA FOUR TIMES DAILY AS NEEDED FOR PAIN 200 g 1    diphenhydrAMINE (BENADRYL) 25 mg capsule Take 25 mg by mouth nightly as needed for Allergies.      docusate sodium (COLACE) 100 MG capsule Take 1  capsule (100 mg total) by mouth once daily. 90 capsule 3    fluticasone propionate (FLONASE) 50 mcg/actuation nasal spray SHAKE LIQUID AND USE 2 SPRAYS IN EACH NOSTRIL EVERY DAY 48 g 0    folic acid (FOLVITE) 1 MG tablet Take 1 tablet (1 mg total) by mouth once daily. 90 tablet 3    furosemide (LASIX) 20 MG tablet TAKE 1 TABLET BY MOUTH DAILY AS NEEDED FOR SWELLING (Patient taking differently: Take 20 mg by mouth daily as needed. TAKE 1 TABLET BY MOUTH DAILY AS NEEDED FOR SWELLING) 30 tablet 11    lactulose (CHRONULAC) 20 gram/30 mL Soln Take 15 mLs (10 g total) by mouth 3 (three) times daily as needed (constipation). 300 mL 1    lithium (ESKALITH) 300 MG capsule Take 300 mg by mouth 2 (two) times daily.      melatonin 10 mg Tab Take 1 tablet (10 mg total) by mouth every evening. (Patient taking differently: Take 9 mg by mouth every evening.) 90 tablet 3    QUEtiapine (SEROQUEL) 200 MG Tab Take 1 tablet each morning and 2 tablets at bedtime. 90 tablet 3    simethicone (MYLICON) 125 mg Cap capsule 1 capsule after meals and at bedtime as needed      simvastatin (ZOCOR) 40 MG tablet Take 1 tablet (40 mg total) by mouth every evening. 90 tablet 3    traZODone (DESYREL) 150 MG tablet TAKE 1 TABLET BY MOUTH AT BEDTIME AS NEEDED FOR INSOMNIA (Patient taking differently: Take 150 mg by mouth nightly.) 30 tablet 2    traZODone (DESYREL) 150 MG tablet Take 1 tablet (150 mg total) by mouth nightly as needed for Insomnia. 30 tablet 3    triamterene-hydrochlorothiazide 37.5-25 mg (MAXZIDE-25) 37.5-25 mg per tablet Take 1 tablet by mouth every morning. 90 tablet 3    valbenazine (INGREZZA) 80 mg Cap Take 1 capsule (80 mg total) by mouth once daily. 30 capsule 3     Current Facility-Administered Medications on File Prior to Visit   Medication Dose Route Frequency Provider Last Rate Last Admin    0.9%  NaCl infusion   Intravenous Continuous Eduardo Angelo MD        sodium chloride 0.9% flush 10 mL  10 mL Intravenous PRN Petey  "Eduardo GREEN MD           Physical exam:  /62 (BP Location: Right arm, Patient Position: Sitting, BP Method: Small (Automatic))   Pulse 70   Wt 47.6 kg (105 lb)   SpO2 (!) 92%   BMI 21.21 kg/m²         General: Pt is a 63 y.o. year old female who is AAOx3, in NAD, is pleasant, well nourished, looks stated age  HEENT: PERRL, EOMI, Oral mucosa pink & moist  CVS  No abnormal cardiac pulsations noted on inspection. JVP not raised. The apical impulse is normal on palpation, and is located in the left 5th intercostal space in the mid - clavicular line. No palpable thrills or abnormal pulsations noted. RR, S1 - S2 heard, no murmurs, rubs or gallops appreciated.   PUL : CTA B/L. No wheezes/crackles heard   ABD : BS +, soft. No tenderness elicited   LE : No C/C/E. Distal Pulses palpable B/L         LABS:    Chemistry:   Lab Results   Component Value Date     03/29/2024    K 3.9 03/29/2024     03/29/2024    CO2 22 (L) 03/29/2024    BUN 7 (L) 03/29/2024    CREATININE 0.7 03/29/2024    CALCIUM 9.9 03/29/2024     Cardiac Markers: No results found for: "CKTOTAL", "CKMB", "CKMBINDEX", "TROPONINI"  Cardiac Markers (Last 3): No results found for: "CKTOTAL", "CKMB", "CKMBINDEX", "TROPONINI"  CBC:   Lab Results   Component Value Date    WBC 2.40 (L) 03/29/2024    HGB 10.4 (L) 03/29/2024    HCT 32.5 (L) 03/29/2024    MCV 92 03/29/2024     (L) 03/29/2024     Lipids:   Lab Results   Component Value Date    CHOL 234 (H) 10/14/2014    TRIG 97 10/14/2014     (H) 10/14/2014     Coagulation:   Lab Results   Component Value Date    INR 1.0 03/26/2024           Assessment        1. Seizure disorder    2. Essential hypertension    3. Mixed hyperlipidemia    4. Coronary artery calcification    5. Calcification of aorta    6. Schizoaffective disorder, unspecified type    7. Bipolar affective disorder, remission status unspecified         Plan:    Has been doing well overall  Her sister takes care of her  Was " hospitalized recently, still weak in the lower extremities, will be getting PT  BP stable-continue amlodipine, maxzide  HLD- continue zocor  Will be starting ASA 81  Low salt, low fat diet  Exercise as tolerated, at least 30 min daily     This note was prepared using voice recognition system and is likely to have sound alike errors that may have been overlooked even after proofreading.     I have reviewed all pertinent chart information.  Plans and recommendations have been formulated under my direct supervision. All questions answered and patient voiced understanding.   If symptoms persist go to the ED.    RTC in 1 year         Katty Myers MD  Cardiology

## 2024-04-04 NOTE — PROGRESS NOTES
Normal US, repeat in 1 year, results released through [email or web address]. Please verify that patient has viewed results. If not, please call patient with interpretation below:    I have reviewed the results of your recent ultrasound and it appears that everything was read as normal.  Based on this, the radiologist has recommended that you recheck an ultrasound in 1 year.       Also please see below health maintenance items that are due:    Shingles Vaccine(1 of 2) Never done  RSV Vaccine (Age 60+ and Pregnant patients)(1 - 1-dose 60+ series) Never done  COVID-19 Vaccine(4 - 2023-24 season) due on 09/01/2023  Mammogram due on 10/04/2023

## 2024-04-05 LAB — BACTERIA SPEC ANAEROBE CULT: NORMAL

## 2024-04-07 NOTE — PROGRESS NOTES
Subjective:     Patient ID: Juan Pablo Bolden is a 63 y.o. female.    Chief Complaint: Nail Care (Pt c/o swollen ankles, non-diabetic pt wears tennis shoes, PCP Dr. Goldberg last seen 4-2-24)    Juan Pablo is a 63 y.o. female who presents to the podiatry clinic  with complaint of left nail toe pain. Patient is accompanied by her sister today, who states the patient aide has stated noted ankle swelling to patient. Patients sister denied any injury to the foot.     Patient Active Problem List   Diagnosis    Central hypothyroidism    Lipodystrophy    Osteopenia    Hyperprolactinemia    PDD (pervasive developmental disorder)    Tardive dyskinesia    Restlessness and agitation    Seizure disorder    Essential hypertension    Leukopenia    Thrombocytopenia    Iron deficiency anemia due to chronic blood loss    Schizoaffective disorder    Other constipation    H. pylori infection    Other symptoms and signs involving the nervous system    Coronary artery calcification    Abnormal CT scan    Calcification of aorta    Liver lesion    Abnormal brain MRI    Pancreas cyst    Mixed hyperlipidemia    Neutropenia, unspecified type    Gluteal abscess    Bilateral leg edema    Developmental delay    Acute cystitis without hematuria    Bipolar disorder       Medication List with Changes/Refills   Current Medications    ACETAMINOPHEN (TYLENOL) 650 MG TBSR    Take 650 mg by mouth every 8 (eight) hours as needed for Pain.    AMLODIPINE (NORVASC) 10 MG TABLET    Take 1 tablet (10 mg total) by mouth once daily.    ASPIRIN (ECOTRIN) 81 MG EC TABLET    Take 1 tablet (81 mg total) by mouth once daily.    BISACODYL (DULCOLAX) 10 MG SUPP    Place 1 suppository (10 mg total) rectally daily as needed (constipation).    CETIRIZINE (ZYRTEC) 10 MG TABLET    Take 10 mg by mouth daily as needed for Allergies.    CLONAZEPAM (KLONOPIN) 0.5 MG TABLET    Take 1 tablet (0.5 mg total) by mouth daily as needed for Anxiety.    DICLOFENAC SODIUM (VOLTAREN) 1 % GEL     APPLY TWO GRAMS TO THE AFFECTED AREA FOUR TIMES DAILY AS NEEDED FOR PAIN    DIPHENHYDRAMINE (BENADRYL) 25 MG CAPSULE    Take 25 mg by mouth nightly as needed for Allergies.    DOCUSATE SODIUM (COLACE) 100 MG CAPSULE    Take 1 capsule (100 mg total) by mouth once daily.    FLUTICASONE PROPIONATE (FLONASE) 50 MCG/ACTUATION NASAL SPRAY    SHAKE LIQUID AND USE 2 SPRAYS IN EACH NOSTRIL EVERY DAY    FOLIC ACID (FOLVITE) 1 MG TABLET    Take 1 tablet (1 mg total) by mouth once daily.    FUROSEMIDE (LASIX) 20 MG TABLET    TAKE 1 TABLET BY MOUTH DAILY AS NEEDED FOR SWELLING    LACTULOSE (CHRONULAC) 20 GRAM/30 ML SOLN    Take 15 mLs (10 g total) by mouth 3 (three) times daily as needed (constipation).    LITHIUM (ESKALITH) 300 MG CAPSULE    Take 300 mg by mouth 2 (two) times daily.    MELATONIN 10 MG TAB    Take 1 tablet (10 mg total) by mouth every evening.    QUETIAPINE (SEROQUEL) 200 MG TAB    Take 1 tablet each morning and 2 tablets at bedtime.    SIMETHICONE (MYLICON) 125 MG CAP CAPSULE    1 capsule after meals and at bedtime as needed    SIMVASTATIN (ZOCOR) 40 MG TABLET    Take 1 tablet (40 mg total) by mouth every evening.    TRAZODONE (DESYREL) 150 MG TABLET    TAKE 1 TABLET BY MOUTH AT BEDTIME AS NEEDED FOR INSOMNIA    TRAZODONE (DESYREL) 150 MG TABLET    Take 1 tablet (150 mg total) by mouth nightly as needed for Insomnia.    TRIAMTERENE-HYDROCHLOROTHIAZIDE 37.5-25 MG (MAXZIDE-25) 37.5-25 MG PER TABLET    Take 1 tablet by mouth every morning.    VALBENAZINE (INGREZZA) 80 MG CAP    Take 1 capsule (80 mg total) by mouth once daily.       Review of patient's allergies indicates:  No Known Allergies    Past Surgical History:   Procedure Laterality Date    COLONOSCOPY N/A 5/17/2021    Procedure: COLONOSCOPY;  Surgeon: Jeffrey Ayala MD;  Location: [facility];  Service: Gastroenterology;  Laterality: N/A;    ENDOSCOPIC ULTRASOUND OF UPPER GASTROINTESTINAL TRACT N/A 5/16/2022    Procedure: ULTRASOUND, UPPER GI TRACT,  ENDOSCOPIC;  Surgeon: Cherise Marshall MD;  Location: [facility] ENDO;  Service: Endoscopy;  Laterality: N/A;  Upper and linear, 22 shark core, slides and formalin.    ENDOSCOPIC ULTRASOUND OF UPPER GASTROINTESTINAL TRACT N/A 12/20/2023    Procedure: ULTRASOUND, UPPER GI TRACT, ENDOSCOPIC;  Surgeon: Reed Ojeda MD;  Location: [facility] ENDO (2ND FLR);  Service: Endoscopy;  Laterality: N/A;  Need EUS (linear) for pancreas cyst surveillance. 45 minutes. Main or [place]. -lewis  instr kcnpie-ci-rd seizures for many years  11/15/23-LVM for precall-DS  11/16-precall complete-Kpvt  11/21/23: instructions sent via portal for reschedule-GD  1    ESOPHAGOGASTRODUODENOSCOPY N/A 11/5/2020    Procedure: EGD (ESOPHAGOGASTRODUODENOSCOPY);  Surgeon: John Batista MD;  Location: [facility] ENDO;  Service: Endoscopy;  Laterality: N/A;       Family History   Problem Relation Age of Onset    Lung cancer Mother     Hypertension Father     Prostate cancer Father     Stroke Maternal Uncle     Heart disease Maternal Grandmother        Social History     Socioeconomic History    Marital status: Single   Tobacco Use    Smoking status: Never     Passive exposure: Never    Smokeless tobacco: Never   Substance and Sexual Activity    Alcohol use: No    Drug use: No    Sexual activity: Never     Social Determinants of Health     Financial Resource Strain: Low Risk  (3/27/2024)    Overall Financial Resource Strain (CARDIA)     Difficulty of Paying Living Expenses: Not hard at all   Food Insecurity: No Food Insecurity (3/27/2024)    Hunger Vital Sign     Worried About Running Out of Food in the Last Year: Never true     Ran Out of Food in the Last Year: Never true   Transportation Needs: No Transportation Needs (3/27/2024)    PRAPARE - Transportation     Lack of Transportation (Medical): No     Lack of Transportation (Non-Medical): No   Physical Activity: Unknown (12/19/2022)    Exercise Vital Sign     Days of Exercise per Week: 0 days   Stress:  "Patient Declined (3/27/2024)    [employer] - Occupational Stress Questionnaire     Feeling of Stress : Patient declined   Social Connections: Unknown (12/19/2022)    Social Connection and Isolation Panel [NHANES]     Frequency of Communication with Friends and Family: Three times a week     Frequency of Social Gatherings with Friends and Family: More than three times a week     Active Member of Clubs or Organizations: No     Attends Club or Organization Meetings: Patient declined     Marital Status: Patient declined   Housing Stability: Low Risk  (3/27/2024)    Housing Stability Vital Sign     Unable to Pay for Housing in the Last Year: No     Number of Places Lived in the Last Year: 1     Unstable Housing in the Last Year: No       Vitals:    04/04/24 1547   Weight: 47.7 kg (105 lb 2.6 oz)   Height: 4' 11" (1.499 m)   PainSc: 0-No pain       Hemoglobin A1C   Date Value Ref Range Status   10/14/2014 5.2 4.5 - 6.2 % Final       Review of Systems   Unable to perform ROS: Mental acuity         Objective:      PHYSICAL EXAM: Apperance: Alert and orient in no distress,well developed, and with good attention to grooming and body habits  Patient presents ambulating in tennis shoes.   LOWER EXTREMITY EXAM:  VASCULAR: Dorsalis pedis pulses 2/4 bilateral and Posterior Tibial pulses 2/4 bilateral. Capillary fill time <4 seconds bilateral. No edema observed bilateral. Varicosities absent bilateral. Skin temperature of the lower extremities is warm to warm, proximal to distal. Hair growth WNL bilateral.  DERMATOLOGICAL: No skin rashes, subcutaneous nodules, lesions, or ulcers observed bilateral. Nails 1,2,3,4,5 bilateral thickened, and discolored with subungual debris. Webspaces 1,2,3,4 bilateral clean, dry and without evidence of break in skin integrity.   NEUROLOGICAL: Light touch, sharp-dull, proprioception all present and equal bilaterally.   MUSCULOSKELETAL: Muscle strength is 5/5 for foot " inverters, everters, plantarflexors, and dorsiflexors. Muscle tone is normal. No pain on palpation of bilateral feet.         Assessment:       Encounter Diagnosis   Name Primary?    Onychomycosis Yes           Plan:   Onychomycosis    I counseled the patient on her conditions, regarding findings of my examination, my impressions, and usual treatment plan.   Patients sister instructed on keeping nails filed down.  The patient and I reviewed the types of shoes she should be wearing, my recommendation includes generally the best time of the day for a shoe fitting is the afternoon, shoes with a wide toe box, very good cushion, and tennis shoes with removable inner soles.The patient and I reviewed my recommendations for over-the-counter orthotic inserts.   Patient to return as needed.       Elsa Wilkes DPM  [facility]

## 2024-04-08 ENCOUNTER — PATIENT MESSAGE (OUTPATIENT)
Dept: PSYCHIATRY | Facility: CLINIC | Age: 64
End: 2024-04-08
Payer: MEDICARE

## 2024-04-08 ENCOUNTER — LAB VISIT (OUTPATIENT)
Dept: LAB | Facility: HOSPITAL | Age: 64
End: 2024-04-08
Attending: INTERNAL MEDICINE
Payer: MEDICARE

## 2024-04-08 DIAGNOSIS — G24.01 TARDIVE DYSKINESIA: Primary | ICD-10-CM

## 2024-04-08 DIAGNOSIS — R82.90 ABNORMAL URINE ODOR: ICD-10-CM

## 2024-04-08 LAB
BACTERIA #/AREA URNS AUTO: ABNORMAL /HPF
BILIRUB UR QL STRIP: NEGATIVE
CLARITY UR REFRACT.AUTO: ABNORMAL
COLOR UR AUTO: YELLOW
GLUCOSE UR QL STRIP: NEGATIVE
HGB UR QL STRIP: ABNORMAL
KETONES UR QL STRIP: NEGATIVE
LEUKOCYTE ESTERASE UR QL STRIP: ABNORMAL
MICROSCOPIC COMMENT: ABNORMAL
NITRITE UR QL STRIP: NEGATIVE
PH UR STRIP: >8 [PH] (ref 5–8)
PROT UR QL STRIP: NEGATIVE
RBC #/AREA URNS AUTO: 11 /HPF (ref 0–4)
SP GR UR STRIP: 1.01 (ref 1–1.03)
SQUAMOUS #/AREA URNS AUTO: 18 /HPF
URN SPEC COLLECT METH UR: ABNORMAL
WBC #/AREA URNS AUTO: 4 /HPF (ref 0–5)

## 2024-04-08 PROCEDURE — 81001 URINALYSIS AUTO W/SCOPE: CPT | Performed by: INTERNAL MEDICINE

## 2024-04-14 PROBLEM — N30.00 ACUTE CYSTITIS WITHOUT HEMATURIA: Status: RESOLVED | Noted: 2024-03-26 | Resolved: 2024-04-14

## 2024-04-14 PROBLEM — L02.31 GLUTEAL ABSCESS: Status: RESOLVED | Noted: 2024-03-26 | Resolved: 2024-04-14

## 2024-04-15 NOTE — PROGRESS NOTES
Results have been released via [employer]. Please verify that these have been viewed by patient. If not, please call patient with results.      I have sent a message to them with the following interpretation (see below).    I have reviewed your recent results.    Recent urine sample appears to be very contaminated with skin cells. There were not enough abnormalities to trigger a urine culture. I do not recommend antibiotic treatment unless Juan Pablo begins having urinary symptoms.     Please do not hesitate to call or message with any additional questions or concerns.    Brooke Goldberg MD

## 2024-04-16 ENCOUNTER — OUTPATIENT CASE MANAGEMENT (OUTPATIENT)
Dept: ADMINISTRATIVE | Facility: OTHER | Age: 64
End: 2024-04-16
Payer: MEDICARE

## 2024-04-17 ENCOUNTER — PATIENT MESSAGE (OUTPATIENT)
Dept: ADMINISTRATIVE | Facility: OTHER | Age: 64
End: 2024-04-17
Payer: MEDICARE

## 2024-04-17 ENCOUNTER — OUTPATIENT CASE MANAGEMENT (OUTPATIENT)
Dept: ADMINISTRATIVE | Facility: OTHER | Age: 64
End: 2024-04-17
Payer: MEDICARE

## 2024-04-17 NOTE — LETTER
April 17, 2024             Dear Juan Pablo,    Welcome to [facility] Complex Care Management Program.  It was a pleasure talking with you today.  My name is Shikha Chahal, and I look forward to being your Care Manager.  My goal is to help you function at the healthiest and highest level possible.  You can contact me directly at [phone].    As an [facility] patient, some of the services we may be able to provide include:     Development of an individualized care plan with a Registered Nurse   Connection with a   Connection with available resources and services    Coordinate communication among your care team members   Provide coaching and education   Help you understand your doctors treatment plan  Help you obtain information about your insurance coverage.     All services provided by [facility] Complex Care Managers and other care team members are coordinated with and communicated to your primary care team.      As part of your enrollment, you will be receiving education materials and more information about these services in your [facility] account, by phone or through the mail.  If you do not wish to participate or receive information, please contact our office at [phone].      Sincerely,        Shikha Chahal, RN  [facility]   Out-patient RN Complex Care Manager

## 2024-04-18 ENCOUNTER — TELEPHONE (OUTPATIENT)
Dept: FAMILY MEDICINE | Facility: CLINIC | Age: 64
End: 2024-04-18
Payer: MEDICARE

## 2024-04-18 NOTE — TELEPHONE ENCOUNTER
----- Message from Shikha Chahal RN sent at 4/18/2024  8:58 AM CDT -----  Regarding: physical therapy  Good morning. I spoke with patient's sister, Alanis, yesterday afternoon. She is concerned about patient's mobility and says she was told that the physical therapist evaluated the patient and decided she does not need PT. I wanted to follow up on this for Alanis - is there anything else that can be done to assist? Alanis says patient will be received a Rolator from an agency ([employer]) that assists with patient's needs.  Thank you,   Shikha Chahal RN  Outpatient Care Manager

## 2024-04-18 NOTE — TELEPHONE ENCOUNTER
Spoke with pt's sister, stated that she would like another physical therapist to assess pt due to balance/weakness issues with pt.

## 2024-04-18 NOTE — PROGRESS NOTES
Outpatient Care Management  Initial Patient Assessment    Patient: Juan Pablo Bolden  MRN: [id number]  Date of Service: 04/17/2024  Completed by: Shikha Chahal RN  Referral Date: 03/26/2024  Date of Eligibility: 3/27/2024  Program:   High Risk  Status: Ongoing  Effective Dates: 4/17/2024 - present  Responsible Staff: Shikha Chahal RN        Reason for Visit   Patient presents with    OPCM Enrollment Call    Nursing Assessment    OPCM Chart Review       Brief Summary:  Juan Pablo Bolden was referred by Dr. Augusta Reid for schizoaffective disorder and developmental delay. Patient qualifies for program based on 86.5% risk score.   Active problem list, medical, surgical and social history reviewed. Active comorbidities include seizure disorder, HTN, HLD, coronary artery calcification, bipolar disorder, Kluver-bucy syndrome, intellectual disability. Areas of need identified by patient's sister, Alanis, include fall prevention, cognitive enhancement with more social activities. Patient's sister says patient lives in an apartment and has 24 hour caregivers. She says home health is also seeing patient, but she is concerned because she was told the therapist evaluated the patient and said she does not need PT. She says patient can walk with shopping cart for a bit, but is unable to walk farther distances and she is not sure if she is getting weak or is having muscle issues. Alanis says she is having an issue with a medication not being covered suddenly for tardive dyskinesia - but she plans to look more into this and will let me know if help is needed. She says patient will be receiving a Rolator and a shower chair from [place]. Alanis would also like to see if patient qualifies for any meal assistance programs. Alansi works until 3 every day - she teaches special needs children.   Next steps: plan to follow up with Alanis in approximately one week. Message being sent to PCP in regards to concerns about patient needing  PT and possibly being told she is not a candidate. Referring to LSW for possible meals patient may qualify for, and any daily activities that patient may be able to participate in. PHQ 2 form - unable to complete. Plan to review medications at next call.     Disability Status  Is the patient alert and oriented (person, place, time, and situation)?: Disoriented to Situation; Disoriented to Time  Hearing Difficulty or Deaf: no  Visual Difficulty or Blind: no  Difficulty Concentrating, Remembering or Making Decisions: yes  Communication Difficulty: yes  Communication: difficulty speaking; difficulty understanding  Eating/Swallowing Difficulty: yes  Eating/Swallowing: swallowing solid food (pureed diet)  Walking or Climbing Stairs Difficulty: yes  Walking or Climbing Stairs: stair climbing difficulty, assistance 1 person  Mobility Management: walks short distances  Dressing/Bathing Difficulty: yes  Dressing/Bathing: bathing difficulty, assistance 1 person; dressing difficulty, assistance 1 person  Dressing/Bathing Management: dependent  Toileting : Dependent  Continence : Incontinence - Bowel; Incontience - Bladder  Difficulty Managing Errands Independently: yes  Errands Management: staff and family  Equipment Currently Used at Home: bedside commode; grab bar; wheelchair  ADL Conclusion Statement: total depend all ADLs  Change in Functional Status Since Onset of Current Illness/Injury: yes        Spiritual Beliefs  Spiritual, Cultural Beliefs, Spiritism Practices, Values that Affect Care: no      Social History     Socioeconomic History    Marital status: Single   Tobacco Use    Smoking status: Never     Passive exposure: Never    Smokeless tobacco: Never   Substance and Sexual Activity    Alcohol use: No    Drug use: No    Sexual activity: Never     Social Determinants of Health     Financial Resource Strain: Low Risk  (3/27/2024)    Overall Financial Resource Strain (CARDIA)     Difficulty of Paying Living Expenses:  Not hard at all   Food Insecurity: No Food Insecurity (3/27/2024)    Hunger Vital Sign     Worried About Running Out of Food in the Last Year: Never true     Ran Out of Food in the Last Year: Never true   Transportation Needs: No Transportation Needs (3/27/2024)    PRAPARE - Transportation     Lack of Transportation (Medical): No     Lack of Transportation (Non-Medical): No   Physical Activity: Unknown (12/19/2022)    Exercise Vital Sign     Days of Exercise per Week: 0 days   Stress: Patient Declined (3/27/2024)    [employer] - Occupational Stress Questionnaire     Feeling of Stress : Patient declined   Social Connections: Unknown (12/19/2022)    Social Connection and Isolation Panel [NHANES]     Frequency of Communication with Friends and Family: Three times a week     Frequency of Social Gatherings with Friends and Family: More than three times a week     Active Member of Clubs or Organizations: No     Attends Club or Organization Meetings: Patient declined     Marital Status: Patient declined   Housing Stability: Low Risk  (3/27/2024)    Housing Stability Vital Sign     Unable to Pay for Housing in the Last Year: No     Number of Places Lived in the Last Year: 1     Unstable Housing in the Last Year: No       Roles and Relationships  Primary Source of Support/Comfort: sibling(s)  Name of Support/Comfort Primary Source: Alanis, sister  Primary Roles/Responsibilities: wage earner, full-time  Secondary Source of Support/Comfort: nonrelative caregiver  Name of Support/Comfort Secondary Source: Dalanna - caregiver 7a - 3p      Advance Directives (For Healthcare)  Advance Directive  (If Adv Dir status is received, view document under Adv Dir in header or Chart Review Media tab): Patient does not have Advance Directive, declines information.        Patient Reported Insurance  Verified current insurance plan:: Medicare             No data to display                Learning Assessment        04/17/2024 1632 [facility] (4/17/2024 - Present)   Created by Shikha Chahal RN -  (Nurse) Status: Complete                 PRIMARY LEARNER     Primary Learner Name:  jimmy byers BD - 04/17/2024 1632    Relationship:  Family BD - 04/17/2024 1632    Does the primary learner have any barriers to learning?:  No Barriers BD - 04/17/2024 1632    What is the preferred language of the primary learner?:  English BD - 04/17/2024 1632    Is an  required?:  No BD - 04/17/2024 1632    How does the primary learner prefer to learn new concepts?:  Listening, Reading, Demonstration, Pictures/Video BD - 04/17/2024 1632    How often do you need to have someone help you read instructions, pamphlets, or written material from your doctor or pharmacy?:  Never BD - 04/17/2024 1632        CO-LEARNER #1     No question answered        CO-LEARNER #2     No question answered        SPECIAL TOPICS     No question answered        ANSWERED BY:     -:  Family BD - 04/17/2024 1632        Comments         Edit History       Shikha Chahal, RN -  (Nurse)   04/17/2024 1632                             Doxycycline Counseling:  Patient counseled regarding possible photosensitivity and increased risk for sunburn.  Patient instructed to avoid sunlight, if possible.  When exposed to sunlight, patients should wear protective clothing, sunglasses, and sunscreen.  The patient was instructed to call the office immediately if the following severe adverse effects occur:  hearing changes, easy bruising/bleeding, severe headache, or vision changes.  The patient verbalized understanding of the proper use and possible adverse effects of doxycycline.  All of the patient's questions and concerns were addressed.

## 2024-04-18 NOTE — TELEPHONE ENCOUNTER
Initial visit was made, emotional support and a spiritual presence were provided. He was awake and alert.    Juan Quintana MDIV, [facility]      Spoke with MsAnibal Alanis pt sister and she advise that they would like to establish care with . So pt can have a closer neurologist here in  instead of [place]. Since they are now in charge of pt regarding her care and she also advise that she has been having shaking in her left hand and was consider about it. So I advise her we would give her a call back on Monday to see if we can get her in sooner.

## 2024-04-19 ENCOUNTER — PATIENT MESSAGE (OUTPATIENT)
Dept: PSYCHIATRY | Facility: CLINIC | Age: 64
End: 2024-04-19
Payer: MEDICARE

## 2024-04-19 NOTE — TELEPHONE ENCOUNTER
States that she is currently using [facility]. Stated that the last PT that came to visit stated that she didn't need PT.

## 2024-04-22 ENCOUNTER — OUTPATIENT CASE MANAGEMENT (OUTPATIENT)
Dept: ADMINISTRATIVE | Facility: OTHER | Age: 64
End: 2024-04-22
Payer: MEDICARE

## 2024-04-23 NOTE — PROGRESS NOTES
Outpatient Care Management   - High Risk Patient Assessment    Patient: Juan Pablo Bolden  MRN:  [id number]  Date of Service:  4/22/2024  Completed by:  Yudi Park LMSW  Referral Date: 03/26/2024    Reason for Visit   Patient presents with    Other     4/22/2024  1st attempt to complete Initial Assessment  for Outpatient Care Management, left message.  Will mail unable to assess letter.        Social Work Assessment - High Risk     4/22/2024       Brief Summary:  received a referral from \[facility]\""M SILVESTRE Chahal for the following patient identified psycho-social needs: meal delivery, activities for patient. SW completed social assessment with pt's sister, Alanis Noyola. Pt lives in an apartment but has 24/7 care from [facility] (Teach, Assist, Reach, Connect). Pt has ROW (Residential Options Waiver) through Medicaid. Pt has hx of developmental delay, Kluver-Bucy Syndrome and seizure disorder. Pt is minimally verbal. Pt has long psych hx of past PECs. Pt [facility] of her life ([facility]) until it closed 8-9 years ago. Pt's sister, Alanis, is her only living relative and takes care of patient's affairs. Pt's sister lives in [place] and is very involved in patient's care. Pt requires assistance with getting dressed, bathing, laundry and housekeeping, which her PCAs provide. Alanis provides transportation to her medical appointments and sets up her medications. Alanis will also take pt with her for grocery shopping. Alanis denied any issues paying for groceries, utilities or rent. Pt receives rental assistance and SNAP. Pt is followed by psychiatrist Dr. Schmidt ([facility]) and sees him regularly.  Alanis is interested in Meals on Wheels through the [employer] [facility] and socialization for patient. Alanis reported she is open to SW sending her ready made meal resources for patient. Alanis also stated pt is interested in crafts like coloring, making bracelets and anything she can  hold in her hand. She does not think pt would fit in with activities at [place] [facility], but a program with her peers. Pt likes to get out and do things and when she is stuck inside often her behavior can escalate. Pt is using a wheelchair until she can get a rollator through [place] [facility]. Alanis is a  and is available after 3:30. Sw will follow up in 1 week. Care plan was created in collaboration with patient/caregiver input.  completed the SDOH questionnaire.

## 2024-04-25 ENCOUNTER — OUTPATIENT CASE MANAGEMENT (OUTPATIENT)
Dept: ADMINISTRATIVE | Facility: OTHER | Age: 64
End: 2024-04-25
Payer: MEDICARE

## 2024-04-25 NOTE — TELEPHONE ENCOUNTER
Care Due:                  Date            Visit Type   Department     Provider  --------------------------------------------------------------------------------                                HOSPITAL  Last Visit: 04-      FOLLOW UP    None Found     Brooke Goldberg  Next Visit: None Scheduled  None         None Found                                                            Last  Test          Frequency    Reason                     Performed    Due Date  --------------------------------------------------------------------------------    Lipid Panel.  12 months..  simvastatin..............  10-   10-    [facility] Embedded Care Due Messages. Reference number: [id number].   4/25/2024 4:28:21 PM CDT

## 2024-04-25 NOTE — TELEPHONE ENCOUNTER
Refill Routing Note   Medication(s) are not appropriate for processing by [facility] for the following reason(s):        Outside of protocol    [facility] action(s):  Route   Requires labs : Yes               Appointments  past 12m or future 3m with PCP    Date Provider   Last Visit   4/2/2024 Brooke Goldberg MD   Next Visit   Visit date not found Brooke Goldberg MD   ED visits in past 90 days: 1        Note composed:5:54 PM 04/25/2024

## 2024-04-25 NOTE — TELEPHONE ENCOUNTER
----- Message from Lillian Maynard sent at 4/25/2024  4:12 PM CDT -----  Regarding: pt sister called  Can the clinic reply in [facility]:  No         Please refill the medication(s) listed below. Please call the patient when the prescription(s) is ready for  at this phone number   [phone] (home)           Medication #1     Disp Refills Start End GLORIA  melatonin 10 mg Tab 90 tablet 3 3/1/2024 - No  Sig - Route: Take 1 tablet (10 mg total) by mouth every evening. - Oral  Patient taking differ        Medication #2  folic acid (FOLVITE) 1 MG tablet        Preferred Pharmacy: Telephone Information:    [facility] - [facility] - [id number]  [address]  [place] [address]  Phone: [phone] Fax: [phone]

## 2024-04-26 RX ORDER — FOLIC ACID 1 MG/1
1 TABLET ORAL DAILY
Qty: 90 TABLET | Refills: 3 | Status: SHIPPED | OUTPATIENT
Start: 2024-04-26

## 2024-04-26 RX ORDER — ACETAMINOPHEN, DIPHENHYDRAMINE HCL, PHENYLEPHRINE HCL 325; 25; 5 MG/1; MG/1; MG/1
1 TABLET ORAL NIGHTLY
Qty: 90 TABLET | Refills: 3 | Status: SHIPPED | OUTPATIENT
Start: 2024-04-26

## 2024-04-29 ENCOUNTER — OUTPATIENT CASE MANAGEMENT (OUTPATIENT)
Dept: ADMINISTRATIVE | Facility: OTHER | Age: 64
End: 2024-04-29
Payer: MEDICARE

## 2024-04-29 ENCOUNTER — OFFICE VISIT (OUTPATIENT)
Dept: PSYCHIATRY | Facility: CLINIC | Age: 64
End: 2024-04-29
Payer: MEDICARE

## 2024-04-29 DIAGNOSIS — R62.50 DEVELOPMENTAL DELAY: Primary | ICD-10-CM

## 2024-04-29 DIAGNOSIS — G47.00 INSOMNIA, UNSPECIFIED TYPE: ICD-10-CM

## 2024-04-29 DIAGNOSIS — R45.1 RESTLESSNESS AND AGITATION: ICD-10-CM

## 2024-04-29 DIAGNOSIS — G40.909 SEIZURE DISORDER: ICD-10-CM

## 2024-04-29 DIAGNOSIS — F79 INTELLECTUAL DISABILITY: Primary | ICD-10-CM

## 2024-04-29 DIAGNOSIS — G24.01 TARDIVE DYSKINESIA: ICD-10-CM

## 2024-04-29 PROCEDURE — 99214 OFFICE O/P EST MOD 30 MIN: CPT | Mod: 95,,, | Performed by: PSYCHIATRY & NEUROLOGY

## 2024-04-29 RX ORDER — QUETIAPINE FUMARATE 200 MG/1
TABLET, FILM COATED ORAL
Qty: 90 TABLET | Refills: 2 | Status: SHIPPED | OUTPATIENT
Start: 2024-04-29

## 2024-04-29 RX ORDER — CLONAZEPAM 0.5 MG/1
0.5 TABLET ORAL DAILY PRN
Qty: 30 TABLET | Refills: 3 | Status: SHIPPED | OUTPATIENT
Start: 2024-04-29 | End: 2025-04-29

## 2024-04-29 RX ORDER — LITHIUM CARBONATE 300 MG/1
CAPSULE ORAL
Qty: 30 CAPSULE | Refills: 3 | Status: SHIPPED | OUTPATIENT
Start: 2024-04-29

## 2024-04-29 RX ORDER — TRAZODONE HYDROCHLORIDE 150 MG/1
150 TABLET ORAL NIGHTLY PRN
Qty: 30 TABLET | Refills: 3 | Status: SHIPPED | OUTPATIENT
Start: 2024-04-29 | End: 2025-04-29

## 2024-04-29 RX ORDER — CALCIUM CARBONATE 160(400)MG
TABLET,CHEWABLE ORAL
Refills: 0 | Status: CANCELLED | OUTPATIENT
Start: 2024-04-29

## 2024-04-29 NOTE — PROGRESS NOTES
Outpatient Care Management   - Care Plan Follow Up    Patient: Juan Pablo Bolden  MRN:  [id number]  Date of Service:  4/29/2024  Completed by:  Yudi Park LMSW  Referral Date: 03/26/2024    Reason for Visit   Patient presents with    OPCM SW Follow Up Call     4/29/2024       Brief Summary: Patient and identified barriers were discussed during case conference with Medical Director and/or physician consultant and OPCM team on 4/24/2024.    Recommendations and Plan: Barriers -Lack of social interaction with peers and Suggestions- [facility], Outpatient psychiatric day program, [facility] ([facility]), [place] [facility]  RICARDO reached out to [facility] [address] [address] [phone]. RICARDO spoke with Tariq there and she stated pt would have to be independent with feeding herself and going to the restroom due to them not having enough staff. She also stated her aides would not be able to attend. They currently have a waitlist as well.  RICARDO called and left message for [facility] and emailed requesting information. Alanis stated she and pt's  have put pt on the wait list for [place] [facility] and the  was unable to find anything else. [facility] called RICARDO back and stated pt's  will need to send them her plan of care so they can see if they can meet her needs. Pt's attendants cannot come due to Medicaid can't be billed twice. There is also no wait list. RICARDO informed Alanis of this information and will email her the email addresses for the [facility] to send the care of plan to be reviewed. RICARDO also spoke with [facility] regarding Meals on Wheels. They will contact Alanis to gather more information. Alanis reported they have reached out. Alanis agreeable to follow up in 1 week.         Complex Care Plan     Care plan was discussed and  completed today with input from patient and/or caregiver.    Patient Instructions     No follow-ups on file.
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
## 2024-04-29 NOTE — TELEPHONE ENCOUNTER
----- Message from Shikha Chahal RN sent at 4/29/2024  4:31 PM CDT -----  Regarding: equipment for patient  Good afternoon. I spoke with Alanis, patient's sister. She is requesting a Rolator and a shower chair for patient - can an order be sent to [facility]? She was waiting for another provider ([facility]) to supply this, but it is taking some time now and she is concerned for her safety. Please let me know if I can assist in any way.  Thank you,   Shikha Chahal, RN  Outpatient Care Manager

## 2024-05-01 NOTE — TELEPHONE ENCOUNTER
I have signed for the following orders AND/OR meds.  Please call the patient and ask the patient to schedule the testing AND/OR inform about any medications that were sent. Medications have been sent to pharmacy listed below      Orders Placed This Encounter   Procedures    BATH/SHOWER CHAIR FOR HOME USE     Order Specific Question:   Height:     Answer:   4'11     Order Specific Question:   Weight:     Answer:   48kg     Order Specific Question:   Length of need (1-99 months):     Answer:   99     Order Specific Question:   Type:     Answer:   With back    WALKER FOR HOME USE     Order Specific Question:   Type of Walker:     Answer:   Rollator with brakes and/or seat     Order Specific Question:   With wheels?     Answer:   Yes     Order Specific Question:   Height:     Answer:   4'11     Order Specific Question:   Weight:     Answer:   48kg     Order Specific Question:   Length of need (1-99 months):     Answer:   99     Order Specific Question:   Please check all that apply:     Answer:   Patient's condition impairs ambulation.     Order Specific Question:   Please check all that apply:     Answer:   Patient is unable to safely ambulate without equipment.     Order Specific Question:   Please check all that apply:     Answer:   Patient needs help to get in and out of chair.              [facility] [place] - [address] [facility] [address]  Phone: [phone] Fax: [phone]    [id number] - [place] - [address] AT [facility]  [address]  [place] [address]  Phone: [phone] Fax: [phone]    [facility] - [place], [place] - [address] [place] [address]  Phone: [phone] Fax: [phone]

## 2024-05-07 ENCOUNTER — OUTPATIENT CASE MANAGEMENT (OUTPATIENT)
Dept: ADMINISTRATIVE | Facility: OTHER | Age: 64
End: 2024-05-07
Payer: MEDICARE

## 2024-05-08 NOTE — PROGRESS NOTES
Outpatient Care Management   - Care Plan Follow Up    Patient: Juan Pablo Bolden  MRN:  [id number]  Date of Service:  5/8/2024  Completed by:  Yudi Park LMSW  Referral Date: 03/26/2024    Reason for Visit   Patient presents with    OPCM SW Follow Up Call     5/8/2024       Brief Summary: RICARDO followed up with pt's sister, Alanis, via telephone. Alanis stated pt is doing well. She received her rollator. Alanis is waiting for approval letter from [facility] for Meals on Wheels. Alanis received email from  with [facility]'s email addresses. Alanis has a quarterly meeting with pt's  with the [facility] on 5/29 and will discuss this with her. Alanis is hoping to be able to go with pt to [facility] during the summer since she will be off work. Juan Pablo agreeable to RICARDO following up in 3 weeks.     Complex Care Plan     Care plan was discussed and completed today with input from patient and/or caregiver.    Patient Instructions     No follow-ups on file.

## 2024-05-08 NOTE — PROGRESS NOTES
Outpatient Care Management  Plan of Care Follow Up Visit    Patient: Juan Pablo Bolden  MRN: [id number]  Date of Service: 04/29/2024  Completed by: Shikha Chahal RN  Referral Date: 03/26/2024    Reason for Visit   Patient presents with    OPCM Chart Review    OPCM RN Follow Up Call       Brief Summary: OPCM visit done. Care plan reviewed and updated. Message sent to PCP in regards to equipment requested for use to prevent falls.   Next Steps: plan to follow up with Alanis in approximately one week - she agrees. Was equipment received? Discuss activities and fall prevention.   MIGUEL Chahal, RN

## 2024-05-09 ENCOUNTER — OUTPATIENT CASE MANAGEMENT (OUTPATIENT)
Dept: ADMINISTRATIVE | Facility: OTHER | Age: 64
End: 2024-05-09
Payer: MEDICARE

## 2024-05-09 RX ORDER — LACTULOSE 10 G/15ML
SOLUTION ORAL; RECTAL
Qty: 300 ML | Refills: 0 | Status: SHIPPED | OUTPATIENT
Start: 2024-05-09

## 2024-05-13 NOTE — PROGRESS NOTES
Outpatient Care Management  Plan of Care Follow Up Visit    Patient: Juan Pablo Bolden  MRN: [id number]  Date of Service: 05/09/2024  Completed by: Shikha Chahal RN  Referral Date: 03/26/2024    Reason for Visit   Patient presents with    OPCM Chart Review    OPCM RN Follow Up Call       Brief Summary: OPCM visit completed, care plan reviewed and updated.   Next Steps: plan to follow up with sister in approximately 2 weeks - she agrees. Discuss proper footwear. Plan to mention simple gardening or a plant - would this be something patient could participate with?  MIGUEL Chahal, RN

## 2024-05-21 ENCOUNTER — TELEPHONE (OUTPATIENT)
Dept: HEMATOLOGY/ONCOLOGY | Facility: CLINIC | Age: 64
End: 2024-05-21
Payer: MEDICARE

## 2024-05-21 NOTE — TELEPHONE ENCOUNTER
----- Message from Marilu Sheppard sent at 5/21/2024  9:53 AM CDT -----  Contact: Alanis Larkin sister is calling to receive a call back at .[phone]. Reports needing to reschedule today's appt.

## 2024-05-23 ENCOUNTER — OUTPATIENT CASE MANAGEMENT (OUTPATIENT)
Dept: ADMINISTRATIVE | Facility: OTHER | Age: 64
End: 2024-05-23
Payer: MEDICARE

## 2024-05-23 ENCOUNTER — TELEPHONE (OUTPATIENT)
Dept: FAMILY MEDICINE | Facility: CLINIC | Age: 64
End: 2024-05-23
Payer: MEDICARE

## 2024-05-23 DIAGNOSIS — R82.90 ABNORMAL URINE: Primary | ICD-10-CM

## 2024-05-23 NOTE — TELEPHONE ENCOUNTER
----- Message from Shikha Chahal RN sent at 5/23/2024  4:17 PM CDT -----  Regarding: possible lab order  Good afternoon. I spoke with Alanis, patient's sister. She noticed that patient's urine had a terrible odor. She is bringing patient to have lab work in the morning. Would it be possible to order a U/A for patient? Alanis says she has gotten a cup in the past and helped collect the urine from patient before to have testing done. Please reach out to Alanis with questions.  Thank you,   Shikha Chahal, RN  Outpatient Care Manager

## 2024-05-23 NOTE — PROGRESS NOTES
Outpatient Care Management  Plan of Care Follow Up Visit    Patient: Juan Pablo Bolden  MRN: [id number]  Date of Service: 05/23/2024  Completed by: Shikha Chahal RN  Referral Date: 03/26/2024    Reason for Visit   Patient presents with    OPCM Chart Review    OPCM RN Follow Up Call       Brief Summary: OPCM visit completed, communicated with LSW and messaged PCP in regards to concerns.  Next Steps: plan to follow up with Alanis, patient's sister in approximately 2 weeks - she agrees. Has patient received a shower chair? Any news from [facility]?  MIGUEL Chahal RN

## 2024-05-24 ENCOUNTER — PATIENT MESSAGE (OUTPATIENT)
Dept: PSYCHIATRY | Facility: CLINIC | Age: 64
End: 2024-05-24
Payer: MEDICARE

## 2024-05-24 NOTE — TELEPHONE ENCOUNTER
Patient can come drop off urine.     I have signed for the following orders AND/OR meds.  Please call the patient and ask the patient to schedule the testing AND/OR inform about any medications that were sent. Medications have been sent to pharmacy listed below      Orders Placed This Encounter   Procedures    Urinalysis, Reflex to Urine Culture Urine, Clean Catch     Standing Status:   Future     Standing Expiration Date:   7/22/2025     Order Specific Question:   Preferred Collection Type     Answer:   Urine, Clean Catch     Order Specific Question:   Specimen Source     Answer:   Urine              [facility] [place] - [address] [address] [facility] [address]  Phone: [phone] Fax: [phone]    [id number] - [place] - [address] AT [facility]  [address]  [place] [address]  Phone: [phone] Fax: [phone]    [facility] [place] [address] [place] [address]  Phone: [phone] Fax: [phone]

## 2024-05-27 ENCOUNTER — OFFICE VISIT (OUTPATIENT)
Dept: PODIATRY | Facility: CLINIC | Age: 64
End: 2024-05-27
Payer: MEDICARE

## 2024-05-27 ENCOUNTER — TELEPHONE (OUTPATIENT)
Dept: PSYCHIATRY | Facility: CLINIC | Age: 64
End: 2024-05-27
Payer: MEDICARE

## 2024-05-27 VITALS — WEIGHT: 104.94 LBS | HEIGHT: 59 IN | BODY MASS INDEX: 21.16 KG/M2

## 2024-05-27 DIAGNOSIS — L84 CORN OR CALLUS: ICD-10-CM

## 2024-05-27 DIAGNOSIS — R60.0 EDEMA OF LEFT FOOT: Primary | ICD-10-CM

## 2024-05-27 PROCEDURE — 99213 OFFICE O/P EST LOW 20 MIN: CPT | Mod: PBBFAC | Performed by: PODIATRIST

## 2024-05-27 PROCEDURE — 99999 PR PBB SHADOW E&M-EST. PATIENT-LVL III: CPT | Mod: PBBFAC,,, | Performed by: PODIATRIST

## 2024-05-27 PROCEDURE — 99213 OFFICE O/P EST LOW 20 MIN: CPT | Mod: S$PBB,,, | Performed by: PODIATRIST

## 2024-05-27 NOTE — TELEPHONE ENCOUNTER
I returned call to pt 's sister. Left a message to return call      ----- Message from Yoko Castellon sent at 5/27/2024  9:32 AM CDT -----  States she would like the nurse to give her a call. Please call Alanis Noyola [phone]. Thank you

## 2024-05-27 NOTE — PROGRESS NOTES
Subjective:     Patient ID: Juan Pablo Bolden is a 63 y.o. female.    Chief Complaint: Callouses (C/o callous on the left foot and right foot is swollen, pt rates pain 8/10, pt is non-diabetic)    Juan Pablo is a 63 y.o. female who presents to the podiatry clinic  with complaint of left foot callus and right foot swelling. Patient is accompanied by her sister today, who states the patient aide has stated noted ankle swelling to patient. Patients sister denied any injury to the foot.     Patient Active Problem List   Diagnosis    Central hypothyroidism    Lipodystrophy    Osteopenia    Hyperprolactinemia    PDD (pervasive developmental disorder)    Tardive dyskinesia    Restlessness and agitation    Seizure disorder    Essential hypertension    Leukopenia    Thrombocytopenia    Iron deficiency anemia due to chronic blood loss    Schizoaffective disorder    Other constipation    H. pylori infection    Other symptoms and signs involving the nervous system    Coronary artery calcification    Abnormal CT scan    Calcification of aorta    Liver lesion    Abnormal brain MRI    Pancreas cyst    Mixed hyperlipidemia    Neutropenia, unspecified type    Bilateral leg edema    Developmental delay    Bipolar disorder       Medication List with Changes/Refills   Current Medications    ACETAMINOPHEN (TYLENOL) 650 MG TBSR    Take 650 mg by mouth every 8 (eight) hours as needed for Pain.    AMLODIPINE (NORVASC) 10 MG TABLET    Take 1 tablet (10 mg total) by mouth once daily.    ASPIRIN (ECOTRIN) 81 MG EC TABLET    Take 1 tablet (81 mg total) by mouth once daily.    CETIRIZINE (ZYRTEC) 10 MG TABLET    Take 10 mg by mouth daily as needed for Allergies.    CLONAZEPAM (KLONOPIN) 0.5 MG TABLET    Take 1 tablet (0.5 mg total) by mouth daily as needed for Anxiety.    DICLOFENAC SODIUM (VOLTAREN) 1 % GEL    APPLY TWO GRAMS TO THE AFFECTED AREA FOUR TIMES DAILY AS NEEDED FOR PAIN    DIPHENHYDRAMINE (BENADRYL) 25 MG CAPSULE    Take 25 mg by mouth  nightly as needed for Allergies.    DOCUSATE SODIUM (COLACE) 100 MG CAPSULE    Take 1 capsule (100 mg total) by mouth once daily.    FLUTICASONE PROPIONATE (FLONASE) 50 MCG/ACTUATION NASAL SPRAY    SHAKE LIQUID AND USE 2 SPRAYS IN EACH NOSTRIL EVERY DAY    FOLIC ACID (FOLVITE) 1 MG TABLET    Take 1 tablet (1 mg total) by mouth once daily.    FUROSEMIDE (LASIX) 20 MG TABLET    TAKE 1 TABLET BY MOUTH DAILY AS NEEDED FOR SWELLING    LACTULOSE (CHRONULAC) 10 GRAM/15 ML SOLUTION    TAKE 15 MLS BY MOUTH THREE TIMES DAILY AS NEEDED FOR CONSTIPATION    LITHIUM (ESKALITH) 300 MG CAPSULE    Take 1 capsule at bedtime.    MELATONIN 10 MG TAB    Take 1 tablet (10 mg total) by mouth every evening.    QUETIAPINE (SEROQUEL) 200 MG TAB    Take 1 tablet each morning and 2 tablets at bedtime.    SIMETHICONE (MYLICON) 125 MG CAP CAPSULE    1 capsule after meals and at bedtime as needed    SIMVASTATIN (ZOCOR) 40 MG TABLET    Take 1 tablet (40 mg total) by mouth every evening.    TRAZODONE (DESYREL) 150 MG TABLET    TAKE 1 TABLET BY MOUTH AT BEDTIME AS NEEDED FOR INSOMNIA    TRAZODONE (DESYREL) 150 MG TABLET    Take 1 tablet (150 mg total) by mouth nightly as needed for Insomnia.    TRIAMTERENE-HYDROCHLOROTHIAZIDE 37.5-25 MG (MAXZIDE-25) 37.5-25 MG PER TABLET    Take 1 tablet by mouth every morning.    VALBENAZINE (INGREZZA) 40 MG CAP    Take 1 capsule (40 mg total) by mouth once daily.       Review of patient's allergies indicates:  No Known Allergies    Past Surgical History:   Procedure Laterality Date    COLONOSCOPY N/A 5/17/2021    Procedure: COLONOSCOPY;  Surgeon: Jeffrey Ayala MD;  Location: [facility];  Service: Gastroenterology;  Laterality: N/A;    ENDOSCOPIC ULTRASOUND OF UPPER GASTROINTESTINAL TRACT N/A 5/16/2022    Procedure: ULTRASOUND, UPPER GI TRACT, ENDOSCOPIC;  Surgeon: Cherise Marshall MD;  Location: [facility];  Service: Endoscopy;  Laterality: N/A;  Upper and linear, 22 shark core, slides and formalin.     ENDOSCOPIC ULTRASOUND OF UPPER GASTROINTESTINAL TRACT N/A 12/20/2023    Procedure: ULTRASOUND, UPPER GI TRACT, ENDOSCOPIC;  Surgeon: Reed Ojeda MD;  Location: [facility] ([address] [place] [address]);  Service: Endoscopy;  Laterality: N/A;  Need EUS (linear) for pancreas cyst surveillance. 45 minutes. Main or [place]. -lewis  instr [id number]-nk-oq seizures for many years  11/15/23-LVM for precall-DS  11/16-precall complete-Kpvt  11/21/23: instructions sent via portal for reschedule-GD  1    ESOPHAGOGASTRODUODENOSCOPY N/A 11/5/2020    Procedure: EGD (ESOPHAGOGASTRODUODENOSCOPY);  Surgeon: John Batista MD;  Location: [facility];  Service: Endoscopy;  Laterality: N/A;       Family History   Problem Relation Name Age of Onset    Lung cancer Mother      Hypertension Father      Prostate cancer Father      Stroke Maternal Uncle      Heart disease Maternal Grandmother         Social History     Socioeconomic History    Marital status: Single   Tobacco Use    Smoking status: Never     Passive exposure: Never    Smokeless tobacco: Never   Substance and Sexual Activity    Alcohol use: No    Drug use: No    Sexual activity: Never     Social Determinants of Health     Financial Resource Strain: Low Risk  (4/23/2024)    Overall Financial Resource Strain (CARDIA)     Difficulty of Paying Living Expenses: Not hard at all   Food Insecurity: No Food Insecurity (4/22/2024)    Hunger Vital Sign     Worried About Running Out of Food in the Last Year: Never true     Ran Out of Food in the Last Year: Never true   Transportation Needs: No Transportation Needs (4/22/2024)    PRAPARE - Transportation     Lack of Transportation (Medical): No     Lack of Transportation (Non-Medical): No   Physical Activity: Inactive (4/22/2024)    Exercise Vital Sign     Days of Exercise per Week: 0 days     Minutes of Exercise per Session: 0 min   Stress: No Stress Concern Present (4/23/2024)    [employer] [employer] - Occupational Stress  "Questionnaire     Feeling of Stress : Not at all   Housing Stability: Low Risk  (4/22/2024)    Housing Stability Vital Sign     Unable to Pay for Housing in the Last Year: No     Homeless in the Last Year: No       Vitals:    05/27/24 0820   Weight: 47.6 kg (104 lb 15 oz)   Height: 4' 11" (1.499 m)   PainSc:   8       Hemoglobin A1C   Date Value Ref Range Status   10/14/2014 5.2 4.5 - 6.2 % Final       Review of Systems   Unable to perform ROS: Mental acuity         Objective:      PHYSICAL EXAM: Apperance: Alert and orient in no distress,well developed, and with good attention to grooming and body habits  Patient presents ambulating in tennis shoes.   LOWER EXTREMITY EXAM:  VASCULAR: Dorsalis pedis pulses 2/4 bilateral and Posterior Tibial pulses 2/4 bilateral. Capillary fill time <4 seconds bilateral. No edema observed bilateral. Varicosities absent bilateral. Skin temperature of the lower extremities is warm to warm, proximal to distal. Hair growth WNL bilateral.  DERMATOLOGICAL: No skin rashes, subcutaneous nodules, lesions, or ulcers observed bilateral. Nails 1,2,3,4,5 bilateral thickened, and discolored with subungual debris. Webspaces 1,2,3,4 bilateral clean, dry and without evidence of break in skin integrity. Mild hyperkeratotic tissue noted to left medial hallux and right plantar 5th submetatarsal. No fluctuance noted to area.   NEUROLOGICAL: Light touch, sharp-dull, proprioception all present and equal bilaterally.   MUSCULOSKELETAL: Muscle strength is 5/5 for foot inverters, everters, plantarflexors, and dorsiflexors. Muscle tone is normal. No pain on palpation of bilateral feet.         Assessment:       Encounter Diagnoses   Name Primary?    Corn or callus     Edema of left foot Yes             Plan:   Edema of left foot  -     Sedimentation rate; Future; Expected date: 05/27/2024  -     C-Reactive Protein; Future; Expected date: 05/27/2024  -     Uric Acid; Future; Expected date: 05/27/2024    Corn or " callus      I counseled the patient on her conditions, regarding findings of my examination, my impressions, and usual treatment plan.   Patient counseled on ways to improve swelling in lower extremities including decreasing/eliminating salt intake, elevating lower extremities, compression stocking, or oral medications. Patient states she understands.   Ordered uric acid, ESR, and CRP labs to be completed tomorrow along with previously order lipid panel.   The patient and I reviewed the types of shoes she should be wearing, my recommendation includes generally the best time of the day for a shoe fitting is the afternoon, shoes with a wide toe box, very good cushion, and tennis shoes with removable inner soles.The patient and I reviewed my recommendations for over-the-counter orthotic inserts.   Patient to return as needed.       Elsa Wilkes DPM  [facility]

## 2024-05-28 ENCOUNTER — TELEPHONE (OUTPATIENT)
Dept: PRIMARY CARE CLINIC | Facility: CLINIC | Age: 64
End: 2024-05-28
Payer: MEDICARE

## 2024-05-28 ENCOUNTER — LAB VISIT (OUTPATIENT)
Dept: LAB | Facility: HOSPITAL | Age: 64
End: 2024-05-28
Attending: PODIATRIST
Payer: MEDICARE

## 2024-05-28 DIAGNOSIS — R60.0 EDEMA OF LEFT FOOT: ICD-10-CM

## 2024-05-28 LAB
CRP SERPL-MCNC: 0.5 MG/L (ref 0–8.2)
ERYTHROCYTE [SEDIMENTATION RATE] IN BLOOD BY WESTERGREN METHOD: 24 MM/HR (ref 0–20)
URATE SERPL-MCNC: 5.8 MG/DL (ref 2.4–5.7)

## 2024-05-28 PROCEDURE — 84550 ASSAY OF BLOOD/URIC ACID: CPT | Performed by: PODIATRIST

## 2024-05-28 PROCEDURE — 86140 C-REACTIVE PROTEIN: CPT | Performed by: PODIATRIST

## 2024-05-28 PROCEDURE — 85651 RBC SED RATE NONAUTOMATED: CPT | Performed by: PODIATRIST

## 2024-05-28 NOTE — TELEPHONE ENCOUNTER
----- Message from Charity Judd sent at 5/28/2024 11:16 AM CDT -----  Contact: Alanis/ Sister  Patient's Sister need a callback from office asap. Calling concerning medication and at the pharmacy  [phone]

## 2024-05-28 NOTE — TELEPHONE ENCOUNTER
Spoke to pts sister, notified her that Melatonin was sent in on 4/26/24. Called channells to confirm this.

## 2024-05-29 ENCOUNTER — OUTPATIENT CASE MANAGEMENT (OUTPATIENT)
Dept: ADMINISTRATIVE | Facility: OTHER | Age: 64
End: 2024-05-29
Payer: MEDICARE

## 2024-05-29 NOTE — PROGRESS NOTES
Outpatient Care Management   - Care Plan Follow Up    Patient: Juan Pablo Bolden  MRN:  [id number]  Date of Service:  5/29/2024  Completed by:  Yudi Park LMSW  Referral Date: 03/26/2024    Reason for Visit   Patient presents with    OPCM SW Follow Up Call     5/29/2024       Brief Summary: SW followed up with pt's sister, Alanis, via telephone. Alanis stated pt is doing great and has been able to see her every day. Alanis received the plan of care from pt's . Sw will forward this to [facility] to start referral process for their Day Program. Alanis has not heard from [employer] after receiving intro letter. SW will task to [facility] to follow up on status of this. Sw will follow up in 2 weeks.     Complex Care Plan     Care plan was discussed and completed today with input from patient and/or caregiver.    Patient Instructions     No follow-ups on file.

## 2024-05-30 ENCOUNTER — PATIENT OUTREACH (OUTPATIENT)
Dept: ADMINISTRATIVE | Facility: OTHER | Age: 64
End: 2024-05-30
Payer: MEDICARE

## 2024-05-30 NOTE — PROGRESS NOTES
Community Health Worker's assistance requested from Yudi Park LMSW to contact [facility] [place] regarding MOW status.  Agency Representative: n/a  Outcome of Call: Rep from [facility] informed that Ms. Almeida was denied MOW due to her sister, Alanis stating on intake that she or someone gets paid to provide meals to .   Follow up needed: no   Next Scheduled follow up: n/a

## 2024-06-13 ENCOUNTER — OUTPATIENT CASE MANAGEMENT (OUTPATIENT)
Dept: ADMINISTRATIVE | Facility: OTHER | Age: 64
End: 2024-06-13
Payer: MEDICARE

## 2024-06-13 NOTE — PROGRESS NOTES
Outpatient Care Management   - Care Plan Follow Up    Patient: Juan Pablo Bolden  MRN:  [id number]  Date of Service:  6/13/2024  Completed by:  Yudi Park LMSW  Referral Date: 03/26/2024    Reason for Visit   Patient presents with    OPCM SW Follow Up Call     6/13/2024       Brief Summary: SW followed up with pt's sister, Alanis. SW informed her that [facility] did receive the plan of care and are reviewing it. They will reach out to Alanis soon. Alanis said pt is doing great. Alanis has missed contact with [facility] regarding [facility]. SW will follow up in 2 weeks.     Complex Care Plan     Care plan was discussed and completed today with input from patient and/or caregiver.    Patient Instructions     No follow-ups on file.

## 2024-06-24 ENCOUNTER — PATIENT MESSAGE (OUTPATIENT)
Dept: PRIMARY CARE CLINIC | Facility: CLINIC | Age: 64
End: 2024-06-24
Payer: MEDICARE

## 2024-06-26 ENCOUNTER — OUTPATIENT CASE MANAGEMENT (OUTPATIENT)
Dept: ADMINISTRATIVE | Facility: OTHER | Age: 64
End: 2024-06-26
Payer: MEDICARE

## 2024-06-26 NOTE — TELEPHONE ENCOUNTER
----- Message from Shikha Chahal RN sent at 6/26/2024  4:23 PM CDT -----  Regarding: patient update  Good afternoon. I spoke with Alanis, patient's sister. She mentioned that she messaged you in regards to patient experiencing constipation and having blood in stool. She thinks it may be due to being constipated, but would like to do a Cologuard test to make sure. Alanis also says patient fell sometime before Father's Day. She says patient had a bruise around left eye and leg pain - she gave Tylenol and has not had any issues since - she reports patient is well. Sitter's were unable to tell her what happened, but Juan Pablo describes a fall on sidewalk. Please reach out to Alanis in regards to possibly doing a Cologuard test.   Thank you,  Shikha Chahal, RN  Outpatient Care Manager

## 2024-06-26 NOTE — TELEPHONE ENCOUNTER
Please advise a message was sent to dr mora 6/24/24 see portal message   Can she do a colorguard test ?

## 2024-06-26 NOTE — TELEPHONE ENCOUNTER
"Spoke to daughter on the phone , she states " I do not need an appt anymore to see dr mora or germaine , I just need an order for the colorguard because I need to check where that blood came from. I sent a message to dr mora on monday thru the pt portal explaining everything ." Pt refused to be seen . Please advise   "

## 2024-06-27 RX ORDER — LACTULOSE 10 G/15ML
10 SOLUTION ORAL; RECTAL 3 TIMES DAILY PRN
Qty: 300 ML | Refills: 0 | Status: SHIPPED | OUTPATIENT
Start: 2024-06-27

## 2024-06-27 NOTE — TELEPHONE ENCOUNTER
Spoke with pt's sister, Alanis and informed her of Dr. Goldberg' recommendations.  Alanis states ryan has had a colonoscopy within the last 10 years.  I instructed her that if a new problem arises, a colonoscopy could be warranted and not be considered a screening, verbalized understanding. Alanis states ryan has had 2 BM's within the last couple of days with no blood found. I advised her to keep monitoring for any blood and that we would inform Dr. Goldberg. Verbalized understanding.   Also requesting a refill on the Lactulose

## 2024-06-27 NOTE — TELEPHONE ENCOUNTER
No care due was identified.  [facility] Embedded Care Due Messages. Reference number: [id number].   6/27/2024 9:06:41 AM CDT

## 2024-07-01 ENCOUNTER — OUTPATIENT CASE MANAGEMENT (OUTPATIENT)
Dept: ADMINISTRATIVE | Facility: OTHER | Age: 64
End: 2024-07-01
Payer: MEDICARE

## 2024-07-02 ENCOUNTER — OFFICE VISIT (OUTPATIENT)
Dept: HEMATOLOGY/ONCOLOGY | Facility: CLINIC | Age: 64
End: 2024-07-02
Payer: MEDICARE

## 2024-07-02 VITALS
TEMPERATURE: 98 F | HEART RATE: 68 BPM | OXYGEN SATURATION: 97 % | WEIGHT: 117.75 LBS | HEIGHT: 57 IN | RESPIRATION RATE: 20 BRPM | DIASTOLIC BLOOD PRESSURE: 79 MMHG | SYSTOLIC BLOOD PRESSURE: 148 MMHG | BODY MASS INDEX: 25.4 KG/M2

## 2024-07-02 DIAGNOSIS — K86.2 PANCREAS CYST: ICD-10-CM

## 2024-07-02 DIAGNOSIS — K76.9 LIVER LESION: ICD-10-CM

## 2024-07-02 DIAGNOSIS — D70.0 CONGENITAL NEUTROPENIA: ICD-10-CM

## 2024-07-02 DIAGNOSIS — R71.8 OTHER ABNORMALITY OF RED BLOOD CELLS: ICD-10-CM

## 2024-07-02 DIAGNOSIS — D69.6 THROMBOCYTOPENIA: ICD-10-CM

## 2024-07-02 DIAGNOSIS — D61.818 PANCYTOPENIA: Primary | ICD-10-CM

## 2024-07-02 PROBLEM — D50.0 IRON DEFICIENCY ANEMIA DUE TO CHRONIC BLOOD LOSS: Status: RESOLVED | Noted: 2020-11-05 | Resolved: 2024-07-02

## 2024-07-02 PROBLEM — D70.9 NEUTROPENIA, UNSPECIFIED TYPE: Status: RESOLVED | Noted: 2023-11-26 | Resolved: 2024-07-02

## 2024-07-02 PROCEDURE — 99999 PR PBB SHADOW E&M-EST. PATIENT-LVL V: CPT | Mod: PBBFAC,,, | Performed by: INTERNAL MEDICINE

## 2024-07-02 PROCEDURE — 99214 OFFICE O/P EST MOD 30 MIN: CPT | Mod: S$PBB,,, | Performed by: INTERNAL MEDICINE

## 2024-07-02 PROCEDURE — 99215 OFFICE O/P EST HI 40 MIN: CPT | Mod: PBBFAC | Performed by: INTERNAL MEDICINE

## 2024-07-02 NOTE — PROGRESS NOTES
Subjective:       Patient ID: Juan Pablo Bolden is a 63 y.o. female.    Chief Complaint: Results    HPI:  63-year-old female with a history of pancytopenia.  The patient is seen and evaluated in the past extensive evaluation excluding bone marrow has been done no clear etiology as pancytopenia.    Past Medical History:   Diagnosis Date    Bipolar 1 disorder     Chronic constipation     Galactorrhea     Gluteal abscess 03/26/2024    Growth retardation     Profound mental retardation    High cholesterol     Hyperlipidemia     Hypertension     Leukopenia     Neuroleptic-induced tardive dyskinesia     Schizoaffective disorder     Seizures     Stereotypic movement disorder      Family History   Problem Relation Name Age of Onset    Lung cancer Mother      Hypertension Father      Prostate cancer Father      Stroke Maternal Uncle      Heart disease Maternal Grandmother       Social History     Socioeconomic History    Marital status: Single   Tobacco Use    Smoking status: Never     Passive exposure: Never    Smokeless tobacco: Never   Substance and Sexual Activity    Alcohol use: No    Drug use: No    Sexual activity: Never     Social Determinants of Health     Financial Resource Strain: Low Risk  (4/23/2024)    Overall Financial Resource Strain (CARDIA)     Difficulty of Paying Living Expenses: Not hard at all   Food Insecurity: No Food Insecurity (4/22/2024)    Hunger Vital Sign     Worried About Running Out of Food in the Last Year: Never true     Ran Out of Food in the Last Year: Never true   Transportation Needs: No Transportation Needs (4/22/2024)    PRAPARE - Transportation     Lack of Transportation (Medical): No     Lack of Transportation (Non-Medical): No   Physical Activity: Inactive (4/22/2024)    Exercise Vital Sign     Days of Exercise per Week: 0 days     Minutes of Exercise per Session: 0 min   Stress: No Stress Concern Present (4/23/2024)    [place] [employer] - Occupational Stress  Questionnaire     Feeling of Stress : Not at all   Housing Stability: Low Risk  (4/22/2024)    Housing Stability Vital Sign     Unable to Pay for Housing in the Last Year: No     Homeless in the Last Year: No     Past Surgical History:   Procedure Laterality Date    COLONOSCOPY N/A 5/17/2021    Procedure: COLONOSCOPY;  Surgeon: Jeffrey Ayala MD;  Location: [facility];  Service: Gastroenterology;  Laterality: N/A;    ENDOSCOPIC ULTRASOUND OF UPPER GASTROINTESTINAL TRACT N/A 5/16/2022    Procedure: ULTRASOUND, UPPER GI TRACT, ENDOSCOPIC;  Surgeon: Cherise Marshall MD;  Location: [facility];  Service: Endoscopy;  Laterality: N/A;  Upper and linear, 22 shark core, slides and formalin.    ENDOSCOPIC ULTRASOUND OF UPPER GASTROINTESTINAL TRACT N/A 12/20/2023    Procedure: ULTRASOUND, UPPER GI TRACT, ENDOSCOPIC;  Surgeon: Reed Ojeda MD;  Location: [facility] (2ND FLR);  Service: Endoscopy;  Laterality: N/A;  Need EUS (linear) for pancreas cyst surveillance. 45 minutes. Main or [place]. -lewis  instr jsiznc-dv-sy seizures for many years  11/15/23-LVM for precall-DS  11/16-precall complete-Kpvt  11/21/23: instructions sent via portal for reschedule-GD  1    ESOPHAGOGASTRODUODENOSCOPY N/A 11/5/2020    Procedure: EGD (ESOPHAGOGASTRODUODENOSCOPY);  Surgeon: John Batista MD;  Location: [facility];  Service: Endoscopy;  Laterality: N/A;       Labs:  Lab Results   Component Value Date    WBC 2.40 (L) 03/29/2024    HGB 10.4 (L) 03/29/2024    HCT 32.5 (L) 03/29/2024    MCV 92 03/29/2024     (L) 03/29/2024     BMP  Lab Results   Component Value Date     03/29/2024    K 3.9 03/29/2024     03/29/2024    CO2 22 (L) 03/29/2024    BUN 7 (L) 03/29/2024    CREATININE 0.7 03/29/2024    CALCIUM 9.9 03/29/2024    ANIONGAP 13 03/29/2024    ESTGFRAFRICA >60.0 06/11/2022    EGFRNONAA >60.0 06/11/2022     Lab Results   Component Value Date    ALT 23 03/26/2024    AST 13 03/26/2024    ALKPHOS 143 (H)  03/26/2024    BILITOT 0.4 03/26/2024       Lab Results   Component Value Date    IRON 92 07/16/2022    TIBC 444 07/16/2022    FERRITIN 148 07/16/2022     Lab Results   Component Value Date    UYDPYWCZ12 586 10/01/2020     Lab Results   Component Value Date    FOLATE 8.4 10/01/2020     Lab Results   Component Value Date    TSH 1.423 03/26/2024         Review of Systems   Constitutional:  Positive for fatigue. Negative for activity change, appetite change, chills, diaphoresis, fever and unexpected weight change.   HENT:  Negative for congestion, dental problem, drooling, ear discharge, ear pain, facial swelling, hearing loss, mouth sores, nosebleeds, postnasal drip, rhinorrhea, sinus pressure, sneezing, sore throat, tinnitus, trouble swallowing and voice change.    Eyes:  Negative for photophobia, pain, discharge, redness, itching and visual disturbance.   Respiratory:  Negative for cough, choking, chest tightness, shortness of breath, wheezing and stridor.    Cardiovascular:  Negative for chest pain, palpitations and leg swelling.   Gastrointestinal:  Negative for abdominal distention, abdominal pain, anal bleeding, blood in stool, constipation, diarrhea, nausea, rectal pain and vomiting.   Endocrine: Negative for cold intolerance, heat intolerance, polydipsia, polyphagia and polyuria.   Genitourinary:  Negative for decreased urine volume, difficulty urinating, dyspareunia, dysuria, enuresis, flank pain, frequency, genital sores, hematuria, menstrual problem, pelvic pain, urgency, vaginal bleeding, vaginal discharge and vaginal pain.   Musculoskeletal:  Negative for arthralgias, back pain, gait problem, joint swelling, myalgias, neck pain and neck stiffness.   Skin:  Negative for color change, pallor and rash.   Allergic/Immunologic: Negative for environmental allergies, food allergies and immunocompromised state.   Neurological:  Positive for weakness. Negative for dizziness, tremors, seizures, syncope, facial  asymmetry, speech difficulty, light-headedness, numbness and headaches.   Hematological:  Negative for adenopathy. Does not bruise/bleed easily.   Psychiatric/Behavioral:  Positive for confusion, decreased concentration and dysphoric mood. Negative for agitation, hallucinations, self-injury, sleep disturbance and suicidal ideas. The patient is nervous/anxious. The patient is not hyperactive.        Objective:      Physical Exam  Vitals reviewed.   Constitutional:       General: She is not in acute distress.     Appearance: She is well-developed. She is not diaphoretic.   HENT:      Head: Normocephalic and atraumatic.      Right Ear: External ear normal.      Left Ear: External ear normal.      Nose: Nose normal.      Right Sinus: No maxillary sinus tenderness or frontal sinus tenderness.      Left Sinus: No maxillary sinus tenderness or frontal sinus tenderness.      Mouth/Throat:      Pharynx: No oropharyngeal exudate.   Eyes:      General: Lids are normal. No scleral icterus.        Right eye: No discharge.         Left eye: No discharge.      Conjunctiva/sclera: Conjunctivae normal.      Right eye: Right conjunctiva is not injected. No hemorrhage.     Left eye: Left conjunctiva is not injected. No hemorrhage.     Pupils: Pupils are equal, round, and reactive to light.   Neck:      Thyroid: No thyromegaly.      Vascular: No JVD.      Trachea: No tracheal deviation.   Cardiovascular:      Rate and Rhythm: Normal rate.   Pulmonary:      Effort: Pulmonary effort is normal. No respiratory distress.      Breath sounds: No stridor.   Chest:      Chest wall: No tenderness.   Abdominal:      General: Bowel sounds are normal. There is no distension.      Palpations: Abdomen is soft. There is no hepatomegaly, splenomegaly or mass.      Tenderness: There is no abdominal tenderness. There is no rebound.   Musculoskeletal:         General: No tenderness. Normal range of motion.      Cervical back: Normal range of motion and  neck supple.   Lymphadenopathy:      Cervical: No cervical adenopathy.      Upper Body:      Right upper body: No supraclavicular adenopathy.      Left upper body: No supraclavicular adenopathy.   Skin:     General: Skin is dry.      Findings: No erythema or rash.   Neurological:      Mental Status: She is alert and oriented to person, place, and time.      Cranial Nerves: No cranial nerve deficit.      Motor: Weakness present.      Coordination: Coordination normal.      Gait: Gait abnormal.   Psychiatric:         Behavior: Behavior normal.         Thought Content: Thought content normal.         Judgment: Judgment normal.             Assessment:      1. Pancytopenia    2. Thrombocytopenia    3. Congenital neutropenia    4. Pancreas cyst    5. Liver lesion    6. Other abnormality of red blood cells           Med Onc Chart Routing      Follow up with physician . Return to clinic can be seen by [facility] in 6 months with CBC CMP serum iron TIBC ferritin prior if possible   Follow up with [facility]    Infusion scheduling note    Injection scheduling note    Labs    Imaging    Pharmacy appointment    Other referrals                   Plan:     History of longstanding pancytopenia reviewed laboratory studies over the last several years.  Patient is originally essentially stable.  At this time do not feel any further workup is warranted.  Reviewed information with her and family member present copies of CBC given for their review        Michael Ferrera Jr, MD FACP

## 2024-07-05 ENCOUNTER — PATIENT MESSAGE (OUTPATIENT)
Dept: PSYCHIATRY | Facility: CLINIC | Age: 64
End: 2024-07-05
Payer: MEDICARE

## 2024-07-08 NOTE — PROGRESS NOTES
Outpatient Care Management  Plan of Care Follow Up Visit    Patient: Juan Pablo Bolden  MRN: [id number]  Date of Service: 06/26/2024  Completed by: Shikha Chahal RN  Referral Date: 03/26/2024    Reason for Visit   Patient presents with    OPCM Chart Review    OPCM RN Follow Up Call       Brief Summary: OPCM visit completed, care plan reviewed and updated. Message sent to PCP in regards to recent fall and follow up on call in regards to possibly doing a cologuard test.   Next Steps: plan to follow up in approximately 2 weeks - Alanis agrees. Any news from PCP office in regards to possibly doing a Cologuard test? Has patient been free of falls? Any news from [facility]?  MIGUEL Chahal RN

## 2024-07-08 NOTE — PROGRESS NOTES
Outpatient Care Management   - Care Plan Follow Up    Patient: Juan Pablo Bolden  MRN:  [id number]  Date of Service:  7/8/2024  Completed by:  Yudi Park LMSW  Referral Date: 03/26/2024    Reason for Visit   Patient presents with    Other     7/1/2024  1st attempt to complete Follow-Up  for Outpatient Care Management, left message.  Will mail unable to assess letter.        OPCM SW Follow Up Call     7/8/2024       Brief Summary: RICARDO followed up with pt's sister, Alanis, via telephone. RICARDO informed Alanis that Juan Pablo on the waitlist for [facility] and they have been trying to reach out to schedule a tour. They are hoping to hire more staff soon as well. RICARDO verified Alanis had their contact number in case she wanted to reach out. Alanis had a surprise birthday party for Juan Pablo this past weekend. Alanis also stated Juan Pablo's  is working on getting her shower chair. Alanis will let RICARDO know if they have any problems. RICARDO will follow up in 3 weeks.     Complex Care Plan     Care plan was discussed and completed today with input from patient and/or caregiver.    Patient Instructions     No follow-ups on file.

## 2024-07-09 ENCOUNTER — EXTERNAL HOME HEALTH (OUTPATIENT)
Dept: HOME HEALTH SERVICES | Facility: HOSPITAL | Age: 64
End: 2024-07-09
Payer: MEDICARE

## 2024-07-09 ENCOUNTER — TELEPHONE (OUTPATIENT)
Dept: PSYCHIATRY | Facility: CLINIC | Age: 64
End: 2024-07-09
Payer: MEDICARE

## 2024-07-10 ENCOUNTER — OUTPATIENT CASE MANAGEMENT (OUTPATIENT)
Dept: ADMINISTRATIVE | Facility: OTHER | Age: 64
End: 2024-07-10
Payer: MEDICARE

## 2024-07-11 ENCOUNTER — OFFICE VISIT (OUTPATIENT)
Dept: PSYCHIATRY | Facility: CLINIC | Age: 64
End: 2024-07-11
Payer: MEDICARE

## 2024-07-11 DIAGNOSIS — G47.00 INSOMNIA, UNSPECIFIED TYPE: ICD-10-CM

## 2024-07-11 DIAGNOSIS — F79 INTELLECTUAL DISABILITY: ICD-10-CM

## 2024-07-11 DIAGNOSIS — Z79.899 LITHIUM LEVEL CHECKED EVERY SIX MONTHS: Primary | ICD-10-CM

## 2024-07-11 DIAGNOSIS — G24.01 TARDIVE DYSKINESIA: ICD-10-CM

## 2024-07-11 RX ORDER — LITHIUM CARBONATE 300 MG/1
CAPSULE ORAL
Qty: 30 CAPSULE | Refills: 2 | Status: SHIPPED | OUTPATIENT
Start: 2024-07-11

## 2024-07-11 RX ORDER — TRAZODONE HYDROCHLORIDE 150 MG/1
150 TABLET ORAL NIGHTLY
Qty: 30 TABLET | Refills: 2 | Status: SHIPPED | OUTPATIENT
Start: 2024-07-11

## 2024-07-11 RX ORDER — CLONAZEPAM 0.5 MG/1
0.5 TABLET ORAL DAILY PRN
Qty: 30 TABLET | Refills: 3 | Status: SHIPPED | OUTPATIENT
Start: 2024-07-11 | End: 2025-07-11

## 2024-07-11 RX ORDER — QUETIAPINE FUMARATE 200 MG/1
TABLET, FILM COATED ORAL
Qty: 90 TABLET | Refills: 2 | Status: SHIPPED | OUTPATIENT
Start: 2024-07-11
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
## 2024-07-15 NOTE — PROGRESS NOTES
Outpatient Care Management  Plan of Care Follow Up Visit    Patient: Juan Pablo Bolden  MRN: [id number]  Date of Service: 07/10/2024  Completed by: Shikha Chahal RN  Referral Date: 03/26/2024    Reason for Visit   Patient presents with    OPCM Chart Review    OPCM RN Follow Up Call       Brief Summary: OPCM visit completed, care plan reviewed and updated.   Next Steps: plan to follow up in approximately 3 weeks, sister agrees. Consider discharge from OPCM if no new issues arise.  MIGUEL Chahal RN

## 2024-07-29 ENCOUNTER — OUTPATIENT CASE MANAGEMENT (OUTPATIENT)
Dept: ADMINISTRATIVE | Facility: OTHER | Age: 64
End: 2024-07-29
Payer: MEDICARE

## 2024-07-29 NOTE — PROGRESS NOTES
Outpatient Care Management   - Care Plan Follow Up    Patient: Juan Pablo Bolden  MRN:  [id number]  Date of Service:  7/29/2024  Completed by:  Yudi Park LMSW  Referral Date: 03/26/2024    Reason for Visit   Patient presents with    Case Closure     7/29/2024       Brief Summary: SW followed up with pt's sister, Alanis, via telephone. She stated pt is doing well. She received the shower chair, but it was the wrong one, but it will be swapped out. Pt did get a rollator and it is good. Pt remains on waitlist for [facility]. Pt's  is working on getting pt connected with another patient and family to do movie day. RICARDO and Alanis discussed new OPCM RN. Case closed, notified OPCM RN.     Complex Care Plan     Care plan was discussed and completed today with input from patient and/or caregiver.    Patient Instructions     No follow-ups on file.

## 2024-07-30 ENCOUNTER — OFFICE VISIT (OUTPATIENT)
Dept: PRIMARY CARE CLINIC | Facility: CLINIC | Age: 64
End: 2024-07-30
Payer: MEDICARE

## 2024-07-30 VITALS
SYSTOLIC BLOOD PRESSURE: 138 MMHG | HEIGHT: 57 IN | DIASTOLIC BLOOD PRESSURE: 70 MMHG | HEART RATE: 79 BPM | BODY MASS INDEX: 25.48 KG/M2 | TEMPERATURE: 99 F | OXYGEN SATURATION: 99 %

## 2024-07-30 DIAGNOSIS — R45.1 RESTLESSNESS AND AGITATION: ICD-10-CM

## 2024-07-30 DIAGNOSIS — D69.6 THROMBOCYTOPENIA: ICD-10-CM

## 2024-07-30 DIAGNOSIS — E78.2 MIXED HYPERLIPIDEMIA: ICD-10-CM

## 2024-07-30 DIAGNOSIS — D49.0 IPMN (INTRADUCTAL PAPILLARY MUCINOUS NEOPLASM): ICD-10-CM

## 2024-07-30 DIAGNOSIS — R62.50 DEVELOPMENTAL DELAY: ICD-10-CM

## 2024-07-30 DIAGNOSIS — K59.09 CHRONIC CONSTIPATION: ICD-10-CM

## 2024-07-30 DIAGNOSIS — I10 ESSENTIAL HYPERTENSION: ICD-10-CM

## 2024-07-30 DIAGNOSIS — R73.9 HYPERGLYCEMIA: Primary | ICD-10-CM

## 2024-07-30 DIAGNOSIS — G24.01 TARDIVE DYSKINESIA: ICD-10-CM

## 2024-07-30 DIAGNOSIS — M15.9 PRIMARY OSTEOARTHRITIS INVOLVING MULTIPLE JOINTS: ICD-10-CM

## 2024-07-30 PROCEDURE — 99214 OFFICE O/P EST MOD 30 MIN: CPT | Mod: S$PBB,,, | Performed by: INTERNAL MEDICINE

## 2024-07-30 PROCEDURE — 99999 PR PBB SHADOW E&M-EST. PATIENT-LVL IV: CPT | Mod: PBBFAC,,, | Performed by: INTERNAL MEDICINE

## 2024-07-30 PROCEDURE — 99214 OFFICE O/P EST MOD 30 MIN: CPT | Mod: PBBFAC,PN | Performed by: INTERNAL MEDICINE

## 2024-07-30 RX ORDER — DICLOFENAC SODIUM 10 MG/G
GEL TOPICAL
Qty: 200 G | Refills: 1 | Status: SHIPPED | OUTPATIENT
Start: 2024-07-30

## 2024-08-12 ENCOUNTER — PATIENT MESSAGE (OUTPATIENT)
Dept: PSYCHIATRY | Facility: CLINIC | Age: 64
End: 2024-08-12
Payer: MEDICARE

## 2024-08-12 PROBLEM — D49.0 IPMN (INTRADUCTAL PAPILLARY MUCINOUS NEOPLASM): Status: ACTIVE | Noted: 2023-04-14

## 2024-08-12 RX ORDER — TRIAMTERENE/HYDROCHLOROTHIAZID 37.5-25 MG
1 TABLET ORAL EVERY MORNING
Qty: 90 TABLET | Refills: 3 | Status: SHIPPED | OUTPATIENT
Start: 2024-08-12

## 2024-08-12 RX ORDER — ACETAMINOPHEN, DIPHENHYDRAMINE HCL, PHENYLEPHRINE HCL 325; 25; 5 MG/1; MG/1; MG/1
1 TABLET ORAL NIGHTLY
Qty: 90 TABLET | Refills: 3 | Status: SHIPPED | OUTPATIENT
Start: 2024-08-12

## 2024-08-12 RX ORDER — DOCUSATE SODIUM 100 MG/1
100 CAPSULE, LIQUID FILLED ORAL DAILY
Qty: 90 CAPSULE | Refills: 3 | Status: SHIPPED | OUTPATIENT
Start: 2024-08-12

## 2024-08-12 RX ORDER — FOLIC ACID 1 MG/1
1 TABLET ORAL DAILY
Qty: 90 TABLET | Refills: 3 | Status: SHIPPED | OUTPATIENT
Start: 2024-08-12

## 2024-08-12 RX ORDER — SIMVASTATIN 40 MG/1
40 TABLET, FILM COATED ORAL NIGHTLY
Qty: 90 TABLET | Refills: 3 | Status: SHIPPED | OUTPATIENT
Start: 2024-08-12

## 2024-08-12 RX ORDER — AMLODIPINE BESYLATE 10 MG/1
10 TABLET ORAL DAILY
Qty: 90 TABLET | Refills: 3 | Status: SHIPPED | OUTPATIENT
Start: 2024-08-12

## 2024-08-12 NOTE — PROGRESS NOTES
Assessment/Plan:    Problem List Items Addressed This Visit          Neuro    Tardive dyskinesia    Developmental delay       Psychiatric    Restlessness and agitation    Overview     -managed by psychiatry  -symptoms stable with current medication regimen            Cardiac/Vascular    Mixed hyperlipidemia    Overview     Hyperlipidemia Medications               simvastatin (ZOCOR) 40 MG tablet Take 1 tablet (40 mg total) by mouth every evening.          -chronic condition. Currently stable.    -reports compliance with hyperlipidemia treatment as prescribed  -denies any known adverse effects of medications  -most recent labs listed below:  Lab Results   Component Value Date    CHOL 185 05/28/2024     Lab Results   Component Value Date    HDL 70 05/28/2024     Lab Results   Component Value Date    LDLCALC 95.4 05/28/2024     Lab Results   Component Value Date    TRIG 98 05/28/2024     Lab Results   Component Value Date    ALT 23 03/26/2024    AST 13 03/26/2024    ALKPHOS 143 (H) 03/26/2024    BILITOT 0.4 03/26/2024             Relevant Medications    simvastatin (ZOCOR) 40 MG tablet    Essential hypertension    Overview     Hypertension Medications               amLODIPine (NORVASC) 10 MG tablet Take 1 tablet (10 mg total) by mouth once daily.    furosemide (LASIX) 20 MG tablet TAKE 1 TABLET BY MOUTH DAILY AS NEEDED FOR SWELLING    triamterene-hydrochlorothiazide 37.5-25 mg (MAXZIDE-25) 37.5-25 mg per tablet Take 1 tablet by mouth every morning.     -at goal today  -continue lifestyle modification with low sodium diet and exercise   -discussed hypertension disease course and importance of treating high blood pressure  -patient understood and advised of risk of untreated blood pressure.  ER precautions were given   for symptoms of hypertensive urgency and emergency.          Relevant Medications    triamterene-hydrochlorothiazide 37.5-25 mg (MAXZIDE-25) 37.5-25 mg per tablet    amLODIPine (NORVASC) 10 MG tablet        Hematology    Thrombocytopenia    Overview     -stable  -followed by hematology  -thought likely medication induced            Oncology    IPMN (intraductal papillary mucinous neoplasm)    Overview     -incidental finding on imaging  -followed regularly with surg onc/GI  -plan for alternating MRCP and EUS  -last EUS performed Nov 2023  -MRI should be due around Aug 2024            GI    Chronic constipation    Overview     -chronic issue  -cscope 2021 without abnormal findings  -need to make sure patient is receiving adequate hydration from home caregivers  -also need to make sure they are giving PRN constipation medication as needed instead of waiting until symptoms are more severe; start BM log         Relevant Medications    docusate sodium (COLACE) 100 MG capsule     Other Visit Diagnoses       Hyperglycemia    -  Primary    Relevant Orders    Hemoglobin A1C    Primary osteoarthritis involving multiple joints        Relevant Medications    diclofenac sodium (VOLTAREN) 1 % Gel            Brooke Goldberg MD  _____________________________________________________________________________________________________________________________________________________    CC: follow up of chronic medical conditions     Patient is in clinic today as an established patient.    History of Present Illness  The patient is a 64-year-old female who presents for her annual exam. She is accompanied by her sister and one of her care givers. Her HPI is mostly provided by her sister today.    She has shown improvement since her last visit. She appears to be much less sedated although she remains calm. She was previously on Ingrezza 80 mg, which was reduced due to excessive sedation. Her caregiver requested a further reduction in the Ingrezza dosage and some of her other therapies, which has led to further improvement.     She recently experienced severe constipation, which was so intense that her caregiver considered taking her to urgent  care. Fortunately, she was able to pass a large stool while at her caregiver's house. She is currently on lactulose, but not on a daily basis. Her caregiver, who works with her 5 to 6 days a week, believes the staff may be reluctant to administer her medication due to the cleaning required afterward. She also takes docusate every morning, but it no longer seems effective. Lactulose and Dulcolax have been more helpful, while MiraLAX has not. One of her medications causes gas but does not aid in bowel movements. She is supposed to take lactulose three times a day as needed, but has only been receiving one dose occasionally. After taking lactulose on Saturday night, she had a bowel movement at 6:30 the next morning. Her caregiver is cautious about overusing lactulose. She has been drinking water, but her caregiver believes she needs more hydration.    She has seen her psychiatrist two or three times since our last visit. She has also seen Dr. Ferrera, who reports that her labs are stable.    She has been experiencing pain in her R hand and arm, which she manages with Tylenol Arthritis. She also uses Voltaren gel, which she applies to her legs, feet, and arms after showering, and she needs a refill of this medication.     No other new complaints today.  Remaining chronic conditions have been reviewed and remain stable. Further detail as stated above.        Current Outpatient Medications on File Prior to Visit   Medication Sig Dispense Refill    acetaminophen (TYLENOL) 650 MG TbSR Take 650 mg by mouth every 8 (eight) hours as needed for Pain.      aspirin (ECOTRIN) 81 MG EC tablet Take 1 tablet (81 mg total) by mouth once daily. 90 tablet 3    cetirizine (ZYRTEC) 10 MG tablet Take 10 mg by mouth daily as needed for Allergies.      clonazePAM (KLONOPIN) 0.5 MG tablet Take 1 tablet (0.5 mg total) by mouth daily as needed for Anxiety. 30 tablet 3    diphenhydrAMINE (BENADRYL) 25 mg capsule Take 25 mg by mouth nightly as  needed for Allergies.      fluticasone propionate (FLONASE) 50 mcg/actuation nasal spray SHAKE LIQUID AND USE 2 SPRAYS IN EACH NOSTRIL EVERY DAY 48 g 0    furosemide (LASIX) 20 MG tablet TAKE 1 TABLET BY MOUTH DAILY AS NEEDED FOR SWELLING (Patient taking differently: Take 20 mg by mouth daily as needed. TAKE 1 TABLET BY MOUTH DAILY AS NEEDED FOR SWELLING) 30 tablet 11    lactulose (CHRONULAC) 10 gram/15 mL solution Take 15 mLs (10 g total) by mouth 3 (three) times daily as needed. 300 mL 0    lithium (ESKALITH) 300 MG capsule Take 1 capsule at bedtime. 30 capsule 2    QUEtiapine (SEROQUEL) 200 MG Tab Take 1 tablet each morning and 2 tablets at bedtime. 90 tablet 2    simethicone (MYLICON) 125 mg Cap capsule 1 capsule after meals and at bedtime as needed      traZODone (DESYREL) 150 MG tablet Take 1 tablet (150 mg total) by mouth nightly as needed for Insomnia. 30 tablet 3    traZODone (DESYREL) 150 MG tablet Take 1 tablet (150 mg total) by mouth every evening. 30 tablet 2    valbenazine (INGREZZA) 40 mg Cap Take 1 capsule (40 mg total) by mouth once daily. 30 capsule 3     Current Facility-Administered Medications on File Prior to Visit   Medication Dose Route Frequency Provider Last Rate Last Admin    0.9%  NaCl infusion   Intravenous Continuous Eduardo Angelo MD        sodium chloride 0.9% flush 10 mL  10 mL Intravenous PRN Eduardo Angelo MD           Review of Systems   Constitutional:  Negative for chills, diaphoresis, fatigue and fever.   HENT:  Negative for congestion, ear pain, postnasal drip, sinus pain and sore throat.    Eyes:  Negative for pain and redness.   Respiratory:  Negative for cough, chest tightness and shortness of breath.    Cardiovascular:  Negative for chest pain and leg swelling.   Gastrointestinal:  Positive for constipation. Negative for abdominal pain, diarrhea, nausea and vomiting.   Genitourinary:  Negative for dysuria and hematuria.   Musculoskeletal:  Negative for arthralgias and  "joint swelling.   Skin:  Negative for rash.   Neurological:  Negative for dizziness, syncope and headaches.   Psychiatric/Behavioral:  Negative for dysphoric mood. The patient is not nervous/anxious.        Vitals:    07/30/24 0856   BP: 138/70   Pulse: 79   Temp: 98.5 °F (36.9 °C)   SpO2: 99%   Height: 4' 9" (1.448 m)       Wt Readings from Last 3 Encounters:   07/02/24 53.4 kg (117 lb 11.6 oz)   05/27/24 47.6 kg (104 lb 15 oz)   04/04/24 47.6 kg (105 lb)       Physical Exam  Constitutional:       General: She is not in acute distress.     Appearance: Normal appearance. She is well-developed.   HENT:      Head: Normocephalic and atraumatic.   Eyes:      Conjunctiva/sclera: Conjunctivae normal.   Cardiovascular:      Rate and Rhythm: Normal rate and regular rhythm.      Pulses: Normal pulses.      Heart sounds: Normal heart sounds. No murmur heard.  Pulmonary:      Effort: Pulmonary effort is normal. No respiratory distress.      Breath sounds: Normal breath sounds.   Abdominal:      General: Bowel sounds are normal. There is no distension.      Palpations: Abdomen is soft.      Tenderness: There is no abdominal tenderness.   Musculoskeletal:         General: Normal range of motion.      Cervical back: Normal range of motion and neck supple.   Skin:     General: Skin is warm and dry.      Findings: No rash.   Neurological:      General: No focal deficit present.      Mental Status: She is alert and oriented to person, place, and time.   Psychiatric:         Mood and Affect: Mood normal.         Behavior: Behavior normal.         Health Maintenance   Topic Date Due    Shingles Vaccine (1 of 2) Never done    Mammogram  10/04/2023    TETANUS VACCINE  05/30/2024    High Dose Statin  08/12/2025    Aspirin/Antiplatelet Therapy  08/12/2025    Lipid Panel  05/28/2029    Colorectal Cancer Screening  05/17/2031    Hepatitis C Screening  Completed    DEXA Scan  Discontinued       DISCLAIMER: This note was compiled by using a " speech recognition dictation system and therefore please be aware that typographical / speech recognition errors can and do occur.  Please contact me if you see any errors specifically.  Consent was obtained for [facility] recording system prior to the visit.

## 2024-08-13 ENCOUNTER — OFFICE VISIT (OUTPATIENT)
Dept: URGENT CARE | Facility: CLINIC | Age: 64
End: 2024-08-13
Payer: MEDICARE

## 2024-08-13 VITALS
HEIGHT: 57 IN | RESPIRATION RATE: 18 BRPM | BODY MASS INDEX: 23.73 KG/M2 | WEIGHT: 110 LBS | DIASTOLIC BLOOD PRESSURE: 85 MMHG | TEMPERATURE: 97 F | HEART RATE: 80 BPM | OXYGEN SATURATION: 97 % | SYSTOLIC BLOOD PRESSURE: 159 MMHG

## 2024-08-13 DIAGNOSIS — K59.09 CHRONIC CONSTIPATION: Primary | ICD-10-CM

## 2024-08-13 PROCEDURE — 99213 OFFICE O/P EST LOW 20 MIN: CPT | Mod: S$GLB,,,

## 2024-08-13 PROCEDURE — 74019 RADEX ABDOMEN 2 VIEWS: CPT | Mod: S$GLB,,, | Performed by: RADIOLOGY

## 2024-08-13 NOTE — PROGRESS NOTES
"Subjective:      Patient ID: Juan Pablo Bolden is a 64 y.o. female.    Vitals:  height is 4' 9" (1.448 m) and weight is 49.9 kg (110 lb). Her tympanic temperature is 96.9 °F (36.1 °C). Her blood pressure is 159/85 (abnormal) and her pulse is 80. Her respiration is 18 and oxygen saturation is 97%.     Chief Complaint: Abdominal Pain    65 y/o female with PMHx of chronic constipation,  here for abdominal pain. Pt has not had a full bowel movement since Tuesday morning last week. Pt has been getting oral meds (Lactulose) twice daily Thursday and Friday, Sunday she was given oral suppositories with no bowel movement and this morning she was given an enema.  "She is getting josie and maybe the size of a quarter out." NKDA.     Abdominal Pain  This is a new problem. The current episode started in the past 7 days. The onset quality is gradual. The problem has been gradually worsening. Associated symptoms include constipation (chronic). Pertinent negatives include no fever or vomiting.       Unable to perform ROS: Psychiatric disorder (Mental developmental delay)   Constitution: Negative for chills and fever.   Gastrointestinal:  Positive for constipation (chronic). Negative for vomiting.      Objective:     Vitals:    08/13/24 1803   BP: (!) 159/85   Pulse: 80   Resp: 18   Temp: 96.9 °F (36.1 °C)       Physical Exam   Constitutional: She is oriented to person, place, and time. She appears well-developed.  Non-toxic appearance. She does not appear ill. No distress.   HENT:   Head: Normocephalic and atraumatic.   Ears:   Right Ear: External ear normal.   Left Ear: External ear normal.   Nose: Nose normal.   Mouth/Throat: Mucous membranes are normal.   Eyes: Conjunctivae and lids are normal.   Neck: Trachea normal. Neck supple.   Cardiovascular: Normal rate, regular rhythm, normal heart sounds and normal pulses.   Pulmonary/Chest: Effort normal and breath sounds normal. No stridor. No respiratory distress. She has no wheezes. "   Abdominal: Normal appearance and bowel sounds are normal. She exhibits distension. She exhibits no mass. Soft. There is no abdominal tenderness. There is no rebound and no guarding.   Musculoskeletal: Normal range of motion.         General: Normal range of motion.   Neurological: She is alert and oriented to person, place, and time. She has normal strength.   Skin: Skin is warm, dry, intact, not diaphoretic and not pale.   Psychiatric: Her speech is normal and behavior is normal. Judgment and thought content normal.   Nursing note and vitals reviewed.      Assessment:     1. Chronic constipation        X-Ray Abdomen Flat And Erect    Result Date: 8/13/2024  EXAM: XR ABDOMEN FLAT AND ERECT CLINICAL HISTORY: Constipation FINDINGS:     No comparison studies are available.  Marked stool burden.  Increased air throughout the colon.  The abdominal gas pattern is otherwise normal. No sign of bowel dilation, air/fluid levels or free air. No abnormal calcifications. Age-appropriate degenerative changes of the spine and pelvis.  Bilateral buttock injection granulomas.     1.  Markedly increased stool burden with moderate air throughout the colon.  Small bowel gas pattern is normal.  Constipation/obstipation must be considered. 2.  Incidental findings as noted above.  Negative for acute process otherwise. Finalized on: 8/13/2024 6:48 PM By:  Ralph Gillette MD [facility]# [id number]      2024-08-13 18:50:[id number]    [facility]     Plan:       Chronic constipation  -     X-Ray Abdomen Flat And Erect; Future; Expected date: 08/13/2024        Patient Instructions   Constipation  - Drink lots of water and get plenty of rest. Eat more fiber rich foods.  - You can use the phosphates enema and bisacodyl suppository (laxatives) as directed 1st to clear any blockage from underneath.    - Take Miralax/Glycolax as prescribed: 1 scoop daily for 2-3 days, then increase to 2 scoops if no result-- followed by 3 scoops and there on until a large bowel  movement is made--then go back to 1 scoop daily  - If you use over the counter dulcolax (docusate) suppository, you can try 2 trials. Hold each in for at least 20 minutes  - You can do a fleets enema over the counter. After a successful bowel movement, you can take over the counter magnesium citrate and miralax for constipation (use with caution if you have renal insufficiency)  - If symptoms do not improve, XR may be indicated to visualize stool burden  - If abdominal pain worsens or is associated with high fevers, nausea, vomiting, please go to the emergency room.  - Follow up with your PCP or specialty clinic as directed in the next 1-2 weeks if not improved or as needed.  If need be you have been referred to a gastroenterologist.

## 2024-08-14 NOTE — PATIENT INSTRUCTIONS
Constipation  - Drink lots of water and get plenty of rest. Eat more fiber rich foods.  - You can use the phosphates enema and bisacodyl suppository (laxatives) as directed 1st to clear any blockage from underneath.    - Take Miralax/Glycolax as prescribed: 1 scoop daily for 2-3 days, then increase to 2 scoops if no result-- followed by 3 scoops and there on until a large bowel movement is made--then go back to 1 scoop daily  - If you use over the counter dulcolax (docusate) suppository, you can try 2 trials. Hold each in for at least 20 minutes  - You can do a fleets enema over the counter. After a successful bowel movement, you can take over the counter magnesium citrate and miralax for constipation (use with caution if you have renal insufficiency)  - If symptoms do not improve, XR may be indicated to visualize stool burden  - If abdominal pain worsens or is associated with high fevers, nausea, vomiting, please go to the emergency room.  - Follow up with your PCP or specialty clinic as directed in the next 1-2 weeks if not improved or as needed.  If need be you have been referred to a gastroenterologist.

## 2024-08-19 ENCOUNTER — OUTPATIENT CASE MANAGEMENT (OUTPATIENT)
Dept: ADMINISTRATIVE | Facility: OTHER | Age: 64
End: 2024-08-19
Payer: MEDICARE

## 2024-09-16 ENCOUNTER — PATIENT MESSAGE (OUTPATIENT)
Dept: PRIMARY CARE CLINIC | Facility: CLINIC | Age: 64
End: 2024-09-16
Payer: MEDICARE

## 2024-09-16 ENCOUNTER — OFFICE VISIT (OUTPATIENT)
Dept: URGENT CARE | Facility: CLINIC | Age: 64
End: 2024-09-16
Payer: MEDICARE

## 2024-09-16 VITALS
WEIGHT: 110 LBS | TEMPERATURE: 97 F | BODY MASS INDEX: 23.73 KG/M2 | OXYGEN SATURATION: 99 % | DIASTOLIC BLOOD PRESSURE: 76 MMHG | HEIGHT: 57 IN | SYSTOLIC BLOOD PRESSURE: 152 MMHG | HEART RATE: 78 BPM | RESPIRATION RATE: 20 BRPM

## 2024-09-16 DIAGNOSIS — K59.09 CHRONIC CONSTIPATION: Primary | ICD-10-CM

## 2024-09-16 DIAGNOSIS — K59.00 CONSTIPATION, UNSPECIFIED CONSTIPATION TYPE: Primary | ICD-10-CM

## 2024-09-16 PROCEDURE — 99213 OFFICE O/P EST LOW 20 MIN: CPT | Mod: S$GLB,,,

## 2024-09-16 NOTE — PROGRESS NOTES
"Subjective:      Patient ID: Juan Pablo Bolden is a 64 y.o. female.    Vitals:  height is 4' 9" (1.448 m) and weight is 49.9 kg (110 lb). Her tympanic temperature is 97 °F (36.1 °C). Her blood pressure is 152/76 (abnormal) and her pulse is 78. Her respiration is 20 and oxygen saturation is 99%.     Chief Complaint: Constipation    65 yo female Pt is presenting constipation. Start last Monday, she went to the bathroom Wednesday but it was small amt "josie". Patient on dulcolax. Family states she is still passing gas without issue. Nothing taken today for symptoms. Family knows she does not drink as much water as is recommended. Denies fever, chills, back pain, vomiting, diarrhea. NKDA.     Constipation  This is a new problem. The current episode started 1 to 4 weeks ago. The problem is unchanged. The patient is not on a high fiber diet. She Does not exercise regularly. Associated symptoms include bloating and fecal incontinence. Pertinent negatives include no abdominal pain, anorexia, back pain, diarrhea, difficulty urinating, fever, flatus, hematochezia, hemorrhoids, melena, nausea, rectal pain, vomiting or weight loss. She has tried laxatives for the symptoms. The treatment provided no relief. There is no history of abdominal surgery, endocrine disease, inflammatory bowel disease, irritable bowel syndrome, metabolic disease, neurologic disease, neuromuscular disease, psychiatric history or radiation treatment.       Constitution: Negative for fever.   Gastrointestinal:  Positive for constipation. Negative for abdominal pain, history of abdominal surgery, nausea, vomiting, diarrhea, bright red blood in stool, rectal pain and hemorrhoids.   Genitourinary:  Negative for frequency and urgency.   Musculoskeletal:  Negative for back pain.      Objective:     Vitals:    09/16/24 0836   BP: (!) 152/76   Pulse: 78   Resp: 20   Temp: 97 °F (36.1 °C)       Physical Exam   Constitutional: She is oriented to person, place, and " time. She appears well-developed.  Non-toxic appearance. She does not appear ill. No distress.   HENT:   Head: Normocephalic and atraumatic.   Ears:   Right Ear: External ear normal.   Left Ear: External ear normal.   Nose: Nose normal.   Mouth/Throat: Mucous membranes are normal.   Eyes: Conjunctivae and lids are normal.   Neck: Trachea normal. Neck supple.   Cardiovascular: Normal rate, regular rhythm, normal heart sounds and normal pulses.   Pulmonary/Chest: Effort normal and breath sounds normal. No stridor. No respiratory distress. She has no wheezes. She has no rhonchi. She has no rales.   Abdominal: Normal appearance and bowel sounds are normal. She exhibits distension (chronic). She exhibits no mass. Soft. There is no abdominal tenderness. There is no guarding, no tenderness at McBurney's point and negative Rovsing's sign.   Musculoskeletal: Normal range of motion.         General: Normal range of motion.   Neurological: She is alert and oriented to person, place, and time. She has normal strength.   Skin: Skin is warm, dry, intact, not diaphoretic and not pale.   Psychiatric: Her speech is normal and behavior is normal. Judgment and thought content normal.   Nursing note and vitals reviewed.      Assessment:     1. Chronic constipation        Plan:       Chronic constipation  -     bisacodyL (FLEET) 10 mg/30 mL Enem; Place 30 mLs (10 mg total) rectally once. for 1 dose  Dispense: 1 enema; Refill: 0        Patient Instructions   Constipation  - Drink lots of water and get plenty of rest. Eat more fiber rich foods.  - You can use the phosphates enema and bisacodyl suppository (laxatives) as directed 1st to clear any blockage from underneath.    - Take Miralax/Glycolax as prescribed: 1 scoop daily for 2-3 days, then increase to 2 scoops if no result-- followed by 3 scoops and there on until a large bowel movement is made--then go back to 1 scoop daily  - If you use over the counter dulcolax (docusate)  suppository, you can try 2 trials. Hold each in for at least 20 minutes  - You can do a fleets enema over the counter. After a successful bowel movement, you can take over the counter magnesium citrate and miralax for constipation (use with caution if you have renal insufficiency)  - If symptoms do not improve, XR may be indicated to visualize stool burden  - If abdominal pain worsens or is associated with high fevers, nausea, vomiting, please go to the emergency room.  - Follow up with your PCP or specialty clinic as directed in the next 1-2 weeks if not improved or as needed.  If need be you have been referred to a gastroenterologist.    Medical Decision Making:   History:   Old Medical Records: I decided to obtain old medical records.  Urgent Care Management:  Enema prescribed. High fiber, increase water intake. Follow up with GI regarding chronic constipation issues. Strict ER precautions for concerns of obstruction, worsening obstipation, bloody stools, or severe pain. Family agreeable to plan. Patient asking for candy and water in exam room. No visible signs of dehydration in mouth. Patient leaves clinic in NAD, VSS, afebrile.

## 2024-09-19 NOTE — TELEPHONE ENCOUNTER
Please let them know that we can try a medication called Linzess and if that does not help then we would like for her to get back in with GI.     I have signed for the following orders AND/OR meds.  Please call the patient and ask the patient to schedule the testing AND/OR inform about any medications that were sent. Medications have been sent to pharmacy listed below      No orders of the defined types were placed in this encounter.      Medications Ordered This Encounter   Medications    linaCLOtide (LINZESS) 72 mcg Cap capsule     Sig: Take 1 capsule (72 mcg total) by mouth before breakfast.     Dispense:  90 capsule     Refill:  1         [facility] - [place] - [address] [address]  Phone: [phone] Fax: [phone]    [id number] - [place] - [address] AT [facility]  [address]  [place] [address]  Phone: [phone] Fax: [phone]    [facility] - [place], [place] - [address] [place] [address]  Phone: [phone] Fax: [phone]

## 2024-10-03 DIAGNOSIS — Z71.89 COMPLEX CARE COORDINATION: ICD-10-CM

## 2024-10-05 ENCOUNTER — LAB VISIT (OUTPATIENT)
Dept: LAB | Facility: HOSPITAL | Age: 64
End: 2024-10-05
Attending: INTERNAL MEDICINE
Payer: MEDICARE

## 2024-10-05 DIAGNOSIS — Z79.899 LITHIUM LEVEL CHECKED EVERY SIX MONTHS: ICD-10-CM

## 2024-10-05 DIAGNOSIS — R73.9 HYPERGLYCEMIA: ICD-10-CM

## 2024-10-05 LAB
BUN SERPL-MCNC: 16 MG/DL (ref 8–23)
CREAT SERPL-MCNC: 1.1 MG/DL (ref 0.5–1.4)
EST. GFR  (NO RACE VARIABLE): 55.8 ML/MIN/1.73 M^2
ESTIMATED AVG GLUCOSE: 108 MG/DL (ref 68–131)
HBA1C MFR BLD: 5.4 % (ref 4–5.6)
LITHIUM SERPL-SCNC: 0.8 MMOL/L (ref 0.6–1.2)

## 2024-10-05 PROCEDURE — 80178 ASSAY OF LITHIUM: CPT | Performed by: PSYCHIATRY & NEUROLOGY

## 2024-10-05 PROCEDURE — 84520 ASSAY OF UREA NITROGEN: CPT | Performed by: PSYCHIATRY & NEUROLOGY

## 2024-10-05 PROCEDURE — 83036 HEMOGLOBIN GLYCOSYLATED A1C: CPT | Performed by: INTERNAL MEDICINE

## 2024-10-05 PROCEDURE — 82565 ASSAY OF CREATININE: CPT | Performed by: PSYCHIATRY & NEUROLOGY

## 2024-10-05 PROCEDURE — 36415 COLL VENOUS BLD VENIPUNCTURE: CPT | Performed by: INTERNAL MEDICINE

## 2024-10-07 ENCOUNTER — TELEPHONE (OUTPATIENT)
Dept: FAMILY MEDICINE | Facility: CLINIC | Age: 64
End: 2024-10-07
Payer: MEDICARE

## 2024-10-07 NOTE — TELEPHONE ENCOUNTER
Pt's sister reports that the linzess along with the other medications for constipation has been working well, but lately pt has been having a lot of loose/watery stools. Pt's sister would like to know if pt could take the linzess every other day instead. Pls advise.

## 2024-10-07 NOTE — TELEPHONE ENCOUNTER
----- Message from Delmy sent at 10/7/2024 10:48 AM CDT -----  Contact: alanis   .Type: Patient Call Back        Who called:   Alanis Thomas      What is the request in detail:    Called in concerning medication concerns . Patient is having some problems  Please call back   Can the clinic reply by [facility]?           Would the patient rather a call back or a response via [facility]?      call   Best call back number:  [phone]

## 2024-10-07 NOTE — PROGRESS NOTES
Results have been released via [facility]. Please verify that these have been viewed by patient. If not, please call patient with results.      I have reviewed your recent results.    The screening A1C test was normal. This indicates that you do not currently have a diagnosis of prediabetes or diabetes.    Please let me know if you have any additional questions or concerns about above.

## 2024-10-09 ENCOUNTER — OFFICE VISIT (OUTPATIENT)
Dept: URGENT CARE | Facility: CLINIC | Age: 64
End: 2024-10-09
Payer: MEDICARE

## 2024-10-09 VITALS
TEMPERATURE: 98 F | SYSTOLIC BLOOD PRESSURE: 149 MMHG | OXYGEN SATURATION: 97 % | RESPIRATION RATE: 20 BRPM | HEART RATE: 71 BPM | DIASTOLIC BLOOD PRESSURE: 89 MMHG

## 2024-10-09 DIAGNOSIS — R46.89 BEHAVIORAL CHANGE: ICD-10-CM

## 2024-10-09 DIAGNOSIS — R82.90 ABNORMAL URINE ODOR: Primary | ICD-10-CM

## 2024-10-09 PROCEDURE — 81003 URINALYSIS AUTO W/O SCOPE: CPT | Mod: QW,S$GLB,,

## 2024-10-09 PROCEDURE — 99213 OFFICE O/P EST LOW 20 MIN: CPT | Mod: S$GLB,,,

## 2024-10-09 NOTE — PROGRESS NOTES
Subjective:      Patient ID: Juan Pablo Bolden is a 64 y.o. female.    Vitals:  tympanic temperature is 97.5 °F (36.4 °C). Her blood pressure is 149/89 (abnormal) and her pulse is 71. Her respiration is 20 and oxygen saturation is 97%.     Chief Complaint: Dysuria    65 yo female Pt presents today with family who provides history. Family states that they noticed patient becoming more restless and staff informed family that urine had an odor and appeared more concentrated than usual, concerned about UTI since having similar symptoms as previous UTI episode. Family states that patient was c/o back pain but no complaints in clinic today. Denies fever, abdominal pain. Pt has started linzess for constipation which is causing frequent bowel movements. Pt's family is also concerned that her frequent bowl movements is causing dehydration. NKDA.    Dysuria   This is a new problem. The current episode started yesterday. The problem occurs every urination. The problem has been unchanged. The pain is at a severity of 3/10. The patient is experiencing no pain. There has been no fever. Associated symptoms include withholding. Pertinent negatives include no flank pain or vomiting. She has tried nothing for the symptoms. The treatment provided no relief.       Unable to perform ROS: Other (developmental delay)   Constitution: Negative for fever.   Gastrointestinal:  Negative for abdominal pain, vomiting and diarrhea.   Genitourinary:  Negative for dysuria and flank pain.        Urine hesitancy   Musculoskeletal:  Positive for back pain.      Objective:     Vitals:    10/09/24 1728   BP: (!) 149/89   Pulse: 71   Resp: 20   Temp: 97.5 °F (36.4 °C)       Physical Exam   Constitutional: She is oriented to person, place, and time. She appears well-developed.  Non-toxic appearance. She does not appear ill. No distress.   HENT:   Head: Normocephalic and atraumatic.   Ears:   Right Ear: External ear normal.   Left Ear: External ear normal.    Nose: Nose normal. No nasal deformity. No epistaxis.   Mouth/Throat: Oropharynx is clear and moist and mucous membranes are normal.   Eyes: Lids are normal.   Neck: Trachea normal and phonation normal. Neck supple.   Cardiovascular: Normal rate, regular rhythm, normal heart sounds and normal pulses.   Pulmonary/Chest: Effort normal and breath sounds normal. No stridor. No respiratory distress. She has no wheezes. She has no rhonchi. She has no rales.   Abdominal: Normal appearance and bowel sounds are normal. Soft. There is no abdominal tenderness. There is no rebound, no guarding, no left CVA tenderness and no right CVA tenderness.   Neurological: She is alert and oriented to person, place, and time.   Skin: Skin is warm, dry, intact and not diaphoretic.   Psychiatric: Her speech is normal and behavior is normal.   Nursing note and vitals reviewed.      Assessment:     1. Abnormal urine odor    2. Behavioral change        Plan:       Abnormal urine odor  -     Cancel: POCT Urinalysis(Instrument)  -     Cancel: Urine culture  -     Cancel: POCT Urinalysis(Instrument); Future; Expected date: 10/09/2024  -     Urine culture; Future; Expected date: 10/09/2024    Behavioral change      Patient Instructions   PLEASE READ YOUR DISCHARGE INSTRUCTIONS ENTIRELY AS IT CONTAINS IMPORTANT INFORMATION.      Take the antibiotics to completion if any were prescribed.     Drink plenty of fluids, wipe front to back, take showers not baths, no scented soaps, wear breathable cotton underwear, urinate after sexual intercourse.     IF A URINE CULTURE WAS SENT: You will be contacted once it results and appropriate action will be taken if needed.     Cranberry juice may help. Get the 100% cranberry juice and mix 4 oz of juice with 4 oz of water and drink this 8 oz glass of liquid once a day.     Please go to the ER for worsening symptoms including fever, worsening flank pain, vomiting, etc.     Please return or see your primary care  doctor if you develop new or worsening symptoms.     Please arrange follow up with your primary medical clinic as soon as possible. You must understand that you've received an Urgent Care treatment only and that you may be released before all of your medical problems are known or treated. You, the patient, will arrange for follow up as instructed. If your symptoms worsen or fail to improve you should go to the Emergency Room.    WE CANNOT RULE OUT ALL POSSIBLE CAUSES OF YOUR SYMPTOMS IN THE URGENT CARE SETTING PLEASE GO TO THE ER IF YOU FEELS YOUR CONDITION IS WORSENING OR YOU WOULD LIKE EMERGENT EVALUATION.          Medical Decision Making:   Urgent Care Management:  Unable to get urine specimen. Discussed diagnosis and treatment plan while  also discussed over-the-counter medication remedies to help support current symptoms.  Patient educational handout also included in discharge paperwork and was given to patient who verbalized understanding agrees with plan of care.  Patient denies any further questions or concerns at this time.  Patient exits exam room in no acute distress.

## 2024-10-10 ENCOUNTER — TELEPHONE (OUTPATIENT)
Dept: PSYCHIATRY | Facility: CLINIC | Age: 64
End: 2024-10-10
Payer: MEDICARE

## 2024-10-10 LAB
BILIRUBIN, UA POC OHS: NEGATIVE
BLOOD, UA POC OHS: NEGATIVE
CLARITY, UA POC OHS: CLEAR
COLOR, UA POC OHS: YELLOW
GLUCOSE, UA POC OHS: NEGATIVE
KETONES, UA POC OHS: NEGATIVE
LEUKOCYTES, UA POC OHS: NEGATIVE
NITRITE, UA POC OHS: NEGATIVE
PH, UA POC OHS: 6.5
PROTEIN, UA POC OHS: NEGATIVE
SPECIFIC GRAVITY, UA POC OHS: 1.01
UROBILINOGEN, UA POC OHS: 1

## 2024-10-10 NOTE — PATIENT INSTRUCTIONS
PLEASE READ YOUR DISCHARGE INSTRUCTIONS ENTIRELY AS IT CONTAINS IMPORTANT INFORMATION.      Take the antibiotics to completion if any were prescribed.     Drink plenty of fluids, wipe front to back, take showers not baths, no scented soaps, wear breathable cotton underwear, urinate after sexual intercourse.     IF A URINE CULTURE WAS SENT: You will be contacted once it results and appropriate action will be taken if needed.     Cranberry juice may help. Get the 100% cranberry juice and mix 4 oz of juice with 4 oz of water and drink this 8 oz glass of liquid once a day.     Please go to the ER for worsening symptoms including fever, worsening flank pain, vomiting, etc.     Please return or see your primary care doctor if you develop new or worsening symptoms.     Please arrange follow up with your primary medical clinic as soon as possible. You must understand that you've received an Urgent Care treatment only and that you may be released before all of your medical problems are known or treated. You, the patient, will arrange for follow up as instructed. If your symptoms worsen or fail to improve you should go to the Emergency Room.    WE CANNOT RULE OUT ALL POSSIBLE CAUSES OF YOUR SYMPTOMS IN THE URGENT CARE SETTING PLEASE GO TO THE ER IF YOU FEELS YOUR CONDITION IS WORSENING OR YOU WOULD LIKE EMERGENT EVALUATION.

## 2024-10-11 LAB
BACTERIA UR CULT: NORMAL
BACTERIA UR CULT: NORMAL

## 2024-10-14 ENCOUNTER — OFFICE VISIT (OUTPATIENT)
Dept: PSYCHIATRY | Facility: CLINIC | Age: 64
End: 2024-10-14
Payer: MEDICARE

## 2024-10-14 DIAGNOSIS — G47.00 INSOMNIA, UNSPECIFIED TYPE: ICD-10-CM

## 2024-10-14 DIAGNOSIS — G24.01 TARDIVE DYSKINESIA: ICD-10-CM

## 2024-10-14 DIAGNOSIS — F79 INTELLECTUAL DISABILITY: Primary | ICD-10-CM

## 2024-10-14 PROCEDURE — 99213 OFFICE O/P EST LOW 20 MIN: CPT | Mod: 95,,, | Performed by: PSYCHIATRY & NEUROLOGY

## 2024-10-14 RX ORDER — CLONAZEPAM 0.5 MG/1
0.5 TABLET ORAL DAILY PRN
Qty: 30 TABLET | Refills: 3 | Status: SHIPPED | OUTPATIENT
Start: 2024-10-14 | End: 2025-10-14

## 2024-10-14 RX ORDER — LITHIUM CARBONATE 300 MG/1
CAPSULE ORAL
Qty: 30 CAPSULE | Refills: 3 | Status: SHIPPED | OUTPATIENT
Start: 2024-10-14

## 2024-10-14 RX ORDER — TRAZODONE HYDROCHLORIDE 150 MG/1
150 TABLET ORAL NIGHTLY PRN
Qty: 30 TABLET | Refills: 3 | Status: SHIPPED | OUTPATIENT
Start: 2024-10-14 | End: 2025-10-14

## 2024-10-14 RX ORDER — QUETIAPINE FUMARATE 200 MG/1
TABLET, FILM COATED ORAL
Qty: 90 TABLET | Refills: 3 | Status: SHIPPED | OUTPATIENT
Start: 2024-10-14
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
## 2024-10-29 ENCOUNTER — OFFICE VISIT (OUTPATIENT)
Dept: PRIMARY CARE CLINIC | Facility: CLINIC | Age: 64
End: 2024-10-29
Payer: MEDICARE

## 2024-10-29 VITALS
HEART RATE: 67 BPM | TEMPERATURE: 99 F | WEIGHT: 122 LBS | SYSTOLIC BLOOD PRESSURE: 138 MMHG | OXYGEN SATURATION: 97 % | BODY MASS INDEX: 26.32 KG/M2 | HEIGHT: 57 IN | DIASTOLIC BLOOD PRESSURE: 74 MMHG

## 2024-10-29 DIAGNOSIS — Z23 INFLUENZA VACCINE NEEDED: Primary | ICD-10-CM

## 2024-10-29 DIAGNOSIS — E78.2 MIXED HYPERLIPIDEMIA: ICD-10-CM

## 2024-10-29 DIAGNOSIS — D69.6 THROMBOCYTOPENIA: ICD-10-CM

## 2024-10-29 DIAGNOSIS — I10 ESSENTIAL HYPERTENSION: ICD-10-CM

## 2024-10-29 PROCEDURE — G0008 ADMIN INFLUENZA VIRUS VAC: HCPCS | Mod: PBBFAC,PN

## 2024-10-29 PROCEDURE — 99999PBSHW PR PBB SHADOW TECHNICAL ONLY FILED TO HB: Mod: PBBFAC,,,

## 2024-10-29 PROCEDURE — 90653 IIV ADJUVANT VACCINE IM: CPT | Mod: PBBFAC,PN

## 2024-10-29 PROCEDURE — 99999 PR PBB SHADOW E&M-EST. PATIENT-LVL V: CPT | Mod: PBBFAC,,, | Performed by: INTERNAL MEDICINE

## 2024-10-29 PROCEDURE — 99215 OFFICE O/P EST HI 40 MIN: CPT | Mod: PBBFAC,PN | Performed by: INTERNAL MEDICINE

## 2024-10-29 RX ADMIN — INFLUENZA A VIRUS A/VICTORIA/4897/2022 IVR-238 (H1N1) ANTIGEN (FORMALDEHYDE INACTIVATED), INFLUENZA A VIRUS A/THAILAND/8/2022 IVR-237 (H3N2) ANTIGEN (FORMALDEHYDE INACTIVATED), INFLUENZA B VIRUS B/AUSTRIA/1359417/2021 BVR-26 ANTIGEN (FORMALDEHYDE INACTIVATED) 0.5 ML: 15; 15; 15 INJECTION, SUSPENSION INTRAMUSCULAR at 03:10

## 2024-11-04 DIAGNOSIS — G24.01 TARDIVE DYSKINESIA: ICD-10-CM

## 2024-11-05 RX ORDER — VALBENAZINE 40 MG/1
40 CAPSULE ORAL DAILY
Qty: 30 CAPSULE | Refills: 3 | Status: ACTIVE | OUTPATIENT
Start: 2024-11-05

## 2024-11-13 NOTE — PROGRESS NOTES
Assessment/Plan:    Problem List Items Addressed This Visit          Cardiac/Vascular    Mixed hyperlipidemia    Overview     Hyperlipidemia Medications               simvastatin (ZOCOR) 40 MG tablet Take 1 tablet (40 mg total) by mouth every evening.          -chronic condition. Currently stable.    -reports compliance with hyperlipidemia treatment as prescribed  -denies any known adverse effects of medications  -most recent labs listed below:  Lab Results   Component Value Date    CHOL 185 05/28/2024     Lab Results   Component Value Date    HDL 70 05/28/2024     Lab Results   Component Value Date    LDLCALC 95.4 05/28/2024     Lab Results   Component Value Date    TRIG 98 05/28/2024     Lab Results   Component Value Date    ALT 23 03/26/2024    AST 13 03/26/2024    ALKPHOS 143 (H) 03/26/2024    BILITOT 0.4 03/26/2024             Essential hypertension    Overview     Hypertension Medications               amLODIPine (NORVASC) 10 MG tablet Take 1 tablet (10 mg total) by mouth once daily.    furosemide (LASIX) 20 MG tablet TAKE 1 TABLET BY MOUTH DAILY AS NEEDED FOR SWELLING    triamterene-hydrochlorothiazide 37.5-25 mg (MAXZIDE-25) 37.5-25 mg per tablet Take 1 tablet by mouth every morning.     -at goal today  -continue lifestyle modification with low sodium diet and exercise   -discussed hypertension disease course and importance of treating high blood pressure  -patient understood and advised of risk of untreated blood pressure.  ER precautions were given   for symptoms of hypertensive urgency and emergency.          Relevant Orders    Basic Metabolic Panel       Hematology    Thrombocytopenia    Overview     -stable  -followed by hematology  -thought likely medication induced          Other Visit Diagnoses       Influenza vaccine needed    -  Primary    Relevant Medications    influenza (adjuvanted) (Fluad) 45 mcg/0.5 mL IM vaccine (> or = 64 yo) 0.5 mL (Completed)            Brooke Goldberg,  MD  _____________________________________________________________________________________________________________________________________________________    CC: follow up of chronic medical conditions     Patient is in clinic today as an established patient.    History of Present Illness  The patient is a 64-year-old female here for routine follow-up. She is accompanied by her sister.    Sister reports that she has been doing well over all. Her sister reports that she has been gaining weight. Linzess, a medication she was prescribed for constipation, has been beneficial for her. Initially, it was administered daily, but due to concerns about potential side effects, it is now given every other day. This adjustment has proven effective. She has not had any other changes in medications.  She continues to follow with her specialists regularly     No other new complaints today.  Remaining chronic conditions have been reviewed and remain stable. Further detail as stated above.    Health Maintenance reviewed - flu shot today.    No recent changes to medical/surgical history.    Current Outpatient Medications on File Prior to Visit   Medication Sig Dispense Refill    acetaminophen (TYLENOL) 650 MG TbSR Take 650 mg by mouth every 8 (eight) hours as needed for Pain.      amLODIPine (NORVASC) 10 MG tablet Take 1 tablet (10 mg total) by mouth once daily. 90 tablet 3    aspirin (ECOTRIN) 81 MG EC tablet Take 1 tablet (81 mg total) by mouth once daily. 90 tablet 3    cetirizine (ZYRTEC) 10 MG tablet Take 10 mg by mouth daily as needed for Allergies.      clonazePAM (KLONOPIN) 0.5 MG tablet Take 1 tablet (0.5 mg total) by mouth daily as needed for Anxiety. 30 tablet 3    diclofenac sodium (VOLTAREN) 1 % Gel APPLY TWO GRAMS TO THE AFFECTED AREA FOUR TIMES DAILY AS NEEDED FOR PAIN 200 g 1    docusate sodium (COLACE) 100 MG capsule Take 1 capsule (100 mg total) by mouth once daily. 90 capsule 3    fluticasone propionate  "(FLONASE) 50 mcg/actuation nasal spray SHAKE LIQUID AND USE 2 SPRAYS IN EACH NOSTRIL EVERY DAY 48 g 0    folic acid (FOLVITE) 1 MG tablet Take 1 tablet (1 mg total) by mouth once daily. 90 tablet 3    furosemide (LASIX) 20 MG tablet TAKE 1 TABLET BY MOUTH DAILY AS NEEDED FOR SWELLING 30 tablet 11    lactulose (CHRONULAC) 10 gram/15 mL solution Take 15 mLs (10 g total) by mouth 3 (three) times daily as needed. 300 mL 0    linaCLOtide (LINZESS) 72 mcg Cap capsule Take 1 capsule (72 mcg total) by mouth before breakfast. 90 capsule 1    lithium (ESKALITH) 300 MG capsule Take 1 capsule at bedtime. 30 capsule 3    melatonin 10 mg Tab Take 1 tablet (10 mg total) by mouth every evening. 90 tablet 3    QUEtiapine (SEROQUEL) 200 MG Tab Take 1 tablet each morning and 2 tablets at bedtime. 90 tablet 3    simethicone (MYLICON) 125 mg Cap capsule 1 capsule after meals and at bedtime as needed      simvastatin (ZOCOR) 40 MG tablet Take 1 tablet (40 mg total) by mouth every evening. 90 tablet 3    traZODone (DESYREL) 150 MG tablet Take 1 tablet (150 mg total) by mouth nightly as needed for Insomnia. 30 tablet 3    triamterene-hydrochlorothiazide 37.5-25 mg (MAXZIDE-25) 37.5-25 mg per tablet Take 1 tablet by mouth every morning. 90 tablet 3     Current Facility-Administered Medications on File Prior to Visit   Medication Dose Route Frequency Provider Last Rate Last Admin    0.9%  NaCl infusion   Intravenous Continuous Eduardo Angelo MD        sodium chloride 0.9% flush 10 mL  10 mL Intravenous PRN Eduardo Angelo MD           Review of Systems   Unable to perform ROS: Psychiatric disorder     Vitals:    10/29/24 1457   BP: 138/74   BP Location: Left arm   Patient Position: Sitting   Pulse: 67   Temp: 98.6 °F (37 °C)   SpO2: 97%   Weight: 55.3 kg (122 lb 0.4 oz)   Height: 4' 9" (1.448 m)       Wt Readings from Last 3 Encounters:   10/29/24 55.3 kg (122 lb 0.4 oz)   09/16/24 49.9 kg (110 lb)   08/13/24 49.9 kg (110 lb) "       Physical Exam  Constitutional:       General: She is not in acute distress.     Appearance: Normal appearance. She is well-developed.   HENT:      Head: Normocephalic and atraumatic.   Eyes:      Conjunctiva/sclera: Conjunctivae normal.   Cardiovascular:      Rate and Rhythm: Normal rate and regular rhythm.      Pulses: Normal pulses.      Heart sounds: Normal heart sounds. No murmur heard.  Pulmonary:      Effort: Pulmonary effort is normal. No respiratory distress.      Breath sounds: Normal breath sounds.   Abdominal:      General: Bowel sounds are normal. There is no distension.      Palpations: Abdomen is soft.      Tenderness: There is no abdominal tenderness.   Musculoskeletal:         General: Normal range of motion.      Cervical back: Normal range of motion and neck supple.   Skin:     General: Skin is warm and dry.      Findings: No rash.   Neurological:      General: No focal deficit present.      Mental Status: She is alert. Mental status is at baseline.   Psychiatric:         Mood and Affect: Mood normal.         Behavior: Behavior normal.     DISCLAIMER: This note was compiled by using a speech recognition dictation system and therefore please be aware that typographical / speech recognition errors can and do occur.  Please contact me if you see any errors specifically.  Consent was obtained for [facility] recording system prior to the visit.

## 2024-11-18 ENCOUNTER — PATIENT MESSAGE (OUTPATIENT)
Dept: PRIMARY CARE CLINIC | Facility: CLINIC | Age: 64
End: 2024-11-18
Payer: MEDICARE

## 2024-11-23 ENCOUNTER — LAB VISIT (OUTPATIENT)
Dept: LAB | Facility: HOSPITAL | Age: 64
End: 2024-11-23
Attending: INTERNAL MEDICINE
Payer: MEDICARE

## 2024-11-23 DIAGNOSIS — I10 ESSENTIAL HYPERTENSION: ICD-10-CM

## 2024-11-23 LAB
ANION GAP SERPL CALC-SCNC: 7 MMOL/L (ref 8–16)
BUN SERPL-MCNC: 13 MG/DL (ref 8–23)
CALCIUM SERPL-MCNC: 10.6 MG/DL (ref 8.7–10.5)
CHLORIDE SERPL-SCNC: 102 MMOL/L (ref 95–110)
CO2 SERPL-SCNC: 29 MMOL/L (ref 23–29)
CREAT SERPL-MCNC: 0.9 MG/DL (ref 0.5–1.4)
EST. GFR  (NO RACE VARIABLE): >60 ML/MIN/1.73 M^2
GLUCOSE SERPL-MCNC: 192 MG/DL (ref 70–110)
POTASSIUM SERPL-SCNC: 4.3 MMOL/L (ref 3.5–5.1)
SODIUM SERPL-SCNC: 138 MMOL/L (ref 136–145)

## 2024-11-23 PROCEDURE — 80048 BASIC METABOLIC PNL TOTAL CA: CPT | Performed by: INTERNAL MEDICINE

## 2024-11-23 PROCEDURE — 36415 COLL VENOUS BLD VENIPUNCTURE: CPT | Performed by: INTERNAL MEDICINE

## 2024-12-06 ENCOUNTER — PATIENT OUTREACH (OUTPATIENT)
Dept: ADMINISTRATIVE | Facility: HOSPITAL | Age: 64
End: 2024-12-06
Payer: MEDICARE

## 2024-12-06 ENCOUNTER — OFFICE VISIT (OUTPATIENT)
Dept: PRIMARY CARE CLINIC | Facility: CLINIC | Age: 64
End: 2024-12-06
Payer: MEDICARE

## 2024-12-06 ENCOUNTER — TELEPHONE (OUTPATIENT)
Dept: PRIMARY CARE CLINIC | Facility: CLINIC | Age: 64
End: 2024-12-06
Payer: MEDICARE

## 2024-12-06 VITALS
WEIGHT: 116.06 LBS | BODY MASS INDEX: 25.12 KG/M2 | SYSTOLIC BLOOD PRESSURE: 128 MMHG | TEMPERATURE: 98 F | DIASTOLIC BLOOD PRESSURE: 64 MMHG | HEART RATE: 80 BPM | OXYGEN SATURATION: 98 %

## 2024-12-06 DIAGNOSIS — R19.7 DIARRHEA, UNSPECIFIED TYPE: Primary | ICD-10-CM

## 2024-12-06 LAB
CTP QC/QA: YES
CTP QC/QA: YES
POC MOLECULAR INFLUENZA A AGN: NEGATIVE
POC MOLECULAR INFLUENZA B AGN: NEGATIVE
SARS-COV-2 RDRP RESP QL NAA+PROBE: NEGATIVE

## 2024-12-06 PROCEDURE — 99214 OFFICE O/P EST MOD 30 MIN: CPT | Mod: PBBFAC,PN | Performed by: NURSE PRACTITIONER

## 2024-12-06 PROCEDURE — 87502 INFLUENZA DNA AMP PROBE: CPT | Mod: PBBFAC,PN | Performed by: NURSE PRACTITIONER

## 2024-12-06 PROCEDURE — 99999 PR PBB SHADOW E&M-EST. PATIENT-LVL IV: CPT | Mod: PBBFAC,,, | Performed by: NURSE PRACTITIONER

## 2024-12-06 PROCEDURE — 99999PBSHW: Mod: PBBFAC,,,

## 2024-12-06 PROCEDURE — 99999PBSHW POCT INFLUENZA A/B MOLECULAR: Mod: PBBFAC,,,

## 2024-12-06 PROCEDURE — 99214 OFFICE O/P EST MOD 30 MIN: CPT | Mod: S$PBB,,, | Performed by: NURSE PRACTITIONER

## 2024-12-06 PROCEDURE — 87635 SARS-COV-2 COVID-19 AMP PRB: CPT | Mod: PBBFAC,PN | Performed by: NURSE PRACTITIONER

## 2024-12-06 NOTE — PROGRESS NOTES
VBC Program  activation, Per previous outreaches Pt has difficulties with Paps & Mammograms. Pt has a Primary Care visit with NP today.

## 2024-12-06 NOTE — TELEPHONE ENCOUNTER
----- Message from Nurse Craig sent at 12/6/2024 10:59 AM CST -----  Contact: Alanis    ----- Message -----  From: Mary Cornell  Sent: 12/6/2024  10:30 AM CST  To: Yusuf Cox Staff    .Type:  Patient Returning Call    Who Called:Alanis   Who Left Message for Patient:nurse   Does the patient know what this is regarding?:unknown   Would the patient rather a call back or a response via [facility]? Call   Best Call Back Number:.[phone]   Additional Information: Pt requesting a call lindsey

## 2024-12-09 NOTE — PROGRESS NOTES
Assessment/Plan:    Problem List Items Addressed This Visit    None  Visit Diagnoses       Diarrhea, unspecified type    -  Primary    Relevant Orders    POCT COVID-19 Rapid Screening (Completed)    POCT Influenza A/B Molecular (Completed)        -COVID  and influenza screening both negative in clinic today.     Follow up if symptoms worsen or fail to improve.    Sandra Alvarado, ROSE  _____________________________________________________________________________________________________________________________________________________    History of Present Illness    CHIEF COMPLAINT:  Ms. Bolden presents today for diarrhea and decreased appetite.    GASTROINTESTINAL SYMPTOMS:  She reports multiple episodes of diarrhea, with two bowel movements today. She is experiencing decreased appetite, nausea, and vomiting. She also complains of abdominal pain. She has had at least two episodes of vomiting and additional attempts without productive results due to lack of appetite. The diarrhea developed after resolving a recent bout of severe constipation with Linzess. She reports that Pepto Bismol has been effective in reducing nighttime diarrhea.    MEDICAL HISTORY:  She has a history of constipation.    MEDICATIONS:  She was taking Linzess for constipation but discontinued it on Wednesday due to persistent diarrhea. She has been using Pepto Bismol for diarrhea management and Tylenol for potential fever and pain.    No other new complaints today.  Remaining chronic conditions have been reviewed and remain stable. Further detail as stated above.     HM reviewed.     No recent changes to medical/surgical history.    Current Outpatient Medications on File Prior to Visit   Medication Sig Dispense Refill    acetaminophen (TYLENOL) 650 MG TbSR Take 650 mg by mouth every 8 (eight) hours as needed for Pain.      amLODIPine (NORVASC) 10 MG tablet Take 1 tablet (10 mg total) by mouth once daily. 90 tablet 3    aspirin (ECOTRIN) 81 MG EC  tablet Take 1 tablet (81 mg total) by mouth once daily. 90 tablet 3    cetirizine (ZYRTEC) 10 MG tablet Take 10 mg by mouth daily as needed for Allergies.      clonazePAM (KLONOPIN) 0.5 MG tablet Take 1 tablet (0.5 mg total) by mouth daily as needed for Anxiety. 30 tablet 3    diclofenac sodium (VOLTAREN) 1 % Gel APPLY TWO GRAMS TO THE AFFECTED AREA FOUR TIMES DAILY AS NEEDED FOR PAIN 200 g 1    docusate sodium (COLACE) 100 MG capsule Take 1 capsule (100 mg total) by mouth once daily. 90 capsule 3    fluticasone propionate (FLONASE) 50 mcg/actuation nasal spray SHAKE LIQUID AND USE 2 SPRAYS IN EACH NOSTRIL EVERY DAY 48 g 0    folic acid (FOLVITE) 1 MG tablet Take 1 tablet (1 mg total) by mouth once daily. 90 tablet 3    furosemide (LASIX) 20 MG tablet TAKE 1 TABLET BY MOUTH DAILY AS NEEDED FOR SWELLING 30 tablet 11    lactulose (CHRONULAC) 10 gram/15 mL solution Take 15 mLs (10 g total) by mouth 3 (three) times daily as needed. 300 mL 0    linaCLOtide (LINZESS) 72 mcg Cap capsule Take 1 capsule (72 mcg total) by mouth before breakfast. 90 capsule 1    lithium (ESKALITH) 300 MG capsule Take 1 capsule at bedtime. 30 capsule 3    melatonin 10 mg Tab Take 1 tablet (10 mg total) by mouth every evening. 90 tablet 3    QUEtiapine (SEROQUEL) 200 MG Tab Take 1 tablet each morning and 2 tablets at bedtime. 90 tablet 3    simethicone (MYLICON) 125 mg Cap capsule 1 capsule after meals and at bedtime as needed      simvastatin (ZOCOR) 40 MG tablet Take 1 tablet (40 mg total) by mouth every evening. 90 tablet 3    traZODone (DESYREL) 150 MG tablet Take 1 tablet (150 mg total) by mouth nightly as needed for Insomnia. 30 tablet 3    triamterene-hydrochlorothiazide 37.5-25 mg (MAXZIDE-25) 37.5-25 mg per tablet Take 1 tablet by mouth every morning. 90 tablet 3    valbenazine (INGREZZA) 40 mg Cap Take 1 capsule (40 mg total) by mouth once daily. 30 capsule 3     Current Facility-Administered Medications on File Prior to Visit    Medication Dose Route Frequency Provider Last Rate Last Admin    0.9%  NaCl infusion   Intravenous Continuous Eduardo Angelo MD        sodium chloride 0.9% flush 10 mL  10 mL Intravenous PRN Eduardo Angelo MD           Review of Systems   Unable to perform ROS: Psychiatric disorder       Vitals:    12/06/24 1425   BP: 128/64   BP Location: Right arm   Patient Position: Sitting   Pulse: 80   Temp: 98.2 °F (36.8 °C)   SpO2: 98%   Weight: 52.7 kg (116 lb 1.2 oz)       Wt Readings from Last 3 Encounters:   12/06/24 52.7 kg (116 lb 1.2 oz)   10/29/24 55.3 kg (122 lb 0.4 oz)   09/16/24 49.9 kg (110 lb)       Physical Exam  Constitutional:       General: She is not in acute distress.     Appearance: Normal appearance. She is well-developed.   HENT:      Head: Normocephalic and atraumatic.   Eyes:      Conjunctiva/sclera: Conjunctivae normal.   Cardiovascular:      Rate and Rhythm: Normal rate and regular rhythm.      Pulses: Normal pulses.      Heart sounds: Normal heart sounds. No murmur heard.  Pulmonary:      Effort: Pulmonary effort is normal. No respiratory distress.      Breath sounds: Normal breath sounds.   Abdominal:      General: Bowel sounds are normal. There is no distension.      Palpations: Abdomen is soft.      Tenderness: There is no abdominal tenderness. There is no guarding.   Musculoskeletal:         General: Normal range of motion.      Cervical back: Normal range of motion and neck supple.   Skin:     General: Skin is warm and dry.      Findings: No rash.   Neurological:      General: No focal deficit present.      Mental Status: She is alert.   Psychiatric:      Comments: Sleepy during visit; easily awaked with verbal and physical stimuli         Health Maintenance   Topic Date Due    Pneumococcal Vaccines (Age 65+) (1 of 2 - PCV) Never done    Shingles Vaccine (1 of 2) Never done    RSV Vaccine (Age 60+ and Pregnant patients) (1 - Risk 60-74 years 1-dose series) Never done    Mammogram   10/04/2023    TETANUS VACCINE  05/30/2024    COVID-19 Vaccine (4 - 2024-25 season) 09/01/2024    High Dose Statin  12/06/2025    Aspirin/Antiplatelet Therapy  12/06/2025    Hemoglobin A1c (Diabetic Prevention Screening)  10/05/2027    Lipid Panel  05/28/2029    Colorectal Cancer Screening  05/17/2031    Hepatitis C Screening  Completed    Influenza Vaccine  Completed    HIV Screening  Completed    DEXA Scan  Discontinued       This note was generated with the assistance of ambient listening technology. Verbal consent was obtained by the patient and accompanying visitor(s) for the recording of patient appointment to facilitate this note. I attest to having reviewed and edited the generated note for accuracy, though some syntax or spelling errors may persist. Please contact the author of this note for any clarification.

## 2024-12-16 ENCOUNTER — OFFICE VISIT (OUTPATIENT)
Dept: PODIATRY | Facility: CLINIC | Age: 64
End: 2024-12-16
Payer: MEDICARE

## 2024-12-16 VITALS — BODY MASS INDEX: 25.07 KG/M2 | WEIGHT: 116.19 LBS | HEIGHT: 57 IN

## 2024-12-16 DIAGNOSIS — B35.1 ONYCHOMYCOSIS: Primary | ICD-10-CM

## 2024-12-16 DIAGNOSIS — F84.9 PDD (PERVASIVE DEVELOPMENTAL DISORDER): ICD-10-CM

## 2024-12-16 PROCEDURE — 99213 OFFICE O/P EST LOW 20 MIN: CPT | Mod: PBBFAC | Performed by: PODIATRIST

## 2024-12-16 PROCEDURE — 99999 PR PBB SHADOW E&M-EST. PATIENT-LVL III: CPT | Mod: PBBFAC,,, | Performed by: PODIATRIST

## 2024-12-16 PROCEDURE — 99213 OFFICE O/P EST LOW 20 MIN: CPT | Mod: S$PBB,,, | Performed by: PODIATRIST

## 2024-12-20 ENCOUNTER — TELEPHONE (OUTPATIENT)
Dept: FAMILY MEDICINE | Facility: CLINIC | Age: 64
End: 2024-12-20
Payer: MEDICARE

## 2024-12-20 NOTE — TELEPHONE ENCOUNTER
Spoke with pt's sister, stated that pt hasn't had a bowel movement in 7 days, only a small amount this morning. Pt took a Linzess last night and 30 ml of Doculax. Pt's daughter asking if she should I've pt lactulose as well. Pls advise.

## 2024-12-20 NOTE — TELEPHONE ENCOUNTER
No care due was identified.  [facility] Embedded Care Due Messages. Reference number: [id number].   12/20/2024 3:37:03 PM CST

## 2024-12-20 NOTE — TELEPHONE ENCOUNTER
Refill Routing Note   Medication(s) are not appropriate for processing by [facility] for the following reason(s):        Outside of protocol    [facility] action(s):  Route               Appointments  past 12m or future 3m with PCP    Date Provider   Last Visit   10/29/2024 Brooke Goldberg MD   Next Visit   Visit date not found Brooke Goldberg MD   ED visits in past 90 days: 0        Note composed:5:40 PM 12/20/2024

## 2024-12-20 NOTE — TELEPHONE ENCOUNTER
----- Message from Summer sent at 12/20/2024  2:49 PM CST -----  Contact: Alanis/sister  Patient called asking for advice about having . She wants to know what medication she can get for her sister that will not interfere with her medication she is taking now. Please call patient at [phone]. Thanks!

## 2024-12-23 RX ORDER — LACTULOSE 10 G/15ML
10 SOLUTION ORAL; RECTAL 3 TIMES DAILY PRN
Qty: 300 ML | Refills: 0 | Status: SHIPPED | OUTPATIENT
Start: 2024-12-23

## 2024-12-27 ENCOUNTER — TELEPHONE (OUTPATIENT)
Dept: FAMILY MEDICINE | Facility: CLINIC | Age: 64
End: 2024-12-27
Payer: MEDICARE

## 2024-12-27 NOTE — TELEPHONE ENCOUNTER
----- Message from Sagar sent at 12/27/2024 11:44 AM CST -----  Name of Who is Calling:PT          What is the request in detail:Pt sister would like a [facility] pneumonia [facility]ax.Please advise thank you       Can the clinic reply by [facility]:        What Number to Call Back if not in [facility]:Telephone Information:  [employer]          [phone]

## 2024-12-30 ENCOUNTER — TELEPHONE (OUTPATIENT)
Dept: PSYCHIATRY | Facility: CLINIC | Age: 64
End: 2024-12-30
Payer: MEDICARE

## 2024-12-30 DIAGNOSIS — R45.1 RESTLESSNESS AND AGITATION: Primary | ICD-10-CM

## 2024-12-30 DIAGNOSIS — G24.01 TARDIVE DYSKINESIA: ICD-10-CM

## 2024-12-30 RX ORDER — VALBENAZINE 40 MG/1
40 CAPSULE ORAL DAILY
Qty: 30 CAPSULE | Refills: 3 | Status: SHIPPED | OUTPATIENT
Start: 2024-12-30

## 2024-12-30 RX ORDER — CLONAZEPAM 0.5 MG/1
0.5 TABLET ORAL DAILY PRN
Qty: 30 TABLET | Refills: 3 | Status: SHIPPED | OUTPATIENT
Start: 2024-12-30 | End: 2025-12-30

## 2024-12-30 RX ORDER — QUETIAPINE FUMARATE 200 MG/1
TABLET, FILM COATED ORAL
Qty: 90 TABLET | Refills: 3 | Status: SHIPPED | OUTPATIENT
Start: 2024-12-30

## 2024-12-30 RX ORDER — TRAZODONE HYDROCHLORIDE 150 MG/1
150 TABLET ORAL NIGHTLY PRN
Qty: 30 TABLET | Refills: 3 | Status: SHIPPED | OUTPATIENT
Start: 2024-12-30 | End: 2025-12-30

## 2024-12-30 RX ORDER — LITHIUM CARBONATE 300 MG/1
CAPSULE ORAL
Qty: 30 CAPSULE | Refills: 3 | Status: SHIPPED | OUTPATIENT
Start: 2024-12-30

## 2024-12-30 NOTE — TELEPHONE ENCOUNTER
----- Message from Charity sent at 12/30/2024  9:36 AM CST -----  Type:  Sooner Apoointment Request    Caller is requesting a sooner appointment.  Caller declined first available appointment listed below.  Caller will not accept being placed on the waitlist and is requesting a message be sent to doctor.  Name of Caller:Alanis/ Sister  When is the first available appointment?n/a  Symptoms:Follow up appt/ Medication refill  Would the patient rather a call back or a response via [facility]? Callback  Best Call Back Number:[phone]  Additional Information: Calling for appointment and also patient is needing medication. Please callback

## 2025-01-02 ENCOUNTER — OFFICE VISIT (OUTPATIENT)
Dept: PRIMARY CARE CLINIC | Facility: CLINIC | Age: 65
End: 2025-01-02
Payer: MEDICARE

## 2025-01-02 VITALS
DIASTOLIC BLOOD PRESSURE: 70 MMHG | SYSTOLIC BLOOD PRESSURE: 106 MMHG | RESPIRATION RATE: 18 BRPM | BODY MASS INDEX: 25.67 KG/M2 | HEART RATE: 77 BPM | TEMPERATURE: 98 F | WEIGHT: 118.63 LBS | OXYGEN SATURATION: 99 %

## 2025-01-02 DIAGNOSIS — Z23 NEED FOR PNEUMOCOCCAL VACCINE: Primary | ICD-10-CM

## 2025-01-02 PROCEDURE — 99999PBSHW PR PBB SHADOW TECHNICAL ONLY FILED TO HB: Mod: PBBFAC,,,

## 2025-01-02 PROCEDURE — 99999 PR PBB SHADOW E&M-EST. PATIENT-LVL IV: CPT | Mod: PBBFAC,,, | Performed by: NURSE PRACTITIONER

## 2025-01-02 PROCEDURE — 99214 OFFICE O/P EST MOD 30 MIN: CPT | Mod: PBBFAC,PN,25 | Performed by: NURSE PRACTITIONER

## 2025-01-02 PROCEDURE — 90677 PCV20 VACCINE IM: CPT | Mod: PBBFAC,PN

## 2025-01-02 PROCEDURE — G0009 ADMIN PNEUMOCOCCAL VACCINE: HCPCS | Mod: PBBFAC,PN

## 2025-01-02 RX ADMIN — PNEUMOCOCCAL 20-VALENT CONJUGATE VACCINE 0.5 ML
2.2; 2.2; 2.2; 2.2; 2.2; 2.2; 2.2; 2.2; 2.2; 2.2; 2.2; 2.2; 2.2; 2.2; 2.2; 2.2; 4.4; 2.2; 2.2; 2.2 INJECTION, SUSPENSION INTRAMUSCULAR at 10:01

## 2025-01-02 NOTE — PROGRESS NOTES
Assessment/Plan:    Problem List Items Addressed This Visit    None  Visit Diagnoses       Need for pneumococcal vaccine    -  Primary    Relevant Medications    pneumoc 20-nikolai conj-dip cr(PF) (PREVNAR-20 (PF)) injection Syrg 0.5 mL (Completed)            Follow up if symptoms worsen or fail to improve.    Sandra Alvarado, ROSE  _____________________________________________________________________________________________________________________________________________________    History of Present Illness    CHIEF COMPLAINT:  Ms. Bolden presents today for pneumonia vaccination.    CONSTIPATION:  She has a history of severe constipation requiring hospital disimpaction on at least three occasions. She currently takes lactulose 10mg and has found Fleet enemas effective for symptom management in the past.    IMMUNIZATION STATUS:  She recently received a flu vaccine and is planning to receive the COVID vaccine in two weeks. She is due for a tetanus vaccine.          No other new complaints today.  Remaining chronic conditions have been reviewed and remain stable. Further detail as stated above.     HM reviewed.     No recent changes to medical/surgical history.    Current Outpatient Medications on File Prior to Visit   Medication Sig Dispense Refill    acetaminophen (TYLENOL) 650 MG TbSR Take 650 mg by mouth every 8 (eight) hours as needed for Pain.      amLODIPine (NORVASC) 10 MG tablet Take 1 tablet (10 mg total) by mouth once daily. 90 tablet 3    aspirin (ECOTRIN) 81 MG EC tablet Take 1 tablet (81 mg total) by mouth once daily. 90 tablet 3    cetirizine (ZYRTEC) 10 MG tablet Take 10 mg by mouth daily as needed for Allergies.      clonazePAM (KLONOPIN) 0.5 MG tablet Take 1 tablet (0.5 mg total) by mouth daily as needed for Anxiety. 30 tablet 3    diclofenac sodium (VOLTAREN) 1 % Gel APPLY TWO GRAMS TO THE AFFECTED AREA FOUR TIMES DAILY AS NEEDED FOR PAIN 200 g 1    docusate sodium (COLACE) 100 MG capsule Take 1 capsule  (100 mg total) by mouth once daily. 90 capsule 3    fluticasone propionate (FLONASE) 50 mcg/actuation nasal spray SHAKE LIQUID AND USE 2 SPRAYS IN EACH NOSTRIL EVERY DAY 48 g 0    folic acid (FOLVITE) 1 MG tablet Take 1 tablet (1 mg total) by mouth once daily. 90 tablet 3    furosemide (LASIX) 20 MG tablet TAKE 1 TABLET BY MOUTH DAILY AS NEEDED FOR SWELLING 30 tablet 11    lactulose (CHRONULAC) 10 gram/15 mL solution Take 15 mLs (10 g total) by mouth 3 (three) times daily as needed. 300 mL 0    linaCLOtide (LINZESS) 72 mcg Cap capsule Take 1 capsule (72 mcg total) by mouth before breakfast. 90 capsule 1    lithium (ESKALITH) 300 MG capsule Take 1 capsule at bedtime. 30 capsule 3    melatonin 10 mg Tab Take 1 tablet (10 mg total) by mouth every evening. 90 tablet 3    QUEtiapine (SEROQUEL) 200 MG Tab Take 1 tablet each morning and 2 tablets at bedtime. 90 tablet 3    simvastatin (ZOCOR) 40 MG tablet Take 1 tablet (40 mg total) by mouth every evening. 90 tablet 3    traZODone (DESYREL) 150 MG tablet Take 1 tablet (150 mg total) by mouth nightly as needed for Insomnia. 30 tablet 3    triamterene-hydrochlorothiazide 37.5-25 mg (MAXZIDE-25) 37.5-25 mg per tablet Take 1 tablet by mouth every morning. 90 tablet 3    valbenazine (INGREZZA) 40 mg Cap Take 1 capsule (40 mg total) by mouth once daily. 30 capsule 3    simethicone (MYLICON) 125 mg Cap capsule 1 capsule after meals and at bedtime as needed (Patient not taking: Reported on 1/2/2025)       Current Facility-Administered Medications on File Prior to Visit   Medication Dose Route Frequency Provider Last Rate Last Admin    0.9%  NaCl infusion   Intravenous Continuous Eduardo Angelo MD        sodium chloride 0.9% flush 10 mL  10 mL Intravenous PRN Eduardo Angelo MD           Review of Systems   Unable to perform ROS: Psychiatric disorder       Vitals:    01/02/25 0939   BP: 106/70   BP Location: Left arm   Patient Position: Sitting   Pulse: 77   Resp: 18   Temp: 98.2  °F (36.8 °C)   TempSrc: Oral   SpO2: 99%   Weight: 53.8 kg (118 lb 9.7 oz)       Wt Readings from Last 3 Encounters:   01/02/25 53.8 kg (118 lb 9.7 oz)   12/16/24 52.7 kg (116 lb 2.9 oz)   12/06/24 52.7 kg (116 lb 1.2 oz)       Physical Exam  Constitutional:       General: She is not in acute distress.     Appearance: Normal appearance. She is well-developed.   HENT:      Head: Normocephalic and atraumatic.   Cardiovascular:      Rate and Rhythm: Normal rate and regular rhythm.      Pulses: Normal pulses.      Heart sounds: Normal heart sounds. No murmur heard.  Pulmonary:      Effort: Pulmonary effort is normal. No respiratory distress.      Breath sounds: Normal breath sounds.   Abdominal:      General: Bowel sounds are normal. There is distension (soft).      Palpations: Abdomen is soft.      Tenderness: There is no abdominal tenderness. There is no guarding.   Musculoskeletal:         General: Normal range of motion.      Cervical back: Normal range of motion and neck supple.   Skin:     General: Skin is warm and dry.   Neurological:      General: No focal deficit present.      Mental Status: She is alert.   Psychiatric:      Comments: Sleepy during visit; easily awaked with verbal and physical stimuli         Health Maintenance   Topic Date Due    Shingles Vaccine (1 of 2) Never done    RSV Vaccine (Age 60+ and Pregnant patients) (1 - Risk 60-74 years 1-dose series) Never done    Mammogram  10/04/2023    TETANUS VACCINE  05/30/2024    COVID-19 Vaccine (4 - 2024-25 season) 09/01/2024    High Dose Statin  01/02/2026    Aspirin/Antiplatelet Therapy  01/02/2026    Hemoglobin A1c (Diabetic Prevention Screening)  10/05/2027    Lipid Panel  05/28/2029    Colorectal Cancer Screening  05/17/2031    Hepatitis C Screening  Completed    Influenza Vaccine  Completed    HIV Screening  Completed    Pneumococcal Vaccines (Age 50+)  Completed    DEXA Scan  Discontinued       This note was generated with the assistance of  ambient listening technology. Verbal consent was obtained by the patient and accompanying visitor(s) for the recording of patient appointment to facilitate this note. I attest to having reviewed and edited the generated note for accuracy, though some syntax or spelling errors may persist. Please contact the author of this note for any clarification.

## 2025-01-02 NOTE — PROGRESS NOTES
Subjective:     Patient ID: Juan Pablo Bolden is a 64 y.o. female.    Chief Complaint: Nail Care (Non-diabetic pt wears slide on shoes, pt is unable to tell me the pain level, PCP Levi Goldberg last seen 12/6/2024) and Foot Pain    Juan Pablo is a 64 y.o. female who presents to the podiatry clinic  with complaint of left nail toe pain. Patient is accompanied by her brother in law today. Patients brother in law denied any injury to the foot.     Patient Active Problem List   Diagnosis    Central hypothyroidism    Lipodystrophy    Osteopenia    Hyperprolactinemia    PDD (pervasive developmental disorder)    Tardive dyskinesia    Restlessness and agitation    Seizure disorder    Essential hypertension    Leukopenia    Thrombocytopenia    Schizoaffective disorder    Chronic constipation    H. pylori infection    Other symptoms and signs involving the nervous system    Coronary artery calcification    Abnormal CT scan    Calcification of aorta    Liver lesion    Abnormal brain MRI    IPMN (intraductal papillary mucinous neoplasm)    Mixed hyperlipidemia    Bilateral leg edema    Developmental delay    Bipolar disorder       Medication List with Changes/Refills   Current Medications    ACETAMINOPHEN (TYLENOL) 650 MG TBSR    Take 650 mg by mouth every 8 (eight) hours as needed for Pain.    AMLODIPINE (NORVASC) 10 MG TABLET    Take 1 tablet (10 mg total) by mouth once daily.    ASPIRIN (ECOTRIN) 81 MG EC TABLET    Take 1 tablet (81 mg total) by mouth once daily.    CETIRIZINE (ZYRTEC) 10 MG TABLET    Take 10 mg by mouth daily as needed for Allergies.    DICLOFENAC SODIUM (VOLTAREN) 1 % GEL    APPLY TWO GRAMS TO THE AFFECTED AREA FOUR TIMES DAILY AS NEEDED FOR PAIN    DOCUSATE SODIUM (COLACE) 100 MG CAPSULE    Take 1 capsule (100 mg total) by mouth once daily.    FLUTICASONE PROPIONATE (FLONASE) 50 MCG/ACTUATION NASAL SPRAY    SHAKE LIQUID AND USE 2 SPRAYS IN EACH NOSTRIL EVERY DAY    FOLIC ACID (FOLVITE) 1 MG TABLET    Take 1 tablet  (1 mg total) by mouth once daily.    FUROSEMIDE (LASIX) 20 MG TABLET    TAKE 1 TABLET BY MOUTH DAILY AS NEEDED FOR SWELLING    LINACLOTIDE (LINZESS) 72 MCG CAP CAPSULE    Take 1 capsule (72 mcg total) by mouth before breakfast.    MELATONIN 10 MG TAB    Take 1 tablet (10 mg total) by mouth every evening.    SIMETHICONE (MYLICON) 125 MG CAP CAPSULE    1 capsule after meals and at bedtime as needed    SIMVASTATIN (ZOCOR) 40 MG TABLET    Take 1 tablet (40 mg total) by mouth every evening.    TRIAMTERENE-HYDROCHLOROTHIAZIDE 37.5-25 MG (MAXZIDE-25) 37.5-25 MG PER TABLET    Take 1 tablet by mouth every morning.   Changed and/or Refilled Medications    Modified Medication Previous Medication    CLONAZEPAM (KLONOPIN) 0.5 MG TABLET clonazePAM (KLONOPIN) 0.5 MG tablet       Take 1 tablet (0.5 mg total) by mouth daily as needed for Anxiety.    Take 1 tablet (0.5 mg total) by mouth daily as needed for Anxiety.    LACTULOSE (CHRONULAC) 10 GRAM/15 ML SOLUTION lactulose (CHRONULAC) 10 gram/15 mL solution       Take 15 mLs (10 g total) by mouth 3 (three) times daily as needed.    Take 15 mLs (10 g total) by mouth 3 (three) times daily as needed.    LITHIUM (ESKALITH) 300 MG CAPSULE lithium (ESKALITH) 300 MG capsule       Take 1 capsule at bedtime.    Take 1 capsule at bedtime.    QUETIAPINE (SEROQUEL) 200 MG TAB QUEtiapine (SEROQUEL) 200 MG Tab       Take 1 tablet each morning and 2 tablets at bedtime.    Take 1 tablet each morning and 2 tablets at bedtime.    TRAZODONE (DESYREL) 150 MG TABLET traZODone (DESYREL) 150 MG tablet       Take 1 tablet (150 mg total) by mouth nightly as needed for Insomnia.    Take 1 tablet (150 mg total) by mouth nightly as needed for Insomnia.    VALBENAZINE (INGREZZA) 40 MG CAP valbenazine (INGREZZA) 40 mg Cap       Take 1 capsule (40 mg total) by mouth once daily.    Take 1 capsule (40 mg total) by mouth once daily.       Review of patient's allergies indicates:  No Known Allergies    Past Surgical  History:   Procedure Laterality Date    COLONOSCOPY N/A 5/17/2021    Procedure: COLONOSCOPY;  Surgeon: Jeffrey Ayala MD;  Location: [facility] ENDO;  Service: Gastroenterology;  Laterality: N/A;    ENDOSCOPIC ULTRASOUND OF UPPER GASTROINTESTINAL TRACT N/A 5/16/2022    Procedure: ULTRASOUND, UPPER GI TRACT, ENDOSCOPIC;  Surgeon: Cherise Marshall MD;  Location: [facility] ENDO;  Service: Endoscopy;  Laterality: N/A;  Upper and linear, 22 shark core, slides and formalin.    ENDOSCOPIC ULTRASOUND OF UPPER GASTROINTESTINAL TRACT N/A 12/20/2023    Procedure: ULTRASOUND, UPPER GI TRACT, ENDOSCOPIC;  Surgeon: Reed Ojeda MD;  Location: [facility] ENDO (2ND FLR);  Service: Endoscopy;  Laterality: N/A;  Need EUS (linear) for pancreas cyst surveillance. 45 minutes. Main or [place]. -lewis  instr qyoaoc-bg-in seizures for many years  11/15/23-LVM for precall-DS  11/16-precall complete-Kpvt  11/21/23: instructions sent via portal for reschedule-GD  1    ESOPHAGOGASTRODUODENOSCOPY N/A 11/5/2020    Procedure: EGD (ESOPHAGOGASTRODUODENOSCOPY);  Surgeon: John Batista MD;  Location: [facility];  Service: Endoscopy;  Laterality: N/A;       Family History   Problem Relation Name Age of Onset    Lung cancer Mother      Hypertension Father      Prostate cancer Father      Stroke Maternal Uncle      Heart disease Maternal Grandmother         Social History     Socioeconomic History    Marital status: Single   Tobacco Use    Smoking status: Never     Passive exposure: Never    Smokeless tobacco: Never   Substance and Sexual Activity    Alcohol use: No    Drug use: No    Sexual activity: Never     Social Drivers of Health     Financial Resource Strain: Low Risk  (4/23/2024)    Overall Financial Resource Strain (CARDIA)     Difficulty of Paying Living Expenses: Not hard at all   Food Insecurity: No Food Insecurity (4/22/2024)    Hunger Vital Sign     Worried About Running Out of Food in the Last Year: Never true     Ran Out of Food in  "the Last Year: Never true   Transportation Needs: No Transportation Needs (4/22/2024)    PRAPARE - Transportation     Lack of Transportation (Medical): No     Lack of Transportation (Non-Medical): No   Physical Activity: Inactive (4/22/2024)    Exercise Vital Sign     Days of Exercise per Week: 0 days     Minutes of Exercise per Session: 0 min   Stress: No Stress Concern Present (4/23/2024)    Citizen of [place] of Occupational Health - Occupational Stress Questionnaire     Feeling of Stress : Not at all   Housing Stability: Low Risk  (4/22/2024)    Housing Stability Vital Sign     Unable to Pay for Housing in the Last Year: No     Homeless in the Last Year: No       Vitals:    12/16/24 1135   Weight: 52.7 kg (116 lb 2.9 oz)   Height: 4' 9" (1.448 m)   PainSc: 0-No pain       Hemoglobin A1C   Date Value Ref Range Status   10/05/2024 5.4 4.0 - 5.6 % Final     Comment:     ADA Screening Guidelines:  5.7-6.4%  Consistent with prediabetes  >or=6.5%  Consistent with diabetes    High levels of fetal hemoglobin interfere with the HbA1C  assay. Heterozygous hemoglobin variants (HbS, HgC, etc)do  not significantly interfere with this assay.   However, presence of multiple variants may affect accuracy.     10/14/2014 5.2 4.5 - 6.2 % Final       Review of Systems   Unable to perform ROS: Mental acuity         Objective:      PHYSICAL EXAM: Apperance: Alert and orient in no distress,well developed, and with good attention to grooming and body habits  Patient presents ambulating in tennis shoes.   LOWER EXTREMITY EXAM:  VASCULAR: Dorsalis pedis pulses 2/4 bilateral and Posterior Tibial pulses 2/4 bilateral. Capillary fill time <4 seconds bilateral. No edema observed bilateral. Varicosities absent bilateral. Skin temperature of the lower extremities is warm to warm, proximal to distal. Hair growth WNL bilateral.  DERMATOLOGICAL: No skin rashes, subcutaneous nodules, lesions, or ulcers observed bilateral. Nails 1,2,3,4,5 bilateral " thickened, and discolored with subungual debris. Webspaces 1,2,3,4 bilateral clean, dry and without evidence of break in skin integrity.   NEUROLOGICAL: Light touch, sharp-dull, proprioception all present and equal bilaterally.   MUSCULOSKELETAL: Muscle strength is 5/5 for foot inverters, everters, plantarflexors, and dorsiflexors. Muscle tone is normal. No pain on palpation of bilateral feet.         Assessment:       Encounter Diagnoses   Name Primary?    Onychomycosis Yes    PDD (pervasive developmental disorder)            Plan:   Onychomycosis    PDD (pervasive developmental disorder)    I counseled the patient on her conditions, regarding findings of my examination, my impressions, and usual treatment plan.   Patients sister instructed on keeping nails filed down.  The patient and I reviewed the types of shoes she should be wearing, my recommendation includes generally the best time of the day for a shoe fitting is the afternoon, shoes with a wide toe box, very good cushion, and tennis shoes with removable inner soles.The patient and I reviewed my recommendations for over-the-counter orthotic inserts.   Patient to return as needed.       Elsa Wilkes DPM  [facility]

## 2025-01-08 DIAGNOSIS — G24.01 TARDIVE DYSKINESIA: ICD-10-CM

## 2025-01-08 RX ORDER — VALBENAZINE 40 MG/1
40 CAPSULE ORAL DAILY
Qty: 30 CAPSULE | Refills: 3 | OUTPATIENT
Start: 2025-01-08

## 2025-01-09 DIAGNOSIS — G24.01 TARDIVE DYSKINESIA: ICD-10-CM

## 2025-01-09 RX ORDER — VALBENAZINE 40 MG/1
40 CAPSULE ORAL DAILY
Qty: 30 CAPSULE | Refills: 3 | Status: ACTIVE | OUTPATIENT
Start: 2025-01-09

## 2025-01-09 RX ORDER — VALBENAZINE 40 MG/1
40 CAPSULE ORAL DAILY
Qty: 30 CAPSULE | Refills: 3 | Status: CANCELLED | OUTPATIENT
Start: 2025-01-09

## 2025-01-13 DIAGNOSIS — Z00.00 ENCOUNTER FOR MEDICARE ANNUAL WELLNESS EXAM: ICD-10-CM

## 2025-01-27 DIAGNOSIS — I25.10 CORONARY ARTERY CALCIFICATION: ICD-10-CM

## 2025-01-27 DIAGNOSIS — I70.0 CALCIFICATION OF AORTA: ICD-10-CM

## 2025-01-28 RX ORDER — ASPIRIN 81 MG/1
81 TABLET ORAL
Qty: 90 TABLET | Refills: 3 | Status: SHIPPED | OUTPATIENT
Start: 2025-01-28

## 2025-01-28 NOTE — TELEPHONE ENCOUNTER
Refill Routing Note   Medication(s) are not appropriate for processing by [facility] for the following reason(s):        Outside of protocol    [facility] action(s):  Route             Appointments  past 12m or future 3m with PCP    Date Provider   Last Visit   10/29/2024 Brooke Goldberg MD   Next Visit   Visit date not found Brooke Goldberg MD   ED visits in past 90 days: 0        Note composed:7:50 AM 01/28/2025

## 2025-01-30 ENCOUNTER — PATIENT MESSAGE (OUTPATIENT)
Dept: CARDIOLOGY | Facility: HOSPITAL | Age: 65
End: 2025-01-30
Payer: MEDICARE

## 2025-02-14 ENCOUNTER — TELEPHONE (OUTPATIENT)
Dept: FAMILY MEDICINE | Facility: CLINIC | Age: 65
End: 2025-02-14
Payer: MEDICARE

## 2025-02-14 DIAGNOSIS — K92.1 BLOOD IN STOOL: ICD-10-CM

## 2025-02-14 DIAGNOSIS — K59.00 CONSTIPATION, UNSPECIFIED CONSTIPATION TYPE: ICD-10-CM

## 2025-02-14 DIAGNOSIS — K59.09 CHRONIC CONSTIPATION: Primary | ICD-10-CM

## 2025-02-14 NOTE — TELEPHONE ENCOUNTER
Blood likely related to constipation but requested referral placed.    Refill for Linzess sent. In future, she can just request via [facility] so she does not run out and have these problems in the future.    Prevnar 20 does not require boosters. She will not require another pneumonia vaccine in the future. I would recommend that she receive Shingles and Tdap vaccines though, which she can receive at her local pharmacy.     I have signed for the following orders AND/OR meds.  Please call the patient and ask the patient to schedule the testing AND/OR inform about any medications that were sent. Medications have been sent to pharmacy listed below      Orders Placed This Encounter   Procedures    Ambulatory referral/consult to Gastroenterology     Standing Status:   Future     Standing Expiration Date:   3/14/2026     Referral Priority:   Routine     Referral Type:   Consultation     Referral Reason:   Specialty Services Required     Referred to Provider:   Jeffrey Ayala MD     Requested Specialty:   Gastroenterology     Number of Visits Requested:   1       Medications Ordered This Encounter   Medications    linaCLOtide (LINZESS) 72 mcg Cap capsule     Sig: Take 1 capsule (72 mcg total) by mouth before breakfast.     Dispense:  90 capsule     Refill:  3         [facility] - [place] - [facility] [address] [address]  Phone: [phone] Fax: [phone]    [id number] - [place] - [address] AT [facility]  [address]  [place] [address]  Phone: [phone] Fax: [phone]    [facility] - [place] [address] [place] [address]  Phone: [phone] Fax: [phone]

## 2025-02-14 NOTE — TELEPHONE ENCOUNTER
----- Message from Agensys sent at 2/14/2025  7:30 AM CST -----  Contact: sister  ..Type:  Needs Medical Advice    Who Called: Alanis   Would the patient rather a call back or a response via [facility]? Call back   Best Call Back Number: .[phone] (home)   Additional Information: pt sister Alanis is asking for an return call in reference to medication  linaCLOtide (LINZESS) 72 mcg Cap capsule, also states pt has blood in her stool, and to discuss follow up from seeing the NP.

## 2025-02-14 NOTE — TELEPHONE ENCOUNTER
Pt's sister states that pt might be dehydrated and she has been constipated. States small amounts of blood was noted in her stool. On Monday, 2/10/25, pt was given Magnesium citrate and Dulcolax and had a large BM. Her last dose of Linzess was on 2/2/25. Alanis states she is trying to get the prescription so pt can resume it.   Alanis is asking to have a referral to Gastro. States pt has seen Gastro in the past, and would like to be seen again, preferably with Dr. Jeffrey Ayala in [facility] if available.   Alanis also inquiring if a 2nd pneumonia vaccine is needed? Pt had the Prevnar-20 on 1/2/25.

## 2025-02-18 ENCOUNTER — TELEPHONE (OUTPATIENT)
Dept: FAMILY MEDICINE | Facility: CLINIC | Age: 65
End: 2025-02-18
Payer: MEDICARE

## 2025-02-18 ENCOUNTER — HOSPITAL ENCOUNTER (EMERGENCY)
Facility: HOSPITAL | Age: 65
Discharge: HOME | End: 2025-02-18
Attending: EMERGENCY MEDICINE
Payer: MEDICARE

## 2025-02-18 VITALS
WEIGHT: 118.38 LBS | HEART RATE: 77 BPM | DIASTOLIC BLOOD PRESSURE: 95 MMHG | RESPIRATION RATE: 22 BRPM | OXYGEN SATURATION: 97 % | SYSTOLIC BLOOD PRESSURE: 176 MMHG | BODY MASS INDEX: 25.62 KG/M2

## 2025-02-18 DIAGNOSIS — R53.1 WEAKNESS: ICD-10-CM

## 2025-02-18 DIAGNOSIS — I10 HYPERTENSION, UNSPECIFIED TYPE: Primary | ICD-10-CM

## 2025-02-18 LAB
ALBUMIN SERPL BCP-MCNC: 4 G/DL (ref 3.5–5.2)
ALP SERPL-CCNC: 114 U/L (ref 40–150)
ALT SERPL W/O P-5'-P-CCNC: 14 U/L (ref 10–44)
ANION GAP SERPL CALC-SCNC: 12 MMOL/L (ref 8–16)
AST SERPL-CCNC: 10 U/L (ref 10–40)
BASOPHILS # BLD AUTO: 0.01 K/UL (ref 0–0.2)
BASOPHILS NFR BLD: 0.2 % (ref 0–1.9)
BILIRUB SERPL-MCNC: 0.3 MG/DL (ref 0.1–1)
BILIRUB UR QL STRIP: NEGATIVE
BNP SERPL-MCNC: <10 PG/ML (ref 0–99)
BUN SERPL-MCNC: 17 MG/DL (ref 8–23)
CALCIUM SERPL-MCNC: 10.4 MG/DL (ref 8.7–10.5)
CHLORIDE SERPL-SCNC: 102 MMOL/L (ref 95–110)
CK SERPL-CCNC: 20 U/L (ref 20–180)
CLARITY UR: CLEAR
CO2 SERPL-SCNC: 23 MMOL/L (ref 23–29)
COLOR UR: YELLOW
CREAT SERPL-MCNC: 1.1 MG/DL (ref 0.5–1.4)
DIFFERENTIAL METHOD BLD: ABNORMAL
EOSINOPHIL # BLD AUTO: 0.1 K/UL (ref 0–0.5)
EOSINOPHIL NFR BLD: 2.4 % (ref 0–8)
ERYTHROCYTE [DISTWIDTH] IN BLOOD BY AUTOMATED COUNT: 12.6 % (ref 11.5–14.5)
EST. GFR  (NO RACE VARIABLE): 56 ML/MIN/1.73 M^2
GLUCOSE SERPL-MCNC: 253 MG/DL (ref 70–110)
GLUCOSE UR QL STRIP: NEGATIVE
HCT VFR BLD AUTO: 38.7 % (ref 37–48.5)
HGB BLD-MCNC: 12.4 G/DL (ref 12–16)
HGB UR QL STRIP: NEGATIVE
IMM GRANULOCYTES # BLD AUTO: 0.02 K/UL (ref 0–0.04)
IMM GRANULOCYTES NFR BLD AUTO: 0.4 % (ref 0–0.5)
KETONES UR QL STRIP: NEGATIVE
LEUKOCYTE ESTERASE UR QL STRIP: NEGATIVE
LITHIUM SERPL-SCNC: 0.8 MMOL/L (ref 0.6–1.2)
LYMPHOCYTES # BLD AUTO: 0.7 K/UL (ref 1–4.8)
LYMPHOCYTES NFR BLD: 13.6 % (ref 18–48)
MCH RBC QN AUTO: 27.7 PG (ref 27–31)
MCHC RBC AUTO-ENTMCNC: 32 G/DL (ref 32–36)
MCV RBC AUTO: 87 FL (ref 82–98)
MONOCYTES # BLD AUTO: 0.3 K/UL (ref 0.3–1)
MONOCYTES NFR BLD: 5.7 % (ref 4–15)
NEUTROPHILS # BLD AUTO: 3.8 K/UL (ref 1.8–7.7)
NEUTROPHILS NFR BLD: 77.7 % (ref 38–73)
NITRITE UR QL STRIP: NEGATIVE
NRBC BLD-RTO: 0 /100 WBC
PH UR STRIP: 7 [PH] (ref 5–8)
PLATELET # BLD AUTO: 160 K/UL (ref 150–450)
PMV BLD AUTO: 10.4 FL (ref 9.2–12.9)
POTASSIUM SERPL-SCNC: 4.1 MMOL/L (ref 3.5–5.1)
PROT SERPL-MCNC: 7.1 G/DL (ref 6–8.4)
PROT UR QL STRIP: ABNORMAL
RBC # BLD AUTO: 4.47 M/UL (ref 4–5.4)
SODIUM SERPL-SCNC: 137 MMOL/L (ref 136–145)
SP GR UR STRIP: 1.02 (ref 1–1.03)
TROPONIN I SERPL DL<=0.01 NG/ML-MCNC: <0.006 NG/ML (ref 0–0.03)
URN SPEC COLLECT METH UR: ABNORMAL
UROBILINOGEN UR STRIP-ACNC: NEGATIVE EU/DL
WBC # BLD AUTO: 4.93 K/UL (ref 3.9–12.7)

## 2025-02-18 PROCEDURE — 85025 COMPLETE CBC W/AUTO DIFF WBC: CPT | Performed by: REGISTERED NURSE

## 2025-02-18 PROCEDURE — 81003 URINALYSIS AUTO W/O SCOPE: CPT | Performed by: REGISTERED NURSE

## 2025-02-18 PROCEDURE — 93010 ELECTROCARDIOGRAM REPORT: CPT | Mod: ,,, | Performed by: INTERNAL MEDICINE

## 2025-02-18 PROCEDURE — 80053 COMPREHEN METABOLIC PANEL: CPT | Performed by: REGISTERED NURSE

## 2025-02-18 PROCEDURE — 82550 ASSAY OF CK (CPK): CPT | Performed by: REGISTERED NURSE

## 2025-02-18 PROCEDURE — 80178 ASSAY OF LITHIUM: CPT | Performed by: EMERGENCY MEDICINE

## 2025-02-18 PROCEDURE — 83880 ASSAY OF NATRIURETIC PEPTIDE: CPT | Performed by: REGISTERED NURSE

## 2025-02-18 PROCEDURE — 93005 ELECTROCARDIOGRAM TRACING: CPT

## 2025-02-18 PROCEDURE — 84484 ASSAY OF TROPONIN QUANT: CPT | Performed by: REGISTERED NURSE

## 2025-02-18 PROCEDURE — 99285 EMERGENCY DEPT VISIT HI MDM: CPT | Mod: 25

## 2025-02-18 NOTE — TELEPHONE ENCOUNTER
----- Message from Tavares sent at 2/18/2025 12:40 PM CST -----  Contact: [phone]  Type:  Patient Returning CallWho Called:sisterFelisha Left Message for Patient:Padilla Escalante MADoes the patient know what this is regarding?:missed a call from your officeWould the patient rather a call back or a response via [facility]? Call [facility] Call Back Number: [phone] Information: n [phone]

## 2025-02-18 NOTE — ED PROVIDER NOTES
SCRIBE #1 NOTE: I, Harshil Mckeon, am scribing for, and in the presence of, Yohana Pedro MD. I have scribed the entire note.       History     Chief Complaint   Patient presents with    Fatigue     Pt's family and caretaker report that the patient has been weak since yesterday with multiple falls since then.  Pt has also been increasingly drowsy yesterday and today.     Review of patient's allergies indicates:  No Known Allergies      History of Present Illness     HPI    2/18/2025, 4:25 PM  History obtained from the  pt's brother-in-law      History of Present Illness: Juan Pablo Bolden is a 64 y.o. female patient with a PMHx of galactorrhea, HTN, high cholesterol, Bipolar 1 disorder, HLD, leukopenia, growth retardation, and schizoaffective disorder who presents to the Emergency Department for evaluation of increased weakness which onset gradually since yesterday. Pt's brother-in-law states pt has been falling more. Pt's caretaker found pt in the floor last night. Pt is normally able to ambulate at home without assistance and moves around in a wheelchair in public. Per brother-in-law, pt has been complaining about constipation for the past week. Pt gets 24/7 care. Symptoms are constant and moderate in severity. No mitigating or exacerbating factors reported. Patient's brother in law denies any fever, running nose, congestion, and all other sxs at this time. No prior Tx reported. No further complaints or concerns at this time.       Arrival mode: Personal Transportation    PCP: Brooke Goldberg MD        Past Medical History:  Past Medical History:   Diagnosis Date    Bipolar 1 disorder     Chronic constipation     Galactorrhea     Gluteal abscess 03/26/2024    Growth retardation     Profound mental retardation    High cholesterol     Hyperlipidemia     Hypertension     Leukopenia     Neuroleptic-induced tardive dyskinesia     Schizoaffective disorder     Seizures     Stereotypic movement disorder         Past Surgical History:  Past Surgical History:   Procedure Laterality Date    COLONOSCOPY N/A 5/17/2021    Procedure: COLONOSCOPY;  Surgeon: Jeffrey Ayala MD;  Location: [facility];  Service: Gastroenterology;  Laterality: N/A;    ENDOSCOPIC ULTRASOUND OF UPPER GASTROINTESTINAL TRACT N/A 5/16/2022    Procedure: ULTRASOUND, UPPER GI TRACT, ENDOSCOPIC;  Surgeon: Cherise Marshall MD;  Location: [facility] ENDO;  Service: Endoscopy;  Laterality: N/A;  Upper and linear, 22 shark core, slides and formalin.    ENDOSCOPIC ULTRASOUND OF UPPER GASTROINTESTINAL TRACT N/A 12/20/2023    Procedure: ULTRASOUND, UPPER GI TRACT, ENDOSCOPIC;  Surgeon: Reed Ojeda MD;  Location: [facility] ENDO (2ND FLR);  Service: Endoscopy;  Laterality: N/A;  Need EUS (linear) for pancreas cyst surveillance. 45 minutes. Main or [facility]. -lewis  instr hniece-is-xp seizures for many years  11/15/23-LVM for precall-DS  11/16-precall complete-Kpvt  11/21/23: instructions sent via portal for reschedule-GD  1    ESOPHAGOGASTRODUODENOSCOPY N/A 11/5/2020    Procedure: EGD (ESOPHAGOGASTRODUODENOSCOPY);  Surgeon: John Batista MD;  Location: [facility];  Service: Endoscopy;  Laterality: N/A;         Family History:  Family History   Problem Relation Name Age of Onset    Lung cancer Mother      Hypertension Father      Prostate cancer Father      Stroke Maternal Uncle      Heart disease Maternal Grandmother         Social History:  Social History     Tobacco Use    Smoking status: Never     Passive exposure: Never    Smokeless tobacco: Never   Substance and Sexual Activity    Alcohol use: No    Drug use: No    Sexual activity: Never        Review of Systems     Review of Systems   Constitutional:  Positive for fatigue. Negative for fever.   HENT:  Negative for congestion, rhinorrhea and sore throat.    Respiratory:  Negative for shortness of breath.    Cardiovascular:  Negative for chest pain.   Gastrointestinal:  Positive for constipation.  Negative for nausea.   Genitourinary:  Negative for dysuria.   Musculoskeletal:  Negative for back pain.   Skin:  Negative for rash.   Neurological:  Positive for weakness.   Hematological:  Does not bruise/bleed easily.   All other systems reviewed and are negative.     Physical Exam     Initial Vitals [02/18/25 1212]   BP Pulse Resp Temp SpO2   (!) 145/83 76 16 -- 97 %      MAP       --          Physical Exam  Nursing Notes and Vital Signs Reviewed.  Constitutional: Patient is in no acute distress. Well-developed and well-nourished.  Head: Atraumatic. Normocephalic.  Eyes: PERRL. EOM intact. Conjunctivae are not pale. No scleral icterus.  ENT: Mucous membranes are moist. Oropharynx is clear and symmetric.    Neck: Supple. Full ROM. No lymphadenopathy.  Cardiovascular: Regular rate. Regular rhythm. No murmurs, rubs, or gallops. Distal pulses are 2+ and symmetric.  Pulmonary/Chest: No respiratory distress. Clear to auscultation bilaterally. No wheezing or rales.  Abdominal: Soft and non-distended.  There is no tenderness.  No rebound, guarding, or rigidity. Good bowel sounds.  Genitourinary: No CVA tenderness  Musculoskeletal: Moves all extremities. Able to move arms in all directions. Able to flex and extend her legs. No obvious deformities. No edema. No calf tenderness.  Skin: Warm and dry.  Neurological: Encephalopathic. At baseline. No acute focal neurological deficits are appreciated.  Psychiatric: Unable to assess     ED Course   Procedures  ED Vital Signs:  Vitals:    02/18/25 1212 02/18/25 1645 02/18/25 1745 02/18/25 1816   BP: (!) 145/83 (!) 165/99 (!) 163/90 (!) 176/95   Pulse: 76 75 77 77   Resp: 16 (!) 26  (!) 22   TempSrc: Axillary      SpO2: 97% 97% 98% 97%   Weight:        02/18/25 1830   BP:    Pulse:    Resp:    TempSrc:    SpO2:    Weight: 53.7 kg (118 lb 6.2 oz)       Abnormal Lab Results:  Labs Reviewed   CBC W/ AUTO DIFFERENTIAL - Abnormal       Result Value    WBC 4.93      RBC 4.47       Hemoglobin 12.4      Hematocrit 38.7      MCV 87      MCH 27.7      MCHC 32.0      RDW 12.6      Platelets 160      MPV 10.4      Immature Granulocytes 0.4      Gran # (ANC) 3.8      Immature Grans (Abs) 0.02      Lymph # 0.7 (*)     Mono # 0.3      Eos # 0.1      Baso # 0.01      nRBC 0      Gran % 77.7 (*)     Lymph % 13.6 (*)     Mono % 5.7      Eosinophil % 2.4      Basophil % 0.2      Differential Method Automated     COMPREHENSIVE METABOLIC PANEL - Abnormal    Sodium 137      Potassium 4.1      Chloride 102      CO2 23      Glucose 253 (*)     BUN 17      Creatinine 1.1      Calcium 10.4      Total Protein 7.1      Albumin 4.0      Total Bilirubin 0.3      Alkaline Phosphatase 114      AST 10      ALT 14      eGFR 56 (*)     Anion Gap 12     URINALYSIS, REFLEX TO URINE CULTURE - Abnormal    Specimen UA Urine, Clean Catch      Color, UA Yellow      Appearance, UA Clear      pH, UA 7.0      Specific Gravity, UA 1.020      Protein, UA Trace (*)     Glucose, UA Negative      Ketones, UA Negative      Bilirubin (UA) Negative      Occult Blood UA Negative      Nitrite, UA Negative      Urobilinogen, UA Negative      Leukocytes, UA Negative      Narrative:     Specimen Source->Urine   B-TYPE NATRIURETIC PEPTIDE    BNP <10     TROPONIN I    Troponin I <0.006     CK    CPK 20     LITHIUM LEVEL    Lithium Level 0.8          All Lab Results:  Results for orders placed or performed during the hospital encounter of 02/18/25   CBC auto differential    Collection Time: 02/18/25  6:23 PM   Result Value Ref Range    WBC 4.93 3.90 - 12.70 K/uL    RBC 4.47 4.00 - 5.40 M/uL    Hemoglobin 12.4 12.0 - 16.0 g/dL    Hematocrit 38.7 37.0 - 48.5 %    MCV 87 82 - 98 fL    MCH 27.7 27.0 - 31.0 pg    MCHC 32.0 32.0 - 36.0 g/dL    RDW 12.6 11.5 - 14.5 %    Platelets 160 150 - 450 K/uL    MPV 10.4 9.2 - 12.9 fL    Immature Granulocytes 0.4 0.0 - 0.5 %    Gran # (ANC) 3.8 1.8 - 7.7 K/uL    Immature Grans (Abs) 0.02 0.00 - 0.04 K/uL    Lymph  # 0.7 (L) 1.0 - 4.8 K/uL    Mono # 0.3 0.3 - 1.0 K/uL    Eos # 0.1 0.0 - 0.5 K/uL    Baso # 0.01 0.00 - 0.20 K/uL    nRBC 0 0 /100 WBC    Gran % 77.7 (H) 38.0 - 73.0 %    Lymph % 13.6 (L) 18.0 - 48.0 %    Mono % 5.7 4.0 - 15.0 %    Eosinophil % 2.4 0.0 - 8.0 %    Basophil % 0.2 0.0 - 1.9 %    Differential Method Automated    Comprehensive metabolic panel    Collection Time: 02/18/25  6:23 PM   Result Value Ref Range    Sodium 137 136 - 145 mmol/L    Potassium 4.1 3.5 - 5.1 mmol/L    Chloride 102 95 - 110 mmol/L    CO2 23 23 - 29 mmol/L    Glucose 253 (H) 70 - 110 mg/dL    BUN 17 8 - 23 mg/dL    Creatinine 1.1 0.5 - 1.4 mg/dL    Calcium 10.4 8.7 - 10.5 mg/dL    Total Protein 7.1 6.0 - 8.4 g/dL    Albumin 4.0 3.5 - 5.2 g/dL    Total Bilirubin 0.3 0.1 - 1.0 mg/dL    Alkaline Phosphatase 114 40 - 150 U/L    AST 10 10 - 40 U/L    ALT 14 10 - 44 U/L    eGFR 56 (A) >60 mL/min/1.73 m^2    Anion Gap 12 8 - 16 mmol/L   Brain natriuretic peptide    Collection Time: 02/18/25  6:23 PM   Result Value Ref Range    BNP <10 0 - 99 pg/mL   Troponin I    Collection Time: 02/18/25  6:23 PM   Result Value Ref Range    Troponin I <0.006 0.000 - 0.026 ng/mL   CPK    Collection Time: 02/18/25  6:23 PM   Result Value Ref Range    CPK 20 20 - 180 U/L   Lithium level    Collection Time: 02/18/25  6:23 PM   Result Value Ref Range    Lithium Level 0.8 0.6 - 1.2 mmol/L   Urinalysis, Reflex to Urine Culture Urine, Clean Catch    Collection Time: 02/18/25  7:35 PM    Specimen: Urine   Result Value Ref Range    Specimen UA Urine, Clean Catch     Color, UA Yellow Yellow, Straw, Demi    Appearance, UA Clear Clear    pH, UA 7.0 5.0 - 8.0    Specific Gravity, UA 1.020 1.005 - 1.030    Protein, UA Trace (A) Negative    Glucose, UA Negative Negative    Ketones, UA Negative Negative    Bilirubin (UA) Negative Negative    Occult Blood UA Negative Negative    Nitrite, UA Negative Negative    Urobilinogen, UA Negative <2.0 EU/dL    Leukocytes, UA Negative  Negative     *Note: Due to a large number of results and/or encounters for the requested time period, some results have not been displayed. A complete set of results can be found in Results Review.         Imaging Results:  Imaging Results              X-Ray Chest 1 View (Final result)  Result time 02/18/25 14:39:38      Final result by Dirk Montelongo MD (02/18/25 14:39:38)                   Impression:      No acute findings.      Electronically signed by: Dirk Montelongo MD  Date:    02/18/2025  Time:    14:39               Narrative:    EXAMINATION:  XR CHEST 1 VIEW    CLINICAL HISTORY:  Weakness    TECHNIQUE:  AP view of the chest was performed.    COMPARISON:  01/02/2024    FINDINGS:  The cardiac and mediastinal silhouettes appear within normal limits.   The lungs are clear bilaterally.  No acute osseous findings demonstrated.                                       The EKG was ordered, reviewed, and independently interpreted by the ED provider.  Interpretation time: 17:15  Rate: 75 BPM  Rhythm: normal sinus rhythm  Interpretation: Possible Left atrial enlargement. Rightward axis. T wave abnormality, consider inferolateral ischemia. No STEMI.           The Emergency Provider reviewed the vital signs and test results, which are outlined above.     ED Discussion     7:00 PM: Pt's sister provided additional hx. Pt's sister confirms HPI as provided by . States she only has 1 bowel movement a week. Pt is given magnesium citrate frequently to help with bowel movement.     8:12 PM: Reassessed pt at this time. Discussed with pt's sister all pertinent ED information and results. Discussed pt dx and plan of tx. Gave pt's sister all f/u and return to the ED instructions. All questions and concerns were addressed at this time. Pt's sister expresses understanding of information and instructions, and is comfortable with plan to discharge. Pt is stable for discharge.    I discussed with patient and/or family/caretaker that  evaluation in the ED does not suggest any emergent or life threatening medical conditions requiring immediate intervention beyond what was provided in the ED, and I believe patient is safe for discharge.  Regardless, an unremarkable evaluation in the ED does not preclude the development or presence of a serious of life threatening condition. As such, patient was instructed to return immediately for any worsening or change in current symptoms.         Medical Decision Making  Amount and/or Complexity of Data Reviewed  Independent Historian: caregiver  Labs: ordered. Decision-making details documented in ED Course.  Radiology: ordered. Decision-making details documented in ED Course.  ECG/medicine tests: ordered and independent interpretation performed. Decision-making details documented in ED Course.                ED Medication(s):  Medications - No data to display    Discharge Medication List as of 2/18/2025  8:08 PM           Follow-up Information       Brooke Goldberg MD In 1 day.    Specialty: Internal Medicine  Contact information:  [address] [address] [facility]  [address]  [facility] [address]  [phone]               [facility]    Specialty: Emergency Medicine  Why: As needed, If symptoms worsen  Contact information:  [id number] [place] [address] [phone]                               Scribe Attestation:   Scribe #1: I performed the above scribed service and the documentation accurately describes the services I performed. I attest to the accuracy of the note.     Attending:   Physician Attestation Statement for Scribe #1: I, Yohana Pedro MD, personally performed the services described in this documentation, as scribed by Harshil Mckeon, in my presence, and it is both accurate and complete.           Clinical Impression       ICD-10-CM ICD-9-CM   1. Hypertension, unspecified type  I10 401.9   2. Weakness  R53.1 780.79       Disposition:   Disposition:  Discharged  Condition: Stable       Yohana Pedro MD  02/19/25 0116

## 2025-02-18 NOTE — FIRST PROVIDER EVALUATION
Medical screening examination initiated.  I have conducted a focused provider triage encounter, findings are as follows:    Brief history of present illness:  Brother in law reports increased weakness and fell last night, denies any injuries from fall    Vitals:    02/18/25 1212   BP: (!) 145/83   BP Location: Right arm   Pulse: 76   Resp: 16   Temp: Comment: unable to obtain in triage   TempSrc: Axillary   SpO2: 97%       Pertinent physical exam:  No acute distress, VS stable    Brief workup plan:  Workup    Preliminary workup initiated; this workup will be continued and followed by the physician or advanced practice provider that is assigned to the patient when roomed.

## 2025-02-19 LAB
OHS QRS DURATION: 78 MS
OHS QTC CALCULATION: 406 MS

## 2025-03-01 ENCOUNTER — LAB VISIT (OUTPATIENT)
Dept: LAB | Facility: HOSPITAL | Age: 65
End: 2025-03-01
Attending: INTERNAL MEDICINE
Payer: MEDICARE

## 2025-03-01 DIAGNOSIS — D61.818 PANCYTOPENIA: ICD-10-CM

## 2025-03-01 DIAGNOSIS — R71.8 OTHER ABNORMALITY OF RED BLOOD CELLS: ICD-10-CM

## 2025-03-01 LAB
ALBUMIN SERPL BCP-MCNC: 3.7 G/DL (ref 3.5–5.2)
ALP SERPL-CCNC: 110 U/L (ref 40–150)
ALT SERPL W/O P-5'-P-CCNC: 16 U/L (ref 10–44)
ANION GAP SERPL CALC-SCNC: 11 MMOL/L (ref 8–16)
AST SERPL-CCNC: 10 U/L (ref 10–40)
BASOPHILS # BLD AUTO: 0.03 K/UL (ref 0–0.2)
BASOPHILS NFR BLD: 0.8 % (ref 0–1.9)
BILIRUB SERPL-MCNC: 0.4 MG/DL (ref 0.1–1)
BUN SERPL-MCNC: 16 MG/DL (ref 8–23)
CALCIUM SERPL-MCNC: 10.4 MG/DL (ref 8.7–10.5)
CHLORIDE SERPL-SCNC: 103 MMOL/L (ref 95–110)
CO2 SERPL-SCNC: 24 MMOL/L (ref 23–29)
CREAT SERPL-MCNC: 1 MG/DL (ref 0.5–1.4)
DIFFERENTIAL METHOD BLD: ABNORMAL
EOSINOPHIL # BLD AUTO: 0.1 K/UL (ref 0–0.5)
EOSINOPHIL NFR BLD: 3.7 % (ref 0–8)
ERYTHROCYTE [DISTWIDTH] IN BLOOD BY AUTOMATED COUNT: 12.7 % (ref 11.5–14.5)
EST. GFR  (NO RACE VARIABLE): >60 ML/MIN/1.73 M^2
FERRITIN SERPL-MCNC: 112 NG/ML (ref 20–300)
GLUCOSE SERPL-MCNC: 191 MG/DL (ref 70–110)
HCT VFR BLD AUTO: 38.3 % (ref 37–48.5)
HGB BLD-MCNC: 12.8 G/DL (ref 12–16)
IMM GRANULOCYTES # BLD AUTO: 0.02 K/UL (ref 0–0.04)
IMM GRANULOCYTES NFR BLD AUTO: 0.5 % (ref 0–0.5)
IRON SERPL-MCNC: 93 UG/DL (ref 30–160)
LYMPHOCYTES # BLD AUTO: 0.5 K/UL (ref 1–4.8)
LYMPHOCYTES NFR BLD: 12 % (ref 18–48)
MCH RBC QN AUTO: 28.7 PG (ref 27–31)
MCHC RBC AUTO-ENTMCNC: 33.4 G/DL (ref 32–36)
MCV RBC AUTO: 86 FL (ref 82–98)
MONOCYTES # BLD AUTO: 0.3 K/UL (ref 0.3–1)
MONOCYTES NFR BLD: 8.4 % (ref 4–15)
NEUTROPHILS # BLD AUTO: 2.9 K/UL (ref 1.8–7.7)
NEUTROPHILS NFR BLD: 74.6 % (ref 38–73)
NRBC BLD-RTO: 0 /100 WBC
PLATELET # BLD AUTO: 156 K/UL (ref 150–450)
PMV BLD AUTO: 10.4 FL (ref 9.2–12.9)
POTASSIUM SERPL-SCNC: 3.8 MMOL/L (ref 3.5–5.1)
PROT SERPL-MCNC: 6.8 G/DL (ref 6–8.4)
RBC # BLD AUTO: 4.46 M/UL (ref 4–5.4)
SATURATED IRON: 25 % (ref 20–50)
SODIUM SERPL-SCNC: 138 MMOL/L (ref 136–145)
TOTAL IRON BINDING CAPACITY: 366 UG/DL (ref 250–450)
TRANSFERRIN SERPL-MCNC: 247 MG/DL (ref 200–375)
WBC # BLD AUTO: 3.83 K/UL (ref 3.9–12.7)

## 2025-03-01 PROCEDURE — 80053 COMPREHEN METABOLIC PANEL: CPT | Performed by: INTERNAL MEDICINE

## 2025-03-01 PROCEDURE — 82728 ASSAY OF FERRITIN: CPT | Performed by: INTERNAL MEDICINE

## 2025-03-01 PROCEDURE — 85025 COMPLETE CBC W/AUTO DIFF WBC: CPT | Performed by: INTERNAL MEDICINE

## 2025-03-01 PROCEDURE — 36415 COLL VENOUS BLD VENIPUNCTURE: CPT | Performed by: INTERNAL MEDICINE

## 2025-03-01 PROCEDURE — 83540 ASSAY OF IRON: CPT | Performed by: INTERNAL MEDICINE

## 2025-03-03 ENCOUNTER — PATIENT MESSAGE (OUTPATIENT)
Dept: PSYCHIATRY | Facility: CLINIC | Age: 65
End: 2025-03-03
Payer: MEDICARE

## 2025-03-06 ENCOUNTER — TELEPHONE (OUTPATIENT)
Dept: FAMILY MEDICINE | Facility: CLINIC | Age: 65
End: 2025-03-06
Payer: MEDICARE

## 2025-03-06 ENCOUNTER — TELEPHONE (OUTPATIENT)
Dept: GASTROENTEROLOGY | Facility: CLINIC | Age: 65
End: 2025-03-06
Payer: MEDICARE

## 2025-03-06 ENCOUNTER — TELEPHONE (OUTPATIENT)
Dept: HEMATOLOGY/ONCOLOGY | Facility: CLINIC | Age: 65
End: 2025-03-06
Payer: MEDICARE

## 2025-03-06 NOTE — TELEPHONE ENCOUNTER
----- Message from [place] sent at 3/6/2025  2:24 PM CST -----  Contact: Alanis/Sister  Type:  Sooner Apoointment Request Caller is requesting a sooner appointment. Caller will not accept being placed on the waitlist and is requesting a message be sent to doctor. Name of Caller:Alanis When is the first available appointment?scheduled 3/11/25 Symptoms:6 month follow-up/labWould the patient rather a call back or a response via [facility]? call [facility] Call Back Number:[phone] Additional Information: Patient's sister request to reschedule patient's visit for another time on the same. Request not to cancel visit at this time. Thank you, GH

## 2025-03-06 NOTE — TELEPHONE ENCOUNTER
----- Message from [place] sent at 3/6/2025  2:21 PM CST -----  Contact: Aalnis/Sister  Type:  Sooner Apoointment RequestCaller is requesting a sooner appointment. Caller will not accept being placed on the waitlist and is requesting a message be sent to doctor.Name of Caller:Lex is the first available appointment?unknownSymptoms:GI/abdominal/bowel issuesWould the patient rather a call back or a response via [facility]? [facility] Call Back Number:[phone] Additional Information: Patient's sister request to discuss patient's care.Thank you,GH

## 2025-03-06 NOTE — TELEPHONE ENCOUNTER
----- Message from [place] sent at 3/6/2025  3:31 PM CST -----  Contact: [phone]  Type:  Needs Medical AdviceWho Called: sisterSymptoms (please be specific): need to talk to the nurse about she is having swallowing problem, she is not eating as much as she use to  How long has patient had these symptoms:  Pharmacy name and phone #:  Would the patient rather a call back or a response via [facility]? Call [facility] Call Back Number: [phone] Information: n [phone]

## 2025-03-06 NOTE — TELEPHONE ENCOUNTER
Contacted pt in regards to appt. Pt was advised that she has one of the soonest appt and she has been added to the wait list. Pt verbalized understanding.

## 2025-03-14 ENCOUNTER — OFFICE VISIT (OUTPATIENT)
Dept: PSYCHIATRY | Facility: CLINIC | Age: 65
End: 2025-03-14
Payer: MEDICARE

## 2025-03-14 VITALS
WEIGHT: 109.81 LBS | HEART RATE: 71 BPM | SYSTOLIC BLOOD PRESSURE: 157 MMHG | DIASTOLIC BLOOD PRESSURE: 79 MMHG | BODY MASS INDEX: 23.76 KG/M2

## 2025-03-14 DIAGNOSIS — R45.1 RESTLESSNESS AND AGITATION: ICD-10-CM

## 2025-03-14 DIAGNOSIS — G24.01 TARDIVE DYSKINESIA: ICD-10-CM

## 2025-03-14 PROCEDURE — 99999 PR PBB SHADOW E&M-EST. PATIENT-LVL II: CPT | Mod: PBBFAC,,, | Performed by: PSYCHIATRY & NEUROLOGY

## 2025-03-14 PROCEDURE — 99214 OFFICE O/P EST MOD 30 MIN: CPT | Mod: S$PBB,,, | Performed by: PSYCHIATRY & NEUROLOGY

## 2025-03-14 PROCEDURE — 99212 OFFICE O/P EST SF 10 MIN: CPT | Mod: PBBFAC | Performed by: PSYCHIATRY & NEUROLOGY

## 2025-03-14 RX ORDER — LITHIUM CARBONATE 300 MG/1
CAPSULE ORAL
Qty: 30 CAPSULE | Refills: 3 | Status: SHIPPED | OUTPATIENT
Start: 2025-03-14

## 2025-03-14 RX ORDER — CLONAZEPAM 0.5 MG/1
0.5 TABLET ORAL DAILY PRN
Qty: 30 TABLET | Refills: 3 | Status: SHIPPED | OUTPATIENT
Start: 2025-03-14 | End: 2026-03-14

## 2025-03-14 RX ORDER — VALBENAZINE 40 MG/1
40 CAPSULE ORAL DAILY
Qty: 30 CAPSULE | Refills: 3 | Status: SHIPPED | OUTPATIENT
Start: 2025-03-14

## 2025-03-14 RX ORDER — TRAZODONE HYDROCHLORIDE 150 MG/1
150 TABLET ORAL NIGHTLY PRN
Qty: 30 TABLET | Refills: 3 | Status: SHIPPED | OUTPATIENT
Start: 2025-03-14 | End: 2026-03-14

## 2025-03-14 RX ORDER — QUETIAPINE FUMARATE 200 MG/1
TABLET, FILM COATED ORAL
Qty: 90 TABLET | Refills: 3 | Status: SHIPPED | OUTPATIENT
Start: 2025-03-14
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
## 2025-03-17 ENCOUNTER — TELEPHONE (OUTPATIENT)
Dept: HEMATOLOGY/ONCOLOGY | Facility: CLINIC | Age: 65
End: 2025-03-17
Payer: MEDICARE

## 2025-03-17 NOTE — TELEPHONE ENCOUNTER
----- Message from Corinne sent at 3/17/2025 11:50 AM CDT -----  .Type:   Appointment RequestName of Caller:. Patient's sister, Lex is the first available appointment?03/19/2025Best Call Back Number:.[phone] Additional Information: She would like to know if there is availability tomorrow. Please call Alanis back to advise at the number listed above. Melax. EL

## 2025-03-18 ENCOUNTER — TELEPHONE (OUTPATIENT)
Dept: FAMILY MEDICINE | Facility: CLINIC | Age: 65
End: 2025-03-18
Payer: MEDICARE